# Patient Record
Sex: FEMALE | Race: WHITE | NOT HISPANIC OR LATINO | Employment: OTHER | URBAN - METROPOLITAN AREA
[De-identification: names, ages, dates, MRNs, and addresses within clinical notes are randomized per-mention and may not be internally consistent; named-entity substitution may affect disease eponyms.]

---

## 2017-05-16 ENCOUNTER — ALLSCRIPTS OFFICE VISIT (OUTPATIENT)
Dept: OTHER | Facility: OTHER | Age: 47
End: 2017-05-16

## 2017-05-16 ENCOUNTER — HOSPITAL ENCOUNTER (OUTPATIENT)
Dept: RADIOLOGY | Facility: HOSPITAL | Age: 47
Discharge: HOME | End: 2017-05-16
Attending: ORTHOPAEDIC SURGERY
Payer: COMMERCIAL

## 2017-05-16 DIAGNOSIS — M25.569 PAIN IN KNEE: ICD-10-CM

## 2017-05-16 DIAGNOSIS — M16.12 PRIMARY OSTEOARTHRITIS OF LEFT HIP: ICD-10-CM

## 2017-05-16 DIAGNOSIS — M25.552 PAIN IN LEFT HIP: ICD-10-CM

## 2017-05-16 PROCEDURE — 73502 X-RAY EXAM HIP UNI 2-3 VIEWS: CPT

## 2017-05-24 ENCOUNTER — APPOINTMENT (OUTPATIENT)
Dept: PHYSICAL THERAPY | Facility: REHABILITATION | Age: 47
End: 2017-05-24
Payer: COMMERCIAL

## 2017-05-24 DIAGNOSIS — M25.552 PAIN IN LEFT HIP: ICD-10-CM

## 2017-05-24 DIAGNOSIS — M25.569 PAIN IN KNEE: ICD-10-CM

## 2017-05-24 PROCEDURE — 97162 PT EVAL MOD COMPLEX 30 MIN: CPT

## 2017-05-24 PROCEDURE — 97110 THERAPEUTIC EXERCISES: CPT

## 2017-05-30 ENCOUNTER — APPOINTMENT (OUTPATIENT)
Dept: PHYSICAL THERAPY | Facility: REHABILITATION | Age: 47
End: 2017-05-30
Payer: COMMERCIAL

## 2017-05-30 PROCEDURE — 97110 THERAPEUTIC EXERCISES: CPT

## 2017-05-30 PROCEDURE — 97140 MANUAL THERAPY 1/> REGIONS: CPT

## 2017-06-05 ENCOUNTER — APPOINTMENT (OUTPATIENT)
Dept: PHYSICAL THERAPY | Facility: REHABILITATION | Age: 47
End: 2017-06-05
Payer: COMMERCIAL

## 2017-06-05 PROCEDURE — 97110 THERAPEUTIC EXERCISES: CPT

## 2017-06-08 ENCOUNTER — APPOINTMENT (OUTPATIENT)
Dept: PHYSICAL THERAPY | Facility: REHABILITATION | Age: 47
End: 2017-06-08
Payer: COMMERCIAL

## 2017-06-08 PROCEDURE — 97140 MANUAL THERAPY 1/> REGIONS: CPT

## 2017-06-08 PROCEDURE — 97110 THERAPEUTIC EXERCISES: CPT

## 2017-06-13 ENCOUNTER — APPOINTMENT (OUTPATIENT)
Dept: PHYSICAL THERAPY | Facility: REHABILITATION | Age: 47
End: 2017-06-13
Payer: COMMERCIAL

## 2017-06-13 PROCEDURE — 97110 THERAPEUTIC EXERCISES: CPT

## 2017-06-13 PROCEDURE — 97140 MANUAL THERAPY 1/> REGIONS: CPT

## 2017-06-16 ENCOUNTER — APPOINTMENT (OUTPATIENT)
Dept: PHYSICAL THERAPY | Facility: REHABILITATION | Age: 47
End: 2017-06-16
Payer: COMMERCIAL

## 2017-06-16 PROCEDURE — 97140 MANUAL THERAPY 1/> REGIONS: CPT

## 2017-06-16 PROCEDURE — 97110 THERAPEUTIC EXERCISES: CPT

## 2017-06-19 ENCOUNTER — APPOINTMENT (OUTPATIENT)
Dept: PHYSICAL THERAPY | Facility: REHABILITATION | Age: 47
End: 2017-06-19
Payer: COMMERCIAL

## 2017-06-20 ENCOUNTER — APPOINTMENT (OUTPATIENT)
Dept: PHYSICAL THERAPY | Facility: REHABILITATION | Age: 47
End: 2017-06-20
Payer: COMMERCIAL

## 2017-06-20 PROCEDURE — 97110 THERAPEUTIC EXERCISES: CPT

## 2017-06-20 PROCEDURE — 97140 MANUAL THERAPY 1/> REGIONS: CPT

## 2017-06-23 ENCOUNTER — APPOINTMENT (OUTPATIENT)
Dept: PHYSICAL THERAPY | Facility: REHABILITATION | Age: 47
End: 2017-06-23
Payer: COMMERCIAL

## 2017-06-26 ENCOUNTER — APPOINTMENT (OUTPATIENT)
Dept: PHYSICAL THERAPY | Facility: REHABILITATION | Age: 47
End: 2017-06-26
Payer: COMMERCIAL

## 2017-06-26 PROCEDURE — 97110 THERAPEUTIC EXERCISES: CPT

## 2017-06-26 PROCEDURE — 97140 MANUAL THERAPY 1/> REGIONS: CPT

## 2017-06-29 ENCOUNTER — APPOINTMENT (OUTPATIENT)
Dept: PHYSICAL THERAPY | Facility: REHABILITATION | Age: 47
End: 2017-06-29
Payer: COMMERCIAL

## 2017-06-29 PROCEDURE — 97110 THERAPEUTIC EXERCISES: CPT

## 2017-06-29 PROCEDURE — 97140 MANUAL THERAPY 1/> REGIONS: CPT

## 2017-07-03 ENCOUNTER — APPOINTMENT (OUTPATIENT)
Dept: PHYSICAL THERAPY | Facility: REHABILITATION | Age: 47
End: 2017-07-03
Payer: COMMERCIAL

## 2017-07-03 PROCEDURE — 97140 MANUAL THERAPY 1/> REGIONS: CPT

## 2017-07-03 PROCEDURE — 97110 THERAPEUTIC EXERCISES: CPT

## 2017-07-06 ENCOUNTER — APPOINTMENT (OUTPATIENT)
Dept: PHYSICAL THERAPY | Facility: REHABILITATION | Age: 47
End: 2017-07-06
Payer: COMMERCIAL

## 2017-07-06 ENCOUNTER — GENERIC CONVERSION - ENCOUNTER (OUTPATIENT)
Dept: OTHER | Facility: OTHER | Age: 47
End: 2017-07-06

## 2017-07-06 PROCEDURE — 97110 THERAPEUTIC EXERCISES: CPT

## 2017-07-06 PROCEDURE — 97140 MANUAL THERAPY 1/> REGIONS: CPT

## 2017-07-11 ENCOUNTER — APPOINTMENT (OUTPATIENT)
Dept: PHYSICAL THERAPY | Facility: REHABILITATION | Age: 47
End: 2017-07-11
Payer: COMMERCIAL

## 2017-07-11 PROCEDURE — 97140 MANUAL THERAPY 1/> REGIONS: CPT

## 2017-07-11 PROCEDURE — 97110 THERAPEUTIC EXERCISES: CPT

## 2017-07-14 ENCOUNTER — APPOINTMENT (OUTPATIENT)
Dept: PHYSICAL THERAPY | Facility: REHABILITATION | Age: 47
End: 2017-07-14
Payer: COMMERCIAL

## 2017-07-14 PROCEDURE — 97140 MANUAL THERAPY 1/> REGIONS: CPT

## 2017-07-14 PROCEDURE — 97110 THERAPEUTIC EXERCISES: CPT

## 2017-07-17 ENCOUNTER — APPOINTMENT (OUTPATIENT)
Dept: PHYSICAL THERAPY | Facility: REHABILITATION | Age: 47
End: 2017-07-17
Payer: COMMERCIAL

## 2017-07-17 PROCEDURE — 97140 MANUAL THERAPY 1/> REGIONS: CPT

## 2017-07-17 PROCEDURE — 97110 THERAPEUTIC EXERCISES: CPT

## 2017-07-20 ENCOUNTER — APPOINTMENT (OUTPATIENT)
Dept: PHYSICAL THERAPY | Facility: REHABILITATION | Age: 47
End: 2017-07-20
Payer: COMMERCIAL

## 2017-07-25 ENCOUNTER — APPOINTMENT (OUTPATIENT)
Dept: PHYSICAL THERAPY | Facility: REHABILITATION | Age: 47
End: 2017-07-25
Payer: COMMERCIAL

## 2017-07-27 ENCOUNTER — GENERIC CONVERSION - ENCOUNTER (OUTPATIENT)
Dept: OTHER | Facility: OTHER | Age: 47
End: 2017-07-27

## 2017-07-27 ENCOUNTER — APPOINTMENT (OUTPATIENT)
Dept: PHYSICAL THERAPY | Facility: REHABILITATION | Age: 47
End: 2017-07-27
Payer: COMMERCIAL

## 2017-07-27 PROCEDURE — 97110 THERAPEUTIC EXERCISES: CPT

## 2017-07-27 PROCEDURE — 97140 MANUAL THERAPY 1/> REGIONS: CPT

## 2017-08-30 ENCOUNTER — GENERIC CONVERSION - ENCOUNTER (OUTPATIENT)
Dept: OTHER | Facility: OTHER | Age: 47
End: 2017-08-30

## 2017-10-11 ENCOUNTER — ALLSCRIPTS OFFICE VISIT (OUTPATIENT)
Dept: OTHER | Facility: OTHER | Age: 47
End: 2017-10-11

## 2017-10-13 NOTE — PROGRESS NOTES
Assessment  1  Arm numbness (782 0) (R20 0)    Plan  Arm numbness    · *1 - [facility]  *  Status: Active  Requested for: 96NDL8401  Care Summary provided  : Yes   · *1 - [facility]  *  Status: Active  Requested for:  10THN4172  Care Summary provided  : Yes   · ASS Instruction Sheet Numbness Given; Status:Complete;   Done: 23WLL7966    Discussion/Summary    Scribe attestation:  Jess Patsy am acting as a scribe while in the presence of the attending physician  Attestation:  Angeles Sepulveda MD, personally performed the services described in this documentation as scribed in my presence  likelihood of symptoms not caused by carpal tunnel syndrome, but rather a broader neurologic problem  This is given the fact that the patient has multiple areas of nerve aggravation, weakness to multiple muscles, she describes numbness on both the dorsal and palmar aspect of the hand, she did not get better with carpal tunnel injections  follow-up with neurology and/or pain management  as needed I do not feel that surgical intervention in the form of a carpal tunnel release will benefit this patient  Chief Complaint  1  Hand Problem  Patient presents for follow-up of numbness and tingling in both hands  History of Present Illness  HPI: Patient is a 14-year-old, female who presents for follow-up to numbness tingling and weakness in both hands  He was last seen in the office on 8/30/17  EMG testing revealed a mild median neuropathy in both wrists  At last office visit, she received bilateral bilateral corticosteroid injections into both wrists, she was also advised to wear volar wrist splints at bedtime  Patient admits wearing splints as directed with no relief of symptoms she further admits no relief of symptoms after corticosteroid injections  He continues to complain of numbness and tingling in both hands all fingers both palmar and dorsal aspects   She notes bilateral hand pain especially with finger flexion  As well as, hand and arm weakness  PMH is significant for fibromyalgia  past medical, surgical, family, and social history as well as medications and allergies were reviewed  Updates as needed were performed  Review of systems was recorded on a separate intake sheet, this was reviewed as well  Review of Systems    Constitutional: No fever, no chills, feels well, no tiredness, no recent weight gain or loss  Eyes: No complaints of eyesight problems, no red eyes  ENT: no loss of hearing, no nosebleeds, no sore throat  Cardiovascular: No complaints of chest pain, no palpitations, no leg claudication or lower extremity edema  Respiratory: no compliants of shortness of breath, no wheezing, no cough  Gastrointestinal: no complaints of abdominal pain, no constipation, no nausea or diarrhea, no vomiting, no bloody stools  Genitourinary: no complaints of dysuria, no incontinence  Musculoskeletal: as noted in HPI  Integumentary: no complaints of skin rash or lesion, no itching or dry skin, no skin wounds  Neurological: as noted in HPI  Endocrine: No complaints of muscle weakness, no feelings of weakness, no frequent urination, no excessive thirst    Psychiatric: No suicidal thoughts, no anxiety, no feelings of depression  ROS reviewed  Active Problems  1  Arm numbness (782 0) (R20 0)   2  Arthritis, degenerative (715 90) (M19 90)   3  Asthma (493 90) (J45 909)   4  Bilateral low back pain with sciatica, sciatica laterality unspecified   5  Chronic back pain greater than 3 months duration (724 5,338 29) (M54 9,G89 29)   6  Knee injury, left, initial encounter (959 7) (S89 92XA)   7  Knee pain (719 46) (M25 569)   8  Left hip pain (719 45) (M25 552)   9  Postop check (V67 00) (Z09)   10  Primary localized osteoarthritis of left hip (715 15) (M16 12)   11  Primary osteoarthritis of left knee (715 16) (M17 12)   12   Tear of lateral meniscus of knee, left, sequela    Current Meds   1  Azelastine HCl - 0 05 % Ophthalmic Solution; Therapy: 42AFH6088 to Recorded   2  [employer] Cortisone Maximum Strength 1 % External Cream;   Therapy: 47Lov8513 to Recorded   3  DULoxetine HCl - 60 MG Oral Capsule Delayed Release Particles; Therapy: 06GVT0913 to Recorded   4  Ferrous Sulfate 325 (65 Fe) MG Oral Tablet Recorded   5  Hydrocortisone 2 5 % External Cream;   Therapy: 73Ern8037 to Recorded   6  Lisinopril 10 MG Oral Tablet; Therapy: 07KLV9458 to Recorded   7  Loratadine 10 MG Oral Tablet Recorded   8  Meloxicam 15 MG Oral Tablet; TAKE 1 TABLET DAILY WITH FOOD; Therapy: 73UNP7921 to (Evaluate:27Fqm6369)  Requested for: 55BVK5735; Last   Rx:56Rrs6662 Ordered   9  Meloxicam 7 5 MG Oral Tablet; Take 1 tablet twice daily as needed; Therapy: 85MHK9710 to (Evaluate:84Mbo8916)  Requested for: 24OUM5867; Last   Rx:43Qtu2164 Ordered   10  Oseltamivir Phosphate 75 MG Oral Capsule; Therapy: 43XUT2751 to Recorded   11  Simvastatin 40 MG Oral Tablet Recorded   12  Simvastatin 40 MG Oral Tablet; Therapy: 11Yra5327 to Recorded   13  Symbicort 160-4 5 MCG/ACT Inhalation Aerosol; Therapy: 45Fst2776 to Recorded   14  TraMADol HCl - 50 MG Oral Tablet; TAKE 1 TO 2 TABLETS EVERY 4 TO 6 HOURS AS    NEEDED FOR PAIN;    Therapy: 13FED7005 to (Evaluate:49Njh9023); Last Rx:71Sge3086 Ordered   15  TraZODone HCl - 50 MG Oral Tablet Recorded   16  Ventolin  (90 Base) MCG/ACT Inhalation Aerosol Solution; INHALE 2 PUFFS    EVERY 4-6 HOURS AS NEEDED; Therapy: 26GAL5116 to (Evaluate:06Jun2013); Last Rx:52Zis3455 Ordered   17  Vitamin D 2000 UNIT Oral Capsule Recorded   18  Vitamin D 2000 UNIT Oral Capsule; Therapy: 54Bme3881 to Recorded    Allergies  1   No Known Drug Allergies    Vitals  Signs   Heart Rate: 74  Systolic: 664  Diastolic: 85  Height: 5 ft 6 in  Weight: 274 lb 6 oz  BMI Calculated: 44 29  BSA Calculated: 2 29    Physical Exam      General: No acute distress, age-appropriate  Neck: Supple, trachea midline  HEENT: Normocephalic atraumatic, mucous membranes are moist, sclera are nonicteric  Cardiovascular: No discernable arrhythmia  Respiratory: Breathing is even and unlabored, no stridor or audible wheezing  Psychiatric: Awake alert and oriented x3, normal mood and affect  Abdomen: Without rebound or guarding  Bilateral upper extremities: Patient has a positive Tinel's ranging from the wrists to shoulders including the median nerves at the level of the infraclavicular area of the brachial plexus    Weakness to wrist extension on the left  Nearly full composite fist formation of the left with distraction, however with observation, patient only able to make a fist 50% of the way  She has global weakness of anterior interosseous muscles bilaterally, weakness abductor pollicis brevis bilaterally, weakness of biceps muscles bilaterally  full fist formation on the right mild interosseous weakness  Skin: was evaluated and demonstrated no masses, no abrasions, no erythema, no effusion, no edema, no fluctuance, no induration, no laceration, no ulceration, no lymphadenopathy  Left Upper Neurovascular:   2+ bilateral radial pulses  Results/Data    Diagnostic Review EMG demonstrates a very mild median neuropathy across both wrists  Future Appointments    Date/Time Provider Specialty Site   10/24/2017 12:10 PM ALEJANDRA Blanco   Orthopedic Surgery [address]     Signatures   Electronically signed by : HALINA Goode; Oct 11 2017  2:16PM EST                       (Author)    Electronically signed by : ALEJANDRA Rowell ; Oct 11 2017  7:15PM EST                       (Author)

## 2017-10-24 ENCOUNTER — GENERIC CONVERSION - ENCOUNTER (OUTPATIENT)
Dept: OTHER | Facility: OTHER | Age: 47
End: 2017-10-24

## 2017-12-12 ENCOUNTER — GENERIC CONVERSION - ENCOUNTER (OUTPATIENT)
Dept: OTHER | Facility: OTHER | Age: 47
End: 2017-12-12

## 2017-12-12 ENCOUNTER — CLINICAL SUPPORT (OUTPATIENT)
Dept: OTHER | Facility: OTHER | Age: 47
End: 2017-12-12

## 2017-12-28 ENCOUNTER — ALLSCRIPTS OFFICE VISIT (OUTPATIENT)
Dept: OTHER | Facility: OTHER | Age: 47
End: 2017-12-28

## 2017-12-29 ENCOUNTER — GENERIC CONVERSION - ENCOUNTER (OUTPATIENT)
Dept: OTHER | Facility: OTHER | Age: 47
End: 2017-12-29

## 2017-12-29 DIAGNOSIS — M54.16 RADICULOPATHY OF LUMBAR REGION: ICD-10-CM

## 2017-12-29 DIAGNOSIS — M54.12 RADICULOPATHY OF CERVICAL REGION: ICD-10-CM

## 2017-12-30 NOTE — PROGRESS NOTES
Assessment   1  Primary osteoarthritis of left knee (715 16) (M17 12)    Plan   Knee injury, left, initial encounter    · Euflexxa 20 MG/2ML Intra-articular Solution Prefilled Syringe  Primary osteoarthritis of left knee    · Follow-up visit in 1 week Evaluation and Treatment  Follow-up  Status: Hold For -    Scheduling  Requested for: 88Ylv5257    Discussion/Summary   Left knee DJD  Plan is as follows:          Weightbearing as tolerated          Viscosupplementation injection was administered which patient tolerated well to the left knee (Euflexxa)          Follow-up in 1 week for the 2nd of a series of 3 injections          Discussed treatment plan with patient and she is in agreement treatment plan  Thank you         Chief Complaint   1  Knee Pain    History of Present Illness   HPI: 54-year-old female presents at this time secondary to left knee DJD  Patient has failed steroid injection treatment to the left knee  Patient continues to have pain over the medial aspect of the knee  Review of Systems      ROS reviewed  Active Problems   1  Arm numbness (782 0) (R20 0)   2  Arthritis, degenerative (715 90) (M19 90)   3  Asthma (493 90) (J45 909)   4  Bilateral low back pain with sciatica, sciatica laterality unspecified   5  Carpal tunnel syndrome, bilateral (354 0) (G56 03)   6  Cervical radiculopathy (723 4) (M54 12)   7  Chronic back pain greater than 3 months duration (724 5,338 29) (M54 9,G89 29)   8  Degenerative disc disease, cervical (722 4) (M50 30)   9  Knee injury, left, initial encounter (959 7) (S89 92XA)   10  Knee pain (719 46) (M25 569)   11  Left hip pain (719 45) (M25 552)   12  Lumbar radiculopathy (724 4) (M54 16)   13  Postop check (V67 00) (Z09)   14  Primary localized osteoarthritis of left hip (715 15) (M16 12)   15  Primary osteoarthritis of left knee (715 16) (M17 12)   16  Sacroiliitis (720 2) (M46 1)   17   Tear of lateral meniscus of knee, left, sequela    Past Medical History    · History of Anxiety (300 00) (F41 9)   · Arthritis, degenerative (715 90) (M19 90)   · Asthma (493 90) (J45 909)   · History of arthritis (V13 4) (Z87 39)   · History of depression (V11 8) (Z86 59)   · History of fibromyalgia (V13 59) (Z87 39)   · History of hypercholesterolemia (V12 29) (Z86 39)   · History of malignant neoplasm (V10 90) (Z85 9)     The active problems and past medical history were reviewed and updated today  Surgical History    · History of Hip Surgery   · History of Hysterectomy   · History of Knee Surgery     The surgical history was reviewed and updated today  Family History   Mother    · Family history of arthritis (V17 7) (Z82 61)   · Family history of diabetes mellitus (V18 0) (Z83 3)  Father    · Family history of arthritis (V17 7) (Z82 61)   · Family history of diabetes mellitus (V18 0) (Z83 3)   · Family history of malignant neoplasm (V16 9) (Z80 9)  Sister    · Family history of diabetes mellitus (V18 0) (Z83 3)  Brother    · Family history of diabetes mellitus (V18 0) (Z83 3)  Unknown    · Family history of Back disorder   · Family history of hypertension (V17 49) (Z82 49)   · Family history of malignant neoplasm of thyroid (V16 8) (Z80 8)     The family history was reviewed and updated today  Social History    · Never a smoker   · No alcohol use  The social history was reviewed and updated today  Current Meds    1  Azelastine HCl - 0 05 % Ophthalmic Solution; Therapy: 62BHU5469 to Recorded   2  BuPROPion HCl ER (XL) 300 MG Oral Tablet Extended Release 24 Hour; Therapy: 74XXK8428 to (Evaluate:27Jan2018) Recorded   3  [employer] Cortisone Maximum Strength 1 % External Cream;     Therapy: 84Qpd9925 to Recorded   4  DULoxetine HCl - 60 MG Oral Capsule Delayed Release Particles; Therapy: 40AYA6828 to Recorded   5  Euflexxa 20 MG/2ML Intra-articular Solution Prefilled Syringe; Therapy: 42FQD2966 to (Evaluate:18Jan2018) Recorded   6   Ferrous Sulfate 325 (65 Fe) MG Oral Tablet Recorded   7  Gabapentin 300 MG Oral Capsule; TAKE 1 CAPSULE 3 TIMES DAILY; Therapy: 35FHP6009 to (Evaluate:29Mar2018)  Requested for: 85Hkf9756; Last     Rx:74Npi4778 Ordered   8  Hydrocortisone 2 5 % External Cream;     Therapy: 88Xba6027 to Recorded   9  Lisinopril 10 MG Oral Tablet; Therapy: 77FTL7434 to Recorded   10  Loratadine 10 MG Oral Tablet Recorded   11  Meloxicam 15 MG Oral Tablet; TAKE 1 TABLET DAILY WITH FOOD; Therapy: 55IIU3665 to (Evaluate:76Jue3052)  Requested for: 96UHP9563; Last      Rx:83Jtt2046 Ordered   12  Oseltamivir Phosphate 75 MG Oral Capsule; Therapy: 54ZQF5606 to Recorded   13  Simvastatin 40 MG Oral Tablet Recorded   14  Simvastatin 40 MG Oral Tablet; Therapy: 27Ybx6206 to Recorded   15  Symbicort 160-4 5 MCG/ACT Inhalation Aerosol; Therapy: 07Dus3910 to Recorded   16  TraMADol HCl - 50 MG Oral Tablet; Therapy: 57Lqz7031 to Recorded   17  TraZODone HCl - 50 MG Oral Tablet Recorded   18  Ventolin  (90 Base) MCG/ACT Inhalation Aerosol Solution; INHALE 2 PUFFS      EVERY 4-6 HOURS AS NEEDED; Therapy: 33NBB9535 to (Evaluate:06Jun2013); Last Rx:75Kgx6226 Ordered   19  Vitamin D 2000 UNIT Oral Capsule Recorded   20  Vitamin D 2000 UNIT Oral Capsule; Therapy: 02Goi0903 to Recorded     The medication list was reviewed and updated today  Allergies   1  iodine  2   Pollen    Vitals   Signs   Heart Rate: 80  Respiration: 20  Systolic: 619  Diastolic: 81  Weight: 341 lb     Physical Exam   Left knee:  No abrasions, no open wounds, no effusion, Joselyn tenderness, minimal lateral joint line tenderness, stable to coronal and sagittal plane stress, full range of motion, neurologically and vascularly intact distally                  Constitutional - General appearance: Normal       Musculoskeletal - Gait and station: Abnormal -- Digits and nails: Normal -- Muscle strength/tone: Normal -- Lower extremity compartments: Normal       Cardiovascular - Pulses: Normal -- Examination of extremities for edema and/or varicosities: Normal       Skin - Skin and subcutaneous tissue: Normal       Neurologic - Sensation: Normal       Psychiatric - Orientation to person, place, and time: Normal -- Mood and affect: Normal       Procedure   Injection of Euflexxa to the left knee secondary to pain and osteoarthritis  Patient total pros and cons  Alcohol ethyl chloride spray was used  Patient tolerated injection well  Band-Aid was placed  Future Appointments      Date/Time Provider Specialty Site   01/04/2018 03:40 PM ALEJANDRA Nava  Orthopedic Surgery [facility]   01/11/2018 01:10 PM ALEJANDRA Nava   Orthopedic Surgery [facility] [address] [place]   02/23/2018 11:30 AM Aron Kinney DO Pain Management [address]     Signatures    Electronically signed by : ALEJANDRA Gamboa ; Dec 29 2017 11:32AM EST                       (Author)

## 2018-01-04 ENCOUNTER — GENERIC CONVERSION - ENCOUNTER (OUTPATIENT)
Dept: OTHER | Facility: OTHER | Age: 48
End: 2018-01-04

## 2018-01-11 ENCOUNTER — GENERIC CONVERSION - ENCOUNTER (OUTPATIENT)
Dept: OTHER | Facility: OTHER | Age: 48
End: 2018-01-11

## 2018-01-11 ENCOUNTER — APPOINTMENT (OUTPATIENT)
Dept: PHYSICAL THERAPY | Facility: REHABILITATION | Age: 48
End: 2018-01-11
Payer: COMMERCIAL

## 2018-01-13 VITALS
HEART RATE: 77 BPM | SYSTOLIC BLOOD PRESSURE: 146 MMHG | DIASTOLIC BLOOD PRESSURE: 90 MMHG | WEIGHT: 262.13 LBS | BODY MASS INDEX: 42.31 KG/M2 | RESPIRATION RATE: 20 BRPM

## 2018-01-14 VITALS
WEIGHT: 274.38 LBS | HEIGHT: 66 IN | DIASTOLIC BLOOD PRESSURE: 85 MMHG | BODY MASS INDEX: 44.1 KG/M2 | SYSTOLIC BLOOD PRESSURE: 114 MMHG | HEART RATE: 74 BPM

## 2018-01-16 ENCOUNTER — GENERIC CONVERSION - ENCOUNTER (OUTPATIENT)
Dept: OTHER | Facility: OTHER | Age: 48
End: 2018-01-16

## 2018-01-19 ENCOUNTER — APPOINTMENT (OUTPATIENT)
Dept: PHYSICAL THERAPY | Facility: REHABILITATION | Age: 48
End: 2018-01-19
Payer: COMMERCIAL

## 2018-01-19 DIAGNOSIS — M54.12 RADICULOPATHY OF CERVICAL REGION: ICD-10-CM

## 2018-01-19 DIAGNOSIS — M54.16 RADICULOPATHY OF LUMBAR REGION: ICD-10-CM

## 2018-01-19 PROCEDURE — 97162 PT EVAL MOD COMPLEX 30 MIN: CPT

## 2018-01-19 PROCEDURE — G8979 MOBILITY GOAL STATUS: HCPCS

## 2018-01-19 PROCEDURE — 97110 THERAPEUTIC EXERCISES: CPT

## 2018-01-19 PROCEDURE — G8978 MOBILITY CURRENT STATUS: HCPCS

## 2018-01-22 ENCOUNTER — GENERIC CONVERSION - ENCOUNTER (OUTPATIENT)
Dept: OTHER | Facility: OTHER | Age: 48
End: 2018-01-22

## 2018-01-22 VITALS
WEIGHT: 274.38 LBS | SYSTOLIC BLOOD PRESSURE: 126 MMHG | BODY MASS INDEX: 44.1 KG/M2 | HEIGHT: 66 IN | HEART RATE: 77 BPM | DIASTOLIC BLOOD PRESSURE: 84 MMHG

## 2018-01-22 VITALS
WEIGHT: 280 LBS | SYSTOLIC BLOOD PRESSURE: 156 MMHG | BODY MASS INDEX: 45.19 KG/M2 | HEART RATE: 80 BPM | RESPIRATION RATE: 20 BRPM | DIASTOLIC BLOOD PRESSURE: 79 MMHG

## 2018-01-23 VITALS
RESPIRATION RATE: 20 BRPM | BODY MASS INDEX: 46 KG/M2 | SYSTOLIC BLOOD PRESSURE: 135 MMHG | HEART RATE: 80 BPM | WEIGHT: 285 LBS | DIASTOLIC BLOOD PRESSURE: 81 MMHG

## 2018-01-23 NOTE — MISCELLANEOUS
Message   Recorded as Task   Date: 12/12/2017 11:23 AM, Created By: Chuy Momin   Task Name: Miscellaneous   Assigned To: Dipika Minor   Regarding Patient: Ihsan Peters, Status: Active   Comment:    Dipika Minor - 12 Dec 2017 11:23 AM     TASK CREATED  Pt called stating she missed her appt today 10:45 because she came outside to a flat tire  I asked if she called the office to let them know  She said she did not have her phone on her    Pt rescheduled for 12/29 at 7:15am    Kali Puentes - 12 Dec 2017 12:45 PM     TASK REPLIED TO: Previously Assigned To [address]   Electronically signed by : Lynsey Rust, ; Dec 12 2017 12:58PM EST                       (Author)

## 2018-01-23 NOTE — MISCELLANEOUS
Message   Recorded as Task   Date: 01/15/2018 11:53 AM, Created By: Darinel Valdez   Task Name: Miscellaneous   Assigned To: [facility]   Regarding Patient: Festus Carter, Status: Active   CommentLacey Ruizgeolaura - 15 Yoel 2018 11:53 AM     TASK CREATED  Pt called and hasn't started PT yet  Has her 1st visit on Monday  Has appt scheduled for a follow up on 2/23  Should she keep this appt  or wait until after he PT is finished? Please call [phone] or her cell phone [phone]   Gladys Giron - 15 Yoel 2018 12:17 PM     TASK EDITED   Shreya Hilliard - 15 Yoel 2018 5:01 PM     TASK REPLIED TO: Previously Assigned To Shreya Hilliard                      The patient can keep her follow-up appointment as she will have completed about a month of physical therapy at that time and we can evaluate whether not she is making progress   [facility] - 16 Jan 2018 8:11 AM     TASK EDITED  S/w pt  Advised pt of the same  Pt verbalized understanding  Active Problems    1  Arm numbness (782 0) (R20 0)   2  Arthritis, degenerative (715 90) (M19 90)   3  Asthma (493 90) (J45 909)   4  Bilateral low back pain with sciatica, sciatica laterality unspecified   5  Carpal tunnel syndrome, bilateral (354 0) (G56 03)   6  Cervical radiculopathy (723 4) (M54 12)   7  Chronic back pain greater than 3 months duration (724 5,338 29) (M54 9,G89 29)   8  Degenerative disc disease, cervical (722 4) (M50 30)   9  Knee injury, left, initial encounter (959 7) (S89 92XA)   10  Knee pain (719 46) (M25 569)   11  Left hip pain (719 45) (M25 552)   12  Lumbar radiculopathy (724 4) (M54 16)   13  Postop check (V67 00) (Z09)   14  Primary localized osteoarthritis of left hip (715 15) (M16 12)   15  Primary osteoarthritis of left knee (715 16) (M17 12)   16  Sacroiliitis (720 2) (M46 1)   17  Tear of lateral meniscus of knee, left, sequela    Current Meds   1  Azelastine HCl - 0 05 % Ophthalmic Solution; Therapy: 87ZMR9199 to Recorded   2  BuPROPion HCl ER (XL) 150 MG Oral Tablet Extended Release 24 Hour; Therapy: 04EPW1393 to (Evaluate:03Feb2018) Recorded   3  BuPROPion HCl ER (XL) 300 MG Oral Tablet Extended Release 24 Hour; Therapy: 97CQA6681 to (Evaluate:27Jan2018) Recorded   4  [employer] Cortisone Maximum Strength 1 % External Cream;   Therapy: 82Ryq6080 to Recorded   5  DULoxetine HCl - 60 MG Oral Capsule Delayed Release Particles; Therapy: 31WKM7952 to Recorded   6  Euflexxa 20 MG/2ML Intra-articular Solution Prefilled Syringe; Therapy: 70KMQ6650 to (Evaluate:18Jan2018) Recorded   7  Ferrous Sulfate 325 (65 Fe) MG Oral Tablet Recorded   8  Gabapentin 300 MG Oral Capsule; TAKE 1 CAPSULE 3 TIMES DAILY; Therapy: 32DBN4461 to (Evaluate:29Mar2018)  Requested for: 04Qrg3844; Last   Rx:78Ywc4970 Ordered   9  Hydrocortisone 2 5 % External Cream;   Therapy: 28Apr2017 to Recorded   10  Lisinopril 10 MG Oral Tablet; Therapy: 82MRT7120 to Recorded   11  Loratadine 10 MG Oral Tablet Recorded   12  Meloxicam 15 MG Oral Tablet; TAKE 1 TABLET DAILY WITH FOOD; Therapy: 48IMI2781 to (Evaluate:65Rax9574)  Requested for: 88IMY3560; Last    Rx:27Raq5973 Ordered   13  Oseltamivir Phosphate 75 MG Oral Capsule; Therapy: 22MJE5171 to Recorded   14  Simvastatin 40 MG Oral Tablet Recorded   15  Simvastatin 40 MG Oral Tablet; Therapy: 06Jng3129 to Recorded   16  Symbicort 160-4 5 MCG/ACT Inhalation Aerosol; Therapy: 62Nhg8336 to Recorded   17  TraMADol HCl - 50 MG Oral Tablet; Therapy: 92MLG3834 to Recorded   18  TraZODone HCl - 50 MG Oral Tablet Recorded   19  Ventolin  (90 Base) MCG/ACT Inhalation Aerosol Solution; INHALE 2 PUFFS    EVERY 4-6 HOURS AS NEEDED; Therapy: 78YAK2857 to (Evaluate:06Jun2013); Last Rx:06Feb2013 Ordered   20  Vitamin D 2000 UNIT Oral Capsule Recorded   21  Vitamin D 2000 UNIT Oral Capsule; Therapy: 35Flq4560 to Recorded    Allergies    1  iodine    2   Pollen    Signatures   Electronically signed by : Yaw Haddad Khurram Nevarez, ; Jan 16 2018  8:11AM EST                       (Author)

## 2018-01-23 NOTE — MISCELLANEOUS
Signatures   Electronically signed by : Nicole Magana DO; Dec 12 2017 11:58AM EST                       (Author)

## 2018-01-24 VITALS
BODY MASS INDEX: 46.81 KG/M2 | DIASTOLIC BLOOD PRESSURE: 88 MMHG | SYSTOLIC BLOOD PRESSURE: 140 MMHG | RESPIRATION RATE: 20 BRPM | WEIGHT: 290 LBS | HEART RATE: 82 BPM

## 2018-01-24 VITALS
HEART RATE: 79 BPM | BODY MASS INDEX: 45.96 KG/M2 | TEMPERATURE: 97.7 F | HEIGHT: 66 IN | SYSTOLIC BLOOD PRESSURE: 156 MMHG | DIASTOLIC BLOOD PRESSURE: 92 MMHG | WEIGHT: 286 LBS

## 2018-01-24 VITALS
DIASTOLIC BLOOD PRESSURE: 81 MMHG | HEART RATE: 91 BPM | SYSTOLIC BLOOD PRESSURE: 132 MMHG | WEIGHT: 291 LBS | RESPIRATION RATE: 18 BRPM | BODY MASS INDEX: 46.97 KG/M2

## 2018-01-26 ENCOUNTER — OFFICE VISIT (OUTPATIENT)
Dept: PHYSICAL THERAPY | Facility: REHABILITATION | Age: 48
End: 2018-01-26
Payer: COMMERCIAL

## 2018-01-26 DIAGNOSIS — M54.12 RADICULOPATHY, CERVICAL: Primary | ICD-10-CM

## 2018-01-26 PROCEDURE — 97110 THERAPEUTIC EXERCISES: CPT | Performed by: PHYSICAL THERAPIST

## 2018-01-26 PROCEDURE — 97112 NEUROMUSCULAR REEDUCATION: CPT | Performed by: PHYSICAL THERAPIST

## 2018-01-26 PROCEDURE — 97140 MANUAL THERAPY 1/> REGIONS: CPT | Performed by: PHYSICAL THERAPIST

## 2018-01-26 NOTE — PROGRESS NOTES
Daily Note     Today's date: 2018  Patient name: Puma Villegas  : 1970  MRN: [id number]  Referring provider: Sandy Agrawal DO  Dx:   Encounter Diagnosis   Name Primary?  Radiculopathy, cervical Yes                  Subjective: Patient reports full compliance with HEP for initial evaluation and states that her neck is sore pre-tx  Patient also reports HA pre-tx  Objective: See treatment diary below      Assessment: Tolerated treatment well  Patient demonstrated fatigue post treatment and exhibited good technqiue with therapeutic exercises  Patient performed self SOR but reported no change in symptoms  Palpation revealed significant tightness despite no change  Began TE as noted  F/u with symptoms next tx session  Plan: Continue per plan of care  Precautions: HLD, HTN, OA, Fibromyalgia    Daily Treatment Diary     Manual              Suboccipital release DM            C2 upglide B/L DM                                                       Exercise Diary              UBE 6'            AROM + overpressure in all planes 6x10"ea              Self SNAG B/L 6x10"            UT stretching 4x30"B/L            TMD - tongue on roof + opening 1x10x5"            Scap retractions with BTB 2x10x5"            Prone scap retractions NV            Self SOR 3'                                                                                                                                                                            Modalities

## 2018-01-29 ENCOUNTER — OFFICE VISIT (OUTPATIENT)
Dept: PHYSICAL THERAPY | Facility: REHABILITATION | Age: 48
End: 2018-01-29
Payer: COMMERCIAL

## 2018-01-29 DIAGNOSIS — M54.12 RADICULOPATHY, CERVICAL: Primary | ICD-10-CM

## 2018-01-29 PROCEDURE — 97140 MANUAL THERAPY 1/> REGIONS: CPT | Performed by: PHYSICAL THERAPIST

## 2018-01-29 PROCEDURE — 97112 NEUROMUSCULAR REEDUCATION: CPT | Performed by: PHYSICAL THERAPIST

## 2018-01-29 PROCEDURE — 97110 THERAPEUTIC EXERCISES: CPT | Performed by: PHYSICAL THERAPIST

## 2018-01-29 NOTE — PROGRESS NOTES
Daily Note     Today's date: 2018  Patient name: Linda Andrade  : 1970  MRN: [id number]  Referring provider: Juan Antonio Carvajal DO  Dx:   Encounter Diagnosis   Name Primary?  Radiculopathy, cervical Yes                  Subjective: Patient reports having "the same" pain since last tx session and states that she has been moderately compliant with her HEP  Patient also reports having a B/L temporal headache pre-tx  Objective: See treatment diary below      Assessment: Tolerated treatment well  Patient demonstrated fatigue post treatment and exhibited good technqiue with therapeutic exercises  Improved upper cervical rotation noted B/L  Added cervical traction + extension which pt tolerated well without radicular symptoms  Plan: Continue per plan of care  Precautions: HLD, HTN, OA, Fibromyalgia    Daily Treatment Diary     Manual             Suboccipital release DM DM           C2 upglide B/L DM DM           Cervical traction + extension  DM                                         Exercise Diary             UBE 6' 6'           AROM + overpressure in all planes 6x10"ea  6x10"ea             Self SNAG B/L 6x10" B/L 6x10" B/L           UT stretching 4x30"B/L            TMD - tongue on roof + opening 1x10x5" 1x10x5"           Scap retractions with BTB 2x10x5" 2x10x5"           Prone scap retractions NV [id number]"           Self SOR 3'                                                                                                                                                                            Modalities

## 2018-02-02 ENCOUNTER — OFFICE VISIT (OUTPATIENT)
Dept: PHYSICAL THERAPY | Facility: REHABILITATION | Age: 48
End: 2018-02-02
Payer: COMMERCIAL

## 2018-02-02 DIAGNOSIS — M54.12 RADICULOPATHY, CERVICAL: Primary | ICD-10-CM

## 2018-02-02 PROCEDURE — 97112 NEUROMUSCULAR REEDUCATION: CPT | Performed by: PHYSICAL THERAPIST

## 2018-02-02 PROCEDURE — 97140 MANUAL THERAPY 1/> REGIONS: CPT | Performed by: PHYSICAL THERAPIST

## 2018-02-02 PROCEDURE — 97110 THERAPEUTIC EXERCISES: CPT | Performed by: PHYSICAL THERAPIST

## 2018-02-02 NOTE — PROGRESS NOTES
Daily Note     Today's date: 2018  Patient name: Armaan Ibanez  : 1970  MRN: [id number]  Referring provider: Rick Riggins DO  Dx:   Encounter Diagnosis   Name Primary?  Radiculopathy, cervical Yes                  Subjective: Patient reports having persistent headaches since last tx session and states that she has had minimal improvements thus far  Patient reports moderate HEP compliance  Patient reported less HA intensity at the end of the treatment session than when she came in (50% reduction)  Objective: See treatment diary below      Assessment: Tolerated treatment well  Patient demonstrated fatigue post treatment, exhibited good technique with therapeutic exercises and would benefit from continued PT  Continued extension based treatment including self progression with 1/2 foam roll used to upper thoracic PA mobilization  Plan: Continue plan of care  Precautions: HLD, HTN, OA, Fibromyalgia    Daily Treatment Diary     Manual            Suboccipital release DM DM DM          C2 upglide B/L DM DM DM          Cervical traction + extension  DM DM                                        Exercise Diary            UBE 6' 6' 6'          AROM + overpressure in all planes 6x10"ea  6x10"ea             Self SNAG B/L 6x10" B/L 6x10" B/L 6x10" B/L          UT stretching 4x30"B/L            TMD - tongue on roof + opening 1x10x5" 1x10x5"           Scap retractions with BTB 2x10x5" 2x10x5" 2x10x5"          Prone scap retractions NV 2p70x14" 4e72i51"          Self SOR 3' 3'           1/2 foam roll - horizontal + cervical retraction   5'                                                                                                                                                             Modalities

## 2018-02-05 ENCOUNTER — APPOINTMENT (OUTPATIENT)
Dept: PHYSICAL THERAPY | Facility: REHABILITATION | Age: 48
End: 2018-02-05
Payer: COMMERCIAL

## 2018-02-06 ENCOUNTER — OFFICE VISIT (OUTPATIENT)
Dept: PHYSICAL THERAPY | Facility: REHABILITATION | Age: 48
End: 2018-02-06
Payer: COMMERCIAL

## 2018-02-06 DIAGNOSIS — M54.12 RADICULOPATHY, CERVICAL: Primary | ICD-10-CM

## 2018-02-06 PROCEDURE — 97112 NEUROMUSCULAR REEDUCATION: CPT | Performed by: PHYSICAL THERAPIST

## 2018-02-06 PROCEDURE — 97140 MANUAL THERAPY 1/> REGIONS: CPT | Performed by: PHYSICAL THERAPIST

## 2018-02-06 PROCEDURE — 97110 THERAPEUTIC EXERCISES: CPT | Performed by: PHYSICAL THERAPIST

## 2018-02-06 NOTE — PROGRESS NOTES
Daily Note     Today's date: 2018  Patient name: Joshua Hill  : 1970  MRN: [id number]  Referring provider: Sandee Morrison DO  Dx:   Encounter Diagnosis   Name Primary?  Radiculopathy, cervical Yes                  Subjective: Patient reports having a resolved HA on Friday and 3/4 of the day on Saturday  Patient states that her HA returned and that she continued with B/L tremors at this time  Objective: See treatment diary below      Assessment: Tolerated treatment well  Patient demonstrated fatigue post treatment and exhibited good technique with therapeutic exercises  Patient continues to respond well to manual distraction with mild extension  B/L cervical rotation remains significantly limited despite patients report of HEP compliance  Continued postural based TE as noted  Plan: Continue per plan of care  Precautions: HLD, HTN, OA, Fibromyalgia    Daily Treatment Diary     Manual   02         Suboccipital release DM DM DM DM         C2 upglide B/L DM DM DM DM         Cervical distraction + extension  DM DM DM                                       Exercise Diary   0205         UBE 6' 6' 6' 6'         AROM + overpressure in all planes 6x10"ea  6x10"ea             Self SNAG B/L 6x10" B/L 6x10" B/L 6x10" B/L          UT stretching 4x30"B/L            TMD - tongue on roof + opening 1x10x5" 1x10x5"           Scap retractions with BTB 2x10x5" 2x10x5" 2x10x5" 2x10x5"         Prone scap retractions NV [id number]" [id number]" 1m23i69"         Self SOR 3' 3'           1/2 foam roll - horizontal + cervical retraction   5' 5'                                                                                                                                                            Modalities

## 2018-02-08 ENCOUNTER — APPOINTMENT (OUTPATIENT)
Dept: PHYSICAL THERAPY | Facility: REHABILITATION | Age: 48
End: 2018-02-08
Payer: COMMERCIAL

## 2018-02-12 ENCOUNTER — OFFICE VISIT (OUTPATIENT)
Dept: PHYSICAL THERAPY | Facility: REHABILITATION | Age: 48
End: 2018-02-12
Payer: COMMERCIAL

## 2018-02-12 DIAGNOSIS — M54.12 RADICULOPATHY, CERVICAL: Primary | ICD-10-CM

## 2018-02-12 PROCEDURE — 97110 THERAPEUTIC EXERCISES: CPT | Performed by: PHYSICAL THERAPIST

## 2018-02-12 PROCEDURE — 97140 MANUAL THERAPY 1/> REGIONS: CPT | Performed by: PHYSICAL THERAPIST

## 2018-02-12 NOTE — PROGRESS NOTES
Daily Note     Today's date: 2018  Patient name: Carlos A Phillips  : 1970  MRN: [id number]  Referring provider: Corry Mayorga DO  Dx:   Encounter Diagnosis   Name Primary?  Radiculopathy, cervical Yes                  Subjective: Patient reports persistent B/L UE symptoms and states that her HA frequency and intensity is mildly reducing  Patient reports compliance with her HEP  Objective: See treatment diary below      Assessment: Tolerated treatment well  Patient demonstrated fatigue post treatment and exhibited good technique with therapeutic exercises  Added prone MT TE secondary to significant weakness  Also progressed  strengthening HEP to include blue putty  Plan: Continue per plan of care  Precautions: HLD, HTN, OA, Fibromyalgia    Daily Treatment Diary     Manual          Suboccipital release DM DM DM DM 4'        C2 upglide B/L DM DM DM DM 3'        Cervical distraction + extension  DM DM DM x1 set                                      Exercise Diary          UBE 6' 6' 6' 6' 6'        AROM + overpressure in all planes 6x10"ea  6x10"ea             Self SNAG B/L 6x10" B/L 6x10" B/L 6x10" B/L          UT stretching 4x30"B/L            TMD - tongue on roof + opening 1x10x5" 1x10x5"           Scap retractions with BTB 2x10x5" 2x10x5" 2x10x5" 2x10x5" 2x10x5"        Prone scap retractions NV [id number]" [id number]" [id number]" [id number]"        Self SOR 3' 3'           1/2 foam roll - horizontal + cervical retraction   5' 5' 3'/ 10x10"        Prone MT     1# - 2x10 B/L        Blue putty     x50                                                                                                                                 Modalities

## 2018-02-15 ENCOUNTER — OFFICE VISIT (OUTPATIENT)
Dept: PHYSICAL THERAPY | Facility: REHABILITATION | Age: 48
End: 2018-02-15
Payer: COMMERCIAL

## 2018-02-15 DIAGNOSIS — M54.12 RADICULOPATHY, CERVICAL: Primary | ICD-10-CM

## 2018-02-15 PROCEDURE — 97112 NEUROMUSCULAR REEDUCATION: CPT | Performed by: PHYSICAL THERAPIST

## 2018-02-15 PROCEDURE — 97110 THERAPEUTIC EXERCISES: CPT | Performed by: PHYSICAL THERAPIST

## 2018-02-15 PROCEDURE — 97140 MANUAL THERAPY 1/> REGIONS: CPT | Performed by: PHYSICAL THERAPIST

## 2018-02-15 NOTE — PROGRESS NOTES
Daily Note     Today's date: 2/15/2018  Patient name: Armaan Ibanez  : 1970  MRN: [id number]  Referring provider: Rick Riggins DO  Dx:   Encounter Diagnosis   Name Primary?  Radiculopathy, cervical Yes                  Subjective: Patient reports having persistent UE symptoms but states that her cervical mobility is improving and her HA intensity and frequency continue to lessen  Objective: See treatment diary below      Assessment: Tolerated treatment well  Patient demonstrated fatigue post treatment and exhibited good technique with therapeutic exercises  Trialed mechanical traction but patient reported no change in symptoms except for worsening neck pain and was discontinued after 3 minutes  Continued all other TE as noted and pt tolerated well  Plan: Continue per plan of care  Precautions: HLD, HTN, OA, Fibromyalgia    Daily Treatment Diary     Manual  01/26 01/29 02/02 02/05 02/12 02/15       Suboccipital release DM DM DM DM 4' 4'       C2 upglide B/L DM DM DM DM 3' 3'       Cervical distraction + extension  DM DM DM x1 set x1                                     Exercise Diary  01/26 01/29 02/02 02/05 02/12 02/15       UBE 6' 6' 6' 6' 6' 6'       AROM + overpressure in all planes 6x10"ea  6x10"ea             Self SNAG B/L 6x10" B/L 6x10" B/L 6x10" B/L          UT stretching 4x30"B/L     4x30" B/L       TMD - tongue on roof + opening 1x10x5" 1x10x5"           Scap retractions with BTB 2x10x5" 2x10x5" 2x10x5" 2x10x5" 2x10x5" 2x10x5"       Prone scap retractions NV [id number]" [id number]" 1n96p72" 7k61a31" 0v77i72"       Self SOR 3' 3'           1/2 foam roll - horizontal + cervical retraction   5' 5' 3'/ 10x10" 3'/10x10"       Prone MT     1# - 2x10 B/L 1#  2x10  B/L       Blue putty     x50                                                                                                                                 Modalities

## 2018-02-19 ENCOUNTER — OFFICE VISIT (OUTPATIENT)
Dept: PHYSICAL THERAPY | Facility: REHABILITATION | Age: 48
End: 2018-02-19
Payer: COMMERCIAL

## 2018-02-19 DIAGNOSIS — M54.12 RADICULOPATHY, CERVICAL: Primary | ICD-10-CM

## 2018-02-19 PROCEDURE — 97140 MANUAL THERAPY 1/> REGIONS: CPT

## 2018-02-19 PROCEDURE — 97110 THERAPEUTIC EXERCISES: CPT

## 2018-02-19 NOTE — PROGRESS NOTES
Daily Note     Today's date: 2018  Patient name: Misael Given  : 1970  MRN: [id number]  Referring provider: Brielle Lockwood DO  Dx:   Encounter Diagnosis   Name Primary?  Radiculopathy, cervical Yes                  Subjective: Patient reports full compliance with HEP  Notes that she feels tremors throughout her body at times  Pt will be getting an MRI for her neck and spine  Complaints of a HA pre tx  Objective: See treatment diary below      Assessment: Tolerated treatment well  Patient demonstrated fatigue post treatment and exhibited good technqiue with therapeutic exercises  Patient performed self SOR but reported no change in symptoms  Palpation revealed significant tightness despite no change  Continued with all TE as noted having no increased pain or other symproms  Pt was sore post session  F/u with symptoms next tx session  Plan: Continue per plan of care  Precautions: HLD, HTN, OA, Fibromyalgia    Daily Treatment Diary     Manual             Suboccipital release DM MM           C2 upglide B/L DM                                                       Exercise Diary             UBE 6' 6'           AROM + overpressure in all planes 6x10"ea  6x10" ea             Self SNAG B/L 6x10" 6x10"           UT stretching 4x30"B/L 4x30" B/L           TMD - tongue on roof + opening 1x10x5" nt           Scap retractions with BTB 2x10x5" 2x10 5"           Prone scap retractions NV 2x10 #1           Self SOR 3' 3'                                                                                                                                                                           Modalities

## 2018-02-22 ENCOUNTER — OFFICE VISIT (OUTPATIENT)
Dept: PHYSICAL THERAPY | Facility: REHABILITATION | Age: 48
End: 2018-02-22
Payer: COMMERCIAL

## 2018-02-22 DIAGNOSIS — M54.12 RADICULOPATHY, CERVICAL: Primary | ICD-10-CM

## 2018-02-22 PROCEDURE — 97112 NEUROMUSCULAR REEDUCATION: CPT | Performed by: PHYSICAL THERAPIST

## 2018-02-22 PROCEDURE — 97110 THERAPEUTIC EXERCISES: CPT | Performed by: PHYSICAL THERAPIST

## 2018-02-22 PROCEDURE — 97140 MANUAL THERAPY 1/> REGIONS: CPT | Performed by: PHYSICAL THERAPIST

## 2018-02-22 NOTE — PROGRESS NOTES
Daily Note     Today's date: 2018  Patient name: Abdirahman Garza  : 1970  MRN: [id number]  Referring provider: Ambrose Mead DO  Dx:   Encounter Diagnosis     ICD-10-CM    1  Radiculopathy, cervical M54 12                   Subjective: Patient reports having decreased pain pre-tx but states that she continued to experience her "night tremors" and headaches  Patient also states that she is compliant with her HEP  Objective: See treatment diary below      Assessment: Tolerated treatment well  Patient exhibited good technique with therapeutic exercises  Patient continues with thoracic hypomobility as per [place] spring testing and reduced overall cervical mobility  Patient continues to demonstrate mild reduction of symptoms and centralization with extension progression  Continue to progress TE as tolerated  Plan: Continue per plan of care  Precautions: HLD, HTN, OA, Fibromyalgia    Daily Treatment Diary     Manual            Suboccipital release DM MM 5'          C2 upglide B/L DM  5'                                                     Exercise Diary            UBE 6' 6' 6'          AROM + overpressure in all planes 6x10"ea  6x10" ea             Self SNAG B/L 6x10" 6x10"           UT stretching 4x30"B/L 4x30" B/L           TMD - tongue on roof + opening 1x10x5" nt           Scap retractions with BTB 2x10x5" 2x10 5" 2x10x5"          Prone T's NV 2x10 #1 3x10  1#          Self SOR 3' 3'           Prone Y's   3x10          Prone scap retractions   3x10          Foam roller - T-spine PA   x5'                                                                                                                                   Modalities

## 2018-02-23 ENCOUNTER — CLINICAL SUPPORT (OUTPATIENT)
Dept: PAIN MEDICINE | Facility: CLINIC | Age: 48
End: 2018-02-23
Payer: COMMERCIAL

## 2018-02-23 VITALS
SYSTOLIC BLOOD PRESSURE: 148 MMHG | TEMPERATURE: 97.7 F | DIASTOLIC BLOOD PRESSURE: 93 MMHG | WEIGHT: 283 LBS | HEIGHT: 66 IN | BODY MASS INDEX: 45.48 KG/M2 | HEART RATE: 79 BPM

## 2018-02-23 DIAGNOSIS — G89.4 CHRONIC PAIN SYNDROME: ICD-10-CM

## 2018-02-23 DIAGNOSIS — M54.16 LUMBAR RADICULOPATHY: Primary | ICD-10-CM

## 2018-02-23 DIAGNOSIS — M54.12 CERVICAL RADICULOPATHY: ICD-10-CM

## 2018-02-23 DIAGNOSIS — M46.1 SACROILIITIS (HCC): ICD-10-CM

## 2018-02-23 DIAGNOSIS — G56.03 BILATERAL CARPAL TUNNEL SYNDROME: ICD-10-CM

## 2018-02-23 DIAGNOSIS — M50.30 DEGENERATIVE DISC DISEASE, CERVICAL: ICD-10-CM

## 2018-02-23 PROCEDURE — 99214 OFFICE O/P EST MOD 30 MIN: CPT | Performed by: ANESTHESIOLOGY

## 2018-02-23 RX ORDER — SIMVASTATIN 40 MG
TABLET ORAL
COMMUNITY
Start: 2017-08-30

## 2018-02-23 RX ORDER — AZELASTINE HYDROCHLORIDE 0.5 MG/ML
SOLUTION/ DROPS OPHTHALMIC
COMMUNITY
Start: 2017-05-22

## 2018-02-23 RX ORDER — LORATADINE 10 MG/1
TABLET ORAL
COMMUNITY

## 2018-02-23 RX ORDER — GABAPENTIN 300 MG/1
1 CAPSULE ORAL 3 TIMES DAILY
COMMUNITY
Start: 2017-12-29 | End: 2018-02-23 | Stop reason: SDUPTHER

## 2018-02-23 RX ORDER — TRAMADOL HYDROCHLORIDE 50 MG/1
TABLET ORAL
COMMUNITY
Start: 2017-12-29 | End: 2018-02-23

## 2018-02-23 RX ORDER — MELOXICAM 15 MG/1
1 TABLET ORAL DAILY
COMMUNITY
Start: 2017-05-16 | End: 2018-10-05

## 2018-02-23 RX ORDER — FERROUS SULFATE 325(65) MG
TABLET ORAL
COMMUNITY

## 2018-02-23 RX ORDER — TRAZODONE HYDROCHLORIDE 50 MG/1
TABLET ORAL
COMMUNITY
End: 2018-08-22 | Stop reason: ALTCHOICE

## 2018-02-23 RX ORDER — DULOXETIN HYDROCHLORIDE 60 MG/1
CAPSULE, DELAYED RELEASE ORAL
COMMUNITY
Start: 2017-04-05 | End: 2018-08-22 | Stop reason: ALTCHOICE

## 2018-02-23 RX ORDER — BUPROPION HYDROCHLORIDE 300 MG/1
TABLET ORAL
COMMUNITY
Start: 2017-12-15

## 2018-02-23 RX ORDER — GABAPENTIN 400 MG/1
400 CAPSULE ORAL 3 TIMES DAILY
Qty: 90 CAPSULE | Refills: 1 | Status: SHIPPED | OUTPATIENT
Start: 2018-02-23 | End: 2018-04-12 | Stop reason: SDUPTHER

## 2018-02-23 RX ORDER — HYALURONATE SODIUM 10 MG/ML
SYRINGE (ML) INTRAARTICULAR
COMMUNITY
Start: 2017-11-09 | End: 2018-10-05

## 2018-02-23 RX ORDER — DIAPER,BRIEF,INFANT-TODD,DISP
EACH MISCELLANEOUS
COMMUNITY
Start: 2017-08-29

## 2018-02-23 RX ORDER — LISINOPRIL 10 MG/1
TABLET ORAL
COMMUNITY
Start: 2017-05-01

## 2018-02-23 NOTE — PROGRESS NOTES
Assessment:  1  Lumbar radiculopathy    2  Chronic pain syndrome    3  Cervical radiculopathy    4  Bilateral carpal tunnel syndrome    5  Degenerative disc disease, cervical    6  Sacroiliitis [facility])        Plan:  50-year-old female returning for follow-up of neck pain that radiates into the C6-7 distribution of the left upper extremity  The patient does have carpal tunnel syndrome, however it does appear that she does have a radicular component to her pain as she does have a positive Spurling's on the left  She also complains of lumbosacral back pain which seems multifactorial in nature secondary to myofascial and facet mediated component  The patient does have evidence of sacroiliitis  There may be a radicular component as well as she does have numbness in bilateral feet  She has done about 5 weeks of physical therapy without any relief  She has titrated up on gabapentin to 300 mg t i d  and has noted some relief with this  She does continue to take tramadol 50 mg daily p r n  by her primary care physician and duloxetine 60 mg daily  At this point the patient has not improved with conservative therapy and I feel an MRI of her cervical and lumbar spines are indicated at this time  1   I will order an MRI of the patient's cervical and lumbar spine  2  I will increase the patient's gabapentin to 400 mg t i d  for neuropathic complaints  3  The patient will continue with duloxetine 60 mg daily  4  Patient may continue with tramadol as prescribed by her PCP  5  The patient may continue with meloxicam as prescribed  6  Patient will continue with her home exercise program  7    I will follow up the patient in 2 months and will call her with results of imaging once received    [place] Prescription Drug Monitoring Program report was reviewed and was appropriate       My impressions and treatment recommendations were discussed in detail with the patient who verbalized understanding and had no further questions  Discharge instructions were provided  I personally saw and examined the patient and I agree with the above discussed plan of care  No orders of the defined types were placed in this encounter  New Medications Ordered This Visit   Medications    VENTOLIN  (90 Base) MCG/ACT inhaler     Sig: INHALE 2 PUFFS EVERY 4 (FOUR) HOURS AS NEEDED FOR WHEEZING OR SHORTNESS OF BREATH  Refill:  2    azelastine (OPTIVAR) 0 05 % ophthalmic solution     Sig: Apply to eye    buPROPion (WELLBUTRIN XL) 300 mg 24 hr tablet     Sig: Take by mouth    Cholecalciferol 2000 units TABS     Sig: Take 2 capsules by mouth    hydrocortisone ([employer] CORTISONE MAXIMUM STRENGTH) 1 % cream     Sig: Apply topically    DULoxetine (CYMBALTA) 60 mg delayed release capsule     Sig: Take by mouth    Sodium Hyaluronate (EUFLEXXA) 20 MG/2ML SOSY     Sig: Inject into the joint    ferrous sulfate 325 (65 Fe) mg tablet     Sig: Take by mouth    fluticasone-salmeterol (ADVAIR DISKUS) 250-50 mcg/dose inhaler     Sig: Inhale 1 puff    gabapentin (NEURONTIN) 300 mg capsule     Sig: Take 1 capsule by mouth 3 (three) times a day    lisinopril (ZESTRIL) 10 mg tablet     Sig: Take by mouth    loratadine (CLARITIN) 10 mg tablet     Sig: Take by mouth    meloxicam (MOBIC) 15 mg tablet     Sig: Take 1 tablet by mouth Daily    simvastatin (ZOCOR) 40 mg tablet     Sig: Take by mouth    traMADol (ULTRAM) 50 mg tablet     Sig: Take by mouth    traZODone (DESYREL) 50 mg tablet     Sig: Take by mouth       History of Present Illness:    Geo Camacho is a 51 y o  female with a history of carpal tunnel syndrome returning for follow-up of neck pain that radiates into the C6-7 distribution of the left upper extremity and lumbosacral back pain that radiates into the buttocks with associated numbness in her feet  The patient did have injections in her wrists for carpal tunnel syndrome which were ineffective    She has been involved in physical therapy for the past 5 weeks for her neck and low back without any improvement in her symptoms  She has titrated up to gabapentin 300 mg t i d  and has noted some relief with this  She continues to take tramadol 50 mg daily p r n  by her PCP and duloxetine 60 mg daily  She gets approximately 20% relief from the current pain medicine regimen and denies any side effects  She denies any bladder or bowel incontinence or saddle anesthesia  The patient rates her pain a 9/10 on the pain is worse in the morning  The pain is constant and described as burning, sharp, throbbing, pressure-like, numbness, and pins and needles  The pain is worse with standing, walking, exercise, lifting, and bending at the neck and waste  The pain is alleviated with relaxation and lying down  I have personally reviewed and/or updated the patient's past medical history, past surgical history, family history, social history, current medications, allergies, and vital signs today  Other than as stated above, the patient denies any interval changes in medications, medical condition, mental condition, symptoms, or allergies since the last office visit  Review of Systems:    Review of Systems   Respiratory: Positive for shortness of breath  Cardiovascular: Negative for chest pain  Gastrointestinal: Negative for constipation, diarrhea, nausea and vomiting  Musculoskeletal: Negative for arthralgias, gait problem, joint swelling and myalgias  Difficulty walking, Joint stiffness    Skin: Negative for rash  Neurological: Positive for dizziness  Negative for seizures and weakness  All other systems reviewed and are negative  There is no problem list on file for this patient  Past Medical History:   Diagnosis Date    Asthma     Hyperlipidemia        No past surgical history on file  No family history on file  Social History     Occupational History    Not on file       Social History Main Topics    Smoking status: Not on file    Smokeless tobacco: Not on file    Alcohol use Not on file    Drug use: Unknown    Sexual activity: Not on file       No current outpatient prescriptions on file prior to visit  No current facility-administered medications on file prior to visit  Allergies   Allergen Reactions    Fish-Derived Products Hives    Iodine Hives    Other Swelling       Physical Exam:    /93   Pulse 79   Temp 97 7 °F (36 5 °C)   Ht 5' 6" (1 676 m)   Wt 128 kg (283 lb)   BMI 45 68 kg/m²     Constitutional: obese  Eyes: anicteric  HEENT: grossly intact  Neck: supple, symmetric, trachea midline and no masses   Pulmonary:even and unlabored  Cardiovascular:No edema or pitting edema present  Skin:Normal without rashes or lesions and well hydrated  Psychiatric:Mood and affect appropriate  Neurologic:Cranial Nerves II-XII grossly intact  Musculoskeletal:antalgic gait  Bilateral cervical paraspinals and trapezii tender to palpation on the left more than the right  Positive Spurling's to the left and negative to the right  Bilateral upper extremity strength 5/5 in all muscle groups  Bilateral lumbar paraspinals and SI joints tender to palpation  Equivocal seated straight leg raises bilaterally    Bilateral lower extremity strength 5/5 in all muscle groups    Imaging  Imaging reviewed

## 2018-03-01 ENCOUNTER — APPOINTMENT (OUTPATIENT)
Dept: PHYSICAL THERAPY | Facility: REHABILITATION | Age: 48
End: 2018-03-01
Payer: COMMERCIAL

## 2018-03-05 ENCOUNTER — OFFICE VISIT (OUTPATIENT)
Dept: PHYSICAL THERAPY | Facility: REHABILITATION | Age: 48
End: 2018-03-05
Payer: COMMERCIAL

## 2018-03-05 DIAGNOSIS — M54.12 RADICULOPATHY, CERVICAL: Primary | ICD-10-CM

## 2018-03-05 PROCEDURE — 97112 NEUROMUSCULAR REEDUCATION: CPT | Performed by: PHYSICAL THERAPIST

## 2018-03-05 PROCEDURE — 97140 MANUAL THERAPY 1/> REGIONS: CPT | Performed by: PHYSICAL THERAPIST

## 2018-03-05 PROCEDURE — 97110 THERAPEUTIC EXERCISES: CPT | Performed by: PHYSICAL THERAPIST

## 2018-03-05 NOTE — PROGRESS NOTES
Daily Note     Today's date: 3/5/2018  Patient name: Beryl Whittington  : 1970  MRN: [id number]  Referring provider: Brice Morrison DO  Dx:   Encounter Diagnosis     ICD-10-CM    1  Radiculopathy, cervical M54 12        Start Time: 1631  Stop Time: 4749  Total time in clinic (min): 55 minutes    Subjective: I feel very tight today I have had a really rough weekend  Objective: See treatment diary below      Assessment: Tolerated treatment well  Patient demonstrated fatigue post treatment and would benefit from continued PT      Plan: Continue per plan of care  Progress treatment as tolerated  Precautions: HLD, HTN, OA, Fibromyalgia    Daily Treatment Diary     Manual   35         Suboccipital release DM MM 5' 5'         C2 upglide B/L DM  5'          T spine PA mobs    5'                                       Exercise Diary   35         UBE 6' 6' 6' 6'         AROM + overpressure in all planes 6x10"ea  6x10" ea    6x10"         Self SNAG B/L 6x10" 6x10"           UT stretching 4x30"B/L 4x30" B/L  3x30"         TMD - tongue on roof + opening 1x10x5" nt           Scap retractions with BTB 2x10x5" 2x10 5" 2x10x5" 2x10x  5"         Prone T's NV 2x10 #1 3x10  1# 3x0   1#         Self SOR 3' 3'           Prone Y's   3x10 3x10         Prone scap retractions   3x10 3x10         Foam roller - T-spine PA   x5' x5'                                                                                                                                  Modalities

## 2018-03-08 ENCOUNTER — TELEPHONE (OUTPATIENT)
Dept: PAIN MEDICINE | Facility: CLINIC | Age: 48
End: 2018-03-08

## 2018-03-08 NOTE — TELEPHONE ENCOUNTER
--pt to C/B--    S/W pt  Advised her did not see a message in the computer that [address] called her  Asked her what the message stated  Pt stated she was just about to listen to the message and she stated she will get back to [address]

## 2018-03-08 NOTE — TELEPHONE ENCOUNTER
Received transferred call from pt, said she had a voicemail that we will not fill paperwork out, our  called and left the message  Pt requested that we mail the forms back to her,  mailed right away  Pt was extremely rude on phone

## 2018-03-12 ENCOUNTER — OFFICE VISIT (OUTPATIENT)
Dept: PHYSICAL THERAPY | Facility: REHABILITATION | Age: 48
End: 2018-03-12
Payer: COMMERCIAL

## 2018-03-12 DIAGNOSIS — M54.12 RADICULOPATHY, CERVICAL: Primary | ICD-10-CM

## 2018-03-12 PROCEDURE — 97140 MANUAL THERAPY 1/> REGIONS: CPT | Performed by: PHYSICAL THERAPIST

## 2018-03-12 PROCEDURE — 97112 NEUROMUSCULAR REEDUCATION: CPT | Performed by: PHYSICAL THERAPIST

## 2018-03-12 PROCEDURE — 97110 THERAPEUTIC EXERCISES: CPT | Performed by: PHYSICAL THERAPIST

## 2018-03-12 NOTE — PROGRESS NOTES
Daily Note     Today's date: 3/12/2018  Patient name: Lucila Mccartney  : 1970  MRN: [id number]  Referring provider: Ugo Wallace DO  Dx:   Encounter Diagnosis     ICD-10-CM    1  Radiculopathy, cervical M54 12                   Subjective: Patient reports continued cervical discomfort pre-tx and states that she has made minimal progression since initiation of physical therapy  Objective: See treatment diary below      Assessment: Tolerated treatment well  Patient demonstrated fatigue post treatment and exhibited good technique with therapeutic exercises  Added redcord cervical distraction and retractions which pt tolerated well  Overall posture remains impaired and tightness os suboccipitals still present secondary to forward head posture  Plan: Continue per plan of care  Continue to assess progress and potentially d/c in the near future if no progress is being made  Precautions: HLD, HTN, OA, Fibromyalgia    Daily Treatment Diary     Manual  01/26 2/19 02/22 3/5 3/12        Suboccipital release DM MM 5' 5' 5'        C2 upglide B/L DM  5'          T spine PA mobs    5' 5'                                      Exercise Diary  01/26 2/19 2/22 3/5 3/12        UBE 6' 6' 6' 6' 6'        AROM + overpressure in all planes 6x10"ea  6x10" ea    6x10"         Self SNAG B/L 6x10" 6x10"           UT stretching 4x30"B/L 4x30" B/L  3x30"         TMD - tongue on roof + opening 1x10x5" nt           Scap retractions with BTB 2x10x5" 2x10 5" 2x10x5" 2x10x  5" 2x10x5"        Prone T's NV 2x10 #1 3x10  1# 3x10   1# 3x10  1#        Self SOR 3' 3'           Prone Y's   3x10 3x10 3x10        Prone scap retractions   3x10 3x10         Foam roller - T-spine PA   x5' x5'                                                                                                                                  Modalities

## 2018-03-15 ENCOUNTER — OFFICE VISIT (OUTPATIENT)
Dept: OBGYN CLINIC | Facility: HOSPITAL | Age: 48
End: 2018-03-15
Payer: COMMERCIAL

## 2018-03-15 VITALS
DIASTOLIC BLOOD PRESSURE: 97 MMHG | RESPIRATION RATE: 20 BRPM | HEART RATE: 79 BPM | WEIGHT: 287 LBS | SYSTOLIC BLOOD PRESSURE: 128 MMHG | BODY MASS INDEX: 46.32 KG/M2

## 2018-03-15 DIAGNOSIS — M17.12 ARTHRITIS OF LEFT KNEE: Primary | ICD-10-CM

## 2018-03-15 PROCEDURE — 99213 OFFICE O/P EST LOW 20 MIN: CPT | Performed by: ORTHOPAEDIC SURGERY

## 2018-03-15 NOTE — PROGRESS NOTES
Orthopedics          Maya Ordonez 51 y o  female MRN: [id number]      Chief Complaint:   left knee pain    HPI:   51 y  o female complaining of left knee pain  Patient with history of left knee pain due to DJD  Denies any numbness tingling fevers chills  States that Depo-Medrol injection did not help her symptoms  Denies any numbness tingling fevers chills per                Review Of Systems:   · Skin: Normal  · Neuro: See HPI  · Musculoskeletal: See HPI  · 14 point review of systems negative except as stated above     Past Medical History:   Past Medical History:   Diagnosis Date    Asthma     Hyperlipidemia        Past Surgical History:   History reviewed  No pertinent surgical history  Family History:  Family history reviewed and non-contributory  History reviewed  No pertinent family history  Social History:  Social History     Social History    Marital status: Single     Spouse name: N/A    Number of children: N/A    Years of education: N/A     Social History Main Topics    Smoking status: Never Smoker    Smokeless tobacco: Never Used    Alcohol use None    Drug use: Unknown    Sexual activity: Not Asked     Other Topics Concern    None     Social History Narrative    None       Allergies:    Allergies   Allergen Reactions    Fish-Derived Products Hives    Iodine Hives    Other Swelling       Labs:    0  Lab Value Date/Time   HCT 35 7 09/07/2015 0600   HCT 34 6 (L) 09/06/2015 0556   HCT 39 4 09/05/2015 0607   HGB 11 1 (L) 09/07/2015 0600   HGB 10 8 (L) 09/06/2015 0556   HGB 12 8 09/05/2015 0607   INR 1 02 08/20/2015 1311   WBC 11 47 (H) 09/07/2015 0600   WBC 11 74 (H) 09/06/2015 0556   WBC 18 25 (H) 09/05/2015 0607       Meds:    Current Outpatient Prescriptions:     azelastine (OPTIVAR) 0 05 % ophthalmic solution, Apply to eye, Disp: , Rfl:     buPROPion (WELLBUTRIN XL) 300 mg 24 hr tablet, Take by mouth, Disp: , Rfl:     Cholecalciferol 2000 units TABS, Take 2 capsules by mouth, Disp: , Rfl:     DULoxetine (CYMBALTA) 60 mg delayed release capsule, Take by mouth, Disp: , Rfl:     ferrous sulfate 325 (65 Fe) mg tablet, Take by mouth, Disp: , Rfl:     fluticasone-salmeterol (ADVAIR DISKUS) 250-50 mcg/dose inhaler, Inhale 1 puff, Disp: , Rfl:     gabapentin (NEURONTIN) 400 mg capsule, Take 1 capsule (400 mg total) by mouth 3 (three) times a day, Disp: 90 capsule, Rfl: 1    hydrocortisone ([employer]S CORTISONE MAXIMUM STRENGTH) 1 % cream, Apply topically, Disp: , Rfl:     lisinopril (ZESTRIL) 10 mg tablet, Take by mouth, Disp: , Rfl:     loratadine (CLARITIN) 10 mg tablet, Take by mouth, Disp: , Rfl:     meloxicam (MOBIC) 15 mg tablet, Take 1 tablet by mouth Daily, Disp: , Rfl:     simvastatin (ZOCOR) 40 mg tablet, Take by mouth, Disp: , Rfl:     Sodium Hyaluronate (EUFLEXXA) 20 MG/2ML SOSY, Inject into the joint, Disp: , Rfl:     traZODone (DESYREL) 50 mg tablet, Take by mouth, Disp: , Rfl:     VENTOLIN  (90 Base) MCG/ACT inhaler, INHALE 2 PUFFS EVERY 4 (FOUR) HOURS AS NEEDED FOR WHEEZING OR SHORTNESS OF BREATH , Disp: , Rfl: 2      Physical Exam:     General Appearance:    Alert, cooperative, no distress, appears stated age   Head:    Normocephalic, without obvious abnormality, atraumatic   Eyes:    conjunctiva/corneas clear, both eyes        Nose:   Nares normal, septum midline, no drainage    Throat:   Lips normal; teeth and gums normal   Neck:    symmetrical, trachea midline, ;     thyroid:  no enlargement/   Back:     Symmetric, no curvature, ROM normal   Lungs:   No audible wheezing or labored breathing   Chest Wall:    No tenderness or deformity    Heart:    Regular rate and rhythm               Pulses:   2+ and symmetric all extremities   Skin:   Skin color, texture, turgor normal, no rashes or lesions   Neurologic:   normal strength, sensation and reflexes     throughout       Musculoskeletal: left lower extremity (nee)  · Moderate effusion  · Lateral joint line tenderness, mild medial joint line tenderness, stable to coronal and sagittal plane stress, neurologically and vascularly intact distally    Radiology:   I personally reviewed the films       _*_*_*_*_*_*_*_*_*_*_*_*_*_*_*_*_*_*_*_*_*_*_*_*_*_*_*_*_*_*_*_*_*_*_*_*_*_*_*_*_*    Assessment:  51 y  o female with left knee DJD    Plan:   · Weight bearing as tolerated  left lower extremity  ·   · Follow up in 3 months or sooner if needed    · Aspiration of the left knee which yielded about 30 milliliters of clear yellow fluid  Patient was given injection of steroids afterwards  Will try to schedule patient for Synvisc injection    Discussed treatment plan with patient and she is in agreement treatment plan    Thank you    Sarai Carvajal MD

## 2018-03-22 ENCOUNTER — TELEPHONE (OUTPATIENT)
Dept: OBGYN CLINIC | Facility: OTHER | Age: 48
End: 2018-03-22

## 2018-03-22 NOTE — TELEPHONE ENCOUNTER
Caller: Dr Summers Challenger patient  Ph: [phone]  Patient is checking on the status of her domestic relations paperwork that was dropped off last week for Dr Andrea Huerta  It was requested to be faxed to the phone number on the sheet and a copy mailed out to the patient   Please follow up on this request

## 2018-03-23 ENCOUNTER — TELEPHONE (OUTPATIENT)
Dept: RADIOLOGY | Facility: CLINIC | Age: 48
End: 2018-03-23

## 2018-03-23 ENCOUNTER — EVALUATION (OUTPATIENT)
Dept: PHYSICAL THERAPY | Facility: REHABILITATION | Age: 48
End: 2018-03-23
Payer: COMMERCIAL

## 2018-03-23 DIAGNOSIS — M54.12 RADICULOPATHY, CERVICAL: Primary | ICD-10-CM

## 2018-03-23 DIAGNOSIS — M54.16 LUMBAR RADICULOPATHY: Primary | ICD-10-CM

## 2018-03-23 PROCEDURE — 97112 NEUROMUSCULAR REEDUCATION: CPT | Performed by: PHYSICAL THERAPIST

## 2018-03-23 PROCEDURE — 97110 THERAPEUTIC EXERCISES: CPT | Performed by: PHYSICAL THERAPIST

## 2018-03-23 PROCEDURE — 97140 MANUAL THERAPY 1/> REGIONS: CPT | Performed by: PHYSICAL THERAPIST

## 2018-03-23 NOTE — TELEPHONE ENCOUNTER
Received call from [facility] at [address] [facility]  Pt is scheduled on Sat 3/31 for MRI of L Spine  This MRI [facility] with [facility] needs blood work ordered to check for creatinine and EGFR  Dionne explained this is because the pt has high blood pressure so they need to check on kidney functions before MRI w contrast  (if no contrast, no blood work needed)  Can you please order? Once ordered pt needs to be called and told to have blood work done 2-3 days prior to MRI  If we have questions, # for [facility] [place]- [phone]

## 2018-03-23 NOTE — PROGRESS NOTES
Daily Note     Today's date: 3/23/2018  Patient name: Kyle Mccullough  : 1970  MRN: [id number]  Referring provider: Antwon Up DO  Dx:   Encounter Diagnosis     ICD-10-CM    1  Radiculopathy, cervical M54 12        Start Time: 820  Stop Time:   Total time in clinic (min): 45 minutes    Subjective: Patient reports that she continues with neck pain and LUE radicular pain  States that she has been compliant with HEP thus far  Objective: See treatment diary below  3+ C5-6 reflex b/L  Denies difficulty speaking/swallowing  Increased tone to R Sub occipitals      Assessment: Tolerated treatment fair  Patient with upper CS extension rotation syndrome, hypomobility in CTJ and upper TS with increased mobility upper CS  Improved pain with MWM and postural correction  Patient educated on resting posture and upper CS extension  Plan: Continue per plan of care  Precautions: HLD, HTN, OA, Fibromyalgia    Daily Treatment Diary     Manual  01/26 2/19 02/22 3/5 3/12 3/23       Suboccipital release DM MM 5' 5' 5' L side       C2 upglide B/L DM  5'          T spine PA mobs    5' 5'        MWM PA to CTJ with active ext      3x6                        Exercise Diary  01/26 2/19 2/22 3/5 3/12 3/23       UBE 6' 6' 6' 6' 6' 6'       AROM + overpressure in all planes 6x10"ea  6x10" ea    6x10"         Self SNAG B/L 6x10" 6x10"           UT stretching 4x30"B/L 4x30" B/L  3x30"         TMD - tongue on roof + opening 1x10x5" nt           Scap retractions with BTB 2x10x5" 2x10 5" 2x10x5" 2x10x  5" 2x10x5" 10"x10-2 sets+chin tuck       Prone T's NV 2x10 #1 3x10  1# 3x10   1# 3x10  1#        Self SOR 3' 3'           Prone Y's   3x10 3x10 3x10        Prone scap retractions   3x10 3x10         Foam roller - T-spine PA   x5' x5'         Corner pec stretch      20"x3 B/L                                                                                                                   Modalities

## 2018-03-26 ENCOUNTER — HOSPITAL ENCOUNTER (OUTPATIENT)
Dept: RADIOLOGY | Facility: HOSPITAL | Age: 48
Discharge: HOME | End: 2018-03-26
Attending: ANESTHESIOLOGY
Payer: COMMERCIAL

## 2018-03-26 ENCOUNTER — TELEPHONE (OUTPATIENT)
Dept: PAIN MEDICINE | Facility: MEDICAL CENTER | Age: 48
End: 2018-03-26

## 2018-03-26 DIAGNOSIS — M54.12 CERVICAL RADICULOPATHY: ICD-10-CM

## 2018-03-26 PROCEDURE — 72141 MRI NECK SPINE W/O DYE: CPT

## 2018-03-26 NOTE — TELEPHONE ENCOUNTER
Pt is calling stating that someone called her and didn't leave a name or the reason why and would like a call back  In the process of talking to her the pt disconnected the phone on me   Cb# [phone]

## 2018-03-26 NOTE — TELEPHONE ENCOUNTER
Left detailed MOM to c/b, informed pt that Dr Efrain Joiner wants the MRI without contrast  Informed pt that I could mail the new rx or she can pick it up at the office  Will await c/b with decision

## 2018-03-26 NOTE — TELEPHONE ENCOUNTER
S/w pt to inform her that the MRI is to be done without contrast, pt does not need the rx as long the MRI dept has the new order  Called and s/w Dionne from  MRI, she got the new order and will change it in the system for her upcoming appt on 3/31

## 2018-03-26 NOTE — TELEPHONE ENCOUNTER
I meant order an MRI of the lumbar spine without contrast  I apologize for the confusion and over site   MRI of lumbar spine order printed at office to either be faxed over to MRI department or mailed to patient

## 2018-03-27 ENCOUNTER — APPOINTMENT (OUTPATIENT)
Dept: PHYSICAL THERAPY | Facility: REHABILITATION | Age: 48
End: 2018-03-27
Payer: COMMERCIAL

## 2018-03-30 ENCOUNTER — OFFICE VISIT (OUTPATIENT)
Dept: PHYSICAL THERAPY | Facility: REHABILITATION | Age: 48
End: 2018-03-30
Payer: COMMERCIAL

## 2018-03-30 DIAGNOSIS — M54.12 RADICULOPATHY, CERVICAL: Primary | ICD-10-CM

## 2018-03-30 PROCEDURE — 97110 THERAPEUTIC EXERCISES: CPT | Performed by: PHYSICAL THERAPIST

## 2018-03-30 PROCEDURE — G8985 CARRY GOAL STATUS: HCPCS | Performed by: PHYSICAL THERAPIST

## 2018-03-30 PROCEDURE — 97140 MANUAL THERAPY 1/> REGIONS: CPT | Performed by: PHYSICAL THERAPIST

## 2018-03-30 PROCEDURE — 97112 NEUROMUSCULAR REEDUCATION: CPT | Performed by: PHYSICAL THERAPIST

## 2018-03-30 PROCEDURE — G8984 CARRY CURRENT STATUS: HCPCS | Performed by: PHYSICAL THERAPIST

## 2018-03-30 NOTE — PROGRESS NOTES
PT Re-Evaluation     Today's date: 3/30/2018  Patient name: Abdirahman Garza  : 1970  MRN: [id number]  Referring provider: Ambrose Mead DO  Dx:   Encounter Diagnosis     ICD-10-CM    1  Radiculopathy, cervical M54 12                   Assessment  Impairments: abnormal muscle tone, abnormal or restricted ROM, abnormal movement, activity intolerance, impaired physical strength, lacks appropriate home exercise program and pain with function    Assessment details: Patient is a 51y o  year old female who attended physical therapy for 12 treatment sessions regarding cervical pain and radicular symptoms  Patient reports 65-70% improvement at this time which correlates to improved FOTO scoring  Patient has shown improvement throughout PT by demonstrating decreased pain, increased range of motion, increased strength and improved tolerance to activity  Patient continues to present with pain, decreased ROM, decreased strength, and decreased tolerance to activity  Maria Esther Stella would benefit from continued physical therapy to address these issues and to maximize function  Thank you  Understanding of Dx/Px/POC: excellent  Goals  STG (4 weeks)  1  Pt will be independent with Home Exercise Program = MET  2  Decrease pain at worst from 10/10 to < or equal to 7/10 = PROGRESSING  3  Increase all deficient cervical mobility by 10 degrees AROM = MET  LTG (8 weeks)  1  Decrease pain at worst from 10/10 to < or equal to 4/10 = NOT MET  2  Increase all deficient cervical mobility by 20 degrees AROM = PROGRESSING  3   Increase FOTO score from 34 to 52 at d/c = PROGRESSING (45 on 2018)    Plan  Patient would benefit from: skilled PT  Planned therapy interventions: joint mobilization, manual therapy, neuromuscular re-education, patient education, postural training, strengthening, stretching, therapeutic exercise, home exercise program, functional ROM exercises and ADL training  Frequency: 2x week  Duration in weeks: 8  Treatment plan discussed with: patient        Subjective Evaluation    History of Present Illness  Mechanism of injury: Patient reports having chronic neck pain (> 1 year) with isideous onset of pain/symptoms  Patient states reports increased crepitus with cervical rotation that reproduces symptoms  Patient reports worsening HA's for the past 6 months which start in the suboccipital region  Patient states that secondary to these symptoms she experiences difficulty with ADL's and working on the computer  Patient describes these symptoms as numbness/tingling into B/L hands while performing these tasks  Patient also reports increased difficulty sleeping secondary to symptoms  Patients goals for PT are to reduce symptom intensity and improve mobility  2018: Patient reports a mild reduction in pain/symptoms since IE and states that she continues with distal UE tingling and "shakes"  Patient states that she has been moderately compliant with her HEP     Recurrent probem    Quality of life: good    Pain  Current pain ratin  At best pain ratin  At worst pain ratin  Quality: sharp and radiating  Relieving factors: relaxation (self massage)  Aggravating factors: sitting and keyboarding      Diagnostic Tests  MRI studies: abnormal (Mild C5/C6, C6/C7 disc bulging)        Objective     Active Range of Motion   Cervical/Thoracic Spine   Cervical    Flexion: 12 degrees   Extension: 17 degrees     Thoracic   Left lateral flexion: 33 degrees   Right lateral flexion: 32 degrees   Left rotation: 51 degrees   Right rotation: 46 degrees     Additional Active Range of Motion Details  Hand held dynamometer:  R = 64# (80# on 2018)  L = 60# (73# on 2018)    (+) Compression, (+) B/L Spurling's (REMAINS)  Reflexes: 3+  Myotomes: Impaired C5-C7  (+) B/L ULTT - radial, ulnar, and median (IMPROVING)  (+) Suboccipital tension (IMPROVING)  (+) Forward head posture (REMAINS)  (+) TMD dysfunction (REMAINS)  (+) B/L Upper cervical dysfunction (Flexion rotation test) - ~ 20 degrees B/L (~30 degrees B/L on 03/30/2018)  (+) B/L UT release (REMAINS)    (-) CV testing  (-) CN testing    Strength/Myotome Testing     Left Shoulder     Planes of Motion   Flexion: 4+   Abduction: 4+   External rotation at 0°: 4+   Internal rotation at 0°: 4+     Right Shoulder     Planes of Motion   Flexion: 4+   Abduction: 4+   External rotation at 0°: 4+   Internal rotation at 0°: 4+     Left Elbow   Flexion: 4+  Extension: 4+    Right Elbow   Flexion: 4+  Extension: 4+    Left Wrist/Hand   Wrist extension: 4-  Wrist flexion: 4    Right Wrist/Hand   Wrist extension: 4-  Wrist flexion: 4          Precautions: HLD, HTN, OA, Fibromyalgia    Daily Treatment Diary     Manual  01/26 2/19 02/22 3/5 3/12        Suboccipital release DM MM 5' 5' 5'        C2 upglide B/L DM  5'          T spine PA mobs    5' 5'                                      Exercise Diary  01/26 2/19 2/22 3/5 3/12        UBE 6' 6' 6' 6' 6'        AROM + overpressure in all planes 6x10"ea  6x10" ea    6x10"         Self SNAG B/L 6x10" 6x10"           UT stretching 4x30"B/L 4x30" B/L  3x30"         TMD - tongue on roof + opening 1x10x5" nt           Scap retractions with BTB 2x10x5" 2x10 5" 2x10x5" 2x10x  5" 2x10x5"        Prone T's NV 2x10 #1 3x10  1# 3x10   1# 3x10  1#        Self SOR 3' 3'           Prone Y's   3x10 3x10 3x10        Prone scap retractions   3x10 3x10         Foam roller - T-spine PA   x5' x5'                                                                                                                                  Modalities

## 2018-03-31 ENCOUNTER — HOSPITAL ENCOUNTER (OUTPATIENT)
Dept: RADIOLOGY | Facility: HOSPITAL | Age: 48
Discharge: HOME | End: 2018-03-31
Attending: ANESTHESIOLOGY
Payer: COMMERCIAL

## 2018-03-31 DIAGNOSIS — M54.16 LUMBAR RADICULOPATHY: ICD-10-CM

## 2018-03-31 PROCEDURE — 72148 MRI LUMBAR SPINE W/O DYE: CPT

## 2018-04-03 ENCOUNTER — APPOINTMENT (OUTPATIENT)
Dept: PHYSICAL THERAPY | Facility: REHABILITATION | Age: 48
End: 2018-04-03
Payer: COMMERCIAL

## 2018-04-06 ENCOUNTER — OFFICE VISIT (OUTPATIENT)
Dept: PHYSICAL THERAPY | Facility: REHABILITATION | Age: 48
End: 2018-04-06
Payer: COMMERCIAL

## 2018-04-06 DIAGNOSIS — M54.12 RADICULOPATHY, CERVICAL: Primary | ICD-10-CM

## 2018-04-06 PROCEDURE — 97140 MANUAL THERAPY 1/> REGIONS: CPT | Performed by: PHYSICAL THERAPIST

## 2018-04-06 PROCEDURE — 97110 THERAPEUTIC EXERCISES: CPT | Performed by: PHYSICAL THERAPIST

## 2018-04-06 PROCEDURE — 97112 NEUROMUSCULAR REEDUCATION: CPT | Performed by: PHYSICAL THERAPIST

## 2018-04-11 ENCOUNTER — APPOINTMENT (OUTPATIENT)
Dept: PHYSICAL THERAPY | Facility: REHABILITATION | Age: 48
End: 2018-04-11
Payer: COMMERCIAL

## 2018-04-11 ENCOUNTER — OFFICE VISIT (OUTPATIENT)
Dept: PHYSICAL THERAPY | Facility: REHABILITATION | Age: 48
End: 2018-04-11
Payer: COMMERCIAL

## 2018-04-11 DIAGNOSIS — M54.12 RADICULOPATHY, CERVICAL: Primary | ICD-10-CM

## 2018-04-11 PROCEDURE — G8984 CARRY CURRENT STATUS: HCPCS | Performed by: PHYSICAL THERAPIST

## 2018-04-11 PROCEDURE — 97140 MANUAL THERAPY 1/> REGIONS: CPT | Performed by: PHYSICAL THERAPIST

## 2018-04-11 PROCEDURE — 97112 NEUROMUSCULAR REEDUCATION: CPT | Performed by: PHYSICAL THERAPIST

## 2018-04-11 PROCEDURE — 97110 THERAPEUTIC EXERCISES: CPT | Performed by: PHYSICAL THERAPIST

## 2018-04-11 PROCEDURE — G8985 CARRY GOAL STATUS: HCPCS | Performed by: PHYSICAL THERAPIST

## 2018-04-11 NOTE — PROGRESS NOTES
Daily Note     Today's date: 2018  Patient name: Tasha Sanchez  : 1970  MRN: [id number]  Referring provider: Faustino Bradshaw DO  Dx:   Encounter Diagnosis     ICD-10-CM    1  Radiculopathy, cervical M54 12                   Subjective: Patient reports having left cervical pain with an associated headache and states that she attributes this to her sleeping position  Objective: See treatment diary below      Assessment: CTJ remains hypomobile and patient demonstrates reduced MT/LT strength  Tolerated treatment well  Patient demonstrated fatigue post treatment and exhibited good technique with therapeutic exercises  Plan: Continue per plan of care  Precautions: HLD, HTN, OA, Fibromyalgia    Daily Treatment Diary     Manual  01/26 2/19 02/22 3/5 3/12 4/6 4/11      Suboccipital release DM MM 5' 5' 5' 5' 5'      C2 upglide B/L DM  5'          T spine PA mobs    5' 5' 5' 5'      Left cervical lateral glides       3'                       Exercise Diary  01/26 2/19 2/22 3/5 3/12 4/6 4/11      UBE 6' 6' 6' 6' 6' 6' 6'      AROM + overpressure in all planes 6x10"ea  6x10" ea    6x10"         Self SNAG B/L 6x10" 6x10"           UT stretching 4x30"B/L 4x30" B/L  3x30"  3x30"       TMD - tongue on roof + opening 1x10x5" nt           Scap retractions with BTB 2x10x5" 2x10 5" 2x10x5" 2x10x  5" 2x10x5" 2x10x5"       Prone T's NV 2x10 #1 3x10  1# 3x10   1# 3x10  1# 3x10  2# 3x10  2#      Self SOR 3' 3'           Prone Y's   3x10 3x10 3x10  3x10  1#      Prone scap retractions   3x10 3x10         Foam roller - T-spine PA   x5' x5'  x5' x5'      Elliptical       8' 5'                                                                                                                  Modalities

## 2018-04-12 ENCOUNTER — CLINICAL SUPPORT (OUTPATIENT)
Dept: PAIN MEDICINE | Facility: CLINIC | Age: 48
End: 2018-04-12
Payer: COMMERCIAL

## 2018-04-12 VITALS
SYSTOLIC BLOOD PRESSURE: 136 MMHG | BODY MASS INDEX: 46.12 KG/M2 | DIASTOLIC BLOOD PRESSURE: 81 MMHG | HEART RATE: 81 BPM | HEIGHT: 66 IN | WEIGHT: 287 LBS | TEMPERATURE: 98.2 F

## 2018-04-12 DIAGNOSIS — M54.12 CERVICAL RADICULOPATHY: ICD-10-CM

## 2018-04-12 DIAGNOSIS — M47.816 LUMBAR SPONDYLOSIS: ICD-10-CM

## 2018-04-12 DIAGNOSIS — M46.1 SACROILIITIS (HCC): Primary | ICD-10-CM

## 2018-04-12 DIAGNOSIS — M50.30 DEGENERATIVE DISC DISEASE, CERVICAL: ICD-10-CM

## 2018-04-12 PROCEDURE — 99214 OFFICE O/P EST MOD 30 MIN: CPT | Performed by: ANESTHESIOLOGY

## 2018-04-12 RX ORDER — TRAMADOL HYDROCHLORIDE 50 MG/1
TABLET ORAL
COMMUNITY
Start: 2018-04-10 | End: 2018-10-05

## 2018-04-12 RX ORDER — GABAPENTIN 300 MG/1
300 CAPSULE ORAL 3 TIMES DAILY
Qty: 90 CAPSULE | Refills: 2 | Status: SHIPPED | OUTPATIENT
Start: 2018-04-12 | End: 2019-01-29

## 2018-04-12 NOTE — PROGRESS NOTES
Assessment:  1  Sacroiliitis (HCC)    2  Cervical radiculopathy    3  Degenerative disc disease, cervical    4  Lumbar spondylosis        Plan:  44-year-old female returning for follow-up of neck pain and neuropathic symptoms into the left upper extremity  The patient does have carpal tunnel syndrome  She also has a history of fibromyalgia  She did have an MRI of her cervical spine revealing some degenerative disc disease at C5-6 and C6-7 with mild right foraminal stenosis at C6-7 which would not contribute to the patient's left upper extremity symptoms  I did explain to the patient that the patient's neuropathic symptoms are likely secondary to a combination of her carpal tunnel syndrome and fibromyalgia since there is no compressive pathology in her cervical spine  The patient also complains of lumbosacral back pain with occasional neuropathic symptoms in her feet  MRI of her lumbar spine revealed some degenerative disc disease and spondylosis with a small disc bulge at L4-5 which was noncompressive and did not produce any significant central foraminal stenosis  I am unsure as to what is causing the neuropathic symptoms in her lower extremities and this may indeed be secondary to fibromyalgia or peripheral neuropathy  Her low back pain seems to be myofascial and facet mediated  The patient does have evidence of sacroiliitis bilaterally which is certainly contributing to her low back pain  The patient did increase her gabapentin to 400 mg t i d , however she has been noticing tremors and some blurry vision  I have advised the patient to decrease her gabapentin to 300 mg t i d  as these could be side effects secondary to gabapentin to see if they improve  The patient did verbalize understanding  1   I will decrease the patient's gabapentin to 300 mg t i d  to see if her tremors and blurred vision improve  2    I will schedule the patient for bilateral SI joint injections to reduce the inflammatory component of her pain  3   The patient could potentially revisit Orthopedics regarding potential carpal tunnel release if warranted  4  Patient should follow up with the rheumatologist for fibromyalgia  5  Patient will continue with duloxetine 60 mg daily  6  I will follow up the patient pending results of SI joint injections    [facility] Prescription Drug Monitoring Program report was reviewed and was appropriate     Complete risks and benefits including bleeding, infection, tissue reaction, nerve injury and allergic reaction were discussed  The approach was demonstrated using models and literature was provided  Verbal and written consent was obtained  My impressions and treatment recommendations were discussed in detail with the patient who verbalized understanding and had no further questions  Discharge instructions were provided  I personally saw and examined the patient and I agree with the above discussed plan of care  No orders of the defined types were placed in this encounter  New Medications Ordered This Visit   Medications    traMADol (ULTRAM) 50 mg tablet       History of Present Illness:    Charu Lui is a 51 y o  female with a history of fibromyalgia and carpal tunnel syndrome returning for follow-up of neck pain and neuropathic symptoms into the left upper extremity and hand and lumbosacral back pain with neuropathic symptoms in her feet and calves bilaterally  She denies any bladder or bowel incontinence, saddle anesthesia, or balance issues  She does have some occasional neuropathic symptoms into the right upper extremity  The patient did have an MRI of her cervical spine revealing some degenerative disc disease and mild right foraminal stenosis at C6-7  MRI of her lumbar spine revealed a noncompressive disc bulge at L4-5 without any significant central or foraminal stenosis noted    The patient has increased her gabapentin to 400 mg t i d , however the patient has noted tremors and some blurriness in her vision  She is also taking duloxetine 60 mg daily and meloxicam daily p r n     She gets approximately 20% relief from her pain with this regimen and denies any side effects other than those listed above  The patient rates her pain 8/10 and the pain is worse in the evening at night  The pain is intermittent and described as burning, sharp, throbbing, pressure-like, shooting, numbness, and pins and needles  The pain is worse with standing, walking, and bending  The pain is alleviated with sitting and lying down  I have personally reviewed and/or updated the patient's past medical history, past surgical history, family history, social history, current medications, allergies, and vital signs today  Other than as stated above, the patient denies any interval changes in medications, medical condition, mental condition, symptoms, or allergies since the last office visit  Review of Systems:    Review of Systems   Respiratory: Negative for shortness of breath  Cardiovascular: Negative for chest pain  Gastrointestinal: Positive for constipation  Negative for diarrhea, nausea and vomiting  Musculoskeletal: Positive for gait problem  Negative for arthralgias, joint swelling and myalgias  Difficulty walking, Joint stiffness    Skin: Negative for rash  Neurological: Positive for dizziness  Negative for seizures and weakness  All other systems reviewed and are negative  Patient Active Problem List   Diagnosis    Bilateral carpal tunnel syndrome    Cervical radiculopathy    Degenerative disc disease, cervical    Lumbar radiculopathy    Sacroiliitis (HCC)       Past Medical History:   Diagnosis Date    Asthma     Hyperlipidemia        No past surgical history on file  No family history on file  Social History     Occupational History    Not on file       Social History Main Topics    Smoking status: Never Smoker    Smokeless tobacco: Never Used    Alcohol use Not on file    Drug use: Unknown    Sexual activity: Not on file       Current Outpatient Prescriptions on File Prior to Visit   Medication Sig    azelastine (OPTIVAR) 0 05 % ophthalmic solution Apply to eye    buPROPion (WELLBUTRIN XL) 300 mg 24 hr tablet Take by mouth    Cholecalciferol 2000 units TABS Take 2 capsules by mouth    DULoxetine (CYMBALTA) 60 mg delayed release capsule Take by mouth    ferrous sulfate 325 (65 Fe) mg tablet Take by mouth    fluticasone-salmeterol (ADVAIR DISKUS) 250-50 mcg/dose inhaler Inhale 1 puff    gabapentin (NEURONTIN) 400 mg capsule Take 1 capsule (400 mg total) by mouth 3 (three) times a day    hydrocortisone (CVS CORTISONE MAXIMUM STRENGTH) 1 % cream Apply topically    lisinopril (ZESTRIL) 10 mg tablet Take by mouth    loratadine (CLARITIN) 10 mg tablet Take by mouth    meloxicam (MOBIC) 15 mg tablet Take 1 tablet by mouth Daily    simvastatin (ZOCOR) 40 mg tablet Take by mouth    Sodium Hyaluronate (EUFLEXXA) 20 MG/2ML SOSY Inject into the joint    traZODone (DESYREL) 50 mg tablet Take by mouth    VENTOLIN  (90 Base) MCG/ACT inhaler INHALE 2 PUFFS EVERY 4 (FOUR) HOURS AS NEEDED FOR WHEEZING OR SHORTNESS OF BREATH  No current facility-administered medications on file prior to visit  Allergies   Allergen Reactions    Fish-Derived Products Hives    Iodine Hives    Other Swelling       Physical Exam:    /81   Pulse 81   Temp 98 2 °F (36 8 °C)   Ht 5' 6" (1 676 m)   Wt 130 kg (287 lb)   BMI 46 32 kg/m²     Constitutional: obese  Eyes: anicteric  HEENT: grossly intact  Neck: supple, symmetric, trachea midline and no masses   Pulmonary:even and unlabored  Cardiovascular:No edema or pitting edema present  Skin:Normal without rashes or lesions and well hydrated  Psychiatric:Mood and affect appropriate  Neurologic:Cranial Nerves II-XII grossly intact  Musculoskeletal:normal gait    Bilateral cervical paraspinals and trapezii tender to palpation and ropy in texture  Bilateral upper extremity strength 5/5 in all muscle groups  Equivocal Spurling's to the left and negative to the right  Bilateral lumbar paraspinals tender to palpation ropy in texture from L1-L5  Negative seated straight leg raise bilaterally  Bilateral lower extremity strength 5/5 in all muscle groups  Positive Jesus's, Gaenslen's, and AP compression test bilaterally      Imaging  Imaging reviewed

## 2018-04-20 ENCOUNTER — APPOINTMENT (OUTPATIENT)
Dept: PHYSICAL THERAPY | Facility: REHABILITATION | Age: 48
End: 2018-04-20
Payer: COMMERCIAL

## 2018-04-20 DIAGNOSIS — M54.12 CERVICAL RADICULOPATHY: ICD-10-CM

## 2018-04-20 RX ORDER — GABAPENTIN 400 MG/1
400 CAPSULE ORAL 3 TIMES DAILY
Qty: 90 CAPSULE | Refills: 1 | OUTPATIENT
Start: 2018-04-20

## 2018-04-20 NOTE — PROGRESS NOTES
Daily Note     Today's date: 2018  Patient name: Hal Burns  : 1970  MRN: [id number]  Referring provider: Lluvia Bergman DO  Dx:   Encounter Diagnosis     ICD-10-CM    1  Radiculopathy, cervical M54 12                   Subjective: Patient reports having left cervical pain with an associated headache and states that she attributes this to her sleeping position  Objective: See treatment diary below      Assessment: CTJ remains hypomobile and patient demonstrates reduced MT/LT strength  Tolerated treatment well  Patient demonstrated fatigue post treatment and exhibited good technique with therapeutic exercises  Plan: Continue per plan of care  Precautions: HLD, HTN, OA, Fibromyalgia    Daily Treatment Diary     Manual  01/26 2/19 02/22 3/5 3/12 4/6 4/11 4/20     Suboccipital release DM MM 5' 5' 5' 5' 5'      C2 upglide B/L DM  5'          T spine PA mobs    5' 5' 5' 5'      Left cervical lateral glides       3'                       Exercise Diary  01/26 2/19 2/22 3/5 3/12 4/6 4/11 4/20     UBE 6' 6' 6' 6' 6' 6' 6'      AROM + overpressure in all planes 6x10"ea  6x10" ea    6x10"         Self SNAG B/L 6x10" 6x10"           UT stretching 4x30"B/L 4x30" B/L  3x30"  3x30"       TMD - tongue on roof + opening 1x10x5" nt           Scap retractions with BTB 2x10x5" 2x10 5" 2x10x5" 2x10x  5" 2x10x5" 2x10x5"       Prone T's NV 2x10 #1 3x10  1# 3x10   1# 3x10  1# 3x10  2# 3x10  2#      Self SOR 3' 3'           Prone Y's   3x10 3x10 3x10  3x10  1#      Prone scap retractions   3x10 3x10         Foam roller - T-spine PA   x5' x5'  x5' x5'      Elliptical       8' 5'                                                                                                                  Modalities

## 2018-04-27 ENCOUNTER — APPOINTMENT (OUTPATIENT)
Dept: PHYSICAL THERAPY | Facility: REHABILITATION | Age: 48
End: 2018-04-27
Payer: COMMERCIAL

## 2018-05-14 NOTE — PROGRESS NOTES
Familia Gage attended physical therapy from 01/19/2018 until 04/11/2018 for 15 treatment sessions regarding cervical pain  Patient made improvement throughout treatment but am unable to achieve discharge information secondary to patient not returning for follow up appointments  Goals are unable to be updated secondary to self d/c  Patient will be discharged from PT at this time  Thank you

## 2018-07-09 RX ORDER — DULOXETIN HYDROCHLORIDE 60 MG/1
60 CAPSULE, DELAYED RELEASE ORAL
COMMUNITY
Start: 2018-05-01

## 2018-07-10 ENCOUNTER — OFFICE VISIT (OUTPATIENT)
Dept: OBGYN CLINIC | Facility: HOSPITAL | Age: 48
End: 2018-07-10
Payer: COMMERCIAL

## 2018-07-10 ENCOUNTER — HOSPITAL ENCOUNTER (OUTPATIENT)
Dept: RADIOLOGY | Facility: HOSPITAL | Age: 48
Discharge: HOME | End: 2018-07-10
Attending: ORTHOPAEDIC SURGERY
Payer: COMMERCIAL

## 2018-07-10 VITALS
DIASTOLIC BLOOD PRESSURE: 111 MMHG | SYSTOLIC BLOOD PRESSURE: 173 MMHG | WEIGHT: 282.6 LBS | HEART RATE: 91 BPM | BODY MASS INDEX: 45.61 KG/M2

## 2018-07-10 DIAGNOSIS — M17.12 PRIMARY OSTEOARTHRITIS OF LEFT KNEE: ICD-10-CM

## 2018-07-10 DIAGNOSIS — M25.562 ACUTE PAIN OF LEFT KNEE: ICD-10-CM

## 2018-07-10 DIAGNOSIS — M25.562 ACUTE PAIN OF LEFT KNEE: Primary | ICD-10-CM

## 2018-07-10 PROCEDURE — 20610 DRAIN/INJ JOINT/BURSA W/O US: CPT | Performed by: ORTHOPAEDIC SURGERY

## 2018-07-10 PROCEDURE — 99214 OFFICE O/P EST MOD 30 MIN: CPT | Performed by: ORTHOPAEDIC SURGERY

## 2018-07-10 PROCEDURE — 73562 X-RAY EXAM OF KNEE 3: CPT

## 2018-07-10 RX ORDER — LIDOCAINE HYDROCHLORIDE 10 MG/ML
2 INJECTION, SOLUTION INFILTRATION; PERINEURAL
Status: COMPLETED | OUTPATIENT
Start: 2018-07-10 | End: 2018-07-10

## 2018-07-10 RX ORDER — METHYLPREDNISOLONE ACETATE 40 MG/ML
2 INJECTION, SUSPENSION INTRA-ARTICULAR; INTRALESIONAL; INTRAMUSCULAR; SOFT TISSUE
Status: COMPLETED | OUTPATIENT
Start: 2018-07-10 | End: 2018-07-10

## 2018-07-10 RX ORDER — BUPIVACAINE HYDROCHLORIDE 2.5 MG/ML
2 INJECTION, SOLUTION INFILTRATION; PERINEURAL
Status: COMPLETED | OUTPATIENT
Start: 2018-07-10 | End: 2018-07-10

## 2018-07-10 RX ORDER — TRAZODONE HYDROCHLORIDE 50 MG/1
TABLET ORAL
COMMUNITY
Start: 2018-07-10 | End: 2019-08-08

## 2018-07-10 RX ADMIN — LIDOCAINE HYDROCHLORIDE 2 ML: 10 INJECTION, SOLUTION INFILTRATION; PERINEURAL at 17:56

## 2018-07-10 RX ADMIN — BUPIVACAINE HYDROCHLORIDE 2 ML: 2.5 INJECTION, SOLUTION INFILTRATION; PERINEURAL at 17:56

## 2018-07-10 RX ADMIN — METHYLPREDNISOLONE ACETATE 2 ML: 40 INJECTION, SUSPENSION INTRA-ARTICULAR; INTRALESIONAL; INTRAMUSCULAR; SOFT TISSUE at 17:56

## 2018-07-10 NOTE — PROGRESS NOTES
Orthopedics          Shweta Ferrari 51 y o  female MRN: [id number]      Chief Complaint:   left knee pain    HPI:   51 y  o female complaining of left knee pain  Patient presents office today regarding left Knee pain  She has been diagnosed with left knee osteoarthritis  She has had significant pain relief with injections in the past over the pain since returned  States the pain relief is for approximately 2 months following injection  She states the pain is localized to her left knee she denies any instability clicking popping catching  She also has history of viscosupplementation injection in her left knee without significant pain relief  Denies numbness tingling fevers chills  Review Of Systems:   · Skin: Normal  · Neuro: See HPI  · Musculoskeletal: See HPI  · 14 point review of systems negative except as stated above     Past Medical History:   Past Medical History:   Diagnosis Date    Asthma     Hyperlipidemia        Past Surgical History:   History reviewed  No pertinent surgical history  Family History:  Family history reviewed and non-contributory  History reviewed  No pertinent family history  Social History:  Social History     Social History    Marital status: Single     Spouse name: N/A    Number of children: N/A    Years of education: N/A     Social History Main Topics    Smoking status: Never Smoker    Smokeless tobacco: Never Used    Alcohol use None    Drug use: Unknown    Sexual activity: Not Asked     Other Topics Concern    None     Social History Narrative    None       Allergies:    Allergies   Allergen Reactions    Bee Venom Swelling    Fish-Derived Products Hives    Iodine Hives    Other Swelling       Labs:    0  Lab Value Date/Time   HCT 35 7 09/07/2015 0600   HCT 34 6 (L) 09/06/2015 0556   HCT 39 4 09/05/2015 0607   HGB 11 1 (L) 09/07/2015 0600   HGB 10 8 (L) 09/06/2015 0556   HGB 12 8 09/05/2015 0607   INR 1 02 08/20/2015 1311   WBC 11 47 (H) 09/07/2015 0600   WBC 11 74 (H) 09/06/2015 0556   WBC 18 25 (H) 09/05/2015 0607       Meds:    Current Outpatient Prescriptions:     amoxicillin (AMOXIL) 500 mg capsule, TAKE 4 CAPSULES 1 HR PRIOR TO APPT, Disp: , Rfl: 0    azelastine (OPTIVAR) 0 05 % ophthalmic solution, Apply to eye, Disp: , Rfl:     buPROPion (WELLBUTRIN XL) 300 mg 24 hr tablet, Take by mouth, Disp: , Rfl:     Cholecalciferol (VITAMIN D3) 2000 units capsule, TAKE 2 CAPSULES BY MOUTH DAILY INDICATIONS: VITAMIN D DEFICIENCY , Disp: , Rfl: 5    Cholecalciferol 2000 units TABS, Take 2 capsules by mouth, Disp: , Rfl:     DULoxetine (CYMBALTA) 60 mg delayed release capsule, Take by mouth, Disp: , Rfl:     DULoxetine (CYMBALTA) 60 mg delayed release capsule, Take 60 mg by mouth, Disp: , Rfl:     ferrous sulfate 325 (65 Fe) mg tablet, Take by mouth, Disp: , Rfl:     fluticasone-salmeterol (ADVAIR DISKUS) 250-50 mcg/dose inhaler, Inhale 1 puff, Disp: , Rfl:     gabapentin (NEURONTIN) 300 mg capsule, Take 1 capsule (300 mg total) by mouth 3 (three) times a day, Disp: 90 capsule, Rfl: 2    hydrocortisone (CVS CORTISONE MAXIMUM STRENGTH) 1 % cream, Apply topically, Disp: , Rfl:     lisinopril (ZESTRIL) 10 mg tablet, Take by mouth, Disp: , Rfl:     loratadine (CLARITIN) 10 mg tablet, Take by mouth, Disp: , Rfl:     meloxicam (MOBIC) 15 mg tablet, Take 1 tablet by mouth Daily, Disp: , Rfl:     simvastatin (ZOCOR) 40 mg tablet, Take by mouth, Disp: , Rfl:     Sodium Hyaluronate (EUFLEXXA) 20 MG/2ML SOSY, Inject into the joint, Disp: , Rfl:     traMADol (ULTRAM) 50 mg tablet, , Disp: , Rfl:     traZODone (DESYREL) 50 mg tablet, Take by mouth, Disp: , Rfl:     traZODone (DESYREL) 50 mg tablet, TAKE 1 TO 2 TABLETS AT BEDTIME FOR MOOD, Disp: , Rfl:     VENTOLIN  (90 Base) MCG/ACT inhaler, INHALE 2 PUFFS EVERY 4 (FOUR) HOURS AS NEEDED FOR WHEEZING OR SHORTNESS OF BREATH , Disp: , Rfl: 2      Physical Exam:     General Appearance:    Alert, cooperative, no distress, appears stated age   Head:    Normocephalic, without obvious abnormality, atraumatic   Eyes:    conjunctiva/corneas clear, both eyes        Nose:   Nares normal, septum midline, no drainage    Throat:   Lips normal; teeth and gums normal   Neck:    symmetrical, trachea midline, ;     thyroid:  no enlargement/   Back:     Symmetric, no curvature, ROM normal   Lungs:   No audible wheezing or labored breathing   Chest Wall:    No tenderness or deformity    Heart:    Regular rate and rhythm               Pulses:   2+ and symmetric all extremities   Skin:   Skin color, texture, turgor normal, no rashes or lesions   Neurologic:   normal strength, sensation and reflexes     throughout       Musculoskeletal: left lower extremity  · On examination of the left knee there is  a moderate effusion, no erythema  Range of motion to full active extension and flexion to greater than 120°  Pain on palpation medial and lateral joint lines  There is crepitus with range of motion, no warmth to palpation, bony enlargement noted  No pain on palpation pes anserine bursa region or distal iliotibial band  Stable to varus and valgus stress without pain or gapping  Negative anterior and posterior drawer testing  Sensation intact distal pulses present  Radiology:   I personally reviewed the films    X-rays left knee shows significant subchondral sclerosis lateral joint space narrowing osteophyte formation    Large joint arthrocentesis  Date/Time: 7/10/2018 5:56 PM  Consent given by: patient  Supporting Documentation  Indications: pain   Procedure Details  Location: knee - L knee  Needle size: 18 G  Ultrasound guidance: no  Approach: superior  Medications administered: 2 mL bupivacaine 0 25 %; 2 mL methylPREDNISolone acetate 40 mg/mL; 2 mL lidocaine 1 %    Aspirate amount: 22 mL  Aspirate: yellow and clear  Patient tolerance: patient tolerated the procedure well with no immediate complications        _*_*_*_*_*_*_*_*_*_*_*_*_*_*_*_*_*_*_*_*_*_*_*_*_*_*_*_*_*_*_*_*_*_*_*_*_*_*_*_*_*    Assessment:  51 y  o female with left knee pain osteoarthritis    Plan:   · Weight bearing as tolerated  left lower extremity  · Patient advised should they develop any increasing pain, redness, drainage, numbness, tingling or swelling surrounding the injection sight, they are to contact our office or present to the emergency department    · Left knee aspiration and intra-articular injection given as noted above  · Home range of motion strengthening exercises  · Follow up in 3 months or sooner if needed        Lisa Dailey PA-C

## 2018-08-07 ENCOUNTER — TELEPHONE (OUTPATIENT)
Dept: OBGYN CLINIC | Facility: HOSPITAL | Age: 48
End: 2018-08-07

## 2018-08-07 NOTE — TELEPHONE ENCOUNTER
Caller: patient  Call back number: [phone]  Fax number: [phone] efe Aguilar  Patient's doctor: Dr Fred Funes    Patient states at her last appointment she discussed having a letter written for a breast reduction  She states she would need this to qualify for it  She is asking that one is written and sent over   Please fax

## 2018-08-20 NOTE — TELEPHONE ENCOUNTER
I discovered I cannot write this letter given we have only taken care of her hip, she may need to follow up with a physical medicine and rehabilitation or her primary care physician

## 2018-08-22 ENCOUNTER — HOSPITAL ENCOUNTER (OUTPATIENT)
Dept: RADIOLOGY | Facility: CLINIC | Age: 48
Discharge: HOME | End: 2018-08-22
Payer: COMMERCIAL

## 2018-08-22 ENCOUNTER — TELEPHONE (OUTPATIENT)
Dept: PAIN MEDICINE | Facility: CLINIC | Age: 48
End: 2018-08-22

## 2018-08-22 VITALS
RESPIRATION RATE: 18 BRPM | DIASTOLIC BLOOD PRESSURE: 110 MMHG | OXYGEN SATURATION: 96 % | TEMPERATURE: 97.8 F | SYSTOLIC BLOOD PRESSURE: 157 MMHG | HEART RATE: 83 BPM

## 2018-08-22 DIAGNOSIS — M46.1 SACROILIITIS (HCC): ICD-10-CM

## 2018-08-22 PROCEDURE — A9579 GAD-BASE MR CONTRAST NOS,1ML: HCPCS | Performed by: ANESTHESIOLOGY

## 2018-08-22 PROCEDURE — 27096 INJECT SACROILIAC JOINT: CPT | Performed by: ANESTHESIOLOGY

## 2018-08-22 RX ORDER — METHYLPREDNISOLONE ACETATE 80 MG/ML
80 INJECTION, SUSPENSION INTRA-ARTICULAR; INTRALESIONAL; INTRAMUSCULAR; PARENTERAL; SOFT TISSUE ONCE
Status: COMPLETED | OUTPATIENT
Start: 2018-08-22 | End: 2018-08-22

## 2018-08-22 RX ORDER — LIDOCAINE HYDROCHLORIDE 10 MG/ML
5 INJECTION, SOLUTION EPIDURAL; INFILTRATION; INTRACAUDAL; PERINEURAL ONCE
Status: COMPLETED | OUTPATIENT
Start: 2018-08-22 | End: 2018-08-22

## 2018-08-22 RX ORDER — BUPIVACAINE HCL/PF 2.5 MG/ML
30 VIAL (ML) INJECTION ONCE
Status: COMPLETED | OUTPATIENT
Start: 2018-08-22 | End: 2018-08-22

## 2018-08-22 RX ADMIN — LIDOCAINE HYDROCHLORIDE 4 ML: 10 INJECTION, SOLUTION EPIDURAL; INFILTRATION; INTRACAUDAL; PERINEURAL at 10:50

## 2018-08-22 RX ADMIN — METHYLPREDNISOLONE ACETATE 80 MG: 80 INJECTION, SUSPENSION INTRA-ARTICULAR; INTRALESIONAL; INTRAMUSCULAR; SOFT TISSUE at 10:55

## 2018-08-22 RX ADMIN — GADOPENTETATE DIMEGLUMINE 1 ML: 469.01 INJECTION INTRAVENOUS at 10:53

## 2018-08-22 RX ADMIN — BUPIVACAINE HYDROCHLORIDE 4 ML: 2.5 INJECTION, SOLUTION EPIDURAL; INFILTRATION; INTRACAUDAL at 10:55

## 2018-08-22 NOTE — DISCHARGE INSTR - LAB
Steroid Joint Injection   WHAT YOU NEED TO KNOW:   A steroid joint injection is a procedure to inject steroid medicine into a joint  Steroid medicine decreases pain and inflammation  The injection may also contain an anesthetic (numbing medicine) to decrease pain  It may be done to treat conditions such as arthritis, gout, or carpal tunnel syndrome  The injections may be given in your knee, ankle, shoulder, elbow, wrist, ankle or sacroiliac joint  1  Do not apply heat to any area that is numb  If you have discomfort or soreness at the injection site, you may apply ice today, 20 minutes on and 20 minutes off  Tomorrow you may use ice or warm, moist heat  Do not apply ice or heat directly to the skin  2  You may have an increase or change in the discomfort for 36-48 hours after your treatment  Apply ice and continue with any pain medicine you have been prescribed  3  Do not do anything strenuous today  You may shower, but no tub baths or hot tubs today  You may resume your normal activities tomorrow, but do not overdo it  Resume normal activities slowly when you are feeling better  4  If you experience redness, drainage or swelling at the injection site, or if you develop a fever above 100 degrees, please call [facility] at [phone] or go to the Emergency Room  5  Continue to take all routine medicines prescribed by your primary care physician unless otherwise instructed by our staff  Most blood thinners should be started again according to your regularly scheduled dosing  If you have any questions, please give our office a call  If you have a problem specifically related to your procedure, please call our office at [phone]  Problems not related to your procedure should be directed to your primary care physician

## 2018-08-22 NOTE — H&P
History of Present Illness: The patient is a 51 y o  female who presents with complaints of low back pain  Patient Active Problem List   Diagnosis    Bilateral carpal tunnel syndrome    Degenerative disc disease, cervical    Lumbar radiculopathy    Sacroiliitis (HCC)    Lumbar spondylosis       Past Medical History:   Diagnosis Date    Asthma     Hyperlipidemia        History reviewed  No pertinent surgical history        Current Outpatient Prescriptions:     amoxicillin (AMOXIL) 500 mg capsule, TAKE 4 CAPSULES 1 HR PRIOR TO APPT, Disp: , Rfl: 0    azelastine (OPTIVAR) 0 05 % ophthalmic solution, Apply to eye, Disp: , Rfl:     buPROPion (WELLBUTRIN XL) 300 mg 24 hr tablet, Take by mouth, Disp: , Rfl:     Cholecalciferol (VITAMIN D3) 2000 units capsule, TAKE 2 CAPSULES BY MOUTH DAILY INDICATIONS: VITAMIN D DEFICIENCY , Disp: , Rfl: 5    Cholecalciferol 2000 units TABS, Take 2 capsules by mouth, Disp: , Rfl:     DULoxetine (CYMBALTA) 60 mg delayed release capsule, Take 60 mg by mouth, Disp: , Rfl:     ferrous sulfate 325 (65 Fe) mg tablet, Take by mouth, Disp: , Rfl:     fluticasone-salmeterol (ADVAIR DISKUS) 250-50 mcg/dose inhaler, Inhale 1 puff, Disp: , Rfl:     gabapentin (NEURONTIN) 300 mg capsule, Take 1 capsule (300 mg total) by mouth 3 (three) times a day, Disp: 90 capsule, Rfl: 2    hydrocortisone (CVS CORTISONE MAXIMUM STRENGTH) 1 % cream, Apply topically, Disp: , Rfl:     lisinopril (ZESTRIL) 10 mg tablet, Take by mouth, Disp: , Rfl:     loratadine (CLARITIN) 10 mg tablet, Take by mouth, Disp: , Rfl:     meloxicam (MOBIC) 15 mg tablet, Take 1 tablet by mouth Daily, Disp: , Rfl:     simvastatin (ZOCOR) 40 mg tablet, Take by mouth, Disp: , Rfl:     Sodium Hyaluronate (EUFLEXXA) 20 MG/2ML SOSY, Inject into the joint, Disp: , Rfl:     traMADol (ULTRAM) 50 mg tablet, , Disp: , Rfl:     traZODone (DESYREL) 50 mg tablet, TAKE 1 TO 2 TABLETS AT BEDTIME FOR MOOD, Disp: , Rfl:     VENTOLIN  (90 Base) MCG/ACT inhaler, INHALE 2 PUFFS EVERY 4 (FOUR) HOURS AS NEEDED FOR WHEEZING OR SHORTNESS OF BREATH , Disp: , Rfl: 2    Allergies   Allergen Reactions    Bee Venom Swelling    Fish-Derived Products Hives    Iodine Hives    Other Swelling       Physical Exam:   Vitals:    08/22/18 1032   BP: (!) 152/106   Pulse: 80   Resp: 18   Temp: 97 8 °F (36 6 °C)   SpO2: 96%     General: Awake, Alert, Oriented x 3, Mood and affect appropriate  Respiratory: Respirations even and unlabored  Cardiovascular: Peripheral pulses intact; no edema  Musculoskeletal Exam:  Bilateral SI joints tender to palpation  ASA Score: 2    Patient/Chart Verification  Patient ID Verified: Verbal  Consents Confirmed: Procedural, To be obtained in the Pre-Procedure area  H&P( within 30 days) Verified: To be obtained in the Pre-Procedure area  Interval H&P(within 24 hr) Complete (required for Outpatients and Surgery Admit only): To be obtained in the Pre-Procedure area  Allergies Reviewed: Yes  Anticoag/NSAID held?: NA  Currently on antibiotics?: No  Pregnancy denied?: NA    Assessment:   1   Sacroiliitis (HCC)        Plan: B/L SIJ INJ

## 2018-08-22 NOTE — TELEPHONE ENCOUNTER
Patient came in for appt today and asked about a letter she called about    When I looked for the phone note it was sent to Ortho  Pt stated it wasn't suppose to go there it was to go to Dr Gokul Renae  Please see below and advise  Caller: patient  Call back number: [phone]  Fax number: [phone] attention Paxton Pate  Patient's doctor: Dr Harmeet Calabrese     Patient states at her last appointment she discussed having a letter written for a breast reduction  She states she would need this to qualify for it  She is asking that one is written and sent over   Please fax

## 2018-08-29 ENCOUNTER — TELEPHONE (OUTPATIENT)
Dept: RADIOLOGY | Facility: CLINIC | Age: 48
End: 2018-08-29

## 2018-08-29 NOTE — TELEPHONE ENCOUNTER
Pt states that she is still having some pain in her neck  States that her pain is at about 7/10 currently and doesn't think she got much relief from the injection at this point  Pt was made aware that the injection could take up to 2 weeks for full effect or medication

## 2018-09-04 NOTE — TELEPHONE ENCOUNTER
2 week follow up call    1st attempt   Left message with call back number  Asked pt to give update after 2 weeks

## 2018-09-07 NOTE — TELEPHONE ENCOUNTER
Pt states that she has not received any more relief since last week  States that the pain starts in the mid back and shoots up and down the spine and into the legs  States that her pain is 7/10 currently and she didn't receive any relief from the injection

## 2018-09-29 DIAGNOSIS — M54.12 CERVICAL RADICULOPATHY: ICD-10-CM

## 2018-10-05 ENCOUNTER — OFFICE VISIT (OUTPATIENT)
Dept: OBGYN CLINIC | Facility: HOSPITAL | Age: 48
End: 2018-10-05
Payer: COMMERCIAL

## 2018-10-05 ENCOUNTER — CLINICAL SUPPORT (OUTPATIENT)
Dept: PAIN MEDICINE | Facility: CLINIC | Age: 48
End: 2018-10-05
Payer: COMMERCIAL

## 2018-10-05 VITALS
DIASTOLIC BLOOD PRESSURE: 79 MMHG | HEART RATE: 82 BPM | WEIGHT: 292 LBS | SYSTOLIC BLOOD PRESSURE: 111 MMHG | HEIGHT: 66 IN | BODY MASS INDEX: 46.93 KG/M2

## 2018-10-05 VITALS
DIASTOLIC BLOOD PRESSURE: 78 MMHG | WEIGHT: 292 LBS | TEMPERATURE: 97.9 F | SYSTOLIC BLOOD PRESSURE: 110 MMHG | BODY MASS INDEX: 47.13 KG/M2

## 2018-10-05 DIAGNOSIS — M47.816 LUMBAR SPONDYLOSIS: Primary | ICD-10-CM

## 2018-10-05 DIAGNOSIS — M46.1 SACROILIITIS (HCC): ICD-10-CM

## 2018-10-05 DIAGNOSIS — G56.02 CARPAL TUNNEL SYNDROME ON LEFT: Primary | ICD-10-CM

## 2018-10-05 DIAGNOSIS — G56.03 BILATERAL CARPAL TUNNEL SYNDROME: ICD-10-CM

## 2018-10-05 DIAGNOSIS — M50.30 DEGENERATIVE DISC DISEASE, CERVICAL: ICD-10-CM

## 2018-10-05 PROCEDURE — 20526 THER INJECTION CARP TUNNEL: CPT | Performed by: ORTHOPAEDIC SURGERY

## 2018-10-05 PROCEDURE — 99213 OFFICE O/P EST LOW 20 MIN: CPT | Performed by: ORTHOPAEDIC SURGERY

## 2018-10-05 PROCEDURE — 99214 OFFICE O/P EST MOD 30 MIN: CPT | Performed by: ANESTHESIOLOGY

## 2018-10-05 RX ORDER — BETAMETHASONE SODIUM PHOSPHATE AND BETAMETHASONE ACETATE 3; 3 MG/ML; MG/ML
6 INJECTION, SUSPENSION INTRA-ARTICULAR; INTRALESIONAL; INTRAMUSCULAR; SOFT TISSUE
Status: COMPLETED | OUTPATIENT
Start: 2018-10-05 | End: 2018-10-05

## 2018-10-05 RX ORDER — BUPIVACAINE HYDROCHLORIDE 2.5 MG/ML
1 INJECTION, SOLUTION INFILTRATION; PERINEURAL
Status: COMPLETED | OUTPATIENT
Start: 2018-10-05 | End: 2018-10-05

## 2018-10-05 RX ADMIN — BUPIVACAINE HYDROCHLORIDE 1 ML: 2.5 INJECTION, SOLUTION INFILTRATION; PERINEURAL at 11:30

## 2018-10-05 RX ADMIN — BETAMETHASONE SODIUM PHOSPHATE AND BETAMETHASONE ACETATE 6 MG: 3; 3 INJECTION, SUSPENSION INTRA-ARTICULAR; INTRALESIONAL; INTRAMUSCULAR; SOFT TISSUE at 11:30

## 2018-10-05 NOTE — PROGRESS NOTES
Assessment:  1  Lumbar spondylosis    2  Sacroiliitis ([address] )    3  Degenerative disc disease, cervical    4  Bilateral carpal tunnel syndrome        Plan:  58-year-old female returning for follow-up of neck pain, cervical degenerative disc disease, carpal tunnel syndrome, lumbar degenerative disc disease and spondylosis, and sacroiliitis  The patient is currently following with Orthopedics for carpal tunnel syndrome and is receiving steroid injections for this  The patient did have bilateral SI joint injections which did not provide much relief  She is currently taking gabapentin 300 mg t i d  She was unable tolerate higher doses secondary to tremors and blurred vision  She is tolerating this dose well  She also takes duloxetine 60 mg daily which is prescribed by rheumatology  The patient's low back pain seems to be myofascial and facet mediated  There is a sacroiliac mediated component as well, however she did not respond to SI joint injections  Although the patient does complain of neuropathic symptoms into her lower extremities, she does not have any evidence of radiculopathy or myelopathy on physical exam   1   I will schedule the patient for bilateral L2, L3, L4, and L5 medial branch blocks  I discussed the diagnostic nature of these blocks with the patient and if she has a favorable result x2 we could proceed with RFA for longer lasting relief  The patient wished to proceed  2   The patient will continue with gabapentin 300 mg t i d   3   The patient will continue with duloxetine as prescribed  4  I will follow up the patient pending results of medial branch blocks    Complete risks and benefits including bleeding, infection, tissue reaction, nerve injury and allergic reaction were discussed  The approach was demonstrated using models and literature was provided  Verbal and written consent was obtained      My impressions and treatment recommendations were discussed in detail with the patient who verbalized understanding and had no further questions  Discharge instructions were provided  I personally saw and examined the patient and I agree with the above discussed plan of care  No orders of the defined types were placed in this encounter  No orders of the defined types were placed in this encounter  History of Present Illness:    Geo Camacho is a 51 y o  female   returning for follow-up of neck pain, cervical degenerative disc disease, carpal tunnel syndrome, lumbar degenerative disc disease and spondylosis, and sacroiliitis  The patient is currently following with Orthopedics for carpal tunnel syndrome and is receiving steroid injections for this  The patient did have bilateral SI joint injections which did not provide much relief  She is currently taking gabapentin 300 mg t i d  She was unable tolerate higher doses secondary to tremors and blurred vision  She is tolerating this dose well  She also takes duloxetine 60 mg daily which is prescribed by rheumatology  The patient rates her pain as 7/10 on the pain is worse in the morning and at night  The pain is constant and described as burning, sharp, pressure-like, numbness, and pins and needles  The pain is worse with standing, walking, bending, and exercise  The pain is alleviated with relaxation, sitting, and lying down  I have personally reviewed and/or updated the patient's past medical history, past surgical history, family history, social history, current medications, allergies, and vital signs today  Other than as stated above, the patient denies any interval changes in medications, medical condition, mental condition, symptoms, or allergies since the last office visit  Review of Systems:    Review of Systems   Constitutional: Negative for fever and unexpected weight change  HENT: Negative for trouble swallowing  Eyes: Negative for visual disturbance     Respiratory: Negative for shortness of breath and wheezing  Cardiovascular: Negative for chest pain and palpitations  Gastrointestinal: Positive for nausea  Negative for constipation, diarrhea and vomiting  Endocrine: Negative for cold intolerance, heat intolerance and polydipsia  Genitourinary: Negative for difficulty urinating and frequency  Musculoskeletal: Positive for gait problem and joint swelling  Negative for arthralgias and myalgias  Left knee swelling   Skin: Negative for rash  Neurological: Positive for dizziness  Negative for seizures, syncope, weakness and headaches  Hematological: Does not bruise/bleed easily  Psychiatric/Behavioral: Negative for dysphoric mood  All other systems reviewed and are negative  Patient Active Problem List   Diagnosis    Bilateral carpal tunnel syndrome    Degenerative disc disease, cervical    Lumbar radiculopathy    Sacroiliitis (HCC)    Lumbar spondylosis       Past Medical History:   Diagnosis Date    Asthma     Hyperlipidemia        No past surgical history on file  Family History   Problem Relation Age of Onset    Diabetes Mother     Stroke Mother     Diabetes Father     Cancer Father     Diabetes Sister     Diabetes Brother        Social History     Occupational History    Not on file  Social History Main Topics    Smoking status: Former Smoker    Smokeless tobacco: Never Used    Alcohol use Not on file    Drug use: Unknown    Sexual activity: Not on file       Current Outpatient Prescriptions on File Prior to Visit   Medication Sig    amoxicillin (AMOXIL) 500 mg capsule TAKE 4 CAPSULES 1 HR PRIOR TO APPT    azelastine (OPTIVAR) 0 05 % ophthalmic solution Apply to eye    buPROPion (WELLBUTRIN XL) 300 mg 24 hr tablet Take by mouth    Cholecalciferol (VITAMIN D3) 2000 units capsule TAKE 2 CAPSULES BY MOUTH DAILY INDICATIONS: VITAMIN D DEFICIENCY      Cholecalciferol 2000 units TABS Take 2 capsules by mouth    DULoxetine (CYMBALTA) 60 mg delayed release capsule Take 60 mg by mouth    ferrous sulfate 325 (65 Fe) mg tablet Take by mouth    fluticasone-salmeterol (ADVAIR DISKUS) 250-50 mcg/dose inhaler Inhale 1 puff    gabapentin (NEURONTIN) 300 mg capsule Take 1 capsule (300 mg total) by mouth 3 (three) times a day    hydrocortisone ([employer] CORTISONE MAXIMUM STRENGTH) 1 % cream Apply topically    lisinopril (ZESTRIL) 10 mg tablet Take by mouth    loratadine (CLARITIN) 10 mg tablet Take by mouth    meloxicam (MOBIC) 15 mg tablet Take 1 tablet by mouth Daily    simvastatin (ZOCOR) 40 mg tablet Take by mouth    Sodium Hyaluronate (EUFLEXXA) 20 MG/2ML SOSY Inject into the joint    traMADol (ULTRAM) 50 mg tablet     traZODone (DESYREL) 50 mg tablet TAKE 1 TO 2 TABLETS AT BEDTIME FOR MOOD    VENTOLIN  (90 Base) MCG/ACT inhaler INHALE 2 PUFFS EVERY 4 (FOUR) HOURS AS NEEDED FOR WHEEZING OR SHORTNESS OF BREATH  No current facility-administered medications on file prior to visit  Allergies   Allergen Reactions    Bee Venom Swelling    Fish-Derived Products Hives    Iodine Hives    Other Swelling       Physical Exam:    There were no vitals taken for this visit  Constitutional: obese  Eyes: anicteric  HEENT: grossly intact  Neck: supple, symmetric, trachea midline and no masses   Pulmonary:even and unlabored  Cardiovascular:No edema or pitting edema present  Skin:Normal without rashes or lesions and well hydrated  Psychiatric:Mood and affect appropriate  Neurologic:Cranial Nerves II-XII grossly intact  Musculoskeletal:normal gait  Bilateral cervical paraspinals and trapezii tender to palpation  Bilateral upper extremity strength 5/5 in all muscle groups  Negative Spurling's bilaterally  Bilateral lumbar paraspinals tender to palpation from L2-L5  Bilateral SI joints tender to palpation  Bilateral lower extremity strength 5/5 in all muscle groups  Negative seated straight leg raise bilaterally      Imaging  Imaging reviewed

## 2018-10-05 NOTE — PROGRESS NOTES
ASSESSMENT/PLAN:    Assessment:   Left hand/arm paresthesias and difficulty gripping  Previous EMG showed mild carpal tunnel    Plan:   Explained to patient that not all of her signs and symptoms are consistent with carpal tunnel  Patient would like to try a 2nd injection and was instructed to pay close attention to any improvement in symptoms, even if just for a couple of days  If improvement, consider ECTR  If not, neurology consult    Follow Up:  6  week(s)    _____________________________________________________  CHIEF COMPLAINT:  Chief Complaint   Patient presents with    Left Hand - Follow-up    Right Hand - Follow-up         SUBJECTIVE:  Beryl Whittington is a 51y o  year old female who presents for follow up regarding left hand paresthesias  Patient states her symptoms have worsened since last year  She describes n/t throughout the entire hand including the dorsal hand  This radiates up the forearm as well  She describes difficulty grabbing and picking up items  Previous carpal tunnel injection did not provide relief  PAST MEDICAL HISTORY:  Past Medical History:   Diagnosis Date    Asthma     Hyperlipidemia        PAST SURGICAL HISTORY:  History reviewed  No pertinent surgical history      FAMILY HISTORY:  Family History   Problem Relation Age of Onset    Diabetes Mother     Stroke Mother     Diabetes Father     Cancer Father     Diabetes Sister     Diabetes Brother        SOCIAL HISTORY:  Social History   Substance Use Topics    Smoking status: Former Smoker    Smokeless tobacco: Never Used    Alcohol use Not on file       MEDICATIONS:    Current Outpatient Prescriptions:     amoxicillin (AMOXIL) 500 mg capsule, TAKE 4 CAPSULES 1 HR PRIOR TO APPT, Disp: , Rfl: 0    azelastine (OPTIVAR) 0 05 % ophthalmic solution, Apply to eye, Disp: , Rfl:     buPROPion (WELLBUTRIN XL) 300 mg 24 hr tablet, Take by mouth, Disp: , Rfl:     Cholecalciferol (VITAMIN D3) 2000 units capsule, TAKE 2 CAPSULES BY MOUTH DAILY INDICATIONS: VITAMIN D DEFICIENCY , Disp: , Rfl: 5    Cholecalciferol 2000 units TABS, Take 2 capsules by mouth, Disp: , Rfl:     DULoxetine (CYMBALTA) 60 mg delayed release capsule, Take 60 mg by mouth, Disp: , Rfl:     ferrous sulfate 325 (65 Fe) mg tablet, Take by mouth, Disp: , Rfl:     fluticasone-salmeterol (ADVAIR DISKUS) 250-50 mcg/dose inhaler, Inhale 1 puff, Disp: , Rfl:     gabapentin (NEURONTIN) 300 mg capsule, Take 1 capsule (300 mg total) by mouth 3 (three) times a day, Disp: 90 capsule, Rfl: 2    hydrocortisone (CVS CORTISONE MAXIMUM STRENGTH) 1 % cream, Apply topically, Disp: , Rfl:     lisinopril (ZESTRIL) 10 mg tablet, Take by mouth, Disp: , Rfl:     loratadine (CLARITIN) 10 mg tablet, Take by mouth, Disp: , Rfl:     simvastatin (ZOCOR) 40 mg tablet, Take by mouth, Disp: , Rfl:     Sodium Hyaluronate (EUFLEXXA) 20 MG/2ML SOSY, Inject into the joint, Disp: , Rfl:     traZODone (DESYREL) 50 mg tablet, TAKE 1 TO 2 TABLETS AT BEDTIME FOR MOOD, Disp: , Rfl:     VENTOLIN  (90 Base) MCG/ACT inhaler, INHALE 2 PUFFS EVERY 4 (FOUR) HOURS AS NEEDED FOR WHEEZING OR SHORTNESS OF BREATH , Disp: , Rfl: 2    meloxicam (MOBIC) 15 mg tablet, Take 1 tablet by mouth Daily, Disp: , Rfl:     traMADol (ULTRAM) 50 mg tablet, , Disp: , Rfl:     ALLERGIES:  Allergies   Allergen Reactions    Bee Venom Swelling    Fish-Derived Products Hives    Iodine Hives    Other Swelling       REVIEW OF SYSTEMS:  Pertinent items are noted in HPI  A comprehensive review of systems was negative      LABS:  HgA1c: No results found for: HGBA1C  BMP:   Lab Results   Component Value Date    GLUCOSE 116 09/07/2015    CALCIUM 8 1 (L) 09/07/2015     09/07/2015    K 3 8 09/07/2015    CO2 31 09/07/2015     09/07/2015    BUN 10 09/07/2015    CREATININE 0 63 09/07/2015           _____________________________________________________  PHYSICAL EXAMINATION:  General: well developed and well nourished, alert, oriented times 3 and appears comfortable  Psychiatric: Normal  HEENT: Trachea Midline, No torticollis  Cardiovascular: No discernable arrhythmia  Pulmonary: No wheezing or stridor  Skin: No masses, erthema, lacerations, fluctation, ulcerations  Neurovascular: Sensation Intact to the Median, Ulnar, Radial Nerve, Motor Intact to the Median, Ulnar, Radial Nerve and Pulses Intact    MUSCULOSKELETAL EXAMINATION:  LEFT SIDE:  Carpal tunnel:  Positive tinel's at carpal tunnel, but also positive tinel's further up the median nerve towards the antecubital fossa, cubital tunnel and radial tunnel as well as up towards the area of the clavicle  Patient describes difficulty moving her thumb for motor testing 2/2 pain  She has 5/5 strength to AIN, APB and intrinsic muscles       _____________________________________________________  STUDIES REVIEWED:  No New Studies to review - previous EMG was read as mild CTS b/l      PROCEDURES PERFORMED:  Hand/upper extremity injection  Date/Time: 10/5/2018 11:30 AM  Consent given by: patient  Timeout: Immediately prior to procedure a time out was called to verify the correct patient, procedure, equipment, support staff and site/side marked as required   Supporting Documentation  Indications: therapeutic   Procedure Details  Condition:carpal tunnel syndrome Site: L carpal tunnel   Needle size: 25 G  Ultrasound guidance: no  Approach: volar  Medications administered: 6 mg betamethasone acetate-betamethasone sodium phosphate 6 (3-3) mg/mL; 1 mL bupivacaine 0 25 %  Patient tolerance: patient tolerated the procedure well with no immediate complications  Dressing:  Sterile dressing applied       No Procedures performed today   Scribe Attestation    I,:   Nisha Barone PA-C am acting as a scribe while in the presence of the attending physician :        I,:   Christy Chávez MD personally performed the services described in this documentation    as scribed in my presence :

## 2018-10-07 RX ORDER — GABAPENTIN 300 MG/1
300 CAPSULE ORAL 3 TIMES DAILY
Qty: 90 CAPSULE | Refills: 2 | OUTPATIENT
Start: 2018-10-07

## 2018-10-09 NOTE — TELEPHONE ENCOUNTER
S/W pt  Advised her of the same  Pt stated she does not need a refill at this time  Advised pt to call [facility] at least 48 hrs prior to running out to request a refill  Pt verbalized understanding

## 2018-10-16 ENCOUNTER — OFFICE VISIT (OUTPATIENT)
Dept: OBGYN CLINIC | Facility: HOSPITAL | Age: 48
End: 2018-10-16
Payer: COMMERCIAL

## 2018-10-16 VITALS
WEIGHT: 291.2 LBS | HEIGHT: 66 IN | BODY MASS INDEX: 46.8 KG/M2 | HEART RATE: 106 BPM | DIASTOLIC BLOOD PRESSURE: 107 MMHG | SYSTOLIC BLOOD PRESSURE: 144 MMHG

## 2018-10-16 DIAGNOSIS — M17.12 PRIMARY OSTEOARTHRITIS OF LEFT KNEE: Primary | ICD-10-CM

## 2018-10-16 PROCEDURE — 20610 DRAIN/INJ JOINT/BURSA W/O US: CPT | Performed by: PHYSICIAN ASSISTANT

## 2018-10-16 PROCEDURE — 99213 OFFICE O/P EST LOW 20 MIN: CPT | Performed by: ORTHOPAEDIC SURGERY

## 2018-10-16 PROCEDURE — 20610 DRAIN/INJ JOINT/BURSA W/O US: CPT | Performed by: ORTHOPAEDIC SURGERY

## 2018-10-16 RX ORDER — METHYLPREDNISOLONE ACETATE 40 MG/ML
2 INJECTION, SUSPENSION INTRA-ARTICULAR; INTRALESIONAL; INTRAMUSCULAR; SOFT TISSUE
Status: COMPLETED | OUTPATIENT
Start: 2018-10-16 | End: 2018-10-16

## 2018-10-16 RX ORDER — BUPIVACAINE HYDROCHLORIDE 2.5 MG/ML
2 INJECTION, SOLUTION INFILTRATION; PERINEURAL
Status: COMPLETED | OUTPATIENT
Start: 2018-10-16 | End: 2018-10-16

## 2018-10-16 RX ADMIN — METHYLPREDNISOLONE ACETATE 2 ML: 40 INJECTION, SUSPENSION INTRA-ARTICULAR; INTRALESIONAL; INTRAMUSCULAR; SOFT TISSUE at 13:59

## 2018-10-16 RX ADMIN — BUPIVACAINE HYDROCHLORIDE 2 ML: 2.5 INJECTION, SOLUTION INFILTRATION; PERINEURAL at 13:59

## 2018-10-16 NOTE — PROGRESS NOTES
Orthopedics          Srinivas Case 51 y o  female MRN: [id number]      Chief Complaint:   left knee pain    HPI:   51 y  o female complaining of left knee pain  Patient diagnosed with left knee osteoarthritis in the past   Patient did have pain relief with the aspiration injection 3 months ago over pain has since returned  She states having 4 weeks pain relief following the injection  She states the pain is aching nature localized her left knee she complains of decreased motion in her left knee over the past 2 weeks time  She denies any instability or changes in numbness or tingling in her left lower extremity  Review Of Systems:   · Skin: Normal  · Neuro: See HPI  · Musculoskeletal: See HPI  · All other systems reviewed and are negative    Past Medical History:   Past Medical History:   Diagnosis Date    Asthma     Hyperlipidemia        Past Surgical History:   History reviewed  No pertinent surgical history  Family History:  Family history reviewed and non-contributory  Family History   Problem Relation Age of Onset    Diabetes Mother     Stroke Mother     Diabetes Father     Cancer Father     Diabetes Sister     Diabetes Brother          Social History:  Social History     Social History    Marital status: Single     Spouse name: N/A    Number of children: N/A    Years of education: N/A     Social History Main Topics    Smoking status: Former Smoker    Smokeless tobacco: Never Used    Alcohol use None    Drug use: Unknown    Sexual activity: Not Asked     Other Topics Concern    None     Social History Narrative    None       Allergies:    Allergies   Allergen Reactions    Bee Venom Swelling    Fish-Derived Products Hives    Iodine Hives    Other Swelling       Labs:    0  Lab Value Date/Time   HCT 35 7 09/07/2015 0600   HCT 34 6 (L) 09/06/2015 0556   HCT 39 4 09/05/2015 0607   HGB 11 1 (L) 09/07/2015 0600   HGB 10 8 (L) 09/06/2015 0556   HGB 12 8 09/05/2015 0607   INR 1 02 08/20/2015 1311   WBC 11 47 (H) 09/07/2015 0600   WBC 11 74 (H) 09/06/2015 0556   WBC 18 25 (H) 09/05/2015 0607       Meds:    Current Outpatient Prescriptions:     azelastine (OPTIVAR) 0 05 % ophthalmic solution, Apply to eye, Disp: , Rfl:     buPROPion (WELLBUTRIN XL) 300 mg 24 hr tablet, Take by mouth, Disp: , Rfl:     Cholecalciferol (VITAMIN D3) 2000 units capsule, TAKE 2 CAPSULES BY MOUTH DAILY INDICATIONS: VITAMIN D DEFICIENCY , Disp: , Rfl: 5    Cholecalciferol 2000 units TABS, Take 2 capsules by mouth, Disp: , Rfl:     DULoxetine (CYMBALTA) 60 mg delayed release capsule, Take 60 mg by mouth, Disp: , Rfl:     ferrous sulfate 325 (65 Fe) mg tablet, Take by mouth, Disp: , Rfl:     fluticasone-salmeterol (ADVAIR DISKUS) 250-50 mcg/dose inhaler, Inhale 1 puff, Disp: , Rfl:     gabapentin (NEURONTIN) 300 mg capsule, Take 1 capsule (300 mg total) by mouth 3 (three) times a day, Disp: 90 capsule, Rfl: 2    hydrocortisone ([employer]S CORTISONE MAXIMUM STRENGTH) 1 % cream, Apply topically, Disp: , Rfl:     lisinopril (ZESTRIL) 10 mg tablet, Take by mouth, Disp: , Rfl:     loratadine (CLARITIN) 10 mg tablet, Take by mouth, Disp: , Rfl:     simvastatin (ZOCOR) 40 mg tablet, Take by mouth, Disp: , Rfl:     traZODone (DESYREL) 50 mg tablet, TAKE 1 TO 2 TABLETS AT BEDTIME FOR MOOD, Disp: , Rfl:     VENTOLIN  (90 Base) MCG/ACT inhaler, INHALE 2 PUFFS EVERY 4 (FOUR) HOURS AS NEEDED FOR WHEEZING OR SHORTNESS OF BREATH , Disp: , Rfl: 2      Physical Exam:     General Appearance:    Alert, cooperative, no distress, appears stated age   Head:    Normocephalic, without obvious abnormality, atraumatic   Eyes:    conjunctiva/corneas clear, both eyes        Nose:   Nares normal, septum midline, no drainage    Throat:   Lips normal; teeth and gums normal   Neck:    symmetrical, trachea midline, ;     thyroid:  no enlargement/   Back:     Symmetric, no curvature, ROM normal   Lungs:   No audible wheezing or labored breathing   Chest Wall:    No tenderness or deformity    Heart:    Regular rate and rhythm               Pulses:   2+ and symmetric all extremities   Skin:   Skin color, texture, turgor normal, no rashes or lesions   Neurologic:   normal strength, sensation and reflexes     throughout       Musculoskeletal: left lower extremity  · On examination of the left knee there is a moderate effusion, no erythema  Range of motion to full active extension and flexion to greater than 120°  Pain on palpation medial and lateral joint lines  There is crepitus with range of motion, no warmth to palpation, bony enlargement noted  No pain on palpation pes anserine bursa region or distal iliotibial band  Stable to varus and valgus stress without pain or gapping  Negative anterior and posterior drawer testing  Sensation intact distal pulses present  Large joint arthrocentesis  Date/Time: 10/16/2018 1:59 PM  Consent given by: patient  Supporting Documentation  Indications: pain   Procedure Details  Location: knee - L knee  Needle size: 22 G  Ultrasound guidance: no  Approach: superior  Medications administered: 2 mL bupivacaine 0 25 %; 2 mL methylPREDNISolone acetate 40 mg/mL    Aspirate amount: 26 mL  Aspirate: yellow  Patient tolerance: patient tolerated the procedure well with no immediate complications            _*_*_*_*_*_*_*_*_*_*_*_*_*_*_*_*_*_*_*_*_*_*_*_*_*_*_*_*_*_*_*_*_*_*_*_*_*_*_*_*_*    Assessment:  51 y  o female with left knee pain, osteoarthritis and effusion    Plan:   · Weight bearing as tolerated  left lower extremity  · Left knee aspirated and injected with steroid as noted above  · Patient advised should they develop any increasing pain, redness, drainage, numbness, tingling or swelling surrounding the injection sight, they are to contact our office or present to the emergency department    · Continue home range of motion stretching strengthening exercises  · Follow up in 3 months or sooner if needed    Geraldine Alexandre PA-C

## 2018-10-17 ENCOUNTER — HOSPITAL ENCOUNTER (OUTPATIENT)
Dept: RADIOLOGY | Facility: CLINIC | Age: 48
Discharge: HOME | End: 2018-10-17
Admitting: ANESTHESIOLOGY
Payer: COMMERCIAL

## 2018-10-17 VITALS
RESPIRATION RATE: 20 BRPM | HEART RATE: 98 BPM | OXYGEN SATURATION: 100 % | TEMPERATURE: 97.9 F | SYSTOLIC BLOOD PRESSURE: 148 MMHG | DIASTOLIC BLOOD PRESSURE: 88 MMHG

## 2018-10-17 DIAGNOSIS — M47.816 LUMBAR SPONDYLOSIS: ICD-10-CM

## 2018-10-17 PROCEDURE — 64493 INJ PARAVERT F JNT L/S 1 LEV: CPT | Performed by: ANESTHESIOLOGY

## 2018-10-17 PROCEDURE — 64494 INJ PARAVERT F JNT L/S 2 LEV: CPT | Performed by: ANESTHESIOLOGY

## 2018-10-17 PROCEDURE — 64495 INJ PARAVERT F JNT L/S 3 LEV: CPT | Performed by: ANESTHESIOLOGY

## 2018-10-17 RX ORDER — LIDOCAINE HYDROCHLORIDE 10 MG/ML
10 INJECTION, SOLUTION EPIDURAL; INFILTRATION; INTRACAUDAL; PERINEURAL ONCE
Status: COMPLETED | OUTPATIENT
Start: 2018-10-17 | End: 2018-10-17

## 2018-10-17 RX ADMIN — LIDOCAINE HYDROCHLORIDE 4 ML: 20 INJECTION, SOLUTION EPIDURAL; INFILTRATION; INTRACAUDAL; PERINEURAL at 11:09

## 2018-10-17 RX ADMIN — LIDOCAINE HYDROCHLORIDE 10 ML: 10 INJECTION, SOLUTION EPIDURAL; INFILTRATION; INTRACAUDAL; PERINEURAL at 11:04

## 2018-10-17 NOTE — DISCHARGE INSTR - LAB

## 2018-10-17 NOTE — H&P
History of Present Illness: The patient is a 51 y o  female who presents with complaints of low back pain  Patient Active Problem List   Diagnosis    Bilateral carpal tunnel syndrome    Degenerative disc disease, cervical    Lumbar radiculopathy    Sacroiliitis (HCC)    Lumbar spondylosis       Past Medical History:   Diagnosis Date    Asthma     Hyperlipidemia        History reviewed  No pertinent surgical history        Current Outpatient Prescriptions:     azelastine (OPTIVAR) 0 05 % ophthalmic solution, Apply to eye, Disp: , Rfl:     buPROPion (WELLBUTRIN XL) 300 mg 24 hr tablet, Take by mouth, Disp: , Rfl:     Cholecalciferol (VITAMIN D3) 2000 units capsule, TAKE 2 CAPSULES BY MOUTH DAILY INDICATIONS: VITAMIN D DEFICIENCY , Disp: , Rfl: 5    Cholecalciferol 2000 units TABS, Take 2 capsules by mouth, Disp: , Rfl:     DULoxetine (CYMBALTA) 60 mg delayed release capsule, Take 60 mg by mouth, Disp: , Rfl:     ferrous sulfate 325 (65 Fe) mg tablet, Take by mouth, Disp: , Rfl:     fluticasone-salmeterol (ADVAIR DISKUS) 250-50 mcg/dose inhaler, Inhale 1 puff, Disp: , Rfl:     gabapentin (NEURONTIN) 300 mg capsule, Take 1 capsule (300 mg total) by mouth 3 (three) times a day, Disp: 90 capsule, Rfl: 2    hydrocortisone ([employer]S CORTISONE MAXIMUM STRENGTH) 1 % cream, Apply topically, Disp: , Rfl:     lisinopril (ZESTRIL) 10 mg tablet, Take by mouth, Disp: , Rfl:     loratadine (CLARITIN) 10 mg tablet, Take by mouth, Disp: , Rfl:     simvastatin (ZOCOR) 40 mg tablet, Take by mouth, Disp: , Rfl:     traZODone (DESYREL) 50 mg tablet, TAKE 1 TO 2 TABLETS AT BEDTIME FOR MOOD, Disp: , Rfl:     VENTOLIN  (90 Base) MCG/ACT inhaler, INHALE 2 PUFFS EVERY 4 (FOUR) HOURS AS NEEDED FOR WHEEZING OR SHORTNESS OF BREATH , Disp: , Rfl: 2    Current Facility-Administered Medications:     lidocaine (PF) (XYLOCAINE-MPF) 1 % injection 10 mL, 10 mL, Intra-articular, Once, Willie Ignacio, DO    lidocaine (PF) (XYLOCAINE-MPF) 2 % injection 4 mL, 4 mL, Intra-articular, Once, Willie Ignacio, DO    Allergies   Allergen Reactions    Bee Venom Swelling    Fish-Derived Products Hives    Iodine Hives    Other Swelling       Physical Exam:   Vitals:    10/17/18 1024   BP: 140/91   Pulse: 83   Resp: 20   Temp: 97 9 °F (36 6 °C)   SpO2: 94%     General: Awake, Alert, Oriented x 3, Mood and affect appropriate  Respiratory: Respirations even and unlabored  Cardiovascular: Peripheral pulses intact; no edema  Musculoskeletal Exam:   Bilateral lumbar paraspinals tender to palpation  ASA Score: 2    Patient/Chart Verification  Patient ID Verified: Verbal  ID Band Applied: No  Consents Confirmed: Procedural, To be obtained in the Pre-Procedure area  H&P( within 30 days) Verified: To be obtained in the Pre-Procedure area  Interval H&P(within 24 hr) Complete (required for Outpatients and Surgery Admit only): To be obtained in the Pre-Procedure area  Allergies Reviewed: Yes  Anticoag/NSAID held?: No  Currently on antibiotics?: No    Assessment:   1   Lumbar spondylosis        Plan: lumbar spondylosis - Bilateral L2, L3, L4, L5 MBB#1

## 2018-10-26 ENCOUNTER — TELEPHONE (OUTPATIENT)
Dept: PAIN MEDICINE | Facility: CLINIC | Age: 48
End: 2018-10-26

## 2018-10-26 NOTE — TELEPHONE ENCOUNTER
The patient had a favorable result to bilateral L2, L3, L4, L5 medial branch block 1  Please call the patient to schedule medial branch block 2

## 2018-10-29 NOTE — TELEPHONE ENCOUNTER
Called pt, scheduled BL L2, L3, L4, L5 MBB#2 on Tues 11/13/18 arr at 10:40  Went over pre procedure instructions, NPO 1 hr prior, if sick or on abx needs to call to rs, wear loose, comf clothing- no buttons/zippers, needs   Pt verbalized understanding

## 2018-11-13 ENCOUNTER — HOSPITAL ENCOUNTER (OUTPATIENT)
Dept: RADIOLOGY | Facility: CLINIC | Age: 48
Discharge: HOME | End: 2018-11-13
Admitting: ANESTHESIOLOGY
Payer: COMMERCIAL

## 2018-11-13 VITALS
DIASTOLIC BLOOD PRESSURE: 99 MMHG | OXYGEN SATURATION: 96 % | TEMPERATURE: 97.6 F | HEART RATE: 73 BPM | RESPIRATION RATE: 20 BRPM | SYSTOLIC BLOOD PRESSURE: 159 MMHG

## 2018-11-13 DIAGNOSIS — M47.816 LUMBAR SPONDYLOSIS: ICD-10-CM

## 2018-11-13 PROCEDURE — 64495 INJ PARAVERT F JNT L/S 3 LEV: CPT | Performed by: ANESTHESIOLOGY

## 2018-11-13 PROCEDURE — 64494 INJ PARAVERT F JNT L/S 2 LEV: CPT | Performed by: ANESTHESIOLOGY

## 2018-11-13 PROCEDURE — 64493 INJ PARAVERT F JNT L/S 1 LEV: CPT | Performed by: ANESTHESIOLOGY

## 2018-11-13 RX ORDER — BUPIVACAINE HYDROCHLORIDE 5 MG/ML
30 INJECTION, SOLUTION EPIDURAL; INTRACAUDAL ONCE
Status: COMPLETED | OUTPATIENT
Start: 2018-11-13 | End: 2018-11-13

## 2018-11-13 RX ORDER — LIDOCAINE HYDROCHLORIDE 10 MG/ML
10 INJECTION, SOLUTION EPIDURAL; INFILTRATION; INTRACAUDAL; PERINEURAL ONCE
Status: COMPLETED | OUTPATIENT
Start: 2018-11-13 | End: 2018-11-13

## 2018-11-13 RX ORDER — LIDOCAINE HYDROCHLORIDE 10 MG/ML
5 INJECTION, SOLUTION EPIDURAL; INFILTRATION; INTRACAUDAL; PERINEURAL ONCE
Status: COMPLETED | OUTPATIENT
Start: 2018-11-13 | End: 2018-11-13

## 2018-11-13 RX ADMIN — BUPIVACAINE HYDROCHLORIDE 4 ML: 5 INJECTION, SOLUTION EPIDURAL; INTRACAUDAL at 11:04

## 2018-11-13 RX ADMIN — LIDOCAINE HYDROCHLORIDE 2 ML: 10 INJECTION, SOLUTION EPIDURAL; INFILTRATION; INTRACAUDAL; PERINEURAL at 10:57

## 2018-11-13 RX ADMIN — LIDOCAINE HYDROCHLORIDE 10 ML: 10 INJECTION, SOLUTION EPIDURAL; INFILTRATION; INTRACAUDAL; PERINEURAL at 10:57

## 2018-11-13 NOTE — H&P
History of Present Illness: The patient is a 51 y o  female who presents with complaints of low back pain  Patient Active Problem List   Diagnosis    Bilateral carpal tunnel syndrome    Degenerative disc disease, cervical    Lumbar radiculopathy    Sacroiliitis (HCC)    Lumbar spondylosis       Past Medical History:   Diagnosis Date    Asthma     Hyperlipidemia        History reviewed  No pertinent surgical history        Current Outpatient Prescriptions:     azelastine (OPTIVAR) 0 05 % ophthalmic solution, Apply to eye, Disp: , Rfl:     buPROPion (WELLBUTRIN XL) 300 mg 24 hr tablet, Take by mouth, Disp: , Rfl:     Cholecalciferol (VITAMIN D3) 2000 units capsule, TAKE 2 CAPSULES BY MOUTH DAILY INDICATIONS: VITAMIN D DEFICIENCY , Disp: , Rfl: 5    Cholecalciferol 2000 units TABS, Take 2 capsules by mouth, Disp: , Rfl:     DULoxetine (CYMBALTA) 60 mg delayed release capsule, Take 60 mg by mouth, Disp: , Rfl:     ferrous sulfate 325 (65 Fe) mg tablet, Take by mouth, Disp: , Rfl:     fluticasone-salmeterol (ADVAIR DISKUS) 250-50 mcg/dose inhaler, Inhale 1 puff, Disp: , Rfl:     gabapentin (NEURONTIN) 300 mg capsule, Take 1 capsule (300 mg total) by mouth 3 (three) times a day, Disp: 90 capsule, Rfl: 2    hydrocortisone ([facility] CORTISONE MAXIMUM STRENGTH) 1 % cream, Apply topically, Disp: , Rfl:     lisinopril (ZESTRIL) 10 mg tablet, Take by mouth, Disp: , Rfl:     loratadine (CLARITIN) 10 mg tablet, Take by mouth, Disp: , Rfl:     simvastatin (ZOCOR) 40 mg tablet, Take by mouth, Disp: , Rfl:     traZODone (DESYREL) 50 mg tablet, TAKE 1 TO 2 TABLETS AT BEDTIME FOR MOOD, Disp: , Rfl:     VENTOLIN  (90 Base) MCG/ACT inhaler, INHALE 2 PUFFS EVERY 4 (FOUR) HOURS AS NEEDED FOR WHEEZING OR SHORTNESS OF BREATH , Disp: , Rfl: 2    Allergies   Allergen Reactions    Bee Venom Swelling    Fish-Derived Products Hives    Iodine Hives    Other Swelling       Physical Exam:   Vitals:    11/13/18 1040 BP: 136/98   Pulse: 77   Resp: 20   Temp: 97 6 °F (36 4 °C)   SpO2: 94%     General: Awake, Alert, Oriented x 3, Mood and affect appropriate  Respiratory: Respirations even and unlabored  Cardiovascular: Peripheral pulses intact; no edema  Musculoskeletal Exam:  Bilateral lumbar paraspinals tender to palpation  ASA Score: 2    Patient/Chart Verification  Patient ID Verified: Verbal  ID Band Applied: No  Consents Confirmed: Procedural  H&P( within 30 days) Verified: To be obtained in the Pre-Procedure area  Interval H&P(within 24 hr) Complete (required for Outpatients and Surgery Admit only): To be obtained in the Pre-Procedure area  Allergies Reviewed: Yes  Anticoag/NSAID held?: NA  Currently on antibiotics?: No  Pre-op Lab/Test Results Available: N/A  Pregnancy Lab Collected: N/A comment    Assessment:   1   Lumbar spondylosis        Plan: B/L L2, L3, L4, L5 MBB#2

## 2018-11-13 NOTE — DISCHARGE INSTRUCTIONS

## 2018-11-30 ENCOUNTER — TELEPHONE (OUTPATIENT)
Dept: OBGYN CLINIC | Facility: HOSPITAL | Age: 48
End: 2018-11-30

## 2018-11-30 NOTE — TELEPHONE ENCOUNTER
Patient called to cancel her appt today because she was not feeling well  Patient is stating that the last injection helped so she really didn't need the appt  Patient did reschedule for 1/9/18 just in case she needs a follow up

## 2018-12-06 ENCOUNTER — TELEPHONE (OUTPATIENT)
Dept: PAIN MEDICINE | Facility: CLINIC | Age: 48
End: 2018-12-06

## 2018-12-06 NOTE — TELEPHONE ENCOUNTER
The patient had a favorable response to bilateral L2, L3, L4, and L5 medial branch block 2   Please call the patient to schedule RFA

## 2018-12-07 NOTE — TELEPHONE ENCOUNTER
Called pt, scheduled LT L2, L3, L4, L5 RFA on Wed 12/19/18 arr at 11:10am, and RT L2, L3, L4, L5 RFA on Tues 01/08/18 arr at 08:30am     Alan david pre procedure instructions, NPO 1 hr prior, if sick or on abx needs to call to rs, wear loose, comf clothing- no buttons/zippers, needs   Pt verbalized understanding

## 2019-01-08 ENCOUNTER — HOSPITAL ENCOUNTER (OUTPATIENT)
Dept: RADIOLOGY | Facility: CLINIC | Age: 49
Discharge: HOME | End: 2019-01-08
Attending: ANESTHESIOLOGY
Payer: COMMERCIAL

## 2019-01-08 ENCOUNTER — TELEPHONE (OUTPATIENT)
Dept: RADIOLOGY | Facility: CLINIC | Age: 49
End: 2019-01-08

## 2019-01-08 VITALS
DIASTOLIC BLOOD PRESSURE: 91 MMHG | HEART RATE: 81 BPM | RESPIRATION RATE: 18 BRPM | SYSTOLIC BLOOD PRESSURE: 154 MMHG | OXYGEN SATURATION: 96 % | TEMPERATURE: 97.7 F

## 2019-01-08 DIAGNOSIS — M47.816 LUMBAR SPONDYLOSIS: ICD-10-CM

## 2019-01-08 PROCEDURE — 64636 DESTROY L/S FACET JNT ADDL: CPT | Performed by: ANESTHESIOLOGY

## 2019-01-08 PROCEDURE — 64635 DESTROY LUMB/SAC FACET JNT: CPT | Performed by: ANESTHESIOLOGY

## 2019-01-08 RX ORDER — BUPIVACAINE HYDROCHLORIDE 5 MG/ML
30 INJECTION, SOLUTION EPIDURAL; INTRACAUDAL ONCE
Status: COMPLETED | OUTPATIENT
Start: 2019-01-08 | End: 2019-01-08

## 2019-01-08 RX ORDER — MELOXICAM 15 MG/1
15 TABLET ORAL DAILY
COMMUNITY
End: 2019-11-05 | Stop reason: SDUPTHER

## 2019-01-08 RX ORDER — LIDOCAINE HYDROCHLORIDE 10 MG/ML
10 INJECTION, SOLUTION EPIDURAL; INFILTRATION; INTRACAUDAL; PERINEURAL ONCE
Status: COMPLETED | OUTPATIENT
Start: 2019-01-08 | End: 2019-01-08

## 2019-01-08 RX ADMIN — BUPIVACAINE HYDROCHLORIDE 4 ML: 5 INJECTION, SOLUTION EPIDURAL; INTRACAUDAL at 09:18

## 2019-01-08 RX ADMIN — LIDOCAINE HYDROCHLORIDE 4 ML: 20 INJECTION, SOLUTION EPIDURAL; INFILTRATION; INTRACAUDAL; PERINEURAL at 09:22

## 2019-01-08 RX ADMIN — LIDOCAINE HYDROCHLORIDE 9 ML: 10 INJECTION, SOLUTION EPIDURAL; INFILTRATION; INTRACAUDAL; PERINEURAL at 09:05

## 2019-01-08 NOTE — DISCHARGE INSTR - LAB

## 2019-01-08 NOTE — H&P
History of Present Illness: The patient is a 51 y o  female who presents with complaints of low back pain  Patient Active Problem List   Diagnosis    Bilateral carpal tunnel syndrome    Degenerative disc disease, cervical    Lumbar radiculopathy    Sacroiliitis (HCC)    Lumbar spondylosis       Past Medical History:   Diagnosis Date    Asthma     Hyperlipidemia        History reviewed  No pertinent surgical history        Current Outpatient Prescriptions:     meloxicam (MOBIC) 15 mg tablet, Take 15 mg by mouth daily, Disp: , Rfl:     azelastine (OPTIVAR) 0 05 % ophthalmic solution, Apply to eye, Disp: , Rfl:     buPROPion (WELLBUTRIN XL) 300 mg 24 hr tablet, Take by mouth, Disp: , Rfl:     Cholecalciferol (VITAMIN D3) 2000 units capsule, TAKE 2 CAPSULES BY MOUTH DAILY INDICATIONS: VITAMIN D DEFICIENCY , Disp: , Rfl: 5    Cholecalciferol 2000 units TABS, Take 2 capsules by mouth, Disp: , Rfl:     DULoxetine (CYMBALTA) 60 mg delayed release capsule, Take 60 mg by mouth, Disp: , Rfl:     ferrous sulfate 325 (65 Fe) mg tablet, Take by mouth, Disp: , Rfl:     fluticasone-salmeterol (ADVAIR DISKUS) 250-50 mcg/dose inhaler, Inhale 1 puff, Disp: , Rfl:     gabapentin (NEURONTIN) 300 mg capsule, Take 1 capsule (300 mg total) by mouth 3 (three) times a day, Disp: 90 capsule, Rfl: 2    hydrocortisone ([facility]S CORTISONE MAXIMUM STRENGTH) 1 % cream, Apply topically, Disp: , Rfl:     lisinopril (ZESTRIL) 10 mg tablet, Take by mouth, Disp: , Rfl:     loratadine (CLARITIN) 10 mg tablet, Take by mouth, Disp: , Rfl:     simvastatin (ZOCOR) 40 mg tablet, Take by mouth, Disp: , Rfl:     traZODone (DESYREL) 50 mg tablet, TAKE 1 TO 2 TABLETS AT BEDTIME FOR MOOD, Disp: , Rfl:     VENTOLIN  (90 Base) MCG/ACT inhaler, INHALE 2 PUFFS EVERY 4 (FOUR) HOURS AS NEEDED FOR WHEEZING OR SHORTNESS OF BREATH , Disp: , Rfl: 2    Allergies   Allergen Reactions    Bee Venom Swelling    Fish-Derived Products Hives    Iodine Hives    Other Swelling       Physical Exam:   Vitals:    01/08/19 0843   BP: 127/82   Pulse: 75   Resp: 18   Temp: 97 7 °F (36 5 °C)   SpO2: 94%     General: Awake, Alert, Oriented x 3, Mood and affect appropriate  Respiratory: Respirations even and unlabored  Cardiovascular: Peripheral pulses intact; no edema  Musculoskeletal Exam:  Bilateral lumbar paraspinals tender to palpation  ASA Score: 2    Patient/Chart Verification  Patient ID Verified: Verbal  Consents Confirmed: Procedural, To be obtained in the Pre-Procedure area  H&P( within 30 days) Verified: To be obtained in the Pre-Procedure area  Interval H&P(within 24 hr) Complete (required for Outpatients and Surgery Admit only): To be obtained in the Pre-Procedure area  Allergies Reviewed: Yes  Anticoag/NSAID held?: NA  Currently on antibiotics?: No  Pregnancy denied?: NA    Assessment:   1   Lumbar spondylosis        Plan: LT L2, L3, L4, L5 RFA

## 2019-01-09 NOTE — TELEPHONE ENCOUNTER
S/w the patient today and she stated she is just tired and is fine  Bandaid's are off and area is C/D/I  Reviewed the postop instructions

## 2019-01-29 ENCOUNTER — HOSPITAL ENCOUNTER (OUTPATIENT)
Dept: RADIOLOGY | Facility: CLINIC | Age: 49
Discharge: HOME | End: 2019-01-29
Admitting: ANESTHESIOLOGY
Payer: COMMERCIAL

## 2019-01-29 ENCOUNTER — TELEPHONE (OUTPATIENT)
Dept: RADIOLOGY | Facility: CLINIC | Age: 49
End: 2019-01-29

## 2019-01-29 ENCOUNTER — TELEPHONE (OUTPATIENT)
Dept: PAIN MEDICINE | Facility: CLINIC | Age: 49
End: 2019-01-29

## 2019-01-29 VITALS
OXYGEN SATURATION: 95 % | SYSTOLIC BLOOD PRESSURE: 148 MMHG | DIASTOLIC BLOOD PRESSURE: 94 MMHG | RESPIRATION RATE: 18 BRPM | HEART RATE: 81 BPM | TEMPERATURE: 98.4 F

## 2019-01-29 DIAGNOSIS — M47.816 LUMBAR SPONDYLOSIS: ICD-10-CM

## 2019-01-29 DIAGNOSIS — M54.12 CERVICAL RADICULOPATHY: ICD-10-CM

## 2019-01-29 PROCEDURE — 64636 DESTROY L/S FACET JNT ADDL: CPT | Performed by: ANESTHESIOLOGY

## 2019-01-29 PROCEDURE — 64635 DESTROY LUMB/SAC FACET JNT: CPT | Performed by: ANESTHESIOLOGY

## 2019-01-29 RX ORDER — GABAPENTIN 300 MG/1
300 CAPSULE ORAL 3 TIMES DAILY
Qty: 90 CAPSULE | Refills: 1 | Status: SHIPPED | OUTPATIENT
Start: 2019-01-29 | End: 2019-03-13

## 2019-01-29 RX ORDER — BUPIVACAINE HYDROCHLORIDE 5 MG/ML
30 INJECTION, SOLUTION EPIDURAL; INTRACAUDAL ONCE
Status: COMPLETED | OUTPATIENT
Start: 2019-01-29 | End: 2019-01-29

## 2019-01-29 RX ORDER — LIDOCAINE HYDROCHLORIDE 10 MG/ML
10 INJECTION, SOLUTION EPIDURAL; INFILTRATION; INTRACAUDAL; PERINEURAL ONCE
Status: COMPLETED | OUTPATIENT
Start: 2019-01-29 | End: 2019-01-29

## 2019-01-29 RX ADMIN — BUPIVACAINE HYDROCHLORIDE 4 ML: 5 INJECTION, SOLUTION EPIDURAL; INTRACAUDAL at 08:51

## 2019-01-29 RX ADMIN — LIDOCAINE HYDROCHLORIDE 4 ML: 20 INJECTION, SOLUTION EPIDURAL; INFILTRATION; INTRACAUDAL; PERINEURAL at 08:54

## 2019-01-29 RX ADMIN — LIDOCAINE HYDROCHLORIDE 9 ML: 10 INJECTION, SOLUTION EPIDURAL; INFILTRATION; INTRACAUDAL; PERINEURAL at 08:49

## 2019-01-29 NOTE — TELEPHONE ENCOUNTER
Pt in for procedure, requesting a 90 day refill of Gabapentin 300 mg TID  [facility]

## 2019-01-29 NOTE — H&P
History of Present Illness: The patient is a 47 y o  female who presents with complaints of low back pain  Patient Active Problem List   Diagnosis    Bilateral carpal tunnel syndrome    Degenerative disc disease, cervical    Lumbar radiculopathy    Sacroiliitis (HCC)    Lumbar spondylosis       Past Medical History:   Diagnosis Date    Asthma     Hyperlipidemia        History reviewed  No pertinent surgical history        Current Outpatient Prescriptions:     azelastine (OPTIVAR) 0 05 % ophthalmic solution, Apply to eye, Disp: , Rfl:     buPROPion (WELLBUTRIN XL) 300 mg 24 hr tablet, Take by mouth, Disp: , Rfl:     Cholecalciferol (VITAMIN D3) 2000 units capsule, TAKE 2 CAPSULES BY MOUTH DAILY INDICATIONS: VITAMIN D DEFICIENCY , Disp: , Rfl: 5    Cholecalciferol 2000 units TABS, Take 2 capsules by mouth, Disp: , Rfl:     DULoxetine (CYMBALTA) 60 mg delayed release capsule, Take 60 mg by mouth, Disp: , Rfl:     ferrous sulfate 325 (65 Fe) mg tablet, Take by mouth, Disp: , Rfl:     fluticasone-salmeterol (ADVAIR DISKUS) 250-50 mcg/dose inhaler, Inhale 1 puff, Disp: , Rfl:     gabapentin (NEURONTIN) 300 mg capsule, Take 1 capsule (300 mg total) by mouth 3 (three) times a day, Disp: 90 capsule, Rfl: 2    hydrocortisone ([facility]S CORTISONE MAXIMUM STRENGTH) 1 % cream, Apply topically, Disp: , Rfl:     lisinopril (ZESTRIL) 10 mg tablet, Take by mouth, Disp: , Rfl:     loratadine (CLARITIN) 10 mg tablet, Take by mouth, Disp: , Rfl:     meloxicam (MOBIC) 15 mg tablet, Take 15 mg by mouth daily, Disp: , Rfl:     simvastatin (ZOCOR) 40 mg tablet, Take by mouth, Disp: , Rfl:     traZODone (DESYREL) 50 mg tablet, TAKE 1 TO 2 TABLETS AT BEDTIME FOR MOOD, Disp: , Rfl:     VENTOLIN  (90 Base) MCG/ACT inhaler, INHALE 2 PUFFS EVERY 4 (FOUR) HOURS AS NEEDED FOR WHEEZING OR SHORTNESS OF BREATH , Disp: , Rfl: 2    Allergies   Allergen Reactions    Bee Venom Swelling    Fish-Derived Products Hives    Iodine Hives    Other Swelling       Physical Exam:   Vitals:    01/29/19 0834   BP: 147/100   Pulse: 88   Resp: 20   Temp: 98 4 °F (36 9 °C)   SpO2: 93%     General: Awake, Alert, Oriented x 3, Mood and affect appropriate  Respiratory: Respirations even and unlabored  Cardiovascular: Peripheral pulses intact; no edema  Musculoskeletal Exam:  Right lumbar paraspinals tender to palpation  ASA Score: 2    Patient/Chart Verification  Patient ID Verified: Verbal  ID Band Applied: No  Consents Confirmed: Procedural, To be obtained in the Pre-Procedure area  H&P( within 30 days) Verified: To be obtained in the Pre-Procedure area  Interval H&P(within 24 hr) Complete (required for Outpatients and Surgery Admit only): To be obtained in the Pre-Procedure area  Allergies Reviewed: Yes  Anticoag/NSAID held?: NA  Currently on antibiotics?: No    Assessment:   1   Lumbar spondylosis        Plan: RT L2, L3, L4, L5 RFA

## 2019-01-29 NOTE — TELEPHONE ENCOUNTER
**To be called on 1/30/19    Pt is s/p R L2-5 RFA on 1/29/19 by Dr Elías Huitron  Pt is coming in for a follow up on 3/13/19 to arrive at 9:45 am with Wen Toledo

## 2019-01-29 NOTE — DISCHARGE INSTRUCTIONS

## 2019-01-30 NOTE — TELEPHONE ENCOUNTER
S/w the patient and she stated she is fine but just has a HA  Bandaid's are off and she stated she is fine  Reviewed again the postop discharge instructions and patient verbalized understanding

## 2019-01-31 ENCOUNTER — OFFICE VISIT (OUTPATIENT)
Dept: OBGYN CLINIC | Facility: HOSPITAL | Age: 49
End: 2019-01-31
Payer: COMMERCIAL

## 2019-01-31 VITALS
BODY MASS INDEX: 49.52 KG/M2 | DIASTOLIC BLOOD PRESSURE: 93 MMHG | HEART RATE: 82 BPM | SYSTOLIC BLOOD PRESSURE: 154 MMHG | WEIGHT: 293 LBS

## 2019-01-31 DIAGNOSIS — M17.12 PRIMARY OSTEOARTHRITIS OF LEFT KNEE: Primary | ICD-10-CM

## 2019-01-31 PROCEDURE — 99213 OFFICE O/P EST LOW 20 MIN: CPT | Performed by: ORTHOPAEDIC SURGERY

## 2019-01-31 PROCEDURE — 20610 DRAIN/INJ JOINT/BURSA W/O US: CPT | Performed by: ORTHOPAEDIC SURGERY

## 2019-01-31 RX ORDER — TRAMADOL HYDROCHLORIDE 50 MG/1
50 TABLET ORAL EVERY 6 HOURS PRN
Qty: 30 TABLET | Refills: 0 | Status: SHIPPED | OUTPATIENT
Start: 2019-01-31 | End: 2019-08-08

## 2019-01-31 RX ORDER — BETAMETHASONE SODIUM PHOSPHATE AND BETAMETHASONE ACETATE 3; 3 MG/ML; MG/ML
12 INJECTION, SUSPENSION INTRA-ARTICULAR; INTRALESIONAL; INTRAMUSCULAR; SOFT TISSUE
Status: COMPLETED | OUTPATIENT
Start: 2019-01-31 | End: 2019-01-31

## 2019-01-31 RX ORDER — BUPIVACAINE HYDROCHLORIDE 2.5 MG/ML
2 INJECTION, SOLUTION INFILTRATION; PERINEURAL
Status: COMPLETED | OUTPATIENT
Start: 2019-01-31 | End: 2019-01-31

## 2019-01-31 RX ADMIN — BUPIVACAINE HYDROCHLORIDE 2 ML: 2.5 INJECTION, SOLUTION INFILTRATION; PERINEURAL at 15:21

## 2019-01-31 RX ADMIN — BETAMETHASONE SODIUM PHOSPHATE AND BETAMETHASONE ACETATE 12 MG: 3; 3 INJECTION, SUSPENSION INTRA-ARTICULAR; INTRALESIONAL; INTRAMUSCULAR; SOFT TISSUE at 15:21

## 2019-01-31 NOTE — PROGRESS NOTES
Orthopedics          Misael Lopez 47 y o  female MRN: [id number]    Assessment:  1  Primary osteoarthritis of left knee  traMADol (ULTRAM) 50 mg tablet       Plan:   Weight Bearing as Tolerated to Left lower extremity    Left knee aspiration and corticosteroid injection given in the office today   Follow up in 3 months if pain in Left knee persists   Patient would like to discuss total knee arthroplasty in the near future   Discussed treatment plan with patient and she is in agreement treatment plan  Thank you      The above stated was discussed in layman's terms and the patient expressed understanding  All questions were answered to the patient's satisfaction  Subjective:   Misael Lopez is a 47 y o  female who presents in the office today with Left knee pain due to her previous diagnosed primary osteoarthritis  She stated that at her last visit, about 4 months ago, that she received a steroid injection and it worked well for her  She stated that her pain hasn't changed in nature  She stated that the pain is in the lateral aspect of her knee and achy  She stated that it is worse with walking and stairs  She stated that she has been working on losing weight by walking, but that it is difficult with her knee pain  She stated that she does believe she is ready to get her knee replaced but needs to get a few at home situations figured out and would like to lose more weight first  She also stated that she uses Tramadol for her pain, except she has run out  She denies numbness and tingling that goes down the leg  She denies fever  Review of systems negative unless otherwise specified in HPI    Past Medical History:   Diagnosis Date    Asthma     Hyperlipidemia        History reviewed  No pertinent surgical history      Family History   Problem Relation Age of Onset    Diabetes Mother     Stroke Mother     Diabetes Father     Cancer Father     Diabetes Sister     Diabetes Brother        Social History     Occupational History    Not on file       Social History Main Topics    Smoking status: Former Smoker    Smokeless tobacco: Never Used    Alcohol use Not on file    Drug use: Unknown    Sexual activity: Not on file         Current Outpatient Prescriptions:     azelastine (OPTIVAR) 0 05 % ophthalmic solution, Apply to eye, Disp: , Rfl:     buPROPion (WELLBUTRIN XL) 300 mg 24 hr tablet, Take by mouth, Disp: , Rfl:     Cholecalciferol (VITAMIN D3) 2000 units capsule, TAKE 2 CAPSULES BY MOUTH DAILY INDICATIONS: VITAMIN D DEFICIENCY , Disp: , Rfl: 5    Cholecalciferol 2000 units TABS, Take 2 capsules by mouth, Disp: , Rfl:     DULoxetine (CYMBALTA) 60 mg delayed release capsule, Take 60 mg by mouth, Disp: , Rfl:     ferrous sulfate 325 (65 Fe) mg tablet, Take by mouth, Disp: , Rfl:     fluticasone-salmeterol (ADVAIR DISKUS) 250-50 mcg/dose inhaler, Inhale 1 puff, Disp: , Rfl:     gabapentin (NEURONTIN) 300 mg capsule, Take 1 capsule (300 mg total) by mouth 3 (three) times a day, Disp: 90 capsule, Rfl: 1    hydrocortisone ([facility]S CORTISONE MAXIMUM STRENGTH) 1 % cream, Apply topically, Disp: , Rfl:     lisinopril (ZESTRIL) 10 mg tablet, Take by mouth, Disp: , Rfl:     loratadine (CLARITIN) 10 mg tablet, Take by mouth, Disp: , Rfl:     meloxicam (MOBIC) 15 mg tablet, Take 15 mg by mouth daily, Disp: , Rfl:     simvastatin (ZOCOR) 40 mg tablet, Take by mouth, Disp: , Rfl:     traZODone (DESYREL) 50 mg tablet, TAKE 1 TO 2 TABLETS AT BEDTIME FOR MOOD, Disp: , Rfl:     VENTOLIN  (90 Base) MCG/ACT inhaler, INHALE 2 PUFFS EVERY 4 (FOUR) HOURS AS NEEDED FOR WHEEZING OR SHORTNESS OF BREATH , Disp: , Rfl: 2    traMADol (ULTRAM) 50 mg tablet, Take 1 tablet (50 mg total) by mouth every 6 (six) hours as needed for moderate pain, Disp: 30 tablet, Rfl: 0    Allergies   Allergen Reactions    Bee Venom Swelling    Fish-Derived Products Hives    Iodine Hives    Other Swelling            Vitals: 01/31/19 1420   BP: 154/93   Pulse: 82     Review of systems other than those stated are noted to be negative    Objective:    Physical Exam:  General: Awake, Alert, Oriented  Eyes: Pupils equal, round and reactive to light  Heart : Regular rate and rhythm  Lungs: No audible wheezing or laboring breathing  Musculoskeletal:    Left Knee:  · Valgus knee with moderate effusion present without erythema    · TTP of the lateral joint line  · Full Active and passive ROM with pain  · Muscle strength 5/5  · Moderate effusion  · Neurovascularly intact ditsally    Imaging:    None performed        Procedures:  Large joint arthrocentesis  Date/Time: 1/31/2019 3:21 PM  Consent given by: patient  Site marked: site marked  Supporting Documentation  Indications: pain and joint swelling   Procedure Details  Location: knee - L knee  Needle size: 22 G  Ultrasound guidance: no  Approach: anterolateral  Medications administered: 2 mL bupivacaine 0 25 %; 12 mg betamethasone acetate-betamethasone sodium phosphate 6 (3-3) mg/mL    Aspirate: yellow  Patient tolerance: patient tolerated the procedure well with no immediate complications  Dressing:  Sterile dressing applied     20 mL of fluid was aspirated

## 2019-02-05 ENCOUNTER — TELEPHONE (OUTPATIENT)
Dept: PAIN MEDICINE | Facility: MEDICAL CENTER | Age: 49
End: 2019-02-05

## 2019-02-15 ENCOUNTER — TELEPHONE (OUTPATIENT)
Dept: OBGYN CLINIC | Facility: HOSPITAL | Age: 49
End: 2019-02-15

## 2019-02-15 NOTE — TELEPHONE ENCOUNTER
Patient called checking on the status on a prior authorization that was requested for Tramadol  Patient will like a call back with update thanks                      Call back: [phone]

## 2019-02-18 ENCOUNTER — TELEPHONE (OUTPATIENT)
Dept: OBGYN CLINIC | Facility: HOSPITAL | Age: 49
End: 2019-02-18

## 2019-02-18 NOTE — TELEPHONE ENCOUNTER
Kimberlee called from [address] [facility],[address] [place] [address] to verify quantity of Tramadol

## 2019-03-13 ENCOUNTER — OFFICE VISIT (OUTPATIENT)
Dept: PAIN MEDICINE | Facility: CLINIC | Age: 49
End: 2019-03-13
Payer: COMMERCIAL

## 2019-03-13 VITALS — HEART RATE: 82 BPM | SYSTOLIC BLOOD PRESSURE: 138 MMHG | DIASTOLIC BLOOD PRESSURE: 88 MMHG | TEMPERATURE: 97.7 F

## 2019-03-13 DIAGNOSIS — M50.30 DEGENERATIVE DISC DISEASE, CERVICAL: ICD-10-CM

## 2019-03-13 DIAGNOSIS — M47.816 LUMBAR SPONDYLOSIS: ICD-10-CM

## 2019-03-13 DIAGNOSIS — G56.03 BILATERAL CARPAL TUNNEL SYNDROME: ICD-10-CM

## 2019-03-13 DIAGNOSIS — M54.16 LUMBAR RADICULOPATHY: ICD-10-CM

## 2019-03-13 DIAGNOSIS — M46.1 SACROILIITIS (HCC): Primary | ICD-10-CM

## 2019-03-13 PROCEDURE — 99214 OFFICE O/P EST MOD 30 MIN: CPT | Performed by: NURSE PRACTITIONER

## 2019-03-13 RX ORDER — GABAPENTIN 400 MG/1
400 CAPSULE ORAL 3 TIMES DAILY
Qty: 90 CAPSULE | Refills: 1 | Status: SHIPPED | OUTPATIENT
Start: 2019-03-13 | End: 2019-06-10

## 2019-03-13 NOTE — PROGRESS NOTES
Assessment:  1  Sacroiliitis ([facility] [address] )    2  Lumbar radiculopathy    3  Bilateral carpal tunnel syndrome    4  Degenerative disc disease, cervical    5  Lumbar spondylosis        Plan:  1  Patient is status post right L2-5 RFA on January 29, 2019 and left L2-5 RFA on January 2019  Per the patient, she is experiencing ongoing relief at this time, about 20-35%  I did explain that if needed, we can always repeat this procedure every 9 months  The patient was agreeable and verbalized an understanding  2  I will slightly increase gabapentin to 400 mg t i d  For neuropathic complaints  I advised the patient that if they experience any side effects or issues with the changes in their medication regiment, they should give our office a call to discuss  I also advised the patient not to drive or operate machinery until they see how the changes in the medication regimen affects them  The patient was agreeable and verbalized an understanding  3  The patient may continue duloxetine as prescribed  4  The patient will follow up with Orthopedics regarding carpal tunnel syndrome  5  The patient will follow-up in 3 months for medication prescription refill and reevaluation  The patient was advised to contact the office should their symptoms worsen in the interim  The patient was agreeable and verbalized an understanding  [place] Prescription Drug Monitoring Program report was reviewed and was appropriate     History of Present Illness: The patient is a 47 y o  female with a history of fibromyalgia carpal tunnel syndrome last seen on 01/29/19 who presents for a follow up office visit in regards to chronic lumbosacral back pain secondary to lumbar spondylosis  The patient is status post bilateral L2-5 RFA on January 29, 2019 and reports ongoing mild relief at this time    MRI of the lumbar spine revealed some degenerative disc disease and spondylosis with a small disc bulge at L4-5 which was noncompressive and did not produce any significant central or foraminal stenosis  The patient has also been seen by our practice for her neck pain that radiates into the left upper extremity, however MRI of the cervical spine revealed degenerative disc disease at C5-6 and C6-7 with mild right foraminal stenosis which would not contribute to the patient's left upper extremity symptoms  The patient also has a diagnosis of carpal tunnel syndrome  She was following with Orthopedics regarding this, however has not made a follow-up appointment recently  The patient currently rates her pain as 6/10 on the numeric pain rating scale  She states her pain is occasional in nature most bothersome in the evening  She characterizes the pain as dull aching, throbbing and shooting  Current pain medications includes:  Gabapentin 300 mg t i d , duloxetine as prescribed by rheumatology and tramadol as prescribed by her PCP   The patient reports that this regimen is providing 20-35% pain relief  The patient is reporting no side effects from this pain medication regimen  I have personally reviewed and/or updated the patient's past medical history, past surgical history, family history, social history, current medications, allergies, and vital signs today  Review of Systems:    Review of Systems   Respiratory: Positive for shortness of breath  Cardiovascular: Negative for chest pain  Gastrointestinal: Positive for nausea  Negative for constipation, diarrhea and vomiting  Musculoskeletal: Positive for gait problem  Negative for arthralgias, joint swelling and myalgias  Decreased ROM, swelling   Skin: Negative for rash  Neurological: Positive for dizziness  Negative for seizures and weakness  All other systems reviewed and are negative  Past Medical History:   Diagnosis Date    Asthma     Hyperlipidemia        No past surgical history on file      Family History   Problem Relation Age of Onset    Diabetes Mother    Coleen Sal Stroke Mother     Diabetes Father     Cancer Father     Diabetes Sister     Diabetes Brother        Social History     Occupational History    Not on file   Tobacco Use    Smoking status: Former Smoker    Smokeless tobacco: Never Used   Substance and Sexual Activity    Alcohol use: Not on file    Drug use: Not on file    Sexual activity: Not on file         Current Outpatient Medications:     azelastine (OPTIVAR) 0 05 % ophthalmic solution, Apply to eye, Disp: , Rfl:     buPROPion (WELLBUTRIN XL) 300 mg 24 hr tablet, Take by mouth, Disp: , Rfl:     Cholecalciferol (VITAMIN D3) 2000 units capsule, TAKE 2 CAPSULES BY MOUTH DAILY INDICATIONS: VITAMIN D DEFICIENCY , Disp: , Rfl: 5    DULoxetine (CYMBALTA) 60 mg delayed release capsule, Take 60 mg by mouth, Disp: , Rfl:     ferrous sulfate 325 (65 Fe) mg tablet, Take by mouth, Disp: , Rfl:     fluticasone-salmeterol (ADVAIR DISKUS) 250-50 mcg/dose inhaler, Inhale 1 puff, Disp: , Rfl:     hydrocortisone (CVS CORTISONE MAXIMUM STRENGTH) 1 % cream, Apply topically, Disp: , Rfl:     lisinopril (ZESTRIL) 10 mg tablet, Take by mouth, Disp: , Rfl:     loratadine (CLARITIN) 10 mg tablet, Take by mouth, Disp: , Rfl:     meloxicam (MOBIC) 15 mg tablet, Take 15 mg by mouth daily, Disp: , Rfl:     simvastatin (ZOCOR) 40 mg tablet, Take by mouth, Disp: , Rfl:     traMADol (ULTRAM) 50 mg tablet, Take 1 tablet (50 mg total) by mouth every 6 (six) hours as needed for moderate pain, Disp: 30 tablet, Rfl: 0    traZODone (DESYREL) 50 mg tablet, TAKE 1 TO 2 TABLETS AT BEDTIME FOR MOOD, Disp: , Rfl:     VENTOLIN  (90 Base) MCG/ACT inhaler, INHALE 2 PUFFS EVERY 4 (FOUR) HOURS AS NEEDED FOR WHEEZING OR SHORTNESS OF BREATH , Disp: , Rfl: 2    Cholecalciferol 2000 units TABS, Take 2 capsules by mouth, Disp: , Rfl:     gabapentin (NEURONTIN) 400 mg capsule, Take 1 capsule (400 mg total) by mouth 3 (three) times a day, Disp: 90 capsule, Rfl: 1    Allergies Allergen Reactions    Bee Venom Swelling    Fish-Derived Products Hives    Iodine Hives    Other Swelling       Physical Exam:    /88   Pulse 82   Temp 97 7 °F (36 5 °C) (Oral)     Constitutional:normal, well developed, well nourished, alert, in no distress and non-toxic and no overt pain behavior  Eyes:anicteric  HEENT:grossly intact  Neck:supple, symmetric, trachea midline and no masses   Pulmonary:even and unlabored  Cardiovascular:No edema or pitting edema present  Skin:Normal without rashes or lesions and well hydrated  Psychiatric:Mood and affect appropriate  Neurologic:Cranial Nerves II-XII grossly intact  Musculoskeletal:normal gait  Imaging  No orders to display   MRI LUMBAR SPINE WITHOUT CONTRAST     INDICATION:  Difficulty walking, bilateral hip replacement     COMPARISON:  X-ray 12/29/2015, CT 1/31/2008     TECHNIQUE:  Sagittal T1, sagittal T2, sagittal inversion recovery, axial T1 and axial T2, coronal T2        IMAGE QUALITY:  Diagnostic     FINDINGS:     ALIGNMENT:  Minor degenerative anterolisthesis L4-L5      MARROW SIGNAL:  Normal marrow signal is identified within the visualized bony structures  No discrete marrow lesion      DISTAL CORD AND CONUS:  Normal size and signal within the distal cord and conus  The conus ends at the L1 level      PARASPINAL SOFT TISSUES:  Paraspinal soft tissues are unremarkable      SACRUM:  Normal signal within the sacrum  No evidence of insufficiency or stress fracture      LOWER THORACIC DISC SPACES:  Normal disc height and signal   No disc herniation, canal stenosis or foraminal narrowing      LUMBAR DISC SPACES:     L1-L2:  Thin right posterolateral protrusion, not evident on prior CT  Minor deformation of the sac without root compression      L2-L3:  Minor facet arthrosis has progressed since prior CT      L3-L4:  Minor facet arthrosis     L4-L5:  Progression of facet arthrosis, very minor anterolisthesis not evident on prior CT    Disc extends asymmetrically to the left without root compression      L5-S1:  Minor facet arthrosis      IMPRESSION:     Minor degenerative changes which have progressed since prior CT 10 years earlier  Thin right L1-2 protrusion and thin left L4-5 protrusion are not compressive  MRI CERVICAL SPINE WITHOUT CONTRAST     INDICATION:  Chronic posterior neck pain and stiffness with limited range of motion      COMPARISON:  None      TECHNIQUE:  Sagittal T1, sagittal T2, sagittal inversion recovery, axial T2, axial  2D merge     IMAGE QUALITY:  Diagnostic     FINDINGS:     ALIGNMENT:  Straightening of the normal cervical lordosis  No compression fracture  No subluxation      MARROW SIGNAL:  Mild endplate marrow degenerative change at C6-C7      CERVICAL AND VISUALIZED THORACIC CORD:  Normal signal within the visualized cord      PREVERTEBRAL AND PARASPINAL SOFT TISSUES:  Normal      VISUALIZED POSTERIOR FOSSA:  The visualized posterior fossa demonstrates no abnormal signal      CERVICAL DISC SPACES:     C2-C3:  Normal      C3-C4:  Normal      C4-C5:  Normal      C5-C6:  Slight loss of disc height with mild annular bulging  No discrete disc herniation, canal stenosis or foraminal narrowing      C6-C7:  Disc desiccation with slight loss of disc height  Mild right greater than left uncinate joint hypertrophic degenerative change  No canal stenosis  Mild right foraminal narrowing without discrete nerve impingement      C7-T1:  Normal      UPPER THORACIC DISC SPACES:  Normal      IMPRESSION:     Mild noncompressive cervical degenerative change at C5-6 and C6-7  No orders of the defined types were placed in this encounter

## 2019-03-27 DIAGNOSIS — M54.12 CERVICAL RADICULOPATHY: ICD-10-CM

## 2019-03-27 RX ORDER — GABAPENTIN 300 MG/1
CAPSULE ORAL
Qty: 90 CAPSULE | Refills: 1 | OUTPATIENT
Start: 2019-03-27

## 2019-05-02 ENCOUNTER — OFFICE VISIT (OUTPATIENT)
Dept: OBGYN CLINIC | Facility: HOSPITAL | Age: 49
End: 2019-05-02
Payer: COMMERCIAL

## 2019-05-02 VITALS
BODY MASS INDEX: 50.02 KG/M2 | HEART RATE: 112 BPM | DIASTOLIC BLOOD PRESSURE: 105 MMHG | HEIGHT: 64 IN | SYSTOLIC BLOOD PRESSURE: 161 MMHG | WEIGHT: 293 LBS

## 2019-05-02 DIAGNOSIS — M17.12 PRIMARY OSTEOARTHRITIS OF LEFT KNEE: Primary | ICD-10-CM

## 2019-05-02 PROCEDURE — 99213 OFFICE O/P EST LOW 20 MIN: CPT | Performed by: ORTHOPAEDIC SURGERY

## 2019-05-02 PROCEDURE — 20610 DRAIN/INJ JOINT/BURSA W/O US: CPT | Performed by: ORTHOPAEDIC SURGERY

## 2019-05-02 RX ORDER — METHYLPREDNISOLONE ACETATE 40 MG/ML
2 INJECTION, SUSPENSION INTRA-ARTICULAR; INTRALESIONAL; INTRAMUSCULAR; SOFT TISSUE
Status: COMPLETED | OUTPATIENT
Start: 2019-05-02 | End: 2019-05-02

## 2019-05-02 RX ORDER — BUPIVACAINE HYDROCHLORIDE 2.5 MG/ML
2 INJECTION, SOLUTION INFILTRATION; PERINEURAL
Status: COMPLETED | OUTPATIENT
Start: 2019-05-02 | End: 2019-05-02

## 2019-05-02 RX ADMIN — METHYLPREDNISOLONE ACETATE 2 ML: 40 INJECTION, SUSPENSION INTRA-ARTICULAR; INTRALESIONAL; INTRAMUSCULAR; SOFT TISSUE at 17:14

## 2019-05-02 RX ADMIN — BUPIVACAINE HYDROCHLORIDE 2 ML: 2.5 INJECTION, SOLUTION INFILTRATION; PERINEURAL at 17:14

## 2019-05-08 ENCOUNTER — TELEPHONE (OUTPATIENT)
Dept: OBGYN CLINIC | Facility: HOSPITAL | Age: 49
End: 2019-05-08

## 2019-05-09 DIAGNOSIS — M54.16 LUMBAR RADICULOPATHY: ICD-10-CM

## 2019-05-09 RX ORDER — GABAPENTIN 400 MG/1
400 CAPSULE ORAL 3 TIMES DAILY
Qty: 90 CAPSULE | Refills: 1 | OUTPATIENT
Start: 2019-05-09

## 2019-06-10 ENCOUNTER — OFFICE VISIT (OUTPATIENT)
Dept: PAIN MEDICINE | Facility: CLINIC | Age: 49
End: 2019-06-10
Payer: COMMERCIAL

## 2019-06-10 VITALS
BODY MASS INDEX: 50.02 KG/M2 | HEIGHT: 64 IN | DIASTOLIC BLOOD PRESSURE: 80 MMHG | WEIGHT: 293 LBS | HEART RATE: 90 BPM | SYSTOLIC BLOOD PRESSURE: 135 MMHG

## 2019-06-10 DIAGNOSIS — M54.12 CERVICAL RADICULOPATHY: Primary | ICD-10-CM

## 2019-06-10 DIAGNOSIS — M50.30 DEGENERATIVE DISC DISEASE, CERVICAL: ICD-10-CM

## 2019-06-10 PROCEDURE — 99214 OFFICE O/P EST MOD 30 MIN: CPT | Performed by: NURSE PRACTITIONER

## 2019-06-10 RX ORDER — GABAPENTIN 600 MG/1
600 TABLET ORAL 3 TIMES DAILY
Qty: 90 TABLET | Refills: 1 | Status: SHIPPED | OUTPATIENT
Start: 2019-06-10 | End: 2019-09-23 | Stop reason: SDUPTHER

## 2019-08-06 ENCOUNTER — OFFICE VISIT (OUTPATIENT)
Dept: OBGYN CLINIC | Facility: HOSPITAL | Age: 49
End: 2019-08-06
Payer: COMMERCIAL

## 2019-08-06 VITALS
WEIGHT: 293 LBS | HEIGHT: 64 IN | DIASTOLIC BLOOD PRESSURE: 88 MMHG | HEART RATE: 83 BPM | BODY MASS INDEX: 50.02 KG/M2 | SYSTOLIC BLOOD PRESSURE: 146 MMHG

## 2019-08-06 DIAGNOSIS — M17.12 PRIMARY OSTEOARTHRITIS OF LEFT KNEE: Primary | ICD-10-CM

## 2019-08-06 PROCEDURE — 20610 DRAIN/INJ JOINT/BURSA W/O US: CPT | Performed by: ORTHOPAEDIC SURGERY

## 2019-08-06 PROCEDURE — 99213 OFFICE O/P EST LOW 20 MIN: CPT | Performed by: ORTHOPAEDIC SURGERY

## 2019-08-06 RX ORDER — METHYLPREDNISOLONE ACETATE 40 MG/ML
2 INJECTION, SUSPENSION INTRA-ARTICULAR; INTRALESIONAL; INTRAMUSCULAR; SOFT TISSUE
Status: COMPLETED | OUTPATIENT
Start: 2019-08-06 | End: 2019-08-06

## 2019-08-06 RX ORDER — BUPIVACAINE HYDROCHLORIDE 2.5 MG/ML
2 INJECTION, SOLUTION INFILTRATION; PERINEURAL
Status: COMPLETED | OUTPATIENT
Start: 2019-08-06 | End: 2019-08-06

## 2019-08-06 RX ADMIN — BUPIVACAINE HYDROCHLORIDE 2 ML: 2.5 INJECTION, SOLUTION INFILTRATION; PERINEURAL at 11:25

## 2019-08-06 RX ADMIN — METHYLPREDNISOLONE ACETATE 2 ML: 40 INJECTION, SUSPENSION INTRA-ARTICULAR; INTRALESIONAL; INTRAMUSCULAR; SOFT TISSUE at 11:25

## 2019-08-06 NOTE — PROGRESS NOTES
47 y o female presenting for follow-up for left knee pain  Patient was seen approximately 3 months ago, and was offered a corticosteroid injection at that time  She reports approximately 5 weeks of pain relief after the injection, with recurrence of her symptoms afterwards  Current she is walking with a cane, and describes pain throughout the entire knee, although worse in the posterior aspect  She continues to be interested in receiving a total knee arthroplasty at some point, but understands she has not yet met her weight goal  Denies any groin pain  Review of Systems  Review of systems negative unless otherwise specified in HPI    Past Medical History  Past Medical History:   Diagnosis Date    Asthma     Hyperlipidemia        Past Surgical History  No past surgical history on file  Current Medications  Current Outpatient Medications on File Prior to Visit   Medication Sig Dispense Refill    azelastine (OPTIVAR) 0 05 % ophthalmic solution Apply to eye      buPROPion (WELLBUTRIN XL) 300 mg 24 hr tablet Take by mouth      Cholecalciferol (VITAMIN D3) 2000 units capsule TAKE 2 CAPSULES BY MOUTH DAILY INDICATIONS: VITAMIN D DEFICIENCY    5    Cholecalciferol 2000 units TABS Take 2 capsules by mouth      diclofenac sodium (VOLTAREN) 1 % Apply 2 g topically 4 (four) times a day 1 Tube 2    DULoxetine (CYMBALTA) 60 mg delayed release capsule Take 60 mg by mouth      ferrous sulfate 325 (65 Fe) mg tablet Take by mouth      fluticasone-salmeterol (ADVAIR DISKUS) 250-50 mcg/dose inhaler Inhale 1 puff      gabapentin (NEURONTIN) 600 MG tablet Take 1 tablet (600 mg total) by mouth 3 (three) times a day 90 tablet 1    hydrocortisone (CVS CORTISONE MAXIMUM STRENGTH) 1 % cream Apply topically      lisinopril (ZESTRIL) 10 mg tablet Take by mouth      loratadine (CLARITIN) 10 mg tablet Take by mouth      meloxicam (MOBIC) 15 mg tablet Take 15 mg by mouth daily      simvastatin (ZOCOR) 40 mg tablet Take by mouth      traMADol (ULTRAM) 50 mg tablet Take 1 tablet (50 mg total) by mouth every 6 (six) hours as needed for moderate pain 30 tablet 0    traZODone (DESYREL) 50 mg tablet TAKE 1 TO 2 TABLETS AT BEDTIME FOR MOOD      VENTOLIN  (90 Base) MCG/ACT inhaler INHALE 2 PUFFS EVERY 4 (FOUR) HOURS AS NEEDED FOR WHEEZING OR SHORTNESS OF BREATH   2     No current facility-administered medications on file prior to visit  Recent Labs [facility])  0   Lab Value Date/Time    HCT 35 7 09/07/2015 0600    HGB 11 1 (L) 09/07/2015 0600    WBC 11 47 (H) 09/07/2015 0600    INR 1 02 08/20/2015 1311    GLUCOSE 116 09/07/2015 0600         Physical exam  · General: Awake, Alert, Oriented  · Eyes: Pupils equal, round and reactive to light  · Heart: regular rate and rhythm  · Lungs: No audible wheezing  · Abdomen: soft  MSK left lower extremity:  -skin intact over the knee  -trace effusion  -ROM 0-110  -Pain with passive terminal flexion  -TTP medial and lateral joint lines, no tenderness over the IT band or patella  -stable to valgus/varus stress  -no gross motor sensory deficits    Imaging  No new imaging this encounter  Previous images show moderate degenerative changes, primarily in the patellofemoral and lateral compartments      Procedure  Large joint arthrocentesis: L knee  Date/Time: 8/6/2019 11:25 AM  Consent given by: patient  Site marked: site marked  Timeout: Immediately prior to procedure a time out was called to verify the correct patient, procedure, equipment, support staff and site/side marked as required   Supporting Documentation  Indications: pain   Procedure Details  Location: knee - L knee  Needle size: 22 G  Approach: anteromedial  Medications administered: 2 mL bupivacaine 0 25 %; 2 mL methylPREDNISolone acetate 40 mg/mL    Patient tolerance: patient tolerated the procedure well with no immediate complications  Dressing:  Sterile dressing applied            Assessment/Plan: 47 y o female presenting for follow-up for left knee arthritis  A corticosteroid injection was offered and administered to the left knee  Patient was encouraged to continue to work on decreasing her BMI prior to being able to proceed with surgery safely  We referred the patient to physical therapy for quad and hamstring strengthening, as well as VMO strengthening  Would recommend 3 month follow-up for possible repeat injection and weight check  Discussed plan with patient and she is in agreement treatment plan    Thank you

## 2019-08-08 ENCOUNTER — OFFICE VISIT (OUTPATIENT)
Dept: PAIN MEDICINE | Facility: CLINIC | Age: 49
End: 2019-08-08
Payer: COMMERCIAL

## 2019-08-08 VITALS
HEART RATE: 80 BPM | SYSTOLIC BLOOD PRESSURE: 151 MMHG | BODY MASS INDEX: 50.02 KG/M2 | WEIGHT: 293 LBS | HEIGHT: 64 IN | DIASTOLIC BLOOD PRESSURE: 87 MMHG

## 2019-08-08 DIAGNOSIS — M50.30 DEGENERATIVE DISC DISEASE, CERVICAL: ICD-10-CM

## 2019-08-08 DIAGNOSIS — M47.816 LUMBAR SPONDYLOSIS: ICD-10-CM

## 2019-08-08 DIAGNOSIS — M54.12 CERVICAL RADICULOPATHY: ICD-10-CM

## 2019-08-08 DIAGNOSIS — M17.12 PRIMARY OSTEOARTHRITIS OF LEFT KNEE: Primary | ICD-10-CM

## 2019-08-08 DIAGNOSIS — M54.16 LUMBAR RADICULOPATHY: ICD-10-CM

## 2019-08-08 PROCEDURE — 99213 OFFICE O/P EST LOW 20 MIN: CPT | Performed by: NURSE PRACTITIONER

## 2019-08-08 NOTE — PROGRESS NOTES
Assessment:  1  Primary osteoarthritis of left knee    2  Cervical radiculopathy    3  Degenerative disc disease, cervical    4  Lumbar spondylosis    5  Lumbar radiculopathy        Plan:  Patient was scheduled for a cervical epidural steroid injection with Dr Johnson Friday, however did not come to the appointment because she states that her main focus is on her left knee  She has established with Orthopedics and has undergone corticosteroid injections as well as viscous supplemental injections into her left knee without relief  She is going to be initiating in physical therapy as per Orthopedics for her left knee pain  1  I did offer to schedule the patient for left geniculate nerve blocks with the intention of moving toward radiofrequency ablation, however she declines at this time and would like to trial physical therapy 1st  2  The patient may continue gabapentin 600 mg t i d  As prescribed  She has not need a refill this medication at this time  3  Patient may continue duloxetine 60 mg daily as prescribed by her PCP  4  The patient will continue to follow with orthopedics as scheduled  5  Patient could always be rescheduled for cervical epidural steroid injection should she wish to move forward with this in the future  6  The patient will follow-up in 4 months for medication prescription refill and reevaluation  The patient was advised to contact the office should their symptoms worsen in the interim  The patient was agreeable and verbalized an understanding  Other than as stated above, the patient denies any interval changes in medications, medical condition, mental condition, symptoms, or allergies since the last office visit  [employer] software was used to dictate this note  It may contain errors with dictating incorrect words or incorrect spelling  Please contact the provider directly with any questions  History of Present Illness:     The patient is a 47 y o  female with a history of fibromyalgia in carpal tunnel syndrome last seen on 6/10/19 who presents for a follow up office visit in regards to chronic neck pain that radiates into the bilateral upper extremities and left knee pain secondary to cervical degenerative disc disease, cervical spondylosis and stenosis and osteoarthritis of the knee  The patient denies bowel or bladder incontinence or balance issues  The patient prioritizes her left knee pain as most bothersome at this time  She was scheduled for cervical epidural steroid injection with our office, however did not show for the injection  At today's office visit, she states she decided to not treat her neck pain and focus on her knee pain  She has undergone corticosteroid injections and viscous supplemental injections by Orthopedics into the knee without relief  She will soon be initiating in physical therapy for this issue  The patient currently rates her pain a 9/10 on the numeric rating states the pain is CT constant nature and bothersome in the evening and at night  She characterizes the pain as burning, dull aching, shooting, numbness and pins and needles  Current pain medications includes:  Gabapentin 600 mg t i d , duloxetine 60 mg b i d  As prescribed by psychiatry and diclofenac gel p r n     The patient reports that this regimen is providing 25% pain relief  The patient is reporting no side effects from this pain medication regimen  I have personally reviewed and/or updated the patient's past medical history, past surgical history, family history, social history, current medications, allergies, and vital signs today  Review of Systems:    Review of Systems   Respiratory: Negative for shortness of breath  Cardiovascular: Negative for chest pain  Gastrointestinal: Negative for constipation, diarrhea, nausea and vomiting  Musculoskeletal: Negative for arthralgias, gait problem, joint swelling and myalgias  Skin: Negative for rash     Neurological: Negative for dizziness, seizures and weakness  All other systems reviewed and are negative  Past Medical History:   Diagnosis Date    Asthma     Hyperlipidemia        History reviewed  No pertinent surgical history      Family History   Problem Relation Age of Onset    Diabetes Mother     Stroke Mother     Diabetes Father     Cancer Father     Diabetes Sister     Diabetes Brother        Social History     Occupational History    Not on file   Tobacco Use    Smoking status: Former Smoker    Smokeless tobacco: Never Used   Substance and Sexual Activity    Alcohol use: Not on file    Drug use: Not on file    Sexual activity: Not on file         Current Outpatient Medications:     azelastine (OPTIVAR) 0 05 % ophthalmic solution, Apply to eye, Disp: , Rfl:     buPROPion (WELLBUTRIN XL) 300 mg 24 hr tablet, Take by mouth, Disp: , Rfl:     Cholecalciferol (VITAMIN D3) 2000 units capsule, TAKE 2 CAPSULES BY MOUTH DAILY INDICATIONS: VITAMIN D DEFICIENCY , Disp: , Rfl: 5    Cholecalciferol 2000 units TABS, Take 2 capsules by mouth, Disp: , Rfl:     diclofenac sodium (VOLTAREN) 1 %, Apply 2 g topically 4 (four) times a day, Disp: 1 Tube, Rfl: 2    DULoxetine (CYMBALTA) 60 mg delayed release capsule, Take 60 mg by mouth, Disp: , Rfl:     ferrous sulfate 325 (65 Fe) mg tablet, Take by mouth, Disp: , Rfl:     fluticasone-salmeterol (ADVAIR DISKUS) 250-50 mcg/dose inhaler, Inhale 1 puff, Disp: , Rfl:     gabapentin (NEURONTIN) 600 MG tablet, Take 1 tablet (600 mg total) by mouth 3 (three) times a day, Disp: 90 tablet, Rfl: 1    hydrocortisone (CVS CORTISONE MAXIMUM STRENGTH) 1 % cream, Apply topically, Disp: , Rfl:     lisinopril (ZESTRIL) 10 mg tablet, Take by mouth, Disp: , Rfl:     loratadine (CLARITIN) 10 mg tablet, Take by mouth, Disp: , Rfl:     meloxicam (MOBIC) 15 mg tablet, Take 15 mg by mouth daily, Disp: , Rfl:     simvastatin (ZOCOR) 40 mg tablet, Take by mouth, Disp: , Rfl:     VENTOLIN  (90 Base) MCG/ACT inhaler, INHALE 2 PUFFS EVERY 4 (FOUR) HOURS AS NEEDED FOR WHEEZING OR SHORTNESS OF BREATH , Disp: , Rfl: 2    Allergies   Allergen Reactions    Bee Venom Swelling    Fish-Derived Products Hives    Iodine Hives    Other Swelling       Physical Exam:    /87   Pulse 80   Ht 5' 4" (1 626 m)   Wt 135 kg (298 lb)   BMI 51 15 kg/m²     Constitutional:obese  Eyes:anicteric  HEENT:grossly intact  Neck:supple, symmetric, trachea midline and no masses   Pulmonary:even and unlabored  Cardiovascular:No edema or pitting edema present  Skin:Normal without rashes or lesions and well hydrated  Psychiatric:Mood and affect appropriate  Neurologic:Cranial Nerves II-XII grossly intact  Musculoskeletal:antalgic gait but steady with the use of a cane      Imaging  No orders to display   MRI LUMBAR SPINE WITHOUT CONTRAST     INDICATION:  Difficulty walking, bilateral hip replacement     COMPARISON:  X-ray 12/29/2015, CT 1/31/2008     TECHNIQUE:  Sagittal T1, sagittal T2, sagittal inversion recovery, axial T1 and axial T2, coronal T2        IMAGE QUALITY:  Diagnostic     FINDINGS:     ALIGNMENT:  Minor degenerative anterolisthesis L4-L5      MARROW SIGNAL:  Normal marrow signal is identified within the visualized bony structures  No discrete marrow lesion      DISTAL CORD AND CONUS:  Normal size and signal within the distal cord and conus  The conus ends at the L1 level      PARASPINAL SOFT TISSUES:  Paraspinal soft tissues are unremarkable      SACRUM:  Normal signal within the sacrum  No evidence of insufficiency or stress fracture      LOWER THORACIC DISC SPACES:  Normal disc height and signal   No disc herniation, canal stenosis or foraminal narrowing      LUMBAR DISC SPACES:     L1-L2:  Thin right posterolateral protrusion, not evident on prior CT    Minor deformation of the sac without root compression      L2-L3:  Minor facet arthrosis has progressed since prior CT      L3-L4:  Minor facet arthrosis     L4-L5:  Progression of facet arthrosis, very minor anterolisthesis not evident on prior CT  Disc extends asymmetrically to the left without root compression      L5-S1:  Minor facet arthrosis      IMPRESSION:     Minor degenerative changes which have progressed since prior CT 10 years earlier  Thin right L1-2 protrusion and thin left L4-5 protrusion are not compressive  MRI CERVICAL SPINE WITHOUT CONTRAST     INDICATION:  Chronic posterior neck pain and stiffness with limited range of motion      COMPARISON:  None      TECHNIQUE:  Sagittal T1, sagittal T2, sagittal inversion recovery, axial T2, axial  2D merge     IMAGE QUALITY:  Diagnostic     FINDINGS:     ALIGNMENT:  Straightening of the normal cervical lordosis  No compression fracture  No subluxation      MARROW SIGNAL:  Mild endplate marrow degenerative change at C6-C7      CERVICAL AND VISUALIZED THORACIC CORD:  Normal signal within the visualized cord      PREVERTEBRAL AND PARASPINAL SOFT TISSUES:  Normal      VISUALIZED POSTERIOR FOSSA:  The visualized posterior fossa demonstrates no abnormal signal      CERVICAL DISC SPACES:     C2-C3:  Normal      C3-C4:  Normal      C4-C5:  Normal      C5-C6:  Slight loss of disc height with mild annular bulging  No discrete disc herniation, canal stenosis or foraminal narrowing      C6-C7:  Disc desiccation with slight loss of disc height  Mild right greater than left uncinate joint hypertrophic degenerative change  No canal stenosis  Mild right foraminal narrowing without discrete nerve impingement      C7-T1:  Normal      UPPER THORACIC DISC SPACES:  Normal      IMPRESSION:     Mild noncompressive cervical degenerative change at C5-6 and C6-7  LEFT KNEE     INDICATION:   [id number]: Pain in left knee    "PT STATES CHRONIC LEFT KNEE PAIN"     COMPARISON:  Left knee radiographs 4/18/2016      VIEWS:  XR KNEE 3 VW LEFT NON INJURY         FINDINGS:     There is no acute fracture or dislocation      There is no joint effusion      Tricompartmental degenerative changes      No lytic or blastic lesions are seen      Soft tissues are unremarkable      IMPRESSION:     Tricompartmental degenerative changes            Workstation performed: [id number]    No orders of the defined types were placed in this encounter

## 2019-08-12 ENCOUNTER — TELEPHONE (OUTPATIENT)
Dept: PAIN MEDICINE | Facility: CLINIC | Age: 49
End: 2019-08-12

## 2019-08-12 NOTE — TELEPHONE ENCOUNTER
Patient called requesting her medical impairment medical source statement (disability form) must be filled out by Dr Fauzia Campbell  Patient states she thought she would see Dr Fauzia Campbell on her last appointment & she saw Diana Ibarra who she states told her that they don't fill out disability forms  Patient states her PCP told her Dr Fauzia Campbell suppose to fill out this form  Patient's appointment with her  is on 9/3/19 & she must give this paperwork on that day   Please advisetatiana    Call back# [phone]

## 2019-08-14 ENCOUNTER — APPOINTMENT (OUTPATIENT)
Dept: PHYSICAL THERAPY | Facility: REHABILITATION | Age: 49
End: 2019-08-14
Payer: COMMERCIAL

## 2019-08-15 DIAGNOSIS — M50.30 DEGENERATIVE DISC DISEASE, CERVICAL: ICD-10-CM

## 2019-08-15 DIAGNOSIS — M54.12 CERVICAL RADICULOPATHY: ICD-10-CM

## 2019-08-15 RX ORDER — GABAPENTIN 600 MG/1
TABLET ORAL
Qty: 90 TABLET | Refills: 1 | OUTPATIENT
Start: 2019-08-15

## 2019-08-21 ENCOUNTER — APPOINTMENT (OUTPATIENT)
Dept: PHYSICAL THERAPY | Facility: REHABILITATION | Age: 49
End: 2019-08-21
Payer: COMMERCIAL

## 2019-08-26 ENCOUNTER — DOCUMENTATION (OUTPATIENT)
Dept: OBGYN CLINIC | Facility: HOSPITAL | Age: 49
End: 2019-08-26

## 2019-08-30 ENCOUNTER — TELEPHONE (OUTPATIENT)
Dept: OBGYN CLINIC | Facility: HOSPITAL | Age: 49
End: 2019-08-30

## 2019-08-30 NOTE — TELEPHONE ENCOUNTER
Dharmesh Decker is calling to find make sure the medical records request was sent to [facility]  Marc Chand asked how long it might take to hear from [facility]  I advised it could take 4-6 weeks

## 2019-09-03 ENCOUNTER — TELEPHONE (OUTPATIENT)
Dept: PAIN MEDICINE | Facility: MEDICAL CENTER | Age: 49
End: 2019-09-03

## 2019-09-03 NOTE — TELEPHONE ENCOUNTER
Pt is calling stating her  from [facility] sent over request of records for pt medical records  Did you receive a release form for patient?

## 2019-09-04 ENCOUNTER — TELEPHONE (OUTPATIENT)
Dept: PAIN MEDICINE | Facility: CLINIC | Age: 49
End: 2019-09-04

## 2019-09-04 NOTE — TELEPHONE ENCOUNTER
Michelle HUMPHREY  Is calling from the attorneys office to check on the request  She is asking for a call back #[phone]   The  does need the records by tomorrow for the hearing on Friday

## 2019-09-04 NOTE — TELEPHONE ENCOUNTER
Rockney Session called because time is of the essence as they need records by tomorrow in order to get to   Would records be able to be faxed to them directly along with an invoice for payment? Fax [phone] att Jimbo Caraballo  Ph [phone]    Thank you

## 2019-09-05 ENCOUNTER — EVALUATION (OUTPATIENT)
Dept: PHYSICAL THERAPY | Facility: REHABILITATION | Age: 49
End: 2019-09-05
Payer: COMMERCIAL

## 2019-09-05 DIAGNOSIS — M17.12 ARTHRITIS OF KNEE, LEFT: Primary | ICD-10-CM

## 2019-09-05 PROCEDURE — 97110 THERAPEUTIC EXERCISES: CPT | Performed by: PHYSICAL THERAPIST

## 2019-09-05 PROCEDURE — 97161 PT EVAL LOW COMPLEX 20 MIN: CPT | Performed by: PHYSICAL THERAPIST

## 2019-09-05 NOTE — PROGRESS NOTES
PT Evaluation     Today's date: 2019  Patient name: Armaan Yu  : 1970  MRN: [id number]  Referring provider: Anna Acuna MD  Dx:   Encounter Diagnosis     ICD-10-CM    1  Arthritis of knee, left M17 12        Start Time: 1400  Stop Time: 1445  Total time in clinic (min): 45 minutes    Assessment  Assessment details: Armaan Yu is a 47 y o  female who presents with pain, decreased strength, decreased ROM, decreased joint mobility and ambulatory dysfunction  Due to these impairments, Patient has difficulty performing a/iadls, recreational activities and engaging in social activities  Patient's clinical presentation is consistent with their referring diagnosis of left knee pain with tricompartmental degenerative changes  Patient would benefit from skilled physical therapy to address their aforementioned impairments, improve their level of function and to improve their overall quality of life  Impairments: abnormal gait, abnormal or restricted ROM, abnormal movement, activity intolerance, impaired balance, impaired physical strength, lacks appropriate home exercise program, pain with function and weight-bearing intolerance  Understanding of Dx/Px/POC: excellent  Goals  Short Term Goals: to be achieved in 4 weeks  1) Patient to be independent with basic HEP  2) Decrease pain at it's worst to 4/10 on VAS  3) Increase LE strength by 1/2 MMT grade in all deficient planes  4) Patient to report decreased sleep interruption secondary to pain  5) Patient to negotiate steps with a reciprocal pattern with use of HR  6) Increase ambulatory tolerance by 10 minutes  Long Term Goals: to be achieved by discharge  1) FOTO equal to or greater than 48   2) Patient to be independent with comprehensive HEP  3) Abolish pain for improved quality of life  4) Increase LE strength to 5/5 MMT grade in all planes to improve A/IADLS    5) Patient to negotiate steps with a reciprocal pattern without use of Hr   6) Increase ambulatory tolerance to 40 minutes  7) Patient to report no sleep interruption secondary to pain  Plan  Patient would benefit from: skilled PT  Planned modality interventions: biofeedback, cryotherapy, hydrotherapy, TENS, unattended electrical stimulation and low level laser therapy  Planned therapy interventions: activity modification, ADL retraining, balance, balance/weight bearing training, behavior modification, body mechanics training, functional ROM exercises, gait training, home exercise program, IADL retraining, joint mobilization, manual therapy, massage, neuromuscular re-education, patient education, strengthening, stretching, therapeutic activities, therapeutic exercise and transfer training  Frequency: 1-2x week  Duration in weeks: 12  Treatment plan discussed with: patient        Subjective Evaluation    History of Present Illness  Mechanism of injury: Patient presents with c/c of chronic left knee pain  Patient with several knee injections with partial relief  Patient has underwent viscosupplementation injections without relief  Patient has difficulty sleeping  Patient had been recommended for a total knee replacement but was denied for insurance purposes  Patient reports having tingling "all over my body from the [place] " Patient has numbness in her legs bilaterally  Patient describes having intermittent popping in her knee  Patient has been ambulating with a SPC secondary to left knee pain and "broken toes" in left foot  Pain  Current pain ratin  At best pain ratin  At worst pain ratin  Location: left knee - diffuse, greatest laterally  Quality: needles, electric shocks  Alleviating factors: rest, laying with a pillow between legs, TENS unit  Exacerbated by: bending, ascending steps, exercising, walking      Social Support    Employment status: not working    Diagnostic Tests  X-ray: abnormal (Left knee: tricompartmental degenerative changes)  Patient Goals  Patient goal: to walk treadmill for longer periods of time, improved ability to negotiate steps, increase strength, decreased pain        Objective     Tenderness   Left Knee   No tenderness in the lateral joint line, lateral patella, medial joint line and medial patella  Right Knee   Tenderness in the lateral joint line, lateral patella, medial joint line and medial patella  Active Range of Motion   Left Knee   Flexion: 90 degrees   Extension: -5 degrees     Right Knee   Flexion: 105 degrees   Extension: 5 degrees     Passive Range of Motion   Left Knee   Flexion: 95 degrees   Extension: -5 degrees     Right Knee   Flexion: 110 degrees   Extension: 7 degrees     Mobility   Patellar Mobility:   Left Knee   Hypomobile: left medial, left lateral, left superior and left inferior    Right Knee   Hypomobile: medial, lateral, superior and inferior     Strength/Myotome Testing     Left Hip   Planes of Motion   Flexion: 5  Extension: 4+  Abduction: 4+    Right Hip   Planes of Motion   Flexion: 5  Extension: 4+  Abduction: 4+    Left Knee   Flexion: 5  Extension: 5    Right Knee   Flexion: 5  Extension: 5    Left Ankle/Foot   Left ankle dorsiflexion strength: deferred secondary to fractured toes  Right Ankle/Foot   Dorsiflexion: 5    Ambulation     Ambulation: Level Surfaces   Ambulation with assistive device: independent    Observational Gait   Gait: antalgic     Functional Assessment        Comments  Bilateral squat: fair technique, (+) knee pain, (+) crepitus      Flowsheet Rows      Most Recent Value   PT/OT G-Codes   Current Score  36   Projected Score  48             Precautions: bilateral EVA, lumbar fusion, h/o CA, HTN, prediabetes      Manual  9/5            Left patellar mobs             Left gastroc, hip flexor, h/s str  Exercise Diary  9/5            Recumbent bike             VG             Prone quad str   3x30"            Wall slides TKE             Standing hip abd, ext   With TB 2x10 OTB ea  b/l            LAQ             H/s curls                                                                                                                                                                             Modalities  9/5            TENS

## 2019-09-09 ENCOUNTER — OFFICE VISIT (OUTPATIENT)
Dept: PHYSICAL THERAPY | Facility: REHABILITATION | Age: 49
End: 2019-09-09
Payer: COMMERCIAL

## 2019-09-09 DIAGNOSIS — M17.12 ARTHRITIS OF KNEE, LEFT: Primary | ICD-10-CM

## 2019-09-09 PROCEDURE — 97112 NEUROMUSCULAR REEDUCATION: CPT

## 2019-09-09 PROCEDURE — 97140 MANUAL THERAPY 1/> REGIONS: CPT

## 2019-09-09 PROCEDURE — 97110 THERAPEUTIC EXERCISES: CPT

## 2019-09-09 NOTE — PROGRESS NOTES
Daily Note     Today's date: 2019  Patient name: Deric Barboza  : 1970  MRN: [id number]  Referring provider: Timoteo Kimbrough MD  Dx:   Encounter Diagnosis     ICD-10-CM    1  Arthritis of knee, left M17 12        Start Time: 1445  Stop Time: 1532  Total time in clinic (min): 47 minutes    Subjective: Pt reports no change since IE, has no questions about HEP at this time  Pt presents to session with decreased WB on L LE and no toe of on R LE  Pt states, "I think I broke my toes"       Objective: See treatment diary below      Assessment: Tolerated treatment fair  Held gastroc stretch due to toe pain  Some interventions modified this session due to pt having toe pain while standing  Patient demonstrated fatigue post treatment and would benefit from continued PT      Plan: Continue per plan of care  Precautions: bilateral EVA, lumbar fusion, h/o CA, HTN, prediabetes      Manual             Left patellar mobs  LK  prom           Left gastroc, hip flexor, h/s str  LK  Hip felxor and h/s                                                      Exercise Diary             Recumbent bike  8'            VG  np            Prone quad str  3x30" 3x 30"           Wall slides             TKE             Standing hip abd, ext   With TB 2x10 OTB ea  b/l [id number]             LAQ  2#  3x10           H/s curls  Prone  2#  3x1-                                                                                                                                                                           Modalities              TENS

## 2019-09-11 ENCOUNTER — OFFICE VISIT (OUTPATIENT)
Dept: PHYSICAL THERAPY | Facility: REHABILITATION | Age: 49
End: 2019-09-11
Payer: COMMERCIAL

## 2019-09-11 DIAGNOSIS — M17.12 ARTHRITIS OF KNEE, LEFT: Primary | ICD-10-CM

## 2019-09-11 PROCEDURE — 97110 THERAPEUTIC EXERCISES: CPT | Performed by: PHYSICAL THERAPIST

## 2019-09-11 PROCEDURE — 97140 MANUAL THERAPY 1/> REGIONS: CPT | Performed by: PHYSICAL THERAPIST

## 2019-09-11 NOTE — PROGRESS NOTES
Daily Note     Today's date: 2019  Patient name: Elton Gomez  : 1970  MRN: [id number]  Referring provider: Aydee Marinelli MD  Dx:   Encounter Diagnosis     ICD-10-CM    1  Arthritis of knee, left M17 12        Start Time: 1500  Stop Time: 1550  Total time in clinic (min): 50 minutes    Subjective: Patient reports that her knee continues to be painful, but her toes are feeling much better  Objective: See treatment diary below      Assessment: Tolerated treatment fair  Patient demonstrated fatigue post treatment and would benefit from continued PT  Patient with improved standing tolerance d/t less knee and toe pain  Patient educated to contact her physician if her toes continue to be painful secondary to presence of fractures  Plan: Continue per plan of care  Progress treatment as tolerated  Precautions: bilateral EVA, lumbar fusion, h/o CA, HTN, prediabetes      Manual            Left patellar mobs  LK  prom GR          Left gastroc, hip flexor, h/s str  LK  Hip felxor and h/s GR                                                     Exercise Diary            Recumbent bike  8'  10'          VG  np  L6 6'          Prone quad str  3x30" 3x 30" 3x30"          Wall slides             TKE   20x5" GTB          Standing hip abd, ext   With TB 2x10 OTB ea  b/l [id number]   3x10 OTB ea  b/l          LAQ  2#  3x10 3x10 3#          H/s curls  Prone  2#  3x1- Standing 3x10 3#                                                                                                                                                                          Modalities              TENS

## 2019-09-16 ENCOUNTER — OFFICE VISIT (OUTPATIENT)
Dept: PHYSICAL THERAPY | Facility: REHABILITATION | Age: 49
End: 2019-09-16
Payer: COMMERCIAL

## 2019-09-16 DIAGNOSIS — M17.12 ARTHRITIS OF KNEE, LEFT: Primary | ICD-10-CM

## 2019-09-16 PROCEDURE — 97110 THERAPEUTIC EXERCISES: CPT | Performed by: PHYSICAL THERAPIST

## 2019-09-16 NOTE — PROGRESS NOTES
Daily Note     Today's date: 2019  Patient name: Shayan Long  : 1970  MRN: [id number]  Referring provider: Jameson Guadalupe MD  Dx:   Encounter Diagnosis     ICD-10-CM    1  Arthritis of knee, left M17 12                   Subjective: States that her toes are feeling 'pretty okay', her knee remains at 7-8/10 pain  Objective: See treatment diary below      Assessment: Completed exercise with correct technique and minor reports of knee/toe pain  Able to increase repetitions in resistance exercises this session  Pt would benefit from continued PT services to reduce pain and increase level of function  Plan: Continue per plan of care  Progress treatment as tolerated  Precautions: bilateral EVA, lumbar fusion, h/o CA, HTN, prediabetes      Manual           Left patellar mobs  LK  prom GR MM         Left gastroc, hip flexor, h/s str  LK  Hip felxor and h/s GR MM                                                    Exercise Diary           Recumbent bike  8'  10' 10'         VG  np  L6 6' L6, 6'         Prone quad str  3x30" 3x 30" 3x30" 3x30"         Wall slides             TKE   20x5" GTB 20x5", GTB         Standing hip abd, ext   With TB 2x10 OTB ea  b/l [id number]   3x10 OTB ea  b/l 3x10, 3# ea B/L         LAQ  2#  3x10 3x10 3# 3x15, 3#         H/s curls  Prone  2#  3x1- Standing 3x10 3# Standing, 3x15, 3#                                                                                                                                                                         Modalities              TENS

## 2019-09-18 ENCOUNTER — OFFICE VISIT (OUTPATIENT)
Dept: PHYSICAL THERAPY | Facility: REHABILITATION | Age: 49
End: 2019-09-18
Payer: COMMERCIAL

## 2019-09-18 DIAGNOSIS — M17.12 ARTHRITIS OF KNEE, LEFT: Primary | ICD-10-CM

## 2019-09-18 PROCEDURE — 97140 MANUAL THERAPY 1/> REGIONS: CPT

## 2019-09-18 PROCEDURE — 97112 NEUROMUSCULAR REEDUCATION: CPT

## 2019-09-18 PROCEDURE — 97110 THERAPEUTIC EXERCISES: CPT

## 2019-09-18 NOTE — PROGRESS NOTES
Daily Note     Today's date: 2019  Patient name: Yoon Dubose  : 1970  MRN: [id number]  Referring provider: Lillette Ahumada, MD  Dx:   Encounter Diagnosis     ICD-10-CM    1  Arthritis of knee, left M17 12        Start Time:   Stop Time: 1725  Total time in clinic (min): 45 minutes    Subjective: Pt reports pain in L knee as 8/10 prior to start of session today  Notes that pain in toes became aggravated again after last visit  States she has an appointment with her orthopedic doctor this coming Saturday, to discuss the pain in her toes  Objective: See treatment diary below      Assessment: Tolerated treatment well  Was able to perform all exercise with no complaints of increased pain  Prone quad stretch not performed due to ride home arriving early to the clinic  Pt asked to perform that stretch herself at home later tonight  Tolerated increase of incline on VG well  Patient would benefit from continued PT in effort to further decrease pain levels and improve overall strength  Plan: Continue per plan of care  Precautions: bilateral EVA, lumbar fusion, h/o CA, HTN, prediabetes      Manual          Left patellar mobs  LK  prom GR MM Prom  AFB        Left gastroc, hip flexor, h/s str  LK  Hip felxor and h/s GR MM AFB                                                   Exercise Diary          Recumbent bike  8'  10' 10' 10'        VG  np  L6 6' L6, 6' L7, 5'        Prone quad str  3x30" 3x 30" 3x30" 3x30" Def to home        Wall slides             TKE   20x5" GTB 20x5", GTB 20x5", GTB        Standing hip abd, ext   With TB 2x10 OTB ea  b/l [id number]   3x10 OTB ea  b/l 3x10, 3# ea B/L 3x10, 3# ea B/L        LAQ  2#  3x10 3x10 3# 3x15, 3# 3x15, 3#        H/s curls  Prone  2#  3x1- Standing 3x10 3# Standing, 3x15, 3# Standing, 3x15, 3# Modalities  9/5            TENS

## 2019-09-21 ENCOUNTER — HOSPITAL ENCOUNTER (OUTPATIENT)
Dept: RADIOLOGY | Facility: HOSPITAL | Age: 49
Discharge: HOME | End: 2019-09-21
Payer: COMMERCIAL

## 2019-09-21 ENCOUNTER — OFFICE VISIT (OUTPATIENT)
Dept: OBGYN CLINIC | Facility: HOSPITAL | Age: 49
End: 2019-09-21
Payer: COMMERCIAL

## 2019-09-21 VITALS
SYSTOLIC BLOOD PRESSURE: 158 MMHG | WEIGHT: 293 LBS | HEART RATE: 80 BPM | HEIGHT: 64 IN | BODY MASS INDEX: 50.02 KG/M2 | DIASTOLIC BLOOD PRESSURE: 113 MMHG

## 2019-09-21 DIAGNOSIS — M79.672 PAIN IN LEFT FOOT: Primary | ICD-10-CM

## 2019-09-21 DIAGNOSIS — M79.672 PAIN IN LEFT FOOT: ICD-10-CM

## 2019-09-21 DIAGNOSIS — S92.515A CLOSED NONDISPLACED FRACTURE OF PROXIMAL PHALANX OF LESSER TOE OF LEFT FOOT, INITIAL ENCOUNTER: ICD-10-CM

## 2019-09-21 PROCEDURE — 99213 OFFICE O/P EST LOW 20 MIN: CPT | Performed by: PHYSICIAN ASSISTANT

## 2019-09-21 PROCEDURE — 73630 X-RAY EXAM OF FOOT: CPT

## 2019-09-21 NOTE — PATIENT INSTRUCTIONS
Ice Pack Application   WHAT YOU NEED TO KNOW:   Ice can be used to decrease swelling and pain after an injury or surgery  Common injuries that may benefit from ice therapy are sprains, strains, and bruises  The use of ice is most effective in the first 1 to 3 days after an injury  DISCHARGE INSTRUCTIONS:   How to apply ice:   · Fill a bag with crushed ice about half full  Remove the air from the bag before you close it  You can also use a bag of frozen vegetables  · Wrap the ice pack in a cloth to protect your skin from frostbite or other injury  · Put the ice over the injured area for 20 to 30 minutes or as long as directed  · Check your skin after about 30 seconds for color changes or blistering  Remove the ice if you notice skin changes or you feel burning or numbness in the area  · Throw the ice pack away after use  · Apply ice to your injured area 4 times each day or as directed  Ask your healthcare provider how many days you should apply ice  Contact your healthcare provider if:   · You see blisters, whitening of your skin, or a bluish color to your skin after using ice  · You feel burning or numbness when using ice  · You have questions about the use of ice packs  © 2017 [facility] Information is for End User's use only and may not be sold, redistributed or otherwise used for commercial purposes  All illustrations and images included in CareNotes® are the copyrighted property of [employer]  or Farhad Mckay  The above information is an  only  It is not intended as medical advice for individual conditions or treatments  Talk to your doctor, nurse or pharmacist before following any medical regimen to see if it is safe and effective for you

## 2019-09-21 NOTE — PROGRESS NOTES
Assessment/Plan   Diagnoses and all orders for this visit:    Pain in left foot  -     XR foot 3+ vw left; Future    Closed  Minimally displaced fracture of proximal phalanx of lesser toe of left foot, initial encounter  - ice as needed  - post op shoe fitted and dispensed  - follow up in a week with Dr Rey Naranjo   Patient ID: Francis Suarez is a 47 y o  female  Vitals:    09/21/19 1055   BP: (!) 158/113   Pulse: [de-identified]     48yo female comes in for an evaluation of her left 5th toe  She was injured when she accidentally walked into her mother's chair in the kitchen  She does not know when his happened  With some effort in narrowing down the time frame, I believe it was likely 2-3 weeks ago  At some point she had xrays done at a [facility] urgent care, but does not know when they were done or what they showed  She continues with 5th toe pain that increases with walking  She uses a cane for an unrelated reason, but this is helpful for her toe as well  The pain is dull in character, mild in severity, pain does not radiate and is not associated with numbness  She is a current patient of Dr Elenita Good          The following portions of the patient's history were reviewed and updated as appropriate: allergies, current medications, past family history, past medical history, past social history, past surgical history and problem list     Review of Systems  Ortho Exam  Past Medical History:   Diagnosis Date    Asthma     Cancer ([facility] [address] )     endometrial    Hyperlipidemia     Hypertension     Prediabetes      Past Surgical History:   Procedure Laterality Date    KNEE ARTHROSCOPY Right     LUMBAR FUSION      TOTAL HIP ARTHROPLASTY Bilateral      Family History   Problem Relation Age of Onset    Diabetes Mother     Stroke Mother     Diabetes Father     Cancer Father     Diabetes Sister     Diabetes Brother      Social History     Occupational History    Not on file   Tobacco Use    Smoking status: Former Smoker    Smokeless tobacco: Never Used   Substance and Sexual Activity    Alcohol use: Not on file    Drug use: Not on file    Sexual activity: Not on file       Review of Systems   Constitutional: Negative  HENT: Negative  Eyes: Negative  Respiratory: Negative  Cardiovascular: Negative  Gastrointestinal: Negative  Endocrine: Negative  Genitourinary: Negative  Musculoskeletal: As below      Allergic/Immunologic: Negative  Neurological: Negative  Hematological: Negative  Psychiatric/Behavioral: Negative  Objective   Physical Exam        I have personally reviewed pertinent films in PACS and my interpretation is oblique 5th toe fracture        · Constitutional: Awake, Alert, Oriented  · Eyes: EOMI  · Psych: Mood and affect appropriate  · Heart: regular rate and rhythm  · Lungs: No audible wheezing  · Abdomen: soft  · Lymph: no lymphedema             left 5th toe:  - Appearance   ecchymosis: onychomycosis incidentally noted, no discoloration, no deformity and + ecchymosis of the proximal phalanx   - Palpation   + tenderness of the 5th toe and Otherwise, no tenderness about the foot or ankle   - ROM   not examined due to fracture status  - Special Tests      - Motor   limited by pain  - NVI distally

## 2019-09-23 ENCOUNTER — OFFICE VISIT (OUTPATIENT)
Dept: PHYSICAL THERAPY | Facility: REHABILITATION | Age: 49
End: 2019-09-23
Payer: COMMERCIAL

## 2019-09-23 ENCOUNTER — TELEPHONE (OUTPATIENT)
Dept: PAIN MEDICINE | Facility: CLINIC | Age: 49
End: 2019-09-23

## 2019-09-23 DIAGNOSIS — M17.12 ARTHRITIS OF KNEE, LEFT: Primary | ICD-10-CM

## 2019-09-23 DIAGNOSIS — M50.30 DEGENERATIVE DISC DISEASE, CERVICAL: ICD-10-CM

## 2019-09-23 DIAGNOSIS — M54.12 CERVICAL RADICULOPATHY: ICD-10-CM

## 2019-09-23 PROCEDURE — 97110 THERAPEUTIC EXERCISES: CPT | Performed by: PHYSICAL THERAPIST

## 2019-09-23 PROCEDURE — 97140 MANUAL THERAPY 1/> REGIONS: CPT | Performed by: PHYSICAL THERAPIST

## 2019-09-23 RX ORDER — GABAPENTIN 600 MG/1
600 TABLET ORAL 3 TIMES DAILY
Qty: 90 TABLET | Refills: 2 | Status: SHIPPED | OUTPATIENT
Start: 2019-09-23 | End: 2019-12-10

## 2019-09-23 NOTE — TELEPHONE ENCOUNTER
S/w pt, confirmed she is taking Gabapentin 600mg 1 tablet TID and she has enough pills to last until Friday  Denies any side effects and states the medication is working for her  Please advise, thank you

## 2019-09-23 NOTE — TELEPHONE ENCOUNTER
Pt contacted Call Center requested refill of their medication  Medication Name:  gabapentin    Dosage of Med:  600mg    Frequency of Med:  1 tablet three times daily     Remaining Medication: For the week remaining  Pharmacy and Location:  [facility]/pharmacy #[id number], [address], [place]      Pt  Preferred Callback Phone Number:  [phone]    Thank you

## 2019-09-23 NOTE — PROGRESS NOTES
Daily Note     Today's date: 2019  Patient name: Breanna Edward  : 1970  MRN: [id number]  Referring provider: Marilyn Lion MD  Dx:   Encounter Diagnosis     ICD-10-CM    1  Arthritis of knee, left M17 12        Start Time: 1350  Stop Time: 1455  Total time in clinic (min): 65 minutes    Subjective: Patient reports that her knee is about an 8 today  Patient has a post-op shoe on her left foot because her toes are still fractured  Objective: See treatment diary below      Assessment: Tolerated treatment well  Patient demonstrated fatigue post treatment, exhibited good technique with therapeutic exercises and would benefit from continued PT  Patient with significantly improved patellar mobility  Deferred majority of weight-bearing exercises d/t toe pain  Plan: Continue per plan of care  Progress treatment as tolerated  Precautions: bilateral EVA, lumbar fusion, h/o CA, HTN, prediabetes      Manual         Left patellar mobs  LK  prom GR MM Prom  AFB GR       Left gastroc, hip flexor, h/s str  LK  Hip felxor and h/s GR MM AFB GR                                                  Exercise Diary         Recumbent bike  8'  10' 10' 10' 10'       VG  np  L6 6' L6, 6' L7, 5' 50% 5'2       Prone quad str  3x30" 3x 30" 3x30" 3x30" Def to home        Wall slides             TKE   20x5" GTB 20x5", GTB 20x5", GTB        Standing hip abd, ext  With TB 2x10 OTB ea  b/l [id number]   3x10 OTB ea  b/l 3x10, 3# ea B/L 3x10, 3# ea B/L        LAQ  2#  3x10 3x10 3# 3x15, 3# 3x15, 3#        H/s curls  Prone  2#  3x1- Standing 3x10 3# Standing, 3x15, 3# Standing, 3x15, 3#        Knee flexion mach  3x10 50#       Knee ext  Mach        3x10 25#       Wall sits      15x10"       Leg press      3x10 140#                                                                                                                   Modalities              TENS

## 2019-09-25 ENCOUNTER — APPOINTMENT (OUTPATIENT)
Dept: PHYSICAL THERAPY | Facility: REHABILITATION | Age: 49
End: 2019-09-25
Payer: COMMERCIAL

## 2019-09-26 ENCOUNTER — OFFICE VISIT (OUTPATIENT)
Dept: PHYSICAL THERAPY | Facility: REHABILITATION | Age: 49
End: 2019-09-26
Payer: COMMERCIAL

## 2019-09-26 DIAGNOSIS — M17.12 ARTHRITIS OF KNEE, LEFT: Primary | ICD-10-CM

## 2019-09-26 PROCEDURE — 97110 THERAPEUTIC EXERCISES: CPT | Performed by: PHYSICAL THERAPIST

## 2019-09-26 PROCEDURE — 97140 MANUAL THERAPY 1/> REGIONS: CPT | Performed by: PHYSICAL THERAPIST

## 2019-09-26 NOTE — PROGRESS NOTES
Daily Note     Today's date: 2019  Patient name: Finn Hernandez  : 1970  MRN: [id number]  Referring provider: Kishor Goodman MD  Dx:   Encounter Diagnosis     ICD-10-CM    1  Arthritis of knee, left M17 12        Start Time: 1445  Stop Time: 1540  Total time in clinic (min): 55 minutes    Subjective: Patient reports that her left knee and foot are more painful today and she had difficulty walking a block  Objective: See treatment diary below      Assessment: Tolerated treatment well  Patient demonstrated fatigue post treatment, exhibited good technique with therapeutic exercises and would benefit from continued PT  Patient entered therapy with increased antalgic gait secondary to pain in left knee and toes  Plan: Continue per plan of care  Progress treatment as tolerated  Precautions: bilateral EVA, lumbar fusion, h/o CA, HTN, prediabetes      Manual        Left patellar mobs  LK  prom GR MM Prom  AFB GR GR      Left gastroc, hip flexor, h/s str  LK  Hip felxor and h/s GR MM AFB GR GR                                                 Exercise Diary        Recumbent bike  8'  10' 10' 10' 10' 10'      VG  np  L6 6' L6, 6' L7, 5' 50% 5'2 L7 7'      Prone quad str  3x30" 3x 30" 3x30" 3x30" Def to home        Wall slides             TKE   20x5" GTB 20x5", GTB 20x5", GTB        Standing hip abd, ext  With TB 2x10 OTB ea  b/l [id number]   3x10 OTB ea  b/l 3x10, 3# ea B/L 3x10, 3# ea B/L        LAQ  2#  3x10 3x10 3# 3x15, 3# 3x15, 3#        H/s curls  Prone  2#  3x1- Standing 3x10 3# Standing, 3x15, 3# Standing, 3x15, 3#        Knee flexion mach  3x10 50# 3x10 50#      Knee ext  Mach        3x10 25# 3x10 35#      Wall sits      15x10" 15x10"      Leg press      3x10 140# 3x10 140#                                                                                                                  Modalities   TENS

## 2019-09-27 ENCOUNTER — TELEPHONE (OUTPATIENT)
Dept: OBGYN CLINIC | Facility: HOSPITAL | Age: 49
End: 2019-09-27

## 2019-09-30 ENCOUNTER — APPOINTMENT (OUTPATIENT)
Dept: PHYSICAL THERAPY | Facility: REHABILITATION | Age: 49
End: 2019-09-30
Payer: COMMERCIAL

## 2019-09-30 NOTE — PROGRESS NOTES
Daily Note     Today's date: 2019  Patient name: Monserrat Cochran  : 1970  MRN: [id number]  Referring provider: Warren Boeck, MD  Dx:   Encounter Diagnosis     ICD-10-CM    1  Arthritis of knee, left M17 12                   Subjective: ***      Objective: See treatment diary below      Assessment: Tolerated treatment well  Patient demonstrated fatigue post treatment, exhibited good technique with therapeutic exercises and would benefit from continued PT  Plan: Continue per plan of care  Progress treatment as tolerated  Precautions: bilateral EVA, lumbar fusion, h/o CA, HTN, prediabetes      Manual       Left patellar mobs  LK  prom GR MM Prom  AFB GR GR      Left gastroc, hip flexor, h/s str  LK  Hip felxor and h/s GR MM AFB GR GR                                                 Exercise Diary       Recumbent bike  8'  10' 10' 10' 10' 10'      VG  np  L6 6' L6, 6' L7, 5' 50% 5'2 L7 7'      Prone quad str  3x30" 3x 30" 3x30" 3x30" Def to home        Wall slides             TKE   20x5" GTB 20x5", GTB 20x5", GTB        Standing hip abd, ext  With TB 2x10 OTB ea  b/l [id number]   3x10 OTB ea  b/l 3x10, 3# ea B/L 3x10, 3# ea B/L        LAQ  2#  3x10 3x10 3# 3x15, 3# 3x15, 3#        H/s curls  Prone  2#  3x1- Standing 3x10 3# Standing, 3x15, 3# Standing, 3x15, 3#        Knee flexion mach  3x10 50# 3x10 50#      Knee ext  Mach        3x10 25# 3x10 35#      Wall sits      15x10" 15x10"      Leg press      3x10 140# 3x10 140#                                                                                                                  Modalities              TENS

## 2019-10-02 ENCOUNTER — OFFICE VISIT (OUTPATIENT)
Dept: PHYSICAL THERAPY | Facility: REHABILITATION | Age: 49
End: 2019-10-02
Payer: COMMERCIAL

## 2019-10-02 DIAGNOSIS — M17.12 ARTHRITIS OF KNEE, LEFT: Primary | ICD-10-CM

## 2019-10-02 PROCEDURE — 97140 MANUAL THERAPY 1/> REGIONS: CPT | Performed by: PHYSICAL THERAPIST

## 2019-10-02 PROCEDURE — 97110 THERAPEUTIC EXERCISES: CPT | Performed by: PHYSICAL THERAPIST

## 2019-10-02 NOTE — PROGRESS NOTES
Daily Note     Today's date: 10/2/2019  Patient name: Judy Cordova  : 1970  MRN: [id number]  Referring provider: Vish Rivera MD  Dx:   Encounter Diagnosis     ICD-10-CM    1  Arthritis of knee, left M17 12                   Subjective: Patient reports that her knee and foot continue to be painful  "My knee is about a 6 "      Objective: See treatment diary below      Assessment: Tolerated treatment fair  Patient demonstrated fatigue post treatment and would benefit from continued PT  Patient continues to present with left knee pain and antalgic gait largely due to toe fractures on left  Plan: Continue per plan of care  Progress treatment as tolerated  Precautions: bilateral EVA, lumbar fusion, h/o CA, HTN, prediabetes      Manual  9/5 09/09 9/11 9/16 9/18 9/23 9/26 10/2     Left patellar mobs  LK  prom GR MM Prom  AFB GR GR GR     Left gastroc, hip flexor, h/s str  LK  Hip felxor and h/s GR MM AFB GR GR GR                                                Exercise Diary   10/2     Recumbent bike  8'  10' 10' 10' 10' 10' 10'     VG  np  L6 6' L6, 6' L7, 5' 50% 5'2 L7 7' L7 5'     Prone quad str  3x30" 3x 30" 3x30" 3x30" Def to home        Wall slides             TKE   20x5" GTB 20x5", GTB 20x5", GTB        Standing hip abd, ext  With TB 2x10 OTB ea  b/l [id number]   3x10 OTB ea  b/l 3x10, 3# ea B/L 3x10, 3# ea B/L   3x10 YTB ea  b/l     LAQ  2#  3x10 3x10 3# 3x15, 3# 3x15, 3#        H/s curls  Prone  2#  3x1- Standing 3x10 3# Standing, 3x15, 3# Standing, 3x15, 3#        Knee flexion mach  3x10 50# 3x10 50# 3x10 50#     Knee ext  Mach        3x10 25# 3x10 35# 3x10 30#     Wall sits      15x10" 15x10" 15x10"     Leg press      3x10 140# 3x10 140# 3x10 140#                                                                                                                 Modalities              TENS

## 2019-10-04 ENCOUNTER — OFFICE VISIT (OUTPATIENT)
Dept: PHYSICAL THERAPY | Facility: REHABILITATION | Age: 49
End: 2019-10-04
Payer: COMMERCIAL

## 2019-10-04 ENCOUNTER — APPOINTMENT (OUTPATIENT)
Dept: PHYSICAL THERAPY | Facility: REHABILITATION | Age: 49
End: 2019-10-04
Payer: COMMERCIAL

## 2019-10-04 DIAGNOSIS — M17.12 ARTHRITIS OF KNEE, LEFT: Primary | ICD-10-CM

## 2019-10-04 PROCEDURE — 97140 MANUAL THERAPY 1/> REGIONS: CPT | Performed by: PHYSICAL THERAPIST

## 2019-10-04 PROCEDURE — 97110 THERAPEUTIC EXERCISES: CPT | Performed by: PHYSICAL THERAPIST

## 2019-10-04 NOTE — PROGRESS NOTES
Daily Note     Today's date: 10/4/2019  Patient name: Monserrat Cochran  : 1970  MRN: [id number]  Referring provider: Warren Boeck, MD  Dx:   Encounter Diagnosis     ICD-10-CM    1  Arthritis of knee, left M17 12        Start Time: 1030  Stop Time: 1130  Total time in clinic (min): 60 minutes    Subjective: Patient reports that she has a f/u appointment with her ortho next week  Objective: See treatment diary below      Assessment: Tolerated treatment well  Patient demonstrated fatigue post treatment, exhibited good technique with therapeutic exercises and would benefit from continued PT  Patient with improved quality of gait with less evidence of antalgia this visit  Plan: Continue per plan of care  Progress treatment as tolerated  Precautions: bilateral EVA, lumbar fusion, h/o CA, HTN, prediabetes      Manual  9/5 09/09 9/11 9/16 9/18 9/23 9/26 10/2 10/4    Left patellar mobs  LK  prom GR MM Prom  AFB GR GR GR GR    Left gastroc, hip flexor, h/s str  LK  Hip felxor and h/s GR MM AFB GR GR GR GR                                               Exercise Diary  9/5 09/09 9/11 9/16 9/18 9/23 9/26 10/2 10/4    Recumbent bike  8'  10' 10' 10' 10' 10' 10' 10'    VG  np  L6 6' L6, 6' L7, 5' 50% 5'2 L7 7' L7 5' L7 5'    Prone quad str  3x30" 3x 30" 3x30" 3x30" Def to home        Wall slides             TKE   20x5" GTB 20x5", GTB 20x5", GTB        Standing hip abd, ext  With TB 2x10 OTB ea  b/l [id number]   3x10 OTB ea  b/l 3x10, 3# ea B/L 3x10, 3# ea B/L   3x10 YTB ea  b/l 3x10 YTB b/l ea  LAQ  2#  3x10 3x10 3# 3x15, 3# 3x15, 3#        H/s curls  Prone  2#  3x1- Standing 3x10 3# Standing, 3x15, 3# Standing, 3x15, 3#        Knee flexion mach  3x10 50# 3x10 50# 3x10 50# 3x10 50#    Knee ext  Mach        3x10 25# 3x10 35# 3x10 30# 3x10 50#    Wall sits      15x10" 15x10" 15x10" 15x10"    Leg press      3x10 140# 3x10 140# 3x10 140# 3x10 140# Modalities  9/5            TENS

## 2019-10-08 ENCOUNTER — HOSPITAL ENCOUNTER (OUTPATIENT)
Dept: RADIOLOGY | Facility: HOSPITAL | Age: 49
Discharge: HOME | End: 2019-10-08
Attending: ORTHOPAEDIC SURGERY
Payer: COMMERCIAL

## 2019-10-08 ENCOUNTER — OFFICE VISIT (OUTPATIENT)
Dept: OBGYN CLINIC | Facility: HOSPITAL | Age: 49
End: 2019-10-08
Payer: COMMERCIAL

## 2019-10-08 VITALS
HEIGHT: 64 IN | BODY MASS INDEX: 50.02 KG/M2 | DIASTOLIC BLOOD PRESSURE: 100 MMHG | HEART RATE: 76 BPM | SYSTOLIC BLOOD PRESSURE: 140 MMHG | WEIGHT: 293 LBS

## 2019-10-08 DIAGNOSIS — S92.512A CLOSED DISPLACED FRACTURE OF PROXIMAL PHALANX OF LESSER TOE OF LEFT FOOT, INITIAL ENCOUNTER: Primary | ICD-10-CM

## 2019-10-08 DIAGNOSIS — M79.672 PAIN IN LEFT FOOT: ICD-10-CM

## 2019-10-08 DIAGNOSIS — G89.29 CHRONIC PAIN OF RIGHT KNEE: ICD-10-CM

## 2019-10-08 DIAGNOSIS — M25.561 CHRONIC PAIN OF RIGHT KNEE: ICD-10-CM

## 2019-10-08 DIAGNOSIS — E66.01 MORBID OBESITY (HCC): ICD-10-CM

## 2019-10-08 PROCEDURE — 20610 DRAIN/INJ JOINT/BURSA W/O US: CPT | Performed by: ORTHOPAEDIC SURGERY

## 2019-10-08 PROCEDURE — 99213 OFFICE O/P EST LOW 20 MIN: CPT | Performed by: ORTHOPAEDIC SURGERY

## 2019-10-08 PROCEDURE — 73630 X-RAY EXAM OF FOOT: CPT

## 2019-10-08 RX ORDER — BUPIVACAINE HYDROCHLORIDE 2.5 MG/ML
2 INJECTION, SOLUTION INFILTRATION; PERINEURAL
Status: COMPLETED | OUTPATIENT
Start: 2019-10-08 | End: 2019-10-08

## 2019-10-08 RX ORDER — METHYLPREDNISOLONE ACETATE 40 MG/ML
2 INJECTION, SUSPENSION INTRA-ARTICULAR; INTRALESIONAL; INTRAMUSCULAR; SOFT TISSUE
Status: COMPLETED | OUTPATIENT
Start: 2019-10-08 | End: 2019-10-08

## 2019-10-08 RX ADMIN — METHYLPREDNISOLONE ACETATE 2 ML: 40 INJECTION, SUSPENSION INTRA-ARTICULAR; INTRALESIONAL; INTRAMUSCULAR; SOFT TISSUE at 12:35

## 2019-10-08 RX ADMIN — BUPIVACAINE HYDROCHLORIDE 2 ML: 2.5 INJECTION, SOLUTION INFILTRATION; PERINEURAL at 12:35

## 2019-10-08 NOTE — PROGRESS NOTES
49 y o female who is now 6 weeks out after injuring her left 5th toe and found to have a displaced fracture of the left 5th proximal phalanx  She was seen by one of our orthopaedic PA's two weeks ago and provided with a hard soled shoe and instructed to follow up in one week for management  She comes in more than two weeks since that appointment  She has been compliant with wear of her hard soled shoe, her pain is well controlled, she does have some numbness or tingling to the lateral aspect of the left toe, pain has been well managed without the need for persistent pain medication  She also notes some worsening of her chronic right knee pain  Review of Systems  Review of systems negative unless otherwise specified in HPI    Past Medical History  Past Medical History:   Diagnosis Date    Asthma     Cancer ([facility] [address] )     endometrial    Hyperlipidemia     Hypertension     Prediabetes        Past Surgical History  Past Surgical History:   Procedure Laterality Date    KNEE ARTHROSCOPY Right     LUMBAR FUSION      TOTAL HIP ARTHROPLASTY Bilateral        Current Medications  Current Outpatient Medications on File Prior to Visit   Medication Sig Dispense Refill    azelastine (OPTIVAR) 0 05 % ophthalmic solution Apply to eye      buPROPion (WELLBUTRIN XL) 300 mg 24 hr tablet Take by mouth      Cholecalciferol (VITAMIN D3) 2000 units capsule TAKE 2 CAPSULES BY MOUTH DAILY INDICATIONS: VITAMIN D DEFICIENCY    5    Cholecalciferol 2000 units TABS Take 2 capsules by mouth      diclofenac sodium (VOLTAREN) 1 % Apply 2 g topically 4 (four) times a day 1 Tube 2    DULoxetine (CYMBALTA) 60 mg delayed release capsule Take 60 mg by mouth      ferrous sulfate 325 (65 Fe) mg tablet Take by mouth      fluticasone-salmeterol (ADVAIR DISKUS) 250-50 mcg/dose inhaler Inhale 1 puff      gabapentin (NEURONTIN) 600 MG tablet Take 1 tablet (600 mg total) by mouth 3 (three) times a day 90 tablet 2    hydrocortisone ([employer] CORTISONE MAXIMUM STRENGTH) 1 % cream Apply topically      lisinopril (ZESTRIL) 10 mg tablet Take by mouth      loratadine (CLARITIN) 10 mg tablet Take by mouth      meloxicam (MOBIC) 15 mg tablet Take 15 mg by mouth daily      simvastatin (ZOCOR) 40 mg tablet Take by mouth      VENTOLIN  (90 Base) MCG/ACT inhaler INHALE 2 PUFFS EVERY 4 (FOUR) HOURS AS NEEDED FOR WHEEZING OR SHORTNESS OF BREATH   2     No current facility-administered medications on file prior to visit  Recent Labs (HCT,HGB,PT,INR,ESR,CRP,GLU,HgA1C)  0   Lab Value Date/Time    HCT 35 7 09/07/2015 0600    HGB 11 1 (L) 09/07/2015 0600    WBC 11 47 (H) 09/07/2015 0600    INR 1 02 08/20/2015 1311    GLUCOSE 116 09/07/2015 0600         Physical exam  · General: Awake, Alert, Oriented  · Eyes: Pupils equal, round and reactive to light  · Heart: regular rate and rhythm  · Lungs: No audible wheezing  · Abdomen: soft  LLE 5th toe   Skin to the left toe intact with some mild swelling  Sensation diminished to the lateral aspect of the 5th toe, able to flex and extend the digit   Brisk capillary refill to the toe  Skin to the right knee intact  TTP over the lateral joint ling  Able to straight leg raise without flexion contracture  Stable ligamentous exam  Brisk capillary refill to the foot and toes     Imaging reviewed with the patient   XR of the left foot reviewed with the patient that shows interval healing of the fracture of the left 5th toe proximal phalanx     Large joint arthrocentesis: R knee  Date/Time: 10/8/2019 12:35 PM  Consent given by: patient  Supporting Documentation  Indications: pain   Procedure Details  Location: knee - R knee  Needle size: 22 G  Approach: anterolateral  Medications administered: 2 mL bupivacaine 0 25 %; 2 mL methylPREDNISolone acetate 40 mg/mL    Patient tolerance: patient tolerated the procedure well with no immediate complications            Assessment/Plan:   47 y  o female with left 5th toe middle phalanx fracture   WBAT LLE in hard soled shoe when out of the house, may begin walking around the house without the hard soled shoe  Provided referral for medical weight management   Provided CSI to right knee   Tylenol as needed for pain relief  ICE and elevation to the left foot as needed  Will see patient in one months for examination of there left knee and foot   Please obtain x-rays of the right knee also when patient returns to office hours even though we will be concentrating on her left knee on this visit    Thank you

## 2019-10-11 ENCOUNTER — EVALUATION (OUTPATIENT)
Dept: PHYSICAL THERAPY | Facility: REHABILITATION | Age: 49
End: 2019-10-11
Payer: COMMERCIAL

## 2019-10-11 DIAGNOSIS — M17.12 ARTHRITIS OF KNEE, LEFT: Primary | ICD-10-CM

## 2019-10-11 PROCEDURE — 97140 MANUAL THERAPY 1/> REGIONS: CPT | Performed by: PHYSICAL THERAPIST

## 2019-10-11 PROCEDURE — 97110 THERAPEUTIC EXERCISES: CPT | Performed by: PHYSICAL THERAPIST

## 2019-10-11 NOTE — PROGRESS NOTES
PT Re-Evaluation  and PT Discharge    Today's date: 10/11/2019  Patient name: Soha Dawn  : 1970  MRN: [id number]  Referring provider: Abigail Thurman MD  Dx:   Encounter Diagnosis     ICD-10-CM    1  Arthritis of knee, left M17 12                   Assessment  Assessment details: Since beginning physical therapy, Richard Alvarado has attended a total number of 9 visits and has maintained excellent compliance with established POC  Patient has made significant improvements in all areas, including decreased pain, increased strength, increased ROM, improved flexibility, improved joint mobility and improved overall level of function  Patient is reporting improved ability to perform {Functional Limitations:77093}  Patient is independent with comprehensive HEP  Patient has been instructed to contact PT if {He/she (caps):24804} begins to notice a decline in function or has any questions or concerns  Patient will be discharged at this time  Patient is in agreement with plan  Understanding of Dx/Px/POC: excellent   Prognosis: good    Goals  Short Term Goals: to be achieved in 4 weeks  1) Patient to be independent with basic HEP  2) Decrease pain at it's worst to 4/10 on VAS  3) Increase LE strength by 1/2 MMT grade in all deficient planes  4) Patient to report decreased sleep interruption secondary to pain  5) Patient to negotiate steps with a reciprocal pattern with use of HR  6) Increase ambulatory tolerance by 10 minutes  Long Term Goals: to be achieved by discharge  1) FOTO equal to or greater than 48   2) Patient to be independent with comprehensive HEP  3) Abolish pain for improved quality of life  4) Increase LE strength to 5/5 MMT grade in all planes to improve A/IADLS  5) Patient to negotiate steps with a reciprocal pattern without use of Hr  6) Increase ambulatory tolerance to 40 minutes  7) Patient to report no sleep interruption secondary to pain      Plan  Planned therapy interventions: home exercise program  Treatment plan discussed with: patient        Subjective    Objective           Precautions: bilateral EVA, lumbar fusion, h/o CA, HTN, prediabetes      Manual  9/5 09/09 9/11 9/16 9/18 9/23 9/26 10/2 10/4 10/11   Left patellar mobs  LK  prom GR MM Prom  AFB GR GR GR GR    Left gastroc, hip flexor, h/s str  LK  Hip felxor and h/s GR MM AFB GR GR GR GR                                               Exercise Diary  9/5 09/09 9/11 9/16 9/18 9/23 9/26 10/2 10/4 10/11   Recumbent bike  8'  10' 10' 10' 10' 10' 10' 10'    VG  np  L6 6' L6, 6' L7, 5' 50% 5'2 L7 7' L7 5' L7 5'    Prone quad str  3x30" 3x 30" 3x30" 3x30" Def to home        Wall slides             TKE   20x5" GTB 20x5", GTB 20x5", GTB        Standing hip abd, ext  With TB 2x10 OTB ea  b/l [id number]   3x10 OTB ea  b/l 3x10, 3# ea B/L 3x10, 3# ea B/L   3x10 YTB ea  b/l 3x10 YTB b/l ea  LAQ  2#  3x10 3x10 3# 3x15, 3# 3x15, 3#        H/s curls  Prone  2#  3x1- Standing 3x10 3# Standing, 3x15, 3# Standing, 3x15, 3#        Knee flexion mach  3x10 50# 3x10 50# 3x10 50# 3x10 50#    Knee ext  Mach        3x10 25# 3x10 35# 3x10 30# 3x10 50#    Wall sits      15x10" 15x10" 15x10" 15x10"    Leg press      3x10 140# 3x10 140# 3x10 140# 3x10 140#                                                                                                                Modalities  9/5            TENS

## 2019-10-11 NOTE — PROGRESS NOTES
Daily Note     Today's date: 10/11/2019  Patient name: Antwan Greer  : 1970  MRN: [id number]  Referring provider: Tera Porter MD  Dx:   Encounter Diagnosis     ICD-10-CM    1  Arthritis of knee, left M17 12        Start Time:   Stop Time: 940  Total time in clinic (min): 65 minutes    Subjective: Patient reports that she had a f/u with her ortho who injected her right knee  Patient was instructed to begin weaning out of her post-op shoe in 2 weeks in the home  Patient was instructed to continue with formal PT  Objective: See treatment diary below      Assessment: Tolerated treatment well  Patient demonstrated fatigue post treatment, exhibited good technique with therapeutic exercises and would benefit from continued PT  Patient with improved quality of ambulation with less evidence of antalgia  No significant changes to overall status otherwise  Plan: Continue per plan of care  Progress treatment as tolerated  Precautions: bilateral EVA, lumbar fusion, h/o CA, HTN, prediabetes      Manual  9/5 09/09 9/11 9/16 9/18 9/23 9/26 10/2 10/4 10/11   Left patellar mobs  LK  prom GR MM Prom  AFB GR GR GR GR GR   Left gastroc, hip flexor, h/s str  LK  Hip felxor and h/s GR MM AFB GR GR GR GR GR                                              Exercise Diary  9/5 09/09 9/11 9/16 9/18 9/23 9/26 10/2 10/4 10/11   Recumbent bike  8'  10' 10' 10' 10' 10' 10' 10' 10'   VG  np  L6 6' L6, 6' L7, 5' 50% 5'2 L7 7' L7 5' L7 5' L7 5'   Prone quad str  3x30" 3x 30" 3x30" 3x30" Def to home        Wall slides             TKE   20x5" GTB 20x5", GTB 20x5", GTB        Standing hip abd, ext  With TB 2x10 OTB ea  b/l [id number]   3x10 OTB ea  b/l 3x10, 3# ea B/L 3x10, 3# ea B/L   3x10 YTB ea  b/l 3x10 YTB b/l ea  LAQ  2#  3x10 3x10 3# 3x15, 3# 3x15, 3#        H/s curls  Prone  2#  3x1- Standing 3x10 3# Standing, 3x15, 3# Standing, 3x15, 3#        Knee flexion mach        3x10 50# 3x10 50# 3x10 50# 3x10 50# 3x10 50#   Knee ext  Mach        3x10 25# 3x10 35# 3x10 30# 3x10 50# 3x10 50#   Wall sits      15x10" 15x10" 15x10" 15x10" 20x10"   Leg press      3x10 140# 3x10 140# 3x10 140# 3x10 140# 3x10 140#                                                                                                               Modalities  9/5            TENS

## 2019-10-14 ENCOUNTER — OFFICE VISIT (OUTPATIENT)
Dept: PHYSICAL THERAPY | Facility: REHABILITATION | Age: 49
End: 2019-10-14
Payer: COMMERCIAL

## 2019-10-14 DIAGNOSIS — M17.12 ARTHRITIS OF KNEE, LEFT: Primary | ICD-10-CM

## 2019-10-14 PROCEDURE — 97110 THERAPEUTIC EXERCISES: CPT | Performed by: PHYSICAL THERAPIST

## 2019-10-14 NOTE — PROGRESS NOTES
Daily Note     Today's date: 10/14/2019  Patient name: Soha Dawn  : 1970  MRN: [id number]  Referring provider: Abigail Thurman MD  Dx:   Encounter Diagnosis     ICD-10-CM    1  Arthritis of knee, left M17 12        Start Time: 955  Stop Time:   Total time in clinic (min): 45 minutes    Subjective: "I feel pretty good right now "      Objective: See treatment diary below      Assessment: Tolerated treatment well  Patient demonstrated fatigue post treatment, exhibited good technique with therapeutic exercises and would benefit from continued PT  Patient's left knee remains painful with loaded quadriceps strengthening  Limiting standing activity secondary to healing fracture of left foot  Plan: Continue per plan of care  Progress treatment as tolerated  Precautions: bilateral EVA, lumbar fusion, h/o CA, HTN, prediabetes      Manual  10/14         10/11   Left patellar mobs GR         GR   Left gastroc, hip flexor, h/s str  GR                                              Exercise Diary  10/14         10/11   Recumbent bike 10'         10'   VG 50% 7'         L7 5'   Prone quad str  Wall slides             TKE             Standing hip abd, ext  With TB             LAQ             H/s curls             Knee flexion mach  3x10 50#         3x10 50#   Knee ext  Mach   3x10 50#         3x10 50#   Wall sits 20x10"         20x10"   Leg press 3x10 140#         3x10 140#                                                                                                               Modalities              TENS

## 2019-10-16 ENCOUNTER — OFFICE VISIT (OUTPATIENT)
Dept: PHYSICAL THERAPY | Facility: REHABILITATION | Age: 49
End: 2019-10-16
Payer: COMMERCIAL

## 2019-10-16 DIAGNOSIS — M17.12 ARTHRITIS OF KNEE, LEFT: Primary | ICD-10-CM

## 2019-10-16 PROCEDURE — 97110 THERAPEUTIC EXERCISES: CPT

## 2019-10-16 PROCEDURE — 97140 MANUAL THERAPY 1/> REGIONS: CPT

## 2019-10-16 NOTE — PROGRESS NOTES
Daily Note     Today's date: 10/16/2019  Patient name: Carry Bloch  : 1970  MRN: [id number]  Referring provider: Millie Olmstead MD  Dx:   Encounter Diagnosis     ICD-10-CM    1  Arthritis of knee, left M17 12                   Subjective: Pt has no complaints following last visit  Notes that today her knee is feeling painful /10 noting that it comes and goes pending how much activity she does  Objective: See treatment diary below      Assessment: Tolerated treatment well  She continues to have increased discomfort with loading exercises but able to complete all sets and reps as charted  She continues to make slow progress towards her long term goals  Patient demonstrated fatigue post treatment, exhibited good technique with therapeutic exercises and would benefit from continued PT  Plan: Continue per plan of care  Progress treatment as tolerated  Precautions: bilateral EVA, lumbar fusion, h/o CA, HTN, prediabetes      Manual  10/14 10/16        10/11   Left patellar mobs GR MM prom        GR   Left gastroc, hip flexor, h/s str  GR                                              Exercise Diary  10/14 10/16        10/11   Recumbent bike 10' 10'        10'   VG 50% 7' 50% 7'        L7 5'   Prone quad str  Wall slides             TKE             Standing hip abd, ext  With TB             LAQ             H/s curls             Knee flexion mach  3x10 50# 3x10 50#        3x10 50#   Knee ext  Mach   3x10 50# 3x10 50#        3x10 50#   Wall sits 20x10" 20x10"        20x10"   Leg press 3x10 140# 3x10 140#        3x10 140#                                                                                                               Modalities              TENS

## 2019-10-21 ENCOUNTER — OFFICE VISIT (OUTPATIENT)
Dept: PHYSICAL THERAPY | Facility: REHABILITATION | Age: 49
End: 2019-10-21
Payer: COMMERCIAL

## 2019-10-21 DIAGNOSIS — M17.12 ARTHRITIS OF KNEE, LEFT: Primary | ICD-10-CM

## 2019-10-21 PROCEDURE — 97110 THERAPEUTIC EXERCISES: CPT

## 2019-10-21 NOTE — PROGRESS NOTES
Daily Note     Today's date: 10/21/2019  Patient name: Cathie Diallo  : 1970  MRN: [id number]  Referring provider: Sade Naranjo MD  Dx:   Encounter Diagnosis     ICD-10-CM    1  Arthritis of knee, left M17 12        Start Time: 1117  Stop Time: 1206  Total time in clinic (min): 49 minutes    Subjective: "I haven't been wearing my boot " "Today is the first day I have tried wearing sneakers "  Reports "popping" in L knee with certain activity since last Friday  Objective: See treatment diary below      Assessment: Tolerated treatment well  Program kept the same d/t subjective comments reported at start of session  Slight pain reported in R knee during wall sits  Pain began to resolve throughout remainder of session with rest and additional exercise  No "popping" of L knee reported during assigned exercise today  Patient demonstrated fatigue post treatment and would benefit from continued PT  Plan: Continue per plan of care  Progress as able  Precautions: bilateral EVA, lumbar fusion, h/o CA, HTN, prediabetes      Manual  10/14 10/16 10/21       10/11   Left patellar mobs GR MM prom AFB  prom       GR   Left gastroc, hip flexor, h/s str  Hip flex,   20"x2       GR                                              Exercise Diary  10/14 10/16 10/21       10/11   Recumbent bike 10' 10' 10'       10'   VG 50% 7' 50% 7' 50% 7'       L7 5'   Prone quad str  Wall slides             TKE             Standing hip abd, ext  With TB             LAQ             H/s curls             Knee flexion mach  3x10 50# 3x10 50# 3x10 50#       3x10 50#   Knee ext  Mach   3x10 50# 3x10 50# 3x10 50#       3x10 50#   Wall sits 20x10" 20x10" 20  x10"       20x10"   Leg press 3x10 140# 3x10 140# 3x10 140#       3x10 140#                                                                                                               Modalities              TENS

## 2019-10-23 ENCOUNTER — OFFICE VISIT (OUTPATIENT)
Dept: PHYSICAL THERAPY | Facility: REHABILITATION | Age: 49
End: 2019-10-23
Payer: COMMERCIAL

## 2019-10-23 DIAGNOSIS — M17.12 ARTHRITIS OF KNEE, LEFT: Primary | ICD-10-CM

## 2019-10-23 PROCEDURE — 97140 MANUAL THERAPY 1/> REGIONS: CPT | Performed by: PHYSICAL THERAPIST

## 2019-10-23 PROCEDURE — 97110 THERAPEUTIC EXERCISES: CPT | Performed by: PHYSICAL THERAPIST

## 2019-10-23 NOTE — PROGRESS NOTES
Daily Note     Today's date: 10/23/2019  Patient name: Rina Galloway  : 1970  MRN: [id number]  Referring provider: Ricarda Del Rio MD  Dx:   Encounter Diagnosis     ICD-10-CM    1  Arthritis of knee, left M17 12        Start Time: 8  Stop Time: 1015  Total time in clinic (min): 57 minutes    Subjective: Patient reports that she is feeling pretty good today  Objective: See treatment diary below      Assessment: Tolerated treatment well  Patient demonstrated fatigue post treatment, exhibited good technique with therapeutic exercises and would benefit from continued PT  Patient with improved quality of gait and improved tolerance for weight-bearing strengthening  Plan: Continue per plan of care  Progress treatment as tolerated  Precautions: bilateral EVA, lumbar fusion, h/o CA, HTN, prediabetes      Manual  10/14 10/16 10/21 10/23      10/11   Left patellar mobs GR MM prom [employer]  prom GR      GR   Left gastroc, hip flexor, h/s str  Hip flex,   20"x2 GR      GR                                              Exercise Diary  10/14 10/16 10/21 10/23      10/11   Recumbent bike 10' 10' 10' 10'      10'   VG 50% 7' 50% 7' 50% 7' 50% 7'      L7 5'   Prone quad str  Wall slides             TKE             Standing hip abd, ext  With TB             LAQ             H/s curls             Knee flexion mach  3x10 50# 3x10 50# 3x10 50# 3x10 50#      3x10 50#   Knee ext  Mach   3x10 50# 3x10 50# 3x10 50# 3x10 40#      3x10 50#   Wall sits 20x10" 20x10" 20  x10" 15x10"      20x10"   Leg press 3x10 140# 3x10 140# 3x10 140# 3x10 140#      3x10 140#   VG: u/l, L5    30x b/l         Sidestepping with TB    10x YTB         LSD    2x10 4"                                                                              Modalities              TENS

## 2019-10-28 ENCOUNTER — OFFICE VISIT (OUTPATIENT)
Dept: PHYSICAL THERAPY | Facility: REHABILITATION | Age: 49
End: 2019-10-28
Payer: COMMERCIAL

## 2019-10-28 DIAGNOSIS — M17.12 ARTHRITIS OF KNEE, LEFT: Primary | ICD-10-CM

## 2019-10-28 PROCEDURE — 97110 THERAPEUTIC EXERCISES: CPT | Performed by: PHYSICAL THERAPIST

## 2019-10-28 NOTE — PROGRESS NOTES
Daily Note     Today's date: 10/28/2019  Patient name: Sonia Lane  : 1970  MRN: [id number]  Referring provider: Inocencia Florian MD  Dx:   Encounter Diagnosis     ICD-10-CM    1  Arthritis of knee, left M17 12        Start Time: 1115  Stop Time: 1235  Total time in clinic (min): 80 minutes    Subjective: Patient reports that her knee is about a 6/10 today  Objective: See treatment diary below      Assessment: Tolerated treatment well  Patient demonstrated fatigue post treatment, exhibited good technique with therapeutic exercises and would benefit from continued PT  Patient with improved weight-bearing tolerance and functional mobility  Plan: Continue per plan of care  Progress treatment as tolerated  Precautions: bilateral EVA, lumbar fusion, h/o CA, HTN, prediabetes      Manual  10/14 10/16 10/21 10/23 10/28     10/11   Left patellar mobs GR MM [employer]  prom GR      GR   Left gastroc, hip flexor, h/s str  Hip flex,   20"x2 GR      GR                                              Exercise Diary  10/14 10/16 10/21 10/23 10/28     10   Recumbent bike 10' 10' 10' 10' 15'     10'   VG 50% 7' 50% 7' 50% 7' 50% 7' 50% 7'     L7 5'   Prone quad str  Wall slides             TKE             Standing hip abd, ext  With TB     30x YTB ea  b/l        LAQ             H/s curls             Knee flexion mach  3x10 50# 3x10 50# 3x10 50# 3x10 50# 3x10  60#     3x10 50#   Knee ext  Mach   3x10 50# 3x10 50# 3x10 50# 3x10 40# 3x10 40#     3x10 50#   Wall sits 20x10" 20x10" 20  x10" 15x10" 15x10"     20x10"   Leg press 3x10 140# 3x10 140# 3x10 140# 3x10 140# 3x10 160#     3x10 140#   VG: u/l, L5    30x b/l 30x b/l        Sidestepping with TB    10x YTB 10x YTB        LSD    2x10 4" 3x10 4"                                                                             Modalities              TENS

## 2019-10-30 ENCOUNTER — EVALUATION (OUTPATIENT)
Dept: PHYSICAL THERAPY | Facility: REHABILITATION | Age: 49
End: 2019-10-30
Payer: COMMERCIAL

## 2019-10-30 DIAGNOSIS — M17.12 ARTHRITIS OF KNEE, LEFT: Primary | ICD-10-CM

## 2019-10-30 PROCEDURE — 97110 THERAPEUTIC EXERCISES: CPT | Performed by: PHYSICAL THERAPIST

## 2019-10-30 PROCEDURE — 97140 MANUAL THERAPY 1/> REGIONS: CPT | Performed by: PHYSICAL THERAPIST

## 2019-10-30 NOTE — PROGRESS NOTES
PT Re-Evaluation     Today's date: 10/30/2019  Patient name: Roma Franz  : 1970  MRN: [id number]  Referring provider: Ekaterina Leary MD  Dx:   Encounter Diagnosis     ICD-10-CM    1  Arthritis of knee, left M17 12        Start Time: 1025  Stop Time: 1145  Total time in clinic (min): 80 minutes    Assessment  Assessment details: Since beginning physical therapy, Estefany Feliciano has attended a total number of 16 visits and has maintained excellent compliance with established POC  Patient has made significant improvements in all areas, including decreased pain, increased strength, increased ROM, improved flexibility, improved joint mobility, improved balance, improved quality of gait and improved overall level of function  Patient is reporting improved ability to perform a/iadls and engaging in social activities  Despite these improvements, Patient continues to present with pain, decreased strength and ambulatory dysfunction  Patient would continue to benefit from skilled physical therapy to address her aforementioned impairments, improve their level of function and to improve their overall quality of life  Impairments: activity intolerance, impaired balance, impaired physical strength and pain with function  Understanding of Dx/Px/POC: excellent   Prognosis: good    Goals  Short Term Goals: to be achieved in 4 weeks  1) Patient to be independent with basic HEP  MET  2) Decrease pain at it's worst to 4/10 on VAS  PROGRESSED, BUT NOT MET  3) Increase LE strength by 1/2 MMT grade in all deficient planes  MET  4) Patient to report decreased sleep interruption secondary to pain  MET  5) Patient to negotiate steps with a reciprocal pattern with use of HR  MET  6) Increase ambulatory tolerance by 10 minutes  MET    Long Term Goals: to be achieved by discharge ALL GOALS PROGRESSING  1) FOTO equal to or greater than 48   2) Patient to be independent with comprehensive HEP    3) Abolish pain for improved quality of life  4) Increase LE strength to 5/5 MMT grade in all planes to improve A/IADLS  5) Patient to negotiate steps with a reciprocal pattern without use of Hr  6) Increase ambulatory tolerance to 40 minutes  7) Patient to report no sleep interruption secondary to pain  Plan  Patient would benefit from: skilled PT  Planned modality interventions: biofeedback, cryotherapy, hydrotherapy, TENS, unattended electrical stimulation and low level laser therapy  Planned therapy interventions: activity modification, ADL retraining, balance, balance/weight bearing training, behavior modification, body mechanics training, functional ROM exercises, gait training, home exercise program, IADL retraining, joint mobilization, manual therapy, massage, neuromuscular re-education, patient education, strengthening, stretching, therapeutic activities, therapeutic exercise and transfer training  Frequency: 1-2x week  Duration in weeks: 4  Treatment plan discussed with: patient        Subjective Evaluation    History of Present Illness  Mechanism of injury: Patient reports that since beginning physical therapy she is able to negotiate steps, squat without support and walk/stand with greater ease  Patient continues to have pain with lifting/pulling heavy items, kicking and prolonged walking  Patient has a f/u appointment with her referring physician on 19  Pain  Current pain ratin  At best pain ratin  At worst pain ratin  Location: left knee: diffuse  Quality: stabbing, ache, burning      Treatments  Current treatment: physical therapy  Patient Goals  Patient goal: to walk treadmill for longer periods of time, improved ability to negotiate steps, increase strength, decreased pain        Objective     Active Range of Motion   Left Knee   Flexion: 135 degrees   Extension: 0 degrees     Passive Range of Motion   Left Knee   Flexion: 135 degrees   Extension: 0 degrees     Mobility   Patellar Mobility:   Left Knee   WFL: medial, lateral, superior and inferior  Strength/Myotome Testing     Left Hip   Normal muscle strength    Right Hip   Normal muscle strength    Left Knee   Normal strength    Right Knee   Normal strength    Left Ankle/Foot   Dorsiflexion: 5    Right Ankle/Foot   Dorsiflexion: 5    Ambulation     Ambulation: Level Surfaces   Ambulation without assistive device: independent    Observational Gait   Gait: antalgic     Functional Assessment      Squat    Left within functional limits, right within functional limits and pain  Forward Step Down 8"   Left Leg  Within functional limits  Right Leg  Within functional limits  Comments  FSD 8" step: antalgic with decreased eccentric control, Mod I with u/l HR      Flowsheet Rows      Most Recent Value   PT/OT G-Codes   Current Score  57   Projected Score  48             Precautions: bilateral EVA, lumbar fusion, h/o CA, HTN, prediabetes      Manual  10/14 10/16 10/21 10/23 10/28 10/30    10/11   Left patellar mobs GR MM [employer]  prom GR      GR   Left gastroc, hip flexor, h/s str  Hip flex,   20"x2 GR      GR   Reassessment      GR                                     Exercise Diary  10/14 10/16 10/21 10/23 10/28 10/30    10/11   Recumbent bike 10' 10' 10' 10' 15' 15'    10'   VG 50% 7' 50% 7' 50% 7' 50% 7' 50% 7' 50% 7'    L7 5'   Prone quad str  Wall slides             TKE             Standing hip abd, ext  With TB     30x YTB ea  b/l        LAQ             H/s curls             Knee flexion mach  3x10 50# 3x10 50# 3x10 50# 3x10 50# 3x10  60#     3x10 50#   Knee ext  Mach   3x10 50# 3x10 50# 3x10 50# 3x10 40# 3x10 40#     3x10 50#   Wall sits 20x10" 20x10" 20  x10" 15x10" 15x10"     20x10"   Leg press 3x10 140# 3x10 140# 3x10 140# 3x10 140# 3x10 160#     3x10 140#   VG: u/l, L5    30x b/l 30x b/l        Sidestepping with TB    10x YTB 10x YTB        LSD    2x10 4" 3x10 4" 3x10 6" Modalities              TENS

## 2019-10-31 ENCOUNTER — TELEPHONE (OUTPATIENT)
Dept: PAIN MEDICINE | Facility: CLINIC | Age: 49
End: 2019-10-31

## 2019-10-31 NOTE — TELEPHONE ENCOUNTER
lmom for pt to cb to r/s appt due to change in providers schedule please r/s to later that day or the week before  Thank you

## 2019-11-05 ENCOUNTER — HOSPITAL ENCOUNTER (OUTPATIENT)
Dept: RADIOLOGY | Facility: HOSPITAL | Age: 49
Discharge: HOME | End: 2019-11-05
Attending: ORTHOPAEDIC SURGERY
Payer: COMMERCIAL

## 2019-11-05 ENCOUNTER — OFFICE VISIT (OUTPATIENT)
Dept: OBGYN CLINIC | Facility: HOSPITAL | Age: 49
End: 2019-11-05
Payer: COMMERCIAL

## 2019-11-05 VITALS
SYSTOLIC BLOOD PRESSURE: 123 MMHG | DIASTOLIC BLOOD PRESSURE: 84 MMHG | WEIGHT: 293 LBS | HEART RATE: 83 BPM | BODY MASS INDEX: 50.02 KG/M2 | HEIGHT: 64 IN

## 2019-11-05 DIAGNOSIS — M17.12 PRIMARY OSTEOARTHRITIS OF LEFT KNEE: ICD-10-CM

## 2019-11-05 DIAGNOSIS — M25.561 RIGHT KNEE PAIN, UNSPECIFIED CHRONICITY: Primary | ICD-10-CM

## 2019-11-05 DIAGNOSIS — M25.572 PAIN, JOINT, ANKLE AND FOOT, LEFT: ICD-10-CM

## 2019-11-05 DIAGNOSIS — M25.561 RIGHT KNEE PAIN, UNSPECIFIED CHRONICITY: ICD-10-CM

## 2019-11-05 PROCEDURE — 20610 DRAIN/INJ JOINT/BURSA W/O US: CPT | Performed by: PHYSICIAN ASSISTANT

## 2019-11-05 PROCEDURE — 99213 OFFICE O/P EST LOW 20 MIN: CPT | Performed by: PHYSICIAN ASSISTANT

## 2019-11-05 PROCEDURE — 73630 X-RAY EXAM OF FOOT: CPT

## 2019-11-05 PROCEDURE — 73562 X-RAY EXAM OF KNEE 3: CPT

## 2019-11-05 RX ORDER — BUPIVACAINE HYDROCHLORIDE 2.5 MG/ML
2 INJECTION, SOLUTION INFILTRATION; PERINEURAL
Status: COMPLETED | OUTPATIENT
Start: 2019-11-05 | End: 2019-11-05

## 2019-11-05 RX ORDER — METHYLPREDNISOLONE ACETATE 40 MG/ML
2 INJECTION, SUSPENSION INTRA-ARTICULAR; INTRALESIONAL; INTRAMUSCULAR; SOFT TISSUE
Status: COMPLETED | OUTPATIENT
Start: 2019-11-05 | End: 2019-11-05

## 2019-11-05 RX ORDER — MELOXICAM 15 MG/1
15 TABLET ORAL DAILY
Qty: 30 TABLET | Refills: 0 | Status: SHIPPED | OUTPATIENT
Start: 2019-11-05 | End: 2021-07-27

## 2019-11-05 RX ADMIN — METHYLPREDNISOLONE ACETATE 2 ML: 40 INJECTION, SUSPENSION INTRA-ARTICULAR; INTRALESIONAL; INTRAMUSCULAR; SOFT TISSUE at 12:22

## 2019-11-05 RX ADMIN — BUPIVACAINE HYDROCHLORIDE 2 ML: 2.5 INJECTION, SOLUTION INFILTRATION; PERINEURAL at 12:22

## 2019-11-05 NOTE — PROGRESS NOTES
Orthopedics          Isma Morris 47 y o  female MRN: [id number]      Chief Complaint:   bilateral knee pain    HPI:   47 y  o female complaining of bilateral knee pain  Patient presents office today regarding bilateral knee pain left greater than right  Patient did receive an injection right knee 3 months ago with significant pain relief in her right knee  States most of her pain is localized to her left knee at this time  States the pain is aching nature worse with activity mildly relieved with rest   Denies instability clicking popping catching her left knee  Patient is also presents today regarding her left small toe fracture  This is treated non operatively  She states she has had significant decreasing pain in her left small toe ever so been limited with certain shoe wear due to pain  States the pain is significantly improving denies any numbness tingling left lower extremity                  Review Of Systems:   · Skin: Normal  · Neuro: See HPI  · Musculoskeletal: See HPI  · All other systems reviewed and are negative    Past Medical History:   Past Medical History:   Diagnosis Date    Asthma     Cancer ([facility] [address] )     endometrial    Hyperlipidemia     Hypertension     Prediabetes        Past Surgical History:   Past Surgical History:   Procedure Laterality Date    KNEE ARTHROSCOPY Right     LUMBAR FUSION      TOTAL HIP ARTHROPLASTY Bilateral        Family History:  Family history reviewed and non-contributory  Family History   Problem Relation Age of Onset    Diabetes Mother     Stroke Mother     Diabetes Father     Cancer Father     Diabetes Sister     Diabetes Brother          Social History:  Social History     Socioeconomic History    Marital status: Single     Spouse name: None    Number of children: None    Years of education: None    Highest education level: None   Occupational History    None   Social Needs    Financial resource strain: None    Food insecurity:     Worry: None Inability: None    Transportation needs:     Medical: None     Non-medical: None   Tobacco Use    Smoking status: Former Smoker    Smokeless tobacco: Never Used   Substance and Sexual Activity    Alcohol use: None    Drug use: None    Sexual activity: None   Lifestyle    Physical activity:     Days per week: None     Minutes per session: None    Stress: None   Relationships    Social connections:     Talks on phone: None     Gets together: None     Attends Confucianist service: None     Active member of club or organization: None     Attends meetings of clubs or organizations: None     Relationship status: None    Intimate partner violence:     Fear of current or ex partner: None     Emotionally abused: None     Physically abused: None     Forced sexual activity: None   Other Topics Concern    None   Social History Narrative    None       Allergies:    Allergies   Allergen Reactions    Bee Venom Swelling    Fish-Derived Products Hives    Iodine Hives    Other Swelling       Labs:  0   Lab Value Date/Time    HCT 35 7 09/07/2015 0600    HCT 34 6 (L) 09/06/2015 0556    HCT 39 4 09/05/2015 0607    HGB 11 1 (L) 09/07/2015 0600    HGB 10 8 (L) 09/06/2015 0556    HGB 12 8 09/05/2015 0607    INR 1 02 08/20/2015 1311    WBC 11 47 (H) 09/07/2015 0600    WBC 11 74 (H) 09/06/2015 0556    WBC 18 25 (H) 09/05/2015 0607       Meds:    Current Outpatient Medications:     azelastine (OPTIVAR) 0 05 % ophthalmic solution, Apply to eye, Disp: , Rfl:     buPROPion (WELLBUTRIN XL) 300 mg 24 hr tablet, Take by mouth, Disp: , Rfl:     Cholecalciferol (VITAMIN D3) 2000 units capsule, TAKE 2 CAPSULES BY MOUTH DAILY INDICATIONS: VITAMIN D DEFICIENCY , Disp: , Rfl: 5    Cholecalciferol 2000 units TABS, Take 2 capsules by mouth, Disp: , Rfl:     diclofenac sodium (VOLTAREN) 1 %, Apply 2 g topically 4 (four) times a day, Disp: 1 Tube, Rfl: 2    DULoxetine (CYMBALTA) 60 mg delayed release capsule, Take 60 mg by mouth, Disp: , Rfl:     ferrous sulfate 325 (65 Fe) mg tablet, Take by mouth, Disp: , Rfl:     fluticasone-salmeterol (ADVAIR DISKUS) 250-50 mcg/dose inhaler, Inhale 1 puff, Disp: , Rfl:     gabapentin (NEURONTIN) 600 MG tablet, Take 1 tablet (600 mg total) by mouth 3 (three) times a day, Disp: 90 tablet, Rfl: 2    hydrocortisone ([employer] CORTISONE MAXIMUM STRENGTH) 1 % cream, Apply topically, Disp: , Rfl:     lisinopril (ZESTRIL) 10 mg tablet, Take by mouth, Disp: , Rfl:     loratadine (CLARITIN) 10 mg tablet, Take by mouth, Disp: , Rfl:     meloxicam (MOBIC) 15 mg tablet, Take 1 tablet (15 mg total) by mouth daily, Disp: 30 tablet, Rfl: 0    simvastatin (ZOCOR) 40 mg tablet, Take by mouth, Disp: , Rfl:     VENTOLIN  (90 Base) MCG/ACT inhaler, INHALE 2 PUFFS EVERY 4 (FOUR) HOURS AS NEEDED FOR WHEEZING OR SHORTNESS OF BREATH , Disp: , Rfl: 2      Physical Exam:     General Appearance:    Alert, cooperative, no distress, appears stated age   Head:    Normocephalic, without obvious abnormality, atraumatic   Eyes:    conjunctiva/corneas clear, both eyes        Nose:   Nares normal, septum midline, no drainage    Throat:   Lips normal; teeth and gums normal   Neck:    symmetrical, trachea midline, ;     thyroid:  no enlargement/   Back:     Symmetric, no curvature, ROM normal   Lungs:   No audible wheezing or labored breathing   Chest Wall:    No tenderness or deformity    Heart:    Regular rate and rhythm               Pulses:   2+ and symmetric all extremities   Skin:   Skin color, texture, turgor normal, no rashes or lesions   Neurologic:   normal strength, sensation and reflexes     throughout       Musculoskeletal: bilateral lower extremity  · On examination of the left knee there is no effusion, no erythema valgus deformity noted  Range of motion to full active extension and flexion to greater than 120°  Pain on palpation medial and lateral joint lines    There is crepitus with range of motion, no warmth to palpation, bony enlargement noted  No pain on palpation pes anserine bursa region or distal iliotibial band  Stable to varus and valgus stress without pain or gapping  Negative anterior and posterior drawer testing  Sensation intact distal pulses present  · On examination of the right knee there is no effusion, no erythema valgus deformity noted  Range of motion to full active extension and flexion to greater than 120°  Pain on palpation medial and lateral joint lines  There is crepitus with range of motion, no warmth to palpation, bony enlargement noted  No pain on palpation pes anserine bursa region or distal iliotibial band  Stable to varus and valgus stress without pain or gapping  Negative anterior and posterior drawer testing  Sensation intact distal pulses present  Large joint arthrocentesis: L knee  Date/Time: 11/5/2019 12:22 PM  Consent given by: patient  Supporting Documentation  Indications: pain   Procedure Details  Location: knee - L knee  Needle size: 22 G  Ultrasound guidance: no  Approach: superior  Medications administered: 2 mL bupivacaine 0 25 %; 2 mL methylPREDNISolone acetate 40 mg/mL          Imaging:  X-rays of the right knee personally reviewed by myself in the office today  X-rays reveal valgus deformity significant lateral joint space narrowing osteophyte formation x-rays left great toe reveal significant healing of the left small toe proximal phalanx fracture significant callus formation in acceptable alignment     _*_*_*_*_*_*_*_*_*_*_*_*_*_*_*_*_*_*_*_*_*_*_*_*_*_*_*_*_*_*_*_*_*_*_*_*_*_*_*_*_*    Assessment:  47 y  o female with bilateral knee pain due to osteoarthritis left worse than right left small toe fracture treated non operatively doing well    Plan:   · Weight bearing as tolerated  bilateral lower extremity  · Left knee intra-articular steroid injection given as noted above  · Patient advised should they develop any increasing pain, redness, drainage, numbness, tingling or swelling surrounding the injection sight, they are to contact our office or present to the emergency department    · Continue home range of motion stretching strengthening exercise bilateral knees  · Patient is following up with bariatric surgery guarding possible weight loss options in the near future which helps a less than her bilateral knee pain due to osteoarthritis  · Follow up in 3 months or sooner if needed      Rajni Joyce PA-C

## 2019-11-06 ENCOUNTER — OFFICE VISIT (OUTPATIENT)
Dept: PHYSICAL THERAPY | Facility: REHABILITATION | Age: 49
End: 2019-11-06
Payer: COMMERCIAL

## 2019-11-06 DIAGNOSIS — M17.12 ARTHRITIS OF KNEE, LEFT: Primary | ICD-10-CM

## 2019-11-06 PROCEDURE — 97110 THERAPEUTIC EXERCISES: CPT | Performed by: PHYSICAL THERAPIST

## 2019-11-06 NOTE — PROGRESS NOTES
PT Discharge    Today's date: 2019  Patient name: Frandy Dooley  : 1970  MRN: [id number]  Referring provider: Leticia Guevara MD  Dx:   Encounter Diagnosis     ICD-10-CM    1  Arthritis of knee, left M17 12        Start Time:   Stop Time: 950  Total time in clinic (min): 60 minutes    Assessment  Assessment details: Since beginning physical therapy, Jimmy Huerta has attended a total number of 17 visits and has maintained excellent compliance with established POC  Patient has made significant improvements in all areas, including decreased pain, increased strength, increased ROM, improved flexibility, improved joint mobility, improved balance, improved quality of gait and improved overall level of function  Patient is reporting improved ability to perform a/iadls and engaging in social activities  Patient is independent with comprehensive HEP and will be joining the gym to continue her rehabilitaiton  Patient has been instructed to contact PT if she begins to notice a decline in function or has any questions or concerns  Patient will be discharged at this time  Patient is in agreement with plan  Impairments: activity intolerance, impaired balance, impaired physical strength and pain with function  Understanding of Dx/Px/POC: excellent   Prognosis: good    Goals  Short Term Goals: to be achieved in 4 weeks  1) Patient to be independent with basic HEP  MET  2) Decrease pain at it's worst to 4/10 on VAS  PROGRESSED, BUT NOT MET  3) Increase LE strength by 1/2 MMT grade in all deficient planes  MET  4) Patient to report decreased sleep interruption secondary to pain  MET  5) Patient to negotiate steps with a reciprocal pattern with use of HR  MET  6) Increase ambulatory tolerance by 10 minutes  MET    Long Term Goals: to be achieved by discharge  1) FOTO equal to or greater than 48  MET  2) Patient to be independent with comprehensive HEP  MET  3) Abolish pain for improved quality of life   PROGRESSED, BUT NOT MET  4) Increase LE strength to 5/5 MMT grade in all planes to improve A/IADLS  MET  5) Patient to negotiate steps with a reciprocal pattern without use of Hr  MET  6) Increase ambulatory tolerance to 40 minutes  MET  7) Patient to report no sleep interruption secondary to pain  MET    Plan  Planned therapy interventions: home exercise program  Frequency: 1-2x week  Treatment plan discussed with: patient        Subjective Evaluation    History of Present Illness  Mechanism of injury: Patient reports that since beginning physical therapy she is able to negotiate steps, squat without support and walk/stand with greater ease  Patient continues to have pain with lifting/pulling heavy items, kicking and prolonged walking  Patient recently received a steroid injection to her left knee with some relief  Pain  Current pain ratin  At best pain ratin  At worst pain ratin  Location: left knee: diffuse  Quality: stabbing, ache, burning  Treatments  Current treatment: physical therapy  Patient Goals  Patient goal: to walk treadmill for longer periods of time, improved ability to negotiate steps, increase strength, decreased pain        Objective     Active Range of Motion   Left Knee   Flexion: 135 degrees   Extension: 0 degrees     Passive Range of Motion   Left Knee   Flexion: 135 degrees   Extension: 0 degrees     Mobility   Patellar Mobility:   Left Knee   WFL: medial, lateral, superior and inferior  Strength/Myotome Testing     Left Hip   Normal muscle strength    Right Hip   Normal muscle strength    Left Knee   Normal strength    Right Knee   Normal strength    Left Ankle/Foot   Dorsiflexion: 5    Right Ankle/Foot   Dorsiflexion: 5    Ambulation     Ambulation: Level Surfaces   Ambulation without assistive device: independent    Observational Gait   Gait: antalgic     Functional Assessment      Squat    Left within functional limits, right within functional limits and pain       Forward Step Down 8"   Left Leg  Within functional limits  Right Leg  Within functional limits  Comments  FSD 8" step: antalgic with decreased eccentric control, Mod I with u/l HR             Precautions: bilateral EVA, lumbar fusion, h/o CA, HTN, prediabetes      Manual  10/14 10/16 10/21 10/23 10/28 10/30 11/6   10/11   Left patellar mobs GR MM [employer]  prom GR      GR   Left gastroc, hip flexor, h/s str  Hip flex,   20"x2 GR      GR   Reassessment      [facility]                                     Exercise Diary  10/14 10/16 10/21 10/23 10/28 10/30 11/6   10/11   Recumbent bike 10' 10' 10' 10' 15' 15' 15'   10'   VG 50% 7' 50% 7' 50% 7' 50% 7' 50% 7' 50% 7' 50% 7'   L7 5'   Prone quad str  Wall slides             TKE             Standing hip abd, ext  With TB     30x YTB ea  b/l  30x YTB ea  b/l      LAQ             H/s curls             Knee flexion mach  3x10 50# 3x10 50# 3x10 50# 3x10 50# 3x10  60#  3x10 60#   3x10 50#   Knee ext  Mach   3x10 50# 3x10 50# 3x10 50# 3x10 40# 3x10 40#  3x10 40#   3x10 50#   Wall sits 20x10" 20x10" 20  x10" 15x10" 15x10"  15x10"   20x10"   Leg press 3x10 140# 3x10 140# 3x10 140# 3x10 140# 3x10 160#  3x10 160#   3x10 140#   VG: u/l, L5    30x b/l 30x b/l        Sidestepping with TB    10x YTB 10x YTB  10x YTB      LSD    2x10 4" 3x10 4" 3x10 6" 3x10 6"      Squats       2x10                                                              Modalities              TENS

## 2019-11-06 NOTE — PROGRESS NOTES
{SL AMB PT/OT NOTE [id number]}    Today's date: 2019  Patient name: Michael Wang  : 1970  MRN: [id number]  Referring provider: New Alvarado MD  Dx:   Encounter Diagnosis     ICD-10-CM    1   Arthritis of knee, left M17 12                   Assessment/Plan    Subjective    Objective           Precautions: ***      Manual                                                                                   Exercise Diary                                                                                                                                                                                                                                                                                      Modalities

## 2019-11-20 ENCOUNTER — DOCTOR'S OFFICE (OUTPATIENT)
Dept: URBAN - METROPOLITAN AREA CLINIC 136 | Facility: CLINIC | Age: 49
Setting detail: OPHTHALMOLOGY
End: 2019-11-20
Payer: COMMERCIAL

## 2019-11-20 ENCOUNTER — RX ONLY (RX ONLY)
Age: 49
End: 2019-11-20

## 2019-11-20 DIAGNOSIS — H52.13: ICD-10-CM

## 2019-11-20 DIAGNOSIS — H18.603: ICD-10-CM

## 2019-11-20 DIAGNOSIS — H52.4: ICD-10-CM

## 2019-11-20 PROCEDURE — 92002 INTRM OPH EXAM NEW PATIENT: CPT | Performed by: OPTOMETRIST

## 2019-11-20 PROCEDURE — 92015 DETERMINE REFRACTIVE STATE: CPT | Performed by: OPTOMETRIST

## 2019-11-20 PROCEDURE — 92025 CPTRIZED CORNEAL TOPOGRAPHY: CPT | Performed by: OPTOMETRIST

## 2019-11-20 ASSESSMENT — CONFRONTATIONAL VISUAL FIELD TEST (CVF)
OD_FINDINGS: FULL
OS_FINDINGS: FULL

## 2019-11-29 PROBLEM — I10 HYPERTENSION: Status: ACTIVE | Noted: 2019-11-29

## 2019-11-29 PROBLEM — R73.03 PREDIABETES: Status: ACTIVE | Noted: 2019-11-29

## 2019-11-29 PROBLEM — E78.5 HYPERLIPIDEMIA: Status: ACTIVE | Noted: 2019-11-29

## 2019-11-29 PROBLEM — E66.01 MORBID OBESITY WITH BODY MASS INDEX (BMI) OF 50.0 TO 59.9 IN ADULT (HCC): Status: ACTIVE | Noted: 2019-11-29

## 2019-12-02 ASSESSMENT — SPHEQUIV_DERIVED
OD_SPHEQUIV: -9
OS_SPHEQUIV: -10

## 2019-12-02 ASSESSMENT — REFRACTION_MANIFEST
OD_VA1: 20/100-1
OS_VA3: 20/
OD_VA3: 20/
OU_VA: 20/
OD_VA1: 20/
OS_CYLINDER: -4.00
OS_ADD: +1.50
OU_VA: 20/
OD_VA2: 20/
OD_SPHERE: -7.00
OS_VA2: 20/
OD_ADD: +1.50
OD_VA3: 20/
OS_VA3: 20/
OS_SPHERE: -8.00
OD_CYLINDER: -4.00
OS_VA2: 20/
OD_AXIS: 150
OS_VA1: 20/
OD_VA2: 20/
OS_VA1: 20/200
OS_AXIS: 025

## 2019-12-03 ASSESSMENT — REFRACTION_CURRENTRX
OS_OVR_VA: 20/
OD_OVR_VA: 20/
OD_OVR_VA: 20/
OS_OVR_VA: 20/
OS_OVR_VA: 20/
OD_OVR_VA: 20/

## 2019-12-03 ASSESSMENT — REFRACTION_AUTOREFRACTION
OD_SPHERE: -4.50
OD_AXIS: 149
OD_CYLINDER: -3.25
OS_SPHERE: ERROR

## 2019-12-03 ASSESSMENT — VISUAL ACUITY
OD_BCVA: 20/200
OS_BCVA: 20/70-1

## 2019-12-03 ASSESSMENT — SPHEQUIV_DERIVED: OD_SPHEQUIV: -6.125

## 2019-12-10 ENCOUNTER — OFFICE VISIT (OUTPATIENT)
Dept: PAIN MEDICINE | Facility: CLINIC | Age: 49
End: 2019-12-10
Payer: COMMERCIAL

## 2019-12-10 VITALS
BODY MASS INDEX: 50.02 KG/M2 | SYSTOLIC BLOOD PRESSURE: 148 MMHG | WEIGHT: 293 LBS | HEIGHT: 64 IN | DIASTOLIC BLOOD PRESSURE: 81 MMHG

## 2019-12-10 DIAGNOSIS — M50.30 DEGENERATIVE DISC DISEASE, CERVICAL: ICD-10-CM

## 2019-12-10 DIAGNOSIS — G89.29 CHRONIC PAIN OF BOTH KNEES: ICD-10-CM

## 2019-12-10 DIAGNOSIS — M54.16 LUMBAR RADICULOPATHY: ICD-10-CM

## 2019-12-10 DIAGNOSIS — M47.816 LUMBAR SPONDYLOSIS: Primary | ICD-10-CM

## 2019-12-10 DIAGNOSIS — M25.562 CHRONIC PAIN OF BOTH KNEES: ICD-10-CM

## 2019-12-10 DIAGNOSIS — M54.12 CERVICAL RADICULOPATHY: ICD-10-CM

## 2019-12-10 DIAGNOSIS — M25.561 CHRONIC PAIN OF BOTH KNEES: ICD-10-CM

## 2019-12-10 PROCEDURE — 99214 OFFICE O/P EST MOD 30 MIN: CPT | Performed by: NURSE PRACTITIONER

## 2019-12-10 RX ORDER — GABAPENTIN 300 MG/1
600 CAPSULE ORAL 2 TIMES DAILY
Qty: 180 CAPSULE | Refills: 1 | Status: SHIPPED | OUTPATIENT
Start: 2019-12-10 | End: 2020-07-27 | Stop reason: SDUPTHER

## 2019-12-10 NOTE — PATIENT INSTRUCTIONS
Gabapentin 300mg: Take 2 by mouth in the morning, 1 by mouth in the afternoon and 2 by mouth at bedtime x 3 days, then decrease to 1 by mouth in the morning, 1 by mouth in the afternoon and 2 by mouth at bedtime x 3 days, then decrease to 1 by mouth three times a day and continue

## 2019-12-10 NOTE — PROGRESS NOTES
Assessment:  1  Lumbar spondylosis    2  Cervical radiculopathy    3  Degenerative disc disease, cervical    4  Lumbar radiculopathy    5  Chronic pain of both knees        Plan: At today's office visit, the patient states that she is continuing in physical therapy with some relief  She has established with Orthopedics and is undergoing corticosteroid injections and viscous supplemental injections without relief  She was offered geniculate nerve blocks in the past, however she is not interested in this at this time  She was also offered a cervical epidural steroid injection the past, however again, is not interested in pursuing at this time  She does wish to decrease gabapentin  1  Patient will decrease gabapentin to 300 mg t i d  She was provided with instructions on how to do so appropriately  I advised the patient that if they experience any side effects or issues with the changes in their medication regiment, they should give our office a call to discuss  I also advised the patient not to drive or operate machinery until they see how the changes in the medication regimen affects them  The patient was agreeable and verbalized an understanding  2  The patient may continue duloxetine as prescribed by her PCP  3  Patient will continue to follow with Orthopedics for bilateral knee pain and will call our office if she wishes to move forward with left geniculate nerve blocks  4  We can schedule the patient for a cervical epidural steroid injection should her symptoms worsen in the future  5  Patient will continue with her home exercise program  6  We can repeat bilateral L2-5 RFA p r n   7  The patient would like to follow up as needed at this time  She states she will call our office when she needs a refill of her medications  M*Modal software was used to dictate this note  It may contain errors with dictating incorrect words or incorrect spelling   Please contact the provider directly with any questions  History of Present Illness: The patient is a 47 y o  female with a history of fibromyalgia and carpal tunnel syndrome last seen on 8/8/19 who presents for a follow up office visit in regards to chronic lumbosacral back pain, bilateral knee pain, and neck pain secondary to cervical degenerative disc disease, cervical spondylosis and stenosis, osteoarthritis, and lumbar spondylosis  The patient denies bowel or bladder incontinence or saddle anesthesia  She has established with Orthopedics and has undergone corticosteroid and viscous supplemental injections without much relief  She currently is participating in physical therapy with some improvement of her pain complaints  She was scheduled for cervical epidural steroid injection in the past, however canceled the procedure  She is inquiring about trying to decrease her gabapentin dosing at this time  The patient currently rates her pain a 6/10 on the numeric pain rating scale  She states her pain is constant in nature and bothersome in the evening and at night  She characterizes the pain as burning, dull aching, sharp, throbbing, shooting, numbness and pins and needles  Current pain medications includes:  Gabapentin 600 mg t i d , duloxetine 60 mg b i d  As prescribed by psychiatry, and meloxicam 15 mg daily p r n  As prescribed by Orthopedics   The patient reports that this regimen is providing 50-70% pain relief  The patient is reporting no side effects from this pain medication regimen  I have personally reviewed and/or updated the patient's past medical history, past surgical history, family history, social history, current medications, allergies, and vital signs today  Review of Systems:    Review of Systems   Respiratory: Negative for shortness of breath  Cardiovascular: Positive for chest pain  Gastrointestinal: Negative for constipation, diarrhea, nausea and vomiting  Musculoskeletal: Positive for gait problem  Negative for arthralgias, joint swelling and myalgias  Skin: Negative for rash  Neurological: Positive for weakness  Negative for dizziness and seizures  All other systems reviewed and are negative          Past Medical History:   Diagnosis Date    Asthma     Cancer ([facility] [address] )     endometrial    Hyperlipidemia     Hypertension     Prediabetes        Past Surgical History:   Procedure Laterality Date    KNEE ARTHROSCOPY Right     LUMBAR FUSION      TOTAL HIP ARTHROPLASTY Bilateral        Family History   Problem Relation Age of Onset    Diabetes Mother     Stroke Mother     Diabetes Father     Cancer Father     Diabetes Sister     Diabetes Brother        Social History     Occupational History    Not on file   Tobacco Use    Smoking status: Former Smoker    Smokeless tobacco: Never Used   Substance and Sexual Activity    Alcohol use: Not on file    Drug use: Not on file    Sexual activity: Not on file         Current Outpatient Medications:     azelastine (OPTIVAR) 0 05 % ophthalmic solution, Apply to eye, Disp: , Rfl:     buPROPion (WELLBUTRIN XL) 300 mg 24 hr tablet, Take by mouth, Disp: , Rfl:     Cholecalciferol (VITAMIN D3) 2000 units capsule, TAKE 2 CAPSULES BY MOUTH DAILY INDICATIONS: VITAMIN D DEFICIENCY , Disp: , Rfl: 5    Cholecalciferol 2000 units TABS, Take 2 capsules by mouth, Disp: , Rfl:     diclofenac sodium (VOLTAREN) 1 %, Apply 2 g topically 4 (four) times a day, Disp: 1 Tube, Rfl: 2    DULoxetine (CYMBALTA) 60 mg delayed release capsule, Take 60 mg by mouth, Disp: , Rfl:     ferrous sulfate 325 (65 Fe) mg tablet, Take by mouth, Disp: , Rfl:     fluticasone-salmeterol (ADVAIR DISKUS) 250-50 mcg/dose inhaler, Inhale 1 puff, Disp: , Rfl:     hydrocortisone (CVS CORTISONE MAXIMUM STRENGTH) 1 % cream, Apply topically, Disp: , Rfl:     lisinopril (ZESTRIL) 10 mg tablet, Take by mouth, Disp: , Rfl:     loratadine (CLARITIN) 10 mg tablet, Take by mouth, Disp: , Rfl:    meloxicam (MOBIC) 15 mg tablet, Take 1 tablet (15 mg total) by mouth daily, Disp: 30 tablet, Rfl: 0    simvastatin (ZOCOR) 40 mg tablet, Take by mouth, Disp: , Rfl:     VENTOLIN  (90 Base) MCG/ACT inhaler, INHALE 2 PUFFS EVERY 4 (FOUR) HOURS AS NEEDED FOR WHEEZING OR SHORTNESS OF BREATH , Disp: , Rfl: 2    gabapentin (NEURONTIN) 300 mg capsule, Take 2 capsules (600 mg total) by mouth 2 (two) times a day, Disp: 180 capsule, Rfl: 1    Allergies   Allergen Reactions    Bee Venom Swelling    Fish-Derived Products Hives    Iodine Hives    Other Swelling       Physical Exam:    /81   Ht 5' 4" (1 626 m)   Wt 133 kg (294 lb)   BMI 50 46 kg/m²     Constitutional:overweight  Eyes:anicteric  HEENT:grossly intact  Neck:supple, symmetric, trachea midline and no masses   Pulmonary:even and unlabored  Cardiovascular:No edema or pitting edema present  Skin:Normal without rashes or lesions and well hydrated  Psychiatric:Mood and affect appropriate  Neurologic:Cranial Nerves II-XII grossly intact  Musculoskeletal:Slightly antalgic but steady without the use of assistive devices      Imaging  No orders to display   MRI LUMBAR SPINE WITHOUT CONTRAST     INDICATION:  Difficulty walking, bilateral hip replacement     COMPARISON:  X-ray 12/29/2015, CT 1/31/2008     TECHNIQUE:  Sagittal T1, sagittal T2, sagittal inversion recovery, axial T1 and axial T2, coronal T2        IMAGE QUALITY:  Diagnostic     FINDINGS:     ALIGNMENT:  Minor degenerative anterolisthesis L4-L5      MARROW SIGNAL:  Normal marrow signal is identified within the visualized bony structures  No discrete marrow lesion      DISTAL CORD AND CONUS:  Normal size and signal within the distal cord and conus  The conus ends at the L1 level      PARASPINAL SOFT TISSUES:  Paraspinal soft tissues are unremarkable      SACRUM:  Normal signal within the sacrum   No evidence of insufficiency or stress fracture      LOWER THORACIC DISC SPACES:  Normal disc height and signal   No disc herniation, canal stenosis or foraminal narrowing      LUMBAR DISC SPACES:     L1-L2:  Thin right posterolateral protrusion, not evident on prior CT  Minor deformation of the sac without root compression      L2-L3:  Minor facet arthrosis has progressed since prior CT      L3-L4:  Minor facet arthrosis     L4-L5:  Progression of facet arthrosis, very minor anterolisthesis not evident on prior CT  Disc extends asymmetrically to the left without root compression      L5-S1:  Minor facet arthrosis      IMPRESSION:     Minor degenerative changes which have progressed since prior CT 10 years earlier  Thin right L1-2 protrusion and thin left L4-5 protrusion are not compressive  MRI CERVICAL SPINE WITHOUT CONTRAST     INDICATION:  Chronic posterior neck pain and stiffness with limited range of motion      COMPARISON:  None      TECHNIQUE:  Sagittal T1, sagittal T2, sagittal inversion recovery, axial T2, axial  2D merge     IMAGE QUALITY:  Diagnostic     FINDINGS:     ALIGNMENT:  Straightening of the normal cervical lordosis  No compression fracture  No subluxation      MARROW SIGNAL:  Mild endplate marrow degenerative change at C6-C7      CERVICAL AND VISUALIZED THORACIC CORD:  Normal signal within the visualized cord      PREVERTEBRAL AND PARASPINAL SOFT TISSUES:  Normal      VISUALIZED POSTERIOR FOSSA:  The visualized posterior fossa demonstrates no abnormal signal      CERVICAL DISC SPACES:     C2-C3:  Normal      C3-C4:  Normal      C4-C5:  Normal      C5-C6:  Slight loss of disc height with mild annular bulging  No discrete disc herniation, canal stenosis or foraminal narrowing      C6-C7:  Disc desiccation with slight loss of disc height  Mild right greater than left uncinate joint hypertrophic degenerative change  No canal stenosis    Mild right foraminal narrowing without discrete nerve impingement      C7-T1:  Normal      UPPER THORACIC DISC SPACES: Normal      IMPRESSION:     Mild noncompressive cervical degenerative change at C5-6 and C6-7           No orders of the defined types were placed in this encounter

## 2019-12-19 ENCOUNTER — DOCTOR'S OFFICE (OUTPATIENT)
Dept: URBAN - METROPOLITAN AREA CLINIC 136 | Facility: CLINIC | Age: 49
Setting detail: OPHTHALMOLOGY
End: 2019-12-19
Payer: COMMERCIAL

## 2019-12-19 DIAGNOSIS — H18.603: ICD-10-CM

## 2019-12-19 PROCEDURE — 92310 CONTACT LENS FITTING OU: CPT | Performed by: OPTOMETRIST

## 2019-12-19 ASSESSMENT — REFRACTION_MANIFEST
OU_VA: 20/
OS_VA3: 20/
OS_VA2: 20/
OS_VA1: 20/
OD_VA3: 20/
OD_VA2: 20/
OD_VA1: 20/

## 2019-12-19 ASSESSMENT — CONFRONTATIONAL VISUAL FIELD TEST (CVF)
OS_FINDINGS: FULL
OD_FINDINGS: FULL

## 2019-12-20 ENCOUNTER — OPTICAL OFFICE (OUTPATIENT)
Dept: URBAN - METROPOLITAN AREA CLINIC 143 | Facility: CLINIC | Age: 49
Setting detail: OPHTHALMOLOGY
End: 2019-12-20
Payer: COMMERCIAL

## 2019-12-20 DIAGNOSIS — H18.603: ICD-10-CM

## 2019-12-20 DIAGNOSIS — H44.23: ICD-10-CM

## 2019-12-20 PROCEDURE — S0500 DISPOS CONT LENS: HCPCS | Performed by: OPTOMETRIST

## 2019-12-20 PROCEDURE — 92072 FITG C-LENS KERATOCONUS 1ST: CPT | Performed by: OPTOMETRIST

## 2019-12-26 ASSESSMENT — REFRACTION_MANIFEST
OD_ADD: +1.50
OS_VA3: 20/
OS_CYLINDER: -4.00
OD_VA2: 20/
OS_VA2: 20/
OD_AXIS: 150
OD_SPHERE: -7.00
OS_ADD: +1.50
OD_CYLINDER: -4.00
OU_VA: 20/
OS_AXIS: 025
OD_VA3: 20/
OD_VA1: 20/100-1
OS_VA1: 20/200
OS_SPHERE: -8.00

## 2019-12-26 ASSESSMENT — REFRACTION_AUTOREFRACTION
OD_AXIS: 149
OD_SPHERE: -4.50
OS_SPHERE: ERROR
OD_CYLINDER: -3.25

## 2019-12-26 ASSESSMENT — SPHEQUIV_DERIVED
OD_SPHEQUIV: -6.125
OD_SPHEQUIV: -9
OS_SPHEQUIV: -10

## 2019-12-26 ASSESSMENT — VISUAL ACUITY
OD_BCVA: 20/150
OS_BCVA: 20/40

## 2019-12-26 ASSESSMENT — REFRACTION_CURRENTRX
OD_OVR_VA: 20/
OS_OVR_VA: 20/
OD_OVR_VA: 20/
OD_OVR_VA: 20/
OS_OVR_VA: 20/
OS_OVR_VA: 20/

## 2020-02-06 ENCOUNTER — OFFICE VISIT (OUTPATIENT)
Dept: OBGYN CLINIC | Facility: HOSPITAL | Age: 50
End: 2020-02-06
Payer: COMMERCIAL

## 2020-02-06 VITALS
WEIGHT: 293 LBS | SYSTOLIC BLOOD PRESSURE: 138 MMHG | HEART RATE: 92 BPM | DIASTOLIC BLOOD PRESSURE: 88 MMHG | BODY MASS INDEX: 51.77 KG/M2

## 2020-02-06 DIAGNOSIS — M17.12 PRIMARY OSTEOARTHRITIS OF LEFT KNEE: Primary | ICD-10-CM

## 2020-02-06 PROCEDURE — 99213 OFFICE O/P EST LOW 20 MIN: CPT | Performed by: PHYSICIAN ASSISTANT

## 2020-02-06 PROCEDURE — 20610 DRAIN/INJ JOINT/BURSA W/O US: CPT | Performed by: PHYSICIAN ASSISTANT

## 2020-02-06 RX ORDER — METHYLPREDNISOLONE ACETATE 40 MG/ML
2 INJECTION, SUSPENSION INTRA-ARTICULAR; INTRALESIONAL; INTRAMUSCULAR; SOFT TISSUE
Status: COMPLETED | OUTPATIENT
Start: 2020-02-06 | End: 2020-02-06

## 2020-02-06 RX ORDER — BUPIVACAINE HYDROCHLORIDE 2.5 MG/ML
2 INJECTION, SOLUTION INFILTRATION; PERINEURAL
Status: COMPLETED | OUTPATIENT
Start: 2020-02-06 | End: 2020-02-06

## 2020-02-06 RX ORDER — LIDOCAINE HYDROCHLORIDE 10 MG/ML
2 INJECTION, SOLUTION INFILTRATION; PERINEURAL
Status: COMPLETED | OUTPATIENT
Start: 2020-02-06 | End: 2020-02-06

## 2020-02-06 RX ADMIN — BUPIVACAINE HYDROCHLORIDE 2 ML: 2.5 INJECTION, SOLUTION INFILTRATION; PERINEURAL at 13:36

## 2020-02-06 RX ADMIN — METHYLPREDNISOLONE ACETATE 2 ML: 40 INJECTION, SUSPENSION INTRA-ARTICULAR; INTRALESIONAL; INTRAMUSCULAR; SOFT TISSUE at 13:36

## 2020-02-06 RX ADMIN — LIDOCAINE HYDROCHLORIDE 2 ML: 10 INJECTION, SOLUTION INFILTRATION; PERINEURAL at 13:36

## 2020-02-06 NOTE — PROGRESS NOTES
Orthopedics          Germán Chase 45 y o  female MRN: [id number]      Chief Complaint:   left knee pain    HPI:   45 y  o female complaining of left knee pain  Patient presents office today regarding left knee pain  Patient has been diagnosed with bilateral knee pain due to osteoarthritis  Patient states her left knee is swollen with decreased motion and pain localizing left knee  Patient states the pain is aching nature limits her activities  She ambulates with assistance of a cane due to her left knee pain  She denies any instability clicking popping catching of her left knee  Denies any numbness tingling left lower extremity                  Review Of Systems:   · Skin: Normal  · Neuro: See HPI  · Musculoskeletal: See HPI  · All other systems reviewed and are negative    Past Medical History:   Past Medical History:   Diagnosis Date    Asthma     Cancer ([facility] [address] )     endometrial    Hyperlipidemia     Hypertension     Prediabetes        Past Surgical History:   Past Surgical History:   Procedure Laterality Date    KNEE ARTHROSCOPY Right     LUMBAR FUSION      TOTAL HIP ARTHROPLASTY Bilateral        Family History:  Family history reviewed and non-contributory  Family History   Problem Relation Age of Onset    Diabetes Mother     Stroke Mother     Diabetes Father     Cancer Father     Diabetes Sister     Diabetes Brother          Social History:  Social History     Socioeconomic History    Marital status: Single     Spouse name: None    Number of children: None    Years of education: None    Highest education level: None   Occupational History    None   Social Needs    Financial resource strain: None    Food insecurity:     Worry: None     Inability: None    Transportation needs:     Medical: None     Non-medical: None   Tobacco Use    Smoking status: Former Smoker    Smokeless tobacco: Never Used   Substance and Sexual Activity    Alcohol use: None    Drug use: None    Sexual activity: None   Lifestyle    Physical activity:     Days per week: None     Minutes per session: None    Stress: None   Relationships    Social connections:     Talks on phone: None     Gets together: None     Attends [employer] service: None     Active member of club or organization: None     Attends meetings of clubs or organizations: None     Relationship status: None    Intimate partner violence:     Fear of current or ex partner: None     Emotionally abused: None     Physically abused: None     Forced sexual activity: None   Other Topics Concern    None   Social History Narrative    None       Allergies:    Allergies   Allergen Reactions    Bee Venom Swelling    Fish-Derived Products Hives    Iodine Hives    Other Swelling       Labs:  0   Lab Value Date/Time    HCT 35 7 09/07/2015 0600    HCT 34 6 (L) 09/06/2015 0556    HCT 39 4 09/05/2015 0607    HGB 11 1 (L) 09/07/2015 0600    HGB 10 8 (L) 09/06/2015 0556    HGB 12 8 09/05/2015 0607    INR 1 02 08/20/2015 1311    WBC 11 47 (H) 09/07/2015 0600    WBC 11 74 (H) 09/06/2015 0556    WBC 18 25 (H) 09/05/2015 0607       Meds:    Current Outpatient Medications:     azelastine (OPTIVAR) 0 05 % ophthalmic solution, Apply to eye, Disp: , Rfl:     buPROPion (WELLBUTRIN XL) 300 mg 24 hr tablet, Take by mouth, Disp: , Rfl:     Cholecalciferol (VITAMIN D3) 2000 units capsule, TAKE 2 CAPSULES BY MOUTH DAILY INDICATIONS: VITAMIN D DEFICIENCY , Disp: , Rfl: 5    Cholecalciferol 2000 units TABS, Take 2 capsules by mouth, Disp: , Rfl:     diclofenac sodium (VOLTAREN) 1 %, Apply 2 g topically 4 (four) times a day, Disp: 1 Tube, Rfl: 2    DULoxetine (CYMBALTA) 60 mg delayed release capsule, Take 60 mg by mouth, Disp: , Rfl:     ferrous sulfate 325 (65 Fe) mg tablet, Take by mouth, Disp: , Rfl:     fluticasone-salmeterol (ADVAIR DISKUS) 250-50 mcg/dose inhaler, Inhale 1 puff, Disp: , Rfl:     gabapentin (NEURONTIN) 300 mg capsule, Take 2 capsules (600 mg total) by mouth 2 (two) times a day, Disp: 180 capsule, Rfl: 1    hydrocortisone (CVS CORTISONE MAXIMUM STRENGTH) 1 % cream, Apply topically, Disp: , Rfl:     lisinopril (ZESTRIL) 10 mg tablet, Take by mouth, Disp: , Rfl:     loratadine (CLARITIN) 10 mg tablet, Take by mouth, Disp: , Rfl:     meloxicam (MOBIC) 15 mg tablet, Take 1 tablet (15 mg total) by mouth daily, Disp: 30 tablet, Rfl: 0    simvastatin (ZOCOR) 40 mg tablet, Take by mouth, Disp: , Rfl:     VENTOLIN  (90 Base) MCG/ACT inhaler, INHALE 2 PUFFS EVERY 4 (FOUR) HOURS AS NEEDED FOR WHEEZING OR SHORTNESS OF BREATH , Disp: , Rfl: 2      Physical Exam:     General Appearance:    Alert, cooperative, no distress, appears stated age   Head:    Normocephalic, without obvious abnormality, atraumatic   Eyes:    conjunctiva/corneas clear, both eyes        Nose:   Nares normal, septum midline, no drainage    Throat:   Lips normal; teeth and gums normal   Neck:    symmetrical, trachea midline, ;     thyroid:  no enlargement/   Back:     Symmetric, no curvature, ROM normal   Lungs:   No audible wheezing or labored breathing   Chest Wall:    No tenderness or deformity    Heart:    Regular rate and rhythm               Pulses:   2+ and symmetric all extremities   Skin:   Skin color, texture, turgor normal, no rashes or lesions   Neurologic:   normal strength, sensation and reflexes     throughout       Musculoskeletal: left lower extremity  · On examination of the left knee there is a moderate effusion, no erythema, valgus deformity noted  Range of motion to full active extension and flexion to greater than 120°  Pain on palpation medial and lateral joint lines  There is crepitus with range of motion, no warmth to palpation, bony enlargement noted  No pain on palpation pes anserine bursa region or distal iliotibial band  Stable to varus and valgus stress without pain or gapping  Negative anterior and posterior drawer testing    Sensation intact distal pulses present  Large joint arthrocentesis: L knee  Date/Time: 2/6/2020 1:36 PM  Consent given by: patient  Supporting Documentation  Indications: pain   Procedure Details  Location: knee - L knee  Needle size: 18 G  Ultrasound guidance: no  Approach: superior  Medications administered: 2 mL bupivacaine 0 25 %; 2 mL lidocaine 1 %; 2 mL methylPREDNISolone acetate 40 mg/mL    Aspirate amount: 22 mL  Aspirate: clear and yellow    Patient tolerance: patient tolerated the procedure well with no immediate complications          _*_*_*_*_*_*_*_*_*_*_*_*_*_*_*_*_*_*_*_*_*_*_*_*_*_*_*_*_*_*_*_*_*_*_*_*_*_*_*_*_*    Assessment:  45 y  o female with left knee pain due to osteoarthritis left knee effusion    Plan:   · Weight bearing as tolerated  left lower extremity  · Left knee intra-articular aspiration injection given as noted above  · Patient advised should they develop any increasing pain, redness, drainage, numbness, tingling or swelling surrounding the injection sight, they are to contact our office or present to the emergency department    · Advised patient weight loss with significantly reduce the pain in her left knee due to knee osteoarthritis  · Continue home range of motion stretching strengthening exercises  · Follow up in 3 months or sooner if needed      Shruthi Thornton PA-C

## 2020-05-07 ENCOUNTER — OFFICE VISIT (OUTPATIENT)
Dept: OBGYN CLINIC | Facility: HOSPITAL | Age: 50
End: 2020-05-07
Payer: COMMERCIAL

## 2020-05-07 VITALS
DIASTOLIC BLOOD PRESSURE: 91 MMHG | HEART RATE: 80 BPM | WEIGHT: 293 LBS | SYSTOLIC BLOOD PRESSURE: 148 MMHG | BODY MASS INDEX: 51.87 KG/M2

## 2020-05-07 DIAGNOSIS — M17.12 PRIMARY OSTEOARTHRITIS OF LEFT KNEE: Primary | ICD-10-CM

## 2020-05-07 PROCEDURE — 99213 OFFICE O/P EST LOW 20 MIN: CPT | Performed by: PHYSICIAN ASSISTANT

## 2020-05-07 PROCEDURE — 20610 DRAIN/INJ JOINT/BURSA W/O US: CPT | Performed by: PHYSICIAN ASSISTANT

## 2020-05-07 RX ORDER — LIDOCAINE HYDROCHLORIDE 5 MG/ML
2 INJECTION, SOLUTION INFILTRATION; PERINEURAL
Status: COMPLETED | OUTPATIENT
Start: 2020-05-07 | End: 2020-05-07

## 2020-05-07 RX ORDER — KETOROLAC TROMETHAMINE 30 MG/ML
60 INJECTION, SOLUTION INTRAMUSCULAR; INTRAVENOUS
Status: COMPLETED | OUTPATIENT
Start: 2020-05-07 | End: 2020-05-07

## 2020-05-07 RX ADMIN — KETOROLAC TROMETHAMINE 60 MG: 30 INJECTION, SOLUTION INTRAMUSCULAR; INTRAVENOUS at 13:38

## 2020-05-07 RX ADMIN — LIDOCAINE HYDROCHLORIDE 2 ML: 5 INJECTION, SOLUTION INFILTRATION; PERINEURAL at 13:38

## 2020-07-27 ENCOUNTER — TELEPHONE (OUTPATIENT)
Dept: PAIN MEDICINE | Facility: CLINIC | Age: 50
End: 2020-07-27

## 2020-07-27 DIAGNOSIS — M54.12 CERVICAL RADICULOPATHY: ICD-10-CM

## 2020-07-27 RX ORDER — GABAPENTIN 300 MG/1
600 CAPSULE ORAL 2 TIMES DAILY
Qty: 180 CAPSULE | Refills: 0 | Status: SHIPPED | OUTPATIENT
Start: 2020-07-27 | End: 2020-09-24 | Stop reason: SDUPTHER

## 2020-07-27 NOTE — TELEPHONE ENCOUNTER
S/w pt, she is requesting a RF of Gabapentin 600 mg BID  Pt tolerating dose  Next appt 8/7  Confirmed pharmacy

## 2020-07-27 NOTE — TELEPHONE ENCOUNTER
Med refill   Name of medication:gabapentin (NEURONTIN) 300 mg capsule       Frequency: Take 2 capsules (600 mg total) by mouth 2 (two) times a day    How many left:5 days     Pharmacy: [address] [place] [address], [address] [place] [address] call back # [phone]     She has been breaking them in half

## 2020-07-28 ENCOUNTER — TELEPHONE (OUTPATIENT)
Dept: RADIOLOGY | Facility: CLINIC | Age: 50
End: 2020-07-28

## 2020-07-28 NOTE — TELEPHONE ENCOUNTER
lmom for pt to cb- fridays are virtual appts patient is schedule for an sovs  please either change to virtual CALL or VIDEO DOXY OR TEAMS  or r/s to an in office visit  Thank you

## 2020-08-07 ENCOUNTER — TELEPHONE (OUTPATIENT)
Dept: PAIN MEDICINE | Facility: CLINIC | Age: 50
End: 2020-08-07

## 2020-08-07 ENCOUNTER — TELEMEDICINE (OUTPATIENT)
Dept: PAIN MEDICINE | Facility: CLINIC | Age: 50
End: 2020-08-07
Payer: COMMERCIAL

## 2020-08-07 DIAGNOSIS — M25.561 CHRONIC PAIN OF BOTH KNEES: ICD-10-CM

## 2020-08-07 DIAGNOSIS — G89.29 CHRONIC PAIN OF BOTH KNEES: ICD-10-CM

## 2020-08-07 DIAGNOSIS — M47.816 LUMBAR SPONDYLOSIS: ICD-10-CM

## 2020-08-07 DIAGNOSIS — M50.30 DEGENERATIVE DISC DISEASE, CERVICAL: ICD-10-CM

## 2020-08-07 DIAGNOSIS — G89.29 CHRONIC PAIN OF LEFT KNEE: Primary | ICD-10-CM

## 2020-08-07 DIAGNOSIS — M25.562 CHRONIC PAIN OF BOTH KNEES: ICD-10-CM

## 2020-08-07 DIAGNOSIS — M46.1 SACROILIITIS (HCC): ICD-10-CM

## 2020-08-07 DIAGNOSIS — M54.16 LUMBAR RADICULOPATHY: ICD-10-CM

## 2020-08-07 DIAGNOSIS — M25.562 CHRONIC PAIN OF LEFT KNEE: Primary | ICD-10-CM

## 2020-08-07 PROCEDURE — G2012 BRIEF CHECK IN BY MD/QHP: HCPCS | Performed by: NURSE PRACTITIONER

## 2020-08-07 NOTE — TELEPHONE ENCOUNTER
Patient contacted and scheduled for Left geniculate nerve block   All pre procedure instructions were given to patient   Nothing to eat or drink for 1 hour prior  Loose fitting clothing   Denies NSAIDS   Denies Antibx   Needs    If becomes sick, patient instructed to give our office a all to r/s procedure     Insurance auth received but is not a guarantee of payment per your insurance company's authorization disclaimer and it is your responsibility to verify your benefits   COVID -19 screening complete     Patient also scheduled for 6 week f/u

## 2020-08-07 NOTE — PROGRESS NOTES
Virtual Brief Visit    Assessment/Plan:    Problem List Items Addressed This Visit        Nervous and Auditory    Lumbar radiculopathy    Relevant Orders    Ambulatory referral to Physical Therapy       Musculoskeletal and Integument    Degenerative disc disease, cervical    Sacroiliitis (HCC)    Lumbar spondylosis       Other    Chronic pain of both knees      Other Visit Diagnoses     Chronic pain of left knee    -  Primary    Relevant Orders    FL spine and pain procedure                Reason for visit is left knee, neck, low back and left leg pain  Chief Complaint   Patient presents with    Virtual Brief Visit        Encounter provider DB Alan    Provider located at [address] [place] [id number]    Recent Visits  No visits were found meeting these conditions  Showing recent visits within past 7 days and meeting all other requirements     Today's Visits  Date Type Provider Dept   08/07/20 Telephone Georgette Recinos, [address] [place] [address]   08/07/20 Telemedicine Elly Carter today's visits and meeting all other requirements     Future Appointments  No visits were found meeting these conditions  Showing future appointments within next 150 days and meeting all other requirements        After connecting through telephone, the patient was identified by name and date of birth  Maya Ordonez was informed that this is a telemedicine visit and that the visit is being conducted through telephone  My office door was closed  No one else was in the room  She acknowledged consent and understanding of privacy and security of the platform  The patient has agreed to participate and understands she can discontinue the visit at any time      It was my intent to perform this visit via video technology but the patient was not able to do a video connection so the visit was completed via audio telephone only     Patient is aware this is a billable service  Subjective    Sandra Bey is a 45 y o  female with a history of fibromyalgia carpal tunnel syndrome last seen on December 10, 2019 returns for follow-up office visit in regards to chronic left knee pain, low back pain that radiates into the left lower extremity no specific dermatomal distribution and neck pain secondary to cervical degenerative disc disease, cervical spondylosis, stenosis, osteoarthritis, lumbar spondylosis, fibromyalgia, and chronic pain syndrome  The patient denies bowel or bladder incontinence or saddle anesthesia  The patient has had corticosteroid and viscous supplemental injections into the left knee without much relief  She is inquiring about geniculate nerve blocks today  She also complains of low back pain that radiates into the left lower extremity  Last MRI of the lumbar spine does not reveal any compressive pathology to account for her left lower extremity symptoms  She has not done any formal physical therapy for her low back slightly  The patient also continues with neck pain  She states that she continues with her physical therapy exercises which does improve her neck pain  She just recently restarted gabapentin and is currently taking 600 mg twice a day  She also manages her pain with meloxicam 15 mg daily p r n , diclofenac topical gel, and Gabapentin 60 mg as prescribed for mood  She rates her pain a 7/10 on the numeric pain rating scale      HPI     Past Medical History:   Diagnosis Date    Asthma     Cancer ([address] )     endometrial    Hyperlipidemia     Hypertension     Prediabetes        Past Surgical History:   Procedure Laterality Date    KNEE ARTHROSCOPY Right     LUMBAR FUSION      TOTAL HIP ARTHROPLASTY Bilateral        Current Outpatient Medications   Medication Sig Dispense Refill    azelastine (OPTIVAR) 0 05 % ophthalmic solution Apply to eye      buPROPion (WELLBUTRIN XL) 300 mg 24 hr tablet Take by mouth      Cholecalciferol (VITAMIN D3) 2000 units capsule TAKE 2 CAPSULES BY MOUTH DAILY INDICATIONS: VITAMIN D DEFICIENCY  5    Cholecalciferol 2000 units TABS Take 2 capsules by mouth      diclofenac sodium (VOLTAREN) 1 % Apply 2 g topically 4 (four) times a day 1 Tube 2    DULoxetine (CYMBALTA) 60 mg delayed release capsule Take 60 mg by mouth      ferrous sulfate 325 (65 Fe) mg tablet Take by mouth      fluticasone-salmeterol (ADVAIR DISKUS) 250-50 mcg/dose inhaler Inhale 1 puff      gabapentin (NEURONTIN) 300 mg capsule Take 2 capsules (600 mg total) by mouth 2 (two) times a day 180 capsule 0    hydrocortisone ([employer] CORTISONE MAXIMUM STRENGTH) 1 % cream Apply topically      lisinopril (ZESTRIL) 10 mg tablet Take by mouth      loratadine (CLARITIN) 10 mg tablet Take by mouth      meloxicam (MOBIC) 15 mg tablet Take 1 tablet (15 mg total) by mouth daily 30 tablet 0    simvastatin (ZOCOR) 40 mg tablet Take by mouth      VENTOLIN  (90 Base) MCG/ACT inhaler INHALE 2 PUFFS EVERY 4 (FOUR) HOURS AS NEEDED FOR WHEEZING OR SHORTNESS OF BREATH   2     No current facility-administered medications for this visit  Allergies   Allergen Reactions    Bee Venom Swelling    Dust Mite Extract     Fish-Derived Products Hives    Iodine Hives    Molds & Smuts     Other Swelling    Pollen Extract     Shrimp Flavor Hives       Review of Systems   Constitutional: Negative for chills and fever  Respiratory: Negative for cough and shortness of breath  Cardiovascular: Negative for chest pain and leg swelling  Gastrointestinal: Negative for abdominal pain  Musculoskeletal: Positive for arthralgias, back pain, gait problem, joint swelling, myalgias, neck pain and neck stiffness  Neurological: Negative for dizziness  There were no vitals filed for this visit  Plan:  1  I will initiate the patient Stefan based physical therapy for her low back and leg complaints    If no relief after 6 weeks, will consider ordering an updated MRI of the lumbar spine  2  I will schedule the patient for left geniculate nerve blocks to address the patient's chronic left knee pain since she has failed corticosteroid and viscous supplemental injections with Orthopedics  She is not currently a candidate for knee replacement secondary to BMI per the patient  3  Patient may continue gabapentin 600 mg twice a day as prescribed she does not require refills today  4  The patient may continue topical diclofenac gel p r n  And duloxetine 60 mg as prescribed  5  If the patient's neck pain becomes more bothersome, may consider cervical epidural steroid injection  6  The patient will continue to follow with orthopedics as scheduled  7  We can repeat bilateral L2-5 RFA p r n   8  The patient will follow-up in 6 weeks for medication prescription refill and reevaluation  The patient was advised to contact the office should their symptoms worsen in the interim  The patient was agreeable and verbalized an understanding  I spent 15 minutes directly with the patient during this visit    [address] acknowledges that she has consented to an online visit or consultation  She understands that the online visit is based solely on information provided by her, and that, in the absence of a face-to-face physical evaluation by the physician, the diagnosis she receives is both limited and provisional in terms of accuracy and completeness  This is not intended to replace a full medical face-to-face evaluation by the physician  Linda Andrade understands and accepts these terms

## 2020-08-07 NOTE — TELEPHONE ENCOUNTER
Maude Hunter - Please schedule patient for left geniculate NB per order  Can you please also schedule the patient for a 6 week follow up from today  Clerical - can you please send the patients PT order with her AVS  Thank you

## 2020-08-07 NOTE — PATIENT INSTRUCTIONS
Plan:  1  I will initiate the patient Stefan based physical therapy for her low back and leg complaints  If no relief after 6 weeks, will consider ordering an updated MRI of the lumbar spine  2  I will schedule the patient for left geniculate nerve blocks to address the patient's chronic left knee pain since she has failed corticosteroid and viscous supplemental injections with Orthopedics  She is not currently a candidate for knee replacement secondary to BMI per the patient  3  Patient may continue gabapentin 600 mg twice a day as prescribed she does not require refills today  4  The patient may continue topical diclofenac gel p r n  And duloxetine 60 mg as prescribed  5  If the patient's neck pain becomes more bothersome, may consider cervical epidural steroid injection  6  The patient will continue to follow with orthopedics as scheduled  7  We can repeat bilateral L2-5 RFA p r n   8  The patient will follow-up in 6 weeks for medication prescription refill and reevaluation  The patient was advised to contact the office should their symptoms worsen in the interim  The patient was agreeable and verbalized an understanding

## 2020-08-13 ENCOUNTER — APPOINTMENT (OUTPATIENT)
Dept: PHYSICAL THERAPY | Facility: REHABILITATION | Age: 50
End: 2020-08-13
Payer: COMMERCIAL

## 2020-08-13 NOTE — TELEPHONE ENCOUNTER
Patient calling for an update if this upcoming procedure has been approved  [phone]     Thank you

## 2020-08-19 ENCOUNTER — APPOINTMENT (OUTPATIENT)
Dept: PHYSICAL THERAPY | Facility: REHABILITATION | Age: 50
End: 2020-08-19
Payer: COMMERCIAL

## 2020-08-19 NOTE — PROGRESS NOTES
PT Evaluation     Today's date: 2020  Patient name: Victoriano Gustafson  : 1970  MRN: [id number]  Referring provider: DB Bo  Dx: No diagnosis found  Assessment  Assessment details: Cait Dennis is a 45 y o  female presenting to PT w/ a multi year history of lower back pain with left sided radicular symptoms  Pt would benefit from skilled PT services to address the below listed impairments and functional limitations to encourage return to PLOF  Impairments: abnormal or restricted ROM, activity intolerance, impaired balance, impaired physical strength, lacks appropriate home exercise program and pain with function    Goals  STG: ROM increased by 25% in all deficient planes in 2-4 weeks  STG: LE strength increased by 1/2 MMT grade in 2-4 weeks  STG: I w/ HEPs 1 week after prescription  LTG:   LTG:  LTG: FOTO >= to goal    LTG: I w/ comprehensive HEP by d/c  Plan  Patient would benefit from: PT eval and skilled physical therapy  Planned modality interventions: TENS, thermotherapy: hydrocollator packs and cryotherapy  Planned therapy interventions: strengthening, stretching, therapeutic activities, therapeutic exercise, therapeutic training, joint mobilization, manual therapy, neuromuscular re-education, balance, patient education, flexibility, functional ROM exercises, graded activity, graded exercise, graded motor and home exercise program  Frequency: 1-2x/week    Duration in visits: 16  Duration in weeks: 12  Plan of Care beginning date: 2020  Plan of Care expiration date: 2020  Treatment plan discussed with: patient        Subjective    Objective           Precautions: ***      Manuals                                                                 Neuro Re-Ed                                                                                                        Ther Ex Ther Activity                                       Gait Training                                       Modalities

## 2020-08-27 ENCOUNTER — EVALUATION (OUTPATIENT)
Dept: PHYSICAL THERAPY | Facility: REHABILITATION | Age: 50
End: 2020-08-27
Payer: COMMERCIAL

## 2020-08-27 DIAGNOSIS — M54.16 LUMBAR RADICULOPATHY: Primary | ICD-10-CM

## 2020-08-27 PROCEDURE — 97162 PT EVAL MOD COMPLEX 30 MIN: CPT | Performed by: PHYSICAL THERAPIST

## 2020-08-27 PROCEDURE — 97110 THERAPEUTIC EXERCISES: CPT | Performed by: PHYSICAL THERAPIST

## 2020-08-27 NOTE — PROGRESS NOTES
PT Evaluation     Today's date: 2020  Patient name: Nori Waldron  : 1970  MRN: [id number]  Referring provider: DB Valentin  Dx:   Encounter Diagnosis     ICD-10-CM    1  Lumbar radiculopathy  M54 16                   Assessment  Assessment details: Lakeisha Warner is a 45 y o  female presenting to PT w/ a multi year history of chronic lower back pain with bilateral radicular symptoms extending to both feet  Symptoms complicated by psychosocial factors resulting in poor prognosis  She also does not seem motivated to complete PT as she subjectively reports she 'knows what to do' and is just doing this to get an MRI  Pt would benefit from skilled PT services to address the below listed impairments and functional limitations to encourage return to PLOF  Impairments: abnormal or restricted ROM, activity intolerance, impaired physical strength, lacks appropriate home exercise program and pain with function  Functional limitations: Difficulty walking, lifting, prolonged sitting/standing, twisting, bendingBarriers to therapy: Poor self efficacy, transportation needs, chronicity of symptoms  Understanding of Dx/Px/POC: fair   Prognosis: poor    Goals  STG: Lumbar ROM increased by 25% in 2-4 weeks  STG: Subjectively report pain decreased by 2 points in 2-4 weeks  STG: I w/ HEPs 1 week after prescription  LTG: Walk   5 miles w/ <3/10 LBP by d/c  LTG: FOTO >= to goal   LTG: I w/ comprehensive HEP by d/c       Plan  Patient would benefit from: PT eval and skilled physical therapy  Planned modality interventions: TENS, thermotherapy: hydrocollator packs and cryotherapy  Planned therapy interventions: joint mobilization, manual therapy, massage, neuromuscular re-education, balance, patient education, strengthening, stretching, therapeutic activities, therapeutic exercise, therapeutic training, flexibility, functional ROM exercises, gait training, graded activity, graded exercise, graded motor and home exercise program  Frequency: 2x week  Duration in visits: 16  Duration in weeks: 12  Plan of Care beginning date: 2020  Plan of Care expiration date: 2020  Treatment plan discussed with: patient        Subjective Evaluation    History of Present Illness  Mechanism of injury: Reports multi year history of lower back pain w/ bilateral radicular symptoms into both legs, left greater than right  States that walking typically makes her symptoms worse, and that anything more than half a mile will make her symptomatic  Sitting is usually a good position for her unless she sits for too long  States that she has had PT for this before and keeps up with some of the exercise  She is really only here so that she can get an MRI for her back  She has previously had injections in her back maybe last year and her back pain started coming back around April  Says that she needs to do PT so that she can get another MRI  States that her goals for PT are to reduce her pain so that she can walk again  Pain  Current pain ratin  At best pain ratin  At worst pain rating: 10  Quality: throbbing, tight, radiating, sharp, knife-like, needle-like, pulling and squeezing  Relieving factors: rest  Aggravating factors: walking, standing, stair climbing and lifting    Treatments  Previous treatment: physical therapy and injection treatment  Patient Goals  Patient goals for therapy: decreased pain, increased motion, increased strength and return to sport/leisure activities          Objective     Concurrent Complaints  Positive for night pain and disturbed sleep   Negative for bladder dysfunction, bowel dysfunction and saddle (S4) numbness    Neurological Testing     Sensation     Lumbar   Left   Intact: light touch    Right   Intact: light touch    Reflexes   Left   Clonus sign: negative    Right   Clonus sign: negative    Active Range of Motion     Lumbar   Flexion:  WFL  Extension:  with pain Restriction level: moderate  Left lateral flexion:  with pain Restriction level: moderate  Right lateral flexion:  with pain Restriction level: moderate  Left rotation:  with pain Restriction level: minimal  Right rotation:  with pain Restriction level: minimal  Mechanical Assessment    Cervical      Thoracic      Lumbar    Standing flexion: repeated movements   Pain location:peripheralized  Pain intensity: worse  Sitting flexion: repeated movements  Pain location: peripheralized  Pain intensity: worse  Lying extension: sustained positions  Pain location: no change    Strength/Myotome Testing     Lumbar   Left   Normal strength    Right   Normal strength    Tests     Lumbar     Left   Positive passive SLR  Right   Negative passive SLR  Left Hip   Negative BRAYDEN and FADIR  Right Hip   Negative BRAYDEN and FADIR         Flowsheet Rows      Most Recent Value   PT/OT G-Codes   Current Score  40   Projected Score  52             Precautions: Hx of lumbar fusion and B/L EVA      Manuals 8/27                                                                Neuro Re-Ed                                                                                                        Ther Ex             Slump glides HEP            CHARLIE HEP                                                                                          Ther Activity                                       Gait Training                                       Modalities

## 2020-08-31 ENCOUNTER — OFFICE VISIT (OUTPATIENT)
Dept: PHYSICAL THERAPY | Facility: REHABILITATION | Age: 50
End: 2020-08-31
Payer: COMMERCIAL

## 2020-08-31 DIAGNOSIS — M54.16 LUMBAR RADICULOPATHY: Primary | ICD-10-CM

## 2020-08-31 PROCEDURE — 97112 NEUROMUSCULAR REEDUCATION: CPT | Performed by: PHYSICAL THERAPIST

## 2020-08-31 PROCEDURE — 97110 THERAPEUTIC EXERCISES: CPT | Performed by: PHYSICAL THERAPIST

## 2020-08-31 NOTE — PROGRESS NOTES
Daily Note     Today's date: 2020  Patient name: Sandra Bey  : 1970  MRN: [id number]  Referring provider: DB White  Dx:   Encounter Diagnosis     ICD-10-CM    1  Lumbar radiculopathy  M54 16                   Subjective: States that she has been doing her exercises at home  Objective: See treatment diary below      Assessment: Completed exercise with correct technique and without reports of pain  Demonstrated moderate fatigue after exercise  Pt would benefit from continued PT services to reduce pain and increase level of function  Plan: Continue per plan of care        Precautions: Hx of lumbar fusion and B/L EVA      Manuals                                                                 Neuro Re-Ed             Pallof press  3x10 ea            U/L LPD 3x10                                                                             Ther Ex             Slump glides HEP            CHARLIE HEP            3 way ball x10 ea                                                   Bike 9'            VG 7'            Ther Activity                                       Gait Training                                       Modalities

## 2020-09-04 ENCOUNTER — APPOINTMENT (OUTPATIENT)
Dept: PHYSICAL THERAPY | Facility: REHABILITATION | Age: 50
End: 2020-09-04
Payer: COMMERCIAL

## 2020-09-09 ENCOUNTER — OFFICE VISIT (OUTPATIENT)
Dept: PHYSICAL THERAPY | Facility: REHABILITATION | Age: 50
End: 2020-09-09
Payer: COMMERCIAL

## 2020-09-09 DIAGNOSIS — M54.16 LUMBAR RADICULOPATHY: Primary | ICD-10-CM

## 2020-09-09 PROCEDURE — 97110 THERAPEUTIC EXERCISES: CPT

## 2020-09-09 PROCEDURE — 97112 NEUROMUSCULAR REEDUCATION: CPT

## 2020-09-09 NOTE — PROGRESS NOTES
Daily Note     Today's date: 2020  Patient name: Pro Mccartney  : 1970  MRN: [id number]  Referring provider: DB Rosales  Dx:   Encounter Diagnosis     ICD-10-CM    1  Lumbar radiculopathy  M54 16        Start Time: 1180  Stop Time: 6736  Total time in clinic (min): 50 minutes    Subjective: Pt reports continuing to have difficulty standing for extended periods of time such as washing dishes or when walking in the grocery store  Onset of symptoms within 5 minutes of these type of activities  Objective: See treatment diary below      Assessment: Tolerated treatment fair  Pt reported slight increase of pain during pallof press at [address], but was able to complete all assigned reps/sets  Progressed program with backward ambulation with resistance from [place], in effort to further improve strength of BLE and reduce stress on LB  Patient would benefit from continued PT to further improve strength and increase standing activity level with less frequency of symptoms  Plan: Continue per plan of care        Precautions: Hx of lumbar fusion and B/L EVA      Manuals                                                                Neuro Re-Ed             Pallof press  3x10 ea [place]  8#  3x10 ea           U/L LPD 3x10 12#  3x10           Claudia bkwd ambulation with row  15#  6 laps                                                               Ther Ex             Slump glides HEP            CHARLIE HEP            3 way ball x10 ea 5"x10 ea                                                  Bike 9' L2 10'           VG 7' L10  6'           Ther Activity                                       Gait Training                                       Modalities

## 2020-09-11 ENCOUNTER — OFFICE VISIT (OUTPATIENT)
Dept: PHYSICAL THERAPY | Facility: REHABILITATION | Age: 50
End: 2020-09-11
Payer: COMMERCIAL

## 2020-09-11 DIAGNOSIS — M54.16 LUMBAR RADICULOPATHY: Primary | ICD-10-CM

## 2020-09-11 PROCEDURE — 97112 NEUROMUSCULAR REEDUCATION: CPT | Performed by: PHYSICAL THERAPIST

## 2020-09-11 PROCEDURE — 97530 THERAPEUTIC ACTIVITIES: CPT | Performed by: PHYSICAL THERAPIST

## 2020-09-11 PROCEDURE — 97110 THERAPEUTIC EXERCISES: CPT | Performed by: PHYSICAL THERAPIST

## 2020-09-11 NOTE — PROGRESS NOTES
Daily Note     Today's date: 2020  Patient name: Gifty Stephens  : 1970  MRN: [id number]  Referring provider: DB Adamson  Dx:   Encounter Diagnosis     ICD-10-CM    1  Lumbar radiculopathy  M54 16                   Subjective: Nothing new to report this session  Objective: See treatment diary below    Assessment: Completed exercise with correct technique and without reports of pain  Demonstrated moderate fatigue after exercise  Mild lower back pain with standing exercises but able to complete  Pt would benefit from continued PT services to reduce pain and increase level of function  Plan: Continue per plan of care        Precautions: Hx of lumbar fusion and B/L EVA      Manuals                                                               Neuro Re-Ed             Pallof press  3x10 ea Claudia  8#  3x10 ea Claudia 8# 3x9 ea          U/L LPD 3x10 12#  3x10 10# 2x10 ea          Claudia bkwd ambulation with row  15#  6 laps 15# 3x6           KB carry   10/8 2 laps ea                                                 Ther Ex             Slump glides HEP            CHARLIE HEP            3 way ball x10 ea 5"x10 ea 5"x10 ea                                                 Bike 9' L2 10' 10'          VG 7' L10  6'           Ther Activity                                       Gait Training                                       Modalities

## 2020-09-15 ENCOUNTER — OFFICE VISIT (OUTPATIENT)
Dept: PHYSICAL THERAPY | Facility: REHABILITATION | Age: 50
End: 2020-09-15
Payer: COMMERCIAL

## 2020-09-15 DIAGNOSIS — M54.16 LUMBAR RADICULOPATHY: Primary | ICD-10-CM

## 2020-09-15 PROCEDURE — 97110 THERAPEUTIC EXERCISES: CPT | Performed by: PHYSICAL THERAPIST

## 2020-09-15 PROCEDURE — 97112 NEUROMUSCULAR REEDUCATION: CPT | Performed by: PHYSICAL THERAPIST

## 2020-09-15 NOTE — PROGRESS NOTES
Daily Note     Today's date: 9/15/2020  Patient name: Herminio Aviles  : 1970  MRN: [id number]  Referring provider: DB Cates  Dx:   Encounter Diagnosis     ICD-10-CM    1  Lumbar radiculopathy  M54 16                   Subjective: States that standing is still hard, also her knees and legs have been bothering her  Objective: See treatment diary below      Assessment:  Completed exercise with correct technique and without reports of pain  Demonstrated moderate fatigue after exercise  Pt would benefit from continued PT services to reduce pain and increase level of function  Plan: Continue per plan of care        Precautions: Hx of lumbar fusion and B/L EVA      Manuals 8/31 9/9 9/11 9/15                                                             Neuro Re-Ed             Pallof press  3x10 ea Claudia  8#  3x10 ea Prather 8# 3x9 ea Claudia 9# 3x10         U/L LPD 3x10 12#  3x10 10# 2x10 ea 10# 2x10 ea         Prather bkwd ambulation with row  15#  6 laps 15# 3x6  16# 3x6          KB carry   10/8 2 laps [employer] 2x6                                   Ther Ex             Slump glides HEP            CHARLIE HEP            3 way ball x10 ea 5"x10 ea 5"x10 ea                                                 Bike 9' L2 10' 10' 10'         VG 7' L10  6'  8'         Ther Activity                                       Gait Training                                       Modalities

## 2020-09-21 PROBLEM — G89.4 CHRONIC PAIN SYNDROME: Status: ACTIVE | Noted: 2020-09-21

## 2020-09-22 ENCOUNTER — OFFICE VISIT (OUTPATIENT)
Dept: OBGYN CLINIC | Facility: HOSPITAL | Age: 50
End: 2020-09-22
Payer: COMMERCIAL

## 2020-09-22 VITALS
SYSTOLIC BLOOD PRESSURE: 116 MMHG | WEIGHT: 293 LBS | BODY MASS INDEX: 52.39 KG/M2 | HEART RATE: 88 BPM | DIASTOLIC BLOOD PRESSURE: 79 MMHG

## 2020-09-22 DIAGNOSIS — M17.11 PRIMARY OSTEOARTHRITIS OF RIGHT KNEE: Primary | ICD-10-CM

## 2020-09-22 DIAGNOSIS — M25.461 BILATERAL KNEE EFFUSIONS: ICD-10-CM

## 2020-09-22 DIAGNOSIS — M17.12 PRIMARY OSTEOARTHRITIS OF LEFT KNEE: ICD-10-CM

## 2020-09-22 DIAGNOSIS — M25.462 BILATERAL KNEE EFFUSIONS: ICD-10-CM

## 2020-09-22 PROCEDURE — 20610 DRAIN/INJ JOINT/BURSA W/O US: CPT | Performed by: PHYSICIAN ASSISTANT

## 2020-09-22 PROCEDURE — 99213 OFFICE O/P EST LOW 20 MIN: CPT | Performed by: PHYSICIAN ASSISTANT

## 2020-09-22 RX ORDER — LIDOCAINE HYDROCHLORIDE 10 MG/ML
2 INJECTION, SOLUTION INFILTRATION; PERINEURAL
Status: COMPLETED | OUTPATIENT
Start: 2020-09-22 | End: 2020-09-22

## 2020-09-22 RX ORDER — BUPIVACAINE HYDROCHLORIDE 2.5 MG/ML
2 INJECTION, SOLUTION INFILTRATION; PERINEURAL
Status: COMPLETED | OUTPATIENT
Start: 2020-09-22 | End: 2020-09-22

## 2020-09-22 RX ORDER — METHYLPREDNISOLONE ACETATE 40 MG/ML
2 INJECTION, SUSPENSION INTRA-ARTICULAR; INTRALESIONAL; INTRAMUSCULAR; SOFT TISSUE
Status: COMPLETED | OUTPATIENT
Start: 2020-09-22 | End: 2020-09-22

## 2020-09-22 RX ADMIN — LIDOCAINE HYDROCHLORIDE 2 ML: 10 INJECTION, SOLUTION INFILTRATION; PERINEURAL at 09:27

## 2020-09-22 RX ADMIN — BUPIVACAINE HYDROCHLORIDE 2 ML: 2.5 INJECTION, SOLUTION INFILTRATION; PERINEURAL at 09:27

## 2020-09-22 RX ADMIN — METHYLPREDNISOLONE ACETATE 2 ML: 40 INJECTION, SUSPENSION INTRA-ARTICULAR; INTRALESIONAL; INTRAMUSCULAR; SOFT TISSUE at 09:27

## 2020-09-22 NOTE — PROGRESS NOTES
Orthopedics          Isis Ennis 45 y o  female MRN: [id number]      Chief Complaint:   bilateral knee pain    HPI:   45 y  o female complaining of bilateral knee pain  Patient presents regarding bilateral knee pain  Patient has been previously diagnosed with pain due to osteoarthritis  Patient states the pain is aching nature worse weight-bearing mildly relieved with rest she also complains of swelling in bilateral knees  Patient has had multiple aspirations injections with significant pain relief however pain since returned  Pain is worse with standing for long periods time denies any instability clicking popping catching bilateral knees                  Review Of Systems:   · Skin: Normal  · Neuro: See HPI  · Musculoskeletal: See HPI  · All other systems reviewed and are negative    Past Medical History:   Past Medical History:   Diagnosis Date    Asthma     Cancer ([facility] [address] )     endometrial    Hyperlipidemia     Hypertension     Prediabetes        Past Surgical History:   Past Surgical History:   Procedure Laterality Date    KNEE ARTHROSCOPY Right     LUMBAR FUSION      TOTAL HIP ARTHROPLASTY Bilateral        Family History:  Family history reviewed and non-contributory  Family History   Problem Relation Age of Onset    Diabetes Mother     Stroke Mother     Diabetes Father     Cancer Father     Diabetes Sister     Diabetes Brother          Social History:  Social History     Socioeconomic History    Marital status: Single     Spouse name: None    Number of children: None    Years of education: None    Highest education level: None   Occupational History    None   Social Needs    Financial resource strain: None    Food insecurity     Worry: None     Inability: None    Transportation needs     Medical: None     Non-medical: None   Tobacco Use    Smoking status: Former Smoker    Smokeless tobacco: Never Used   Substance and Sexual Activity    Alcohol use: None    Drug use: None    Sexual activity: None   Lifestyle    Physical activity     Days per week: None     Minutes per session: None    Stress: None   Relationships    Social connections     Talks on phone: None     Gets together: None     Attends Alevism service: None     Active member of club or organization: None     Attends meetings of clubs or organizations: None     Relationship status: None    Intimate partner violence     Fear of current or ex partner: None     Emotionally abused: None     Physically abused: None     Forced sexual activity: None   Other Topics Concern    None   Social History Narrative    None       Allergies:    Allergies   Allergen Reactions    Bee Venom Swelling    Dust Mite Extract     Fish-Derived Products Hives    Iodine Hives    Molds & Smuts     Other Swelling    Pollen Extract     Shrimp Flavor Hives       Labs:  0   Lab Value Date/Time    HCT 35 7 09/07/2015 0600    HCT 34 6 (L) 09/06/2015 0556    HCT 39 4 09/05/2015 0607    HGB 11 1 (L) 09/07/2015 0600    HGB 10 8 (L) 09/06/2015 0556    HGB 12 8 09/05/2015 0607    INR 1 02 08/20/2015 1311    WBC 11 47 (H) 09/07/2015 0600    WBC 11 74 (H) 09/06/2015 0556    WBC 18 25 (H) 09/05/2015 0607       Meds:    Current Outpatient Medications:     azelastine (OPTIVAR) 0 05 % ophthalmic solution, Apply to eye, Disp: , Rfl:     buPROPion (WELLBUTRIN XL) 300 mg 24 hr tablet, Take by mouth, Disp: , Rfl:     Cholecalciferol (VITAMIN D3) 2000 units capsule, TAKE 2 CAPSULES BY MOUTH DAILY INDICATIONS: VITAMIN D DEFICIENCY , Disp: , Rfl: 5    Cholecalciferol 2000 units TABS, Take 2 capsules by mouth, Disp: , Rfl:     diclofenac sodium (VOLTAREN) 1 %, Apply 2 g topically 4 (four) times a day, Disp: 1 Tube, Rfl: 2    DULoxetine (CYMBALTA) 60 mg delayed release capsule, Take 60 mg by mouth, Disp: , Rfl:     ferrous sulfate 325 (65 Fe) mg tablet, Take by mouth, Disp: , Rfl:     fluticasone-salmeterol (ADVAIR DISKUS) 250-50 mcg/dose inhaler, Inhale 1 puff, Disp: , Rfl:     gabapentin (NEURONTIN) 300 mg capsule, Take 2 capsules (600 mg total) by mouth 2 (two) times a day, Disp: 180 capsule, Rfl: 0    hydrocortisone (CVS CORTISONE MAXIMUM STRENGTH) 1 % cream, Apply topically, Disp: , Rfl:     lisinopril (ZESTRIL) 10 mg tablet, Take by mouth, Disp: , Rfl:     loratadine (CLARITIN) 10 mg tablet, Take by mouth, Disp: , Rfl:     meloxicam (MOBIC) 15 mg tablet, Take 1 tablet (15 mg total) by mouth daily, Disp: 30 tablet, Rfl: 0    simvastatin (ZOCOR) 40 mg tablet, Take by mouth, Disp: , Rfl:     VENTOLIN  (90 Base) MCG/ACT inhaler, INHALE 2 PUFFS EVERY 4 (FOUR) HOURS AS NEEDED FOR WHEEZING OR SHORTNESS OF BREATH , Disp: , Rfl: 2      Physical Exam:     General Appearance:    Alert, cooperative, no distress, appears stated age   Head:    Normocephalic, without obvious abnormality, atraumatic   Eyes:    conjunctiva/corneas clear, both eyes        Nose:   Nares normal, septum midline, no drainage    Throat:   Lips normal; teeth and gums normal   Neck:    symmetrical, trachea midline, ;     thyroid:  no enlargement/   Back:     Symmetric, no curvature, ROM normal   Lungs:   No audible wheezing or labored breathing   Chest Wall:    No tenderness or deformity    Heart:    Regular rate and rhythm               Pulses:   2+ and symmetric all extremities   Skin:   Skin color, texture, turgor normal, no rashes or lesions   Neurologic:   normal strength, sensation and reflexes     throughout       Musculoskeletal: bilateral lower extremity  · On examination of the right knee there is a mild effusion, no erythema  Range of motion to full active extension and flexion to greater than 120°  Pain on palpation medial and lateral joint lines  There is crepitus with range of motion, no warmth to palpation, bony enlargement noted  No pain on palpation pes anserine bursa region or distal iliotibial band  Stable to varus and valgus stress without pain or gapping    Negative anterior and posterior drawer testing  Sensation intact distal pulses present  · On examination of the left knee there is a mild effusion, no erythema  Range of motion to full active extension and flexion to greater than 120°  Pain on palpation medial and lateral joint lines  There is crepitus with range of motion, no warmth to palpation, bony enlargement noted  No pain on palpation pes anserine bursa region or distal iliotibial band  Stable to varus and valgus stress without pain or gapping  Negative anterior and posterior drawer testing  Sensation intact distal pulses present  Large joint arthrocentesis: R knee  Date/Time: 9/22/2020 9:27 AM  Consent given by: patient  Supporting Documentation  Indications: pain   Procedure Details  Location: knee - R knee  Needle size: 22 G  Approach: superior  Medications administered: 2 mL bupivacaine 0 25 %; 2 mL lidocaine 1 %; 2 mL methylPREDNISolone acetate 40 mg/mL    Aspirate amount: 16 mL  Aspirate: clear and yellow    Patient tolerance: patient tolerated the procedure well with no immediate complications    Large joint arthrocentesis: L knee  Date/Time: 9/22/2020 9:27 AM  Consent given by: patient  Supporting Documentation  Indications: pain   Procedure Details  Location: knee - L knee  Needle size: 22 G  Approach: superior  Medications administered: 2 mL bupivacaine 0 25 %; 2 mL lidocaine 1 %; 2 mL methylPREDNISolone acetate 40 mg/mL    Aspirate amount: 13 mL  Aspirate: clear and yellow    Patient tolerance: patient tolerated the procedure well with no immediate complications            _*_*_*_*_*_*_*_*_*_*_*_*_*_*_*_*_*_*_*_*_*_*_*_*_*_*_*_*_*_*_*_*_*_*_*_*_*_*_*_*_*    Assessment:  45 y  o female with bilateral chronic knee pain due to osteoarthritis bilateral knee effusions    Plan:   · Weight bearing as tolerated  bilateral lower extremity  · Bilateral intra-articular knee aspiration injections given as noted above corticosteroid injections  · Patient advised should they develop any increasing pain, redness, drainage, numbness, tingling or swelling surrounding the injection sight, they are to contact our office or present to the emergency department    · Advised patient weight loss would reduce symptoms in her bilateral knees due to knee osteoarthritis  · Patient is following up with weight  at this time  · Continue home range of motion stretching strengthening exercises  · Follow up in 3 months or sooner if needed      Willian Davis PA-C

## 2020-09-23 ENCOUNTER — OFFICE VISIT (OUTPATIENT)
Dept: PHYSICAL THERAPY | Facility: REHABILITATION | Age: 50
End: 2020-09-23
Payer: COMMERCIAL

## 2020-09-23 DIAGNOSIS — M54.16 LUMBAR RADICULOPATHY: Primary | ICD-10-CM

## 2020-09-23 PROCEDURE — 97110 THERAPEUTIC EXERCISES: CPT

## 2020-09-23 PROCEDURE — 97112 NEUROMUSCULAR REEDUCATION: CPT

## 2020-09-23 NOTE — PROGRESS NOTES
Daily Note     Today's date: 2020  Patient name: Srinivas Odom  : 1970  MRN: [id number]  Referring provider: DB Castillo  Dx:   Encounter Diagnosis     ICD-10-CM    1  Lumbar radiculopathy  M54 16        Start Time: 0800  Stop Time: 74  Total time in clinic (min): 55 minutes    Subjective: Pt reports she almost fell down the stairs twice last week  Notes she had increased pain Thursday night into Friday in both LB and both knees  Received cortisone shot yesterday in both knees and has been feeling "refreshed" ever since  Objective: See treatment diary below  Shows improved progress with increased FOTO score of 43/52 today  Assessment: Tolerated treatment well  Continued with program as outlined below  Slight soreness present in LB with all assigned exercise, but no more than usual    Slight R knee pain during Claudia side steps, which resolved with short rest break  Is appropriately challenged with current program, demonstrating signs of fatigue post session  Patient would benefit from continued PT to further improve strength, reduce pain, and maximize function with ADL's  Plan: Continue per plan of care        Precautions: Hx of lumbar fusion and B/L EVA      Manuals 8/31 9/9 9/11 9/15 9/23                                                            Neuro Re-Ed             Pallof press  3x10 ea [place]  8#  3x10 ea [place] 8# 3x9 Brand Liner 9# 3x10 Keiser9# 3x10 ea        U/L LPD 3x10 12#  3x10 10# 2x10 ea 10# 2x10 ea 10# 2x10 ea        [place] bkwd ambulation with row  15#  6 laps 15# 3x6  16# 3x6  16# 3x6        KB carry   10/8 2 laps ea          [place] [employer] [facility] sidesteps    [place] 2x6 10#  2x6                                  Ther Ex             Slump glides HEP            CHARLIE HEP            3 way ball x10 ea 5"x10 ea 5"x10 ea  Fwd  5"x10                                               Bike 9' L2 10' 10' 10' 10'        VG 7' L10  6'  8' L7  8'        Ther Activity Gait Training                                       Modalities

## 2020-09-24 ENCOUNTER — OFFICE VISIT (OUTPATIENT)
Dept: PHYSICAL THERAPY | Facility: REHABILITATION | Age: 50
End: 2020-09-24
Payer: COMMERCIAL

## 2020-09-24 ENCOUNTER — OFFICE VISIT (OUTPATIENT)
Dept: PAIN MEDICINE | Facility: CLINIC | Age: 50
End: 2020-09-24
Payer: COMMERCIAL

## 2020-09-24 VITALS
HEIGHT: 64 IN | SYSTOLIC BLOOD PRESSURE: 164 MMHG | DIASTOLIC BLOOD PRESSURE: 103 MMHG | WEIGHT: 293 LBS | TEMPERATURE: 98 F | BODY MASS INDEX: 50.02 KG/M2 | HEART RATE: 87 BPM

## 2020-09-24 DIAGNOSIS — M47.816 LUMBAR SPONDYLOSIS: ICD-10-CM

## 2020-09-24 DIAGNOSIS — M54.16 LUMBAR RADICULOPATHY: ICD-10-CM

## 2020-09-24 DIAGNOSIS — G89.4 CHRONIC PAIN SYNDROME: Primary | ICD-10-CM

## 2020-09-24 DIAGNOSIS — M54.12 CERVICAL RADICULOPATHY: ICD-10-CM

## 2020-09-24 DIAGNOSIS — M25.562 CHRONIC PAIN OF BOTH KNEES: ICD-10-CM

## 2020-09-24 DIAGNOSIS — M46.1 SACROILIITIS (HCC): ICD-10-CM

## 2020-09-24 DIAGNOSIS — G89.29 CHRONIC PAIN OF BOTH KNEES: ICD-10-CM

## 2020-09-24 DIAGNOSIS — M54.16 LUMBAR RADICULOPATHY: Primary | ICD-10-CM

## 2020-09-24 DIAGNOSIS — M50.30 DEGENERATIVE DISC DISEASE, CERVICAL: ICD-10-CM

## 2020-09-24 DIAGNOSIS — M17.12 PRIMARY OSTEOARTHRITIS OF LEFT KNEE: ICD-10-CM

## 2020-09-24 DIAGNOSIS — M25.561 CHRONIC PAIN OF BOTH KNEES: ICD-10-CM

## 2020-09-24 DIAGNOSIS — M47.812 CERVICAL SPONDYLOSIS: ICD-10-CM

## 2020-09-24 PROCEDURE — 97112 NEUROMUSCULAR REEDUCATION: CPT | Performed by: PHYSICAL THERAPIST

## 2020-09-24 PROCEDURE — 97110 THERAPEUTIC EXERCISES: CPT | Performed by: PHYSICAL THERAPIST

## 2020-09-24 PROCEDURE — 99214 OFFICE O/P EST MOD 30 MIN: CPT | Performed by: NURSE PRACTITIONER

## 2020-09-24 RX ORDER — GABAPENTIN 300 MG/1
600 CAPSULE ORAL 2 TIMES DAILY
Qty: 120 CAPSULE | Refills: 2 | Status: SHIPPED | OUTPATIENT
Start: 2020-09-24 | End: 2020-11-19 | Stop reason: SDUPTHER

## 2020-09-24 NOTE — PROGRESS NOTES
Daily Note     Today's date: 2020  Patient name: Mariely Ferraro  : 1970  MRN: [id number]  Referring provider: DB Hurst  Dx:   Encounter Diagnosis     ICD-10-CM    1  Lumbar radiculopathy  M54 16                   Subjective: States that she is still having back pain, but will be adding in neck for PT as she has been having bad headaches and shooting sensations in her arm  Objective: See treatment diary below  Assessment: Completed exercise with correct technique and without reports of pain  Demonstrated moderate fatigue after exercise  Pt would benefit from continued PT services to reduce pain and increase level of function  Plan: Continue per plan of care        Precautions: Hx of lumbar fusion and B/L EVA      Manuals 9/24 9/9 9/11 9/15 9/23                                                            Neuro Re-Ed             Pallof press  3x10 ea 9# [place]  8#  3x10 ea [place] 8# 3x9 [place] 9# 3x10 Keiser9# 3x10 ea        U/L LPD 3x10 12#  3x10 10# 2x10 ea 10# 2x10 ea 10# 2x10 ea        [place] bkwd ambulation with row 18# 3x10 15#  6 laps 15# 3x6  16# 3x6  16# 3x6        KB carry   108 2 laps ea          [place] [employer] sidesteps 10# 2x6   [place] 2x6 10#  2x6                                  Ther Ex             Slump glides HEP            CHARLIE HEP            3 way ball x10 ea 5"x10 ea 5"x10 ea  Fwd  5"x10                                               Bike 9' L2 10' 10' 10' 10'        VG 7' L10  6'  8' L7  8'        Ther Activity                                       Gait Training                                       Modalities

## 2020-09-24 NOTE — PROGRESS NOTES
Assessment:  1  Chronic pain syndrome    2  Lumbar radiculopathy    3  Degenerative disc disease, cervical    4  Sacroiliitis ([address] )    5  Lumbar spondylosis    6  Chronic pain of both knees    7  Primary osteoarthritis of left knee    8  Cervical radiculopathy    9  Cervical spondylosis        Plan:  1  Patient will continue physical therapy for her low back and leg complaints, however I will add Stefan based physical therapy for her neck and upper extremity symptoms as well  2  I will order an MRI of the lumbar spine as the patient does not completed 6 sessions of physical therapy dedicated to her low back without improvement her pain  3  The patient may continue gabapentin 200 mg twice a day as prescribed  This medication was refilled today  4  We will schedule the patient for left knee C4-6 medial branch blocks with intention of moving forward towards radiofrequency ablation if there is an appropriate diagnostic response  The initial blocks will be performed with 2% lidocaine and if an appropriate response is obtained upon review of the patient's pain diary, a confirmatory block will be scheduled  Complete risks and benefits including bleeding, infection, tissue reaction, nerve injury and allergic reaction were discussed  The patient was agreeable and verbalized an understanding  5  May consider EMG of the bilateral upper extremities  6  I will refill diclofenac 1% topical gel which she can apply to her knees 4 times a day as needed  7  We can repeat bilateral L2-5 RFA p r n   8  Patient may continue meloxicam as prescribed by Orthopedics  She will continue to follow with orthopedics as well for bilateral knee injections  9  The patient will follow-up in 8 weeks for medication prescription refill and reevaluation  The patient was advised to contact the office should their symptoms worsen in the interim  The patient was agreeable and verbalized an understanding        M*Modal software was used to dictate this note  It may contain errors with dictating incorrect words or incorrect spelling  Please contact the provider directly with any questions  History of Present Illness: The patient is a 45 y o  female a history of fibromyalgia and carpal tunnel syndrome last seen on 8/7/20 who presents for a follow up office visit in regards to chronic neck pain that radiates into the upper extremities in no specific dermatomal distribution, low back pain that radiates into the lower extremities in no specific dermatomal distribution, and bilateral knee pain secondary to cervical degenerative disc disease, cervical spondylosis, cervical stenosis, osteoarthritis, lumbar spondylosis, and chronic pain syndrome  The patient denies bowel or bladder incontinence or saddle anesthesia  The patient is following with Orthopedics and is currently receiving corticosteroid injections into her knees  She states she does had injections 2 days ago and has noticed some improvement are ready in her knee pain  She states she has received viscous supplemental injections in the past without relief  She is not a candidate for knee replacement at this time secondary to BMI  She states she tried to initiate in the weight management program at [address] [place] [address], however was canceled secondary to [place]  She is now starting with the pain management program at [facility]  The patient's main pain complaint today is her left-sided neck pain that causes left sided headaches  She does also admit to neuropathic symptoms into the upper extremities, however states this is intermittent  She has not done physical therapy recently for her neck, however is currently enrolled in physical therapy for her low back and leg symptoms  She has had bilateral L2-5 RFA in the past with about 30% relief  She failed SI joint injections in the past   She has never had injections for her neck      MRI of the lumbar spine revealed some degenerative disc disease and spondylosis with a small disc bulge at L4-5 which was noncompressive and did not produce any significant central or foraminal stenosis  MRI of the cervical spine reveals cervical spondylosis at C5-6 and C6-7 without any central or foraminal stenosis  The patient rates her pain as 7/10 on the numeric pain rating scale  She states her pain is intermittent in nature and bothersome in the evening and at night  She characterizes the pain as burning, sharp, throbbing, pressure-like, shooting and pins and needles  Current pain medications includes:  Gabapentin 600 mg twice a day, meloxicam 15 mg daily, duloxetine 60 mg daily, and Tylenol p r n  Topical diclofenac gel was prescribed by Orthopedics, however the patient states she never received this medication from the pharmacy  The patient reports that this regimen is providing 40% pain relief  The patient is reporting no side effects from this pain medication regimen  I have personally reviewed and/or updated the patient's past medical history, past surgical history, family history, social history, current medications, allergies, and vital signs today  Review of Systems:    Review of Systems   Constitutional: Negative for fever and unexpected weight change  HENT: Negative for trouble swallowing  Eyes: Negative for visual disturbance  Respiratory: Negative for shortness of breath and wheezing  Cardiovascular: Negative for chest pain and palpitations  Gastrointestinal: Negative for constipation, diarrhea, nausea and vomiting  Endocrine: Negative for cold intolerance, heat intolerance and polydipsia  Genitourinary: Negative for difficulty urinating and frequency  Musculoskeletal: Positive for gait problem and joint swelling  Negative for arthralgias and myalgias  Skin: Negative for rash  Neurological: Negative for dizziness, seizures, syncope, weakness and headaches  Hematological: Does not bruise/bleed easily  Psychiatric/Behavioral: Negative for dysphoric mood  All other systems reviewed and are negative          Past Medical History:   Diagnosis Date    Asthma     Cancer ([facility] [address]     endometrial    Hyperlipidemia     Hypertension     Prediabetes        Past Surgical History:   Procedure Laterality Date    KNEE ARTHROSCOPY Right     LUMBAR FUSION      TOTAL HIP ARTHROPLASTY Bilateral        Family History   Problem Relation Age of Onset    Diabetes Mother     Stroke Mother     Diabetes Father     Cancer Father     Diabetes Sister     Diabetes Brother        Social History     Occupational History    Not on file   Tobacco Use    Smoking status: Former Smoker    Smokeless tobacco: Never Used   Substance and Sexual Activity    Alcohol use: Not on file    Drug use: Not on file    Sexual activity: Not on file         Current Outpatient Medications:     azelastine (OPTIVAR) 0 05 % ophthalmic solution, Apply to eye, Disp: , Rfl:     buPROPion (WELLBUTRIN XL) 300 mg 24 hr tablet, Take by mouth, Disp: , Rfl:     Cholecalciferol (VITAMIN D3) 2000 units capsule, TAKE 2 CAPSULES BY MOUTH DAILY INDICATIONS: VITAMIN D DEFICIENCY , Disp: , Rfl: 5    Cholecalciferol 2000 units TABS, Take 2 capsules by mouth, Disp: , Rfl:     diclofenac sodium (VOLTAREN) 1 %, Apply 2 g topically 4 (four) times a day, Disp: 1 Tube, Rfl: 3    DULoxetine (CYMBALTA) 60 mg delayed release capsule, Take 60 mg by mouth, Disp: , Rfl:     ferrous sulfate 325 (65 Fe) mg tablet, Take by mouth, Disp: , Rfl:     fluticasone-salmeterol (ADVAIR DISKUS) 250-50 mcg/dose inhaler, Inhale 1 puff, Disp: , Rfl:     gabapentin (NEURONTIN) 300 mg capsule, Take 2 capsules (600 mg total) by mouth 2 (two) times a day, Disp: 120 capsule, Rfl: 2    hydrocortisone (CVS CORTISONE MAXIMUM STRENGTH) 1 % cream, Apply topically, Disp: , Rfl:     lisinopril (ZESTRIL) 10 mg tablet, Take by mouth, Disp: , Rfl:     loratadine (CLARITIN) 10 mg tablet, Take by mouth, Disp: , Rfl:     meloxicam (MOBIC) 15 mg tablet, Take 1 tablet (15 mg total) by mouth daily, Disp: 30 tablet, Rfl: 0    simvastatin (ZOCOR) 40 mg tablet, Take by mouth, Disp: , Rfl:     VENTOLIN  (90 Base) MCG/ACT inhaler, INHALE 2 PUFFS EVERY 4 (FOUR) HOURS AS NEEDED FOR WHEEZING OR SHORTNESS OF BREATH , Disp: , Rfl: 2    Allergies   Allergen Reactions    Bee Venom Swelling    Dust Mite Extract     Fish-Derived Products Hives    Iodine Hives    Molds & Smuts     Other Swelling    Pollen Extract     Shrimp Flavor Hives       Physical Exam:    BP (!) 164/103   Pulse 87   Temp 98 °F (36 7 °C)   Ht 5' 4" (1 626 m)   Wt (!) 138 kg (305 lb)   BMI 52 35 kg/m²     Constitutional:overweight  Eyes:anicteric  HEENT:grossly intact  Neck:supple, symmetric, trachea midline and no masses   Pulmonary:even and unlabored  Cardiovascular:No edema or pitting edema present  Skin:Normal without rashes or lesions and well hydrated  Psychiatric:Mood and affect appropriate  Neurologic:Cranial Nerves II-XII grossly intact  Musculoskeletal:antalgic gait but steady without the use of assistive devices      Imaging  FL spine and pain procedure    (Results Pending)   MRI lumbar spine without contrast    (Results Pending)         Orders Placed This Encounter   Procedures    FL spine and pain procedure    MRI lumbar spine without contrast    Ambulatory referral to Physical Therapy

## 2020-09-29 ENCOUNTER — EVALUATION (OUTPATIENT)
Dept: PHYSICAL THERAPY | Facility: REHABILITATION | Age: 50
End: 2020-09-29
Payer: COMMERCIAL

## 2020-09-29 DIAGNOSIS — M54.12 CERVICAL RADICULOPATHY: ICD-10-CM

## 2020-09-29 DIAGNOSIS — M54.16 LUMBAR RADICULOPATHY: Primary | ICD-10-CM

## 2020-09-29 PROCEDURE — 97112 NEUROMUSCULAR REEDUCATION: CPT | Performed by: PHYSICAL THERAPIST

## 2020-09-29 PROCEDURE — 97164 PT RE-EVAL EST PLAN CARE: CPT | Performed by: PHYSICAL THERAPIST

## 2020-09-29 PROCEDURE — 97110 THERAPEUTIC EXERCISES: CPT | Performed by: PHYSICAL THERAPIST

## 2020-09-29 NOTE — PROGRESS NOTES
PT Re-Evaluation    Today's date: 2020  Patient name: Kyle Mccullough  : 1970  MRN: [id number]  Referring provider: DB Apodaca  Dx:   Encounter Diagnosis     ICD-10-CM    1  Lumbar radiculopathy  M54 [id number]                   Subjective: Pt presents today for evaluation of her chronic neck pain  Including radicular symptoms into her right arm which she states affects her entire arm  States that she has been having this neck pain for about a year of insidious onset  States that her primary complaint regarding the neck is headaches that start in the back of her neck and radiate but the back of her head  States that she will usually get the headaches in the afternoon, sometimes in the morning and if it is in the morning she will try to go back to sleep  States that sometimes an ice pack on her head will help the headaches  Denies visual changes, trouble breathing, balance difficulties  She does also report occassional 'shockwave' type sensation into both sides of her neck and into the back of both of her arms  States that if she lays on her left side with a pillow under her neck it will go away, but if she lays on her right side she will get symptoms  Objective: See treatment diary below    Cervical ROM:   Ext: 75%  Flex: WNL  S/b L: 75%  S/b R: 75% Rot R: 75%  Rot L: 75%  UE MMT: 5/5 grossly B/L  Reflexes:  L - C5: 2+  C6: 2+  C7: 2+       R- C5: 2+   C6: 2+  C7: 2+  Mechanical assessment: Repeated R s/b: Worsse Repeated L S/B: worse     Repeated Ext w/ L S/B: Worse Repeated flexion: No better, no worse  Repeated flex and L rot: Worse Repeated flex and R rotation: no better, no worse      Assessment:  Nilo Hua is a 45 y o  female presenting to PT w/ a 1 year history of bilateral neck pain w/ associated UE radicular symptoms and headaches  Signs and symptoms and clinical exam findings most consistent with cervical radiculopathy, she does have a DP for cervical flexion   Findings most likely complicated by history of chronic pain and multiple psychosocial factors negatively affecting prognosis  Pt would benefit from skilled PT services to address the below listed impairments and functional limitations to encourage return to PLOF  Plan: Continue per plan of care        Precautions: Hx of lumbar fusion and B/L EVA      Manuals 9/24 9/29 9/11 9/15 9/23                                                            Neuro Re-Ed             [place] press  3x10 ea 9#  [place] 8# 3x9 Cleconcepcion Yanet 9# 3x10 Keiser9# 3x10 ea        U/L LPD 3x10  10# 2x10 ea 10# 2x10 ea 10# 2x10 ea        Claudia bkwd ambulation with row 18# 3x10  15# 3x6  16# 3x6  16# 3x6        KB carry   10/8 2 laps ea          [place] [employer] sidesteps 10# 2x6   Claudia 2x6 10#  2x6        B/L KB carry  10/15# 2 laps ea           Serratus slides  W/ shrug x10           Ther Ex             Slump glides HEP            CHARLIE HEP            3 way ball x10 ea 5"x10 ea 5"x10 ea  Fwd  5"x10        DB Row  12# x10 ea                                     Bike 9' L2 10' 10' 10' 10'        VG 7'   8' L7  8'        Ther Activity                                       Gait Training                                       Modalities

## 2020-10-02 ENCOUNTER — OFFICE VISIT (OUTPATIENT)
Dept: PHYSICAL THERAPY | Facility: REHABILITATION | Age: 50
End: 2020-10-02
Payer: COMMERCIAL

## 2020-10-02 DIAGNOSIS — M54.12 CERVICAL RADICULOPATHY: ICD-10-CM

## 2020-10-02 DIAGNOSIS — M54.16 LUMBAR RADICULOPATHY: Primary | ICD-10-CM

## 2020-10-02 PROCEDURE — 97110 THERAPEUTIC EXERCISES: CPT | Performed by: PHYSICAL THERAPIST

## 2020-10-02 PROCEDURE — 97112 NEUROMUSCULAR REEDUCATION: CPT | Performed by: PHYSICAL THERAPIST

## 2020-10-06 ENCOUNTER — OFFICE VISIT (OUTPATIENT)
Dept: PHYSICAL THERAPY | Facility: REHABILITATION | Age: 50
End: 2020-10-06
Payer: COMMERCIAL

## 2020-10-06 DIAGNOSIS — M54.16 LUMBAR RADICULOPATHY: Primary | ICD-10-CM

## 2020-10-06 DIAGNOSIS — M54.12 CERVICAL RADICULOPATHY: ICD-10-CM

## 2020-10-06 PROCEDURE — 97112 NEUROMUSCULAR REEDUCATION: CPT | Performed by: PHYSICAL THERAPIST

## 2020-10-06 PROCEDURE — 97110 THERAPEUTIC EXERCISES: CPT | Performed by: PHYSICAL THERAPIST

## 2020-10-09 ENCOUNTER — OFFICE VISIT (OUTPATIENT)
Dept: PHYSICAL THERAPY | Facility: REHABILITATION | Age: 50
End: 2020-10-09
Payer: COMMERCIAL

## 2020-10-09 DIAGNOSIS — M54.16 LUMBAR RADICULOPATHY: Primary | ICD-10-CM

## 2020-10-09 DIAGNOSIS — M54.12 CERVICAL RADICULOPATHY: ICD-10-CM

## 2020-10-09 PROCEDURE — 97112 NEUROMUSCULAR REEDUCATION: CPT | Performed by: PHYSICAL THERAPIST

## 2020-10-09 PROCEDURE — 97110 THERAPEUTIC EXERCISES: CPT | Performed by: PHYSICAL THERAPIST

## 2020-10-12 ENCOUNTER — TELEPHONE (OUTPATIENT)
Dept: PAIN MEDICINE | Facility: CLINIC | Age: 50
End: 2020-10-12

## 2020-10-14 ENCOUNTER — OFFICE VISIT (OUTPATIENT)
Dept: PHYSICAL THERAPY | Facility: REHABILITATION | Age: 50
End: 2020-10-14
Payer: COMMERCIAL

## 2020-10-14 DIAGNOSIS — M54.16 LUMBAR RADICULOPATHY: Primary | ICD-10-CM

## 2020-10-14 DIAGNOSIS — M54.12 CERVICAL RADICULOPATHY: ICD-10-CM

## 2020-10-14 PROCEDURE — 97112 NEUROMUSCULAR REEDUCATION: CPT | Performed by: PHYSICAL THERAPIST

## 2020-10-14 PROCEDURE — 97110 THERAPEUTIC EXERCISES: CPT | Performed by: PHYSICAL THERAPIST

## 2020-10-16 ENCOUNTER — OFFICE VISIT (OUTPATIENT)
Dept: PHYSICAL THERAPY | Facility: REHABILITATION | Age: 50
End: 2020-10-16
Payer: COMMERCIAL

## 2020-10-16 DIAGNOSIS — M54.16 LUMBAR RADICULOPATHY: Primary | ICD-10-CM

## 2020-10-16 DIAGNOSIS — M54.12 CERVICAL RADICULOPATHY: ICD-10-CM

## 2020-10-16 PROCEDURE — 97112 NEUROMUSCULAR REEDUCATION: CPT | Performed by: PHYSICAL THERAPIST

## 2020-10-16 PROCEDURE — 97110 THERAPEUTIC EXERCISES: CPT | Performed by: PHYSICAL THERAPIST

## 2020-10-26 ENCOUNTER — HOSPITAL ENCOUNTER (OUTPATIENT)
Dept: RADIOLOGY | Facility: CLINIC | Age: 50
Discharge: HOME | End: 2020-10-26
Attending: ANESTHESIOLOGY | Admitting: ANESTHESIOLOGY
Payer: COMMERCIAL

## 2020-10-26 VITALS
SYSTOLIC BLOOD PRESSURE: 139 MMHG | TEMPERATURE: 97.9 F | RESPIRATION RATE: 18 BRPM | DIASTOLIC BLOOD PRESSURE: 85 MMHG | HEART RATE: 82 BPM | OXYGEN SATURATION: 95 %

## 2020-10-26 DIAGNOSIS — M47.812 CERVICAL SPONDYLOSIS: ICD-10-CM

## 2020-10-26 PROCEDURE — 64490 INJ PARAVERT F JNT C/T 1 LEV: CPT | Performed by: ANESTHESIOLOGY

## 2020-10-26 PROCEDURE — 64491 INJ PARAVERT F JNT C/T 2 LEV: CPT | Performed by: ANESTHESIOLOGY

## 2020-10-26 RX ORDER — LIDOCAINE HYDROCHLORIDE 10 MG/ML
5 INJECTION, SOLUTION EPIDURAL; INFILTRATION; INTRACAUDAL; PERINEURAL ONCE
Status: COMPLETED | OUTPATIENT
Start: 2020-10-26 | End: 2020-10-26

## 2020-10-26 RX ADMIN — LIDOCAINE HYDROCHLORIDE 3 ML: 10 INJECTION, SOLUTION EPIDURAL; INFILTRATION; INTRACAUDAL; PERINEURAL at 15:46

## 2020-10-26 RX ADMIN — LIDOCAINE HYDROCHLORIDE 1.5 ML: 20 INJECTION, SOLUTION EPIDURAL; INFILTRATION; INTRACAUDAL; PERINEURAL at 15:48

## 2020-10-27 ENCOUNTER — OFFICE VISIT (OUTPATIENT)
Dept: PHYSICAL THERAPY | Facility: REHABILITATION | Age: 50
End: 2020-10-27
Payer: COMMERCIAL

## 2020-10-27 DIAGNOSIS — M54.12 CERVICAL RADICULOPATHY: Primary | ICD-10-CM

## 2020-10-27 DIAGNOSIS — M54.16 LUMBAR RADICULOPATHY: ICD-10-CM

## 2020-10-27 PROCEDURE — 97110 THERAPEUTIC EXERCISES: CPT | Performed by: PHYSICAL THERAPIST

## 2020-10-27 PROCEDURE — 97112 NEUROMUSCULAR REEDUCATION: CPT | Performed by: PHYSICAL THERAPIST

## 2020-10-30 ENCOUNTER — OFFICE VISIT (OUTPATIENT)
Dept: PHYSICAL THERAPY | Facility: REHABILITATION | Age: 50
End: 2020-10-30
Payer: COMMERCIAL

## 2020-10-30 DIAGNOSIS — M54.16 LUMBAR RADICULOPATHY: ICD-10-CM

## 2020-10-30 DIAGNOSIS — M54.12 CERVICAL RADICULOPATHY: Primary | ICD-10-CM

## 2020-10-30 PROCEDURE — 97110 THERAPEUTIC EXERCISES: CPT | Performed by: PHYSICAL THERAPIST

## 2020-10-30 PROCEDURE — 97112 NEUROMUSCULAR REEDUCATION: CPT | Performed by: PHYSICAL THERAPIST

## 2020-11-03 ENCOUNTER — OFFICE VISIT (OUTPATIENT)
Dept: PHYSICAL THERAPY | Facility: REHABILITATION | Age: 50
End: 2020-11-03
Payer: COMMERCIAL

## 2020-11-03 ENCOUNTER — TELEPHONE (OUTPATIENT)
Dept: RADIOLOGY | Facility: CLINIC | Age: 50
End: 2020-11-03

## 2020-11-03 DIAGNOSIS — M54.12 CERVICAL RADICULOPATHY: Primary | ICD-10-CM

## 2020-11-03 DIAGNOSIS — M54.16 LUMBAR RADICULOPATHY: ICD-10-CM

## 2020-11-03 PROCEDURE — 97112 NEUROMUSCULAR REEDUCATION: CPT | Performed by: PHYSICAL THERAPIST

## 2020-11-03 PROCEDURE — 97110 THERAPEUTIC EXERCISES: CPT | Performed by: PHYSICAL THERAPIST

## 2020-11-05 ENCOUNTER — APPOINTMENT (OUTPATIENT)
Dept: PHYSICAL THERAPY | Facility: REHABILITATION | Age: 50
End: 2020-11-05
Payer: COMMERCIAL

## 2020-11-06 ENCOUNTER — OFFICE VISIT (OUTPATIENT)
Dept: PHYSICAL THERAPY | Facility: REHABILITATION | Age: 50
End: 2020-11-06
Payer: COMMERCIAL

## 2020-11-06 DIAGNOSIS — M54.12 CERVICAL RADICULOPATHY: Primary | ICD-10-CM

## 2020-11-06 DIAGNOSIS — M54.16 LUMBAR RADICULOPATHY: ICD-10-CM

## 2020-11-06 PROCEDURE — 97110 THERAPEUTIC EXERCISES: CPT | Performed by: PHYSICAL THERAPIST

## 2020-11-06 PROCEDURE — 97112 NEUROMUSCULAR REEDUCATION: CPT | Performed by: PHYSICAL THERAPIST

## 2020-11-10 ENCOUNTER — OFFICE VISIT (OUTPATIENT)
Dept: PHYSICAL THERAPY | Facility: REHABILITATION | Age: 50
End: 2020-11-10
Payer: COMMERCIAL

## 2020-11-10 DIAGNOSIS — M54.16 LUMBAR RADICULOPATHY: ICD-10-CM

## 2020-11-10 DIAGNOSIS — M54.12 CERVICAL RADICULOPATHY: Primary | ICD-10-CM

## 2020-11-10 PROCEDURE — 97112 NEUROMUSCULAR REEDUCATION: CPT | Performed by: PHYSICAL THERAPIST

## 2020-11-10 PROCEDURE — 97110 THERAPEUTIC EXERCISES: CPT | Performed by: PHYSICAL THERAPIST

## 2020-11-13 ENCOUNTER — OFFICE VISIT (OUTPATIENT)
Dept: PHYSICAL THERAPY | Facility: REHABILITATION | Age: 50
End: 2020-11-13
Payer: COMMERCIAL

## 2020-11-13 DIAGNOSIS — M54.12 CERVICAL RADICULOPATHY: Primary | ICD-10-CM

## 2020-11-13 DIAGNOSIS — M54.16 LUMBAR RADICULOPATHY: ICD-10-CM

## 2020-11-13 PROCEDURE — 97530 THERAPEUTIC ACTIVITIES: CPT | Performed by: PHYSICAL THERAPIST

## 2020-11-13 PROCEDURE — 97112 NEUROMUSCULAR REEDUCATION: CPT | Performed by: PHYSICAL THERAPIST

## 2020-11-13 PROCEDURE — 97110 THERAPEUTIC EXERCISES: CPT | Performed by: PHYSICAL THERAPIST

## 2020-11-16 ENCOUNTER — APPOINTMENT (OUTPATIENT)
Dept: PHYSICAL THERAPY | Facility: REHABILITATION | Age: 50
End: 2020-11-16
Payer: COMMERCIAL

## 2020-11-19 ENCOUNTER — OFFICE VISIT (OUTPATIENT)
Dept: PAIN MEDICINE | Facility: CLINIC | Age: 50
End: 2020-11-19
Payer: COMMERCIAL

## 2020-11-19 VITALS
BODY MASS INDEX: 50.02 KG/M2 | WEIGHT: 293 LBS | SYSTOLIC BLOOD PRESSURE: 149 MMHG | HEIGHT: 64 IN | HEART RATE: 84 BPM | DIASTOLIC BLOOD PRESSURE: 94 MMHG | TEMPERATURE: 97.9 F

## 2020-11-19 DIAGNOSIS — M47.812 CERVICAL SPONDYLOSIS: ICD-10-CM

## 2020-11-19 DIAGNOSIS — G89.29 CHRONIC PAIN OF BOTH KNEES: ICD-10-CM

## 2020-11-19 DIAGNOSIS — M54.12 CERVICAL RADICULOPATHY: ICD-10-CM

## 2020-11-19 DIAGNOSIS — M54.16 LUMBAR RADICULOPATHY: ICD-10-CM

## 2020-11-19 DIAGNOSIS — M25.562 CHRONIC PAIN OF BOTH KNEES: ICD-10-CM

## 2020-11-19 DIAGNOSIS — M25.561 CHRONIC PAIN OF BOTH KNEES: ICD-10-CM

## 2020-11-19 DIAGNOSIS — G89.4 CHRONIC PAIN SYNDROME: Primary | ICD-10-CM

## 2020-11-19 DIAGNOSIS — M50.30 DEGENERATIVE DISC DISEASE, CERVICAL: ICD-10-CM

## 2020-11-19 DIAGNOSIS — M47.816 LUMBAR SPONDYLOSIS: ICD-10-CM

## 2020-11-19 DIAGNOSIS — M46.1 SACROILIITIS (HCC): ICD-10-CM

## 2020-11-19 PROCEDURE — 99214 OFFICE O/P EST MOD 30 MIN: CPT | Performed by: NURSE PRACTITIONER

## 2020-11-19 RX ORDER — GABAPENTIN 300 MG/1
600 CAPSULE ORAL 2 TIMES DAILY
Qty: 120 CAPSULE | Refills: 2 | Status: SHIPPED | OUTPATIENT
Start: 2020-11-19 | End: 2021-03-31 | Stop reason: SDUPTHER

## 2020-11-19 RX ORDER — BUSPIRONE HYDROCHLORIDE 10 MG/1
10 TABLET ORAL DAILY
COMMUNITY
Start: 2020-10-26

## 2020-11-20 ENCOUNTER — OFFICE VISIT (OUTPATIENT)
Dept: PHYSICAL THERAPY | Facility: REHABILITATION | Age: 50
End: 2020-11-20
Payer: COMMERCIAL

## 2020-11-20 DIAGNOSIS — M54.12 CERVICAL RADICULOPATHY: Primary | ICD-10-CM

## 2020-11-20 DIAGNOSIS — M54.16 LUMBAR RADICULOPATHY: ICD-10-CM

## 2020-11-20 PROCEDURE — 97112 NEUROMUSCULAR REEDUCATION: CPT

## 2020-11-20 PROCEDURE — 97110 THERAPEUTIC EXERCISES: CPT

## 2020-11-24 ENCOUNTER — DOCTOR'S OFFICE (OUTPATIENT)
Dept: URBAN - METROPOLITAN AREA CLINIC 137 | Facility: CLINIC | Age: 50
Setting detail: OPHTHALMOLOGY
End: 2020-11-24
Payer: COMMERCIAL

## 2020-11-24 DIAGNOSIS — H52.13: ICD-10-CM

## 2020-11-24 PROBLEM — H18.603 KERATOCONUS; BOTH EYES: Status: ACTIVE | Noted: 2019-11-20

## 2020-11-24 PROCEDURE — 92014 COMPRE OPH EXAM EST PT 1/>: CPT | Performed by: OPTOMETRIST

## 2020-11-24 ASSESSMENT — REFRACTION_MANIFEST
OS_CYLINDER: -4.00
OD_VA1: 20/100-1
OS_SPHERE: -8.00
OS_ADD: +1.50
OS_VA1: 20/200
OD_CYLINDER: -4.00
OS_AXIS: 025
OD_AXIS: 150
OD_ADD: +1.50
OD_SPHERE: -7.00

## 2020-11-24 ASSESSMENT — REFRACTION_AUTOREFRACTION
OS_SPHERE: ERROR
OD_SPHERE: -4.50
OD_AXIS: 149
OD_CYLINDER: -3.25

## 2020-11-24 ASSESSMENT — VISUAL ACUITY
OD_BCVA: 20/400
OS_BCVA: 20/400

## 2020-11-24 ASSESSMENT — SPHEQUIV_DERIVED
OD_SPHEQUIV: -6.125
OS_SPHEQUIV: -10
OD_SPHEQUIV: -9

## 2020-11-24 ASSESSMENT — CONFRONTATIONAL VISUAL FIELD TEST (CVF)
OS_FINDINGS: FULL
OD_FINDINGS: FULL

## 2020-12-01 ENCOUNTER — TELEPHONE (OUTPATIENT)
Dept: PAIN MEDICINE | Facility: CLINIC | Age: 50
End: 2020-12-01

## 2020-12-08 DIAGNOSIS — M47.812 CERVICAL SPONDYLOSIS: Primary | ICD-10-CM

## 2020-12-29 ENCOUNTER — HOSPITAL ENCOUNTER (OUTPATIENT)
Dept: RADIOLOGY | Facility: CLINIC | Age: 50
Discharge: HOME | End: 2020-12-29
Attending: ANESTHESIOLOGY | Admitting: ANESTHESIOLOGY
Payer: COMMERCIAL

## 2020-12-29 VITALS
TEMPERATURE: 97.3 F | HEART RATE: 73 BPM | RESPIRATION RATE: 20 BRPM | OXYGEN SATURATION: 94 % | SYSTOLIC BLOOD PRESSURE: 112 MMHG | DIASTOLIC BLOOD PRESSURE: 68 MMHG

## 2020-12-29 DIAGNOSIS — M47.812 CERVICAL SPONDYLOSIS: ICD-10-CM

## 2020-12-29 PROCEDURE — 64490 INJ PARAVERT F JNT C/T 1 LEV: CPT | Performed by: ANESTHESIOLOGY

## 2020-12-29 PROCEDURE — 64491 INJ PARAVERT F JNT C/T 2 LEV: CPT | Performed by: ANESTHESIOLOGY

## 2020-12-29 RX ORDER — BUPIVACAINE HYDROCHLORIDE 5 MG/ML
30 INJECTION, SOLUTION EPIDURAL; INTRACAUDAL ONCE
Status: DISCONTINUED | OUTPATIENT
Start: 2020-12-29 | End: 2021-01-02 | Stop reason: HOSPADM

## 2020-12-29 RX ORDER — LIDOCAINE HYDROCHLORIDE 10 MG/ML
5 INJECTION, SOLUTION EPIDURAL; INFILTRATION; INTRACAUDAL; PERINEURAL ONCE
Status: COMPLETED | OUTPATIENT
Start: 2020-12-29 | End: 2020-12-29

## 2020-12-29 RX ORDER — BUPIVACAINE HYDROCHLORIDE 5 MG/ML
30 INJECTION, SOLUTION EPIDURAL; INTRACAUDAL ONCE
Status: COMPLETED | OUTPATIENT
Start: 2020-12-29 | End: 2020-12-29

## 2020-12-29 RX ORDER — LIDOCAINE HYDROCHLORIDE 10 MG/ML
5 INJECTION, SOLUTION EPIDURAL; INFILTRATION; INTRACAUDAL; PERINEURAL ONCE
Status: DISCONTINUED | OUTPATIENT
Start: 2020-12-29 | End: 2021-01-02 | Stop reason: HOSPADM

## 2020-12-29 RX ADMIN — BUPIVACAINE HYDROCHLORIDE 1.5 ML: 5 INJECTION, SOLUTION EPIDURAL; INTRACAUDAL at 13:48

## 2020-12-29 RX ADMIN — LIDOCAINE HYDROCHLORIDE 2 ML: 10 INJECTION, SOLUTION EPIDURAL; INFILTRATION; INTRACAUDAL; PERINEURAL at 13:45

## 2020-12-29 NOTE — DISCHARGE INSTRUCTIONS

## 2020-12-29 NOTE — H&P
History of Present Illness: The patient is a 45 y o  female who presents with complaints of   Neck pain      Patient Active Problem List   Diagnosis    Bilateral carpal tunnel syndrome    Degenerative disc disease, cervical    Lumbar radiculopathy    Sacroiliitis (HCC)    Lumbar spondylosis    Primary osteoarthritis of left knee    Morbid obesity with body mass index (BMI) of 50 0 to 59 9 in adult ([facility] )    Hypertension    Hyperlipidemia    Prediabetes    Chronic pain of both knees    Chronic pain syndrome    Cervical spondylosis       Past Medical History:   Diagnosis Date    Asthma     [facility]     endometrial    Hyperlipidemia     Hypertension     Prediabetes        Past Surgical History:   Procedure Laterality Date    KNEE ARTHROSCOPY Right     LUMBAR FUSION      TOTAL HIP ARTHROPLASTY Bilateral          Current Outpatient Medications:     azelastine (OPTIVAR) 0 05 % ophthalmic solution, Apply to eye, Disp: , Rfl:     buPROPion (WELLBUTRIN XL) 300 mg 24 hr tablet, Take by mouth, Disp: , Rfl:     busPIRone (BUSPAR) 10 mg tablet, TAKE 1 TABLET BY MOUTH TWICE A DAY, Disp: , Rfl:     Cholecalciferol (VITAMIN D3) 2000 units capsule, TAKE 2 CAPSULES BY MOUTH DAILY INDICATIONS: VITAMIN D DEFICIENCY , Disp: , Rfl: 5    Cholecalciferol 2000 units TABS, Take 2 capsules by mouth, Disp: , Rfl:     diclofenac sodium (VOLTAREN) 1 %, Apply 2 g topically 4 (four) times a day, Disp: 1 Tube, Rfl: 3    DULoxetine (CYMBALTA) 60 mg delayed release capsule, Take 60 mg by mouth, Disp: , Rfl:     ferrous sulfate 325 (65 Fe) mg tablet, Take by mouth, Disp: , Rfl:     fluticasone-salmeterol (ADVAIR DISKUS) 250-50 mcg/dose inhaler, Inhale 1 puff, Disp: , Rfl:     gabapentin (NEURONTIN) 300 mg capsule, Take 2 capsules (600 mg total) by mouth 2 (two) times a day, Disp: 120 capsule, Rfl: 2    hydrocortisone ([employer]S CORTISONE MAXIMUM STRENGTH) 1 % cream, Apply topically, Disp: , Rfl:     lisinopril (ZESTRIL) 10 mg tablet, Take by mouth, Disp: , Rfl:     loratadine (CLARITIN) 10 mg tablet, Take by mouth, Disp: , Rfl:     meloxicam (MOBIC) 15 mg tablet, Take 1 tablet (15 mg total) by mouth daily, Disp: 30 tablet, Rfl: 0    metFORMIN (GLUCOPHAGE) 500 mg tablet, Take 500 mg by mouth, Disp: , Rfl:     simvastatin (ZOCOR) 40 mg tablet, Take by mouth, Disp: , Rfl:     VENTOLIN  (90 Base) MCG/ACT inhaler, INHALE 2 PUFFS EVERY 4 (FOUR) HOURS AS NEEDED FOR WHEEZING OR SHORTNESS OF BREATH , Disp: , Rfl: 2    Current Facility-Administered Medications:     bupivacaine (PF) (MARCAINE) 0 5 % injection 30 mL, 30 mL, Injection, Once, Willie Ignacio, DO    lidocaine (PF) (XYLOCAINE-MPF) 1 % injection 5 mL, 5 mL, Other, Once, Willie Ignacio, DO    Allergies   Allergen Reactions    Bee Venom Swelling    Dust Mite Extract     Fish-Derived Products Hives    Iodine Hives    Molds & Smuts     Other Swelling    Pollen Extract     Shrimp Flavor Hives       Physical Exam:   Vitals:    12/29/20 1325   BP: 114/81   Pulse: 75   Resp: 20   Temp: (!) 97 3 °F (36 3 °C)   SpO2: 96%     General: Awake, Alert, Oriented x 3, Mood and affect appropriate  Respiratory: Respirations even and unlabored  Cardiovascular: Peripheral pulses intact; no edema  Musculoskeletal Exam:   Left cervical paraspinals tender to palpation    ASA Score: 2    Patient/Chart Verification  Patient ID Verified: Verbal  ID Band Applied: No  Consents Confirmed: Procedural  H&P( within 30 days) Verified: To be obtained in the Pre-Procedure area  Interval H&P(within 24 hr) Complete (required for Outpatients and Surgery Admit only): To be obtained in the Pre-Procedure area  Allergies Reviewed: Yes  Anticoag/NSAID held?: NA  Currently on antibiotics?: No  Pregnancy denied?: NA    Assessment:   1   Cervical spondylosis        Plan: LEFT C4-6 MBB # 2

## 2020-12-30 ENCOUNTER — HOSPITAL ENCOUNTER (OUTPATIENT)
Dept: RADIOLOGY | Facility: HOSPITAL | Age: 50
Discharge: HOME | End: 2020-12-30
Payer: COMMERCIAL

## 2020-12-30 DIAGNOSIS — M47.816 LUMBAR SPONDYLOSIS: ICD-10-CM

## 2020-12-30 DIAGNOSIS — M54.16 LUMBAR RADICULOPATHY: ICD-10-CM

## 2020-12-30 PROCEDURE — G1004 CDSM NDSC: HCPCS

## 2020-12-30 PROCEDURE — 72148 MRI LUMBAR SPINE W/O DYE: CPT

## 2021-01-04 DIAGNOSIS — M47.816 LUMBAR SPONDYLOSIS: ICD-10-CM

## 2021-01-04 DIAGNOSIS — M47.812 CERVICAL SPONDYLOSIS: Primary | ICD-10-CM

## 2021-01-20 ENCOUNTER — TELEPHONE (OUTPATIENT)
Dept: PAIN MEDICINE | Facility: CLINIC | Age: 51
End: 2021-01-20

## 2021-02-01 ENCOUNTER — TELEPHONE (OUTPATIENT)
Dept: OBGYN CLINIC | Facility: HOSPITAL | Age: 51
End: 2021-02-01

## 2021-02-23 ENCOUNTER — HOSPITAL ENCOUNTER (OUTPATIENT)
Dept: RADIOLOGY | Facility: CLINIC | Age: 51
Discharge: HOME | End: 2021-02-23
Attending: ANESTHESIOLOGY | Admitting: ANESTHESIOLOGY
Payer: COMMERCIAL

## 2021-02-23 ENCOUNTER — TELEPHONE (OUTPATIENT)
Dept: PAIN MEDICINE | Facility: CLINIC | Age: 51
End: 2021-02-23

## 2021-02-23 VITALS
RESPIRATION RATE: 20 BRPM | TEMPERATURE: 96.2 F | SYSTOLIC BLOOD PRESSURE: 162 MMHG | HEART RATE: 74 BPM | OXYGEN SATURATION: 98 % | DIASTOLIC BLOOD PRESSURE: 109 MMHG

## 2021-02-23 DIAGNOSIS — M47.812 CERVICAL SPONDYLOSIS: ICD-10-CM

## 2021-02-23 DIAGNOSIS — M47.812 CERVICAL SPONDYLOSIS: Primary | ICD-10-CM

## 2021-02-23 PROCEDURE — 64633 DESTROY CERV/THOR FACET JNT: CPT | Performed by: ANESTHESIOLOGY

## 2021-02-23 PROCEDURE — 64634 DESTROY C/TH FACET JNT ADDL: CPT | Performed by: ANESTHESIOLOGY

## 2021-02-23 RX ORDER — LIDOCAINE HYDROCHLORIDE 10 MG/ML
5 INJECTION, SOLUTION EPIDURAL; INFILTRATION; INTRACAUDAL; PERINEURAL ONCE
Status: COMPLETED | OUTPATIENT
Start: 2021-02-23 | End: 2021-02-23

## 2021-02-23 RX ORDER — BUPIVACAINE HYDROCHLORIDE 5 MG/ML
30 INJECTION, SOLUTION EPIDURAL; INTRACAUDAL ONCE
Status: COMPLETED | OUTPATIENT
Start: 2021-02-23 | End: 2021-02-23

## 2021-02-23 RX ADMIN — BUPIVACAINE HYDROCHLORIDE 3 ML: 5 INJECTION, SOLUTION EPIDURAL; INTRACAUDAL at 11:33

## 2021-02-23 RX ADMIN — LIDOCAINE HYDROCHLORIDE 3 ML: 20 INJECTION, SOLUTION EPIDURAL; INFILTRATION; INTRACAUDAL; PERINEURAL at 11:35

## 2021-02-23 RX ADMIN — LIDOCAINE HYDROCHLORIDE 1 ML: 10 INJECTION, SOLUTION EPIDURAL; INFILTRATION; INTRACAUDAL; PERINEURAL at 11:34

## 2021-02-23 RX ADMIN — LIDOCAINE HYDROCHLORIDE 5 ML: 10 INJECTION, SOLUTION EPIDURAL; INFILTRATION; INTRACAUDAL; PERINEURAL at 11:34

## 2021-02-23 NOTE — DISCHARGE INSTRUCTIONS

## 2021-02-23 NOTE — TELEPHONE ENCOUNTER
Patient is S/P a Left C4-C6 RFA c/ JW on 2/23/21  Next MBB is on 3/10 for the Right cervical   Please call on 2/24/21 post RFA   Thanks

## 2021-02-23 NOTE — H&P
History of Present Illness: The patient is a 45 y o  female who presents with complaints of   Neck pain      Patient Active Problem List   Diagnosis    Bilateral carpal tunnel syndrome    Degenerative disc disease, cervical    Lumbar radiculopathy    Sacroiliitis (HCC)    Lumbar spondylosis    Primary osteoarthritis of left knee    Morbid obesity with body mass index (BMI) of 50 0 to 59 9 in adult ([facility]    Hypertension    Hyperlipidemia    Prediabetes    Chronic pain of both knees    Chronic pain syndrome    Cervical spondylosis       Past Medical History:   Diagnosis Date    Asthma     [facility]     endometrial    Hyperlipidemia     Hypertension     Prediabetes        Past Surgical History:   Procedure Laterality Date    KNEE ARTHROSCOPY Right     LUMBAR FUSION      TOTAL HIP ARTHROPLASTY Bilateral          Current Outpatient Medications:     azelastine (OPTIVAR) 0 05 % ophthalmic solution, Apply to eye, Disp: , Rfl:     buPROPion (WELLBUTRIN XL) 300 mg 24 hr tablet, Take by mouth, Disp: , Rfl:     busPIRone (BUSPAR) 10 mg tablet, TAKE 1 TABLET BY MOUTH TWICE A DAY, Disp: , Rfl:     Cholecalciferol (VITAMIN D3) 2000 units capsule, TAKE 2 CAPSULES BY MOUTH DAILY INDICATIONS: VITAMIN D DEFICIENCY , Disp: , Rfl: 5    Cholecalciferol 2000 units TABS, Take 2 capsules by mouth, Disp: , Rfl:     diclofenac sodium (VOLTAREN) 1 %, Apply 2 g topically 4 (four) times a day, Disp: 1 Tube, Rfl: 3    DULoxetine (CYMBALTA) 60 mg delayed release capsule, Take 60 mg by mouth, Disp: , Rfl:     ferrous sulfate 325 (65 Fe) mg tablet, Take by mouth, Disp: , Rfl:     fluticasone-salmeterol (ADVAIR DISKUS) 250-50 mcg/dose inhaler, Inhale 1 puff, Disp: , Rfl:     gabapentin (NEURONTIN) 300 mg capsule, Take 2 capsules (600 mg total) by mouth 2 (two) times a day, Disp: 120 capsule, Rfl: 2    hydrocortisone (CVS CORTISONE MAXIMUM STRENGTH) 1 % cream, Apply topically, Disp: , Rfl:     lisinopril (ZESTRIL) 10 mg tablet, Take by mouth, Disp: , Rfl:     loratadine (CLARITIN) 10 mg tablet, Take by mouth, Disp: , Rfl:     meloxicam (MOBIC) 15 mg tablet, Take 1 tablet (15 mg total) by mouth daily, Disp: 30 tablet, Rfl: 0    metFORMIN (GLUCOPHAGE) 500 mg tablet, Take 500 mg by mouth, Disp: , Rfl:     simvastatin (ZOCOR) 40 mg tablet, Take by mouth, Disp: , Rfl:     VENTOLIN  (90 Base) MCG/ACT inhaler, INHALE 2 PUFFS EVERY 4 (FOUR) HOURS AS NEEDED FOR WHEEZING OR SHORTNESS OF BREATH , Disp: , Rfl: 2    Current Facility-Administered Medications:     bupivacaine (PF) (MARCAINE) 0 5 % injection 30 mL, 30 mL, Injection, Once, Willie Ignacio, DO    lidocaine (PF) (XYLOCAINE-MPF) 1 % injection 5 mL, 5 mL, Other, Once, Suly Ignacio, DO    lidocaine (PF) (XYLOCAINE-MPF) 2 % injection 4 mL, 4 mL, Other, Once, Willie Ignacio, DO    Allergies   Allergen Reactions    Bee Venom Swelling    Dust Mite Extract     Fish-Derived Products Hives    Iodine Hives    Molds & Smuts     Other Swelling    Pollen Extract     Shrimp Flavor Hives       Physical Exam:   Vitals:    02/23/21 1105   BP: 145/100   Pulse: 77   Resp: 20   Temp: (!) 96 2 °F (35 7 °C)   SpO2: 98%     General: Awake, Alert, Oriented x 3, Mood and affect appropriate  Respiratory: Respirations even and unlabored  Cardiovascular: Peripheral pulses intact; no edema  Musculoskeletal Exam:   Left cervical paraspinals tender to palpation  ASA Score: 3    Patient/Chart Verification  Patient ID Verified: Verbal  ID Band Applied: No  Consents Confirmed: Procedural  H&P( within 30 days) Verified: To be obtained in the Pre-Procedure area  Interval H&P(within 24 hr) Complete (required for Outpatients and Surgery Admit only): To be obtained in the Pre-Procedure area  Allergies Reviewed: Yes  Anticoag/NSAID held?: No(pt takes Meloxicam and held it for 3 days   pt advised hold not required for this procedure )  Currently on antibiotics?: No  Pregnancy denied?: NA    Assessment:   1   Cervical spondylosis        Plan: LEFT C4-6 RFA

## 2021-02-24 NOTE — TELEPHONE ENCOUNTER
S/W pt  Pt stated needle sites look good, denies S&S of infection, denies fevers, has soreness and stiffness and denies sun burn like sensation  Advised pt if she does get pain to take her prescribed or OTC pain medications and/or use ice/heat and that it takes 4 to 6 weeks to see the full effect  Scheduled pt for SOVS on 3/31 at 1:45 w/ KH  Pt verbalized understanding

## 2021-02-25 ENCOUNTER — OFFICE VISIT (OUTPATIENT)
Dept: OBGYN CLINIC | Facility: HOSPITAL | Age: 51
End: 2021-02-25
Payer: COMMERCIAL

## 2021-02-25 VITALS
BODY MASS INDEX: 48.83 KG/M2 | HEART RATE: 74 BPM | WEIGHT: 286 LBS | SYSTOLIC BLOOD PRESSURE: 142 MMHG | HEIGHT: 64 IN | DIASTOLIC BLOOD PRESSURE: 78 MMHG

## 2021-02-25 DIAGNOSIS — M17.11 PRIMARY OSTEOARTHRITIS OF RIGHT KNEE: Primary | ICD-10-CM

## 2021-02-25 DIAGNOSIS — M17.12 PRIMARY OSTEOARTHRITIS OF LEFT KNEE: ICD-10-CM

## 2021-02-25 PROCEDURE — 20610 DRAIN/INJ JOINT/BURSA W/O US: CPT | Performed by: ORTHOPAEDIC SURGERY

## 2021-02-25 PROCEDURE — 99213 OFFICE O/P EST LOW 20 MIN: CPT | Performed by: ORTHOPAEDIC SURGERY

## 2021-02-25 RX ORDER — BUPIVACAINE HYDROCHLORIDE 2.5 MG/ML
2 INJECTION, SOLUTION INFILTRATION; PERINEURAL
Status: COMPLETED | OUTPATIENT
Start: 2021-02-25 | End: 2021-02-25

## 2021-02-25 RX ORDER — METHYLPREDNISOLONE ACETATE 40 MG/ML
2 INJECTION, SUSPENSION INTRA-ARTICULAR; INTRALESIONAL; INTRAMUSCULAR; SOFT TISSUE
Status: COMPLETED | OUTPATIENT
Start: 2021-02-25 | End: 2021-02-25

## 2021-02-25 RX ADMIN — BUPIVACAINE HYDROCHLORIDE 2 ML: 2.5 INJECTION, SOLUTION INFILTRATION; PERINEURAL at 14:38

## 2021-02-25 RX ADMIN — METHYLPREDNISOLONE ACETATE 2 ML: 40 INJECTION, SUSPENSION INTRA-ARTICULAR; INTRALESIONAL; INTRAMUSCULAR; SOFT TISSUE at 14:38

## 2021-02-25 NOTE — PROGRESS NOTES
Assessment/Plan:  Assessment/Plan   Diagnoses and all orders for this visit:    Primary osteoarthritis of right knee  -     Large joint arthrocentesis: R knee    Primary osteoarthritis of left knee  -     Large joint arthrocentesis: L knee    Discussed with patient that today's physical exam is consistent with flare-up of primary osteoarthritis of the bilateral knees  Patient was offered, and excepted, Marcaine Depo-Medrol corticosteroid injections for relief of pain and inflammation  Patient tolerated treatment well  Informed patient that should she have any questions or concerns she can contact the office to schedule follow-up appointment  At this time she will be seen for follow-up in 3 months for re-evaluation and consideration for repeat injection  Subjective:   Patient ID: Hal Peters is a 45 y o  female  HPI  Patient presents for follow-up evaluation of primary DJD of the bilateral knees  She was last seen in regards to this issue on 9/22/2020 at which time she was provided corticosteroid injections  Patient states that she had relief of these injections up until about December, and due to various weatherclosures, she was unable to obtain follow-up appointment until now  On today's presentation she describes pain with going up and down stairs, or prolonged weight-bearing activity  She also notes that she has stiffness on arising in the morning  She reports occasional feelings of instability and swelling  She denies any bruising, numbness, tingling, or instability      The following portions of the patient's history were reviewed and updated as appropriate: allergies, current medications, past family history, past medical history, past social history, past surgical history and problem list     Past Medical History:   Diagnosis Date    Asthma     [facility] ([facility] )     endometrial    Hyperlipidemia     Hypertension     Prediabetes      Past Surgical History:   Procedure Laterality Date    KNEE ARTHROSCOPY Right     LUMBAR FUSION      TOTAL HIP ARTHROPLASTY Bilateral      Family History   Problem Relation Age of Onset    Diabetes Mother     Stroke Mother     Diabetes Father     Cancer Father     Diabetes Sister     Diabetes Brother      Social History     Socioeconomic History    Marital status: Single     Spouse name: None    Number of children: None    Years of education: None    Highest education level: None   Occupational History    None   Social Needs    Financial resource strain: None    Food insecurity     Worry: None     Inability: None    Transportation needs     Medical: None     Non-medical: None   Tobacco Use    Smoking status: Former Smoker    Smokeless tobacco: Never Used   Substance and Sexual Activity    Alcohol use: None    Drug use: None    Sexual activity: None   Lifestyle    Physical activity     Days per week: None     Minutes per session: None    Stress: None   Relationships    Social connections     Talks on phone: None     Gets together: None     Attends Faith service: None     Active member of club or organization: None     Attends meetings of clubs or organizations: None     Relationship status: None    Intimate partner violence     Fear of current or ex partner: None     Emotionally abused: None     Physically abused: None     Forced sexual activity: None   Other Topics Concern    None   Social History Narrative    None       Current Outpatient Medications:     azelastine (OPTIVAR) 0 05 % ophthalmic solution, Apply to eye, Disp: , Rfl:     buPROPion (WELLBUTRIN XL) 300 mg 24 hr tablet, Take by mouth, Disp: , Rfl:     busPIRone (BUSPAR) 10 mg tablet, TAKE 1 TABLET BY MOUTH TWICE A DAY, Disp: , Rfl:     Cholecalciferol (VITAMIN D3) 2000 units capsule, TAKE 2 CAPSULES BY MOUTH DAILY INDICATIONS: VITAMIN D DEFICIENCY , Disp: , Rfl: 5    Cholecalciferol 2000 units TABS, Take 2 capsules by mouth, Disp: , Rfl:     diclofenac sodium (VOLTAREN) 1 %, Apply 2 g topically 4 (four) times a day, Disp: 1 Tube, Rfl: 3    DULoxetine (CYMBALTA) 60 mg delayed release capsule, Take 60 mg by mouth, Disp: , Rfl:     ferrous sulfate 325 (65 Fe) mg tablet, Take by mouth, Disp: , Rfl:     fluticasone-salmeterol (ADVAIR DISKUS) 250-50 mcg/dose inhaler, Inhale 1 puff, Disp: , Rfl:     gabapentin (NEURONTIN) 300 mg capsule, Take 2 capsules (600 mg total) by mouth 2 (two) times a day, Disp: 120 capsule, Rfl: 2    hydrocortisone (CVS CORTISONE MAXIMUM STRENGTH) 1 % cream, Apply topically, Disp: , Rfl:     lisinopril (ZESTRIL) 10 mg tablet, Take by mouth, Disp: , Rfl:     loratadine (CLARITIN) 10 mg tablet, Take by mouth, Disp: , Rfl:     meloxicam (MOBIC) 15 mg tablet, Take 1 tablet (15 mg total) by mouth daily, Disp: 30 tablet, Rfl: 0    metFORMIN (GLUCOPHAGE) 500 mg tablet, Take 500 mg by mouth, Disp: , Rfl:     simvastatin (ZOCOR) 40 mg tablet, Take by mouth, Disp: , Rfl:     VENTOLIN  (90 Base) MCG/ACT inhaler, INHALE 2 PUFFS EVERY 4 (FOUR) HOURS AS NEEDED FOR WHEEZING OR SHORTNESS OF BREATH , Disp: , Rfl: 2    Allergies   Allergen Reactions    Bee Venom Swelling    Dust Mite Extract     Fish-Derived Products Hives    Iodine Hives    Molds & Smuts     Other Swelling    Pollen Extract     Shrimp Flavor Hives       Review of Systems   Constitutional: Negative for chills, fever and unexpected weight change  HENT: Negative for hearing loss, nosebleeds and sore throat  Eyes: Negative for pain, redness and visual disturbance  Respiratory: Negative for cough, shortness of breath and wheezing  Cardiovascular: Negative for chest pain, palpitations and leg swelling  Gastrointestinal: Negative for abdominal pain, nausea and vomiting  Endocrine: Negative for polydipsia and polyuria  Genitourinary: Negative for dysuria and hematuria  Musculoskeletal:        As noted in HPI   Skin: Negative for rash and wound     Neurological: Negative for dizziness, numbness and headaches  Psychiatric/Behavioral: Negative for decreased concentration and suicidal ideas  The patient is not nervous/anxious  Objective:  /78   Pulse 74   Ht 5' 4" (1 626 m)   Wt 130 kg (286 lb)   BMI 49 09 kg/m²     Ortho Exam   Bilateral knees -   Mild valgus deformity noted  Mild effusion noted in right knee, no soft tissue swelling  TTP over medial joint line  TTP over medial tibial plateau  Mildly TTP over lateral joint  ROM 0°-120°  Knees are stable to varus, valgus, anterior, posterior stress  Patella tracks centrally with palpable crepitus  Calf compartments are soft  2+ TP and DP pulses with brisk capillary refill to the toes  Sural, saphenous, tibial, superficial and deep peroneal motor and sensory distributions intact  Sensation light touch intact distally    Physical Exam  Constitutional:       Appearance: She is well-developed  HENT:      Right Ear: External ear normal       Left Ear: External ear normal       Nose: Nose normal    Eyes:      Conjunctiva/sclera: Conjunctivae normal       Pupils: Pupils are equal, round, and reactive to light  Neck:      Musculoskeletal: Normal range of motion  Pulmonary:      Effort: Pulmonary effort is normal    Musculoskeletal: Normal range of motion  Skin:     General: Skin is warm and dry  Neurological:      Mental Status: She is alert and oriented to person, place, and time  Psychiatric:         Behavior: Behavior normal          Thought Content: Thought content normal          Judgment: Judgment normal        Imaging:  No new imaging reviewed this visit     Large joint arthrocentesis: R knee  Martin Protocol:  Procedure performed by: Melvern Schwab LAT, ATC)  Consent: Verbal consent obtained    Consent given by: patient  Timeout called at: 2/25/2021 2:49 PM   Patient consent: the patient's understanding of the procedure matches consent given  Site marked: the operative site was marked  Patient identity confirmed: verbally with patient    Supporting Documentation  Indications: pain   Procedure Details  Location: knee - R knee  Needle size: 22 G  Ultrasound guidance: no  Approach: anterolateral  Medications administered: 2 mL bupivacaine 0 25 %; 2 mL methylPREDNISolone acetate 40 mg/mL    Patient tolerance: patient tolerated the procedure well with no immediate complications  Dressing:  Sterile dressing applied    Large joint arthrocentesis: L knee  Universal Protocol:  Procedure performed by: Lindsay THOMAS ATC)  Consent: Verbal consent obtained    Consent given by: patient  Timeout called at: 2/25/2021 2:49 PM   Patient consent: the patient's understanding of the procedure matches consent given  Site marked: the operative site was marked  Patient identity confirmed: verbally with patient    Supporting Documentation  Indications: pain and joint swelling   Procedure Details  Location: knee - L knee  Needle size: 22 G  Ultrasound guidance: no  Approach: anterolateral  Medications administered: 2 mL bupivacaine 0 25 %; 2 mL methylPREDNISolone acetate 40 mg/mL    Patient tolerance: patient tolerated the procedure well with no immediate complications  Dressing:  Sterile dressing applied          Scribe Attestation    I,:  Lindsay Titus am acting as a scribe while in the presence of the attending physician :       I,:  Mark Mcguire MD personally performed the services described in this documentation    as scribed in my presence :

## 2021-03-10 ENCOUNTER — HOSPITAL ENCOUNTER (OUTPATIENT)
Dept: RADIOLOGY | Facility: CLINIC | Age: 51
Discharge: HOME | End: 2021-03-10
Attending: ANESTHESIOLOGY
Payer: COMMERCIAL

## 2021-03-10 VITALS
DIASTOLIC BLOOD PRESSURE: 87 MMHG | OXYGEN SATURATION: 100 % | TEMPERATURE: 97.5 F | SYSTOLIC BLOOD PRESSURE: 145 MMHG | HEART RATE: 69 BPM | RESPIRATION RATE: 20 BRPM

## 2021-03-10 DIAGNOSIS — M47.812 CERVICAL SPONDYLOSIS: ICD-10-CM

## 2021-03-10 PROCEDURE — 64490 INJ PARAVERT F JNT C/T 1 LEV: CPT | Performed by: ANESTHESIOLOGY

## 2021-03-10 PROCEDURE — 64491 INJ PARAVERT F JNT C/T 2 LEV: CPT | Performed by: ANESTHESIOLOGY

## 2021-03-10 RX ORDER — LIDOCAINE HYDROCHLORIDE 10 MG/ML
5 INJECTION, SOLUTION EPIDURAL; INFILTRATION; INTRACAUDAL; PERINEURAL ONCE
Status: COMPLETED | OUTPATIENT
Start: 2021-03-10 | End: 2021-03-10

## 2021-03-10 RX ADMIN — LIDOCAINE HYDROCHLORIDE 1.5 ML: 20 INJECTION, SOLUTION EPIDURAL; INFILTRATION; INTRACAUDAL; PERINEURAL at 13:58

## 2021-03-10 RX ADMIN — LIDOCAINE HYDROCHLORIDE 5 ML: 10 INJECTION, SOLUTION EPIDURAL; INFILTRATION; INTRACAUDAL; PERINEURAL at 13:53

## 2021-03-10 NOTE — DISCHARGE INSTRUCTIONS

## 2021-03-10 NOTE — H&P
History of Present Illness: The patient is a 45 y o  female who presents with complaints of   Neck pain      Patient Active Problem List   Diagnosis    Bilateral carpal tunnel syndrome    Degenerative disc disease, cervical    Lumbar radiculopathy    Sacroiliitis (HCC)    Lumbar spondylosis    Primary osteoarthritis of left knee    Morbid obesity with body mass index (BMI) of 50 0 to 59 9 in adult ([facility] )    Hypertension    Hyperlipidemia    Prediabetes    Chronic pain of both knees    Chronic pain syndrome    Cervical spondylosis       Past Medical History:   Diagnosis Date    Asthma     [facility]     endometrial    Hyperlipidemia     Hypertension     Prediabetes        Past Surgical History:   Procedure Laterality Date    KNEE ARTHROSCOPY Right     LUMBAR FUSION      TOTAL HIP ARTHROPLASTY Bilateral          Current Outpatient Medications:     azelastine (OPTIVAR) 0 05 % ophthalmic solution, Apply to eye, Disp: , Rfl:     buPROPion (WELLBUTRIN XL) 300 mg 24 hr tablet, Take by mouth, Disp: , Rfl:     busPIRone (BUSPAR) 10 mg tablet, TAKE 1 TABLET BY MOUTH TWICE A DAY, Disp: , Rfl:     Cholecalciferol (VITAMIN D3) 2000 units capsule, TAKE 2 CAPSULES BY MOUTH DAILY INDICATIONS: VITAMIN D DEFICIENCY , Disp: , Rfl: 5    Cholecalciferol 2000 units TABS, Take 2 capsules by mouth, Disp: , Rfl:     diclofenac sodium (VOLTAREN) 1 %, Apply 2 g topically 4 (four) times a day, Disp: 1 Tube, Rfl: 3    DULoxetine (CYMBALTA) 60 mg delayed release capsule, Take 60 mg by mouth, Disp: , Rfl:     ferrous sulfate 325 (65 Fe) mg tablet, Take by mouth, Disp: , Rfl:     fluticasone-salmeterol (ADVAIR DISKUS) 250-50 mcg/dose inhaler, Inhale 1 puff, Disp: , Rfl:     gabapentin (NEURONTIN) 300 mg capsule, Take 2 capsules (600 mg total) by mouth 2 (two) times a day, Disp: 120 capsule, Rfl: 2    hydrocortisone (CVS CORTISONE MAXIMUM STRENGTH) 1 % cream, Apply topically, Disp: , Rfl:     lisinopril (ZESTRIL) 10 mg tablet, Take by mouth, Disp: , Rfl:     loratadine (CLARITIN) 10 mg tablet, Take by mouth, Disp: , Rfl:     meloxicam (MOBIC) 15 mg tablet, Take 1 tablet (15 mg total) by mouth daily, Disp: 30 tablet, Rfl: 0    metFORMIN (GLUCOPHAGE) 500 mg tablet, Take 500 mg by mouth, Disp: , Rfl:     simvastatin (ZOCOR) 40 mg tablet, Take by mouth, Disp: , Rfl:     VENTOLIN  (90 Base) MCG/ACT inhaler, INHALE 2 PUFFS EVERY 4 (FOUR) HOURS AS NEEDED FOR WHEEZING OR SHORTNESS OF BREATH , Disp: , Rfl: 2    Current Facility-Administered Medications:     lidocaine (PF) (XYLOCAINE-MPF) 1 % injection 5 mL, 5 mL, Other, Once, Andrade Ignacio,     lidocaine (PF) (XYLOCAINE-MPF) 2 % injection 2 mL, 2 mL, Other, Once, Willie Ignacio, DO    Allergies   Allergen Reactions    Bee Venom Swelling    Dust Mite Extract     Fish-Derived Products Hives    Iodine Hives    Molds & Smuts     Other Swelling    Pollen Extract     Shrimp Flavor Hives       Physical Exam:   Vitals:    03/10/21 1336   BP: 139/90   Pulse: 72   Resp: 20   Temp: 97 5 °F (36 4 °C)   SpO2: 98%     General: Awake, Alert, Oriented x 3, Mood and affect appropriate  Respiratory: Respirations even and unlabored  Cardiovascular: Peripheral pulses intact; no edema  Musculoskeletal Exam:   Right cervical paraspinals tender to palpation  ASA Score: 3    Patient/Chart Verification  Patient ID Verified: Verbal  Consents Confirmed: Procedural, To be obtained in the Pre-Procedure area  H&P( within 30 days) Verified: To be obtained in the Pre-Procedure area  Allergies Reviewed: Yes  Anticoag/NSAID held?: NA  Currently on antibiotics?: No  Pregnancy denied?: Yes    Assessment:   1   Cervical spondylosis        Plan: RIGHT C4-6 MBB # 1

## 2021-03-17 ENCOUNTER — TELEPHONE (OUTPATIENT)
Dept: RADIOLOGY | Facility: CLINIC | Age: 51
End: 2021-03-17

## 2021-03-17 NOTE — TELEPHONE ENCOUNTER
Spoke with patient to offer to schedule RIGHT C4-6 MBB # 2, she did not have her schedule with her and she WCB when she has it to schedule

## 2021-03-25 DIAGNOSIS — M47.812 CERVICAL SPONDYLOSIS: Primary | ICD-10-CM

## 2021-03-25 NOTE — TELEPHONE ENCOUNTER
Patient contacted and scheduled for RIGHT C4-6 MBB # 2  All pre procedure instructions were given to patient   Nothing to eat or drink for 1 hour prior  Loose fitting clothing   NSAIDS, ANTICOAG'S OKAY   Denies Antibx   NO PRN PAIN MEDS 6 HOURS PRIOR   Needs    Patient directed to call the office if becomes sick, as we will most likely reschedule  Insurance auth received but is not a guarantee of payment per your insurance company's authorization disclaimer and it is your responsibility to verify your benefits     COVID -19 screening complete     Also verified appointment with Megha prior to injection

## 2021-03-31 ENCOUNTER — OFFICE VISIT (OUTPATIENT)
Dept: PAIN MEDICINE | Facility: CLINIC | Age: 51
End: 2021-03-31
Payer: COMMERCIAL

## 2021-03-31 ENCOUNTER — HOSPITAL ENCOUNTER (OUTPATIENT)
Dept: RADIOLOGY | Facility: CLINIC | Age: 51
Discharge: HOME | End: 2021-03-31
Attending: ANESTHESIOLOGY
Payer: COMMERCIAL

## 2021-03-31 VITALS
DIASTOLIC BLOOD PRESSURE: 82 MMHG | WEIGHT: 277 LBS | HEART RATE: 71 BPM | TEMPERATURE: 98.5 F | SYSTOLIC BLOOD PRESSURE: 126 MMHG | HEIGHT: 64 IN | BODY MASS INDEX: 47.29 KG/M2

## 2021-03-31 VITALS
SYSTOLIC BLOOD PRESSURE: 139 MMHG | RESPIRATION RATE: 20 BRPM | TEMPERATURE: 98.1 F | HEART RATE: 84 BPM | OXYGEN SATURATION: 98 % | DIASTOLIC BLOOD PRESSURE: 94 MMHG

## 2021-03-31 DIAGNOSIS — M47.812 CERVICAL SPONDYLOSIS: ICD-10-CM

## 2021-03-31 DIAGNOSIS — G89.4 CHRONIC PAIN SYNDROME: Primary | ICD-10-CM

## 2021-03-31 DIAGNOSIS — M50.30 DEGENERATIVE DISC DISEASE, CERVICAL: ICD-10-CM

## 2021-03-31 DIAGNOSIS — M47.816 LUMBAR SPONDYLOSIS: ICD-10-CM

## 2021-03-31 DIAGNOSIS — M54.12 CERVICAL RADICULOPATHY: ICD-10-CM

## 2021-03-31 DIAGNOSIS — M54.16 LUMBAR RADICULOPATHY: ICD-10-CM

## 2021-03-31 PROCEDURE — 64491 INJ PARAVERT F JNT C/T 2 LEV: CPT | Performed by: ANESTHESIOLOGY

## 2021-03-31 PROCEDURE — 64490 INJ PARAVERT F JNT C/T 1 LEV: CPT | Performed by: ANESTHESIOLOGY

## 2021-03-31 PROCEDURE — 99214 OFFICE O/P EST MOD 30 MIN: CPT | Performed by: NURSE PRACTITIONER

## 2021-03-31 RX ORDER — LIDOCAINE HYDROCHLORIDE 10 MG/ML
5 INJECTION, SOLUTION EPIDURAL; INFILTRATION; INTRACAUDAL; PERINEURAL ONCE
Status: COMPLETED | OUTPATIENT
Start: 2021-03-31 | End: 2021-03-31

## 2021-03-31 RX ORDER — OMEGA-3/DHA/EPA/FISH OIL 35-113.5MG
1000 TABLET,CHEWABLE ORAL DAILY
COMMUNITY
Start: 2021-03-04

## 2021-03-31 RX ORDER — GABAPENTIN 300 MG/1
600 CAPSULE ORAL 2 TIMES DAILY
Qty: 120 CAPSULE | Refills: 2 | Status: SHIPPED | OUTPATIENT
Start: 2021-03-31 | End: 2021-05-26 | Stop reason: SDUPTHER

## 2021-03-31 RX ORDER — BUPIVACAINE HYDROCHLORIDE 5 MG/ML
30 INJECTION, SOLUTION EPIDURAL; INTRACAUDAL ONCE
Status: COMPLETED | OUTPATIENT
Start: 2021-03-31 | End: 2021-03-31

## 2021-03-31 RX ORDER — FLUTICASONE PROPIONATE 50 MCG
2 SPRAY, SUSPENSION (ML) NASAL DAILY
COMMUNITY
Start: 2021-03-30 | End: 2022-03-30

## 2021-03-31 RX ADMIN — LIDOCAINE HYDROCHLORIDE 3 ML: 10 INJECTION, SOLUTION EPIDURAL; INFILTRATION; INTRACAUDAL; PERINEURAL at 14:39

## 2021-03-31 RX ADMIN — BUPIVACAINE HYDROCHLORIDE 1.5 ML: 5 INJECTION, SOLUTION EPIDURAL; INTRACAUDAL at 14:45

## 2021-03-31 NOTE — PROGRESS NOTES
Assessment:  1  Chronic pain syndrome    2  Cervical radiculopathy    3  Lumbar radiculopathy    4  Degenerative disc disease, cervical    5  Lumbar spondylosis    6  Cervical spondylosis        Plan:  1  Patient will move forward with right C4-6 medial branch block #2 that is scheduled today  If she has a favorable response, we will then move forward to read radiofrequency ablation for longer lasting pain relief  2  I offered to schedule the patient for bilateral L4 TFESI, however she declines at this time  An informational brochure was provided to the patient to review  3  The patient may continue gabapentin 600 mg twice a day as prescribed  This medication was refilled today   4  The patient may continue meloxicam 15 mg p r n  as prescribed by Orthopedics I will continue to follow with them for her knee complaints   5  Patient may continue duloxetine as prescribed for mood   6  The patient will continue with her home exercise program   7  The patient will follow-up in 8 weeks for medication prescription refill and reevaluation  The patient was advised to contact the office should their symptoms worsen in the interim  The patient was agreeable and verbalized an understanding  M*Modal software was used to dictate this note  It may contain errors with dictating incorrect words or incorrect spelling  Please contact the provider directly with any questions  History of Present Illness: The patient is a 45 y o  female with a history of fibromyalgia and carpal tunnel syndrome last seen on 11/19/20 who presents for a follow up office visit in regards to chronic neck pain and low back pain that radiates into the lower extremities no specific dermatomal distribution secondary to  cervical degenerative disc disease, cervical spondylosis, cervical stenosis, osteoarthritis, lumbar spondylosis, lumbar stenosis, and chronic pain syndrome    The patient denies bowel or bladder incontinence, balance issues or saddle anesthesia  The patient is status post left C4-6 RFA completed on February 23, 2021  She does report some improvement of her neck pain, however she does continue a lot of right-sided neck pain so she states it is hard to gauge  She is scheduled for the 2nd round of right-sided C4-6 blocks today  She does continue with low back and leg pain as well  MRI of the lumbar spine does reveal grade 1 anterolisthesis at L4-5 with lateral recess stenosis without any significant canal or foraminal stenosis  There is also a small disc herniation at L5-S1 that does not cause any canal or foraminal stenosis  The patient continues to follow with Orthopedics and receives corticosteroid injections by their practice  The patient currently rates her pain as 7/10 on the numeric pain rating scale  She states her pain is intermittent in nature bothersome the morning and at night  She characterizes the pain as burning, sharp, throbbing, pressure-like, shooting, numbness and pins and needles    Current pain medications includes:  Gabapentin  600 mg twice a day, meloxicam 15 mg daily p r n , and duloxetine 60 mg daily as prescribed for mood   The patient reports that this regimen is providing 55% pain relief  The patient is reporting no side effects from this pain medication regimen  I have personally reviewed and/or updated the patient's past medical history, past surgical history, family history, social history, current medications, allergies, and vital signs today  Review of Systems:    Review of Systems   Respiratory: Negative for shortness of breath  Cardiovascular: Negative for chest pain  Gastrointestinal: Negative for constipation, diarrhea, nausea and vomiting  Musculoskeletal: Negative for arthralgias, gait problem, joint swelling and myalgias  Skin: Negative for rash  Neurological: Negative for dizziness, seizures and weakness  All other systems reviewed and are negative          Past Medical History:   Diagnosis Date    Asthma     [facility] ([facility]     endometrial    Hyperlipidemia     Hypertension     Prediabetes        Past Surgical History:   Procedure Laterality Date    KNEE ARTHROSCOPY Right     LUMBAR FUSION      TOTAL HIP ARTHROPLASTY Bilateral        Family History   Problem Relation Age of Onset    Diabetes Mother     Stroke Mother     Diabetes Father     Cancer Father     Diabetes Sister     Diabetes Brother        Social History     Occupational History    Not on file   Tobacco Use    Smoking status: Former Smoker    Smokeless tobacco: Never Used   Substance and Sexual Activity    Alcohol use: Not on file    Drug use: Not on file    Sexual activity: Not on file         Current Outpatient Medications:     azelastine (OPTIVAR) 0 05 % ophthalmic solution, Apply to eye, Disp: , Rfl:     buPROPion (WELLBUTRIN XL) 300 mg 24 hr tablet, Take by mouth, Disp: , Rfl:     busPIRone (BUSPAR) 10 mg tablet, TAKE 1 TABLET BY MOUTH TWICE A DAY, Disp: , Rfl:     Calcium Citrate-Vitamin D 250-200 MG-UNIT TABS, Take 2 tablets by mouth Three times a day, Disp: , Rfl:     Cholecalciferol (VITAMIN D3) 2000 units capsule, TAKE 2 CAPSULES BY MOUTH DAILY INDICATIONS: VITAMIN D DEFICIENCY , Disp: , Rfl: 5    Cholecalciferol 2000 units TABS, Take 2 capsules by mouth, Disp: , Rfl:     CVS Vitamin B-12 1000 MCG tablet, Take 1,000 mcg by mouth daily, Disp: , Rfl:     diclofenac sodium (VOLTAREN) 1 %, Apply 2 g topically 4 (four) times a day, Disp: 1 Tube, Rfl: 3    DULoxetine (CYMBALTA) 60 mg delayed release capsule, Take 60 mg by mouth, Disp: , Rfl:     ferrous sulfate 325 (65 Fe) mg tablet, Take by mouth, Disp: , Rfl:     fluticasone (FLONASE) 50 mcg/act nasal spray, 2 sprays into each nostril daily, Disp: , Rfl:     fluticasone-salmeterol (ADVAIR DISKUS) 250-50 mcg/dose inhaler, Inhale 1 puff, Disp: , Rfl:     gabapentin (NEURONTIN) 300 mg capsule, Take 2 capsules (600 mg total) by mouth 2 (two) times a day, Disp: 120 capsule, Rfl: 2    hydrocortisone (CVS CORTISONE MAXIMUM STRENGTH) 1 % cream, Apply topically, Disp: , Rfl:     lisinopril (ZESTRIL) 10 mg tablet, Take by mouth, Disp: , Rfl:     loratadine (CLARITIN) 10 mg tablet, Take by mouth, Disp: , Rfl:     meloxicam (MOBIC) 15 mg tablet, Take 1 tablet (15 mg total) by mouth daily, Disp: 30 tablet, Rfl: 0    simvastatin (ZOCOR) 40 mg tablet, Take by mouth, Disp: , Rfl:     VENTOLIN  (90 Base) MCG/ACT inhaler, INHALE 2 PUFFS EVERY 4 (FOUR) HOURS AS NEEDED FOR WHEEZING OR SHORTNESS OF BREATH , Disp: , Rfl: 2    metFORMIN (GLUCOPHAGE) 500 mg tablet, Take 500 mg by mouth, Disp: , Rfl:     Allergies   Allergen Reactions    Bee Venom Swelling    Dust Mite Extract     Fish-Derived Products - Food Allergy Hives    Iodine - Food Allergy Hives    Molds & Smuts     Other Swelling    Pollen Extract     Shrimp Flavor - Food Allergy Hives       Physical Exam:    /82   Pulse 71   Temp 98 5 °F (36 9 °C)   Ht 5' 4" (1 626 m)   Wt 126 kg (277 lb)   BMI 47 55 kg/m²     Constitutional:overweight  Eyes:anicteric  HEENT:grossly intact  Neck:supple, symmetric, trachea midline and no masses   Pulmonary:even and unlabored  Cardiovascular:No edema or pitting edema present  Skin:Normal without rashes or lesions and well hydrated  Psychiatric:Mood and affect appropriate  Neurologic:Cranial Nerves II-XII grossly intact  Musculoskeletal:Slightly antalgic gait but steady without the use of assistive devices      Imaging  No orders to display     MRI LUMBAR SPINE WITHOUT CONTRAST     INDICATION: M54 [id number]: Radiculopathy, lumbar region  M47 816: Spondylosis without myelopathy or radiculopathy, lumbar region      COMPARISON:  MRI dated 3/31/2018     TECHNIQUE:  Sagittal T1, sagittal T2, sagittal inversion recovery, axial T1 and axial T2, coronal T2     IMAGE QUALITY:  Diagnostic     FINDINGS:     VERTEBRAL BODIES:  There are 5 lumbar type vertebral bodies Minimal degenerative anterolisthesis at L4-5 is unchanged    No scoliosis  No compression fracture  Mild endplate degenerative changes  No suspicious marrow signal abnormality      SACRUM:  Normal signal within the sacrum  No evidence of insufficiency or stress fracture      DISTAL CORD AND CONUS:  Normal size and signal within the distal cord and conus      PARASPINAL SOFT TISSUES:  Paraspinal soft tissues are unremarkable      LOWER THORACIC DISC SPACES:  Normal disc height and signal   No disc herniation, canal stenosis or foraminal narrowing      LUMBAR DISC SPACES:     L1-L2:  Stable small right subarticular protrusion that mildly indents the thecal sac  No nerve root impingement  No canal or foraminal stenosis      L2-L3:  No disc herniation, canal or foraminal stenosis      L3-L4:  No disc herniation, canal or foraminal stenosis      L4-L5: Grade 1 retrolisthesis with uncovering of the disc is unchanged  Stable bilateral facet arthropathy  Lateral recess stenosis  No significant canal or foraminal stenosis      L5-S1:  Stable tiny central protrusion remains within the ventral epidural fat  No nerve root impingement  No canal or foraminal stenosis  Mild facet arthropathy      IMPRESSION:  Mild degenerative spondylosis without significant change  Tiny noncompressive right subarticular protrusion L1-2  Grade 1 degenerative anterolisthesis at L4-5  Tiny central noncompressive protrusion at L5-S1  MRI CERVICAL SPINE WITHOUT CONTRAST     INDICATION:  Chronic posterior neck pain and stiffness with limited range of motion      COMPARISON:  None      TECHNIQUE:  Sagittal T1, sagittal T2, sagittal inversion recovery, axial T2, axial  2D merge     IMAGE QUALITY:  Diagnostic     FINDINGS:     ALIGNMENT:  Straightening of the normal cervical lordosis  No compression fracture    No subluxation      MARROW SIGNAL:  Mild endplate marrow degenerative change at C6-C7      CERVICAL AND VISUALIZED THORACIC CORD: Normal signal within the visualized cord      PREVERTEBRAL AND PARASPINAL SOFT TISSUES:  Normal      VISUALIZED POSTERIOR FOSSA:  The visualized posterior fossa demonstrates no abnormal signal      CERVICAL DISC SPACES:     C2-C3:  Normal      C3-C4:  Normal      C4-C5:  Normal      C5-C6:  Slight loss of disc height with mild annular bulging  No discrete disc herniation, canal stenosis or foraminal narrowing      C6-C7:  Disc desiccation with slight loss of disc height  Mild right greater than left uncinate joint hypertrophic degenerative change  No canal stenosis  Mild right foraminal narrowing without discrete nerve impingement      C7-T1:  Normal      UPPER THORACIC DISC SPACES:  Normal      IMPRESSION:     Mild noncompressive cervical degenerative change at C5-6 and C6-7              No orders of the defined types were placed in this encounter

## 2021-03-31 NOTE — H&P
History of Present Illness: The patient is a 45 y o  female who presents with complaints of   Neck pain      Patient Active Problem List   Diagnosis    Bilateral carpal tunnel syndrome    Degenerative disc disease, cervical    Lumbar radiculopathy    Sacroiliitis (HCC)    Lumbar spondylosis    Primary osteoarthritis of left knee    Morbid obesity with body mass index (BMI) of 50 0 to 59 9 in adult ([facility] [facility]    Hypertension    Hyperlipidemia    Prediabetes    Chronic pain of both knees    Chronic pain syndrome    Cervical spondylosis       Past Medical History:   Diagnosis Date    Asthma     [facility]     endometrial    Hyperlipidemia     Hypertension     Prediabetes        Past Surgical History:   Procedure Laterality Date    KNEE ARTHROSCOPY Right     LUMBAR FUSION      TOTAL HIP ARTHROPLASTY Bilateral          Current Outpatient Medications:     azelastine (OPTIVAR) 0 05 % ophthalmic solution, Apply to eye, Disp: , Rfl:     buPROPion (WELLBUTRIN XL) 300 mg 24 hr tablet, Take by mouth, Disp: , Rfl:     busPIRone (BUSPAR) 10 mg tablet, TAKE 1 TABLET BY MOUTH TWICE A DAY, Disp: , Rfl:     Calcium Citrate-Vitamin D 250-200 MG-UNIT TABS, Take 2 tablets by mouth Three times a day, Disp: , Rfl:     Cholecalciferol (VITAMIN D3) 2000 units capsule, TAKE 2 CAPSULES BY MOUTH DAILY INDICATIONS: VITAMIN D DEFICIENCY , Disp: , Rfl: 5    Cholecalciferol 2000 units TABS, Take 2 capsules by mouth, Disp: , Rfl:     CVS Vitamin B-12 1000 MCG tablet, Take 1,000 mcg by mouth daily, Disp: , Rfl:     diclofenac sodium (VOLTAREN) 1 %, Apply 2 g topically 4 (four) times a day, Disp: 1 Tube, Rfl: 3    DULoxetine (CYMBALTA) 60 mg delayed release capsule, Take 60 mg by mouth, Disp: , Rfl:     ferrous sulfate 325 (65 Fe) mg tablet, Take by mouth, Disp: , Rfl:     fluticasone (FLONASE) 50 mcg/act nasal spray, 2 sprays into each nostril daily, Disp: , Rfl:     fluticasone-salmeterol (ADVAIR DISKUS) 250-50 mcg/dose inhaler, Inhale 1 puff, Disp: , Rfl:     gabapentin (NEURONTIN) 300 mg capsule, Take 2 capsules (600 mg total) by mouth 2 (two) times a day, Disp: 120 capsule, Rfl: 2    hydrocortisone (CVS CORTISONE MAXIMUM STRENGTH) 1 % cream, Apply topically, Disp: , Rfl:     lisinopril (ZESTRIL) 10 mg tablet, Take by mouth, Disp: , Rfl:     loratadine (CLARITIN) 10 mg tablet, Take by mouth, Disp: , Rfl:     meloxicam (MOBIC) 15 mg tablet, Take 1 tablet (15 mg total) by mouth daily, Disp: 30 tablet, Rfl: 0    metFORMIN (GLUCOPHAGE) 500 mg tablet, Take 500 mg by mouth, Disp: , Rfl:     simvastatin (ZOCOR) 40 mg tablet, Take by mouth, Disp: , Rfl:     VENTOLIN  (90 Base) MCG/ACT inhaler, INHALE 2 PUFFS EVERY 4 (FOUR) HOURS AS NEEDED FOR WHEEZING OR SHORTNESS OF BREATH , Disp: , Rfl: 2    Allergies   Allergen Reactions    Bee Venom Swelling    Dust Mite Extract     Fish-Derived Products - Food Allergy Hives    Iodine - Food Allergy Hives    Molds & Smuts     Other Swelling    Pollen Extract     Shrimp Flavor - Food Allergy Hives       Physical Exam:   Vitals:    03/31/21 1416   BP: 161/98   Pulse: 77   Resp: 20   Temp: 98 1 °F (36 7 °C)   SpO2: 97%     General: Awake, Alert, Oriented x 3, Mood and affect appropriate  Respiratory: Respirations even and unlabored  Cardiovascular: Peripheral pulses intact; no edema  Musculoskeletal Exam:   Right cervical paraspinals tender to palpation  ASA Score: 3    Patient/Chart Verification  Patient ID Verified: Verbal  ID Band Applied: No  Consents Confirmed: Procedural  H&P( within 30 days) Verified: To be obtained in the Pre-Procedure area  Interval H&P(within 24 hr) Complete (required for Outpatients and Surgery Admit only): To be obtained in the Pre-Procedure area  Allergies Reviewed: Yes  Anticoag/NSAID held?: No  Currently on antibiotics?: No  Pregnancy denied?: Yes  Pre-op Lab/Test Results Available: N/A  Pregnancy Lab Collected: N/A comment    Assessment:   1  Cervical spondylosis        Plan: RIGHT C4-6 MBB # 2

## 2021-03-31 NOTE — DISCHARGE INSTRUCTIONS
ACTIVITY  · Please do activities that will bring on the normal pain that we are rating  For example, if vacuuming or walking increases the pain, do that  This will give the most accurate response to the diary  · You may shower, but no tub baths today, or applied heat  CARE OF THE INJECTION SITE  · This area may be numb for several hours after the injection  · [facility]ify [facility] if you have any of the following:  redness, drainage, swelling, or fever above 100°F     SPECIAL INSTRUCTIONS  · Please return the MBB diary to our office by mail, fax, or drop it off  MEDICATIONS  · Please do not take any break through or short acting pain medications for 8 hours after the block  · Continue to take all routine medications  · Our office may have instructed you to hold some medications  As no general anesthesia was used in today's procedure, you should not experience any side effects related to anesthesia  If you have a problem specifically related to your procedure, please call our office at [phone]  Problems not related to your procedure should be directed to your primary care physician

## 2021-04-02 DIAGNOSIS — M47.812 CERVICAL SPONDYLOSIS: Primary | ICD-10-CM

## 2021-04-21 ENCOUNTER — HOSPITAL ENCOUNTER (OUTPATIENT)
Dept: RADIOLOGY | Facility: CLINIC | Age: 51
Discharge: HOME | End: 2021-04-21
Attending: ANESTHESIOLOGY
Payer: COMMERCIAL

## 2021-04-21 ENCOUNTER — TELEPHONE (OUTPATIENT)
Dept: PAIN MEDICINE | Facility: CLINIC | Age: 51
End: 2021-04-21

## 2021-04-21 VITALS
SYSTOLIC BLOOD PRESSURE: 151 MMHG | RESPIRATION RATE: 18 BRPM | HEART RATE: 66 BPM | DIASTOLIC BLOOD PRESSURE: 87 MMHG | TEMPERATURE: 97.3 F | OXYGEN SATURATION: 98 %

## 2021-04-21 DIAGNOSIS — M47.812 CERVICAL SPONDYLOSIS: ICD-10-CM

## 2021-04-21 PROCEDURE — 64633 DESTROY CERV/THOR FACET JNT: CPT | Performed by: ANESTHESIOLOGY

## 2021-04-21 PROCEDURE — 64634 DESTROY C/TH FACET JNT ADDL: CPT | Performed by: ANESTHESIOLOGY

## 2021-04-21 RX ORDER — BUPIVACAINE HYDROCHLORIDE 5 MG/ML
30 INJECTION, SOLUTION EPIDURAL; INTRACAUDAL ONCE
Status: COMPLETED | OUTPATIENT
Start: 2021-04-21 | End: 2021-04-21

## 2021-04-21 RX ORDER — LIDOCAINE HYDROCHLORIDE 10 MG/ML
5 INJECTION, SOLUTION EPIDURAL; INFILTRATION; INTRACAUDAL; PERINEURAL ONCE
Status: COMPLETED | OUTPATIENT
Start: 2021-04-21 | End: 2021-04-21

## 2021-04-21 RX ADMIN — LIDOCAINE HYDROCHLORIDE 3 ML: 20 INJECTION, SOLUTION EPIDURAL; INFILTRATION; INTRACAUDAL; PERINEURAL at 14:11

## 2021-04-21 RX ADMIN — BUPIVACAINE HYDROCHLORIDE 3 ML: 5 INJECTION, SOLUTION EPIDURAL; INTRACAUDAL at 14:11

## 2021-04-21 RX ADMIN — LIDOCAINE HYDROCHLORIDE 8 ML: 10 INJECTION, SOLUTION EPIDURAL; INFILTRATION; INTRACAUDAL; PERINEURAL at 14:10

## 2021-04-21 NOTE — DISCHARGE INSTRUCTIONS

## 2021-04-21 NOTE — H&P
History of Present Illness: The patient is a 45 y o  female who presents with complaints of   Neck pain      Patient Active Problem List   Diagnosis    Bilateral carpal tunnel syndrome    Degenerative disc disease, cervical    Lumbar radiculopathy    Sacroiliitis (HCC)    Lumbar spondylosis    Primary osteoarthritis of left knee    Morbid obesity with body mass index (BMI) of 50 0 to 59 9 in adult ([facility] [address] )    Hypertension    Hyperlipidemia    Prediabetes    Chronic pain of both knees    Chronic pain syndrome    Cervical spondylosis       Past Medical History:   Diagnosis Date    Asthma     [facility]     endometrial    Hyperlipidemia     Hypertension     Prediabetes        Past Surgical History:   Procedure Laterality Date    KNEE ARTHROSCOPY Right     LUMBAR FUSION      TOTAL HIP ARTHROPLASTY Bilateral          Current Outpatient Medications:     azelastine (OPTIVAR) 0 05 % ophthalmic solution, Apply to eye, Disp: , Rfl:     buPROPion (WELLBUTRIN XL) 300 mg 24 hr tablet, Take by mouth, Disp: , Rfl:     busPIRone (BUSPAR) 10 mg tablet, TAKE 1 TABLET BY MOUTH TWICE A DAY, Disp: , Rfl:     Calcium Citrate-Vitamin D 250-200 MG-UNIT TABS, Take 2 tablets by mouth Three times a day, Disp: , Rfl:     Cholecalciferol (VITAMIN D3) 2000 units capsule, TAKE 2 CAPSULES BY MOUTH DAILY INDICATIONS: VITAMIN D DEFICIENCY , Disp: , Rfl: 5    Cholecalciferol 2000 units TABS, Take 2 capsules by mouth, Disp: , Rfl:     [employer]S Vitamin B-12 1000 MCG tablet, Take 1,000 mcg by mouth daily, Disp: , Rfl:     diclofenac sodium (VOLTAREN) 1 %, Apply 2 g topically 4 (four) times a day, Disp: 1 Tube, Rfl: 3    DULoxetine (CYMBALTA) 60 mg delayed release capsule, Take 60 mg by mouth, Disp: , Rfl:     ferrous sulfate 325 (65 Fe) mg tablet, Take by mouth, Disp: , Rfl:     fluticasone (FLONASE) 50 mcg/act nasal spray, 2 sprays into each nostril daily, Disp: , Rfl:     fluticasone-salmeterol (ADVAIR DISKUS) 250-50 mcg/dose inhaler, Inhale 1 puff, Disp: , Rfl:     gabapentin (NEURONTIN) 300 mg capsule, Take 2 capsules (600 mg total) by mouth 2 (two) times a day, Disp: 120 capsule, Rfl: 2    hydrocortisone (CVS CORTISONE MAXIMUM STRENGTH) 1 % cream, Apply topically, Disp: , Rfl:     lisinopril (ZESTRIL) 10 mg tablet, Take by mouth, Disp: , Rfl:     loratadine (CLARITIN) 10 mg tablet, Take by mouth, Disp: , Rfl:     meloxicam (MOBIC) 15 mg tablet, Take 1 tablet (15 mg total) by mouth daily, Disp: 30 tablet, Rfl: 0    metFORMIN (GLUCOPHAGE) 500 mg tablet, Take 500 mg by mouth, Disp: , Rfl:     simvastatin (ZOCOR) 40 mg tablet, Take by mouth, Disp: , Rfl:     VENTOLIN  (90 Base) MCG/ACT inhaler, INHALE 2 PUFFS EVERY 4 (FOUR) HOURS AS NEEDED FOR WHEEZING OR SHORTNESS OF BREATH , Disp: , Rfl: 2    Current Facility-Administered Medications:     bupivacaine (PF) (MARCAINE) 0 5 % injection 30 mL, 30 mL, Injection, Once, Willie Ignacio, DO    lidocaine (PF) (XYLOCAINE-MPF) 1 % injection 5 mL, 5 mL, Other, Once, Willie Ignacio, DO    lidocaine (PF) (XYLOCAINE-MPF) 2 % injection 4 mL, 4 mL, Other, Once, Willie Ignacio, DO    Allergies   Allergen Reactions    Bee Venom Swelling    Dust Mite Extract     Fish-Derived Products - Food Allergy Hives    Iodine - Food Allergy Hives    Molds & Smuts     Other Swelling    Pollen Extract     Shrimp Flavor - Food Allergy Hives       Physical Exam:   Vitals:    04/21/21 1317   BP: 127/88   Pulse: 75   Resp: 20   Temp: (!) 97 3 °F (36 3 °C)   SpO2: 96%     General: Awake, Alert, Oriented x 3, Mood and affect appropriate  Respiratory: Respirations even and unlabored  Cardiovascular: Peripheral pulses intact; no edema  Musculoskeletal Exam:   Right cervical paraspinals tender to palpation  ASA Score: 3    Patient/Chart Verification  Patient ID Verified: Verbal  ID Band Applied: No  Consents Confirmed: Procedural  H&P( within 30 days) Verified:  To be obtained in the Pre-Procedure area  Interval H&P(within 24 hr) Complete (required for Outpatients and Surgery Admit only): To be obtained in the Pre-Procedure area  Allergies Reviewed: Yes  Anticoag/NSAID held?: NA  Currently on antibiotics?: No  Pregnancy denied?: NA    Assessment:   1   Cervical spondylosis        Plan: RIGHT C4-6 MBB RFA

## 2021-04-21 NOTE — TELEPHONE ENCOUNTER
Pt S/P  RT C4-6 RFA on 4/21 with Noreene Ear    Next ov scheduled 5/26 1330    Please call 4/22 post RFA

## 2021-04-22 NOTE — TELEPHONE ENCOUNTER
FYI-      RN s/w pt  Pt states " I am ok, I was a little sore last night but better today " Pt advised she can try ice, heat and medicate her pain  Pt denies s/s of infection and or sunburn like sensation  Pt reminded it takes 2 weeks to notice a difference and 4-6 weeks for the full effect  Pt verbalized understanding  OV confirmed for 5/26 with a 1:30 arrival time

## 2021-04-26 ENCOUNTER — TELEPHONE (OUTPATIENT)
Dept: PAIN MEDICINE | Facility: CLINIC | Age: 51
End: 2021-04-26

## 2021-04-26 NOTE — TELEPHONE ENCOUNTER
Patient called for help with her My chart on her last After visit Summery 4/21 where code usually is it said    Our records indicate that your [employer] account has been turned off  If you would like to turn back on your [employer]  account, call [phone] ([phone]) to talk to our customer support staff  I provided patient [place] to call for help

## 2021-05-26 ENCOUNTER — OFFICE VISIT (OUTPATIENT)
Dept: PAIN MEDICINE | Facility: CLINIC | Age: 51
End: 2021-05-26
Payer: COMMERCIAL

## 2021-05-26 VITALS
WEIGHT: 270 LBS | SYSTOLIC BLOOD PRESSURE: 158 MMHG | TEMPERATURE: 98.5 F | HEART RATE: 85 BPM | DIASTOLIC BLOOD PRESSURE: 90 MMHG | BODY MASS INDEX: 46.1 KG/M2 | HEIGHT: 64 IN

## 2021-05-26 DIAGNOSIS — M54.16 LUMBAR RADICULOPATHY: ICD-10-CM

## 2021-05-26 DIAGNOSIS — M54.12 CERVICAL RADICULOPATHY: ICD-10-CM

## 2021-05-26 DIAGNOSIS — M47.812 CERVICAL SPONDYLOSIS: ICD-10-CM

## 2021-05-26 DIAGNOSIS — G89.4 CHRONIC PAIN SYNDROME: Primary | ICD-10-CM

## 2021-05-26 PROCEDURE — 99214 OFFICE O/P EST MOD 30 MIN: CPT | Performed by: NURSE PRACTITIONER

## 2021-05-26 RX ORDER — GABAPENTIN 300 MG/1
600 CAPSULE ORAL 2 TIMES DAILY
Qty: 120 CAPSULE | Refills: 2 | Status: SHIPPED | OUTPATIENT
Start: 2021-05-26 | End: 2021-07-27

## 2021-05-26 NOTE — PROGRESS NOTES
Assessment:  1  Chronic pain syndrome    2  Cervical radiculopathy    3  Lumbar radiculopathy    4  Cervical spondylosis        Plan:  1  The patient may continue gabapentin 600 mg twice a day as prescribed  This medication was refilled today  2  We will hold off on any type of interventional procedure at this time as the patient is scheduled for an LSG tomorrow  3  I did inform patient that she would have to discontinue use of meloxicam and any NSAIDs after gastric surgery  4  Patient may continue duloxetine as prescribed  5  May consider a right L4 TFESI in the future  6  Patient will continue with her home exercise program  7  The patient will follow-up in 8 weeks or sooner if needed     M*Modal software was used to dictate this note  It may contain errors with dictating incorrect words or incorrect spelling  Please contact the provider directly with any questions  History of Present Illness: The patient is a 45 y o  female with a history of fibromyalgia and carpal tunnel syndrome last seen on 3/31/21 who presents for a follow up office visit in regards to chronic neck pain and low back pain that radiates into the bilateral upper and lower extremities in no specific dermatomal distribution  The patient denies bowel or bladder incontinence, balance issues or saddle anesthesia  The patient is status post left C4-6 RFA completed on February 23, 2021 and right C4-6 RFA completed on April 22, 2021 with a 60% improvement of her pain from the procedure  She does still continue with some low back pain as well that radiates into the lower extremities and no specific dermatomal distribution  MRI of the lumbar spine does reveal grade 1 anterolisthesis at L4-5 with lateral recess stenosis without any significant canal or foraminal stenosis  There is also a small disc herniation at L5-S1 that does not cause any canal or foraminal stenosis    The patient continues to follow with Orthopedics and receives corticosteroid injections by their practice for her knee complaints  She is scheduled for an LSG tomorrow at [facility]  The patient rates her pain a 7/10 on the numeric pain rating scale  She states her pain is constant nature and bothersome the evening at night  She characterizes the pain as burning, dull aching, throbbing, numbness and pins and needles    Current pain medications includes:  Gabapentin 600 mg twice a day, duloxetine 60 mg daily as prescribed for mood and meloxicam 15 mg daily p r n     The patient reports that this regimen is providing 60% pain relief  The patient is reporting no side effects from this pain medication regimen  I have personally reviewed and/or updated the patient's past medical history, past surgical history, family history, social history, current medications, allergies, and vital signs today  Review of Systems:    Review of Systems   Respiratory: Negative for shortness of breath  Cardiovascular: Negative for chest pain  Gastrointestinal: Negative for constipation, diarrhea, nausea and vomiting  Musculoskeletal: Negative for arthralgias, gait problem, joint swelling and myalgias  Skin: Negative for rash  Neurological: Negative for dizziness, seizures and weakness  All other systems reviewed and are negative          Past Medical History:   Diagnosis Date    Asthma     Cancer ([facility] [address] [facility]     endometrial    Hyperlipidemia     Hypertension     Prediabetes        Past Surgical History:   Procedure Laterality Date    KNEE ARTHROSCOPY Right     LUMBAR FUSION      TOTAL HIP ARTHROPLASTY Bilateral        Family History   Problem Relation Age of Onset    Diabetes Mother     Stroke Mother     Diabetes Father     Cancer Father     Diabetes Sister     Diabetes Brother        Social History     Occupational History    Not on file   Tobacco Use    Smoking status: Former Smoker    Smokeless tobacco: Never Used   Substance and Sexual Activity    Alcohol use: Not on file    Drug use: Not on file    Sexual activity: Not on file         Current Outpatient Medications:     azelastine (OPTIVAR) 0 05 % ophthalmic solution, Apply to eye, Disp: , Rfl:     buPROPion (WELLBUTRIN XL) 300 mg 24 hr tablet, Take by mouth, Disp: , Rfl:     busPIRone (BUSPAR) 10 mg tablet, TAKE 1 TABLET BY MOUTH TWICE A DAY, Disp: , Rfl:     Calcium Citrate-Vitamin D 250-200 MG-UNIT TABS, Take 2 tablets by mouth Three times a day, Disp: , Rfl:     Cholecalciferol (VITAMIN D3) 2000 units capsule, TAKE 2 CAPSULES BY MOUTH DAILY INDICATIONS: VITAMIN D DEFICIENCY , Disp: , Rfl: 5    Cholecalciferol 2000 units TABS, Take 2 capsules by mouth, Disp: , Rfl:     Cholecalciferol 50 MCG (2000 UT) CAPS, TAKE 2 CAPSULES BY MOUTH DAILY INDICATIONS: VITAMIN D DEFICIENCY , Disp: , Rfl:     [employer]S Vitamin B-12 1000 MCG tablet, Take 1,000 mcg by mouth daily, Disp: , Rfl:     diclofenac sodium (VOLTAREN) 1 %, Apply 2 g topically 4 (four) times a day, Disp: 1 Tube, Rfl: 3    DULoxetine (CYMBALTA) 60 mg delayed release capsule, Take 60 mg by mouth, Disp: , Rfl:     ferrous sulfate 325 (65 Fe) mg tablet, Take by mouth, Disp: , Rfl:     fluticasone (FLONASE) 50 mcg/act nasal spray, 2 sprays into each nostril daily, Disp: , Rfl:     fluticasone-salmeterol (ADVAIR DISKUS) 250-50 mcg/dose inhaler, Inhale 1 puff, Disp: , Rfl:     gabapentin (NEURONTIN) 300 mg capsule, Take 2 capsules (600 mg total) by mouth 2 (two) times a day, Disp: 120 capsule, Rfl: 2    hydrocortisone ([facility] CORTISONE MAXIMUM STRENGTH) 1 % cream, Apply topically, Disp: , Rfl:     lisinopril (ZESTRIL) 10 mg tablet, Take by mouth, Disp: , Rfl:     loratadine (CLARITIN) 10 mg tablet, Take by mouth, Disp: , Rfl:     meloxicam (MOBIC) 15 mg tablet, Take 1 tablet (15 mg total) by mouth daily, Disp: 30 tablet, Rfl: 0    simvastatin (ZOCOR) 40 mg tablet, Take by mouth, Disp: , Rfl:     VENTOLIN  (90 Base) MCG/ACT inhaler, INHALE 2 PUFFS EVERY 4 (FOUR) HOURS AS NEEDED FOR WHEEZING OR SHORTNESS OF BREATH , Disp: , Rfl: 2    metFORMIN (GLUCOPHAGE) 500 mg tablet, Take 500 mg by mouth, Disp: , Rfl:     Allergies   Allergen Reactions    Bee Venom Swelling    Dust Mite Extract     Fish-Derived Products - Food Allergy Hives    Iodine - Food Allergy Hives    Lac Bovis Other (See Comments)     congestion    Molds & Smuts     Other Swelling    Pollen Extract Other (See Comments)     Runny nose, watery eyes (also has corneal swelling) and itching  Triggers asthma    Shrimp Flavor - Food Allergy Hives       Physical Exam:    /90   Pulse 85   Temp 98 5 °F (36 9 °C)   Ht 5' 4" (1 626 m)   Wt 122 kg (270 lb)   BMI 46 35 kg/m²     Constitutional:normal, well developed, well nourished, alert, in no distress and non-toxic and no overt pain behavior  Eyes:anicteric  HEENT:grossly intact  Neck:supple, symmetric, trachea midline and no masses   Pulmonary:even and unlabored  Cardiovascular:No edema or pitting edema present  Skin:Normal without rashes or lesions and well hydrated  Psychiatric:Mood and affect appropriate  Neurologic:Cranial Nerves II-XII grossly intact  Musculoskeletal:Slightly antalgic gait but steady without the use of assistive devices      Imaging  No orders to display     Imagine reviewed    No orders of the defined types were placed in this encounter

## 2021-06-22 ENCOUNTER — OFFICE VISIT (OUTPATIENT)
Dept: OBGYN CLINIC | Facility: HOSPITAL | Age: 51
End: 2021-06-22
Payer: COMMERCIAL

## 2021-06-22 VITALS
HEART RATE: 74 BPM | SYSTOLIC BLOOD PRESSURE: 112 MMHG | BODY MASS INDEX: 43.36 KG/M2 | HEIGHT: 64 IN | DIASTOLIC BLOOD PRESSURE: 77 MMHG | WEIGHT: 254 LBS

## 2021-06-22 DIAGNOSIS — M17.12 PRIMARY OSTEOARTHRITIS OF LEFT KNEE: ICD-10-CM

## 2021-06-22 DIAGNOSIS — M17.11 PRIMARY OSTEOARTHRITIS OF RIGHT KNEE: Primary | ICD-10-CM

## 2021-06-22 PROCEDURE — 20610 DRAIN/INJ JOINT/BURSA W/O US: CPT | Performed by: PHYSICIAN ASSISTANT

## 2021-06-22 PROCEDURE — 99213 OFFICE O/P EST LOW 20 MIN: CPT | Performed by: PHYSICIAN ASSISTANT

## 2021-06-22 RX ORDER — METHYLPREDNISOLONE ACETATE 40 MG/ML
2 INJECTION, SUSPENSION INTRA-ARTICULAR; INTRALESIONAL; INTRAMUSCULAR; SOFT TISSUE
Status: COMPLETED | OUTPATIENT
Start: 2021-06-22 | End: 2021-06-22

## 2021-06-22 RX ORDER — BUPIVACAINE HYDROCHLORIDE 2.5 MG/ML
2 INJECTION, SOLUTION INFILTRATION; PERINEURAL
Status: COMPLETED | OUTPATIENT
Start: 2021-06-22 | End: 2021-06-22

## 2021-06-22 RX ORDER — LIDOCAINE HYDROCHLORIDE 10 MG/ML
2 INJECTION, SOLUTION INFILTRATION; PERINEURAL
Status: COMPLETED | OUTPATIENT
Start: 2021-06-22 | End: 2021-06-22

## 2021-06-22 RX ADMIN — METHYLPREDNISOLONE ACETATE 2 ML: 40 INJECTION, SUSPENSION INTRA-ARTICULAR; INTRALESIONAL; INTRAMUSCULAR; SOFT TISSUE at 10:59

## 2021-06-22 RX ADMIN — BUPIVACAINE HYDROCHLORIDE 2 ML: 2.5 INJECTION, SOLUTION INFILTRATION; PERINEURAL at 10:59

## 2021-06-22 RX ADMIN — LIDOCAINE HYDROCHLORIDE 2 ML: 10 INJECTION, SOLUTION INFILTRATION; PERINEURAL at 10:59

## 2021-06-22 NOTE — PROGRESS NOTES
Orthopedics          Corinnericardo Mcelroy 45 y o  female MRN: [id number]      Chief Complaint:   bilateral knee pain    HPI:   45 y  o female complaining of bilateral knee pain  Patient has been diagnosed with bilateral knee pain due to osteoarthritis received multiple intra-articular steroid injection with significant pain relief  She states her most recent injection lasted approximately 2 and half months were given in March of 2021  Patient states he has noticed increasing bilateral knee pain  States the pain is worse than left and right  She denies instability clicking popping catching her bilateral knees                  Review Of Systems:   · Skin: Normal  · Neuro: See HPI  · Musculoskeletal: See HPI  · All other systems reviewed and are negative    Past Medical History:   Past Medical History:   Diagnosis Date    Asthma     Cancer ([facility] [address] [facility]     endometrial    Hyperlipidemia     Hypertension     Prediabetes        Past Surgical History:   Past Surgical History:   Procedure Laterality Date    KNEE ARTHROSCOPY Right     LUMBAR FUSION      MARY-EN-Y PROCEDURE  05/27/2021    Sleve    TOTAL HIP ARTHROPLASTY Bilateral        Family History:  Family history reviewed and non-contributory  Family History   Problem Relation Age of Onset    Diabetes Mother     Stroke Mother     Diabetes Father     Cancer Father     Diabetes Sister     Diabetes Brother          Social History:  Social History     Socioeconomic History    Marital status: Single     Spouse name: None    Number of children: None    Years of education: None    Highest education level: None   Occupational History    None   Tobacco Use    Smoking status: Former Smoker    Smokeless tobacco: Never Used   Substance and Sexual Activity    Alcohol use: None    Drug use: None    Sexual activity: None   Other Topics Concern    None   Social History Narrative    None     Social Determinants of Health     Financial Resource Strain:     Difficulty of Paying Living Expenses:    Food Insecurity:     Worried About Running Out of Food in the Last Year:     [address] in the Last Year:    Transportation Needs:     Lack of Transportation (Medical):  Lack of Transportation (Non-Medical):    Physical Activity:     Days of Exercise per Week:     Minutes of Exercise per Session:    Stress:     Feeling of Stress :    Social Connections:     Frequency of Communication with Friends and Family:     Frequency of Social Gatherings with Friends and Family:     Attends [employer] Services:     Active Member of Clubs or Organizations:     Attends Club or Organization Meetings:     Marital Status:    Intimate Partner Violence:     Fear of Current or Ex-Partner:     Emotionally Abused:     Physically Abused:     Sexually Abused: Allergies:    Allergies   Allergen Reactions    Bee Venom Swelling    Dust Mite Extract     Fish-Derived Products - Food Allergy Hives    Iodine - Food Allergy Hives    Lac Bovis Other (See Comments)     congestion    Molds & Smuts     Other Swelling    Pollen Extract Other (See Comments)     Runny nose, watery eyes (also has corneal swelling) and itching  Triggers asthma    Shrimp Flavor - Food Allergy Hives       Labs:  0   Lab Value Date/Time    HCT 35 7 09/07/2015 0600    HCT 34 6 (L) 09/06/2015 0556    HCT 39 4 09/05/2015 0607    HGB 11 1 (L) 09/07/2015 0600    HGB 10 8 (L) 09/06/2015 0556    HGB 12 8 09/05/2015 0607    INR 1 02 08/20/2015 1311    WBC 11 47 (H) 09/07/2015 0600    WBC 11 74 (H) 09/06/2015 0556    WBC 18 25 (H) 09/05/2015 0607       Meds:    Current Outpatient Medications:     azelastine (OPTIVAR) 0 05 % ophthalmic solution, Apply to eye, Disp: , Rfl:     buPROPion (WELLBUTRIN XL) 300 mg 24 hr tablet, Take by mouth, Disp: , Rfl:     busPIRone (BUSPAR) 10 mg tablet, TAKE 1 TABLET BY MOUTH TWICE A DAY, Disp: , Rfl:     Calcium Citrate-Vitamin D 250-200 MG-UNIT TABS, Take 2 tablets by mouth Three times a day, Disp: , Rfl:     Cholecalciferol (VITAMIN D3) 2000 units capsule, TAKE 2 CAPSULES BY MOUTH DAILY INDICATIONS: VITAMIN D DEFICIENCY , Disp: , Rfl: 5    Cholecalciferol 50 MCG (2000 UT) CAPS, TAKE 2 CAPSULES BY MOUTH DAILY INDICATIONS: VITAMIN D DEFICIENCY , Disp: , Rfl:     [facility] Vitamin B-12 1000 MCG tablet, Take 1,000 mcg by mouth daily, Disp: , Rfl:     diclofenac sodium (VOLTAREN) 1 %, Apply 2 g topically 4 (four) times a day, Disp: 1 Tube, Rfl: 3    DULoxetine (CYMBALTA) 60 mg delayed release capsule, Take 60 mg by mouth, Disp: , Rfl:     ferrous sulfate 325 (65 Fe) mg tablet, Take by mouth, Disp: , Rfl:     fluticasone (FLONASE) 50 mcg/act nasal spray, 2 sprays into each nostril daily, Disp: , Rfl:     fluticasone-salmeterol (ADVAIR DISKUS) 250-50 mcg/dose inhaler, Inhale 1 puff, Disp: , Rfl:     gabapentin (NEURONTIN) 300 mg capsule, Take 2 capsules (600 mg total) by mouth 2 (two) times a day, Disp: 120 capsule, Rfl: 2    hydrocortisone ([facility] CORTISONE MAXIMUM STRENGTH) 1 % cream, Apply topically, Disp: , Rfl:     lisinopril (ZESTRIL) 10 mg tablet, Take by mouth, Disp: , Rfl:     loratadine (CLARITIN) 10 mg tablet, Take by mouth, Disp: , Rfl:     simvastatin (ZOCOR) 40 mg tablet, Take by mouth, Disp: , Rfl:     VENTOLIN  (90 Base) MCG/ACT inhaler, INHALE 2 PUFFS EVERY 4 (FOUR) HOURS AS NEEDED FOR WHEEZING OR SHORTNESS OF BREATH , Disp: , Rfl: 2    Cholecalciferol 2000 units TABS, Take 2 capsules by mouth (Patient not taking: Reported on 6/22/2021), Disp: , Rfl:     meloxicam (MOBIC) 15 mg tablet, Take 1 tablet (15 mg total) by mouth daily, Disp: 30 tablet, Rfl: 0    metFORMIN (GLUCOPHAGE) 500 mg tablet, Take 500 mg by mouth, Disp: , Rfl:       Physical Exam:     General Appearance:    Alert, cooperative, no distress, appears stated age   Head:    Normocephalic, without obvious abnormality, atraumatic   Eyes:    conjunctiva/corneas clear, both eyes        Nose:   Nares normal, septum midline, no drainage    Throat:   Lips normal; teeth and gums normal   Neck:    symmetrical, trachea midline, ;     thyroid:  no enlargement/   Back:     Symmetric, no curvature, ROM normal   Lungs:   No audible wheezing or labored breathing   Chest Wall:    No tenderness or deformity    Heart:    Regular rate and rhythm               Pulses:   2+ and symmetric all extremities   Skin:   Skin color, texture, turgor normal, no rashes or lesions   Neurologic:   normal strength, sensation and reflexes     throughout       Musculoskeletal: bilateral lower extremity  ·   On examination of the right knee there is no effusion, no erythema  Range of motion to full active extension and flexion to greater than 120°  Pain on palpation medial and lateral joint lines  There is crepitus with range of motion, no warmth to palpation, bony enlargement noted  No pain on palpation pes anserine bursa region or distal iliotibial band  Stable to varus and valgus stress without pain or gapping  Negative anterior and posterior drawer testing  Sensation intact distal pulses present  ·   On examination of the left knee there is a small effusion, no erythema  Range of motion to full active extension and flexion to greater than 120°  Pain on palpation medial and lateral joint lines  There is crepitus with range of motion, no warmth to palpation, bony enlargement noted  No pain on palpation pes anserine bursa region or distal iliotibial band  Stable to varus and valgus stress without pain or gapping  Negative anterior and posterior drawer testing  Sensation intact distal pulses present    Large joint arthrocentesis: R knee  Universal Protocol:  Consent given by: patient  Patient understanding: patient states understanding of the procedure being performed  Site marked: the operative site was marked  Patient identity confirmed: verbally with patient    Supporting Documentation  Indications: pain   Procedure Details  Location: knee - R knee  Needle size: 22 G  Approach: superior  Medications administered: 2 mL bupivacaine 0 25 %; 2 mL methylPREDNISolone acetate 40 mg/mL    Patient tolerance: patient tolerated the procedure well with no immediate complications    Large joint arthrocentesis: L knee  Universal Protocol:  Consent given by: patient  Patient understanding: patient states understanding of the procedure being performed  Site marked: the operative site was marked  Patient identity confirmed: verbally with patient    Supporting Documentation  Indications: pain   Procedure Details  Location: knee - L knee  Needle size: 18 G  Approach: superior  Medications administered: 2 mL bupivacaine 0 25 %; 2 mL lidocaine 1 %; 2 mL methylPREDNISolone acetate 40 mg/mL    Aspirate amount: 14 mL  Aspirate: clear and yellow    Patient tolerance: patient tolerated the procedure well with no immediate complications             _*_*_*_*_*_*_*_*_*_*_*_*_*_*_*_*_*_*_*_*_*_*_*_*_*_*_*_*_*_*_*_*_*_*_*_*_*_*_*_*_*    Assessment:  45 y  o female with bilateral knee  Chronic pain due to osteoarthritis    Plan:   · Weight bearing as tolerated  bilateral lower extremity  ·  bilateral knee injections given as noted above  ·  Patient advised should they develop any increasing pain, redness, drainage, numbness, tingling or swelling surrounding the injection sight, they are to contact our office or present to the emergency department    ·  patient wished to continue conservative management of bilateral knee osteoarthritis   · Follow up in 3 months or sooner if needed      Addy Gordillo PA-C

## 2021-07-27 ENCOUNTER — OFFICE VISIT (OUTPATIENT)
Dept: PAIN MEDICINE | Facility: CLINIC | Age: 51
End: 2021-07-27
Payer: MEDICARE

## 2021-07-27 VITALS
WEIGHT: 242 LBS | HEART RATE: 67 BPM | HEIGHT: 64 IN | SYSTOLIC BLOOD PRESSURE: 127 MMHG | DIASTOLIC BLOOD PRESSURE: 72 MMHG | BODY MASS INDEX: 41.32 KG/M2

## 2021-07-27 DIAGNOSIS — G89.4 CHRONIC PAIN SYNDROME: Primary | ICD-10-CM

## 2021-07-27 DIAGNOSIS — M47.816 LUMBAR SPONDYLOSIS: ICD-10-CM

## 2021-07-27 DIAGNOSIS — M54.16 LUMBAR RADICULOPATHY: ICD-10-CM

## 2021-07-27 DIAGNOSIS — M47.812 CERVICAL SPONDYLOSIS: ICD-10-CM

## 2021-07-27 PROCEDURE — 99214 OFFICE O/P EST MOD 30 MIN: CPT | Performed by: NURSE PRACTITIONER

## 2021-07-27 RX ORDER — ONDANSETRON 4 MG/1
TABLET, ORALLY DISINTEGRATING ORAL
COMMUNITY
Start: 2021-06-04

## 2021-07-27 RX ORDER — PANTOPRAZOLE SODIUM 40 MG/1
40 TABLET, DELAYED RELEASE ORAL DAILY
COMMUNITY
Start: 2021-05-28 | End: 2022-05-28

## 2021-07-27 RX ORDER — GABAPENTIN 300 MG/1
300 CAPSULE ORAL 3 TIMES DAILY
Qty: 90 CAPSULE | Refills: 1 | Status: SHIPPED | OUTPATIENT
Start: 2021-07-27 | End: 2022-01-10 | Stop reason: SDUPTHER

## 2021-07-27 NOTE — PROGRESS NOTES
Assessment:  1  Lumbar radiculopathy        Plan:  1  Patient wants to try lowering gabapentin to 300 mg 3 times a day  This medication was sent to the pharmacy   2  We can repeat lumbar RFA p r n    3  Patient will continue to follow with Orthopedics regarding knee complaints  4  I will avoid NSAIDs secondary to history of bariatric surgery   5  Patient may continue duloxetine as prescribed by rheumatology  6  The patient will continue with her home exercise program  7  The patient will follow-up in 3 months or sooner if needed     M*Modal software was used to dictate this note  It may contain errors with dictating incorrect words or incorrect spelling  Please contact the provider directly with any questions  History of Present Illness: The patient is a 45 y o  female with a history of fibromyalgia and carpal tunnel syndrome last seen on 5/26/21 who presents for a follow up office visit in regards to chronic neck pain and low back pain  The patient denies bowel or bladder incontinence or saddle anesthesia  The patient has had cervical RFA in April 2021 with ongoing relief of her neck pain  She does continue with low back pain but does not feel that the pain warrants a repeat RFA at this point  She did recently have gastric bypass in May 2021  MRI of the lumbar spine does reveal grade 1 anterolisthesis at L4-5 with lateral recess stenosis without any significant canal or foraminal stenosis  Rhodia Resides is also a small disc herniation at L5-S1 that does not cause any canal or foraminal stenosis  the patient rates her pain a 6/10 on the numeric pain rating scale  She states her pain is constant nature bothersome in the evening at night  She characterizes pain as burning, dull aching, pressure like, shooting and pins and needles    Current pain medications includes: gabapentin 600 mg twice a day and duloxetine 60 mg daily as prescribed by rheumatology      The patient reports that this regimen is providing 35-40% pain relief  The patient is reporting no side effects from this pain medication regimen  I have personally reviewed and/or updated the patient's past medical history, past surgical history, family history, social history, current medications, allergies, and vital signs today  Review of Systems:    Review of Systems   Respiratory: Negative for shortness of breath  Cardiovascular: Negative for chest pain  Gastrointestinal: Negative for constipation, diarrhea, nausea and vomiting  Musculoskeletal: Negative for arthralgias, gait problem, joint swelling and myalgias  Skin: Negative for rash  Neurological: Negative for dizziness, seizures and weakness  All other systems reviewed and are negative          Past Medical History:   Diagnosis Date    Asthma     Cancer ([facility] [address] )     endometrial    Hyperlipidemia     Hypertension     Prediabetes        Past Surgical History:   Procedure Laterality Date    KNEE ARTHROSCOPY Right     LUMBAR FUSION      MARY-EN-Y PROCEDURE  05/27/2021    Sleve    TOTAL HIP ARTHROPLASTY Bilateral        Family History   Problem Relation Age of Onset    Diabetes Mother    Choco Estevez Stroke Mother     Diabetes Father     Cancer Father     Diabetes Sister     Diabetes Brother        Social History     Occupational History    Not on file   Tobacco Use    Smoking status: Former Smoker    Smokeless tobacco: Never Used   Substance and Sexual Activity    Alcohol use: Not on file    Drug use: Not on file    Sexual activity: Not on file         Current Outpatient Medications:     azelastine (OPTIVAR) 0 05 % ophthalmic solution, Apply to eye, Disp: , Rfl:     buPROPion (WELLBUTRIN XL) 300 mg 24 hr tablet, Take by mouth, Disp: , Rfl:     busPIRone (BUSPAR) 10 mg tablet, 10 mg daily , Disp: , Rfl:     Calcium Citrate-Vitamin D 250-200 MG-UNIT TABS, Take 2 tablets by mouth Three times a day, Disp: , Rfl:     Cholecalciferol (VITAMIN D3) 2000 units capsule, TAKE 2 CAPSULES BY MOUTH DAILY INDICATIONS: VITAMIN D DEFICIENCY , Disp: , Rfl: 5    [facility] Vitamin B-12 1000 MCG tablet, Take 1,000 mcg by mouth daily, Disp: , Rfl:     diclofenac sodium (VOLTAREN) 1 %, Apply 2 g topically 4 (four) times a day, Disp: 1 Tube, Rfl: 3    DULoxetine (CYMBALTA) 60 mg delayed release capsule, Take 60 mg by mouth, Disp: , Rfl:     ferrous sulfate 325 (65 Fe) mg tablet, Take by mouth, Disp: , Rfl:     fluticasone (FLONASE) 50 mcg/act nasal spray, 2 sprays into each nostril daily, Disp: , Rfl:     fluticasone-salmeterol (ADVAIR DISKUS) 250-50 mcg/dose inhaler, Inhale 1 puff, Disp: , Rfl:     hydrocortisone ([facility] CORTISONE MAXIMUM STRENGTH) 1 % cream, Apply topically, Disp: , Rfl:     lisinopril (ZESTRIL) 10 mg tablet, Take by mouth, Disp: , Rfl:     loratadine (CLARITIN) 10 mg tablet, Take by mouth, Disp: , Rfl:     ondansetron (ZOFRAN-ODT) 4 mg disintegrating tablet, TAKE 1 TABLET BY MOUTH EVERY 8 HOURS AS NEEDED FOR NAUSEA AND VOMITING, Disp: , Rfl:     simvastatin (ZOCOR) 40 mg tablet, Take by mouth, Disp: , Rfl:     VENTOLIN  (90 Base) MCG/ACT inhaler, INHALE 2 PUFFS EVERY 4 (FOUR) HOURS AS NEEDED FOR WHEEZING OR SHORTNESS OF BREATH , Disp: , Rfl: 2    Cholecalciferol 2000 units TABS, Take 2 capsules by mouth (Patient not taking: Reported on 6/22/2021), Disp: , Rfl:     Cholecalciferol 50 MCG (2000 UT) CAPS, TAKE 2 CAPSULES BY MOUTH DAILY INDICATIONS: VITAMIN D DEFICIENCY   (Patient not taking: Reported on 7/27/2021), Disp: , Rfl:     gabapentin (NEURONTIN) 300 mg capsule, Take 1 capsule (300 mg total) by mouth 3 (three) times a day, Disp: 90 capsule, Rfl: 1    metFORMIN (GLUCOPHAGE) 500 mg tablet, Take 500 mg by mouth, Disp: , Rfl:     pantoprazole (PROTONIX) 40 mg tablet, Take 40 mg by mouth daily (Patient not taking: Reported on 7/27/2021), Disp: , Rfl:     Allergies   Allergen Reactions    Bee Venom Swelling    Dust Mite Extract     Fish-Derived Products - Food Allergy Hives  Iodine - Food Allergy Hives    Lac Bovis Other (See Comments)     congestion    Molds & Smuts     Other Swelling    Pollen Extract Other (See Comments)     Runny nose, watery eyes (also has corneal swelling) and itching  Triggers asthma    Shrimp Flavor - Food Allergy Hives       Physical Exam:    /72   Pulse 67   Ht 5' 4" (1 626 m)   Wt 110 kg (242 lb)   BMI 41 54 kg/m²     Constitutional:overweight  Eyes:anicteric  HEENT:grossly intact  Neck:supple, symmetric, trachea midline and no masses   Pulmonary:even and unlabored  Cardiovascular:No edema or pitting edema present  Skin:Normal without rashes or lesions and well hydrated  Psychiatric:Mood and affect appropriate  Neurologic:Cranial Nerves II-XII grossly intact  Musculoskeletal:antalgic gait but steady without the use of assistive devices      Imaging  No orders to display         No orders of the defined types were placed in this encounter

## 2021-10-19 ENCOUNTER — OFFICE VISIT (OUTPATIENT)
Dept: PAIN MEDICINE | Facility: CLINIC | Age: 51
End: 2021-10-19
Payer: MEDICARE

## 2021-10-19 VITALS
DIASTOLIC BLOOD PRESSURE: 77 MMHG | HEART RATE: 72 BPM | SYSTOLIC BLOOD PRESSURE: 117 MMHG | BODY MASS INDEX: 36.88 KG/M2 | WEIGHT: 216 LBS | HEIGHT: 64 IN

## 2021-10-19 DIAGNOSIS — M47.812 CERVICAL SPONDYLOSIS: ICD-10-CM

## 2021-10-19 DIAGNOSIS — M47.816 LUMBAR SPONDYLOSIS: ICD-10-CM

## 2021-10-19 DIAGNOSIS — M25.561 CHRONIC PAIN OF BOTH KNEES: ICD-10-CM

## 2021-10-19 DIAGNOSIS — M79.18 MYOFASCIAL PAIN SYNDROME: ICD-10-CM

## 2021-10-19 DIAGNOSIS — M54.16 LUMBAR RADICULOPATHY: ICD-10-CM

## 2021-10-19 DIAGNOSIS — G89.4 CHRONIC PAIN SYNDROME: Primary | ICD-10-CM

## 2021-10-19 DIAGNOSIS — M50.30 DEGENERATIVE DISC DISEASE, CERVICAL: ICD-10-CM

## 2021-10-19 DIAGNOSIS — G89.29 CHRONIC PAIN OF BOTH KNEES: ICD-10-CM

## 2021-10-19 DIAGNOSIS — M25.562 CHRONIC PAIN OF BOTH KNEES: ICD-10-CM

## 2021-10-19 DIAGNOSIS — M46.1 SACROILIITIS (HCC): ICD-10-CM

## 2021-10-19 PROCEDURE — 99214 OFFICE O/P EST MOD 30 MIN: CPT | Performed by: NURSE PRACTITIONER

## 2021-10-19 RX ORDER — METHOCARBAMOL 500 MG/1
500 TABLET, FILM COATED ORAL 2 TIMES DAILY PRN
Qty: 60 TABLET | Refills: 1 | Status: SHIPPED | OUTPATIENT
Start: 2021-10-19 | End: 2022-01-10 | Stop reason: SDUPTHER

## 2021-10-19 RX ORDER — LORAZEPAM 0.5 MG/1
0.5 TABLET ORAL DAILY PRN
COMMUNITY
Start: 2021-08-31

## 2021-10-26 ENCOUNTER — OFFICE VISIT (OUTPATIENT)
Dept: OBGYN CLINIC | Facility: HOSPITAL | Age: 51
End: 2021-10-26
Payer: MEDICARE

## 2021-10-26 VITALS
SYSTOLIC BLOOD PRESSURE: 123 MMHG | DIASTOLIC BLOOD PRESSURE: 83 MMHG | WEIGHT: 215.4 LBS | HEIGHT: 64 IN | BODY MASS INDEX: 36.77 KG/M2 | HEART RATE: 69 BPM

## 2021-10-26 DIAGNOSIS — M17.12 PRIMARY OSTEOARTHRITIS OF LEFT KNEE: Primary | ICD-10-CM

## 2021-10-26 DIAGNOSIS — M17.11 PRIMARY OSTEOARTHRITIS OF RIGHT KNEE: ICD-10-CM

## 2021-10-26 PROCEDURE — 99213 OFFICE O/P EST LOW 20 MIN: CPT | Performed by: PHYSICIAN ASSISTANT

## 2021-10-26 PROCEDURE — 20610 DRAIN/INJ JOINT/BURSA W/O US: CPT | Performed by: PHYSICIAN ASSISTANT

## 2021-10-26 RX ORDER — METHYLPREDNISOLONE ACETATE 40 MG/ML
2 INJECTION, SUSPENSION INTRA-ARTICULAR; INTRALESIONAL; INTRAMUSCULAR; SOFT TISSUE
Status: COMPLETED | OUTPATIENT
Start: 2021-10-26 | End: 2021-10-26

## 2021-10-26 RX ORDER — BUPIVACAINE HYDROCHLORIDE 2.5 MG/ML
2 INJECTION, SOLUTION INFILTRATION; PERINEURAL
Status: COMPLETED | OUTPATIENT
Start: 2021-10-26 | End: 2021-10-26

## 2021-10-26 RX ORDER — LIDOCAINE HYDROCHLORIDE 10 MG/ML
2 INJECTION, SOLUTION INFILTRATION; PERINEURAL
Status: COMPLETED | OUTPATIENT
Start: 2021-10-26 | End: 2021-10-26

## 2021-10-26 RX ADMIN — BUPIVACAINE HYDROCHLORIDE 2 ML: 2.5 INJECTION, SOLUTION INFILTRATION; PERINEURAL at 16:27

## 2021-10-26 RX ADMIN — LIDOCAINE HYDROCHLORIDE 2 ML: 10 INJECTION, SOLUTION INFILTRATION; PERINEURAL at 16:27

## 2021-10-26 RX ADMIN — METHYLPREDNISOLONE ACETATE 2 ML: 40 INJECTION, SUSPENSION INTRA-ARTICULAR; INTRALESIONAL; INTRAMUSCULAR; SOFT TISSUE at 16:27

## 2022-01-10 ENCOUNTER — OFFICE VISIT (OUTPATIENT)
Dept: PAIN MEDICINE | Facility: CLINIC | Age: 52
End: 2022-01-10
Payer: MEDICARE

## 2022-01-10 ENCOUNTER — CONSULT (OUTPATIENT)
Dept: PLASTIC SURGERY | Facility: CLINIC | Age: 52
End: 2022-01-10
Payer: MEDICARE

## 2022-01-10 VITALS
WEIGHT: 206 LBS | HEART RATE: 76 BPM | DIASTOLIC BLOOD PRESSURE: 74 MMHG | SYSTOLIC BLOOD PRESSURE: 110 MMHG | TEMPERATURE: 97.1 F | BODY MASS INDEX: 35.17 KG/M2 | HEIGHT: 64 IN

## 2022-01-10 VITALS
SYSTOLIC BLOOD PRESSURE: 123 MMHG | HEIGHT: 64 IN | WEIGHT: 204 LBS | BODY MASS INDEX: 34.83 KG/M2 | HEART RATE: 77 BPM | DIASTOLIC BLOOD PRESSURE: 83 MMHG

## 2022-01-10 DIAGNOSIS — M46.1 SACROILIITIS (HCC): ICD-10-CM

## 2022-01-10 DIAGNOSIS — N62 MACROMASTIA: Primary | ICD-10-CM

## 2022-01-10 DIAGNOSIS — M54.16 LUMBAR RADICULOPATHY: ICD-10-CM

## 2022-01-10 DIAGNOSIS — M47.812 CERVICAL SPONDYLOSIS: ICD-10-CM

## 2022-01-10 DIAGNOSIS — M54.2 NECK PAIN: ICD-10-CM

## 2022-01-10 DIAGNOSIS — G89.4 CHRONIC PAIN SYNDROME: Primary | ICD-10-CM

## 2022-01-10 DIAGNOSIS — M79.18 MYOFASCIAL PAIN SYNDROME: ICD-10-CM

## 2022-01-10 DIAGNOSIS — M47.816 LUMBAR SPONDYLOSIS: ICD-10-CM

## 2022-01-10 PROCEDURE — 99214 OFFICE O/P EST MOD 30 MIN: CPT | Performed by: NURSE PRACTITIONER

## 2022-01-10 PROCEDURE — 99203 OFFICE O/P NEW LOW 30 MIN: CPT | Performed by: STUDENT IN AN ORGANIZED HEALTH CARE EDUCATION/TRAINING PROGRAM

## 2022-01-10 RX ORDER — GABAPENTIN 300 MG/1
300 CAPSULE ORAL 3 TIMES DAILY
Qty: 90 CAPSULE | Refills: 2 | Status: SHIPPED | OUTPATIENT
Start: 2022-01-10 | End: 2022-08-01 | Stop reason: SDUPTHER

## 2022-01-10 RX ORDER — METHOCARBAMOL 750 MG/1
750 TABLET, FILM COATED ORAL 2 TIMES DAILY PRN
Qty: 60 TABLET | Refills: 2 | Status: SHIPPED | OUTPATIENT
Start: 2022-01-10 | End: 2022-04-04

## 2022-01-10 NOTE — PROGRESS NOTES
Assessment:  1  Chronic pain syndrome    2  Lumbar radiculopathy    3  Lumbar spondylosis    4  Sacroiliitis ([address] )    5  Neck pain    6  Cervical spondylosis    7  Myofascial pain syndrome        Plan:  While the patient was in the office today, I did have a thorough conversation regarding their chronic pain syndrome, medication management, and treatment plan options  Patient is being seen for follow-up visit  Patient is complaining of increasing neck pain  She previously underwent cervical radiofrequency ablations at C4-5 and C5-6  The left side was done on 02/23/2021, the right side was done on 04/21/2021  Her pain was up to 60% improved, but started increasing in a couple months ago  Today, we discussed possibility of repeating the radiofrequency ablations  Patient would like to repeat the ablation, but has an appointment scheduled with a surgeon tomorrow to discuss breast reduction surgery  If surgery is not going to be scheduled in the near future, she will call back and schedule 1 medial branch block  If she obtains good, but short-term relief, we will repeat the radiofrequency ablations  I advised patient to touch base with us after her visit tomorrow and let us now how she wishes to proceed  During her last visit, she was started on Robaxin 500 mg 2 times daily  She is reporting some improvement with Robaxin, but would like to increase it  Will increase the Robaxin to 750 mg twice daily if needed for spasms  A new prescription was sent to her pharmacy  Continue gabapentin 300 mg 3 times daily  Prescription was sent to her pharmacy  The patient will follow-up in 12 weeks for medication prescription refill and reevaluation  The patient was advised to contact the office should their symptoms worsen in the interim  The patient was agreeable and verbalized an understanding  History of Present Illness:     The patient is a 45 y o  female last seen on 10/19/21 who presents for a follow up office visit in regards to chronic pain secondary to chronic pain syndrome, chronic neck pain, cervical spondylosis, chronic low back pain lumbar spondylosis  The patient currently reports complaints of neck pain that radiates to her shoulders, midback pain, low back pain, right knee pain  Current pain level is a 6/10  Quality pain is described as burning, dull, aching, pressure-like, pins and needles  Current pain medications includes:  Gabapentin 300 mg 3 times daily Robaxin 500 mg twice daily if needed for spasms, her rheumatologist prescribes Cymbalta 60 mg daily    The patient reports that this regimen is providing 25-30 % pain relief  The patient is reporting no side effects from this pain medication regimen  I have personally reviewed and/or updated the patient's past medical history, past surgical history, family history, social history, current medications, allergies, and vital signs today  Review of Systems:    Review of Systems   Respiratory: Negative for shortness of breath  Cardiovascular: Positive for chest pain  Gastrointestinal: Negative for constipation, diarrhea, nausea and vomiting  Musculoskeletal: Positive for gait problem  Negative for arthralgias, joint swelling and myalgias  Skin: Negative for rash  Neurological: Negative for dizziness, seizures and weakness  All other systems reviewed and are negative          Past Medical History:   Diagnosis Date    Asthma     Cancer ([facility] [address] )     endometrial    Hyperlipidemia     Hypertension     Prediabetes        Past Surgical History:   Procedure Laterality Date    KNEE ARTHROSCOPY Right     LUMBAR FUSION      MARY-EN-Y PROCEDURE  05/27/2021    Sleve    TOTAL HIP ARTHROPLASTY Bilateral        Family History   Problem Relation Age of Onset    Diabetes Mother    Jullikendra Liming Stroke Mother     Diabetes Father     Cancer Father     Diabetes Sister     Diabetes Brother        Social History     Occupational History    Not on file   Tobacco Use    Smoking status: Former Smoker    Smokeless tobacco: Never Used   Substance and Sexual Activity    Alcohol use: Not on file    Drug use: Not on file    Sexual activity: Not on file         Current Outpatient Medications:     azelastine (OPTIVAR) 0 05 % ophthalmic solution, Apply to eye, Disp: , Rfl:     buPROPion (WELLBUTRIN XL) 300 mg 24 hr tablet, Take by mouth, Disp: , Rfl:     busPIRone (BUSPAR) 10 mg tablet, 10 mg daily , Disp: , Rfl:     Calcium Citrate-Vitamin D 250-200 MG-UNIT TABS, Take 2 tablets by mouth Three times a day, Disp: , Rfl:     Cholecalciferol (VITAMIN D3) 2000 units capsule, TAKE 2 CAPSULES BY MOUTH DAILY INDICATIONS: VITAMIN D DEFICIENCY , Disp: , Rfl: 5    Cholecalciferol 2000 units TABS, Take 2 capsules by mouth (Patient not taking: Reported on 6/22/2021), Disp: , Rfl:     Cholecalciferol 50 MCG (2000 UT) CAPS, TAKE 2 CAPSULES BY MOUTH DAILY INDICATIONS: VITAMIN D DEFICIENCY   (Patient not taking: Reported on 7/27/2021), Disp: , Rfl:     [facility] Vitamin B-12 1000 MCG tablet, Take 1,000 mcg by mouth daily, Disp: , Rfl:     diclofenac sodium (VOLTAREN) 1 %, Apply 2 g topically 4 (four) times a day, Disp: 1 Tube, Rfl: 3    DULoxetine (CYMBALTA) 60 mg delayed release capsule, Take 60 mg by mouth, Disp: , Rfl:     ferrous sulfate 325 (65 Fe) mg tablet, Take by mouth, Disp: , Rfl:     fluticasone (FLONASE) 50 mcg/act nasal spray, 2 sprays into each nostril daily, Disp: , Rfl:     fluticasone-salmeterol (ADVAIR DISKUS) 250-50 mcg/dose inhaler, Inhale 1 puff, Disp: , Rfl:     gabapentin (NEURONTIN) 300 mg capsule, Take 1 capsule (300 mg total) by mouth 3 (three) times a day, Disp: 90 capsule, Rfl: 2    hydrocortisone ([facility] CORTISONE MAXIMUM STRENGTH) 1 % cream, Apply topically, Disp: , Rfl:     hydrocortisone 2 5 % cream, , Disp: , Rfl:     lisinopril (ZESTRIL) 10 mg tablet, Take by mouth, Disp: , Rfl:     loratadine (CLARITIN) 10 mg tablet, Take by mouth, Disp: , Rfl:     LORazepam (ATIVAN) 0 5 mg tablet, Take 0 5 mg by mouth daily as needed, Disp: , Rfl:     metFORMIN (GLUCOPHAGE) 500 mg tablet, Take 500 mg by mouth, Disp: , Rfl:     methocarbamol (ROBAXIN) 750 mg tablet, Take 1 tablet (750 mg total) by mouth 2 (two) times a day as needed for muscle spasms, Disp: 60 tablet, Rfl: 2    ondansetron (ZOFRAN-ODT) 4 mg disintegrating tablet, TAKE 1 TABLET BY MOUTH EVERY 8 HOURS AS NEEDED FOR NAUSEA AND VOMITING, Disp: , Rfl:     pantoprazole (PROTONIX) 40 mg tablet, Take 40 mg by mouth daily (Patient not taking: Reported on 7/27/2021), Disp: , Rfl:     simvastatin (ZOCOR) 40 mg tablet, Take by mouth, Disp: , Rfl:     VENTOLIN  (90 Base) MCG/ACT inhaler, INHALE 2 PUFFS EVERY 4 (FOUR) HOURS AS NEEDED FOR WHEEZING OR SHORTNESS OF BREATH , Disp: , Rfl: 2    Allergies   Allergen Reactions    Bee Venom Swelling    Dust Mite Extract     Fish-Derived Products - Food Allergy Hives    Iodine - Food Allergy Hives    Lac Bovis Other (See Comments)     congestion    Molds & Smuts     Other Swelling    Pollen Extract Other (See Comments)     Runny nose, watery eyes (also has corneal swelling) and itching  Triggers asthma    Shrimp Flavor - Food Allergy Hives       Physical Exam:    /83   Pulse 77   Ht 5' 4" (1 626 m)   Wt 92 5 kg (204 lb)   BMI 35 02 kg/m²     Constitutional:normal, well developed, well nourished, alert, in no distress and non-toxic and no overt pain behavior  Eyes:anicteric  HEENT:grossly intact  Neck:supple, symmetric, trachea midline and no masses   Pulmonary:even and unlabored  Cardiovascular:No edema or pitting edema present  Skin:Normal without rashes or lesions and well hydrated  Psychiatric:Mood and affect appropriate  Neurologic:Cranial Nerves II-XII grossly intact  Musculoskeletal:  Range of motion of her cervical spine is limited in all planes  There are cervical paraspinal muscle spasms present    There is tenderness bilaterally from C2-C6  Imaging  No orders to display         No orders of the defined types were placed in this encounter

## 2022-01-11 NOTE — PROGRESS NOTES
Plastic Surgery Consult    Reason for visit: heavy breasts    HPI:  Patient is a 44 y/o female who presents with symptomatic macromastia  She complains of large, heavy, pendulous breasts that causes her chest, upper back, neck, and shoulder discomfort  She also complains of rashes underneath her breast during humid weather and has difficulty exercising  She has tried supportive bra, tylenol with minimal symptomatic improvement  She denies lumps or discharge on self-exams  Of note, patient is a massive weight loss patient via gastric sleeve in May 2021  She had 100 lbs weight loss  Her weight is now stable at 210 lbs  She currently wears 40K and would like to be B-C cup ideally  Her last mammogram was in 2020 and she is due for another  She has attempted physical therapy due to history of chronic back and neck pain  ROS: 12 pt ROS negative, except as otherwise noted in HPI      PMH: DM, osteoarthritis, fibromyalgia, asthma  FamHx: no history of breast cancer  SurgHx: bilateral hip replacement, gastric sleeve (May 2021), partial hysterectomy (2014)  SocHx: no tobacco, no ETOH  Meds: no blood thinners, no steroids  Allergies   Allergen Reactions    Bee Venom Swelling    Dust Mite Extract     Fish-Derived Products - Food Allergy Hives    Iodine - Food Allergy Hives    Lac Bovis Other (See Comments)     congestion    Molds & Smuts     Other Swelling    Pollen Extract Other (See Comments)     Runny nose, watery eyes (also has corneal swelling) and itching  Triggers asthma    Shrimp Flavor - Food Allergy Hives         PE:  Vitals:    01/10/22 1349   BP: 110/74   Pulse: 76   Temp: (!) 97 1 °F (36 2 °C)       General: NC/AT, breathing comfortably on RA  Neuro: CN II-XII grossly intact, symmetric reflexes  HEENT: PERRLA, EOMI, external ears normal, no lesions or deformities, neck supple, trachea midline  Respiratory: CTAB, normal respiratory effort  Cardio: RRR, normal S1, S2, no murmur, rubs, gallops  GI: soft, non-tender, non-distended  MSK: normal alignment, mobility, gait  Skin: no rashes, lesions, subcutaneous nodules      BMI: 35  BSA: 1 98    Breast Exam:  Bilateral large, pendulous breasts  Severe grade III ptosis bilaterally  No nipple retraction, masses, or nipple discharge  Bilateral shoulder grooving present  No current intertrigo but notable moist areas under breasts  Normal nipple sensation bilaterally  Significantly diminished upper pole bilaterally      Measurements:    R  L  SN-N  46 cm  48 cm  N-IMF  20 cm  19 cm      A/P: Patient is a 46 y/o with symptomatic macromastia   -Patient would benefit from bilateral reduction mammoplasty  Technique: wise-pattern with inferior pedicle with estimated resection weight of at least 650 g per side   -Discussed with patient the risks, benefits, procedure, and complications  Discussed changes/loss/hypersentivity in nipple sensation, diminished breast feeding ability and increase in size of breasts should she become pregnant  Discussed risk of partial vs complete nipple loss due to vascular issues  Discussed that breast reduction will help but may not completely resolve her back, chest, upper neck, and shoulder pain  Due to patient's morbid obesity, I discussed the increased risk of surgical complications including infection, seroma, abscess, wound dehisence  -Due to her large pendulous breasts, there would be planned free nipple grafts which would lead to loss of nipple sensation   -I informed patient that a majority of the weight of her breasts is in the inferior pendulous pole  With a breast reduction, I would be removing much of the pendulous weight which may leave her smaller in size than she wishes for her ideal size to be a "B or C" cup  This is exacerbated due to her severely diminished superior poles  Her very large breasts in combination with her massive weight loss and poor skin quality have resulted in her current clinical state   Despite possibly making her flatter than she would desire and knowing the risks, patient acknowledged and would like to proceed  -Patient's questions answered, concerns addressed  -mammogram ordered  -Will require nutrition labs and HgbA1C prior to preop appointment      Jordan Lee MD   [facility]   [address]   [address]   Office: [phone]

## 2022-01-26 ENCOUNTER — HOSPITAL ENCOUNTER (OUTPATIENT)
Dept: MAMMOGRAPHY | Facility: MEDICAL CENTER | Age: 52
Discharge: HOME | End: 2022-01-26
Payer: MEDICARE

## 2022-01-26 VITALS — WEIGHT: 206 LBS | BODY MASS INDEX: 35.17 KG/M2 | HEIGHT: 64 IN

## 2022-01-26 DIAGNOSIS — Z12.31 VISIT FOR SCREENING MAMMOGRAM: ICD-10-CM

## 2022-01-26 PROCEDURE — 77067 SCR MAMMO BI INCL CAD: CPT

## 2022-01-26 PROCEDURE — 77063 BREAST TOMOSYNTHESIS BI: CPT

## 2022-01-27 ENCOUNTER — APPOINTMENT (OUTPATIENT)
Dept: LAB | Facility: CLINIC | Age: 52
End: 2022-01-27
Payer: MEDICARE

## 2022-01-27 DIAGNOSIS — M54.9 UPPER BACK PAIN: ICD-10-CM

## 2022-01-27 DIAGNOSIS — N62 MACROMASTIA: ICD-10-CM

## 2022-01-27 LAB
ALBUMIN SERPL BCP-MCNC: 3.6 G/DL (ref 3.5–5)
ALP SERPL-CCNC: 145 U/L (ref 46–116)
ALT SERPL W P-5'-P-CCNC: 73 U/L (ref 12–78)
ANION GAP SERPL CALCULATED.3IONS-SCNC: 9 MMOL/L (ref 4–13)
AST SERPL W P-5'-P-CCNC: 35 U/L (ref 5–45)
BASOPHILS # BLD AUTO: 0.06 THOUSANDS/ΜL (ref 0–0.1)
BASOPHILS NFR BLD AUTO: 1 % (ref 0–1)
BILIRUB SERPL-MCNC: 0.55 MG/DL (ref 0.2–1)
BUN SERPL-MCNC: 13 MG/DL (ref 5–25)
CALCIUM SERPL-MCNC: 8.9 MG/DL (ref 8.3–10.1)
CHLORIDE SERPL-SCNC: 103 MMOL/L (ref 100–108)
CO2 SERPL-SCNC: 29 MMOL/L (ref 21–32)
CREAT SERPL-MCNC: 0.8 MG/DL (ref 0.6–1.3)
EOSINOPHIL # BLD AUTO: 0.58 THOUSAND/ΜL (ref 0–0.61)
EOSINOPHIL NFR BLD AUTO: 8 % (ref 0–6)
ERYTHROCYTE [DISTWIDTH] IN BLOOD BY AUTOMATED COUNT: 13 % (ref 11.6–15.1)
FERRITIN SERPL-MCNC: 465 NG/ML (ref 8–388)
FOLATE SERPL-MCNC: >20 NG/ML (ref 3.1–17.5)
GFR SERPL CREATININE-BSD FRML MDRD: 84 ML/MIN/1.73SQ M
GLUCOSE SERPL-MCNC: 87 MG/DL (ref 65–140)
HCT VFR BLD AUTO: 43 % (ref 34.8–46.1)
HGB BLD-MCNC: 13.8 G/DL (ref 11.5–15.4)
IMM GRANULOCYTES # BLD AUTO: 0.01 THOUSAND/UL (ref 0–0.2)
IMM GRANULOCYTES NFR BLD AUTO: 0 % (ref 0–2)
INR PPP: 1.02 (ref 0.84–1.19)
IRON SATN MFR SERPL: 40 % (ref 15–50)
IRON SERPL-MCNC: 104 UG/DL (ref 50–170)
LYMPHOCYTES # BLD AUTO: 2.59 THOUSANDS/ΜL (ref 0.6–4.47)
LYMPHOCYTES NFR BLD AUTO: 35 % (ref 14–44)
MCH RBC QN AUTO: 29.5 PG (ref 26.8–34.3)
MCHC RBC AUTO-ENTMCNC: 32.1 G/DL (ref 31.4–37.4)
MCV RBC AUTO: 92 FL (ref 82–98)
MONOCYTES # BLD AUTO: 0.54 THOUSAND/ΜL (ref 0.17–1.22)
MONOCYTES NFR BLD AUTO: 7 % (ref 4–12)
NEUTROPHILS # BLD AUTO: 3.71 THOUSANDS/ΜL (ref 1.85–7.62)
NEUTS SEG NFR BLD AUTO: 49 % (ref 43–75)
NRBC BLD AUTO-RTO: 0 /100 WBCS
PLATELET # BLD AUTO: 276 THOUSANDS/UL (ref 149–390)
PMV BLD AUTO: 10.9 FL (ref 8.9–12.7)
POTASSIUM SERPL-SCNC: 4.2 MMOL/L (ref 3.5–5.3)
PREALB SERPL-MCNC: 20.9 MG/DL (ref 18–40)
PROT SERPL-MCNC: 6.8 G/DL (ref 6.4–8.2)
PROTHROMBIN TIME: 13.4 SECONDS (ref 11.6–14.5)
RBC # BLD AUTO: 4.68 MILLION/UL (ref 3.81–5.12)
SODIUM SERPL-SCNC: 141 MMOL/L (ref 136–145)
TIBC SERPL-MCNC: 262 UG/DL (ref 250–450)
VIT B12 SERPL-MCNC: 1030 PG/ML (ref 100–900)
WBC # BLD AUTO: 7.49 THOUSAND/UL (ref 4.31–10.16)

## 2022-01-27 PROCEDURE — 85025 COMPLETE CBC W/AUTO DIFF WBC: CPT

## 2022-01-27 PROCEDURE — 80053 COMPREHEN METABOLIC PANEL: CPT

## 2022-01-27 PROCEDURE — 83540 ASSAY OF IRON: CPT

## 2022-01-27 PROCEDURE — 83036 HEMOGLOBIN GLYCOSYLATED A1C: CPT

## 2022-01-27 PROCEDURE — 83550 IRON BINDING TEST: CPT

## 2022-01-27 PROCEDURE — 85610 PROTHROMBIN TIME: CPT

## 2022-01-27 PROCEDURE — 82746 ASSAY OF FOLIC ACID SERUM: CPT

## 2022-01-27 PROCEDURE — 82728 ASSAY OF FERRITIN: CPT

## 2022-01-27 PROCEDURE — 36415 COLL VENOUS BLD VENIPUNCTURE: CPT

## 2022-01-27 PROCEDURE — 84134 ASSAY OF PREALBUMIN: CPT

## 2022-01-27 PROCEDURE — 82607 VITAMIN B-12: CPT

## 2022-01-28 LAB
EST. AVERAGE GLUCOSE BLD GHB EST-MCNC: 111 MG/DL
HBA1C MFR BLD: 5.5 %

## 2022-02-01 ENCOUNTER — OFFICE VISIT (OUTPATIENT)
Dept: PLASTIC SURGERY | Facility: CLINIC | Age: 52
End: 2022-02-01
Payer: MEDICARE

## 2022-02-01 VITALS
DIASTOLIC BLOOD PRESSURE: 77 MMHG | SYSTOLIC BLOOD PRESSURE: 121 MMHG | BODY MASS INDEX: 34.83 KG/M2 | HEIGHT: 64 IN | TEMPERATURE: 96.9 F | HEART RATE: 71 BPM | WEIGHT: 204 LBS

## 2022-02-01 DIAGNOSIS — N62 MACROMASTIA: Primary | ICD-10-CM

## 2022-02-01 PROCEDURE — 99213 OFFICE O/P EST LOW 20 MIN: CPT | Performed by: STUDENT IN AN ORGANIZED HEALTH CARE EDUCATION/TRAINING PROGRAM

## 2022-02-01 NOTE — PROGRESS NOTES
PRS     Preop for bilateral breast reduction, possible free nipple graft    Discussed again the risks, benefits, procedure, and complications of breast reduction including but not limited to infection, bleeding, scarring, asymmetry, abscess, delayed wound healing, wound dehiscence, contour deformity, partial vs complete nipple necrosis, fat grafting, changes in nipple sensation, changes in breast feeding ability  Also discussed increased risk of all complications given her elevated BMI  Due to the ptotic nature of her breasts, also discussed possibility of free nipple graft if needed  Patient current wears 46k, would like B-C cup, something that fits her body type  Estimated resection is at least 650 g per side  Of note, patient had gastric sleeve May 2021 and continues to lose weight  As she is not even 1 year out from weight-loss surgery, I explained to patient that it would be better to delay her surgery until she is at least 1 year out and that her weight is stable for 3-6 months  Patient acknowledged and understood  Will see back in May to reassess  Will review mammogram, nutrition, and HgbA1C at that time        Jordan Lee MD   [address] [place] [address] Plastic and Reconstructive Surgery   [address]   [address]   Office: [phone]

## 2022-02-10 ENCOUNTER — HOSPITAL ENCOUNTER (OUTPATIENT)
Facility: HOSPITAL | Age: 52
Setting detail: OBSERVATION
Discharge: HOME | End: 2022-02-11
Attending: SURGERY | Admitting: SURGERY
Payer: MEDICARE

## 2022-02-10 ENCOUNTER — APPOINTMENT (EMERGENCY)
Dept: RADIOLOGY | Facility: HOSPITAL | Age: 52
End: 2022-02-10
Payer: MEDICARE

## 2022-02-10 ENCOUNTER — APPOINTMENT (EMERGENCY)
Dept: CT IMAGING | Facility: HOSPITAL | Age: 52
End: 2022-02-10
Payer: MEDICARE

## 2022-02-10 DIAGNOSIS — R55 SYNCOPE AND COLLAPSE: Primary | ICD-10-CM

## 2022-02-10 PROBLEM — G89.4 CHRONIC PAIN SYNDROME: Status: ACTIVE | Noted: 2022-02-10

## 2022-02-10 PROBLEM — I10 PRIMARY HYPERTENSION: Status: ACTIVE | Noted: 2022-02-10

## 2022-02-10 LAB
2HR DELTA HS TROPONIN: 0 NG/L
4HR DELTA HS TROPONIN: 0 NG/L
ABO GROUP BLD: NORMAL
ABO GROUP BLD: NORMAL
ATRIAL RATE: 68 BPM
BASE EXCESS BLDA CALC-SCNC: 2 MMOL/L (ref -2–3)
BASOPHILS # BLD AUTO: 0.02 THOUSANDS/ΜL (ref 0–0.1)
BASOPHILS NFR BLD AUTO: 0 % (ref 0–1)
BLD GP AB SCN SERPL QL: NEGATIVE
CARDIAC TROPONIN I PNL SERPL HS: 2 NG/L
EOSINOPHIL # BLD AUTO: 0.08 THOUSAND/ΜL (ref 0–0.61)
EOSINOPHIL NFR BLD AUTO: 1 % (ref 0–6)
ERYTHROCYTE [DISTWIDTH] IN BLOOD BY AUTOMATED COUNT: 13.2 % (ref 11.6–15.1)
ETHANOL SERPL-MCNC: <3 MG/DL (ref 0–3)
EXT FECAL OCCULT BLOOD SCREEN: NEGATIVE
EXT. CONTROL: NORMAL
GLUCOSE SERPL-MCNC: 146 MG/DL (ref 65–140)
HCO3 BLDA-SCNC: 27.2 MMOL/L (ref 24–30)
HCT VFR BLD AUTO: 41.5 % (ref 34.8–46.1)
HCT VFR BLD CALC: 37 % (ref 34.8–46.1)
HGB BLD-MCNC: 13 G/DL (ref 11.5–15.4)
HGB BLDA-MCNC: 12.6 G/DL (ref 11.5–15.4)
HOLD SPECIMEN: NORMAL
HOLD SPECIMEN: NORMAL
IMM GRANULOCYTES # BLD AUTO: 0.03 THOUSAND/UL (ref 0–0.2)
IMM GRANULOCYTES NFR BLD AUTO: 0 % (ref 0–2)
LACTATE SERPL-SCNC: 1.2 MMOL/L (ref 0.5–2)
LACTATE SERPL-SCNC: 2.2 MMOL/L (ref 0.5–2)
LYMPHOCYTES # BLD AUTO: 1.19 THOUSANDS/ΜL (ref 0.6–4.47)
LYMPHOCYTES NFR BLD AUTO: 14 % (ref 14–44)
MCH RBC QN AUTO: 29.7 PG (ref 26.8–34.3)
MCHC RBC AUTO-ENTMCNC: 31.3 G/DL (ref 31.4–37.4)
MCV RBC AUTO: 95 FL (ref 82–98)
MONOCYTES # BLD AUTO: 0.31 THOUSAND/ΜL (ref 0.17–1.22)
MONOCYTES NFR BLD AUTO: 4 % (ref 4–12)
NEUTROPHILS # BLD AUTO: 6.77 THOUSANDS/ΜL (ref 1.85–7.62)
NEUTS SEG NFR BLD AUTO: 81 % (ref 43–75)
NRBC BLD AUTO-RTO: 0 /100 WBCS
P AXIS: 32 DEGREES
PCO2 BLD: 29 MMOL/L (ref 21–32)
PCO2 BLD: 44.3 MM HG (ref 42–50)
PH BLD: 7.4 [PH] (ref 7.3–7.4)
PLATELET # BLD AUTO: 210 THOUSANDS/UL (ref 149–390)
PMV BLD AUTO: 9.8 FL (ref 8.9–12.7)
PO2 BLD: 18 MM HG (ref 35–45)
POTASSIUM BLD-SCNC: 3.9 MMOL/L (ref 3.5–5.3)
PR INTERVAL: 120 MS
QRS AXIS: 87 DEGREES
QRSD INTERVAL: 72 MS
QT INTERVAL: 384 MS
QTC INTERVAL: 408 MS
RBC # BLD AUTO: 4.38 MILLION/UL (ref 3.81–5.12)
RH BLD: POSITIVE
RH BLD: POSITIVE
SAO2 % BLD FROM PO2: 25 % (ref 60–85)
SODIUM BLD-SCNC: 142 MMOL/L (ref 136–145)
SPECIMEN EXPIRATION DATE: NORMAL
SPECIMEN SOURCE: ABNORMAL
T WAVE AXIS: 50 DEGREES
TSH SERPL DL<=0.05 MIU/L-ACNC: 2.21 UIU/ML (ref 0.36–3.74)
VENTRICULAR RATE: 68 BPM
WBC # BLD AUTO: 8.4 THOUSAND/UL (ref 4.31–10.16)

## 2022-02-10 PROCEDURE — 76705 ECHO EXAM OF ABDOMEN: CPT | Performed by: PHYSICIAN ASSISTANT

## 2022-02-10 PROCEDURE — 93308 TTE F-UP OR LMTD: CPT | Performed by: PHYSICIAN ASSISTANT

## 2022-02-10 PROCEDURE — 36415 COLL VENOUS BLD VENIPUNCTURE: CPT | Performed by: SURGERY

## 2022-02-10 PROCEDURE — 71250 CT THORAX DX C-: CPT

## 2022-02-10 PROCEDURE — 70450 CT HEAD/BRAIN W/O DYE: CPT

## 2022-02-10 PROCEDURE — 84295 ASSAY OF SERUM SODIUM: CPT

## 2022-02-10 PROCEDURE — 74176 CT ABD & PELVIS W/O CONTRAST: CPT

## 2022-02-10 PROCEDURE — 86850 RBC ANTIBODY SCREEN: CPT | Performed by: SURGERY

## 2022-02-10 PROCEDURE — 82803 BLOOD GASES ANY COMBINATION: CPT

## 2022-02-10 PROCEDURE — 99285 EMERGENCY DEPT VISIT HI MDM: CPT

## 2022-02-10 PROCEDURE — 85025 COMPLETE CBC W/AUTO DIFF WBC: CPT | Performed by: SURGERY

## 2022-02-10 PROCEDURE — 84443 ASSAY THYROID STIM HORMONE: CPT | Performed by: NURSE PRACTITIONER

## 2022-02-10 PROCEDURE — 84132 ASSAY OF SERUM POTASSIUM: CPT

## 2022-02-10 PROCEDURE — 86900 BLOOD TYPING SEROLOGIC ABO: CPT | Performed by: SURGERY

## 2022-02-10 PROCEDURE — 93005 ELECTROCARDIOGRAM TRACING: CPT

## 2022-02-10 PROCEDURE — 96365 THER/PROPH/DIAG IV INF INIT: CPT

## 2022-02-10 PROCEDURE — 82947 ASSAY GLUCOSE BLOOD QUANT: CPT

## 2022-02-10 PROCEDURE — 83605 ASSAY OF LACTIC ACID: CPT

## 2022-02-10 PROCEDURE — 72170 X-RAY EXAM OF PELVIS: CPT

## 2022-02-10 PROCEDURE — 85014 HEMATOCRIT: CPT

## 2022-02-10 PROCEDURE — NC001 PR NO CHARGE: Performed by: EMERGENCY MEDICINE

## 2022-02-10 PROCEDURE — 73502 X-RAY EXAM HIP UNI 2-3 VIEWS: CPT

## 2022-02-10 PROCEDURE — 82077 ASSAY SPEC XCP UR&BREATH IA: CPT | Performed by: PHYSICIAN ASSISTANT

## 2022-02-10 PROCEDURE — 73560 X-RAY EXAM OF KNEE 1 OR 2: CPT

## 2022-02-10 PROCEDURE — 72125 CT NECK SPINE W/O DYE: CPT

## 2022-02-10 PROCEDURE — 84484 ASSAY OF TROPONIN QUANT: CPT | Performed by: SURGERY

## 2022-02-10 PROCEDURE — 86901 BLOOD TYPING SEROLOGIC RH(D): CPT | Performed by: SURGERY

## 2022-02-10 PROCEDURE — 93010 ELECTROCARDIOGRAM REPORT: CPT | Performed by: INTERNAL MEDICINE

## 2022-02-10 PROCEDURE — 99220 PR INITIAL OBSERVATION CARE/DAY 70 MINUTES: CPT | Performed by: PHYSICIAN ASSISTANT

## 2022-02-10 PROCEDURE — 71045 X-RAY EXAM CHEST 1 VIEW: CPT

## 2022-02-10 RX ORDER — GABAPENTIN 300 MG/1
300 CAPSULE ORAL 3 TIMES DAILY
Status: DISCONTINUED | OUTPATIENT
Start: 2022-02-10 | End: 2022-02-11 | Stop reason: HOSPADM

## 2022-02-10 RX ORDER — FERROUS SULFATE 325(65) MG
325 TABLET ORAL
COMMUNITY

## 2022-02-10 RX ORDER — DULOXETIN HYDROCHLORIDE 60 MG/1
60 CAPSULE, DELAYED RELEASE ORAL DAILY
COMMUNITY

## 2022-02-10 RX ORDER — LORATADINE 10 MG/1
10 TABLET ORAL DAILY
COMMUNITY

## 2022-02-10 RX ORDER — ACETAMINOPHEN 325 MG/1
975 TABLET ORAL EVERY 8 HOURS
Status: DISCONTINUED | OUTPATIENT
Start: 2022-02-10 | End: 2022-02-11 | Stop reason: HOSPADM

## 2022-02-10 RX ORDER — BUSPIRONE HYDROCHLORIDE 10 MG/1
10 TABLET ORAL DAILY
COMMUNITY

## 2022-02-10 RX ORDER — PANTOPRAZOLE SODIUM 40 MG/1
40 TABLET, DELAYED RELEASE ORAL DAILY
COMMUNITY

## 2022-02-10 RX ORDER — GABAPENTIN 300 MG/1
300 CAPSULE ORAL 3 TIMES DAILY
COMMUNITY
End: 2022-04-04 | Stop reason: SDUPTHER

## 2022-02-10 RX ORDER — SODIUM CHLORIDE, SODIUM GLUCONATE, SODIUM ACETATE, POTASSIUM CHLORIDE, MAGNESIUM CHLORIDE, SODIUM PHOSPHATE, DIBASIC, AND POTASSIUM PHOSPHATE .53; .5; .37; .037; .03; .012; .00082 G/100ML; G/100ML; G/100ML; G/100ML; G/100ML; G/100ML; G/100ML
1000 INJECTION, SOLUTION INTRAVENOUS ONCE
Status: COMPLETED | OUTPATIENT
Start: 2022-02-10 | End: 2022-02-10

## 2022-02-10 RX ORDER — BUPROPION HYDROCHLORIDE 300 MG/1
300 TABLET ORAL DAILY
COMMUNITY

## 2022-02-10 RX ORDER — LANOLIN ALCOHOL/MO/W.PET/CERES
CREAM (GRAM) TOPICAL DAILY
COMMUNITY

## 2022-02-10 RX ORDER — SIMVASTATIN 40 MG
40 TABLET ORAL
COMMUNITY

## 2022-02-10 RX ADMIN — ACETAMINOPHEN 975 MG: 325 TABLET, FILM COATED ORAL at 16:25

## 2022-02-10 RX ADMIN — ACETAMINOPHEN 975 MG: 325 TABLET, FILM COATED ORAL at 06:23

## 2022-02-10 RX ADMIN — ENOXAPARIN SODIUM 30 MG: 30 INJECTION SUBCUTANEOUS at 18:23

## 2022-02-10 RX ADMIN — SODIUM CHLORIDE, SODIUM GLUCONATE, SODIUM ACETATE, POTASSIUM CHLORIDE AND MAGNESIUM CHLORIDE 1000 ML: 526; 502; 368; 37; 30 INJECTION, SOLUTION INTRAVENOUS at 06:18

## 2022-02-10 RX ADMIN — SODIUM CHLORIDE 1000 ML: 0.9 INJECTION, SOLUTION INTRAVENOUS at 04:48

## 2022-02-10 RX ADMIN — GABAPENTIN 300 MG: 300 CAPSULE ORAL at 21:23

## 2022-02-10 RX ADMIN — ENOXAPARIN SODIUM 30 MG: 30 INJECTION SUBCUTANEOUS at 06:23

## 2022-02-10 RX ADMIN — GABAPENTIN 300 MG: 300 CAPSULE ORAL at 12:52

## 2022-02-10 RX ADMIN — ACETAMINOPHEN 975 MG: 325 TABLET, FILM COATED ORAL at 21:23

## 2022-02-10 RX ADMIN — GABAPENTIN 300 MG: 300 CAPSULE ORAL at 16:25

## 2022-02-10 NOTE — ASSESSMENT & PLAN NOTE
- chronic lumbar radiculopathy and neck pain  - managed by pain management with gabapentin 300mg TID, Cymbalta 60mg, Robaxin 750mg BID  - Hold on robaxin as potential cause for hypotension  - Acute right hip and knee pain - xrays pending

## 2022-02-10 NOTE — ED PROVIDER NOTES
Emergency Department Airway Evaluation and Management Form    History  Obtained from: patient and ambulance personnel  Patient has no allergy information on record  Chief Complaint   Patient presents with    Trauma     HPI Patient is a 47year old female with multiple syncopal episodes tonight and struck head and has headache and right side of her body hurts  No anticoagulant use  BP was low in the field (80s)  MICU call by me  Trauma level A called  Airway intact  Pupils equal and reactive  Oropharynx clear  TMs clear  Rectal brown stool and heme negative  Multiple female ED staff present during rectal exam  Trauma team evaluating patient in ED  No past medical history on file  No past surgical history on file  No family history on file  Social History     Tobacco Use    Smoking status: Not on file    Smokeless tobacco: Not on file   Substance Use Topics    Alcohol use: Not on file    Drug use: Not on file     I have reviewed and agree with the history as documented      Review of Systems    Physical Exam  BP (!) 77/44   Pulse 72   Resp 20   Wt 94 4 kg (208 lb 1 8 oz)   SpO2 94%     Physical Exam    ED Medications  Medications - No data to display    Intubation  Procedures    Notes      Final Diagnosis  Final diagnoses:   None       ED Provider  Electronically Signed by     Renetta Hughes MD  02/10/22 4870

## 2022-02-10 NOTE — ASSESSMENT & PLAN NOTE
- chronic lumbar radiculopathy and neck pain  - managed by pain management with gabapentin 300mg TID, Cymbalta 60mg, Robaxin 750mg BID  - Robaxin on hold as potential cause of hypotension - blood pressures improved over last 24 hours  - Acute right hip and knee pain - xrays neg

## 2022-02-10 NOTE — H&P
[facility]  H&P- Pro Mccartney 1970, 47 y o  female MRN: [id number]  Unit/Bed#: ED 13 Encounter: [id number]  Primary Care Provider: Lew Keating DO   Date and time admitted to hospital: 2/10/2022  4:49 AM    Syncope and collapse  Assessment & Plan  - fall with associated syncope and collapse - ct neg for acute traumatic injuries  - EKG/Trop/Echo ordered  - with associated hypotension upon arrival  - IVF hydration  - hold sedating medications and antihypertensive medications at this time    Primary hypertension  Assessment & Plan  - Currently managed with Lisinopril 10mg daily  - Hold antihypertensive agents with ongoing hypotension     Chronic pain syndrome  Assessment & Plan  - chronic lumbar radiculopathy and neck pain  - managed by pain management with gabapentin 300mg TID, Cymbalta 60mg, Robaxin 750mg BID  - Hold on robaxin as potential cause for hypotension  - Acute right hip and knee pain - xrays pending    Trauma Alert: Level A   Model of Arrival: Ambulance    Trauma Team: Attending Mariia Mariee and JEFF Li [id number]  Consultants:     None     Assessment/Plan     History of Present Illness     Chief Complaint: Syncope and collapse  Mechanism:Fall     HPI:    Pro Mccartney is a 47 y o  female with PMH robotic sleeve gastrectomy, chronic pain syndrome, htn who presents after an episode of syncope and collapse today  She reports she did not take her evening medications last night because she was not feeling well and this am woke up in pain and attempted to get out of bed but became light headed and fell/passed out onto the floor  She was not able to get up on her own and every time she attempted to stand she would pass out again  She was eventually able to get up with help from her son and immediately became incontinent of stool and once she was in the bathroom passed out again   She was able to get up that time and made it to the toilet where she passed out and EMS found her slumped her  She reports she felt well prior to last evening and denies chest pain, shortness of breath, abdominal pain, nausea, vomiting  She does report right sided pain and headache with severe right knee and hip pain which are not typical for her  Review of Systems   Constitutional: Negative for activity change, fatigue and fever  HENT: Negative for congestion, ear pain, facial swelling, nosebleeds, postnasal drip, rhinorrhea, sinus pressure, sinus pain, sneezing and sore throat  Respiratory: Negative  Negative for chest tightness, shortness of breath and wheezing  Cardiovascular: Negative for chest pain and palpitations  Gastrointestinal: Positive for diarrhea  Negative for abdominal distention, abdominal pain, constipation, nausea and vomiting  Genitourinary: Negative for dysuria, flank pain, hematuria and urgency  Musculoskeletal: Positive for back pain (chronic)  Negative for arthralgias, joint swelling, neck pain and neck stiffness  Skin: Negative for color change, rash and wound  Neurological: Negative for dizziness, seizures, weakness, light-headedness, numbness and headaches  12-point, complete review of systems was reviewed and negative except as stated above  Historical Information     Past Medical History:   Diagnosis Date    Chronic pain syndrome     HTN (hypertension)     Lumbar radiculopathy      Past Surgical History:   Procedure Laterality Date    GASTRECTOMY SLEEVE LAPAROSCOPIC          Social History     Tobacco Use    Smoking status: Not on file    Smokeless tobacco: Not on file   Substance Use Topics    Alcohol use: Not on file    Drug use: Not on file       There is no immunization history on file for this patient  Last Tetanus: n/a  Family History: Non-contributory     Meds/Allergies   all current active meds have been reviewed  Allergies have not been reviewed;   No Known Allergies    Objective   Initial Vitals:   Temperature: 97 6 °F (36 4 °C) (02/10/22 4685)  Pulse: 72 (02/10/22 0450)  Respirations: 20 (02/10/22 0450)  Blood Pressure: (!) 77/44 (02/10/22 0450)    Primary Survey:   Airway:        Status: patent;        Pre-hospital Interventions: none        Hospital Interventions: none  Breathing:        Pre-hospital Interventions: none       Effort: normal       Right breath sounds: normal       Left breath sounds: normal  Circulation:        Rhythm: regular       Rate: regular   Right Pulses Left Pulses    R radial: 2+  R femoral: 2+  R pedal: 2+  R carotid: 2+  R popliteal: 2+ L radial: 2+  L femoral: 2+  L pedal: 2+  L carotid: 2+  L popliteal: 2+   Disability:        GCS: Eye: 4; Verbal: 5 Motor: 6 Total: 15       Right Pupil: round;  reactive         Left Pupil:  round;  reactive      R Motor Strength L Motor Strength    R : 5/5  R dorsiflex: 5/5  R plantarflex: 5/5 L : 5/5  L dorsiflex: 5/5  L plantarflex: 5/5        Sensory:  No sensory deficit  Exposure:       Completed: Yes      Secondary Survey:  Physical Exam  Vitals and nursing note reviewed  Constitutional:       General: She is not in acute distress  Appearance: Normal appearance  She is obese  She is not ill-appearing or toxic-appearing  HENT:      Head: Normocephalic and atraumatic  Right Ear: Tympanic membrane normal       Left Ear: Tympanic membrane normal       Nose: Nose normal  No congestion or rhinorrhea  Mouth/Throat:      Mouth: Mucous membranes are moist       Pharynx: Oropharynx is clear  No oropharyngeal exudate or posterior oropharyngeal erythema  Eyes:      Extraocular Movements: Extraocular movements intact  Conjunctiva/sclera: Conjunctivae normal       Pupils: Pupils are equal, round, and reactive to light  Cardiovascular:      Rate and Rhythm: Normal rate and regular rhythm  Heart sounds: No murmur heard  No friction rub  No gallop  Pulmonary:      Effort: Pulmonary effort is normal  No respiratory distress  Breath sounds:  No wheezing, rhonchi or rales  Abdominal:      General: There is no distension  Palpations: Abdomen is soft  Tenderness: There is no abdominal tenderness  There is no guarding or rebound  Musculoskeletal:      Right shoulder: Normal       Left shoulder: Normal       Right upper arm: Normal       Left upper arm: Normal       Right elbow: Normal       Left elbow: Normal       Right forearm: Normal       Left forearm: Normal       Right wrist: Normal       Left wrist: Normal       Right hand: Normal       Left hand: Normal       Cervical back: Normal range of motion  No deformity, signs of trauma, lacerations or tenderness  Thoracic back: No deformity, signs of trauma or tenderness  Lumbar back: No deformity, signs of trauma or tenderness  Right hip: Normal  No deformity or tenderness  Normal range of motion  Left hip: Normal       Right upper leg: Tenderness (upper inner thigh) present  No swelling or deformity  Left upper leg: Normal       Right knee: Normal       Left knee: Normal       Right lower leg: Normal       Left lower leg: Normal       Right ankle: Normal       Left ankle: Normal       Right foot: Normal       Left foot: Normal    Skin:     General: Skin is warm and dry  Capillary Refill: Capillary refill takes less than 2 seconds  Findings: No bruising or lesion  Neurological:      General: No focal deficit present  Mental Status: She is alert and oriented to person, place, and time  Sensory: No sensory deficit  Motor: No weakness           Invasive Devices  Report    Peripheral Intravenous Line            Peripheral IV 02/10/22 Left Antecubital <1 day    Peripheral IV 02/10/22 Right Antecubital <1 day              Lab Results:   BMP/CMP:   Lab Results   Component Value Date    CO2 29 02/10/2022    GLUCOSE 146 (H) 02/10/2022   , CBC:   Lab Results   Component Value Date    WBC 8 40 02/10/2022    HGB 12 6 02/10/2022    HCT 37 02/10/2022    MCV 95 02/10/2022     02/10/2022    MCH 29 7 02/10/2022    MCHC 31 3 (L) 02/10/2022    RDW 13 2 02/10/2022    MPV 9 8 02/10/2022    NRBC 0 02/10/2022   , Lactate: No results found for: LACTATE, Troponin: No results found for: TROPONINI and EtOH: No results found for: ETOH    Imaging Results: I have personally reviewed pertinent films in PACS  Chest Xray(s): negative for acute traumatic findings   FAST exam(s): negative for acute traumatic findings   CT Scan(s): negative for acute traumatic findings   Additional Xray(s): pending     Other Studies: echo, ekg pending    Code Status: Level 1 - Full Code

## 2022-02-10 NOTE — ASSESSMENT & PLAN NOTE
- Currently managed with Lisinopril 10mg daily  - Hold antihypertensive agents with ongoing hypotension   - Hold Lisinopril at discharge with normotensive readings

## 2022-02-10 NOTE — PLAN OF CARE
Problem: CARDIOVASCULAR - ADULT  Goal: Maintains optimal cardiac output and hemodynamic stability  Description: INTERVENTIONS:  - Monitor I/O, vital signs and rhythm  - Monitor for S/S and trends of decreased cardiac output  - Administer and titrate ordered vasoactive medications to optimize hemodynamic stability  - Assess quality of pulses, skin color and temperature  - Assess for signs of decreased coronary artery perfusion  - Instruct patient to report change in severity of symptoms  Outcome: Progressing     Problem: Prexisting or High Potential for Compromised Skin Integrity  Goal: Skin integrity is maintained or improved  Description: INTERVENTIONS:  - Identify patients at risk for skin breakdown  - Assess and monitor skin integrity  - Assess and monitor nutrition and hydration status  - Monitor labs   - Assess for incontinence   - Turn and reposition patient  - Assist with mobility/ambulation  - Relieve pressure over bony prominences  - Avoid friction and shearing  - Provide appropriate hygiene as needed including keeping skin clean and dry  - Evaluate need for skin moisturizer/barrier cream  - Collaborate with interdisciplinary team   - Patient/family teaching  - Consider wound care consult   Outcome: Progressing     Problem: Potential for Falls  Goal: Patient will remain free of falls  Description: INTERVENTIONS:  - Educate patient/family on patient safety including physical limitations  - Instruct patient to call for assistance with activity   - Consult OT/PT to assist with strengthening/mobility   - Keep Call bell within reach  - Keep bed low and locked with side rails adjusted as appropriate  - Keep care items and personal belongings within reach  - Initiate and maintain comfort rounds  - Make Fall Risk Sign visible to staff  - Apply yellow socks and bracelet for high fall risk patients  - Consider moving patient to room near nurses station  Outcome: Progressing

## 2022-02-10 NOTE — ED NOTES
Meal tray provided at this time, Pt OOB to bedside commode with assistance  No recurrent episodes of hypotension or dizziness note       Jamil Hill RN  02/10/22 6996

## 2022-02-10 NOTE — ASSESSMENT & PLAN NOTE
- fall with associated syncope and collapse - ct neg for acute traumatic injuries  - EKG/Trop neg  - echo ordered, Pending cardiology read  - If echo okay will d/c to home  - with associated hypotension upon arrival  - hold robaxin and lisinopril at discharge as likely causes of hypotension

## 2022-02-10 NOTE — ASSESSMENT & PLAN NOTE
- fall with associated syncope and collapse - ct neg for acute traumatic injuries  - EKG/Trop/Echo ordered  - with associated hypotension upon arrival  - IVF hydration  - hold sedating medications and antihypertensive medications at this time

## 2022-02-10 NOTE — ASSESSMENT & PLAN NOTE
- Currently managed with Lisinopril 10mg daily  - Hold antihypertensive agents with ongoing hypotension

## 2022-02-10 NOTE — QUICK NOTE
Cervical Collar Clearance: The patient had a CT scan of the cervical spine demonstrating no acute injury  On exam, the patient had no midline point tenderness or paresthesias/numbness/weakness in the extremities  The patient had full range of motion (was then able to flex, extend, and rotate head laterally) without pain  There were no distracting injuries and the patient was not intoxicated  The patient's cervical spine was cleared radiologically and clinically  Cervical collar removed at this time       Willian Banks PA-C  2/10/2022

## 2022-02-10 NOTE — PROCEDURES
POC FAST US    Date/Time: 2/10/2022 4:48 AM  Performed by: Silvio Galindo PA-C  Authorized by: Silvio Galindo PA-C     Patient location:  Trauma  Procedure details:     Exam Type:  Diagnostic    Indications: blunt abdominal trauma and blunt chest trauma      Assess for:  Intra-abdominal fluid and pericardial effusion    Technique: FAST      Views obtained:  Heart - Pericardial sac, LUQ - Splenorenal space, Suprapubic - Pouch of Germán and RUQ - Pickering's Pouch    Image quality: diagnostic      Image availability:  Images available in PACS and video obtained  FAST Findings:     RUQ (Hepatorenal) free fluid: absent      LUQ (Splenorenal) free fluid: absent      Suprapubic free fluid: absent      Cardiac wall motion: identified      Pericardial effusion: absent    Interpretation:     Impressions: negative

## 2022-02-11 ENCOUNTER — APPOINTMENT (OUTPATIENT)
Dept: NON INVASIVE DIAGNOSTICS | Facility: HOSPITAL | Age: 52
End: 2022-02-11
Payer: MEDICARE

## 2022-02-11 VITALS
WEIGHT: 208 LBS | BODY MASS INDEX: 35.51 KG/M2 | TEMPERATURE: 97.6 F | RESPIRATION RATE: 18 BRPM | SYSTOLIC BLOOD PRESSURE: 117 MMHG | HEART RATE: 62 BPM | DIASTOLIC BLOOD PRESSURE: 81 MMHG | HEIGHT: 64 IN | OXYGEN SATURATION: 90 %

## 2022-02-11 LAB
ANION GAP SERPL CALCULATED.3IONS-SCNC: 5 MMOL/L (ref 4–13)
AORTIC ROOT: 2.9 CM
APICAL FOUR CHAMBER EJECTION FRACTION: 61 %
ASCENDING AORTA: 3 CM (ref 2.08–3.12)
ATRIAL RATE: 57 BPM
AV LVOT PEAK GRADIENT: 5 MMHG
AV PEAK GRADIENT: 9 MMHG
BASOPHILS # BLD AUTO: 0.04 THOUSANDS/ΜL (ref 0–0.1)
BASOPHILS NFR BLD AUTO: 1 % (ref 0–1)
BUN SERPL-MCNC: 13 MG/DL (ref 5–25)
CALCIUM SERPL-MCNC: 8.2 MG/DL (ref 8.3–10.1)
CHLORIDE SERPL-SCNC: 109 MMOL/L (ref 100–108)
CO2 SERPL-SCNC: 28 MMOL/L (ref 21–32)
CREAT SERPL-MCNC: 0.7 MG/DL (ref 0.6–1.3)
DOP CALC RVOT PEAK VEL: 0.51 M/S
E WAVE DECELERATION TIME: 207 MS
EOSINOPHIL # BLD AUTO: 0.46 THOUSAND/ΜL (ref 0–0.61)
EOSINOPHIL NFR BLD AUTO: 9 % (ref 0–6)
ERYTHROCYTE [DISTWIDTH] IN BLOOD BY AUTOMATED COUNT: 13.6 % (ref 11.6–15.1)
FRACTIONAL SHORTENING: 36 % (ref 28–44)
GFR SERPL CREATININE-BSD FRML MDRD: 99 ML/MIN/1.73SQ M
GLUCOSE P FAST SERPL-MCNC: 93 MG/DL (ref 65–99)
GLUCOSE SERPL-MCNC: 93 MG/DL (ref 65–140)
HCT VFR BLD AUTO: 38.8 % (ref 34.8–46.1)
HGB BLD-MCNC: 12.6 G/DL (ref 11.5–15.4)
IMM GRANULOCYTES # BLD AUTO: 0 THOUSAND/UL (ref 0–0.2)
IMM GRANULOCYTES NFR BLD AUTO: 0 % (ref 0–2)
INTERVENTRICULAR SEPTUM IN DIASTOLE (PARASTERNAL SHORT AXIS VIEW): 1 CM (ref 0.54–1.02)
INTERVENTRICULAR SEPTUM: 1 CM (ref 0.6–1.1)
LEFT ATRIUM AREA SYSTOLE SINGLE PLANE A4C: 21.2 CM2
LEFT ATRIUM SIZE: 3.6 CM
LEFT INTERNAL DIMENSION IN SYSTOLE: 3 CM (ref 2.1–4)
LEFT VENTRICULAR INTERNAL DIMENSION IN DIASTOLE: 4.7 CM (ref 5.42–8.07)
LEFT VENTRICULAR POSTERIOR WALL IN END DIASTOLE: 1 CM (ref 0.53–1.01)
LEFT VENTRICULAR STROKE VOLUME: 70 ML
LYMPHOCYTES # BLD AUTO: 2 THOUSANDS/ΜL (ref 0.6–4.47)
LYMPHOCYTES NFR BLD AUTO: 41 % (ref 14–44)
MCH RBC QN AUTO: 30 PG (ref 26.8–34.3)
MCHC RBC AUTO-ENTMCNC: 32.5 G/DL (ref 31.4–37.4)
MCV RBC AUTO: 92 FL (ref 82–98)
MONOCYTES # BLD AUTO: 0.42 THOUSAND/ΜL (ref 0.17–1.22)
MONOCYTES NFR BLD AUTO: 9 % (ref 4–12)
MV E'TISSUE VEL-SEP: 9 CM/S
MV PEAK A VEL: 0.41 M/S
MV PEAK E VEL: 85 CM/S
MV STENOSIS PRESSURE HALF TIME: 0 MS
NEUTROPHILS # BLD AUTO: 2.02 THOUSANDS/ΜL (ref 1.85–7.62)
NEUTS SEG NFR BLD AUTO: 40 % (ref 43–75)
NRBC BLD AUTO-RTO: 0 /100 WBCS
P AXIS: 18 DEGREES
PLATELET # BLD AUTO: 186 THOUSANDS/UL (ref 149–390)
PMV BLD AUTO: 10.1 FL (ref 8.9–12.7)
POTASSIUM SERPL-SCNC: 3.6 MMOL/L (ref 3.5–5.3)
PR INTERVAL: 114 MS
PV PEAK GRADIENT: 2 MMHG
QRS AXIS: 64 DEGREES
QRSD INTERVAL: 80 MS
QT INTERVAL: 412 MS
QTC INTERVAL: 401 MS
RBC # BLD AUTO: 4.2 MILLION/UL (ref 3.81–5.12)
RIGHT ATRIUM AREA SYSTOLE A4C: 17.7 CM2
RIGHT VENTRICLE ID DIMENSION: 3.9 CM
SL CV LV EF: 60
SL CV PED ECHO LEFT VENTRICLE DIASTOLIC VOLUME (MOD BIPLANE) 2D: 104 ML
SL CV PED ECHO LEFT VENTRICLE SYSTOLIC VOLUME (MOD BIPLANE) 2D: 34 ML
SL CV RVOT MAX GRADIENT: 1 MMHG
SODIUM SERPL-SCNC: 142 MMOL/L (ref 136–145)
T WAVE AXIS: 29 DEGREES
TR MAX PG: 28 MMHG
TR PEAK VELOCITY: 2.7 M/S
TRICUSPID VALVE PEAK REGURGITATION VELOCITY: 2.67 M/S
VENTRICULAR RATE: 57 BPM
WBC # BLD AUTO: 4.94 THOUSAND/UL (ref 4.31–10.16)
Z-SCORE OF ASCENDING AORTA: 1.53
Z-SCORE OF INTERVENTRICULAR SEPTUM IN END DIASTOLE: 1.8
Z-SCORE OF LEFT VENTRICULAR DIMENSION IN END SYSTOLE: -3.41
Z-SCORE OF LEFT VENTRICULAR POSTERIOR WALL IN END DIASTOLE: 1.91

## 2022-02-11 PROCEDURE — 93306 TTE W/DOPPLER COMPLETE: CPT | Performed by: INTERNAL MEDICINE

## 2022-02-11 PROCEDURE — NC001 PR NO CHARGE: Performed by: PHYSICIAN ASSISTANT

## 2022-02-11 PROCEDURE — 93010 ELECTROCARDIOGRAM REPORT: CPT | Performed by: INTERNAL MEDICINE

## 2022-02-11 PROCEDURE — 93306 TTE W/DOPPLER COMPLETE: CPT

## 2022-02-11 PROCEDURE — 93005 ELECTROCARDIOGRAM TRACING: CPT

## 2022-02-11 PROCEDURE — 80048 BASIC METABOLIC PNL TOTAL CA: CPT | Performed by: NURSE PRACTITIONER

## 2022-02-11 PROCEDURE — 85025 COMPLETE CBC W/AUTO DIFF WBC: CPT | Performed by: NURSE PRACTITIONER

## 2022-02-11 PROCEDURE — 99217 PR OBSERVATION CARE DISCHARGE MANAGEMENT: CPT | Performed by: SURGERY

## 2022-02-11 RX ORDER — ACETAMINOPHEN 325 MG/1
975 TABLET ORAL EVERY 8 HOURS
Refills: 0 | Status: CANCELLED
Start: 2022-02-11

## 2022-02-11 RX ORDER — LISINOPRIL 10 MG/1
10 TABLET ORAL DAILY
COMMUNITY
End: 2022-02-11 | Stop reason: HOSPADM

## 2022-02-11 RX ORDER — METHOCARBAMOL 750 MG/1
750 TABLET, FILM COATED ORAL 2 TIMES DAILY
COMMUNITY
End: 2022-02-11 | Stop reason: HOSPADM

## 2022-02-11 RX ADMIN — ACETAMINOPHEN 975 MG: 325 TABLET, FILM COATED ORAL at 05:43

## 2022-02-11 RX ADMIN — GABAPENTIN 300 MG: 300 CAPSULE ORAL at 08:33

## 2022-02-11 RX ADMIN — ENOXAPARIN SODIUM 30 MG: 30 INJECTION SUBCUTANEOUS at 05:43

## 2022-02-11 NOTE — PLAN OF CARE
Problem: Potential for Falls  Goal: Patient will remain free of falls  Description: INTERVENTIONS:  - Educate patient/family on patient safety including physical limitations  - Instruct patient to call for assistance with activity   - Consult OT/PT to assist with strengthening/mobility   - Keep Call bell within reach  - Keep bed low and locked with side rails adjusted as appropriate  - Keep care items and personal belongings within reach  - Initiate and maintain comfort rounds  - Make Fall Risk Sign visible to staff  - Apply yellow socks and bracelet for high fall risk patients  - Consider moving patient to room near nurses station  Outcome: Progressing     Problem: Prexisting or High Potential for Compromised Skin Integrity  Goal: Skin integrity is maintained or improved  Description: INTERVENTIONS:  - Identify patients at risk for skin breakdown  - Assess and monitor skin integrity  - Assess and monitor nutrition and hydration status  - Monitor labs   - Assess for incontinence   - Turn and reposition patient  - Assist with mobility/ambulation  - Relieve pressure over bony prominences  - Avoid friction and shearing  - Provide appropriate hygiene as needed including keeping skin clean and dry  - Evaluate need for skin moisturizer/barrier cream  - Collaborate with interdisciplinary team   - Patient/family teaching  - Consider wound care consult   Outcome: Progressing     Problem: CARDIOVASCULAR - ADULT  Goal: Maintains optimal cardiac output and hemodynamic stability  Description: INTERVENTIONS:  - Monitor I/O, vital signs and rhythm  - Monitor for S/S and trends of decreased cardiac output  - Administer and titrate ordered vasoactive medications to optimize hemodynamic stability  - Assess quality of pulses, skin color and temperature  - Assess for signs of decreased coronary artery perfusion  - Instruct patient to report change in severity of symptoms  Outcome: Progressing

## 2022-02-11 NOTE — ASSESSMENT & PLAN NOTE
- chronic lumbar radiculopathy and neck pain  - managed by pain management with gabapentin 300mg TID, Cymbalta 60mg, Robaxin 750mg BID  - Robaxin on hold as potential cause of hypotension - blood pressures improved over last 24 hours  - Acute right hip and knee pain - xrays neg  - follow-up with pain management as an outpatient

## 2022-02-11 NOTE — OCCUPATIONAL THERAPY NOTE
Occupational Therapy Screen     Patient Name: Delois Collet  EVLJL'D Date: 2/11/2022  Problem List  Principal Problem:    Syncope and collapse  Active Problems:    Chronic pain syndrome    Primary hypertension    Past Medical History  Past Medical History:   Diagnosis Date    Chronic pain syndrome     HTN (hypertension)     Lumbar radiculopathy      Past Surgical History  Past Surgical History:   Procedure Laterality Date    GASTRECTOMY SLEEVE LAPAROSCOPIC          02/11/22 1252   OT Last Visit   OT Visit Date 02/11/22  (Friday)   Note Type   Note type Screen   Additional Comments OT orders received and chart review completed  Observed pt ambulating in room to / from bathroom using cane w/ mod I  Contact made w/ pt  Pt reports living w/ her son and completing ADL at / near baseline level of I  No acute OT needs at ths time  Pt reports no dizziness  Will screen from OT caseload  Please re consult if acute needs arise   DC OT   [facility], OTR/L

## 2022-02-11 NOTE — DISCHARGE SUMMARY
[facility]  Discharge- Lucila Mccartney 1970, 47 y o  female MRN: [id number]  Unit/Bed#: S -01 Encounter: [id number]  Primary Care Provider: Royal Walker DO   Date and time admitted to hospital: 2/10/2022  4:49 AM    Primary hypertension  Assessment & Plan  - Currently managed with Lisinopril 10mg daily  - Hold antihypertensive agents with ongoing hypotension   - will continue to hold lisinopril due to patient being normotensive throughout her hospitalization  Patient is to follow-up with her PCP regarding continued blood pressure monitoring  Chronic pain syndrome  Assessment & Plan  - chronic lumbar radiculopathy and neck pain  - managed by pain management with gabapentin 300mg TID, Cymbalta 60mg, Robaxin 750mg BID  - Robaxin on hold as potential cause of hypotension - blood pressures improved over last 24 hours  - Acute right hip and knee pain - xrays neg  - follow-up with pain management as an outpatient    * Syncope and collapse  Assessment & Plan  - fall with associated syncope and collapse - ct neg for acute traumatic injuries  - EKG/Trop neg  - echo was unremarkable  - patient denies any additional syncope like symptoms  States she feels comfortable being discharged  Her vital signs have been stable-no hypotension   - patient was evaluated by PT/OT in cleared for discharge  - Will hold robaxin and lisinopril at discharge as likely contributing to hypotension  - Follow up with PCP regarding continued blood pressure monitoring  Patient has been normotensive throughout her admission  Medical Problems             Resolved Problems  Date Reviewed: 2/11/2022    None                Admission Date:   Admission Orders (From admission, onward)     Ordered        02/10/22 0604  Place in Observation  Once                        Admitting Diagnosis: Syncope and collapse [R55]  Trauma [[id number]]    HPI: From H&P by myself on 2/9/22: "Lucila Mccartney is a 47 y o  female with PMH robotic sleeve gastrectomy, chronic pain syndrome, htn who presents after an episode of syncope and collapse today  She reports she did not take her evening medications last night because she was not feeling well and this am woke up in pain and attempted to get out of bed but became light headed and fell/passed out onto the floor  She was not able to get up on her own and every time she attempted to stand she would pass out again  She was eventually able to get up with help from her son and immediately became incontinent of stool and once she was in the bathroom passed out again  She was able to get up that time and made it to the toilet where she passed out and EMS found her slumped her  She reports she felt well prior to last evening and denies chest pain, shortness of breath, abdominal pain, nausea, vomiting  She does report right sided pain and headache with severe right knee and hip pain which are not typical for her "    Procedures Performed:   Orders Placed This Encounter   Procedures    Fast Ultrasound       Summary of Hospital Course: Andrzej Keith is a 48 yo female who presented after multiple syncopal falls at home  She was found to be hypotensive without acute traumatic injury  She was admitted to the trauma service for syncope and hypotension work up  She was treated with IVF hydration and her robaxin and lisinopril home medications were held  Her blood pressure improved to the low 110's  Her EKG and troponins were wnl and she had a cardiac echo that was unremarkable  She was able to ambulate without further syncope and she was discharged home discontinuing her lisinopril and robaxin  Significant Findings, Care, Treatment and Services Provided:   Syncope and collapse with hypotension: likely secondary to polypharmacy  Hold home lisinopril and robaxin   Blood pressure improved with IVF hydration    Complications: none    Condition at Discharge: good         Discharge instructions/Information to patient and family:   See after visit summary for information provided to patient and family  Provisions for Follow-Up Care:  See after visit summary for information related to follow-up care and any pertinent home health orders  PCP: Lew Keating DO    Disposition: See After Visit Summary for discharge disposition information  Planned Readmission: No    Discharge Statement   I spent 20 minutes discharging the patient  This time was spent on the day of discharge  I had direct contact with the patient on the day of discharge  Additional documentation is required if more than 30 minutes were spent on discharge  Discharge Medications:  See after visit summary for reconciled discharge medications provided to patient and family

## 2022-02-11 NOTE — PROGRESS NOTES
[facility]  Progress Note - Walter Mcelroy 1970, 47 y o  female MRN: [id number]  Unit/Bed#: S -01 Encounter: [id number]  Primary Care Provider: Darryle Door, DO   Date and time admitted to hospital: 2/10/2022  4:49 AM    * Syncope and collapse  Assessment & Plan  - fall with associated syncope and collapse - ct neg for acute traumatic injuries  - EKG/Trop neg  - echo ordered, Pending cardiology read  - If echo okay will d/c to home  - with associated hypotension upon arrival  - hold robaxin and lisinopril at discharge as likely causes of hypotension    Primary hypertension  Assessment & Plan  - Currently managed with Lisinopril 10mg daily  - Hold antihypertensive agents with ongoing hypotension   - Hold Lisinopril at discharge with normotensive readings    Chronic pain syndrome  Assessment & Plan  - chronic lumbar radiculopathy and neck pain  - managed by pain management with gabapentin 300mg TID, Cymbalta 60mg, Robaxin 750mg BID  - Robaxin on hold as potential cause of hypotension - blood pressures improved over last 24 hours  - Acute right hip and knee pain - xrays neg        Disposition: pending echo with read today, if echo wnl will d/c to home holding robaxin and lisinopril medications      SUBJECTIVE:  Chief Complaint: No complaints    Subjective: Reports pain is well controlled, no other complaints, tolerating diet         OBJECTIVE:     Meds/Allergies     Current Facility-Administered Medications:     acetaminophen (TYLENOL) tablet 975 mg, 975 mg, Oral, Q8H, Lisa Cabrera PA-C, 975 mg at 02/11/22 0543    enoxaparin (LOVENOX) subcutaneous injection 30 mg, 30 mg, Subcutaneous, Q12H, Lisa Cabrera PA-C, 30 mg at 02/11/22 0543    gabapentin (NEURONTIN) capsule 300 mg, 300 mg, Oral, TID, Lisa Cabrera PA-C, 300 mg at 02/11/22 2355     Vitals:   Vitals:    02/11/22 0830   BP:    Pulse: 61   Resp:    Temp:    SpO2: 94%       Intake/Output:  I/O       02/09 0701  02/10 0700 02/10 0701 02/11 0700 02/11 0701 02/12 0700    P  O   240     I V  (mL/kg)  1000 (10 6)     Total Intake(mL/kg)  1240 (13 1)     Urine (mL/kg/hr)  0 (0)     Stool  0     Total Output  0     Net  +1240            Unmeasured Stool Occurrence  0 x            Nutrition/GI Proph/Bowel Reg: Bariatric maintenance    Physical Exam:   GENERAL APPEARANCE: No acute distress resting comfortably in bed   NEURO: AAOX3, GCS 15, no focal neuro deficit  HEENT: PERRL, EOMI, mucus membranes moist  CV: RRR S1 S2 without murmur, rub, or gallop  LUNGS: Clear to ausc bilaterally without wheezes, crackles, or rhonchi  GI: soft, nontender nondistended  : voiding independently  MSK: nontender without deformity  SKIN: warm, dry, intact           Invasive Devices  Report    Peripheral Intravenous Line            Peripheral IV 02/10/22 Left Antecubital 1 day    Peripheral IV 02/10/22 Right Antecubital 1 day                 Lab Results: BMP/CMP: No results found for: SODIUM, K, CL, CO2, ANIONGAP, BUN, CREATININE, GLUCOSE, CALCIUM, AST, ALT, ALKPHOS, PROT, BILITOT, EGFR and CBC:   Lab Results   Component Value Date    WBC 4 94 02/11/2022    HGB 12 6 02/11/2022    HCT 38 8 02/11/2022    MCV 92 02/11/2022     02/11/2022    MCH 30 0 02/11/2022    MCHC 32 5 02/11/2022    RDW 13 6 02/11/2022    MPV 10 1 02/11/2022    NRBC 0 02/11/2022     Imaging/EKG Studies: N/A  Other Studies: pending ECHO  VTE Prophylaxis: Sequential compression device (Venodyne)  and Enoxaparin (Lovenox)

## 2022-02-11 NOTE — ASSESSMENT & PLAN NOTE
- fall with associated syncope and collapse - ct neg for acute traumatic injuries  - EKG/Trop neg  - echo was unremarkable  - patient denies any additional syncope like symptoms  States she feels comfortable being discharged  Her vital signs have been stable-no hypotension   - patient was evaluated by PT/OT in cleared for discharge  - Will hold robaxin and lisinopril at discharge as likely contributing to hypotension  - Follow up with PCP regarding continued blood pressure monitoring  Patient has been normotensive throughout her admission

## 2022-02-11 NOTE — PHYSICAL THERAPY NOTE
PHYSICAL THERAPY SCREEN NOTE    Patient Name: Lucila Mccartney  FNIZM'E Date: 2/11/2022 02/11/22 1253   PT Last Visit   PT Visit Date 02/11/22   Note Type   Note type Screen   Additional Comments PT orders received, chart review performed  This writer observed pt ambulate from EOB to bathroom and b ack w/ SPC w/ mod I  Spoke w/ pt who reports she lives w/ her son and they "take care of each other"  Denies feeling lightheaded and wants to go home   Pt declines PT need at this time, seeing as pt has no acute PT needs, will DC from IPPT caseload, please reconsult if needs arise     Hernan Rangel, PT, DPT

## 2022-02-11 NOTE — PLAN OF CARE
Problem: Potential for Falls  Goal: Patient will remain free of falls  Description: INTERVENTIONS:  - Educate patient/family on patient safety including physical limitations  - Instruct patient to call for assistance with activity   - Consult OT/PT to assist with strengthening/mobility   - Keep Call bell within reach  - Keep bed low and locked with side rails adjusted as appropriate  - Keep care items and personal belongings within reach  - Initiate and maintain comfort rounds  - Make Fall Risk Sign visible to staff  - Offer Toileting every 2 Hours, in advance of need  - Initiate/Maintain alarm  - Obtain necessary fall risk management equipment:   - Apply yellow socks and bracelet for high fall risk patients  - Consider moving patient to room near nurses station  Outcome: Progressing     Problem: Prexisting or High Potential for Compromised Skin Integrity  Goal: Skin integrity is maintained or improved  Description: INTERVENTIONS:  - Identify patients at risk for skin breakdown  - Assess and monitor skin integrity  - Assess and monitor nutrition and hydration status  - Monitor labs   - Assess for incontinence   - Turn and reposition patient  - Assist with mobility/ambulation  - Relieve pressure over bony prominences  - Avoid friction and shearing  - Provide appropriate hygiene as needed including keeping skin clean and dry  - Evaluate need for skin moisturizer/barrier cream  - Collaborate with interdisciplinary team   - Patient/family teaching  - Consider wound care consult   Outcome: Progressing     Problem: CARDIOVASCULAR - ADULT  Goal: Maintains optimal cardiac output and hemodynamic stability  Description: INTERVENTIONS:  - Monitor I/O, vital signs and rhythm  - Monitor for S/S and trends of decreased cardiac output  - Administer and titrate ordered vasoactive medications to optimize hemodynamic stability  - Assess quality of pulses, skin color and temperature  - Assess for signs of decreased coronary artery perfusion  - Instruct patient to report change in severity of symptoms  Outcome: Progressing  Goal: Absence of cardiac dysrhythmias or at baseline rhythm  Description: INTERVENTIONS:  - Continuous cardiac monitoring, vital signs, obtain 12 lead EKG if ordered  - Administer antiarrhythmic and heart rate control medications as ordered  - Monitor electrolytes and administer replacement therapy as ordered  Outcome: Progressing

## 2022-02-11 NOTE — ASSESSMENT & PLAN NOTE
- Currently managed with Lisinopril 10mg daily  - Hold antihypertensive agents with ongoing hypotension   - will continue to hold lisinopril due to patient being normotensive throughout her hospitalization  Patient is to follow-up with her PCP regarding continued blood pressure monitoring

## 2022-02-11 NOTE — INCIDENTAL FINDINGS
The following findings require follow up:  Radiographic finding   Finding: There are subcentimeter noncalcified and calcified pulmonary nodules bilaterally, largest measuring 5 mm in the right upper lobe anteriorly (series 4, image 52)   Based on current Fleischner Society 2017 Guidelines on incidental pulmonary nodule,   Follow up required: PCP   Follow up should be done within 1 week(s)    Please notify the following clinician to assist with the follow up:   PCP

## 2022-02-24 ENCOUNTER — TELEPHONE (OUTPATIENT)
Dept: GASTROENTEROLOGY | Facility: CLINIC | Age: 52
End: 2022-02-24

## 2022-02-24 NOTE — TELEPHONE ENCOUNTER
Patient LMOM stating she overslept for her appointment today with Dr Jose Soares and would like to reschedule    Returned her call, mailbox is full

## 2022-03-08 ENCOUNTER — HOSPITAL ENCOUNTER (OUTPATIENT)
Dept: RADIOLOGY | Facility: HOSPITAL | Age: 52
Discharge: HOME | End: 2022-03-08
Attending: ORTHOPAEDIC SURGERY
Payer: MEDICARE

## 2022-03-08 ENCOUNTER — OFFICE VISIT (OUTPATIENT)
Dept: OBGYN CLINIC | Facility: CLINIC | Age: 52
End: 2022-03-08
Payer: MEDICARE

## 2022-03-08 VITALS — BODY MASS INDEX: 34.59 KG/M2 | HEIGHT: 64 IN | WEIGHT: 202.6 LBS

## 2022-03-08 DIAGNOSIS — M25.511 RIGHT SHOULDER PAIN, UNSPECIFIED CHRONICITY: Primary | ICD-10-CM

## 2022-03-08 DIAGNOSIS — M25.511 RIGHT SHOULDER PAIN, UNSPECIFIED CHRONICITY: ICD-10-CM

## 2022-03-08 PROCEDURE — 73030 X-RAY EXAM OF SHOULDER: CPT

## 2022-03-08 PROCEDURE — 99214 OFFICE O/P EST MOD 30 MIN: CPT | Performed by: ORTHOPAEDIC SURGERY

## 2022-03-08 NOTE — PROGRESS NOTES
Orthopedics          Beryl Whittington 47 y o  female MRN: [id number]      Chief Complaint:   bilateral knee pain, right shoulder pain    HPI:   47 y  o female complaining of bilateral knee pain  Patient presents office today regarding bilateral knee pain due to osteoarthritis follow up right shoulder pain patient has been struggling with bilateral knee osteoarthritis for several months  Patient has had multiple intra-articular injections significant pain relief  Patient states her pain in her bilateral knees is aching nature worse weight-bearing mildly relieved with rest   States her most recent injections October lasted significant amount time however the pain since returned  Patient states pain is aching nature localized her bilateral knees without radiation  Patient also complaining right shoulder pain from a fall she had after syncopal episodes  Patient states his pain is improving over his is localized to her right shoulder region    States having pain with overhead motions pain is localized in the shoulder without radiation denies any numbness tingling right upper extremity                Review Of Systems:   · Skin: Normal  · Neuro: See HPI  · Musculoskeletal: See HPI  · All other systems reviewed and are negative    Past Medical History:   Past Medical History:   Diagnosis Date    Asthma     Cancer ([facility] [address] )     endometrial    Chronic pain syndrome     HTN (hypertension)     Hyperlipidemia     Hypertension     Lumbar radiculopathy     Prediabetes        Past Surgical History:   Past Surgical History:   Procedure Laterality Date    GASTRECTOMY SLEEVE LAPAROSCOPIC      HYSTERECTOMY      KNEE ARTHROSCOPY Right     LUMBAR FUSION      MARY-EN-Y PROCEDURE  05/27/2021    Sleve    TOTAL HIP ARTHROPLASTY Bilateral        Family History:  Family history reviewed and non-contributory  Family History   Problem Relation Age of Onset    Diabetes Mother     Stroke Mother     Diabetes Father     Cancer Father thyroid    Diabetes Sister     Diabetes Brother     No Known Problems Maternal Grandmother     No Known Problems Maternal Grandfather     No Known Problems Paternal Grandmother     No Known Problems Paternal Grandfather     No Known Problems Sister          Social History:  Social History     Socioeconomic History    Marital status: Unknown     Spouse name: None    Number of children: None    Years of education: None    Highest education level: None   Occupational History    None   Tobacco Use    Smoking status: Former Smoker    Smokeless tobacco: Never Used   Substance and Sexual Activity    Alcohol use: None    Drug use: None    Sexual activity: None   Other Topics Concern    None   Social History Narrative    ** Merged History Encounter **          Social Determinants of Health     Financial Resource Strain: Not on file   Food Insecurity: Not on file   Transportation Needs: Not on file   Physical Activity: Not on file   Stress: Not on file   Social Connections: Not on file   Intimate Partner Violence: Not on file   Housing Stability: Not on file       Allergies:    Allergies   Allergen Reactions    Bee Venom Swelling    Dust Mite Extract     Fish-Derived Products - Food Allergy Hives    Iodine - Food Allergy Hives    Lac Bovis Other (See Comments)     congestion    Molds & Smuts     Other Swelling    Pollen Extract Other (See Comments)     Runny nose, watery eyes (also has corneal swelling) and itching  Triggers asthma    Shrimp Flavor - Food Allergy Hives       Labs:  0   Lab Value Date/Time    HCT 38 8 02/11/2022 0825    HCT 37 02/10/2022 0500    HCT 41 5 02/10/2022 0459    HCT 43 0 01/27/2022 1105    HCT 35 7 09/07/2015 0600    HCT 34 6 (L) 09/06/2015 0556    HCT 39 4 09/05/2015 0607    HGB 12 6 02/11/2022 0825    HGB 12 6 02/10/2022 0500    HGB 13 0 02/10/2022 0459    HGB 13 8 01/27/2022 1105    HGB 11 1 (L) 09/07/2015 0600    HGB 10 8 (L) 09/06/2015 0556    HGB 12 8 09/05/2015 0607    INR 1 02 01/27/2022 1105    INR 1 02 08/20/2015 1311    WBC 4 94 02/11/2022 0825    WBC 8 40 02/10/2022 0459    WBC 7 49 01/27/2022 1105    WBC 11 47 (H) 09/07/2015 0600    WBC 11 74 (H) 09/06/2015 0556    WBC 18 25 (H) 09/05/2015 0607       Meds:    Current Outpatient Medications:     buPROPion (WELLBUTRIN XL) 300 mg 24 hr tablet, Take by mouth, Disp: , Rfl:     busPIRone (BUSPAR) 10 mg tablet, 10 mg daily , Disp: , Rfl:     Calcium Carb-Cholecalciferol (OSCAL-D) 500 mg-200 units per tablet, Take 1 tablet by mouth 2 (two) times a day with meals, Disp: , Rfl:     Cholecalciferol (VITAMIN D3) 2000 units capsule, TAKE 2 CAPSULES BY MOUTH DAILY INDICATIONS: VITAMIN D DEFICIENCY , Disp: , Rfl: 5    Cholecalciferol 2000 units TABS, Take 2 capsules by mouth  , Disp: , Rfl:     [facility] Vitamin B-12 1000 MCG tablet, Take 1,000 mcg by mouth daily, Disp: , Rfl:     diclofenac sodium (VOLTAREN) 1 %, Apply 2 g topically 4 (four) times a day, Disp: 1 Tube, Rfl: 3    DULoxetine (CYMBALTA) 60 mg delayed release capsule, Take 60 mg by mouth, Disp: , Rfl:     ferrous sulfate 325 (65 Fe) mg tablet, Take by mouth, Disp: , Rfl:     fluticasone (FLONASE) 50 mcg/act nasal spray, 2 sprays into each nostril daily, Disp: , Rfl:     fluticasone-salmeterol (ADVAIR DISKUS) 250-50 mcg/dose inhaler, Inhale 1 puff, Disp: , Rfl:     gabapentin (NEURONTIN) 300 mg capsule, Take 1 capsule (300 mg total) by mouth 3 (three) times a day, Disp: 90 capsule, Rfl: 2    hydrocortisone ([employer] CORTISONE MAXIMUM STRENGTH) 1 % cream, Apply topically, Disp: , Rfl:     loratadine (CLARITIN) 10 mg tablet, Take by mouth, Disp: , Rfl:     LORazepam (ATIVAN) 0 5 mg tablet, Take 0 5 mg by mouth daily as needed, Disp: , Rfl:     simvastatin (ZOCOR) 40 mg tablet, Take by mouth, Disp: , Rfl:     VENTOLIN  (90 Base) MCG/ACT inhaler, INHALE 2 PUFFS EVERY 4 (FOUR) HOURS AS NEEDED FOR WHEEZING OR SHORTNESS OF BREATH , Disp: , Rfl: 2   vitamin B-12 (VITAMIN B-12) 1,000 mcg tablet, Take by mouth daily, Disp: , Rfl:     azelastine (OPTIVAR) 0 05 % ophthalmic solution, Apply to eye, Disp: , Rfl:     buPROPion (WELLBUTRIN XL) 300 mg 24 hr tablet, Take 300 mg by mouth daily (Patient not taking: Reported on 3/8/2022 ), Disp: , Rfl:     busPIRone (BUSPAR) 10 mg tablet, Take 10 mg by mouth in the morning   (Patient not taking: Reported on 3/8/2022 ), Disp: , Rfl:     Calcium Citrate-Vitamin D 250-200 MG-UNIT TABS, Take 2 tablets by mouth Three times a day (Patient not taking: Reported on 3/8/2022 ), Disp: , Rfl:     Cholecalciferol 50 MCG (2000 UT) CAPS, TAKE 2 CAPSULES BY MOUTH DAILY INDICATIONS: VITAMIN D DEFICIENCY   (Patient not taking: Reported on 3/8/2022), Disp: , Rfl:     DULoxetine (CYMBALTA) 60 mg delayed release capsule, Take 60 mg by mouth daily (Patient not taking: Reported on 3/8/2022 ), Disp: , Rfl:     ferrous sulfate 325 (65 Fe) mg tablet, Take 325 mg by mouth daily with breakfast (Patient not taking: Reported on 3/8/2022 ), Disp: , Rfl:     gabapentin (NEURONTIN) 300 mg capsule, Take 300 mg by mouth 3 (three) times a day (Patient not taking: Reported on 3/8/2022 ), Disp: , Rfl:     hydrocortisone 2 5 % cream, , Disp: , Rfl:     lisinopril (ZESTRIL) 10 mg tablet, Take by mouth (Patient not taking: Reported on 3/8/2022 ), Disp: , Rfl:     loratadine (CLARITIN) 10 mg tablet, Take 10 mg by mouth daily (Patient not taking: Reported on 3/8/2022 ), Disp: , Rfl:     metFORMIN (GLUCOPHAGE) 500 mg tablet, Take 500 mg by mouth 2 (two) times a day with meals (Patient not taking: Reported on 3/8/2022 ), Disp: , Rfl:     methocarbamol (ROBAXIN) 750 mg tablet, Take 1 tablet (750 mg total) by mouth 2 (two) times a day as needed for muscle spasms (Patient not taking: Reported on 3/8/2022 ), Disp: 60 tablet, Rfl: 2    ondansetron (ZOFRAN-ODT) 4 mg disintegrating tablet, TAKE 1 TABLET BY MOUTH EVERY 8 HOURS AS NEEDED FOR NAUSEA AND VOMITING (Patient not taking: Reported on 3/8/2022), Disp: , Rfl:     pantoprazole (PROTONIX) 40 mg tablet, Take 40 mg by mouth daily   (Patient not taking: Reported on 3/8/2022 ), Disp: , Rfl:     pantoprazole (PROTONIX) 40 mg tablet, Take 40 mg by mouth daily (Patient not taking: Reported on 3/8/2022 ), Disp: , Rfl:     simvastatin (ZOCOR) 40 mg tablet, Take 40 mg by mouth daily at bedtime (Patient not taking: Reported on 3/8/2022 ), Disp: , Rfl:       Physical Exam:     General Appearance:    Alert, cooperative, no distress, appears stated age   Head:    Normocephalic, without obvious abnormality, atraumatic   Eyes:    conjunctiva/corneas clear, both eyes        Nose:   Nares normal, septum midline, no drainage    Throat:   Lips normal; teeth and gums normal   Neck:    symmetrical, trachea midline, ;     thyroid:  no enlargement/   Back:     Symmetric, no curvature, ROM normal   Lungs:   No audible wheezing or labored breathing   Chest Wall:    No tenderness or deformity    Heart:    Regular rate and rhythm               Pulses:   2+ and symmetric all extremities   Skin:   Skin color, texture, turgor normal, no rashes or lesions   Neurologic:   normal strength, sensation and reflexes     throughout       Musculoskeletal: bilateral lower extremity  · On examination of the right knee there is no effusion, no erythema  Range of motion to full active extension and flexion to greater than 120°  Pain on palpation medial and lateral joint lines  There is crepitus with range of motion, no warmth to palpation, bony enlargement noted  No pain on palpation pes anserine bursa region or distal iliotibial band  Stable to varus and valgus stress without pain or gapping  Negative anterior and posterior drawer testing  Sensation intact distal pulses present  · On examination of the left knee there is a mild effusion, no erythema  Range of motion to full active extension and flexion to greater than 120°    Pain on palpation medial and lateral joint lines  There is crepitus with range of motion, no warmth to palpation, bony enlargement noted  No pain on palpation pes anserine bursa region or distal iliotibial band  Stable to varus and valgus stress without pain or gapping  Negative anterior and posterior drawer testing  Sensation intact distal pulses present  · Examination patient's right shoulder active forward flexion 140° active abduction 130° limited by pain passive forward flexion 160° passive abduction 150° abduction external strength testing 4+ out of 5 external rotation of greater than 30° internal rotation lumbar spine full range of motion right elbow wrist and hand sensation tacked distal pulses present right upper extremity    Radiology:   I personally reviewed the films  X-rays right shoulder reveal well located glenohumeral joint no evidence of fracture or bony abnormality    _*_*_*_*_*_*_*_*_*_*_*_*_*_*_*_*_*_*_*_*_*_*_*_*_*_*_*_*_*_*_*_*_*_*_*_*_*_*_*_*_*    Assessment:  47 y  o female with right knee chronic pain due to osteoarthritis right shoulder pain    Plan:   · Weight bearing as tolerated  bilateral lower extremities and right upper extremity  · Bilateral intra-articular aspiration injections administered as noted above   · Patient advised should they develop any increasing pain, redness, drainage, numbness, tingling or swelling surrounding the injection sight, they are to contact our office or present to the emergency department    · Formal physical therapy for patient's right shoulder at this time  · Follow up in 3 months or sooner if needed      Willian Davis PA-C

## 2022-03-14 ENCOUNTER — EVALUATION (OUTPATIENT)
Dept: PHYSICAL THERAPY | Facility: REHABILITATION | Age: 52
End: 2022-03-14
Payer: MEDICARE

## 2022-03-14 DIAGNOSIS — G89.29 CHRONIC RIGHT SHOULDER PAIN: Primary | ICD-10-CM

## 2022-03-14 DIAGNOSIS — M25.511 CHRONIC RIGHT SHOULDER PAIN: Primary | ICD-10-CM

## 2022-03-14 PROCEDURE — 97161 PT EVAL LOW COMPLEX 20 MIN: CPT | Performed by: PHYSICAL THERAPIST

## 2022-03-14 PROCEDURE — 97110 THERAPEUTIC EXERCISES: CPT | Performed by: PHYSICAL THERAPIST

## 2022-03-14 NOTE — PROGRESS NOTES
PT Evaluation     Today's date: 3/14/2022  Patient name: Nori Waldron  : 1970  MRN: [id number]   Referring provider: Caleb Gan MD    Dx:   Encounter Diagnosis     ICD-10-CM    1  Chronic right shoulder pain  M25 511     G89 29                   Assessment  Assessment details: Nori Waldron is a 47 y o  female who presents with pain, decreased strength, and decreased ROM  Due to these impairments, patient has difficulty performing a/iadls and recreational activities  Patient's clinical presentation is consistent with their referring diagnosis of chronic right shoulder pain  Patient would benefit from skilled physical therapy to address their aforementioned impairments, improve their level of function and to improve their overall quality of life  Impairments: abnormal or restricted ROM, impaired physical strength and pain with function  Understanding of Dx/Px/POC: good   Prognosis: good    Goals  Short term goals - to be achieved in 4 weeks:    Decrease pain 20-50%  Increase strength by 1/2 grade  Improve range of motion by 25%  Long term goals - to be achieved by discharge:    Overhead reaching is improved to maximal level of function  Lifting is improved to maximal level of function  IADL performance in related activities is improved to maximal level of function  Performance in related household activities is improved to maximal level of function       Plan  Planned therapy interventions: manual therapy, neuromuscular re-education, patient education, strengthening, stretching, therapeutic exercise and home exercise program  Frequency: 2x week  Duration in visits: 12  Duration in weeks: 6  Plan of Care beginning date: 3/14/2022  Plan of Care expiration date: 2022  Treatment plan discussed with: patient        Subjective Evaluation    History of Present Illness  Mechanism of injury: Patient refers to PT with c/o pain in her right shoulder which began in 2022 after sustaining multiple falls secondary to syncopic event  Patient received X-rays of her right shoulder on 3/8/22 which revealed mild osteoarthritis acromioclavicular joint  Patient states numbness and tingling in bilateral UE's into all digits of bilateral hands which she states she has had for many years secondary to C/S degeneration  Patient states pain and difficulty with overhead reaching, lifting, and washing and drying her hair    Patient states pain and difficulty with completion of household activities including vacuuming       Pain  Current pain ratin  At best pain rating: 3  At worst pain rating: 10  Quality: sharp, knife-like, throbbing and tight  Relieving factors: medications  Aggravating factors: overhead activity and lifting (Reaching)    Patient Goals  Patient goals for therapy: decreased pain and independence with ADLs/IADLs          Objective     Active Range of Motion     Right Shoulder   Flexion: 140 degrees   Abduction: 130 degrees   External rotation BTH: C6   Internal rotation BTB: T8     Passive Range of Motion     Right Shoulder   Flexion: 145 degrees   Abduction: 135 degrees   External rotation 90°: 75 degrees   Internal rotation 90°: 55 degrees     Strength/Myotome Testing     Right Shoulder     Planes of Motion   Flexion: 4-   Abduction: 3+   External rotation at 0°: 4-   Internal rotation at 0°: 4              Precautions: Gastric sleeve - 2021, Hx of Endometrial CA, Right EVA - , Left EVA -       Manuals 3/14            Right shoulder PROM                                                    Neuro Re-Ed             Supine Serratus             Ball rolls on wall             Scap ABC's                                                                 Ther Ex             Pulleys             TB ER             TB IR             TB rows             Sup wand flex HEP            Wall slides flex HEP            Stand wand abduction HEP                         Ther Activity Gait Training                                       Modalities                                           HEP access code: [email or web address]

## 2022-03-16 ENCOUNTER — OFFICE VISIT (OUTPATIENT)
Dept: PHYSICAL THERAPY | Facility: REHABILITATION | Age: 52
End: 2022-03-16
Payer: MEDICARE

## 2022-03-16 DIAGNOSIS — G89.29 CHRONIC RIGHT SHOULDER PAIN: Primary | ICD-10-CM

## 2022-03-16 DIAGNOSIS — M25.511 CHRONIC RIGHT SHOULDER PAIN: Primary | ICD-10-CM

## 2022-03-16 PROCEDURE — 97110 THERAPEUTIC EXERCISES: CPT | Performed by: PHYSICAL THERAPIST

## 2022-03-16 PROCEDURE — 97140 MANUAL THERAPY 1/> REGIONS: CPT | Performed by: PHYSICAL THERAPIST

## 2022-03-16 NOTE — PROGRESS NOTES
Daily Note     Today's date: 3/16/2022  Patient name: Gifty Stephens  : 1970  MRN: [id number]  Referring provider: Abran Martins DO  Dx:   Encounter Diagnosis     ICD-10-CM    1  Chronic right shoulder pain  M25 511     G89 29        Start Time: 915  Stop Time: 953  Total time in clinic (min): 38 minutes    Subjective: Patient reports no adverse effects following initial evaluation  Objective: See treatment diary below      Assessment: Patient tolerated first treatment well and denied any aggravation of shoulder pain post treatment  Manuals limited today secondary to patient's inability to relax UE  Cueing also provided throughout TE in order to maintain good exercise technique  Progress, as able  Plan: Continue per plan of care        Precautions: Gastric sleeve - 2021, Hx of Endometrial CA, Right EVA - , Left EVA -       Manuals 3/14 3/16           Right shoulder PROM                                                    Neuro Re-Ed             Supine Serratus  20x5"            Ball rolls on wall             Scap ABC's                                                                 Ther Ex             Pulleys  3' flex           TB ER  OTB 20x           TB IR  OTB 20x           TB rows/ext  OTB 20x5"            Sup wand flex HEP            Wall slides flex HEP            Stand wand abduction HEP            SL ER  20x           SL ABD  10x           SL flex  10x                        Gait Training                                       Modalities

## 2022-03-22 ENCOUNTER — OFFICE VISIT (OUTPATIENT)
Dept: PHYSICAL THERAPY | Facility: REHABILITATION | Age: 52
End: 2022-03-22
Payer: MEDICARE

## 2022-03-22 DIAGNOSIS — M25.511 CHRONIC RIGHT SHOULDER PAIN: Primary | ICD-10-CM

## 2022-03-22 DIAGNOSIS — G89.29 CHRONIC RIGHT SHOULDER PAIN: Primary | ICD-10-CM

## 2022-03-22 PROCEDURE — 97112 NEUROMUSCULAR REEDUCATION: CPT

## 2022-03-22 PROCEDURE — 97110 THERAPEUTIC EXERCISES: CPT

## 2022-03-22 PROCEDURE — 97140 MANUAL THERAPY 1/> REGIONS: CPT

## 2022-03-22 NOTE — PROGRESS NOTES
Daily Note     Today's date: 3/22/2022  Patient name: Gifty Stephens  : 1970  MRN: [id number]  Referring provider: Abran Martins DO  Dx:   Encounter Diagnosis     ICD-10-CM    1  Chronic right shoulder pain  M25 511     G89 29        Start Time: 1030  Stop Time: 1110  Total time in clinic (min): 40 minutes    Subjective: Patient reports she is doing well  Shoulder hurts sometimes when lifting things  Objective: See treatment diary below    Assessment: Patient tolerated treatment well  Displayed good technique with therapeutic exercises and did not report any pain or discomfort during exercise protocol  Pt would benefit from continued skilled physical therapy services to improve overall function while decreasing discomfort to return them to their prior level of function  Plan: Continue per plan of care        Precautions: Gastric sleeve - 2021, Hx of Endometrial CA, Right EVA - , Left EVA -     Manuals 3/14 3/16 3/22          Right shoulder PROM                                       Assessment   10'          Neuro Re-Ed   3/22          Supine Serratus  20x5"            Ball rolls on wall             Scap ABC's             Seated shld ER   20x5" OTB          Single arm row   agueda 3x10          Wall slides   3x10          XS   I's yllw 3x10          Suitcase carry   8lb 4 laps                                    Ther Ex   3/22          UBE   5'          PROM   5'          Pulleys  3' flex           TB ER  OTB 20x           TB IR  OTB 20x           TB rows/ext  OTB 20x5"            Sup wand flex HEP            Wall slides flex HEP            Stand wand abduction HEP            SL ER  20x           SL ABD    10x           SL flex  10x                        Gait Training                                       Modalities

## 2022-03-24 ENCOUNTER — OFFICE VISIT (OUTPATIENT)
Dept: PHYSICAL THERAPY | Facility: REHABILITATION | Age: 52
End: 2022-03-24
Payer: MEDICARE

## 2022-03-24 DIAGNOSIS — G89.29 CHRONIC RIGHT SHOULDER PAIN: Primary | ICD-10-CM

## 2022-03-24 DIAGNOSIS — M25.511 CHRONIC RIGHT SHOULDER PAIN: Primary | ICD-10-CM

## 2022-03-24 PROCEDURE — 97112 NEUROMUSCULAR REEDUCATION: CPT

## 2022-03-24 PROCEDURE — 97110 THERAPEUTIC EXERCISES: CPT

## 2022-03-24 NOTE — PROGRESS NOTES
Daily Note     Today's date: 3/24/2022  Patient name: Negin Hong  : 1970  MRN: [id number]  Referring provider: Elizabeth Buckley DO  Dx:   Encounter Diagnosis     ICD-10-CM    1  Chronic right shoulder pain  M25 511     G89 29                   Subjective: Pt reports she fell asleep last night on her R side, woke up around 3-4am with numbness throughout entire RUE; couldn't lift her arm and required use of SPC to walk to bathroom  Symptoms are better this morning, prior to start of treatment  Objective: See treatment diary below      Assessment: Tolerated treatment well  Continued with program as outlined below  Focus of program primarily on improving ROM and increasing scapular stability  Was able to perform all exercise with no complaints of pain  Occasional cues required for proper mid trap activation with TB resisted exercise  Patient would benefit from continued PT to further improve strength, decrease pain, and maximize overall function  Plan: Continue per plan of care        Precautions: Gastric sleeve - 2021, Hx of Endometrial CA, Right EVA - , Left EVA -     Manuals 3/14 3/16 3/22 3/24         Right shoulder PROM                                       Assessment   10'          Neuro Re-Ed   3/22 3/24         Supine Serratus  20x5"            Ball rolls on wall             Scap ABC's             Seated shld ER   20x5" OTB 20x5" OTB         Single arm row   agueda 3x10 agueda  10 0 3x10         Wall slides   3x10 3x10         XS   I's yllw 3x10 I's [id number]         Suitcase carry   8lb 4 laps farmer's carry  8#/10#  4 laps                                   Ther Ex   3/22 3/24         UBE   5' 5'         PROM   5' 10'         Pulleys  3' flex           TB ER  OTB 20x           TB IR  OTB 20x           TB rows/ext  OTB 20x5"            Sup wand flex HEP            Wall slides flex HEP            Stand wand abduction HEP            SL ER  20x           SL ABD    10x SL flex  10x                        Gait Training                                       Modalities

## 2022-03-30 ENCOUNTER — OFFICE VISIT (OUTPATIENT)
Dept: PHYSICAL THERAPY | Facility: REHABILITATION | Age: 52
End: 2022-03-30
Payer: MEDICARE

## 2022-03-30 DIAGNOSIS — M25.511 CHRONIC RIGHT SHOULDER PAIN: Primary | ICD-10-CM

## 2022-03-30 DIAGNOSIS — G89.29 CHRONIC RIGHT SHOULDER PAIN: Primary | ICD-10-CM

## 2022-03-30 PROCEDURE — 97112 NEUROMUSCULAR REEDUCATION: CPT | Performed by: PHYSICAL THERAPIST

## 2022-03-30 PROCEDURE — 97110 THERAPEUTIC EXERCISES: CPT | Performed by: PHYSICAL THERAPIST

## 2022-03-30 NOTE — PROGRESS NOTES
Daily Note     Today's date: 3/30/2022  Patient name: Herminio Aviles  : 1970  MRN: [id number]  Referring provider: Harjinder Pack DO  Dx:   Encounter Diagnosis     ICD-10-CM    1  Chronic right shoulder pain  M25 511     G89 29                   Subjective: States today that shoulder is okay but sore  Objective: See treatment diary below      Assessment: Completed exercise with correct technique  Minor pain w/ TB Is  Demonstrated moderate fatigue after exercise  Pt would benefit from continued PT services to reduce pain and increase level of function  Plan: Continue per plan of care        Precautions: Gastric sleeve - 2021, Hx of Endometrial CA, Right EVA - , Left EVA -     Manuals 3/14 3/16 3/22 3/24 3/30        Right shoulder PROM                                       Assessment   10'          Neuro Re-Ed   3/22 3/24 3/30        Supine Serratus  20x5"            Ball rolls on wall             Scap ABC's             Seated shld ER   20x5" OTB 20x5" OTB         Single arm row   agueda 3x10 agueda  10 0 3x10 agueda 20# double 3x10        Wall slides   3x10 3x10         XS   I's yllw 3x10 I's [id number] I's purple 2x8        Suitcase carry   8lb 4 laps farmer's carry  8#/10#  4 laps farmer's carry 8#/10# 4 laps                                  Ther Ex   3/22 3/24 3/30        UBE   5' 5' 5'        PROM   5' 10' 10'        Pulleys  3' flex           TB ER  OTB 20x   OTB 30x        TB IR  OTB 20x           TB rows/ext  OTB 20x5"            Sup wand flex HEP            Wall slides flex HEP            Stand wand abduction HEP            SL ER  20x           SL ABD    10x           SL flex  10x                        Gait Training                                       Modalities

## 2022-03-31 ENCOUNTER — OFFICE VISIT (OUTPATIENT)
Dept: PHYSICAL THERAPY | Facility: REHABILITATION | Age: 52
End: 2022-03-31
Payer: MEDICARE

## 2022-03-31 DIAGNOSIS — G89.29 CHRONIC RIGHT SHOULDER PAIN: Primary | ICD-10-CM

## 2022-03-31 DIAGNOSIS — M25.511 CHRONIC RIGHT SHOULDER PAIN: Primary | ICD-10-CM

## 2022-03-31 PROCEDURE — 97110 THERAPEUTIC EXERCISES: CPT

## 2022-03-31 PROCEDURE — 97112 NEUROMUSCULAR REEDUCATION: CPT

## 2022-03-31 NOTE — PROGRESS NOTES
Daily Note     Today's date: 3/31/2022  Patient name: Foster Montelongo  : 1970  MRN: [id number]  Referring provider: Peggy Coe DO  Dx:   Encounter Diagnosis     ICD-10-CM    1  Chronic right shoulder pain  M25 511     G89 29                   Subjective: Pt reports she has been doing well  L shldr did not bother her as much last night, compared to in the past   Notes yesterday and this morning she has experienced most pain with reaching behind her back  Has appt with pain management/spine specialist this coming Monday,   Objective: See treatment diary below      Assessment: Tolerated treatment well  Continued with program as outlined below  Progressed program with ball on wall and supine horiz abd, in effort to further improve scapular strength/stabilization  Patient would benefit from continued PT further improve strength, decrease pain, and maximize function  Plan: Continue per plan of care        Precautions: Gastric sleeve - 2021, Hx of Endometrial CA, Right EVA - , Left EVA -     Manuals 3/14 3/16 3/22 3/24 3/30 3/31       Right shoulder PROM                                       Assessment   10'          Neuro Re-Ed   3/22 3/24 3/30 3/31       Supine Serratus  20x5"            Ball rolls on wall      cw/ccw   x30 ea       Scap ABC's             Seated shld ER   20x5" OTB 20x5" OTB         Single arm row   claudia 3x10 claudia  10 0 3x10 claudia 20# double 3x10 claudia 20# double 3x10       Wall slides   3x10 3x10         XS   I's yllw 3x10 I's [id number] I's purple 2x8 I's purple 3x10       Suitcase carry   8lb 4 laps farmer's carry  8#/10#  4 laps farmer's carry 8#/10# 4 laps farmer's carry 8#/10# 4 laps       Claudia Retro walk      20#  2x10                      Ther Ex   3/22 3/24 3/30 3/31       UBE   5' 5' 5' 3'/3'       PROM   5' 10' 10' 10'       Pulleys  3' flex           TB ER  OTB 20x   OTB 30x OTB 30x       TB IR  OTB 20x           TB rows/ext  OTB 20x5" Supine Horz Abd      OTB  3"x20       Sup wand flex HEP            Wall slides flex HEP            Stand wand abduction HEP            SL ER  20x           SL ABD    10x           SL flex  10x                        Gait Training                                       Modalities

## 2022-04-04 ENCOUNTER — OFFICE VISIT (OUTPATIENT)
Dept: PAIN MEDICINE | Facility: CLINIC | Age: 52
End: 2022-04-04
Payer: MEDICARE

## 2022-04-04 ENCOUNTER — TELEPHONE (OUTPATIENT)
Dept: RADIOLOGY | Facility: CLINIC | Age: 52
End: 2022-04-04

## 2022-04-04 VITALS
DIASTOLIC BLOOD PRESSURE: 84 MMHG | BODY MASS INDEX: 34.15 KG/M2 | HEIGHT: 64 IN | HEART RATE: 74 BPM | WEIGHT: 200 LBS | SYSTOLIC BLOOD PRESSURE: 125 MMHG

## 2022-04-04 DIAGNOSIS — M47.816 LUMBAR SPONDYLOSIS: ICD-10-CM

## 2022-04-04 DIAGNOSIS — G89.4 CHRONIC PAIN SYNDROME: Primary | ICD-10-CM

## 2022-04-04 DIAGNOSIS — M47.812 CERVICAL SPONDYLOSIS: ICD-10-CM

## 2022-04-04 DIAGNOSIS — M54.16 LUMBAR RADICULOPATHY: ICD-10-CM

## 2022-04-04 DIAGNOSIS — M79.18 MYOFASCIAL PAIN SYNDROME: ICD-10-CM

## 2022-04-04 PROCEDURE — 99214 OFFICE O/P EST MOD 30 MIN: CPT | Performed by: NURSE PRACTITIONER

## 2022-04-04 RX ORDER — MONTELUKAST SODIUM 10 MG/1
TABLET ORAL
COMMUNITY
Start: 2022-03-30

## 2022-04-04 RX ORDER — MONTELUKAST SODIUM 10 MG/1
10 TABLET ORAL
COMMUNITY
Start: 2022-03-28 | End: 2023-03-28

## 2022-04-04 RX ORDER — TIZANIDINE 2 MG/1
2 TABLET ORAL
Qty: 30 TABLET | Refills: 0 | Status: SHIPPED | OUTPATIENT
Start: 2022-04-04 | End: 2022-06-15 | Stop reason: SDUPTHER

## 2022-04-04 RX ORDER — FEXOFENADINE HCL AND PSEUDOEPHEDRINE HCI 180; 240 MG/1; MG/1
1 TABLET, EXTENDED RELEASE ORAL DAILY
COMMUNITY
Start: 2022-03-28

## 2022-04-04 RX ORDER — GABAPENTIN 300 MG/1
300 CAPSULE ORAL 3 TIMES DAILY
Qty: 90 CAPSULE | Refills: 2 | Status: SHIPPED | OUTPATIENT
Start: 2022-04-04

## 2022-04-04 NOTE — PROGRESS NOTES
Assessment:  1  Chronic pain syndrome    2  Lumbar radiculopathy    3  Lumbar spondylosis    4  Cervical spondylosis    5  Myofascial pain syndrome        Plan:  1  I will schedule the patient for repeat right C4-6 MBB for axial neck pain  If patient responds to 1 round of medial branch blocks, patient will then move forward with radiofrequency ablation for longer lasting relief  Can repeat the same procedure on the left side  Complete risks and benefits including bleeding, infection, tissue reaction, nerve injury and allergic reaction were discussed  The patient was agreeable and verbalized an understanding  2  I will discontinue methocarbamol and trial the patient on tizanidine 2 mg q h s  p r n  myofascial pain  I advised the patient that they should not drive or operate machinery while on this medication until they see how it affects them, as it could cause lethargy and mental cloudyness  I advised the patient to call our office if they experience any side effects or issues with the medication changes  The patient verbalized an understanding  3  Patient may continue gabapentin as prescribed  4  Patient will continue to follow with Orthopedics regarding shoulder and knee complaints   5  I will avoid NSAIDs secondary to history of bariatric surgery   6  Patient will continue with her home exercise program  7  I will follow up with the patient pending results of her procedure sooner if needed     M*Modal software was used to dictate this note  It may contain errors with dictating incorrect words or incorrect spelling  Please contact the provider directly with any questions  History of Present Illness: The patient is a 47 y o  female of bariatric surgery, fibromyalgia and carpal tunnel syndrome last seen on 1/10/22 who presents for a follow up office visit in regards to chronic axial neck pain that radiates toward the trapezii musculature  Patient denies bowel or bladder incontinence    She has been experiencing balance issues and was admitted into the emergency room in February secondary to dehydration and hypotension  She states that since losing weight her bariatric surgery, she has been feeling dizzy  She was admitted into the emergency room in February and it was deemed that it was most likely polypharmacy between her blood pressure medications and high-dose muscle relaxants along with her weight loss and dehydration that were causing these symptoms  She has had good relief with cervical RFA in the past   She takes gabapentin 600 mg 3 times a day and Tylenol p r n  with about 40% improvement of her pain without side effects  She is no longer taking methocarbamol  She is currently following with Orthopedics regarding the complaints in right shoulder complaint  She states she just received corticosteroid injections into the knees  The patient rates her pain as 7/10 on the numeric pain rating scale  She states her pain is constant nature bothersome in the morning and at night  She characterizes the pain as burning, dull aching, sharp, cramping, pressure like, numbness and pins and needles    I have personally reviewed and/or updated the patient's past medical history, past surgical history, family history, social history, current medications, allergies, and vital signs today  Review of Systems:    Review of Systems   Respiratory: Negative for shortness of breath  Cardiovascular: Negative for chest pain  Gastrointestinal: Negative for constipation, diarrhea, nausea and vomiting  Musculoskeletal: Negative for arthralgias, gait problem, joint swelling and myalgias  Skin: Negative for rash  Neurological: Negative for dizziness, seizures and weakness  All other systems reviewed and are negative          Past Medical History:   Diagnosis Date    Asthma     Cancer [facility])     endometrial    Chronic pain syndrome     HTN (hypertension)     Hyperlipidemia     Hypertension     Lumbar radiculopathy     Prediabetes        Past Surgical History:   Procedure Laterality Date    GASTRECTOMY SLEEVE LAPAROSCOPIC      HYSTERECTOMY      KNEE ARTHROSCOPY Right     LUMBAR FUSION      MARY-EN-Y PROCEDURE  05/27/2021    Sleve    TOTAL HIP ARTHROPLASTY Bilateral        Family History   Problem Relation Age of Onset    Diabetes Mother     Stroke Mother     Diabetes Father     Cancer Father         thyroid    Diabetes Sister     Diabetes Brother     No Known Problems Maternal Grandmother     No Known Problems Maternal Grandfather     No Known Problems Paternal Grandmother     No Known Problems Paternal Grandfather     No Known Problems Sister        Social History     Occupational History    Not on file   Tobacco Use    Smoking status: Former Smoker    Smokeless tobacco: Never Used   Substance and Sexual Activity    Alcohol use: Not on file    Drug use: Not on file    Sexual activity: Not on file         Current Outpatient Medications:     buPROPion (WELLBUTRIN XL) 300 mg 24 hr tablet, Take by mouth, Disp: , Rfl:     busPIRone (BUSPAR) 10 mg tablet, 10 mg daily , Disp: , Rfl:     Calcium Carb-Cholecalciferol (OSCAL-D) 500 mg-200 units per tablet, Take 1 tablet by mouth 2 (two) times a day with meals, Disp: , Rfl:     Cholecalciferol (VITAMIN D3) 2000 units capsule, TAKE 2 CAPSULES BY MOUTH DAILY INDICATIONS: VITAMIN D DEFICIENCY , Disp: , Rfl: 5    [employer] Vitamin B-12 1000 MCG tablet, Take 1,000 mcg by mouth daily, Disp: , Rfl:     diclofenac sodium (VOLTAREN) 1 %, Apply 2 g topically 4 (four) times a day, Disp: 1 Tube, Rfl: 3    DULoxetine (CYMBALTA) 60 mg delayed release capsule, Take 60 mg by mouth, Disp: , Rfl:     ferrous sulfate 325 (65 Fe) mg tablet, Take by mouth, Disp: , Rfl:     fexofenadine-pseudoephedrine (ALLEGRA-D 24) 180-240 MG per 24 hr tablet, Take 1 tablet by mouth daily, Disp: , Rfl:     fluticasone-salmeterol (ADVAIR DISKUS) 250-50 mcg/dose inhaler, Inhale 1 puff, Disp: , Rfl:     gabapentin (NEURONTIN) 300 mg capsule, Take 1 capsule (300 mg total) by mouth 3 (three) times a day, Disp: 90 capsule, Rfl: 2    LORazepam (ATIVAN) 0 5 mg tablet, Take 0 5 mg by mouth daily as needed, Disp: , Rfl:     montelukast (SINGULAIR) 10 mg tablet, Take 10 mg by mouth, Disp: , Rfl:     pantoprazole (PROTONIX) 40 mg tablet, Take 40 mg by mouth daily  , Disp: , Rfl:     simvastatin (ZOCOR) 40 mg tablet, Take by mouth, Disp: , Rfl:     VENTOLIN  (90 Base) MCG/ACT inhaler, INHALE 2 PUFFS EVERY 4 (FOUR) HOURS AS NEEDED FOR WHEEZING OR SHORTNESS OF BREATH , Disp: , Rfl: 2    azelastine (OPTIVAR) 0 05 % ophthalmic solution, Apply to eye, Disp: , Rfl:     buPROPion (WELLBUTRIN XL) 300 mg 24 hr tablet, Take 300 mg by mouth daily (Patient not taking: Reported on 3/8/2022 ), Disp: , Rfl:     busPIRone (BUSPAR) 10 mg tablet, Take 10 mg by mouth in the morning   (Patient not taking: Reported on 3/8/2022 ), Disp: , Rfl:     Calcium Citrate-Vitamin D 250-200 MG-UNIT TABS, Take 2 tablets by mouth Three times a day (Patient not taking: Reported on 3/8/2022 ), Disp: , Rfl:     Cholecalciferol 2000 units TABS, Take 2 capsules by mouth  , Disp: , Rfl:     Cholecalciferol 50 MCG (2000 UT) CAPS, TAKE 2 CAPSULES BY MOUTH DAILY INDICATIONS: VITAMIN D DEFICIENCY   (Patient not taking: Reported on 3/8/2022), Disp: , Rfl:     DULoxetine (CYMBALTA) 60 mg delayed release capsule, Take 60 mg by mouth daily (Patient not taking: Reported on 3/8/2022 ), Disp: , Rfl:     ferrous sulfate 325 (65 Fe) mg tablet, Take 325 mg by mouth daily with breakfast (Patient not taking: Reported on 3/8/2022 ), Disp: , Rfl:     fluticasone (FLONASE) 50 mcg/act nasal spray, 2 sprays into each nostril daily, Disp: , Rfl:     gabapentin (NEURONTIN) 300 mg capsule, Take 1 capsule (300 mg total) by mouth 3 (three) times a day, Disp: 90 capsule, Rfl: 2    hydrocortisone ([employer]S CORTISONE MAXIMUM STRENGTH) 1 % cream, Apply topically, Disp: , Rfl:     hydrocortisone 2 5 % cream, , Disp: , Rfl:     lisinopril (ZESTRIL) 10 mg tablet, Take by mouth (Patient not taking: Reported on 3/8/2022 ), Disp: , Rfl:     loratadine (CLARITIN) 10 mg tablet, Take by mouth, Disp: , Rfl:     loratadine (CLARITIN) 10 mg tablet, Take 10 mg by mouth daily (Patient not taking: Reported on 3/8/2022 ), Disp: , Rfl:     metFORMIN (GLUCOPHAGE) 500 mg tablet, Take 500 mg by mouth 2 (two) times a day with meals (Patient not taking: Reported on 3/8/2022 ), Disp: , Rfl:     montelukast (SINGULAIR) 10 mg tablet, TAKE 1 TABLET BY MOUTH EVERY DAY AT NIGHT, Disp: , Rfl:     ondansetron (ZOFRAN-ODT) 4 mg disintegrating tablet, TAKE 1 TABLET BY MOUTH EVERY 8 HOURS AS NEEDED FOR NAUSEA AND VOMITING (Patient not taking: Reported on 3/8/2022), Disp: , Rfl:     pantoprazole (PROTONIX) 40 mg tablet, Take 40 mg by mouth daily (Patient not taking: Reported on 3/8/2022 ), Disp: , Rfl:     simvastatin (ZOCOR) 40 mg tablet, Take 40 mg by mouth daily at bedtime (Patient not taking: Reported on 3/8/2022 ), Disp: , Rfl:     tiZANidine (ZANAFLEX) 2 mg tablet, Take 1 tablet (2 mg total) by mouth daily at bedtime as needed for muscle spasms, Disp: 30 tablet, Rfl: 0    vitamin B-12 (VITAMIN B-12) 1,000 mcg tablet, Take by mouth daily, Disp: , Rfl:     Allergies   Allergen Reactions    Bee Venom Swelling    Dust Mite Extract     Fish-Derived Products - Food Allergy Hives    Iodine - Food Allergy Hives    Lac Bovis Other (See Comments)     congestion    Molds & Smuts     Other Swelling    Pollen Extract Other (See Comments)     Runny nose, watery eyes (also has corneal swelling) and itching  Triggers asthma    Shrimp Flavor - Food Allergy Hives       Physical Exam:    /84   Pulse 74   Ht 5' 4" (1 626 m)   Wt 90 7 kg (200 lb)   BMI 34 33 kg/m²     Constitutional:normal, well developed, well nourished, alert, in no distress and non-toxic and no overt pain behavior  Eyes:anicteric  HEENT:grossly intact  Neck:supple, symmetric, trachea midline and no masses   Pulmonary:even and unlabored  Cardiovascular:No edema or pitting edema present  Skin:Normal without rashes or lesions and well hydrated  Psychiatric:Mood and affect appropriate  Neurologic:Cranial Nerves II-XII grossly intact  Musculoskeletal:normal gait  Cervical paraspinal and trapezii musculature tender palpation  Limited range of motion with side bending  Imaging  FL spine and pain procedure    (Results Pending)   CT CERVICAL SPINE - WITHOUT CONTRAST   INDICATION: TRAUMA  Patient is a 47year old female with multiple syncopal episodes tonight and struck head and has headache and right side of her body hurts  No anticoagulant use  BP was low in the field (80s)  MICU call by me  Trauma level A called  Airway intact  Pupils   equal and reactive  Oropharynx clear  TMs clear  Rectal brown stool and heme negative  Multiple female ED staff present during rectal exam  Trauma team evaluating patient in ED  The patient's entire past medical history was obtained directly from either the attending emergency room physicians notes and/or the resident/physician's assistant notes in [employer]  The information was copied and pasted directly from Epic  COMPARISON: None  TECHNIQUE: CT examination of the cervical spine was performed without intravenous contrast  Contiguous axial images were obtained  Sagittal and coronal reconstructions were performed  Radiation dose length product (DLP) for this visit: 365 mGy-cm   This examination, like all CT scans performed in the [facility], was performed utilizing techniques to minimize radiation dose exposure, including the use of iterative   reconstruction and automated exposure control  IMAGE QUALITY: Diagnostic  FINDINGS:   ALIGNMENT: Straightening and reversal of the cervical lordotic curvature  Degenerative grade 1 anterolisthesis C4 on C5  VERTEBRAL BODIES: Osseous structures demineralized  No acute fracture or osseous destructive lesions  Degenerative endplate changes, most pronounced C6-C7  DEGENERATIVE CHANGES: Moderate multilevel cervical degenerative changes are noted  This is most pronounced C6-C7  No critical central canal stenosis  PREVERTEBRAL AND PARASPINAL SOFT TISSUES: Unremarkable  THORACIC INLET: Normal    IMPRESSION:   Degenerative changes of the cervical spine  No acute cervical spine fracture or traumatic malalignment          Orders Placed This Encounter   Procedures    FL spine and pain procedure

## 2022-04-04 NOTE — TELEPHONE ENCOUNTER
Patient contacted and scheduled for Repeat right C4-6 MBB  All pre procedure instructions were given to patient   Nothing to eat or drink for 1 hour prior  Loose fitting clothing   NSAIDS, ANTICOAG'S OKAY   Denies Antibx   NO PRN PAIN MEDS 6 HOURS PRIOR   Needs    Patient directed to call the office if becomes sick, as we will most likely reschedule       Insurance auth received but is not a guarantee of payment per your insurance company's authorization disclaimer and it is your responsibility to verify your benefits     COVID -19 screening complete

## 2022-04-04 NOTE — PATIENT INSTRUCTIONS
Methocarbamol (By mouth)   Methocarbamol (meth-oh-CURLY-ba-mol)  Treats muscle pain and spasms  Brand Name(s): Robaxin   There may be other brand names for this medicine  When This Medicine Should Not Be Used: This medicine is not right for everyone  Do not use it if you had an allergic reaction to methocarbamol  How to Use This Medicine:   Tablet  · Take your medicine as directed  Your dose may need to be changed several times to find what works best for you  · Missed dose: Take a dose as soon as you remember  If it is almost time for your next dose, wait until then and take a regular dose  Do not take extra medicine to make up for a missed dose  · Store the medicine in a closed container at room temperature, away from heat, moisture, and direct light  Drugs and Foods to Avoid:   Ask your doctor or pharmacist before using any other medicine, including over-the-counter medicines, vitamins, and herbal products  · Some medicines can affect how methocarbamol works  Tell your doctor if you are using pyridostigmine  · Do not drink alcohol while you are using this medicine  · Tell your doctor if you use anything else that makes you sleepy  Some examples are allergy medicine, narcotic pain medicine, and alcohol  Warnings While Using This Medicine:   · Tell your doctor if you are pregnant or breastfeeding, or if you have kidney disease, liver disease, or myasthenia gravis  · This medicine may make you dizzy or drowsy  Do not drive or do anything that could be dangerous until you know how this medicine affects you  · Tell any doctor or dentist who treats you that you are using this medicine  This medicine may affect certain medical test results  · Your doctor will check your progress and the effects of this medicine at regular visits  Keep all appointments  · Keep all medicine out of the reach of children  Never share your medicine with anyone    Possible Side Effects While Using This Medicine:   Call your doctor right away if you notice any of these side effects:  · Allergic reaction: Itching or hives, swelling in your face or hands, swelling or tingling in your mouth or throat, chest tightness, trouble breathing  · Blurred vision, redness, pain, or swelling of the eyes  · Dark urine or pale stools, nausea, vomiting, loss of appetite, stomach pain, yellow skin or eyes  · Fever  · Lightheadedness, dizziness, fainting  · Seizures  · Slow heartbeat  · Unusual bleeding, bruising, or weakness  If you notice these less serious side effects, talk with your doctor:   · Confusion, trouble sleeping  · Headache  · Warmth or redness in your face, neck, arms, or upper chest  If you notice other side effects that you think are caused by this medicine, tell your doctor  Call your doctor for medical advice about side effects  You may report side effects to FDA at 5-524-FDA-5884    © Copyright [employer] 2022 Information is for End User's use only and may not be sold, redistributed or otherwise used for commercial purposes  The above information is an  only  It is not intended as medical advice for individual conditions or treatments  Talk to your doctor, nurse or pharmacist before following any medical regimen to see if it is safe and effective for you

## 2022-04-05 ENCOUNTER — OFFICE VISIT (OUTPATIENT)
Dept: PHYSICAL THERAPY | Facility: REHABILITATION | Age: 52
End: 2022-04-05
Payer: MEDICARE

## 2022-04-05 DIAGNOSIS — M25.511 CHRONIC RIGHT SHOULDER PAIN: Primary | ICD-10-CM

## 2022-04-05 DIAGNOSIS — G89.29 CHRONIC RIGHT SHOULDER PAIN: Primary | ICD-10-CM

## 2022-04-05 PROCEDURE — 97110 THERAPEUTIC EXERCISES: CPT | Performed by: PHYSICAL THERAPIST

## 2022-04-05 PROCEDURE — 97140 MANUAL THERAPY 1/> REGIONS: CPT | Performed by: PHYSICAL THERAPIST

## 2022-04-05 NOTE — PROGRESS NOTES
Daily Note     Today's date: 2022  Patient name: Magnolia Garner  : 1970  MRN: [id number]  Referring provider: Aki So DO  Dx:   Encounter Diagnosis     ICD-10-CM    1  Chronic right shoulder pain  M25 511     G89 29        Start Time: 1230  Stop Time: 1324  Total time in clinic (min): 54 minutes    Subjective: Patient reports doing well overall, did have some posterior shoulder/UT pain yesterday  States that she has a CS ablation scheduled  Objective: See treatment diary below  R scapular depression, poor elevation b/l with R < L  Assessment: Tolerated treatment well  Patient got through all of her normal exercises without pain or limitations  We did try UT wall slides today due to notes in objective and subjective c/o dull ache in b/l UT  We also did try UT taping to improve resting posture, decrease strain CS with improvements in length/tension  Plan: Continue per plan of care        Precautions: Gastric sleeve - 2021, Hx of Endometrial CA, Right EVA - , Left EVA -     Manuals 3/14 3/16 3/22 3/24 3/30 3/31 45      Right shoulder PROM             UT taping       AB                   Assessment   10'          Neuro Re-Ed   3/22 3/24 3/30 3/31 4      Supine Serratus  20x5"            Ball rolls on wall      cw/ccw   x30 ea cw/ccw 30x ea      Scap ABC's             Seated shld ER   20x5" OTB 20x5" OTB         Single arm row   claudia 3x10 claudia  10 0 3x10 claudia 20# double 3x10 claudia 20# double 3x10 [place] 20#  double 2x10      Wall slides   3x10 3x10         XS   I's yllw 3x10 I's [id number] I's purple 2x8 I's purple 3x10 Is purlple 3x10      Suitcase carry   8lb 4 laps farmer's carry  8#/10#  4 laps farmer's carry 8#/10# 4 laps farmer's carry 8#/10# 4 laps farmer's carry 8#/10# 4 laps      Claudia Retro walk      20#  2x10   20#  2x10      UT wall slides       5"x5  3 sets      Ther Ex   3/22 3/24 3/30 3/31 4      UBE   5' 5' 5' 3'/3' 3'/3'      PROM   5' 10' 10' 10' NP      Pulleys  3' flex           TB ER  OTB 20x   OTB 30x OTB 30x OTB 30x      TB IR  OTB 20x           TB rows/ext  OTB 20x5"            Supine Horz Abd      OTB  3"x20       Sup wand flex HEP            Wall slides flex HEP            Stand wand abduction HEP            SL ER  20x           SL ABD    10x           SL flex  10x                        Gait Training                                       Modalities

## 2022-04-07 ENCOUNTER — APPOINTMENT (OUTPATIENT)
Dept: PHYSICAL THERAPY | Facility: REHABILITATION | Age: 52
End: 2022-04-07
Payer: MEDICARE

## 2022-04-14 ENCOUNTER — OFFICE VISIT (OUTPATIENT)
Dept: PHYSICAL THERAPY | Facility: REHABILITATION | Age: 52
End: 2022-04-14
Payer: MEDICARE

## 2022-04-14 DIAGNOSIS — G89.29 CHRONIC RIGHT SHOULDER PAIN: Primary | ICD-10-CM

## 2022-04-14 DIAGNOSIS — M25.511 CHRONIC RIGHT SHOULDER PAIN: Primary | ICD-10-CM

## 2022-04-14 PROCEDURE — 97112 NEUROMUSCULAR REEDUCATION: CPT

## 2022-04-14 PROCEDURE — 97110 THERAPEUTIC EXERCISES: CPT

## 2022-04-14 NOTE — PROGRESS NOTES
Daily Note     Today's date: 2022  Patient name: Carry Bloch  : 1970  MRN: [id number]  Referring provider: Brady Barnett DO  Dx:   Encounter Diagnosis     ICD-10-CM    1  Chronic right shoulder pain  M25 511     G89 29                   Subjective: Pt reports she continues to see improvement with R shldr pain  Symptoms present most when reaching OH to objects behind her  Feels like the tapping performed to UT LV helped  Objective: See treatment diary below      Assessment: Tolerated treatment well  Continued with program as outlined below  Tolerated increased reps/resistance with XS I's and TB ER  Slight soreness reported with UT wall slides today  Patient would benefit from continued PT to further improve strength, decrease pain, and maximize function  Plan: Continue per plan of care        Precautions: Gastric sleeve - 2021, Hx of Endometrial CA, Right EVA - , Left EVA -     Manuals 3/14 3/16 3/22 3/24 3/30 3/31 4/5 4/14     Right shoulder PROM             UT taping       AB AFB                  Assessment   10'          Neuro Re-Ed   3/22 3/24 3/30 3/31 4/5 4/14     Supine Serratus  20x5"            Ball rolls on wall      cw/ccw   x30 ea cw/ccw 30x ea cw/ccw   x1' ea     Scap ABC's             Seated shld ER   20x5" OTB 20x5" OTB         Single arm row   claudia 3x10 claudia  10 0 3x10 claudia 20# double 3x10 claudia 20# double 3x10 Claudia 20#  double 2x10 claudia 20# double 3x10     Wall slides   3x10 3x10         XS   I's yllw 3x10 I's [id number] I's purple 2x8 I's purple 3x10 Is purlple 3x10 Is purlple 4x8     Suitcase carry   8lb 4 laps farmer's carry  8#/10#  4 laps farmer's carry 8#/10# 4 laps farmer's carry 8#/10# 4 laps farmer's carry 8#/10# 4 laps nv     Claudia Retro walk      20#  2x10   20#  2x10 20#  2x10     UT wall slides       5"x5  3 sets 5"x5  3 sets     Ther Ex   3/22 3/24 3/30 3/31 4/5 4/14     UBE   5' 5' 5' 3'/3' 3'/3' 3'/3'     PROM   5' 10' 10' 10' NP Pulleys  3' flex           TB ER  OTB 20x   OTB 30x OTB 30x OTB 30x GTB  x20     TB IR  OTB 20x           TB rows/ext  OTB 20x5"            Supine Horz Abd      OTB  3"x20       Sup wand flex HEP            Wall slides flex HEP            Stand wand abduction HEP            SL ER  20x           SL ABD    10x           SL flex  10x                        Gait Training                                       Modalities

## 2022-04-19 ENCOUNTER — APPOINTMENT (OUTPATIENT)
Dept: PHYSICAL THERAPY | Facility: REHABILITATION | Age: 52
End: 2022-04-19
Payer: MEDICARE

## 2022-04-21 ENCOUNTER — OFFICE VISIT (OUTPATIENT)
Dept: PHYSICAL THERAPY | Facility: REHABILITATION | Age: 52
End: 2022-04-21
Payer: MEDICARE

## 2022-04-21 DIAGNOSIS — M25.511 CHRONIC RIGHT SHOULDER PAIN: Primary | ICD-10-CM

## 2022-04-21 DIAGNOSIS — G89.29 CHRONIC RIGHT SHOULDER PAIN: Primary | ICD-10-CM

## 2022-04-21 PROCEDURE — 97140 MANUAL THERAPY 1/> REGIONS: CPT

## 2022-04-21 NOTE — PROGRESS NOTES
Daily Note     Today's date: 2022  Patient name: Junior Flores  : 1970  MRN: [id number]  Referring provider: Marbin Tamayo DO  Dx:   Encounter Diagnosis     ICD-10-CM    1  Chronic right shoulder pain  M25 511     G89 29        Start Time: 1730  Stop Time: 1757  Total time in clinic (min): 27 minutes    Subjective: Pt reports she is doing well  Has had 0/10 pain over the past few days  Objective: See treatment diary below    ROM: WFL in all planes (equal to the left side)  Strength: 5/5 in all planes  Joint play assessment unremarkable  PROM WFL in all planes    Assessment: Tolerated treatment well  Patient is being discharged today due to improvements in overall functional status  Plan: D/C today       Precautions: Gastric sleeve - 2021, Hx of Endometrial CA, Right EVA - , Left EVA -     Manuals 3/14 3/16 3/22 3/24 3/30 3/31 4/5 4/14 4/21    Right shoulder PROM             UT taping       AB AFB                  Assessment   10'      QD 25'    Neuro Re-Ed   3/22 3/24 3/30 3/31 4/5 4/14 4/14    Supine Serratus  20x5"            Ball rolls on wall      cw/ccw   x30 ea cw/ccw 30x ea cw/ccw   x1' ea     Scap ABC's             Seated shld ER   20x5" OTB 20x5" OTB         Single arm row   claudia 3x10 claudia  10 0 3x10 claudia 20# double 3x10 claudia 20# double 3x10 Claudia 20#  double 2x10 claudia 20# double 3x10     Wall slides   3x10 3x10         XS   I's yllw 3x10 I's [id number] I's purple 2x8 I's purple 3x10 Is purlple 3x10 Is purlple 4x8     Suitcase carry   8lb 4 laps farmer's carry  8#/10#  4 laps farmer's carry 8#/10# 4 laps farmer's carry 8#/10# 4 laps farmer's carry 8#/10# 4 laps nv     Claudia Retro walk      20#  2x10   20#  2x10 20#  2x10     UT wall slides       5"x5  3 sets 5"x5  3 sets     Ther Ex   3/22 3/24 3/30 3/31 4/5 4/14     UBE   5' 5' 5' 3'/3' 3'/3' 3'/3'     PROM   5' 10' 10' 10' NP      Pulleys  3' flex           TB ER  OTB 20x   OTB 30x OTB 30x OTB 30x GTB  x20     TB IR  OTB 20x           TB rows/ext  OTB 20x5"            Supine Horz Abd      OTB  3"x20       Sup wand flex HEP            Wall slides flex HEP            Stand wand abduction HEP            SL ER  20x           SL ABD    10x           SL flex  10x                        Gait Training                                       Modalities

## 2022-04-26 ENCOUNTER — APPOINTMENT (OUTPATIENT)
Dept: PHYSICAL THERAPY | Facility: REHABILITATION | Age: 52
End: 2022-04-26
Payer: MEDICARE

## 2022-04-28 ENCOUNTER — APPOINTMENT (OUTPATIENT)
Dept: PHYSICAL THERAPY | Facility: REHABILITATION | Age: 52
End: 2022-04-28
Payer: MEDICARE

## 2022-05-09 ENCOUNTER — OFFICE VISIT (OUTPATIENT)
Dept: PLASTIC SURGERY | Facility: CLINIC | Age: 52
End: 2022-05-09
Payer: MEDICARE

## 2022-05-09 VITALS
BODY MASS INDEX: 34.72 KG/M2 | WEIGHT: 203.4 LBS | TEMPERATURE: 97.5 F | RESPIRATION RATE: 16 BRPM | HEIGHT: 64 IN | SYSTOLIC BLOOD PRESSURE: 133 MMHG | HEART RATE: 63 BPM | DIASTOLIC BLOOD PRESSURE: 82 MMHG

## 2022-05-09 DIAGNOSIS — N62 MACROMASTIA: Primary | ICD-10-CM

## 2022-05-09 PROCEDURE — 99214 OFFICE O/P EST MOD 30 MIN: CPT | Performed by: STUDENT IN AN ORGANIZED HEALTH CARE EDUCATION/TRAINING PROGRAM

## 2022-05-09 RX ORDER — NALOXONE HYDROCHLORIDE 4 MG/.1ML
SPRAY NASAL
Qty: 1 EACH | Refills: 1 | Status: SHIPPED | OUTPATIENT
Start: 2022-05-09

## 2022-05-09 RX ORDER — ONDANSETRON 4 MG/1
4 TABLET, FILM COATED ORAL EVERY 8 HOURS PRN
Qty: 20 TABLET | Refills: 0 | Status: SHIPPED | OUTPATIENT
Start: 2022-05-09

## 2022-05-09 RX ORDER — OXYCODONE HYDROCHLORIDE 5 MG/1
5 TABLET ORAL EVERY 4 HOURS PRN
Qty: 30 TABLET | Refills: 0 | Status: SHIPPED | OUTPATIENT
Start: 2022-05-09

## 2022-05-09 RX ORDER — ACETAMINOPHEN 500 MG
500 TABLET ORAL EVERY 4 HOURS PRN
Qty: 30 TABLET | Refills: 2 | Status: SHIPPED | OUTPATIENT
Start: 2022-05-09

## 2022-05-09 RX ORDER — DOCUSATE SODIUM 100 MG/1
100 CAPSULE, LIQUID FILLED ORAL 2 TIMES DAILY
Qty: 20 CAPSULE | Refills: 2 | Status: SHIPPED | OUTPATIENT
Start: 2022-05-09

## 2022-05-09 NOTE — PROGRESS NOTES
PRS      Preop for bilateral breast reduction, possible free nipple graft    Patient was delayed last visit so that she is 1 year s/p weight-loss surgery which she is now  Her weight has been stable for the past 3 months  She would like to proceed with BBR      Discussed again the risks, benefits, procedure, and complications of breast reduction including but not limited to infection, bleeding, scarring, asymmetry, abscess, delayed wound healing, wound dehiscence, contour deformity, partial vs complete nipple necrosis, fat grafting, changes in nipple sensation, changes in breast feeding ability       Also discussed increased risk of all complications given her elevated BMI  Due to the ptotic nature of her breasts, also discussed possibility of free nipple graft if needed, which is a high possibility  BMI 34 9  SN-N 46, 48 cm  N-IMF 20, 19 cm     Patient current wears 46k, would like B-C cup, something that fits her body type  Estimated resection is at least 650 g per side      Last mammogram 1/26/22: normal    Not on blood thinners  NKDA  No tobacco use    -Will order nutrition labs and review prior to scheudling  -Prescriptions sent  -Will obtain cardiac and medical clearance given her recent hospitalization for syncope        Wenceslao Haines MD   [facility]   [address], [address], [address]   Office: [phone]

## 2022-05-12 ENCOUNTER — TELEPHONE (OUTPATIENT)
Dept: PLASTIC SURGERY | Facility: CLINIC | Age: 52
End: 2022-05-12

## 2022-05-12 NOTE — TELEPHONE ENCOUNTER
TRIED CALLING PATIENT TO SCHEDULE SURGERY BUT VOICEMAIL BOX WAS FULL AND I COULDN'T LEAVE A MESSAGE

## 2022-06-02 NOTE — PROGRESS NOTES
Daily Note     Today's date: 2018  Patient name: Osman Scott  : 1970  MRN: [id number]  Referring provider: Babar Lemon DO  Dx:   Encounter Diagnosis     ICD-10-CM    1  Radiculopathy, cervical M54 12                   Subjective: Patient reports increased stress pre-tx and states that she has a left sided headache  Objective: See treatment diary below      Assessment: Tolerated treatment well  Patient demonstrated fatigue post treatment and exhibited good technique with therapeutic exercises  CTJ remains hypomobile as per PA spring testing  Continued cervical extension progression as symptoms continue to reduce  Plan: Continue per plan of care  Precautions: HLD, HTN, OA, Fibromyalgia    Daily Treatment Diary     Manual  01/26 2/19 02/22 3/5 3/12 4/6       Suboccipital release DM MM 5' 5' 5' 5'       C2 upglide B/L DM  5'          T spine PA mobs    5' 5' 5'                                     Exercise Diary  01/26 2/19 2/22 3/5 3/12 4/6       UBE 6' 6' 6' 6' 6' 6'       AROM + overpressure in all planes 6x10"ea  6x10" ea    6x10"         Self SNAG B/L 6x10" 6x10"           UT stretching 4x30"B/L 4x30" B/L  3x30"  3x30"       TMD - tongue on roof + opening 1x10x5" nt           Scap retractions with BTB 2x10x5" 2x10 5" 2x10x5" 2x10x  5" 2x10x5" 2x10x5"       Prone T's NV 2x10 #1 3x10  1# 3x10   1# 3x10  1# 3x10  2#       Self SOR 3' 3'           Prone Y's   3x10 3x10 3x10        Prone scap retractions   3x10 3x10         Foam roller - T-spine PA   x5' x5'  x5'       Elliptical       8'                                                                                                                   Modalities
done

## 2022-06-13 ENCOUNTER — TELEPHONE (OUTPATIENT)
Dept: OBGYN CLINIC | Facility: HOSPITAL | Age: 52
End: 2022-06-13

## 2022-06-13 NOTE — TELEPHONE ENCOUNTER
Patient states that she is having a surgery on her eye in July 26, 2022  She wants to make sure it is okay for her to have a cortisone injection before her surgery on 6/26 with [facility]  She is scheduled for 6/14/2022 for injection       # [phone]

## 2022-06-14 ENCOUNTER — TELEPHONE (OUTPATIENT)
Dept: PAIN MEDICINE | Facility: CLINIC | Age: 52
End: 2022-06-14

## 2022-06-14 DIAGNOSIS — M79.18 MYOFASCIAL PAIN SYNDROME: ICD-10-CM

## 2022-06-14 NOTE — TELEPHONE ENCOUNTER
Patient called back  Said she had just left the office  She seemed to get agitated when I asked what it was in regards to, she yelled "CORTISONE, HE KNOWS WHY I AM CALLING "    She said the eye doctor , Dr Pete Fischer said it is okay to get injection      She is demanding an appointment tomorrow    C/b # [phone]    Daryl Oliver , adding you as you were on this yesterday

## 2022-06-14 NOTE — TELEPHONE ENCOUNTER
Pt is returning the nurses call  Please be advised thank you    Pt can be reached @ [phone] pt is on her way to work doesn't have the time to wait

## 2022-06-14 NOTE — TELEPHONE ENCOUNTER
S/w pt, she was very rude throughout conversation and demanded to know why we were calling her if she called in for the refill, we should not have to talk to her thereafter  RN explained that pt's medication was changed at her last appt, so we need an update on how she is doing on it, and if she is having s/e  Pt stated "trying to get a hold of your office is trying to call wood out of the woodwork" and pt stated she would have called if there was an issue  Redirected pt and asked how it was working, pt said she has no idea and said to just refill it, and hung up on the RN

## 2022-06-14 NOTE — TELEPHONE ENCOUNTER
Pt contacted Call Center requested refill of their medication  Medication Name: Tizanidine      Dosage of Med: 2 mg      Frequency of Med: Take 1 tablet (2 mg total) by mouth daily at bedtime as needed for muscle spasms      Remaining Medication: 0      Pharmacy and Location: [facility] pharmacy on file         Pt  Preferred Callback Phone Number: [phone]      Thank you

## 2022-06-14 NOTE — TELEPHONE ENCOUNTER
LMOM to c/b, c/b# and OH given       **Muscle relaxer changed at last appt, want to see how pt is doing on this new medication

## 2022-06-15 ENCOUNTER — OFFICE VISIT (OUTPATIENT)
Dept: OBGYN CLINIC | Facility: CLINIC | Age: 52
End: 2022-06-15
Payer: MEDICARE

## 2022-06-15 DIAGNOSIS — M17.12 PRIMARY OSTEOARTHRITIS OF LEFT KNEE: Primary | ICD-10-CM

## 2022-06-15 PROCEDURE — 20610 DRAIN/INJ JOINT/BURSA W/O US: CPT | Performed by: PHYSICIAN ASSISTANT

## 2022-06-15 PROCEDURE — 99213 OFFICE O/P EST LOW 20 MIN: CPT | Performed by: PHYSICIAN ASSISTANT

## 2022-06-15 RX ORDER — BUPIVACAINE HYDROCHLORIDE 2.5 MG/ML
2 INJECTION, SOLUTION INFILTRATION; PERINEURAL
Status: COMPLETED | OUTPATIENT
Start: 2022-06-15 | End: 2022-06-15

## 2022-06-15 RX ORDER — TIZANIDINE 2 MG/1
2 TABLET ORAL
Qty: 30 TABLET | Refills: 0 | Status: SHIPPED | OUTPATIENT
Start: 2022-06-15

## 2022-06-15 RX ORDER — METHYLPREDNISOLONE ACETATE 40 MG/ML
2 INJECTION, SUSPENSION INTRA-ARTICULAR; INTRALESIONAL; INTRAMUSCULAR; SOFT TISSUE
Status: COMPLETED | OUTPATIENT
Start: 2022-06-15 | End: 2022-06-15

## 2022-06-15 RX ORDER — KETOROLAC TROMETHAMINE 30 MG/ML
30 INJECTION, SOLUTION INTRAMUSCULAR; INTRAVENOUS
Status: COMPLETED | OUTPATIENT
Start: 2022-06-15 | End: 2022-06-15

## 2022-06-15 RX ADMIN — METHYLPREDNISOLONE ACETATE 2 ML: 40 INJECTION, SUSPENSION INTRA-ARTICULAR; INTRALESIONAL; INTRAMUSCULAR; SOFT TISSUE at 16:17

## 2022-06-15 RX ADMIN — BUPIVACAINE HYDROCHLORIDE 2 ML: 2.5 INJECTION, SOLUTION INFILTRATION; PERINEURAL at 16:17

## 2022-06-15 RX ADMIN — KETOROLAC TROMETHAMINE 30 MG: 30 INJECTION, SOLUTION INTRAMUSCULAR; INTRAVENOUS at 16:17

## 2022-06-15 NOTE — PROGRESS NOTES
Orthopedics          [facility] 47 [facility]: [id number]      Chief Complaint:   left knee pain    HPI:   47 y  o female complaining of left knee pain  Patient presents office today regarding left knee pain  Patient has previously been diagnosed with left knee pain due to osteoarthritis  Patient has had multiple injections in her left knee with significant pain relief however pain since returned  Patient states the pain is aching nature worse weight-bearing mildly relieved with rest   She denies any changes numbness tingling in her left knee                  Review Of Systems:   · Skin: Normal  · Neuro: See HPI  · Musculoskeletal: See HPI  · All other systems reviewed and are negative    Past Medical History:   Past Medical History:   Diagnosis Date    Asthma     Cancer ([address] )     endometrial    Chronic pain syndrome     HTN (hypertension)     Hyperlipidemia     Hypertension     Lumbar radiculopathy     Prediabetes        Past Surgical History:   Past Surgical History:   Procedure Laterality Date    GASTRECTOMY SLEEVE LAPAROSCOPIC      HYSTERECTOMY      KNEE ARTHROSCOPY Right     LUMBAR FUSION      MARY-EN-Y PROCEDURE  05/27/2021    Sleve    TOTAL HIP ARTHROPLASTY Bilateral        Family History:  Family history reviewed and non-contributory  Family History   Problem Relation Age of Onset    Diabetes Mother     Stroke Mother     Diabetes Father     Cancer Father         thyroid    Diabetes Sister     Diabetes Brother     No Known Problems Maternal Grandmother     No Known Problems Maternal Grandfather     No Known Problems Paternal Grandmother     No Known Problems Paternal Grandfather     No Known Problems Sister          Social History:  Social History     Socioeconomic History    Marital status: Unknown     Spouse name: None    Number of children: None    Years of education: None    Highest education level: None   Occupational History    None   Tobacco Use    Smoking status: Former Smoker    Smokeless tobacco: Never Used   Substance and Sexual Activity    Alcohol use: Not Currently    Drug use: Never    Sexual activity: None   Other Topics Concern    None   Social History Narrative    ** Merged History Encounter **          Social Determinants of Health     Financial Resource Strain: Not on file   Food Insecurity: Not on file   Transportation Needs: Not on file   Physical Activity: Not on file   Stress: Not on file   Social Connections: Not on file   Intimate Partner Violence: Not on file   Housing Stability: Not on file       Allergies:    Allergies   Allergen Reactions    Bee Venom Swelling    Dust Mite Extract     Fish-Derived Products - Food Allergy Hives    Iodine - Food Allergy Hives    Lac Bovis Other (See Comments)     congestion    Molds & Smuts     Other Swelling    Pollen Extract Other (See Comments)     Runny nose, watery eyes (also has corneal swelling) and itching  Triggers asthma    Shrimp Flavor - Food Allergy Hives       Labs:  0   Lab Value Date/Time    HCT 38 8 02/11/2022 0825    HCT 37 02/10/2022 0500    HCT 41 5 02/10/2022 0459    HCT 43 0 01/27/2022 1105    HCT 35 7 09/07/2015 0600    HCT 34 6 (L) 09/06/2015 0556    HCT 39 4 09/05/2015 0607    HGB 12 6 02/11/2022 0825    HGB 12 6 02/10/2022 0500    HGB 13 0 02/10/2022 0459    HGB 13 8 01/27/2022 1105    HGB 11 1 (L) 09/07/2015 0600    HGB 10 8 (L) 09/06/2015 0556    HGB 12 8 09/05/2015 0607    INR 1 02 01/27/2022 1105    INR 1 02 08/20/2015 1311    WBC 4 94 02/11/2022 0825    WBC 8 40 02/10/2022 0459    WBC 7 49 01/27/2022 1105    WBC 11 47 (H) 09/07/2015 0600    WBC 11 74 (H) 09/06/2015 0556    WBC 18 25 (H) 09/05/2015 0607       Meds:    Current Outpatient Medications:     acetaminophen (TYLENOL) 500 mg tablet, Take 1 tablet (500 mg total) by mouth every 4 (four) hours as needed for mild pain, Disp: 30 tablet, Rfl: 2    azelastine (OPTIVAR) 0 05 % ophthalmic solution, Apply to eye, Disp: , Rfl:     buPROPion (WELLBUTRIN XL) 300 mg 24 hr tablet, Take by mouth, Disp: , Rfl:     buPROPion (WELLBUTRIN XL) 300 mg 24 hr tablet, Take 300 mg by mouth daily (Patient not taking: No sig reported), Disp: , Rfl:     busPIRone (BUSPAR) 10 mg tablet, 10 mg daily , Disp: , Rfl:     busPIRone (BUSPAR) 10 mg tablet, Take 10 mg by mouth in the morning   (Patient not taking: No sig reported), Disp: , Rfl:     Calcium Carb-Cholecalciferol (OSCAL-D) 500 mg-200 units per tablet, Take 1 tablet by mouth 2 (two) times a day with meals, Disp: , Rfl:     Calcium Citrate-Vitamin D 250-200 MG-UNIT TABS, Take 2 tablets by mouth Three times a day (Patient not taking: No sig reported), Disp: , Rfl:     Cholecalciferol (VITAMIN D3) 2000 units capsule, TAKE 2 CAPSULES BY MOUTH DAILY INDICATIONS: VITAMIN D DEFICIENCY , Disp: , Rfl: 5    Cholecalciferol 2000 units TABS, Take 2 capsules by mouth  , Disp: , Rfl:     Cholecalciferol 50 MCG (2000 UT) CAPS, TAKE 2 CAPSULES BY MOUTH DAILY INDICATIONS: VITAMIN D DEFICIENCY   (Patient not taking: No sig reported), Disp: , Rfl:     [facility] Vitamin B-12 1000 MCG tablet, Take 1,000 mcg by mouth daily, Disp: , Rfl:     diclofenac sodium (VOLTAREN) 1 %, Apply 2 g topically 4 (four) times a day, Disp: 1 Tube, Rfl: 3    docusate sodium (COLACE) 100 mg capsule, Take 1 capsule (100 mg total) by mouth 2 (two) times a day, Disp: 20 capsule, Rfl: 2    DULoxetine (CYMBALTA) 60 mg delayed release capsule, Take 60 mg by mouth, Disp: , Rfl:     DULoxetine (CYMBALTA) 60 mg delayed release capsule, Take 60 mg by mouth daily (Patient not taking: No sig reported), Disp: , Rfl:     ferrous sulfate 325 (65 Fe) mg tablet, Take by mouth, Disp: , Rfl:     ferrous sulfate 325 (65 Fe) mg tablet, Take 325 mg by mouth daily with breakfast (Patient not taking: No sig reported), Disp: , Rfl:     fexofenadine-pseudoephedrine (ALLEGRA-D 24) 180-240 MG per 24 hr tablet, Take 1 tablet by mouth daily, Disp: , Rfl:    fluticasone (FLONASE) 50 mcg/act nasal spray, 2 sprays into each nostril daily, Disp: , Rfl:     fluticasone-salmeterol (Advair) 250-50 mcg/dose inhaler, Inhale 1 puff, Disp: , Rfl:     gabapentin (NEURONTIN) 300 mg capsule, Take 1 capsule (300 mg total) by mouth 3 (three) times a day, Disp: 90 capsule, Rfl: 2    gabapentin (NEURONTIN) 300 mg capsule, Take 1 capsule (300 mg total) by mouth 3 (three) times a day, Disp: 90 capsule, Rfl: 2    hydrocortisone 1 % cream, Apply topically, Disp: , Rfl:     hydrocortisone 2 5 % cream, , Disp: , Rfl:     lisinopril (ZESTRIL) 10 mg tablet, Take by mouth (Patient not taking: No sig reported), Disp: , Rfl:     loratadine (CLARITIN) 10 mg tablet, Take by mouth, Disp: , Rfl:     loratadine (CLARITIN) 10 mg tablet, Take 10 mg by mouth daily (Patient not taking: No sig reported), Disp: , Rfl:     LORazepam (ATIVAN) 0 5 mg tablet, Take 0 5 mg by mouth daily as needed, Disp: , Rfl:     metFORMIN (GLUCOPHAGE) 500 mg tablet, Take 500 mg by mouth 2 (two) times a day with meals (Patient not taking: No sig reported), Disp: , Rfl:     montelukast (SINGULAIR) 10 mg tablet, Take 10 mg by mouth, Disp: , Rfl:     montelukast (SINGULAIR) 10 mg tablet, TAKE 1 TABLET BY MOUTH EVERY DAY AT NIGHT, Disp: , Rfl:     naloxone (NARCAN) 4 mg/0 1 mL nasal spray, Administer 1 spray into a nostril  If no response after 2-3 minutes, give another dose in the other nostril using a new spray   (Patient not taking: No sig reported), Disp: 1 each, Rfl: 1    ondansetron (ZOFRAN) 4 mg tablet, Take 1 tablet (4 mg total) by mouth every 8 (eight) hours as needed for nausea or vomiting, Disp: 20 tablet, Rfl: 0    ondansetron (ZOFRAN-ODT) 4 mg disintegrating tablet, TAKE 1 TABLET BY MOUTH EVERY 8 HOURS AS NEEDED FOR NAUSEA AND VOMITING (Patient not taking: No sig reported), Disp: , Rfl:     oxyCODONE (Roxicodone) 5 immediate release tablet, Take 1 tablet (5 mg total) by mouth every 4 (four) hours as needed for severe pain Max Daily Amount: 30 mg, Disp: 30 tablet, Rfl: 0    pantoprazole (PROTONIX) 40 mg tablet, Take 40 mg by mouth daily  , Disp: , Rfl:     pantoprazole (PROTONIX) 40 mg tablet, Take 40 mg by mouth daily (Patient not taking: No sig reported), Disp: , Rfl:     simvastatin (ZOCOR) 40 mg tablet, Take by mouth, Disp: , Rfl:     simvastatin (ZOCOR) 40 mg tablet, Take 40 mg by mouth daily at bedtime (Patient not taking: No sig reported), Disp: , Rfl:     tiZANidine (ZANAFLEX) 2 mg tablet, Take 1 tablet (2 mg total) by mouth daily at bedtime as needed for muscle spasms, Disp: 30 tablet, Rfl: 0    VENTOLIN  (90 Base) MCG/ACT inhaler, INHALE 2 PUFFS EVERY 4 (FOUR) HOURS AS NEEDED FOR WHEEZING OR SHORTNESS OF BREATH , Disp: , Rfl: 2    vitamin B-12 (VITAMIN B-12) 1,000 mcg tablet, Take by mouth daily, Disp: , Rfl:       Physical Exam:   There were no vitals filed for this visit  General Appearance:    Alert, cooperative, no distress, appears stated age   Head:    Normocephalic, without obvious abnormality, atraumatic   Eyes:    conjunctiva/corneas clear, both eyes        Nose:   Nares normal, septum midline, no drainage    Throat:   Lips normal; teeth and gums normal   Neck:    symmetrical, trachea midline, ;     thyroid:  no enlargement/   Back:     Symmetric, no curvature, ROM normal   Lungs:   No audible wheezing or labored breathing   Chest Wall:    No tenderness or deformity    Heart:    Regular rate and rhythm               Pulses:   2+ and symmetric all extremities   Skin:   Skin color, texture, turgor normal, no rashes or lesions   Neurologic:   normal strength, sensation and reflexes     throughout       Musculoskeletal: left lower extremity  On examination of the left knee there is no effusion, no erythema  Range of motion to full active extension and flexion to greater than 120°  Pain on palpation medial and lateral joint lines    There is crepitus with range of motion, no warmth to palpation, bony enlargement noted  No pain on palpation pes anserine bursa region or distal iliotibial band  Stable to varus and valgus stress without pain or gapping  Negative anterior and posterior drawer testing  Sensation intact distal pulses present  Large joint arthrocentesis: L knee  Universal Protocol:  Consent given by: patient  Patient understanding: patient states understanding of the procedure being performed  Site marked: the operative site was marked  Patient identity confirmed: verbally with patient    Supporting Documentation  Indications: pain   Procedure Details  Location: knee - L knee  Needle size: 22 G  Approach: superior  Medications administered: 2 mL bupivacaine 0 25 %; 2 mL methylPREDNISolone acetate 40 mg/mL; 30 mg ketorolac 30 mg/mL    Patient tolerance: patient tolerated the procedure well with no immediate complications            _*_*_*_*_*_*_*_*_*_*_*_*_*_*_*_*_*_*_*_*_*_*_*_*_*_*_*_*_*_*_*_*_*_*_*_*_*_*_*_*_*    Assessment:  47 y  o female with left knee pain due to osteoarthritis    Plan:   · Weight bearing as tolerated  left lower extremity  · Left knee intra-articular corticosteroid and Toradol injection in his right noted above   · Patient advised should they develop any increasing pain, redness, drainage, numbness, tingling or swelling surrounding the injection sight, they are to contact our office or present to the emergency department    · Case discussed with Dr Bebeto Davis  · Follow up in 5 months or sooner if needed      Cullen Mcfadden PA-C

## 2022-06-15 NOTE — PROGRESS NOTES
Assessment/Plan:  No diagnosis found  Scribe Attestation    I,:   am acting as a scribe while in the presence of the attending physician :       I,:   personally performed the services described in this documentation    as scribed in my presence  :             ***    Subjective:   Monserrat Cochran is a 47 y o  female who presents to the office today for follow-up evaluation of her left knee  Review of Systems   Constitutional: Negative for chills, fever and unexpected weight change  HENT: Negative for hearing loss, nosebleeds and sore throat  Eyes: Negative for pain, redness and visual disturbance  Respiratory: Negative for cough, shortness of breath and wheezing  Cardiovascular: Negative for chest pain, palpitations and leg swelling  Gastrointestinal: Negative for abdominal pain, nausea and vomiting  Endocrine: Negative for polydipsia and polyuria  Genitourinary: Negative for dysuria and hematuria  Musculoskeletal: Positive for arthralgias and myalgias  See HPI   Skin: Negative for rash and wound  Neurological: Negative for dizziness, numbness and headaches  Psychiatric/Behavioral: Negative for decreased concentration and suicidal ideas  The patient is not nervous/anxious            Past Medical History:   Diagnosis Date    Asthma     Cancer ([facility] [address] )     endometrial    Chronic pain syndrome     HTN (hypertension)     Hyperlipidemia     Hypertension     Lumbar radiculopathy     Prediabetes        Past Surgical History:   Procedure Laterality Date    GASTRECTOMY SLEEVE LAPAROSCOPIC      HYSTERECTOMY      KNEE ARTHROSCOPY Right     LUMBAR FUSION      MARY-EN-Y PROCEDURE  05/27/2021    Sleve    TOTAL HIP ARTHROPLASTY Bilateral        Family History   Problem Relation Age of Onset    Diabetes Mother     Stroke Mother     Diabetes Father     Cancer Father         thyroid    Diabetes Sister     Diabetes Brother     No Known Problems Maternal Grandmother     No Known Problems Maternal Grandfather     No Known Problems Paternal Grandmother     No Known Problems Paternal Grandfather     No Known Problems Sister        Social History     Occupational History    Not on file   Tobacco Use    Smoking status: Former Smoker    Smokeless tobacco: Never Used   Substance and Sexual Activity    Alcohol use: Not Currently    Drug use: Never    Sexual activity: Not on file         Current Outpatient Medications:     acetaminophen (TYLENOL) 500 mg tablet, Take 1 tablet (500 mg total) by mouth every 4 (four) hours as needed for mild pain, Disp: 30 tablet, Rfl: 2    azelastine (OPTIVAR) 0 05 % ophthalmic solution, Apply to eye, Disp: , Rfl:     buPROPion (WELLBUTRIN XL) 300 mg 24 hr tablet, Take by mouth, Disp: , Rfl:     buPROPion (WELLBUTRIN XL) 300 mg 24 hr tablet, Take 300 mg by mouth daily (Patient not taking: No sig reported), Disp: , Rfl:     busPIRone (BUSPAR) 10 mg tablet, 10 mg daily , Disp: , Rfl:     busPIRone (BUSPAR) 10 mg tablet, Take 10 mg by mouth in the morning   (Patient not taking: No sig reported), Disp: , Rfl:     Calcium Carb-Cholecalciferol (OSCAL-D) 500 mg-200 units per tablet, Take 1 tablet by mouth 2 (two) times a day with meals, Disp: , Rfl:     Calcium Citrate-Vitamin D 250-200 MG-UNIT TABS, Take 2 tablets by mouth Three times a day (Patient not taking: No sig reported), Disp: , Rfl:     Cholecalciferol (VITAMIN D3) 2000 units capsule, TAKE 2 CAPSULES BY MOUTH DAILY INDICATIONS: VITAMIN D DEFICIENCY , Disp: , Rfl: 5    Cholecalciferol 2000 units TABS, Take 2 capsules by mouth  , Disp: , Rfl:     Cholecalciferol 50 MCG (2000 UT) CAPS, TAKE 2 CAPSULES BY MOUTH DAILY INDICATIONS: VITAMIN D DEFICIENCY   (Patient not taking: No sig reported), Disp: , Rfl:     [facility] Vitamin B-12 1000 MCG tablet, Take 1,000 mcg by mouth daily, Disp: , Rfl:     diclofenac sodium (VOLTAREN) 1 %, Apply 2 g topically 4 (four) times a day, Disp: 1 Tube, Rfl: 3    docusate sodium (COLACE) 100 mg capsule, Take 1 capsule (100 mg total) by mouth 2 (two) times a day, Disp: 20 capsule, Rfl: 2    DULoxetine (CYMBALTA) 60 mg delayed release capsule, Take 60 mg by mouth, Disp: , Rfl:     DULoxetine (CYMBALTA) 60 mg delayed release capsule, Take 60 mg by mouth daily (Patient not taking: No sig reported), Disp: , Rfl:     ferrous sulfate 325 (65 Fe) mg tablet, Take by mouth, Disp: , Rfl:     ferrous sulfate 325 (65 Fe) mg tablet, Take 325 mg by mouth daily with breakfast (Patient not taking: No sig reported), Disp: , Rfl:     fexofenadine-pseudoephedrine (ALLEGRA-D 24) 180-240 MG per 24 hr tablet, Take 1 tablet by mouth daily, Disp: , Rfl:     fluticasone (FLONASE) 50 mcg/act nasal spray, 2 sprays into each nostril daily, Disp: , Rfl:     fluticasone-salmeterol (Advair) 250-50 mcg/dose inhaler, Inhale 1 puff, Disp: , Rfl:     gabapentin (NEURONTIN) 300 mg capsule, Take 1 capsule (300 mg total) by mouth 3 (three) times a day, Disp: 90 capsule, Rfl: 2    gabapentin (NEURONTIN) 300 mg capsule, Take 1 capsule (300 mg total) by mouth 3 (three) times a day, Disp: 90 capsule, Rfl: 2    hydrocortisone 1 % cream, Apply topically, Disp: , Rfl:     hydrocortisone 2 5 % cream, , Disp: , Rfl:     lisinopril (ZESTRIL) 10 mg tablet, Take by mouth (Patient not taking: No sig reported), Disp: , Rfl:     loratadine (CLARITIN) 10 mg tablet, Take by mouth, Disp: , Rfl:     loratadine (CLARITIN) 10 mg tablet, Take 10 mg by mouth daily (Patient not taking: No sig reported), Disp: , Rfl:     LORazepam (ATIVAN) 0 5 mg tablet, Take 0 5 mg by mouth daily as needed, Disp: , Rfl:     metFORMIN (GLUCOPHAGE) 500 mg tablet, Take 500 mg by mouth 2 (two) times a day with meals (Patient not taking: No sig reported), Disp: , Rfl:     montelukast (SINGULAIR) 10 mg tablet, Take 10 mg by mouth, Disp: , Rfl:     montelukast (SINGULAIR) 10 mg tablet, TAKE 1 TABLET BY MOUTH EVERY DAY AT NIGHT, Disp: , Rfl:    naloxone (NARCAN) 4 mg/0 1 mL nasal spray, Administer 1 spray into a nostril  If no response after 2-3 minutes, give another dose in the other nostril using a new spray   (Patient not taking: Reported on 6/14/2022), Disp: 1 each, Rfl: 1    ondansetron (ZOFRAN) 4 mg tablet, Take 1 tablet (4 mg total) by mouth every 8 (eight) hours as needed for nausea or vomiting, Disp: 20 tablet, Rfl: 0    ondansetron (ZOFRAN-ODT) 4 mg disintegrating tablet, TAKE 1 TABLET BY MOUTH EVERY 8 HOURS AS NEEDED FOR NAUSEA AND VOMITING (Patient not taking: No sig reported), Disp: , Rfl:     oxyCODONE (Roxicodone) 5 immediate release tablet, Take 1 tablet (5 mg total) by mouth every 4 (four) hours as needed for severe pain Max Daily Amount: 30 mg, Disp: 30 tablet, Rfl: 0    pantoprazole (PROTONIX) 40 mg tablet, Take 40 mg by mouth daily  , Disp: , Rfl:     pantoprazole (PROTONIX) 40 mg tablet, Take 40 mg by mouth daily (Patient not taking: No sig reported), Disp: , Rfl:     simvastatin (ZOCOR) 40 mg tablet, Take by mouth, Disp: , Rfl:     simvastatin (ZOCOR) 40 mg tablet, Take 40 mg by mouth daily at bedtime (Patient not taking: No sig reported), Disp: , Rfl:     tiZANidine (ZANAFLEX) 2 mg tablet, Take 1 tablet (2 mg total) by mouth daily at bedtime as needed for muscle spasms, Disp: 30 tablet, Rfl: 0    VENTOLIN  (90 Base) MCG/ACT inhaler, INHALE 2 PUFFS EVERY 4 (FOUR) HOURS AS NEEDED FOR WHEEZING OR SHORTNESS OF BREATH , Disp: , Rfl: 2    vitamin B-12 (VITAMIN B-12) 1,000 mcg tablet, Take by mouth daily, Disp: , Rfl:     Allergies   Allergen Reactions    Bee Venom Swelling    Dust Mite Extract     Fish-Derived Products - Food Allergy Hives    Iodine - Food Allergy Hives    Lac Bovis Other (See Comments)     congestion    Molds & Smuts     Other Swelling    Pollen Extract Other (See Comments)     Runny nose, watery eyes (also has corneal swelling) and itching  Triggers asthma    Shrimp Flavor - Food Allergy Hives Objective: There were no vitals filed for this visit      Ortho Exam    Physical Exam    I have personally reviewed pertinent films in PACS and my interpretation is as follows:  ***

## 2022-07-17 DIAGNOSIS — M79.18 MYOFASCIAL PAIN SYNDROME: ICD-10-CM

## 2022-07-17 DIAGNOSIS — M54.16 LUMBAR RADICULOPATHY: ICD-10-CM

## 2022-07-18 RX ORDER — TIZANIDINE 2 MG/1
2 TABLET ORAL
Qty: 30 TABLET | Refills: 0 | OUTPATIENT
Start: 2022-07-18

## 2022-08-01 RX ORDER — GABAPENTIN 300 MG/1
300 CAPSULE ORAL 3 TIMES DAILY
Qty: 90 CAPSULE | Refills: 2 | Status: SHIPPED | OUTPATIENT
Start: 2022-08-01 | End: 2022-09-21

## 2022-08-01 NOTE — TELEPHONE ENCOUNTER
S/w pt  Pt is requesting Gabapentin refill  Pt is taking 300 mg tid, it is helping her pain and she has no se's  Pt was discussing the cancellation of her MBB on 4/25 and had to hang up d/t emergency call coming in  LP 4/4/22 #90 with 2 refills   No f/u OVS   Pt has surgery scheduled in September  Advised will notify [facility] and CB

## 2022-08-01 NOTE — TELEPHONE ENCOUNTER
Pt contacted Call Center requested refill of their medication  Medication Name:  gabapentin    Dosage of Med:  300 mg    Frequency of Med:  3 x a day    Remaining Medication:  2 days    Ph[facility] and [facility]      Pt  Preferred Callback Phone Number:    [phone]  Thank you

## 2022-09-09 ENCOUNTER — APPOINTMENT (OUTPATIENT)
Dept: LAB | Facility: CLINIC | Age: 52
End: 2022-09-09
Payer: MEDICARE

## 2022-09-09 DIAGNOSIS — N62 MACROMASTIA: ICD-10-CM

## 2022-09-09 LAB
ALBUMIN SERPL BCP-MCNC: 3.9 G/DL (ref 3.5–5)
ALP SERPL-CCNC: 85 U/L (ref 34–104)
ALT SERPL W P-5'-P-CCNC: 94 U/L (ref 7–52)
ANION GAP SERPL CALCULATED.3IONS-SCNC: 4 MMOL/L (ref 4–13)
AST SERPL W P-5'-P-CCNC: 41 U/L (ref 13–39)
BASOPHILS # BLD AUTO: 0.07 THOUSANDS/ΜL (ref 0–0.1)
BASOPHILS NFR BLD AUTO: 1 % (ref 0–1)
BILIRUB SERPL-MCNC: 0.6 MG/DL (ref 0.2–1)
BUN SERPL-MCNC: 15 MG/DL (ref 5–25)
CALCIUM SERPL-MCNC: 8.8 MG/DL (ref 8.4–10.2)
CHLORIDE SERPL-SCNC: 105 MMOL/L (ref 96–108)
CO2 SERPL-SCNC: 33 MMOL/L (ref 21–32)
CREAT SERPL-MCNC: 0.76 MG/DL (ref 0.6–1.3)
EOSINOPHIL # BLD AUTO: 0.52 THOUSAND/ΜL (ref 0–0.61)
EOSINOPHIL NFR BLD AUTO: 8 % (ref 0–6)
ERYTHROCYTE [DISTWIDTH] IN BLOOD BY AUTOMATED COUNT: 13.2 % (ref 11.6–15.1)
EST. AVERAGE GLUCOSE BLD GHB EST-MCNC: 117 MG/DL
FERRITIN SERPL-MCNC: 351 NG/ML (ref 8–388)
FOLATE SERPL-MCNC: 19.5 NG/ML (ref 3.1–17.5)
GFR SERPL CREATININE-BSD FRML MDRD: 89 ML/MIN/1.73SQ M
GLUCOSE P FAST SERPL-MCNC: 91 MG/DL (ref 65–99)
HBA1C MFR BLD: 5.7 %
HCT VFR BLD AUTO: 44.7 % (ref 34.8–46.1)
HGB BLD-MCNC: 14.7 G/DL (ref 11.5–15.4)
IMM GRANULOCYTES # BLD AUTO: 0.03 THOUSAND/UL (ref 0–0.2)
IMM GRANULOCYTES NFR BLD AUTO: 1 % (ref 0–2)
INR PPP: 0.94 (ref 0.84–1.19)
IRON SATN MFR SERPL: 37 % (ref 15–50)
IRON SERPL-MCNC: 114 UG/DL (ref 50–170)
LYMPHOCYTES # BLD AUTO: 2.75 THOUSANDS/ΜL (ref 0.6–4.47)
LYMPHOCYTES NFR BLD AUTO: 42 % (ref 14–44)
MCH RBC QN AUTO: 29.6 PG (ref 26.8–34.3)
MCHC RBC AUTO-ENTMCNC: 32.9 G/DL (ref 31.4–37.4)
MCV RBC AUTO: 90 FL (ref 82–98)
MONOCYTES # BLD AUTO: 0.47 THOUSAND/ΜL (ref 0.17–1.22)
MONOCYTES NFR BLD AUTO: 7 % (ref 4–12)
NEUTROPHILS # BLD AUTO: 2.62 THOUSANDS/ΜL (ref 1.85–7.62)
NEUTS SEG NFR BLD AUTO: 41 % (ref 43–75)
NRBC BLD AUTO-RTO: 0 /100 WBCS
PLATELET # BLD AUTO: 265 THOUSANDS/UL (ref 149–390)
PMV BLD AUTO: 9.3 FL (ref 8.9–12.7)
POTASSIUM SERPL-SCNC: 3.9 MMOL/L (ref 3.5–5.3)
PROT SERPL-MCNC: 6.6 G/DL (ref 6.4–8.4)
PROTHROMBIN TIME: 12.8 SECONDS (ref 11.6–14.5)
RBC # BLD AUTO: 4.96 MILLION/UL (ref 3.81–5.12)
SODIUM SERPL-SCNC: 142 MMOL/L (ref 135–147)
TIBC SERPL-MCNC: 307 UG/DL (ref 250–450)
VIT B12 SERPL-MCNC: 769 PG/ML (ref 100–900)
WBC # BLD AUTO: 6.46 THOUSAND/UL (ref 4.31–10.16)

## 2022-09-09 PROCEDURE — 83550 IRON BINDING TEST: CPT

## 2022-09-09 PROCEDURE — 36415 COLL VENOUS BLD VENIPUNCTURE: CPT

## 2022-09-09 PROCEDURE — 83540 ASSAY OF IRON: CPT

## 2022-09-09 PROCEDURE — 82607 VITAMIN B-12: CPT

## 2022-09-09 PROCEDURE — 80053 COMPREHEN METABOLIC PANEL: CPT

## 2022-09-09 PROCEDURE — 82728 ASSAY OF FERRITIN: CPT

## 2022-09-09 PROCEDURE — 82746 ASSAY OF FOLIC ACID SERUM: CPT

## 2022-09-09 PROCEDURE — 85025 COMPLETE CBC W/AUTO DIFF WBC: CPT

## 2022-09-09 PROCEDURE — 83036 HEMOGLOBIN GLYCOSYLATED A1C: CPT

## 2022-09-09 PROCEDURE — 85610 PROTHROMBIN TIME: CPT

## 2022-09-21 RX ORDER — PREDNISOLONE ACETATE 10 MG/ML
SUSPENSION/ DROPS OPHTHALMIC
COMMUNITY
Start: 2022-05-12

## 2022-09-21 RX ORDER — BEPOTASTINE BESILATE 15 MG/ML
SOLUTION/ DROPS OPHTHALMIC
COMMUNITY
Start: 2022-07-15

## 2022-09-21 RX ORDER — OFLOXACIN 3 MG/ML
SOLUTION/ DROPS OPHTHALMIC
COMMUNITY
Start: 2022-05-12

## 2022-09-21 RX ORDER — ERYTHROMYCIN 5 MG/G
OINTMENT OPHTHALMIC
COMMUNITY
Start: 2022-05-12

## 2022-09-21 NOTE — PRE-PROCEDURE INSTRUCTIONS
Pre-Surgery Instructions:   Medication Instructions    acetaminophen (TYLENOL) 500 mg tablet Uses PRN- OK to take day of surgery    azelastine (OPTIVAR) 0 05 % ophthalmic solution Take day of surgery   bepotastine besilate (BEPREVE) 1 5 % op soln Take day of surgery   buPROPion (WELLBUTRIN XL) 300 mg 24 hr tablet Take day of surgery   busPIRone (BUSPAR) 10 mg tablet Take day of surgery   Calcium Carb-Cholecalciferol (OSCAL-D) 500 mg-200 units per tablet Stop taking    Cholecalciferol (VITAMIN D3) 2000 units capsule Stop taking    [facility] Vitamin B-12 1000 MCG tablet Stop taking    docusate sodium (COLACE) 100 mg capsule Hold day of surgery   DULoxetine (CYMBALTA) 60 mg delayed release capsule Take day of surgery   erythromycin (ILOTYCIN) ophthalmic ointment Take day of surgery   ferrous sulfate 325 (65 Fe) mg tablet Hold day of surgery   fluticasone (FLONASE) 50 mcg/act nasal spray Take day of surgery   gabapentin (NEURONTIN) 300 mg capsule Take day of surgery   hydrocortisone 1 % cream Hold day of surgery   loratadine (CLARITIN) 10 mg tablet Hold day of surgery   LORazepam (ATIVAN) 0 5 mg tablet Uses PRN- OK to take day of surgery    montelukast (SINGULAIR) 10 mg tablet Take day of surgery   ofloxacin (OCUFLOX) 0 3 % ophthalmic solution Take day of surgery   pantoprazole (PROTONIX) 40 mg tablet Uses PRN- OK to take day of surgery    prednisoLONE acetate (Pred Forte) 1 % ophthalmic suspension Take day of surgery   simvastatin (ZOCOR) 40 mg tablet Take night before surgery    tiZANidine (ZANAFLEX) 2 mg tablet Uses PRN- OK to take day of surgery    VENTOLIN  (90 Base) MCG/ACT inhaler Uses PRN- OK to take day of surgery   Have you had / have a sore throat? No  Have you had / have a cough less than 1 week? No  Have you had / have a fever greater than 100 0 - 100  4? No  Are you experiencing any shortness of breath? No    Review with patient via phone medications and showering instructions  Advised don't take NSAID's, ok tylenol products  Advised ASC call with surgery schedule time, nothing eat or drink after midnight  Verbalized understanding

## 2022-09-22 ENCOUNTER — ANESTHESIA EVENT (OUTPATIENT)
Dept: PERIOP | Facility: HOSPITAL | Age: 52
End: 2022-09-22
Payer: MEDICARE

## 2022-09-22 PROCEDURE — NC001 PR NO CHARGE: Performed by: PHYSICIAN ASSISTANT

## 2022-09-23 ENCOUNTER — HOSPITAL ENCOUNTER (OUTPATIENT)
Facility: HOSPITAL | Age: 52
Setting detail: OUTPATIENT SURGERY
Discharge: HOME | End: 2022-09-23
Attending: STUDENT IN AN ORGANIZED HEALTH CARE EDUCATION/TRAINING PROGRAM | Admitting: STUDENT IN AN ORGANIZED HEALTH CARE EDUCATION/TRAINING PROGRAM
Payer: MEDICARE

## 2022-09-23 ENCOUNTER — ANESTHESIA (OUTPATIENT)
Dept: PERIOP | Facility: HOSPITAL | Age: 52
End: 2022-09-23
Payer: MEDICARE

## 2022-09-23 VITALS
WEIGHT: 210.4 LBS | RESPIRATION RATE: 18 BRPM | OXYGEN SATURATION: 95 % | BODY MASS INDEX: 35.92 KG/M2 | TEMPERATURE: 97 F | HEIGHT: 64 IN | SYSTOLIC BLOOD PRESSURE: 120 MMHG | DIASTOLIC BLOOD PRESSURE: 70 MMHG | HEART RATE: 89 BPM

## 2022-09-23 DIAGNOSIS — N62 MACROMASTIA: ICD-10-CM

## 2022-09-23 DIAGNOSIS — Z98.890 S/P BILATERAL BREAST REDUCTION: Primary | ICD-10-CM

## 2022-09-23 LAB — GLUCOSE SERPL-MCNC: 92 MG/DL (ref 65–140)

## 2022-09-23 PROCEDURE — 82948 REAGENT STRIP/BLOOD GLUCOSE: CPT

## 2022-09-23 PROCEDURE — 88305 TISSUE EXAM BY PATHOLOGIST: CPT | Performed by: PATHOLOGY

## 2022-09-23 PROCEDURE — 19318 BREAST REDUCTION: CPT | Performed by: STUDENT IN AN ORGANIZED HEALTH CARE EDUCATION/TRAINING PROGRAM

## 2022-09-23 RX ORDER — ONDANSETRON 2 MG/ML
INJECTION INTRAMUSCULAR; INTRAVENOUS AS NEEDED
Status: DISCONTINUED | OUTPATIENT
Start: 2022-09-23 | End: 2022-09-23

## 2022-09-23 RX ORDER — MAGNESIUM HYDROXIDE 1200 MG/15ML
LIQUID ORAL AS NEEDED
Status: DISCONTINUED | OUTPATIENT
Start: 2022-09-23 | End: 2022-09-23 | Stop reason: HOSPADM

## 2022-09-23 RX ORDER — KETAMINE HCL IN NACL, ISO-OSM 100MG/10ML
SYRINGE (ML) INJECTION AS NEEDED
Status: DISCONTINUED | OUTPATIENT
Start: 2022-09-23 | End: 2022-09-23

## 2022-09-23 RX ORDER — GABAPENTIN 300 MG/1
300 CAPSULE ORAL 3 TIMES DAILY
Qty: 15 CAPSULE | Refills: 0 | Status: SHIPPED | OUTPATIENT
Start: 2022-09-23 | End: 2022-09-28

## 2022-09-23 RX ORDER — MIDAZOLAM HYDROCHLORIDE 2 MG/2ML
INJECTION, SOLUTION INTRAMUSCULAR; INTRAVENOUS AS NEEDED
Status: DISCONTINUED | OUTPATIENT
Start: 2022-09-23 | End: 2022-09-23

## 2022-09-23 RX ORDER — SODIUM CHLORIDE, SODIUM LACTATE, POTASSIUM CHLORIDE, CALCIUM CHLORIDE 600; 310; 30; 20 MG/100ML; MG/100ML; MG/100ML; MG/100ML
125 INJECTION, SOLUTION INTRAVENOUS CONTINUOUS
Status: DISCONTINUED | OUTPATIENT
Start: 2022-09-23 | End: 2022-09-23 | Stop reason: HOSPADM

## 2022-09-23 RX ORDER — HYDROMORPHONE HCL/PF 1 MG/ML
0.5 SYRINGE (ML) INJECTION
Status: DISCONTINUED | OUTPATIENT
Start: 2022-09-23 | End: 2022-09-23 | Stop reason: HOSPADM

## 2022-09-23 RX ORDER — ROCURONIUM BROMIDE 10 MG/ML
INJECTION, SOLUTION INTRAVENOUS AS NEEDED
Status: DISCONTINUED | OUTPATIENT
Start: 2022-09-23 | End: 2022-09-23

## 2022-09-23 RX ORDER — EPHEDRINE SULFATE 50 MG/ML
INJECTION INTRAVENOUS AS NEEDED
Status: DISCONTINUED | OUTPATIENT
Start: 2022-09-23 | End: 2022-09-23

## 2022-09-23 RX ORDER — SODIUM CHLORIDE, SODIUM LACTATE, POTASSIUM CHLORIDE, CALCIUM CHLORIDE 600; 310; 30; 20 MG/100ML; MG/100ML; MG/100ML; MG/100ML
100 INJECTION, SOLUTION INTRAVENOUS CONTINUOUS
Status: DISCONTINUED | OUTPATIENT
Start: 2022-09-23 | End: 2022-09-23 | Stop reason: HOSPADM

## 2022-09-23 RX ORDER — OXYCODONE HYDROCHLORIDE 5 MG/1
5 TABLET ORAL EVERY 4 HOURS PRN
Qty: 31 TABLET | Refills: 0 | Status: SHIPPED | OUTPATIENT
Start: 2022-09-23 | End: 2022-10-03

## 2022-09-23 RX ORDER — DOCUSATE SODIUM 100 MG/1
100 CAPSULE, LIQUID FILLED ORAL DAILY PRN
Qty: 30 CAPSULE | Refills: 0 | Status: SHIPPED | OUTPATIENT
Start: 2022-09-23 | End: 2022-10-23

## 2022-09-23 RX ORDER — ACETAMINOPHEN 325 MG/1
975 TABLET ORAL ONCE
Status: COMPLETED | OUTPATIENT
Start: 2022-09-23 | End: 2022-09-23

## 2022-09-23 RX ORDER — ONDANSETRON 2 MG/ML
4 INJECTION INTRAMUSCULAR; INTRAVENOUS EVERY 4 HOURS PRN
Status: DISCONTINUED | OUTPATIENT
Start: 2022-09-23 | End: 2022-09-23 | Stop reason: HOSPADM

## 2022-09-23 RX ORDER — PROPOFOL 10 MG/ML
INJECTION, EMULSION INTRAVENOUS AS NEEDED
Status: DISCONTINUED | OUTPATIENT
Start: 2022-09-23 | End: 2022-09-23

## 2022-09-23 RX ORDER — ALBUMIN, HUMAN INJ 5% 5 %
SOLUTION INTRAVENOUS CONTINUOUS PRN
Status: DISCONTINUED | OUTPATIENT
Start: 2022-09-23 | End: 2022-09-23

## 2022-09-23 RX ORDER — DEXAMETHASONE SODIUM PHOSPHATE 10 MG/ML
INJECTION, SOLUTION INTRAMUSCULAR; INTRAVENOUS AS NEEDED
Status: DISCONTINUED | OUTPATIENT
Start: 2022-09-23 | End: 2022-09-23

## 2022-09-23 RX ORDER — OXYCODONE HYDROCHLORIDE 5 MG/1
5 TABLET ORAL EVERY 4 HOURS PRN
Status: DISCONTINUED | OUTPATIENT
Start: 2022-09-23 | End: 2022-09-23 | Stop reason: HOSPADM

## 2022-09-23 RX ORDER — ONDANSETRON 4 MG/1
4 TABLET, FILM COATED ORAL EVERY 8 HOURS PRN
Qty: 20 TABLET | Refills: 0 | Status: SHIPPED | OUTPATIENT
Start: 2022-09-23

## 2022-09-23 RX ORDER — FENTANYL CITRATE/PF 50 MCG/ML
25 SYRINGE (ML) INJECTION
Status: COMPLETED | OUTPATIENT
Start: 2022-09-23 | End: 2022-09-23

## 2022-09-23 RX ORDER — FENTANYL CITRATE 50 UG/ML
INJECTION, SOLUTION INTRAMUSCULAR; INTRAVENOUS AS NEEDED
Status: DISCONTINUED | OUTPATIENT
Start: 2022-09-23 | End: 2022-09-23

## 2022-09-23 RX ORDER — CETIRIZINE HYDROCHLORIDE 10 MG/1
10 TABLET ORAL DAILY
COMMUNITY

## 2022-09-23 RX ORDER — CEFAZOLIN SODIUM 2 G/50ML
2000 SOLUTION INTRAVENOUS ONCE
Status: COMPLETED | OUTPATIENT
Start: 2022-09-23 | End: 2022-09-23

## 2022-09-23 RX ORDER — GABAPENTIN 300 MG/1
300 CAPSULE ORAL ONCE
Status: COMPLETED | OUTPATIENT
Start: 2022-09-23 | End: 2022-09-23

## 2022-09-23 RX ADMIN — CEFAZOLIN SODIUM 2000 MG: 2 SOLUTION INTRAVENOUS at 14:41

## 2022-09-23 RX ADMIN — SODIUM CHLORIDE, SODIUM LACTATE, POTASSIUM CHLORIDE, AND CALCIUM CHLORIDE 125 ML/HR: .6; .31; .03; .02 INJECTION, SOLUTION INTRAVENOUS at 08:15

## 2022-09-23 RX ADMIN — EPHEDRINE SULFATE 5 MG: 50 INJECTION INTRAVENOUS at 12:54

## 2022-09-23 RX ADMIN — EPHEDRINE SULFATE 10 MG: 50 INJECTION INTRAVENOUS at 10:37

## 2022-09-23 RX ADMIN — SUGAMMADEX 191 MG: 100 INJECTION, SOLUTION INTRAVENOUS at 14:43

## 2022-09-23 RX ADMIN — ACETAMINOPHEN 975 MG: 325 TABLET, FILM COATED ORAL at 08:22

## 2022-09-23 RX ADMIN — PROPOFOL 200 MG: 10 INJECTION, EMULSION INTRAVENOUS at 10:37

## 2022-09-23 RX ADMIN — Medication 30 MG: at 10:37

## 2022-09-23 RX ADMIN — FENTANYL CITRATE 50 MCG: 50 INJECTION INTRAMUSCULAR; INTRAVENOUS at 13:08

## 2022-09-23 RX ADMIN — Medication 25 MCG: at 15:35

## 2022-09-23 RX ADMIN — MIDAZOLAM 2 MG: 1 INJECTION INTRAMUSCULAR; INTRAVENOUS at 10:37

## 2022-09-23 RX ADMIN — ROCURONIUM BROMIDE 5 MG: 50 INJECTION INTRAVENOUS at 14:24

## 2022-09-23 RX ADMIN — Medication 25 MCG: at 15:40

## 2022-09-23 RX ADMIN — ALBUMIN (HUMAN): 12.5 INJECTION, SOLUTION INTRAVENOUS at 13:40

## 2022-09-23 RX ADMIN — ROCURONIUM BROMIDE 50 MG: 50 INJECTION INTRAVENOUS at 10:37

## 2022-09-23 RX ADMIN — GABAPENTIN 300 MG: 300 CAPSULE ORAL at 08:22

## 2022-09-23 RX ADMIN — ROCURONIUM BROMIDE 20 MG: 50 INJECTION INTRAVENOUS at 11:58

## 2022-09-23 RX ADMIN — DEXAMETHASONE SODIUM PHOSPHATE 10 MG: 10 INJECTION, SOLUTION INTRAMUSCULAR; INTRAVENOUS at 11:51

## 2022-09-23 RX ADMIN — HYDROMORPHONE HYDROCHLORIDE 0.5 MG: 1 INJECTION, SOLUTION INTRAMUSCULAR; INTRAVENOUS; SUBCUTANEOUS at 16:17

## 2022-09-23 RX ADMIN — FENTANYL CITRATE 50 MCG: 50 INJECTION INTRAMUSCULAR; INTRAVENOUS at 10:37

## 2022-09-23 RX ADMIN — SODIUM CHLORIDE, SODIUM LACTATE, POTASSIUM CHLORIDE, AND CALCIUM CHLORIDE: .6; .31; .03; .02 INJECTION, SOLUTION INTRAVENOUS at 11:27

## 2022-09-23 RX ADMIN — SODIUM CHLORIDE, SODIUM LACTATE, POTASSIUM CHLORIDE, AND CALCIUM CHLORIDE: .6; .31; .03; .02 INJECTION, SOLUTION INTRAVENOUS at 14:50

## 2022-09-23 RX ADMIN — FENTANYL CITRATE 50 MCG: 50 INJECTION INTRAMUSCULAR; INTRAVENOUS at 11:21

## 2022-09-23 RX ADMIN — SODIUM CHLORIDE, SODIUM LACTATE, POTASSIUM CHLORIDE, AND CALCIUM CHLORIDE: .6; .31; .03; .02 INJECTION, SOLUTION INTRAVENOUS at 13:25

## 2022-09-23 RX ADMIN — CEFAZOLIN SODIUM 2000 MG: 2 SOLUTION INTRAVENOUS at 10:50

## 2022-09-23 RX ADMIN — Medication 20 MG: at 11:21

## 2022-09-23 RX ADMIN — Medication 25 MCG: at 15:25

## 2022-09-23 RX ADMIN — Medication 25 MCG: at 15:30

## 2022-09-23 RX ADMIN — ONDANSETRON 4 MG: 2 INJECTION INTRAMUSCULAR; INTRAVENOUS at 14:46

## 2022-09-23 RX ADMIN — PHENYLEPHRINE HYDROCHLORIDE 50 MCG/MIN: 10 INJECTION INTRAVENOUS at 10:54

## 2022-09-23 RX ADMIN — FENTANYL CITRATE 50 MCG: 50 INJECTION INTRAMUSCULAR; INTRAVENOUS at 14:45

## 2022-09-23 RX ADMIN — EPHEDRINE SULFATE 10 MG: 50 INJECTION INTRAVENOUS at 10:52

## 2022-09-23 NOTE — OP NOTE
OPERATIVE REPORT  PATIENT NAME: Beryl Whittington    :  1970  MRN: [id number]  Pt Location: [facility] OR ROOM 08    SURGERY DATE: 2022    Surgeon(s) and Role:     * Chet Rojas MD - Primary     * Kira Gan PA-C - Assisting     * Lizeth Prather MD - Fellow    Preop Diagnosis:  Macromastia Franklin Mediate    Post-Op Diagnosis Codes:     * Macromastia [N62]    Procedure(s) (LRB):  BILATERAL BREAST REDUCTION (Bilateral)    Specimen(s):  ID Type Source Tests Collected by Time Destination   1 : Right Breast Tissue: 1412g Tissue Breast, Right TISSUE EXAM Chet Rojas MD 2022 1221    2 : Left Breast Tissue: 1157g Tissue Breast, Left TISSUE EXAM Chet Rojas MD 2022 1225        Estimated Blood Loss:   Minimal    Drains:  Urethral Catheter Latex 16 Fr  (Active)   Number of days: 0       Anesthesia Type:   General    Operative Indications:  Macromastia [N62]    Operative Findings:  Right breast resection 1412 g  Left breast resection 1157 g  Inferior pedicle    Complications:   None    Procedure and Technique:    Patient was brought to the operating room, transferred to the operating table in supine fashion  After undergoing general anesthesia, a timeout was performed at which point all patient identifiers were deemed to be correct  The patient's chest and abdomen were prepped and draped in the normal sterile fashion  The pre-operative wise-pattern incisions were re-inforced  A 10 cm inferior pedicle was marked bilaterally  A 42 mm NAC sizer was used to resize the new NAC and incised  The pedicle was dissected and defined first  Next, the breast flaps were dissected starting at 2 cm in thickness and gradually thicker with superior dissection  Resection of the excess breast tissue was performed  After resection, patient was tailortacked and sat upright to compare size and symmetry  After satisfactory size and symmetry were obtained, the new NAC was positioned and closure of the wound occurred   3-0 vicryl was used along the horizontal and vertical limbs for dermis  3-0 stratafix for the skin along the horizontal limb, 4-0 monocryl for the skin for the vertical limb  The NAC was brought at the new position and secured with 3-0 monocryl for dermis and 4-0 monocryl for skin  After this was completed, prineo wound closure system was performed along the vertical and horizontal incisions  Xeroform was placed over the NAC  The breasts were dressed with gauze, ABD, and bra  It was noted at the end of the case that bilateral nipples were viable  The nipples were viable without venous congestion at the end of the case  This concluded the procedure  Patient tolerated the procedure well without complications  At the end of the case, all sponge, needle, and instrument counts were correct  Patient was awakened from anesthesia and taken to the PACU in stable condition  I was present for the entire procedure, A qualified resident physician assisted and a physician assistant was required during the procedure for retraction, tissue handling, dissection and suturing        Patient Disposition:  PACU         SIGNATURE: [unfilled]  DATE: September 23, 2022  TIME: 2:54 PM

## 2022-09-23 NOTE — H&P
H&P - Plastic Surgery   Delois Collet 47 y o  female MRN: [id number]  Unit/Bed#:  Encounter: [id number]           Assessment:  Macromastia [N62]  Plan:  BILATERAL BREAST REDUCTION, POSSIBLE FREE NIPPLE GRAFT (Bilateral Breast)        HPI:   Delois Collet is a 47y o  year old female who presents with symptomatic macromastia  She complains of large, heavy, pendulous breasts that causes her chest, upper back, neck, and shoulder discomfort  She also complains of rashes underneath her breast during humid weather and has difficulty exercising  She has tried supportive bra, tylenol with minimal symptomatic improvement  She denies lumps or discharge on self-exams  Patient was delayed last visit so that she is 1 year s/p weight-loss surgery which she is now  Her weight has been stable for the past 3 months  She would like to proceed with BBR      Discussed again the risks, benefits, procedure, and complications of breast reduction including but not limited to infection, bleeding, scarring, asymmetry, abscess, delayed wound healing, wound dehiscence, contour deformity, partial vs complete nipple necrosis, fat grafting, changes in nipple sensation, changes in breast feeding ability       Also discussed increased risk of all complications given her elevated BMI  Due to the ptotic nature of her breasts, also discussed possibility of free nipple graft if needed, which is a high possibility      BMI 34 9  SN-N 46, 48 cm  N-IMF 20, 19 cm     Patient current wears 46k, would like B-C cup, something that fits her body type  Estimated resection is at least 650 g per side      Last mammogram 1/26/22: normal     Not on blood thinners  NKDA  No tobacco use      REVIEW OF SYSTEMS    GENERAL/CONSTITUTIONAL: The patient denies fever, fatigue, weakness, weight gain or weight loss  HEAD, EYES, EARS, NOSE AND THROAT: Eyes - The patient denies pain, redness, loss of vision, double or blurred vision and denies wearing glasses   The patient denies ringing in the ears, nosebleeds sinusitis, post nasal drip  Also denies frequent sore throats, hoarseness, painful swallowing  CARDIOVASCULAR: The patient denies chest pain, irregular heartbeats, palpitations, shortness of breath, heart murmurs, high blood pressure, cramps in his legs with walking, pain in his feet or toes at night or varicose veins  RESPIRATORY: The patient denies chronic cough, wheezing or night sweats  GASTROINTESTINAL: The patient denies decreased appetite, nausea, vomiting, diarrhea, constipation, blood in the stools  GENITOURINARY: The patient denies difficult urination, pain or burning with urination, blood in the urine  MUSCULOSKELETAL: The patient denies arm, thigh or calf cramps  No joint or muscle pain  No muscle weakness or tenderness  No joint swelling, neck pain, back pain or major orthopedic injuries  SKIN AND BREASTS: see hpi The patient denies easy bruising, skin redness, skin rash, hives  NEUROLOGIC: The patient denies headache, dizziness, fainting, memory loss  PSYCHIATRIC: The patient denies depression anxiety  ENDOCRINE: The patient denies intolerance to hot or cold temperature, flushing, fingernail changes, increased thirst, increased salt intake or decreased sexual desire  HEMATOLOGIC/LYMPHATIC: The patient denies anemia, bleeding tendency or clotting tendency  ALLERGIC/IMMUNOLOGIC: The patient denies rhinitis, asthma, skin sensitivity, latex allergies or sensitivity        Historical Information   Past Medical History:   Diagnosis Date    Anxiety     Asthma     Cancer ([place] [address] )     endometrial    Chronic pain syndrome     CPAP (continuous positive airway pressure) dependence     Depression     Diabetes mellitus ([facility] )     Pre diabetic    GERD (gastroesophageal reflux disease)     HTN (hypertension)     Hyperlipidemia     Hypertension     Lumbar radiculopathy     Prediabetes     Sleep apnea      Past Surgical History:   Procedure Laterality Date    GASTRECTOMY SLEEVE LAPAROSCOPIC      HYSTERECTOMY      KNEE ARTHROSCOPY Right     LUMBAR FUSION      MARY-EN-Y PROCEDURE  05/27/2021    Sleve    TOTAL HIP ARTHROPLASTY Bilateral      Social History   Social History     Substance and Sexual Activity   Alcohol Use Not Currently     Social History     Substance and Sexual Activity   Drug Use Not Currently     Social History     Tobacco Use   Smoking Status Former Smoker   Smokeless Tobacco Never Used     Family History:   Family History   Problem Relation Age of Onset    Diabetes Mother     Stroke Mother     Diabetes Father     Cancer Father         thyroid    Diabetes Sister     Diabetes Brother     No Known Problems Maternal Grandmother     No Known Problems Maternal Grandfather     No Known Problems Paternal Grandmother     No Known Problems Paternal Grandfather     No Known Problems Sister        Meds/Allergies   No current facility-administered medications for this encounter      Current Outpatient Medications:     acetaminophen (TYLENOL) 500 mg tablet, Take 1 tablet (500 mg total) by mouth every 4 (four) hours as needed for mild pain, Disp: 30 tablet, Rfl: 2    azelastine (OPTIVAR) 0 05 % ophthalmic solution, Administer 1 drop to the right eye 2 (two) times a day, Disp: , Rfl:     bepotastine besilate (BEPREVE) 1 5 % op soln, INSTILL 1 DROP INTO BOTH EYES TWICE A DAY, Disp: , Rfl:     buPROPion (WELLBUTRIN XL) 300 mg 24 hr tablet, Take 300 mg by mouth daily, Disp: , Rfl:     busPIRone (BUSPAR) 10 mg tablet, Take 10 mg by mouth in the morning, Disp: , Rfl:     Calcium Carb-Cholecalciferol (OSCAL-D) 500 mg-200 units per tablet, Take 1 tablet by mouth 2 (two) times a day with meals, Disp: , Rfl:     Cholecalciferol (VITAMIN D3) 2000 units capsule, TAKE 2 CAPSULES BY MOUTH DAILY INDICATIONS: VITAMIN D DEFICIENCY , Disp: , Rfl: 5    [employer] Vitamin B-12 1000 MCG tablet, Take 1,000 mcg by mouth daily, Disp: , Rfl:     docusate sodium (COLACE) 100 mg capsule, Take 1 capsule (100 mg total) by mouth 2 (two) times a day, Disp: 20 capsule, Rfl: 2    DULoxetine (CYMBALTA) 60 mg delayed release capsule, Take 60 mg by mouth 2 (two) times a day, Disp: , Rfl:     erythromycin (ILOTYCIN) ophthalmic ointment, APPLY (1CM) TO THE SURGICAL EYE AT BEDTIME, Disp: , Rfl:     ferrous sulfate 325 (65 Fe) mg tablet, Take by mouth daily with breakfast, Disp: , Rfl:     fluticasone (FLONASE) 50 mcg/act nasal spray, 2 sprays into each nostril daily, Disp: , Rfl:     gabapentin (NEURONTIN) 300 mg capsule, Take 1 capsule (300 mg total) by mouth 3 (three) times a day, Disp: 90 capsule, Rfl: 2    hydrocortisone 1 % cream, Apply topically as needed, Disp: , Rfl:     loratadine (CLARITIN) 10 mg tablet, Take 10 mg by mouth daily, Disp: , Rfl:     LORazepam (ATIVAN) 0 5 mg tablet, Take 0 5 mg by mouth daily at bedtime as needed, Disp: , Rfl:     montelukast (SINGULAIR) 10 mg tablet, Take 10 mg by mouth every morning, Disp: , Rfl:     ofloxacin (OCUFLOX) 0 3 % ophthalmic solution, instill 1 drop by ophthalmic route 4 times every day into the surgical eye, Disp: , Rfl:     pantoprazole (PROTONIX) 40 mg tablet, Take 40 mg by mouth as needed, Disp: , Rfl:     prednisoLONE acetate (Pred Forte) 1 % ophthalmic suspension, instill 1 drop 6 times daily to the surgical eye, Disp: , Rfl:     simvastatin (ZOCOR) 40 mg tablet, Take 40 mg by mouth daily at bedtime, Disp: , Rfl:     tiZANidine (ZANAFLEX) 2 mg tablet, Take 1 tablet (2 mg total) by mouth daily at bedtime as needed for muscle spasms, Disp: 30 tablet, Rfl: 0    VENTOLIN  (90 Base) MCG/ACT inhaler, INHALE 2 PUFFS EVERY 4 (FOUR) HOURS AS NEEDED FOR WHEEZING OR SHORTNESS OF BREATH , Disp: , Rfl: 2      Objective     General: NC/AT, breathing comfortably on RA  Neuro: CN II-XII grossly intact, symmetric reflexes  HEENT: PERRLA, EOMI, external ears normal, no lesions or deformities, neck supple, trachea midline  Respiratory: CTAB, normal respiratory effort  Cardio: RRR, normal S1, S2, no murmur, rubs, gallops  GI: soft, non-tender, non-distended  MSK: normal alignment, mobility, gait  Skin: no rashes, lesions, subcutaneous nodules        BMI: 35  BSA: 1 98     Breast Exam:  Bilateral large, pendulous breasts  Severe grade III ptosis bilaterally  No nipple retraction, masses, or nipple discharge  Bilateral shoulder grooving present  No current intertrigo but notable moist areas under breasts  Normal nipple sensation bilaterally  Significantly diminished upper pole bilaterally        Measurements:                          R                      L  SN-N               46 cm              48 cm  N-IMF              20 cm              19 cm    Lab Results:   Lab Results   Component Value Date    WBC 6 46 09/09/2022    HGB 14 7 09/09/2022    HCT 44 7 09/09/2022    MCV 90 09/09/2022     09/09/2022          No results found for: TISSUECULT, WOUNDCULT      Imaging Studies:   No results found      EKG, Pathology, and Other Studies:   No results found for: FINALDX    No intake or output data in the 24 hours ending 09/22/22 2009    Invasive Devices  Report    None                 VTE Prophylaxis: Sequential compression device Robert Gonsalez)     Code Status: Prior  Advance Directive and Living Will:      Power of :

## 2022-09-23 NOTE — DISCHARGE INSTRUCTIONS
Discharge Instructions    -Take tylenol 500 mg as scheduled for pain  For severe breakthrough pain, take oxycodone 5 mg as scheduled   -Do not drive or operate heavy machinery when taking narcotic pain medicine as it can cause drowsiness   -No showering for 48 hours  After 48 hours, can remove all dressings on breast (except of surgical tape along incisions) and shower  No baths, hottubs, pools, or soaking incision   -After shower, pat incisions dry  Dress incisions with gauze  Incision may ooze for first few days, this is normal  Dressing may need to be changed more frequently if this occurs  -Supportive bra at all times, either surgical bra or sports bra  No underwire   -No strenuous activity, no heavy lifting, (nothing over 5 lbs), no reaching above shoulder or head as this can pull on incisions  -Breasts will be swollen and bruised and incisions may ooze for first few days   -Resume all home medications  -Resume regular diet  -Call [facility] to make follow-up appointment with physician assistant in 7-10 days      Rosa Isela Glynn MD   [facility]   [address], [address]   Office: [phone]

## 2022-09-23 NOTE — ANESTHESIA POSTPROCEDURE EVALUATION
Post-Op Assessment Note    CV Status:  Stable  Pain Score: 0    Pain management: adequate     Mental Status:  Awake and sleepy   Hydration Status:  Euvolemic   PONV Controlled:  Controlled   Airway Patency:  Patent      Post Op Vitals Reviewed: Yes      Staff: CRNA         No complications documented      /62 (09/23/22 1508)    Temp 98 8 °F (37 1 °C) (09/23/22 1508)    Pulse 98 (09/23/22 1508)   Resp 16 (09/23/22 1508)    SpO2 99 % (09/23/22 1508)

## 2022-09-23 NOTE — ANESTHESIA PREPROCEDURE EVALUATION
Procedure:  BILATERAL BREAST REDUCTION, POSSIBLE FREE NIPPLE GRAFT (Bilateral Breast)    Relevant Problems   CARDIO   (+) Hyperlipidemia   (+) Hypertension   (+) Primary hypertension      MUSCULOSKELETAL   (+) Cervical spondylosis   (+) Degenerative disc disease, cervical   (+) Lumbar spondylosis   (+) Primary osteoarthritis of left knee   (+) Sacroiliitis (HCC)      NEURO/PSYCH   (+) Chronic pain syndrome        Physical Exam    Airway    Mallampati score: II  TM Distance: >3 FB  Neck ROM: full     Dental   upper dentures and lower dentures,     Cardiovascular  Cardiovascular exam normal    Pulmonary  Pulmonary exam normal     Other Findings        Anesthesia Plan  ASA Score- 2     Anesthesia Type- general with ASA Monitors  Additional Monitors:   Airway Plan: ETT  Plan Factors-    Chart reviewed  Existing labs reviewed  Patient summary reviewed  Patient is not a current smoker  Obstructive sleep apnea risk education given perioperatively  Induction- intravenous  Postoperative Plan- Plan for postoperative opioid use  Planned trial extubation    Informed Consent- Anesthetic plan and risks discussed with patient  I personally reviewed this patient with the CRNA  Discussed and agreed on the Anesthesia Plan with the CRNA  Karrie Nissen

## 2022-09-23 NOTE — DISCHARGE INSTR - AVS FIRST PAGE
Surgery Date: 9/23/2022                Patient: Lucila Mccartney  Surgeon: Dr Willie Mackenzie     Postoperative Instructions   Breast Reduction       Dressings:  [x] Skin glue was applied to your incision over absorbable sutures  You may feel small pieces of suture at the ends of your incision  [] Remove dressing the second morning following your surgery and bathe as directed  [] No dressings are required but you may cover the incision with gauze for comfort  [x] Wear your surgical bra at all times except while showering  [x] Bolsters (yellow dressings) were applied over your nipple areola complex  These MUST remain dry until seen in the office  [] Other instructions:     Bathing:  [x] Shower 48 hours after surgery  Allow soap and water to gently wash over the incision  No scrubbing  Gently pat dry and apply dressing as needed/instructed above  [x] No submerging incision in bathtub, pool, hot tub and/or lake  [x] Bolster dressings (yellow dressings) over your nipple areola complex MUST remain dry until seen in the office  Activity:  [x] No heavy lifting (> 10lbs)  [x] No strenuous exercise  [x] Walking is mandatory daily  [] Sleeping may be more comfortable with your head elevated  Diet and Medication:  [x] Resume diet as tolerated  High protein diet is important for healing  Remain well hydrated with water and minimize sodium intake  [x] Resume preoperative medications  [x] Pain medications as prescribed  You may also begin to use ibuprofen 48 hours after surgery  [] Finish all antibiotics as prescribed  [x] Apply ice to area as needed for pain  Do not place ice directly on skin  Do not use heat  [] Other instructions: It is expected to have some bruising, swelling and mild oozing at the incision site and of the surrounding area  If there is more than you expect, an enlarging area or you suspect an infection, please call the office       Some patients may experience a low-grade fever after surgery  If it is above 100 4, please call the office  If you do not have a postoperative office appointment scheduled, please call the office today and let the staff know Dr Willie Mackenzie needs to see you in 7  days  Please call [phone] with any questions, concerns or changes

## 2022-09-29 ENCOUNTER — TELEPHONE (OUTPATIENT)
Dept: GASTROENTEROLOGY | Facility: CLINIC | Age: 52
End: 2022-09-29

## 2022-09-29 NOTE — TELEPHONE ENCOUNTER
Patients GI provider:  Passed OA    Number to return call: ([phone])    Reason for call: Pt calling to schedule her colonoscopy screening  She would like to go to the [facility]  Please call to schedule  Thank you!     Scheduled procedure/appointment date if applicable: Apt/procedure

## 2022-09-29 NOTE — TELEPHONE ENCOUNTER
09/29/22  Screened by: Ashley Marrero    Referring Provider     Pre- Screening: There is no height or weight on file to calculate BMI  Has patient been referred for a routine screening Colonoscopy? yes  Is the patient between 39-70 years old? yes      Previous Colonoscopy no   If yes:    Date:     Facility:     Reason:       SCHEDULING STAFF: If the patient is between 45yrs-49yrs, please advise patient to confirm benefits/coverage with their insurance company for a routine screening colonoscopy, some insurance carriers will only cover at [address] or older  If the patient is over 66years old, please schedule an office visit  Does the patient want to see a Gastroenterologist prior to their procedure OR are they having any GI symptoms? no    Has the patient been hospitalized or had abdominal surgery in the past 6 months? no    Does the patient use supplemental oxygen? no    Does the patient take Coumadin, Lovenox, Plavix, Elliquis, Xarelto, or other blood thinning medication? no    Has the patient had a stroke, cardiac event, or stent placed in the past year? no    SCHEDULING STAFF: If patient answers NO to above questions, then schedule procedure  If patient answers YES to above questions, then schedule office appointment  If patient is between 45yrs - 49yrs, please advise patient that we will have to confirm benefits & coverage with their insurance company for a routine screening colonoscopy

## 2022-09-30 PROCEDURE — 88305 TISSUE EXAM BY PATHOLOGIST: CPT | Performed by: PATHOLOGY

## 2022-10-03 ENCOUNTER — OFFICE VISIT (OUTPATIENT)
Dept: PLASTIC SURGERY | Facility: CLINIC | Age: 52
End: 2022-10-03

## 2022-10-03 DIAGNOSIS — Z98.890 S/P BILATERAL BREAST REDUCTION: Primary | ICD-10-CM

## 2022-10-03 PROCEDURE — 99024 POSTOP FOLLOW-UP VISIT: CPT | Performed by: PHYSICIAN ASSISTANT

## 2022-10-03 NOTE — PROGRESS NOTES
Assessment/Plan:     Patient is a 58-year-old female who is status post bilateral breast reduction by Dr Payton Harry on 09/23/2022  Please see HPI  The patient presents to the office today for her 1st postoperative visit  She reports no issues or concerns postoperatively  The patient will continue to wear a supportive bra  She should not soak her incisions  She will return to our office in 7-10 days for an incision check  Diagnoses and all orders for this visit:    S/P bilateral breast reduction          Subjective:     Patient ID: Ronnie Chew is a 47 y o  female  HPI    Patient reports no issues or concerns today  Review of Systems    See HPI    Objective:     Physical Exam      Bilateral incisions are clean, dry and intact    Breasts are soft, supple and grossly symmetric

## 2022-10-11 ENCOUNTER — OFFICE VISIT (OUTPATIENT)
Dept: PLASTIC SURGERY | Facility: CLINIC | Age: 52
End: 2022-10-11

## 2022-10-11 DIAGNOSIS — Z98.890 S/P BILATERAL BREAST REDUCTION: Primary | ICD-10-CM

## 2022-10-11 PROCEDURE — 99024 POSTOP FOLLOW-UP VISIT: CPT | Performed by: PHYSICIAN ASSISTANT

## 2022-10-11 NOTE — PROGRESS NOTES
Assessment/Plan:     Patient is a 59-year-old female who is status post bilateral breast reduction by Dr Nayan Gdooy on 09/23/2022  Please see HPI  The patient returns to the office today for a routine postoperative check  She has a small amount of spitting sutures/superficial dehiscence at her bilateral T points, otherwise healing appropriately  The patient will apply Aquaphor and Xeroform to her bilateral T points and cover with an ABD  She will return to the office in 10-14 days for wound check or sooner with any questions or concerns  Diagnoses and all orders for this visit:    S/P bilateral breast reduction          Subjective:     Patient ID: Isis Ennis is a 47 y o  female  HPI     The patient reports that she has been doing well  Her incision sites are sore and she is having some bleeding under her breasts  No fever, chills, nausea, vomiting  Review of Systems    See HPI    Objective:     Physical Exam      Bilateral incision sites are clean, dry and intact with exception of few points of spitting suture bilaterally, most notably at the bilateral T points  Breast are soft, supple and grossly symmetric  Incision sites are non erythematous, nontender to palpation

## 2022-10-25 ENCOUNTER — OFFICE VISIT (OUTPATIENT)
Dept: PLASTIC SURGERY | Facility: CLINIC | Age: 52
End: 2022-10-25

## 2022-10-25 DIAGNOSIS — Z98.890 S/P BILATERAL BREAST REDUCTION: Primary | ICD-10-CM

## 2022-10-25 NOTE — PROGRESS NOTES
Assessment/Plan:     Patient is a 58-year-old female who is status post bilateral breast reduction by Dr Alisha Stark on 09/23/202Delores Whitley see HPI  The patient returns to the office today for routine postoperative check  She does report that she has some bleeding and worsening of her T point wound on the right  Upon evaluation, there is a large spitting suture present in the wound bed which was removed today  Please see photo in media  The patient will apply Aquaphor and xeroform to her right breast T point wound  She will return to the office in 1 week for wound check  Diagnoses and all orders for this visit:    S/P bilateral breast reduction          Subjective:     Patient ID: Sofía Calderón is a 47 y o  female  HPI    No issues or concerns except as stated above  The patient reports that she missed her last appointment because “I have a hard time getting up in the morning”  Review of Systems    See HPI    Objective:     Physical Exam      Incisions are clean, dry and intact with exception of right breast deep point with superficial opening and spitting suture  Wound bed is well granulated  Please see photo in media

## 2022-11-02 ENCOUNTER — OFFICE VISIT (OUTPATIENT)
Dept: PLASTIC SURGERY | Facility: CLINIC | Age: 52
End: 2022-11-02

## 2022-11-02 DIAGNOSIS — Z98.890 S/P BILATERAL BREAST REDUCTION: Primary | ICD-10-CM

## 2022-11-02 NOTE — PROGRESS NOTES
Assessment/Plan:     Patient is a 53-year-old female who is status post bilateral breast reduction by Dr Silver on 09/23/202Brooklyn Daniels see HPI  The patient returns to the office today for an incision check  She has had significant improvement in her right breast T point  Patient had few areas a spitting suture which were removed today  Of note, the patient has a skin growth of the right lateral breast, that is superior to her incision line  She reports that she scratch this recently and it bled for several hours  The patient will return to our office in 2 weeks for an incision check  At that time, I will biopsy this lesion and sent for pathology  The patient should continue to apply Aquaphor and a loose dressing to the open portions of her incision sites  She should continue wear a supportive draw without underwear  Diagnoses and all orders for this visit:    S/P bilateral breast reduction          Subjective:     Patient ID: Bethel Marie is a 47 y o  female  HPI     Patient reports no issues or concerns today except as detailed above  Review of Systems    See HPI    Objective:     Physical Exam      Incisions are clean, dry and intact except as above  Please see photo in media

## 2022-11-21 ENCOUNTER — OFFICE VISIT (OUTPATIENT)
Dept: PLASTIC SURGERY | Facility: CLINIC | Age: 52
End: 2022-11-21

## 2022-11-21 VITALS — BODY MASS INDEX: 35 KG/M2 | WEIGHT: 205 LBS | HEIGHT: 64 IN

## 2022-11-21 DIAGNOSIS — Z98.890 S/P BILATERAL BREAST REDUCTION: Primary | ICD-10-CM

## 2022-11-21 DIAGNOSIS — L98.8 SKIN LESION OF BREAST: ICD-10-CM

## 2022-11-29 ENCOUNTER — OFFICE VISIT (OUTPATIENT)
Dept: OBGYN CLINIC | Facility: CLINIC | Age: 52
End: 2022-11-29

## 2022-11-29 ENCOUNTER — HOSPITAL ENCOUNTER (OUTPATIENT)
Dept: RADIOLOGY | Facility: HOSPITAL | Age: 52
Discharge: HOME | End: 2022-11-29
Attending: ORTHOPAEDIC SURGERY

## 2022-11-29 VITALS — BODY MASS INDEX: 35 KG/M2 | HEIGHT: 64 IN | WEIGHT: 205 LBS

## 2022-11-29 DIAGNOSIS — M17.12 PRIMARY OSTEOARTHRITIS OF LEFT KNEE: Primary | ICD-10-CM

## 2022-11-29 DIAGNOSIS — E66.01 MORBID OBESITY WITH BODY MASS INDEX (BMI) OF 50.0 TO 59.9 IN ADULT (HCC): ICD-10-CM

## 2022-11-29 DIAGNOSIS — M25.662 STIFFNESS OF LEFT KNEE, NOT ELSEWHERE CLASSIFIED: ICD-10-CM

## 2022-11-29 DIAGNOSIS — M25.561 PAIN IN BOTH KNEES, UNSPECIFIED CHRONICITY: ICD-10-CM

## 2022-11-29 DIAGNOSIS — M25.69 STIFFNESS OF OTHER SPECIFIED JOINT, NOT ELSEWHERE CLASSIFIED: ICD-10-CM

## 2022-11-29 DIAGNOSIS — M17.11 PRIMARY OSTEOARTHRITIS OF RIGHT KNEE: ICD-10-CM

## 2022-11-29 DIAGNOSIS — M25.562 PAIN IN BOTH KNEES, UNSPECIFIED CHRONICITY: ICD-10-CM

## 2022-11-29 RX ORDER — FERROUS SULFATE TAB EC 324 MG (65 MG FE EQUIVALENT) 324 (65 FE) MG
324 TABLET DELAYED RESPONSE ORAL
Qty: 60 TABLET | Refills: 0 | Status: SHIPPED | OUTPATIENT
Start: 2022-11-29

## 2022-11-29 RX ORDER — METHYLPREDNISOLONE ACETATE 40 MG/ML
2 INJECTION, SUSPENSION INTRA-ARTICULAR; INTRALESIONAL; INTRAMUSCULAR; SOFT TISSUE
Status: COMPLETED | OUTPATIENT
Start: 2022-11-29 | End: 2022-11-29

## 2022-11-29 RX ORDER — MULTIVIT-MIN/IRON FUM/FOLIC AC 7.5 MG-4
1 TABLET ORAL DAILY
Qty: 30 TABLET | Refills: 0 | Status: SHIPPED | OUTPATIENT
Start: 2022-11-29

## 2022-11-29 RX ORDER — FOLIC ACID 1 MG/1
1 TABLET ORAL DAILY
Qty: 30 TABLET | Refills: 0 | Status: SHIPPED | OUTPATIENT
Start: 2022-11-29

## 2022-11-29 RX ORDER — ASCORBIC ACID 500 MG
500 TABLET ORAL DAILY
Qty: 30 TABLET | Refills: 0 | Status: SHIPPED | OUTPATIENT
Start: 2022-11-29

## 2022-11-29 RX ORDER — BUPIVACAINE HYDROCHLORIDE 2.5 MG/ML
2 INJECTION, SOLUTION INFILTRATION; PERINEURAL
Status: COMPLETED | OUTPATIENT
Start: 2022-11-29 | End: 2022-11-29

## 2022-11-29 RX ORDER — CHLORHEXIDINE GLUCONATE 0.12 MG/ML
15 RINSE ORAL ONCE
OUTPATIENT
Start: 2022-11-29 | End: 2022-11-29

## 2022-11-29 RX ADMIN — BUPIVACAINE HYDROCHLORIDE 2 ML: 2.5 INJECTION, SOLUTION INFILTRATION; PERINEURAL at 14:29

## 2022-11-29 RX ADMIN — METHYLPREDNISOLONE ACETATE 2 ML: 40 INJECTION, SUSPENSION INTRA-ARTICULAR; INTRALESIONAL; INTRAMUSCULAR; SOFT TISSUE at 14:29

## 2022-11-29 NOTE — PROGRESS NOTES
Assessment:  1  Chronic pain syndrome    2  Lumbar radiculopathy    3  Degenerative disc disease, cervical    4  Lumbar spondylosis    5  Sacroiliitis (HCC)    6  Cervical spondylosis    7  Numbness and tingling in both hands    8  Myofascial pain syndrome    9  Macromastia    10  S/P bilateral breast reduction        Plan:  1  We will schedule the patient for repeat right C4-6 medial branch blocks with intention of moving forward towards radiofrequency ablation if there is an appropriate diagnostic response  Complete risks and benefits including bleeding, infection, tissue reaction, nerve injury and allergic reaction were discussed  The patient was agreeable and verbalized an understanding  2  I will order an updated EMG of the bilateral upper extremities  3  I will discontinue tizanidine secondary to side effects and return the patient to methocarbamol 500 mg b i d  p r n  myofascial pain  I advised the patient that they should not drive or operate machinery while on this medication until they see how it affects them, as it could cause lethargy and mental cloudyness  I advised the patient to call our office if they experience any side effects or issues with the medication changes  The patient verbalized an understanding  4  Patient will need to follow with Orthopedics for knee complaints  She is scheduled for a left total knee arthroplasty on February 9, 2023  5  Patient may continue gabapentin 300 mg 3 times a day as prescribed  This medication was refilled today   6  Will avoid NSAIDs secondary to history of bariatric surgery   7  Continue with home exercise program  8  Follow up pending results of procedure or sooner if needed    History of Present Illness: The patient is a 47 y o  female with a history of bariatric surgery, fibromyalgia, and carpal tunnel syndrome last seen on 4/4/22 who presents for a follow up office visit in regards to chronic right-sided axial neck pain    Patient also complains of weakness and numbness in her bilateral hands  EMG from 2017 confirmed bilateral carpal tunnel syndrome  CT of the cervical spine from February 2022 did not reveal any critical central canal stenosis  Patient is scheduled for a left total knee arthroplasty in February of 2023  She just recently had a right intra-articular knee injection with Orthopedics yesterday  She never scheduled cervical medial branch blocks after last appointment but would like to schedule them today  She is taking tizanidine 2 mg p r n , gabapentin 300 mg 3 times a day p r n  and Tylenol p r n  without much relief  She would like to return to methocarbamol as she found this medication more effective in the past   She is unable to take NSAIDs secondary to history of bariatric surgery  She continues to complain of weakness in her hands  She states she is dropping objects and having a hard time with her  strength  She also complains of numbness in her fingers  Patient rates her pain an 8/10 on the numeric pain rating scale  She constantly has pain in the evening and at night which is described as burning, dull aching, shooting and pins and needles    I have personally reviewed and/or updated the patient's past medical history, past surgical history, family history, social history, current medications, allergies, and vital signs today  Review of Systems:    Review of Systems   Respiratory: Negative for shortness of breath  Cardiovascular: Negative for chest pain  Gastrointestinal: Negative for constipation, diarrhea, nausea and vomiting  Musculoskeletal: Negative for arthralgias, joint swelling and myalgias  Skin: Negative for rash  Neurological: Negative for dizziness, seizures and weakness  All other systems reviewed and are negative          Past Medical History:   Diagnosis Date   • Anxiety    • Asthma    • Cancer [facility])     endometrial   • Chronic pain syndrome    • CPAP (continuous positive airway pressure) dependence    • Depression    • Diabetes mellitus ([facility] [address] )     Pre diabetic   • GERD (gastroesophageal reflux disease)    • HTN (hypertension)    • Hyperlipidemia    • Hypertension    • Lumbar radiculopathy    • Prediabetes    • Sleep apnea        Past Surgical History:   Procedure Laterality Date   • GASTRECTOMY SLEEVE LAPAROSCOPIC     • HYSTERECTOMY     • KNEE ARTHROSCOPY Right    • LUMBAR FUSION     • [place] BREAST REDUCTION Bilateral 9/23/2022    Procedure: BILATERAL BREAST REDUCTION;  Surgeon: Alice Kline MD;  Location: [facility];  Service: Plastics   • MARY-EN-Y PROCEDURE  05/27/2021    [place]   • TOTAL HIP ARTHROPLASTY Bilateral        Family History   Problem Relation Age of Onset   • Diabetes Mother    • Stroke Mother    • Diabetes Father    • Cancer Father         thyroid   • Diabetes Sister    • Diabetes Brother    • No Known Problems Maternal Grandmother    • No Known Problems Maternal Grandfather    • No Known Problems Paternal Grandmother    • No Known Problems Paternal Grandfather    • No Known Problems Sister        Social History     Occupational History   • Not on file   Tobacco Use   • Smoking status: Former   • Smokeless tobacco: Never   Vaping Use   • Vaping Use: Never used   Substance and Sexual Activity   • Alcohol use: Not Currently   • Drug use: Not Currently   • Sexual activity: Not Currently         Current Outpatient Medications:   •  DULoxetine (CYMBALTA) 60 mg delayed release capsule, Take 60 mg by mouth 2 (two) times a day, Disp: , Rfl:   •  gabapentin (NEURONTIN) 300 mg capsule, Take 1 capsule (300 mg total) by mouth 3 (three) times a day, Disp: 90 capsule, Rfl: 2  •  methocarbamol (ROBAXIN) 500 mg tablet, Take 1 tablet (500 mg total) by mouth 2 (two) times a day as needed for muscle spasms, Disp: 60 tablet, Rfl: 1  •  acetaminophen (TYLENOL) 500 MG chewable tablet, Chew 1 tablet (500 mg total) every 6 (six) hours, Disp: 30 tablet, Rfl: 0  •  ascorbic acid (VITAMIN C) 500 MG tablet, Take 1 tablet (500 mg total) by mouth daily, Disp: 30 tablet, Rfl: 0  •  azelastine (OPTIVAR) 0 05 % ophthalmic solution, Administer 1 drop to the right eye 2 (two) times a day, Disp: , Rfl:   •  bepotastine besilate (BEPREVE) 1 5 % op soln, INSTILL 1 DROP INTO BOTH EYES TWICE A DAY, Disp: , Rfl:   •  buPROPion (WELLBUTRIN XL) 300 mg 24 hr tablet, Take 300 mg by mouth daily, Disp: , Rfl:   •  busPIRone (BUSPAR) 10 mg tablet, Take 10 mg by mouth in the morning, Disp: , Rfl:   •  Calcium Carb-Cholecalciferol (OSCAL-D) 500 mg-200 units per tablet, Take 1 tablet by mouth 2 (two) times a day with meals, Disp: , Rfl:   •  cetirizine (ZyrTEC) 10 mg tablet, Take 10 mg by mouth daily, Disp: , Rfl:   •  Cholecalciferol (VITAMIN D3) 2000 units capsule, TAKE 2 CAPSULES BY MOUTH DAILY INDICATIONS: VITAMIN D DEFICIENCY , Disp: , Rfl: 5  •  [employer] Vitamin B-12 1000 MCG tablet, Take 1,000 mcg by mouth daily, Disp: , Rfl:   •  docusate sodium (COLACE) 100 mg capsule, Take 1 capsule (100 mg total) by mouth 2 (two) times a day, Disp: 20 capsule, Rfl: 2  •  docusate sodium (Colace) 100 mg capsule, Take 1 capsule (100 mg total) by mouth daily as needed for constipation, Disp: 30 capsule, Rfl: 0  •  erythromycin (ILOTYCIN) ophthalmic ointment, APPLY (1CM) TO THE SURGICAL EYE AT BEDTIME, Disp: , Rfl:   •  ferrous sulfate 324 (65 Fe) mg, Take 1 tablet (324 mg total) by mouth 2 (two) times a day before meals (Patient not taking: Reported on 11/30/2022), Disp: 60 tablet, Rfl: 0  •  ferrous sulfate 325 (65 Fe) mg tablet, Take by mouth daily with breakfast, Disp: , Rfl:   •  fluticasone (FLONASE) 50 mcg/act nasal spray, 2 sprays into each nostril daily, Disp: , Rfl:   •  folic acid (FOLVITE) 1 mg tablet, Take 1 tablet (1 mg total) by mouth daily, Disp: 30 tablet, Rfl: 0  •  hydrocortisone 1 % cream, Apply topically as needed, Disp: , Rfl:   •  loratadine (CLARITIN) 10 mg tablet, Take 10 mg by mouth daily, Disp: , Rfl:   •  LORazepam (ATIVAN) 0 5 mg tablet, Take 0 5 mg by mouth daily at bedtime as needed (Patient not taking: Reported on 10/18/2022), Disp: , Rfl:   •  montelukast (SINGULAIR) 10 mg tablet, Take 10 mg by mouth every morning, Disp: , Rfl:   •  Multiple Vitamins-Minerals (multivitamin with minerals) tablet, Take 1 tablet by mouth daily, Disp: 30 tablet, Rfl: 0  •  ofloxacin (OCUFLOX) 0 3 % ophthalmic solution, instill 1 drop by ophthalmic route 4 times every day into the surgical eye, Disp: , Rfl:   •  ondansetron (ZOFRAN) 4 mg tablet, Take 1 tablet (4 mg total) by mouth every 8 (eight) hours as needed for nausea or vomiting, Disp: 20 tablet, Rfl: 0  •  pantoprazole (PROTONIX) 40 mg tablet, Take 40 mg by mouth as needed, Disp: , Rfl:   •  Polyethyl Glycol-Propyl Glycol (SYSTANE OP), Apply to eye, Disp: , Rfl:   •  prednisoLONE acetate (PRED FORTE) 1 % ophthalmic suspension, instill 1 drop 6 times daily to the surgical eye, Disp: , Rfl:   •  simvastatin (ZOCOR) 40 mg tablet, Take 40 mg by mouth daily at bedtime, Disp: , Rfl:   •  VENTOLIN  (90 Base) MCG/ACT inhaler, INHALE 2 PUFFS EVERY 4 (FOUR) HOURS AS NEEDED FOR WHEEZING OR SHORTNESS OF BREATH , Disp: , Rfl: 2  No current facility-administered medications for this visit  Allergies   Allergen Reactions   • Bee Venom Swelling   • Dust Mite Extract    • Fish-Derived Products - Food Allergy Hives   • Iodine - Food Allergy Hives   • Lac Bovis Other (See Comments)     congestion   • Molds & Smuts    • Other Swelling   • Pollen Extract Other (See Comments)     Runny nose, watery eyes (also has corneal swelling) and itching  Triggers asthma   • Shrimp Flavor - Food Allergy Hives       Physical Exam:    /87   Pulse 78   Ht 5' 4" (1 626 m)   Wt 94 3 kg (208 lb)   BMI 35 70 kg/m²     Constitutional:normal, well developed, well nourished, alert, in no distress and non-toxic and no overt pain behavior    Eyes:anicteric  HEENT:grossly intact  Neck:supple, symmetric, trachea midline and no masses   Pulmonary:even and unlabored  Cardiovascular:No edema or pitting edema present  Skin:Normal without rashes or lesions and well hydrated  Psychiatric:Mood and affect appropriate  Neurologic:Cranial Nerves II-XII grossly intact  Musculoskeletal:normal gait  Bilateral cervical paraspinal and trapezii musculature tender palpation, right greater than left        Imaging  FL spine and pain procedure    (Results Pending)         Orders Placed This Encounter   Procedures   • FL spine and pain procedure   • EMG 2 Limb Upper Extremity

## 2022-11-29 NOTE — PROGRESS NOTES
Assessment:   Diagnosis ICD-10-CM Associated Orders   1  Pain in both knees, unspecified chronicity  M25 561 XR knee 3 vw left non injury    M25 562 XR knee 3 vw right non injury      2  Primary osteoarthritis of left knee  M17 12 Ambulatory Referral to Physical Therapy      3  Primary osteoarthritis of right knee  M17 11 Ambulatory Referral to Physical Therapy     Large joint arthrocentesis: R knee          Plan:  · New xrays of the bilateral knees were obtained today and reviewed with the patient  · On exam, she maintains good non painful ROM, most pain is with weight bearing that is associated with swelling  · Plan is for left total knee arthroplasty and associated procedures  · The benefits and purpose of the operations and/or procedure have been explained to me in language I understand by Dr Callahan Kras well as the risks and benefits, alternatives and complications pertaining to the above procedure/surgery which include but is not limited to: infections, deeps vein thrombosis, blood clots to the lungs, wound healing problems, complications from extensive blood loss, including shock, possible death, continued pain, fracture, vascular or nerve injury, potential need for further surgery/revision, persistent pain/stiffness/instability, failure of hardware,heart attack, stroke and not obtaining results patient used  · She will stay one night at [facility]   · A cortisone injection for the right knee was administered today  · Patient has no hx of the DVT/PE and will on baby aspirin 81mg BID for 5 weeks post op    To do next visit:   Return for Follow up post- op  The above stated was discussed in layman's terms and the patient expressed understanding  All questions were answered to the patient's satisfaction         Scribe Attestation    I,:  Eugene Mcallister am acting as a scribe while in the presence of the attending physician :       I,:  Urvashi Jaimes MD personally performed the services described in this documentation    as scribed in my presence :             Subjective:   Liza Sanabria is a 47 y o  female who presents today for follow-up for her bilateral knee pain  Patient has been treating actively for her left knee pain due to osteoarthritis with periodic cortisone injections  The cortisone injections continue to give her significant relief of her pain  She notes that the pain is dull and aching in nature, worse with activity better at rest   She notes that she has 'popping' on the anterior aspect of the knee  The left knee pain is mainly located over the lateral aspect and posterior  She has tried OTC NSAIDs  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 and can sit most of the time due to pain int he knees  Review of systems negative unless otherwise specified in HPI  Review of Systems   Constitutional: Negative for chills, fever and unexpected weight change  HENT: Negative for hearing loss, nosebleeds and sore throat  Eyes: Negative for pain, redness and visual disturbance  Respiratory: Negative for cough, shortness of breath and wheezing  Cardiovascular: Negative for chest pain, palpitations and leg swelling  Gastrointestinal: Negative for abdominal pain and nausea  Genitourinary: Negative for dyspareunia, dysuria and frequency  Musculoskeletal: Positive for arthralgias  Skin: Negative for rash and wound  Neurological: Negative for dizziness, numbness and headaches  Psychiatric/Behavioral: Negative for decreased concentration and suicidal ideas  The patient is not nervous/anxious          Past Medical History:   Diagnosis Date   • Anxiety    • Asthma    • Cancer [facility])     endometrial   • Chronic pain syndrome    • CPAP (continuous positive airway pressure) dependence    • Depression    • Diabetes mellitus (HCC)     Pre diabetic   • GERD (gastroesophageal reflux disease)    • HTN (hypertension)    • Hyperlipidemia    • Hypertension    • Lumbar radiculopathy    • Prediabetes    • Sleep apnea        Past Surgical History:   Procedure Laterality Date   • GASTRECTOMY SLEEVE LAPAROSCOPIC     • HYSTERECTOMY     • KNEE ARTHROSCOPY Right    • LUMBAR FUSION     • [place] BREAST REDUCTION Bilateral 9/23/2022    Procedure: BILATERAL BREAST REDUCTION;  Surgeon: Garrett Nguyen MD;  Location:  MAIN OR;  Service: Plastics   • MARY-EN-Y PROCEDURE  05/27/2021    [facility]   • TOTAL HIP ARTHROPLASTY Bilateral        Family History   Problem Relation Age of Onset   • Diabetes Mother    • Stroke Mother    • Diabetes Father    • Cancer Father         thyroid   • Diabetes Sister    • Diabetes Brother    • No Known Problems Maternal Grandmother    • No Known Problems Maternal Grandfather    • No Known Problems Paternal Grandmother    • No Known Problems Paternal Grandfather    • No Known Problems Sister        Social History     Occupational History   • Not on file   Tobacco Use   • Smoking status: Former   • Smokeless tobacco: Never   Vaping Use   • Vaping Use: Never used   Substance and Sexual Activity   • Alcohol use: Not Currently   • Drug use: Not Currently   • Sexual activity: Not Currently         Current Outpatient Medications:   •  acetaminophen (TYLENOL) 500 MG chewable tablet, Chew 1 tablet (500 mg total) every 6 (six) hours, Disp: 30 tablet, Rfl: 0  •  azelastine (OPTIVAR) 0 05 % ophthalmic solution, Administer 1 drop to the right eye 2 (two) times a day, Disp: , Rfl:   •  bepotastine besilate (BEPREVE) 1 5 % op soln, INSTILL 1 DROP INTO BOTH EYES TWICE A DAY, Disp: , Rfl:   •  buPROPion (WELLBUTRIN XL) 300 mg 24 hr tablet, Take 300 mg by mouth daily, Disp: , Rfl:   •  busPIRone (BUSPAR) 10 mg tablet, Take 10 mg by mouth in the morning, Disp: , Rfl:   •  Calcium Carb-Cholecalciferol (OSCAL-D) 500 mg-200 units per tablet, Take 1 tablet by mouth 2 (two) times a day with meals, Disp: , Rfl:   •  cetirizine (ZyrTEC) 10 mg tablet, Take 10 mg by mouth daily, Disp: , Rfl:   •  Cholecalciferol (VITAMIN D3) 2000 units capsule, TAKE 2 CAPSULES BY MOUTH DAILY INDICATIONS: VITAMIN D DEFICIENCY , Disp: , Rfl: 5  •  [employer] Vitamin B-12 1000 MCG tablet, Take 1,000 mcg by mouth daily, Disp: , Rfl:   •  docusate sodium (COLACE) 100 mg capsule, Take 1 capsule (100 mg total) by mouth 2 (two) times a day, Disp: 20 capsule, Rfl: 2  •  docusate sodium (Colace) 100 mg capsule, Take 1 capsule (100 mg total) by mouth daily as needed for constipation, Disp: 30 capsule, Rfl: 0  •  DULoxetine (CYMBALTA) 60 mg delayed release capsule, Take 60 mg by mouth 2 (two) times a day, Disp: , Rfl:   •  erythromycin (ILOTYCIN) ophthalmic ointment, APPLY (1CM) TO THE SURGICAL EYE AT BEDTIME, Disp: , Rfl:   •  ferrous sulfate 325 (65 Fe) mg tablet, Take by mouth daily with breakfast, Disp: , Rfl:   •  fluticasone (FLONASE) 50 mcg/act nasal spray, 2 sprays into each nostril daily, Disp: , Rfl:   •  gabapentin (NEURONTIN) 300 mg capsule, Take 1 capsule (300 mg total) by mouth 3 (three) times a day for 5 days, Disp: 15 capsule, Rfl: 0  •  hydrocortisone 1 % cream, Apply topically as needed, Disp: , Rfl:   •  loratadine (CLARITIN) 10 mg tablet, Take 10 mg by mouth daily, Disp: , Rfl:   •  LORazepam (ATIVAN) 0 5 mg tablet, Take 0 5 mg by mouth daily at bedtime as needed (Patient not taking: Reported on 10/18/2022), Disp: , Rfl:   •  montelukast (SINGULAIR) 10 mg tablet, Take 10 mg by mouth every morning, Disp: , Rfl:   •  ofloxacin (OCUFLOX) 0 3 % ophthalmic solution, instill 1 drop by ophthalmic route 4 times every day into the surgical eye, Disp: , Rfl:   •  ondansetron (ZOFRAN) 4 mg tablet, Take 1 tablet (4 mg total) by mouth every 8 (eight) hours as needed for nausea or vomiting, Disp: 20 tablet, Rfl: 0  •  pantoprazole (PROTONIX) 40 mg tablet, Take 40 mg by mouth as needed, Disp: , Rfl:   •  Polyethyl Glycol-Propyl Glycol (SYSTANE OP), Apply to eye, Disp: , Rfl:   •  prednisoLONE acetate (PRED FORTE) 1 % ophthalmic suspension, instill 1 drop 6 times daily to the surgical eye, Disp: , Rfl:   •  simvastatin (ZOCOR) 40 mg tablet, Take 40 mg by mouth daily at bedtime, Disp: , Rfl:   •  tiZANidine (ZANAFLEX) 2 mg tablet, Take 1 tablet (2 mg total) by mouth daily at bedtime as needed for muscle spasms, Disp: 30 tablet, Rfl: 0  •  VENTOLIN  (90 Base) MCG/ACT inhaler, INHALE 2 PUFFS EVERY 4 (FOUR) HOURS AS NEEDED FOR WHEEZING OR SHORTNESS OF BREATH , Disp: , Rfl: 2    Allergies   Allergen Reactions   • Bee Venom Swelling   • Dust Mite Extract    • Fish-Derived Products - Food Allergy Hives   • Iodine - Food Allergy Hives   • Lac Bovis Other (See Comments)     congestion   • Molds & Smuts    • Other Swelling   • Pollen Extract Other (See Comments)     Runny nose, watery eyes (also has corneal swelling) and itching  Triggers asthma   • Shrimp Flavor - Food Allergy Hives          There were no vitals filed for this visit  Objective:    General:  No apparent distress   CV:  S1-S2   Chest:  No audible wheezing   Abdomen:  Soft                    Right Knee Exam     Tenderness   The patient is experiencing tenderness in the lateral joint line and patella  Range of Motion   Extension: 0   Flexion: 130     Tests   Varus: negative Valgus: negative  Drawer:  Anterior - negative        Other   Erythema: absent  Sensation: normal  Pulse: present  Swelling: none  Effusion: no effusion present    Comments:  Calf is soft and non tender      Left Knee Exam     Tenderness   The patient is experiencing tenderness in the lateral joint line and patella  Range of Motion   Extension: 0   Flexion: 120     Tests   Varus: negative Valgus: negative  Drawer:  Anterior - negative         Other   Erythema: absent  Sensation: normal  Pulse: present  Swelling: none  Effusion: no effusion present    Comments:  Calf is soft and non tender            Diagnostics, reviewed and taken today if performed as documented:     The attending physician has personally reviewed the pertinent films in PACS and interpretation is as follows:  Xrays of the left knee 3 views: moderate to severe arthritic change of the lateral compartment and patellofemoral  Xrays of the right knee 3 views: moderate arthritic change of the lateral compartment and patellofemoral compartment  Procedures, if performed today:    Large joint arthrocentesis: R knee  Universal Protocol:  Consent: Verbal consent obtained  Risks and benefits: risks, benefits and alternatives were discussed  Consent given by: patient  Time out: Immediately prior to procedure a "time out" was called to verify the correct patient, procedure, equipment, support staff and site/side marked as required  Timeout called at: 11/29/2022 2:27 PM   Patient understanding: patient states understanding of the procedure being performed  Site marked: the operative site was marked  Patient identity confirmed: verbally with patient    Supporting Documentation  Indications: pain   Procedure Details  Location: knee - R knee  Preparation: Patient was prepped and draped in the usual sterile fashion  Needle size: 22 G  Ultrasound guidance: no  Approach: anterolateral  Medications administered: 2 mL bupivacaine 0 25 %; 2 mL methylPREDNISolone acetate 40 mg/mL    Patient tolerance: patient tolerated the procedure well with no immediate complications  Dressing:  Sterile dressing applied          Portions of the record may have been created with voice recognition software  Occasional wrong word or "sound a like" substitutions may have occurred due to the inherent limitations of voice recognition software  Read the chart carefully and recognize, using context, where substitutions have occurred

## 2022-11-29 NOTE — PATIENT INSTRUCTIONS
Mindful preparation to begin daily prior surgery  1  I know and accept that this procedure will bring greater quality of life to me  2  I must remember the incredible power I possess within me to achieve anything I desire, including being "pain free"  3  I have found out what is keeping me from happiness and will remove it  That is why I am here today  4  I will always come out of a hard situation stronger  5  I can't wait to embrace the new me when I wake up later  6  I am determined to remain calm through this  7  Anxiety has no home in my being  8  I rise above my physical pain  9  There are some things I can't change, and I'm ok with that  But this procedure isn't one of those things because my team and I have got this in the bag  10  I am feeling strong and healthy today  11  Even in this bed, I am making better decisions about what I eat and drink  12  Because I am here, I am choosing to make my mental and physical health a priority  13  I picture my body glowing, growing, and healing  14  I am well, fit, healthy, and powerful         STRENGTHENING:   Quadriceps:   Isometric Quad sets

## 2022-11-30 ENCOUNTER — OFFICE VISIT (OUTPATIENT)
Dept: PAIN MEDICINE | Facility: CLINIC | Age: 52
End: 2022-11-30

## 2022-11-30 VITALS
HEIGHT: 64 IN | HEART RATE: 78 BPM | DIASTOLIC BLOOD PRESSURE: 87 MMHG | BODY MASS INDEX: 35.51 KG/M2 | SYSTOLIC BLOOD PRESSURE: 138 MMHG | WEIGHT: 208 LBS

## 2022-11-30 DIAGNOSIS — Z98.890 S/P BILATERAL BREAST REDUCTION: ICD-10-CM

## 2022-11-30 DIAGNOSIS — M46.1 SACROILIITIS (HCC): ICD-10-CM

## 2022-11-30 DIAGNOSIS — M50.30 DEGENERATIVE DISC DISEASE, CERVICAL: ICD-10-CM

## 2022-11-30 DIAGNOSIS — M54.16 LUMBAR RADICULOPATHY: ICD-10-CM

## 2022-11-30 DIAGNOSIS — M47.816 LUMBAR SPONDYLOSIS: ICD-10-CM

## 2022-11-30 DIAGNOSIS — R20.0 NUMBNESS AND TINGLING IN BOTH HANDS: ICD-10-CM

## 2022-11-30 DIAGNOSIS — R20.2 NUMBNESS AND TINGLING IN BOTH HANDS: ICD-10-CM

## 2022-11-30 DIAGNOSIS — M47.812 CERVICAL SPONDYLOSIS: ICD-10-CM

## 2022-11-30 DIAGNOSIS — G89.4 CHRONIC PAIN SYNDROME: Primary | ICD-10-CM

## 2022-11-30 DIAGNOSIS — M79.18 MYOFASCIAL PAIN SYNDROME: ICD-10-CM

## 2022-11-30 DIAGNOSIS — N62 MACROMASTIA: ICD-10-CM

## 2022-11-30 RX ORDER — GABAPENTIN 300 MG/1
300 CAPSULE ORAL 3 TIMES DAILY
Qty: 90 CAPSULE | Refills: 2 | Status: SHIPPED | OUTPATIENT
Start: 2022-11-30

## 2022-11-30 RX ORDER — METHOCARBAMOL 500 MG/1
500 TABLET, FILM COATED ORAL 2 TIMES DAILY PRN
Qty: 60 TABLET | Refills: 1 | Status: SHIPPED | OUTPATIENT
Start: 2022-11-30

## 2022-12-05 ENCOUNTER — COSMETIC (OUTPATIENT)
Dept: PLASTIC SURGERY | Facility: CLINIC | Age: 52
End: 2022-12-05

## 2022-12-05 ENCOUNTER — OFFICE VISIT (OUTPATIENT)
Dept: PLASTIC SURGERY | Facility: CLINIC | Age: 52
End: 2022-12-05

## 2022-12-05 DIAGNOSIS — Z98.890 S/P BILATERAL BREAST REDUCTION: Primary | ICD-10-CM

## 2022-12-05 DIAGNOSIS — L91.8 SKIN TAG: Primary | ICD-10-CM

## 2022-12-05 NOTE — PROGRESS NOTES
Cosmetic Procedures     Date/Time 12/5/2022 3:47 PM     Performed by  Shreya Saldnaa PA-C     Authorized by Shreya Saldana PA-C      Universal Protocol   Consent: Verbal consent obtained  Risks and benefits: risks, benefits and alternatives were discussed  Consent given by: patient  Patient understanding: patient states understanding of the procedure being performed  Patient consent: the patient's understanding of the procedure matches consent given  Patient identity confirmed: verbally with patient        Local anesthesia used: no     Anesthesia   Local anesthesia used: no     Sedation   Patient sedated: no        Specimen: no    Culture: no   Procedure Details   Procedure Notes:   Skin tag removal from bilateral lateral eyelids, right and left neck, bilateral axillae  Patient tolerated procedure well without complication  Minimal bleeding, patient advised to apply bacitracin and bandage as needed

## 2022-12-05 NOTE — PROGRESS NOTES
Assessment/Plan:     Patient is a 44-year-old female who is status post bilateral breast reduction by Dr Jeremiah Wang on 09/23/2022Cresenciano Herb see HPI  Patient presents to the office today for a routine postoperative check, biopsy results and suture removal from a right breast lesion, and skin tag removal   Please see separate note for skin tag removal cosmetic portion of visit  During the patient's visit today, her right breast T point wound was examined and is nearly healed  There is epidermal opening only  Biopsy results were discussed with the patient, which were as follows:     Final Diagnosis   A  Skin lesion, Right breast lesion, punch biopsy:  - Recurrent/traumatized dermal nevus associated with scar, present at peripheral borders of biopsy     The patient will continue to monitor the right breast T point  If it does not improve or gets worse, she will call the office  The patient will return to the office in 6 weeks for a scar check or sooner as needed  Diagnoses and all orders for this visit:    S/P bilateral breast reduction          Subjective:     Patient ID: Anne Burgess is a 47 y o  female  HPI     Patient reports no issues or concerns today  Review of Systems    See HPI    Objective:     Physical Exam      Incisions are clean, dry and intact with exception of very superficial, epidermal only opening at the right breast T point

## 2022-12-13 ENCOUNTER — HOSPITAL ENCOUNTER (OUTPATIENT)
Dept: RADIOLOGY | Facility: CLINIC | Age: 52
Discharge: HOME | End: 2022-12-13

## 2022-12-13 VITALS
HEART RATE: 71 BPM | OXYGEN SATURATION: 98 % | RESPIRATION RATE: 20 BRPM | TEMPERATURE: 97.1 F | DIASTOLIC BLOOD PRESSURE: 89 MMHG | SYSTOLIC BLOOD PRESSURE: 128 MMHG

## 2022-12-13 DIAGNOSIS — M47.812 CERVICAL SPONDYLOSIS: ICD-10-CM

## 2022-12-13 RX ORDER — LIDOCAINE HYDROCHLORIDE 10 MG/ML
5 INJECTION, SOLUTION EPIDURAL; INFILTRATION; INTRACAUDAL; PERINEURAL ONCE
Status: COMPLETED | OUTPATIENT
Start: 2022-12-13 | End: 2022-12-13

## 2022-12-13 RX ADMIN — LIDOCAINE HYDROCHLORIDE 1.5 ML: 20 INJECTION, SOLUTION EPIDURAL; INFILTRATION; INTRACAUDAL; PERINEURAL at 13:27

## 2022-12-13 RX ADMIN — LIDOCAINE HYDROCHLORIDE 2 ML: 10 INJECTION, SOLUTION EPIDURAL; INFILTRATION; INTRACAUDAL; PERINEURAL at 13:27

## 2022-12-13 NOTE — H&P
History of Present Illness: The patient is a 47 y o  female who presents with complaints of neck pain  Past Medical History:   Diagnosis Date   • Anxiety    • Asthma    • Cancer ([address] )     endometrial   • Chronic pain syndrome    • CPAP (continuous positive airway pressure) dependence    • Depression    • Diabetes mellitus (HCC)     Pre diabetic   • GERD (gastroesophageal reflux disease)    • HTN (hypertension)    • Hyperlipidemia    • Hypertension    • Lumbar radiculopathy    • Prediabetes    • Sleep apnea        Past Surgical History:   Procedure Laterality Date   • GASTRECTOMY SLEEVE LAPAROSCOPIC     • HYSTERECTOMY     • KNEE ARTHROSCOPY Right    • LUMBAR FUSION     • OH BREAST REDUCTION Bilateral 9/23/2022    Procedure: BILATERAL BREAST REDUCTION;  Surgeon: Edda Miller MD;  Location: [facility];  Service: Plastics   • MARY-EN-Y PROCEDURE  05/27/2021    Sleve   • TOTAL HIP ARTHROPLASTY Bilateral          Current Outpatient Medications:   •  acetaminophen (TYLENOL) 500 MG chewable tablet, Chew 1 tablet (500 mg total) every 6 (six) hours, Disp: 30 tablet, Rfl: 0  •  ascorbic acid (VITAMIN C) 500 MG tablet, Take 1 tablet (500 mg total) by mouth daily, Disp: 30 tablet, Rfl: 0  •  azelastine (OPTIVAR) 0 05 % ophthalmic solution, Administer 1 drop to the right eye 2 (two) times a day, Disp: , Rfl:   •  bepotastine besilate (BEPREVE) 1 5 % op soln, INSTILL 1 DROP INTO BOTH EYES TWICE A DAY, Disp: , Rfl:   •  buPROPion (WELLBUTRIN XL) 300 mg 24 hr tablet, Take 300 mg by mouth daily, Disp: , Rfl:   •  busPIRone (BUSPAR) 10 mg tablet, Take 10 mg by mouth in the morning, Disp: , Rfl:   •  Calcium Carb-Cholecalciferol (OSCAL-D) 500 mg-200 units per tablet, Take 1 tablet by mouth 2 (two) times a day with meals, Disp: , Rfl:   •  cetirizine (ZyrTEC) 10 mg tablet, Take 10 mg by mouth daily, Disp: , Rfl:   •  Cholecalciferol (VITAMIN D3) 2000 units capsule, TAKE 2 CAPSULES BY MOUTH DAILY INDICATIONS: VITAMIN D DEFICIENCY  , Disp: , Rfl: 5  •  [employer] Vitamin B-12 1000 MCG tablet, Take 1,000 mcg by mouth daily, Disp: , Rfl:   •  docusate sodium (COLACE) 100 mg capsule, Take 1 capsule (100 mg total) by mouth 2 (two) times a day, Disp: 20 capsule, Rfl: 2  •  docusate sodium (Colace) 100 mg capsule, Take 1 capsule (100 mg total) by mouth daily as needed for constipation, Disp: 30 capsule, Rfl: 0  •  DULoxetine (CYMBALTA) 60 mg delayed release capsule, Take 60 mg by mouth 2 (two) times a day, Disp: , Rfl:   •  erythromycin (ILOTYCIN) ophthalmic ointment, APPLY (1CM) TO THE SURGICAL EYE AT BEDTIME, Disp: , Rfl:   •  ferrous sulfate 324 (65 Fe) mg, Take 1 tablet (324 mg total) by mouth 2 (two) times a day before meals (Patient not taking: Reported on 11/30/2022), Disp: 60 tablet, Rfl: 0  •  ferrous sulfate 325 (65 Fe) mg tablet, Take by mouth daily with breakfast, Disp: , Rfl:   •  fluticasone (FLONASE) 50 mcg/act nasal spray, 2 sprays into each nostril daily, Disp: , Rfl:   •  folic acid (FOLVITE) 1 mg tablet, Take 1 tablet (1 mg total) by mouth daily, Disp: 30 tablet, Rfl: 0  •  gabapentin (NEURONTIN) 300 mg capsule, Take 1 capsule (300 mg total) by mouth 3 (three) times a day, Disp: 90 capsule, Rfl: 2  •  hydrocortisone 1 % cream, Apply topically as needed, Disp: , Rfl:   •  loratadine (CLARITIN) 10 mg tablet, Take 10 mg by mouth daily, Disp: , Rfl:   •  LORazepam (ATIVAN) 0 5 mg tablet, Take 0 5 mg by mouth daily at bedtime as needed (Patient not taking: Reported on 10/18/2022), Disp: , Rfl:   •  methocarbamol (ROBAXIN) 500 mg tablet, Take 1 tablet (500 mg total) by mouth 2 (two) times a day as needed for muscle spasms, Disp: 60 tablet, Rfl: 1  •  montelukast (SINGULAIR) 10 mg tablet, Take 10 mg by mouth every morning, Disp: , Rfl:   •  Multiple Vitamins-Minerals (multivitamin with minerals) tablet, Take 1 tablet by mouth daily, Disp: 30 tablet, Rfl: 0  •  ofloxacin (OCUFLOX) 0 3 % ophthalmic solution, instill 1 drop by ophthalmic route 4 times every day into the surgical eye, Disp: , Rfl:   •  ondansetron (ZOFRAN) 4 mg tablet, Take 1 tablet (4 mg total) by mouth every 8 (eight) hours as needed for nausea or vomiting, Disp: 20 tablet, Rfl: 0  •  pantoprazole (PROTONIX) 40 mg tablet, Take 40 mg by mouth as needed, Disp: , Rfl:   •  Polyethyl Glycol-Propyl Glycol (SYSTANE OP), Apply to eye, Disp: , Rfl:   •  prednisoLONE acetate (PRED FORTE) 1 % ophthalmic suspension, instill 1 drop 6 times daily to the surgical eye, Disp: , Rfl:   •  simvastatin (ZOCOR) 40 mg tablet, Take 40 mg by mouth daily at bedtime, Disp: , Rfl:   •  VENTOLIN  (90 Base) MCG/ACT inhaler, INHALE 2 PUFFS EVERY 4 (FOUR) HOURS AS NEEDED FOR WHEEZING OR SHORTNESS OF BREATH , Disp: , Rfl: 2    Allergies   Allergen Reactions   • Bee Venom Swelling   • Dust Mite Extract    • Fish-Derived Products - Food Allergy Hives   • Iodine - Food Allergy Hives   • Lac Bovis Other (See Comments)     congestion   • Molds & Smuts    • Other Swelling   • Pollen Extract Other (See Comments)     Runny nose, watery eyes (also has corneal swelling) and itching  Triggers asthma   • Shrimp Flavor - Food Allergy Hives       Physical Exam:   Vitals:    12/13/22 1334   BP: 128/89   Pulse: 71   Resp: 20   Temp:    SpO2: 98%     General: Awake, Alert, Oriented x 3, Mood and affect appropriate  Respiratory: Respirations even and unlabored  Cardiovascular: Peripheral pulses intact; no edema  Musculoskeletal Exam: Right cervical paraspinals tender to palpation    ASA Score: 3         Assessment:   1   Cervical spondylosis        Plan: Repeat right C4-6 MBB

## 2022-12-13 NOTE — DISCHARGE INSTRUCTIONS

## 2022-12-14 ENCOUNTER — PATIENT MESSAGE (OUTPATIENT)
Dept: PAIN MEDICINE | Facility: CLINIC | Age: 52
End: 2022-12-14

## 2022-12-15 NOTE — PATIENT COMMUNICATION
The patient had a favorable result to right C4-6 medial branch blocks    Please call the patient to schedule RFA

## 2022-12-22 DIAGNOSIS — M17.12 PRIMARY OSTEOARTHRITIS OF LEFT KNEE: ICD-10-CM

## 2022-12-22 RX ORDER — LORATADINE 10 MG
TABLET ORAL
Qty: 90 TABLET | Refills: 1 | Status: SHIPPED | OUTPATIENT
Start: 2022-12-22

## 2022-12-22 RX ORDER — FOLIC ACID 1 MG/1
TABLET ORAL
Qty: 90 TABLET | Refills: 1 | Status: SHIPPED | OUTPATIENT
Start: 2022-12-22

## 2022-12-29 ENCOUNTER — TELEPHONE (OUTPATIENT)
Dept: RADIOLOGY | Facility: CLINIC | Age: 52
End: 2022-12-29

## 2022-12-29 DIAGNOSIS — M47.812 CERVICAL SPONDYLOSIS: Primary | ICD-10-CM

## 2022-12-29 NOTE — TELEPHONE ENCOUNTER
Regarding: Lauren Destiny   Contact: [phone]  ----- Message from Ruddy Guo DO sent at 12/15/2022  3:20 PM EST -----       ----- Message from Carlos Davis to Ruddy Guo DO sent at 12/14/2022 11:47 AM -----   Hello I filled in the correct answers

## 2022-12-29 NOTE — TELEPHONE ENCOUNTER
Patient contacted and scheduled for right C4-6 - RFA  All pre procedure instructions were given to patient   Nothing to eat or drink for 1 hour prior  Loose fitting clothing   NSAIDS, ANTICOAG'S OKAY   Denies Antibx      Needs    Patient directed to call the office if becomes sick, as we will most likely reschedule       Insurance auth received but is not a guarantee of payment per your insurance company's authorization disclaimer and it is your responsibility to verify your benefits     COVID -19 screening complete

## 2023-01-03 ENCOUNTER — OFFICE VISIT (OUTPATIENT)
Dept: LAB | Facility: CLINIC | Age: 53
End: 2023-01-03

## 2023-01-03 ENCOUNTER — APPOINTMENT (OUTPATIENT)
Dept: LAB | Facility: CLINIC | Age: 53
End: 2023-01-03

## 2023-01-03 DIAGNOSIS — M17.12 PRIMARY OSTEOARTHRITIS OF LEFT KNEE: ICD-10-CM

## 2023-01-03 DIAGNOSIS — M25.662 STIFFNESS OF LEFT KNEE, NOT ELSEWHERE CLASSIFIED: ICD-10-CM

## 2023-01-03 DIAGNOSIS — M25.69 STIFFNESS OF OTHER SPECIFIED JOINT, NOT ELSEWHERE CLASSIFIED: ICD-10-CM

## 2023-01-03 LAB
ALBUMIN SERPL BCP-MCNC: 4.2 G/DL (ref 3.5–5)
ALP SERPL-CCNC: 73 U/L (ref 34–104)
ALT SERPL W P-5'-P-CCNC: 51 U/L (ref 7–52)
ANION GAP SERPL CALCULATED.3IONS-SCNC: 2 MMOL/L (ref 4–13)
APTT PPP: 29 SECONDS (ref 23–37)
AST SERPL W P-5'-P-CCNC: 31 U/L (ref 13–39)
BASOPHILS # BLD AUTO: 0.06 THOUSANDS/ÂΜL (ref 0–0.1)
BASOPHILS NFR BLD AUTO: 1 % (ref 0–1)
BILIRUB SERPL-MCNC: 0.64 MG/DL (ref 0.2–1)
BUN SERPL-MCNC: 16 MG/DL (ref 5–25)
CALCIUM SERPL-MCNC: 9.3 MG/DL (ref 8.4–10.2)
CHLORIDE SERPL-SCNC: 105 MMOL/L (ref 96–108)
CO2 SERPL-SCNC: 35 MMOL/L (ref 21–32)
CREAT SERPL-MCNC: 0.74 MG/DL (ref 0.6–1.3)
CRP SERPL QL: <1 MG/L
EOSINOPHIL # BLD AUTO: 0.8 THOUSAND/ÂΜL (ref 0–0.61)
EOSINOPHIL NFR BLD AUTO: 12 % (ref 0–6)
ERYTHROCYTE [DISTWIDTH] IN BLOOD BY AUTOMATED COUNT: 12.9 % (ref 11.6–15.1)
EST. AVERAGE GLUCOSE BLD GHB EST-MCNC: 117 MG/DL
GFR SERPL CREATININE-BSD FRML MDRD: 92 ML/MIN/1.73SQ M
GLUCOSE P FAST SERPL-MCNC: 93 MG/DL (ref 65–99)
HBA1C MFR BLD: 5.7 %
HCT VFR BLD AUTO: 47.7 % (ref 34.8–46.1)
HGB BLD-MCNC: 15.3 G/DL (ref 11.5–15.4)
IMM GRANULOCYTES # BLD AUTO: 0.03 THOUSAND/UL (ref 0–0.2)
IMM GRANULOCYTES NFR BLD AUTO: 1 % (ref 0–2)
INR PPP: 0.94 (ref 0.84–1.19)
LYMPHOCYTES # BLD AUTO: 2.42 THOUSANDS/ÂΜL (ref 0.6–4.47)
LYMPHOCYTES NFR BLD AUTO: 37 % (ref 14–44)
MCH RBC QN AUTO: 29.4 PG (ref 26.8–34.3)
MCHC RBC AUTO-ENTMCNC: 32.1 G/DL (ref 31.4–37.4)
MCV RBC AUTO: 92 FL (ref 82–98)
MONOCYTES # BLD AUTO: 0.48 THOUSAND/ÂΜL (ref 0.17–1.22)
MONOCYTES NFR BLD AUTO: 7 % (ref 4–12)
NEUTROPHILS # BLD AUTO: 2.71 THOUSANDS/ÂΜL (ref 1.85–7.62)
NEUTS SEG NFR BLD AUTO: 42 % (ref 43–75)
NRBC BLD AUTO-RTO: 0 /100 WBCS
PLATELET # BLD AUTO: 220 THOUSANDS/UL (ref 149–390)
PMV BLD AUTO: 9.5 FL (ref 8.9–12.7)
POTASSIUM SERPL-SCNC: 4.3 MMOL/L (ref 3.5–5.3)
PROT SERPL-MCNC: 6.9 G/DL (ref 6.4–8.4)
PROTHROMBIN TIME: 12.8 SECONDS (ref 11.6–14.5)
RBC # BLD AUTO: 5.21 MILLION/UL (ref 3.81–5.12)
SODIUM SERPL-SCNC: 142 MMOL/L (ref 135–147)
WBC # BLD AUTO: 6.5 THOUSAND/UL (ref 4.31–10.16)

## 2023-01-04 ENCOUNTER — LAB REQUISITION (OUTPATIENT)
Dept: LAB | Facility: HOSPITAL | Age: 53
End: 2023-01-04

## 2023-01-04 DIAGNOSIS — M17.2 BILATERAL POST-TRAUMATIC OSTEOARTHRITIS OF KNEE: ICD-10-CM

## 2023-01-04 LAB
ABO GROUP BLD: NORMAL
ATRIAL RATE: 76 BPM
BLD GP AB SCN SERPL QL: NEGATIVE
P AXIS: 6 DEGREES
PR INTERVAL: 114 MS
QRS AXIS: 67 DEGREES
QRSD INTERVAL: 74 MS
QT INTERVAL: 354 MS
QTC INTERVAL: 398 MS
RH BLD: POSITIVE
SPECIMEN EXPIRATION DATE: NORMAL
T WAVE AXIS: 47 DEGREES
VENTRICULAR RATE: 76 BPM

## 2023-01-05 ENCOUNTER — TELEPHONE (OUTPATIENT)
Dept: PAIN MEDICINE | Facility: MEDICAL CENTER | Age: 53
End: 2023-01-05

## 2023-01-05 NOTE — TELEPHONE ENCOUNTER
Caller: [facility]    Doctor: nicholas    Reason for call: needs script faxed over for the EMG  FAX# [phone]    Call back#: [phone]

## 2023-01-10 PROBLEM — I10 PRIMARY HYPERTENSION: Status: RESOLVED | Noted: 2022-02-10 | Resolved: 2023-01-10

## 2023-01-10 NOTE — H&P (VIEW-ONLY)
Internal Medicine Pre-Operative Evaluation:     Reason for Visit: Pre-operative Evaluation for Risk Stratification and Optimization    Patient ID: Mc Morrison is a 47 y o  female  Surgery: Arthroplasty of left knee  Referring Provider: Dr Earnest Mendoza      Recommendations to Proceed withSurgery    Patient is considered to be Low risk for Medium risk procedure  After evaluation and discussion with patient with emphasis that all surgery has some degree of inherent risk it is determined this procedure is of acceptable risk  medically  Patient may proceed with planned procedure      Assessment      Primary osteoarthritis left knee  • Failed conservative measures  • Electing to undergo total joint arthroplasty    Pre-operative Medical Evaluation for planned surgery  • Patient meets preoperative quality goals as noted below  • Recommendations as listed in PLAN section below  • Contact surgical nurse  navigator with any questions regarding preoperative plan or schedule      Chronic pain  · Back pain  · S/p multiple injections f/b pain mgmt  · Takes cymbalta bid    Obesity  • Recommend ongoing attempts at weight loss  • Current BMI 35    Abnormal EKG  · As compared to old ekg 2015  · Clinically patient with no symptoms    H/o Gastric sleeve  · Avoid NSAIDS  · Cont PPI  · Continue attempts at weight loss    Recent b/l breast reduction  · 9/2022  · F/b plastics    Impaired fasting glucose  • Hgb A1c 5 7  • Recommend following DM diet  • Monitor FBS    Depression  · Continue medications as prescribed          Patient Active Problem List   Diagnosis   • Bilateral carpal tunnel syndrome   • Degenerative disc disease, cervical   • Lumbar radiculopathy   • Sacroiliitis (HCC)   • Lumbar spondylosis   • Primary osteoarthritis of left knee   • Morbid obesity with body mass index (BMI) of 50 0 to 59 9 in [facility])   • Hypertension   • Hyperlipidemia   • Prediabetes   • Chronic pain of both knees   • Chronic pain syndrome   • Cervical spondylosis   • Neck pain   • Syncope and collapse   • Chronic pain syndrome   • Macromastia   • S/P bilateral breast reduction   • Skin lesion of breast        Plan:     1  Further preoperative workup as follows:   - none no further testing required may proceed with surgery    2  Medication Management/Recommendations:   - continue all current medicines through morning of surgery with sip of water except the following:  - hold aspirin 7 days prior to surgery  - avoid use of NSAID such as motrin, advil, aleve for 7 days prior to surgery  - hold all OTC herbal or nutritional supplements 7 days before surgery    3  Patient requires further consultation with:   No Consults Required    4  Discharge Planning / Barriers to Discharge  none identified - patients has post discharge therapy plan in place, transportation arranged for discharge day, adequate family support at home to assist with discharge to home  Subjective:           History of Present Illness:     Dorsie Leventhal is a 47 y o  female who presents to the office today for a preoperative consultation at the request of surgeon  The patient understands this is an elective procedure and not emergent  They are electing to undergo planned procedure with an understanding that all surgery has inherent risk  They have worked with their surgeon and failed conservative treatment measures  Today they present for preoperative risk assessment and recommendations for optimization in preparation for surgery  Pt seen in surgical optimization center for upcoming proposed surgery  They have failed previous conservative measures and have elected surgical intervention  Pt meets presurgical lab and BMI optimization goals  Upon interview questioning patient is able to perform greater than 4 METs workload in daily life without any reported cardio-pulmonary symptoms      Pt EKG was reviewed and it indicates a new septal infarct when compared to her last EKG  ROS: No TIA's or unusual headaches, no dysphagia  No prolonged cough  No dyspnea or chest pain on exertion  No abdominal pain, change in bowel habits, black or bloody stools  No urinary tract or BPH symptoms  Positive reported pain in arthritic joint  Positive difficulty with gait  No skin rashes or issues              Objective:      /78   Pulse 83   Ht 5' 4 5" (1 638 m)   Wt 96 2 kg (212 lb)   SpO2 97%   BMI 35 83 kg/m²       General Appearance: no distress, conversive  HEENT: PERRLA, conjuctiva normal; oropharynx clear; mucous membranes moist;   Neck:  Supple, no lymphadenopathy or thyromegaly  Lungs: breath sounds normal, normal respiratory effort, no retractions, expiratory effort normal  CV: normal heart sounds S1/S2, PMI normal   ABD: soft non tender, no masses , no hepatic or splenomegaly  EXT: DP pulses intact, no lymphadenopathy, no edema  Skin: normal turgor, normal texture, no rash  Psych: affect normal, mood normal  Neuro: AAOx3        The following portions of the patient's history were reviewed and updated as appropriate: allergies, current medications, past family history, past medical history, past social history, past surgical history and problem list      Past History:       Past Medical History:   Diagnosis Date   • Anxiety    • Asthma    • Cancer ([facility] [address] )     endometrial   • Chronic pain syndrome    • CPAP (continuous positive airway pressure) dependence    • Depression    • Diabetes mellitus (HCC)     Pre diabetic   • GERD (gastroesophageal reflux disease)    • HTN (hypertension)    • Hyperlipidemia    • Hypertension    • Lumbar radiculopathy    • Prediabetes    • Sleep apnea     Past Surgical History:   Procedure Laterality Date   • GASTRECTOMY SLEEVE LAPAROSCOPIC     • HYSTERECTOMY     • KNEE ARTHROSCOPY Right    • LUMBAR FUSION     • MI BREAST REDUCTION Bilateral 9/23/2022    Procedure: BILATERAL BREAST REDUCTION;  Surgeon: Tami Schuler MD;  Location: [facility] OR;  Service: Plastics   • MARY-EN-Y PROCEDURE  05/27/2021    Sleve   • TOTAL HIP ARTHROPLASTY Bilateral           Social History     Tobacco Use   • Smoking status: Former   • Smokeless tobacco: Never   Vaping Use   • Vaping Use: Never used   Substance Use Topics   • Alcohol use: Not Currently   • Drug use: Not Currently     Family History   Problem Relation Age of Onset   • Diabetes Mother    • Stroke Mother    • Diabetes Father    • Cancer Father         thyroid   • Diabetes Sister    • Diabetes Brother    • No Known Problems Maternal Grandmother    • No Known Problems Maternal Grandfather    • No Known Problems Paternal Grandmother    • No Known Problems Paternal Grandfather    • No Known Problems Sister           Allergies: Allergies   Allergen Reactions   • Bee Venom Swelling   • Dust Mite Extract    • Fish-Derived Products - Food Allergy Hives   • Iodine - Food Allergy Hives   • Lac Bovis Other (See Comments)     congestion   • Molds & Smuts    • Other Swelling   • Pollen Extract Other (See Comments)     Runny nose, watery eyes (also has corneal swelling) and itching  Triggers asthma   • Shrimp Flavor - Food Allergy Hives        Current Medications:     Current Outpatient Medications   Medication Instructions   • acetaminophen (TYLENOL) 500 mg, Oral, Every 6 hours scheduled   • azelastine (OPTIVAR) 0 05 % ophthalmic solution 1 drop, Right Eye, 2 times daily   • bepotastine besilate (BEPREVE) 1 5 % op soln INSTILL 1 DROP INTO BOTH EYES TWICE A DAY   • buPROPion (WELLBUTRIN XL) 300 mg, Oral, Daily   • busPIRone (BUSPAR) 10 mg, Oral, Daily, Takes twice a day   • Calcium Carb-Cholecalciferol (OSCAL-D) 500 mg-200 units per tablet 1 tablet, Oral, 2 times daily with meals   • cetirizine (ZYRTEC) 10 mg, Oral, Daily, Takes 1 tablet 2 times a day   • Cholecalciferol (VITAMIN D3) 2000 units capsule TAKE 2 CAPSULES BY MOUTH DAILY INDICATIONS: VITAMIN D DEFICIENCY     • [facility]S Vitamin B-12 1,000 mcg, Oral, Daily   • [facility]S Vitamin C 500 MG tablet TAKE 1 TABLET (500 MG TOTAL) BY MOUTH DAILY  • docusate sodium (COLACE) 100 mg, Oral, Daily PRN   • DULoxetine (CYMBALTA) 60 mg, Oral, 2 times daily   • erythromycin (ILOTYCIN) ophthalmic ointment APPLY (1CM) TO THE SURGICAL EYE AT BEDTIME   • ferrous sulfate 325 (65 Fe) mg tablet Oral, Daily with breakfast   • fluticasone (FLONASE) 50 mcg/act nasal spray 2 sprays, Nasal, Daily   • folic acid (FOLVITE) 1 mg tablet TAKE 1 TABLET BY MOUTH EVERY DAY   • gabapentin (NEURONTIN) 300 mg, Oral, 3 times daily   • hydrocortisone 1 % cream Topical, As needed, Applies daily   • loratadine (CLARITIN) 10 mg, Daily   • methocarbamol (ROBAXIN) 500 mg, Oral, 2 times daily PRN   • montelukast (SINGULAIR) 10 mg, Oral, Every morning   • Multiple Vitamins-Minerals (multivitamin with minerals) tablet 1 tablet, Oral, Daily   • ofloxacin (OCUFLOX) 0 3 % ophthalmic solution instill 1 drop by ophthalmic route 4 times every day into the surgical eye   • ondansetron (ZOFRAN) 4 mg, Oral, Every 8 hours PRN   • pantoprazole (PROTONIX) 40 mg, As needed   • Polyethyl Glycol-Propyl Glycol (SYSTANE OP) Ophthalmic   • prednisoLONE acetate (PRED FORTE) 1 % ophthalmic suspension instill 1 drop 6 times daily to the surgical eye   • simvastatin (ZOCOR) 40 mg, Oral, Daily at bedtime   • VENTOLIN  (90 Base) MCG/ACT inhaler INHALE 2 PUFFS EVERY 4 (FOUR) HOURS AS NEEDED FOR WHEEZING OR SHORTNESS OF BREATH  PRE-OP WORKSHEET DATA    Assessment of Pre-Operative Risks     MLJ Quality Hard Stops:  BMI (<40) : Estimated body mass index is 35 83 kg/m² as calculated from the following:    Height as of this encounter: 5' 4 5" (1 638 m)  Weight as of this encounter: 96 2 kg (212 lb)  Hgb ( >11):    Lab Results   Component Value Date    HGB 15 3 01/03/2023     HbA1c (<7 0) :   Lab Results   Component Value Date    HGBA1C 5 7 (H) 01/03/2023     GFR (>60) (Less then 45 = Nephrology consult):  CrCl cannot be calculated (Patient's most recent lab result is older than the maximum 7 days allowed  )  Active Decompensated Chronic Conditions which would delay surgery  No acutely decompensated medical issues such as recent CVA, MI, new onset arrhythmia, severe aortic stenosis, CHF, uncontrolled COPD       Exercise Capacity: (if less the 4 mets consider functional assessment via cardiac stress testing or consultation)    · Able to walk 2 blocks without symptoms?: Yes  · Able to walk 1 flights without symptoms?: Yes    Assessment of intra and post operative respiratory, hemodynamic and thrombotic risks     Prior Anesthesia Reactions: No     Personal history of venous thromboembolic disease? No    History of steroid use > 5 mg for >2 weeks within last year? No    Cardiac Risk Estimation: per the Revised Cardiac Risk Index (Circ  100:1043, 1999),     The patient's risk factors for cardiac complications include :  none    Tonya Del Real has a in hospital cardiac risk of RCI RISK CLASS I (0 risk factors, risk of major cardiac compl  appr  0 5%) based on RCRI calculator          Pre-Op Data Reviewed:       Laboratory Results: I have personally reviewed the pertinent laboratory results/reports     EKG:I have personally reviewed pertinent reports    I personally reviewed and interpreted available tracings in the electronic medical record    Normal sinus rhythm  Septal infarct , age undetermined  When compared with ECG of 20-AUG-2015 13:21,  Septal infarct is now Present  Confirmed by Alvarez Silveira ([id number]) on 1/4/2023     OLD RECORDS: reviewed old records in the chart review section if EHR on day of visit      Previous cardiopulmonary studies within the past year:  · Echocardiogram: yes, 2022 normal ef  · Cardiac Catheterization: no  · Stress Test: no      Time of visit including pre-visit chart review, visit and post-visit coordination of plan and care , review of pre-surgical lab work, preparation and time spent documenting note in electronic medical record, time spent face-to-face in physical examination answering patient questions by care team 60 minutes             [address] [place] [address]

## 2023-01-10 NOTE — PROGRESS NOTES
Internal Medicine Pre-Operative Evaluation:     Reason for Visit: Pre-operative Evaluation for Risk Stratification and Optimization    Patient ID: Christian Gandhi is a 47 y o  female  Surgery: Arthroplasty of left knee  Referring Provider: Dr Saurabh Savage      Recommendations to Proceed withSurgery    Patient is considered to be Low risk for Medium risk procedure  After evaluation and discussion with patient with emphasis that all surgery has some degree of inherent risk it is determined this procedure is of acceptable risk  medically  Patient may proceed with planned procedure      Assessment      Primary osteoarthritis left knee  • Failed conservative measures  • Electing to undergo total joint arthroplasty    Pre-operative Medical Evaluation for planned surgery  • Patient meets preoperative quality goals as noted below  • Recommendations as listed in PLAN section below  • Contact surgical nurse  navigator with any questions regarding preoperative plan or schedule      Chronic pain  · Back pain  · S/p multiple injections f/b pain mgmt  · Takes cymbalta bid    Obesity  • Recommend ongoing attempts at weight loss  • Current BMI 35    Abnormal EKG  · As compared to old ekg 2015  · Clinically patient with no symptoms    H/o Gastric sleeve  · Avoid NSAIDS  · Cont PPI  · Continue attempts at weight loss    Recent b/l breast reduction  · 9/2022  · F/b plastics    Impaired fasting glucose  • Hgb A1c 5 7  • Recommend following DM diet  • Monitor FBS    Depression  · Continue medications as prescribed          Patient Active Problem List   Diagnosis   • Bilateral carpal tunnel syndrome   • Degenerative disc disease, cervical   • Lumbar radiculopathy   • Sacroiliitis (HCC)   • Lumbar spondylosis   • Primary osteoarthritis of left knee   • Morbid obesity with body mass index (BMI) of 50 0 to 59 9 in [facility])   • Hypertension   • Hyperlipidemia   • Prediabetes   • Chronic pain of both knees   • Chronic pain syndrome   • Cervical spondylosis   • Neck pain   • Syncope and collapse   • Chronic pain syndrome   • Macromastia   • S/P bilateral breast reduction   • Skin lesion of breast        Plan:     1  Further preoperative workup as follows:   - none no further testing required may proceed with surgery    2  Medication Management/Recommendations:   - continue all current medicines through morning of surgery with sip of water except the following:  - hold aspirin 7 days prior to surgery  - avoid use of NSAID such as motrin, advil, aleve for 7 days prior to surgery  - hold all OTC herbal or nutritional supplements 7 days before surgery    3  Patient requires further consultation with:   No Consults Required    4  Discharge Planning / Barriers to Discharge  none identified - patients has post discharge therapy plan in place, transportation arranged for discharge day, adequate family support at home to assist with discharge to home  Subjective:           History of Present Illness:     Pantera Houston is a 47 y o  female who presents to the office today for a preoperative consultation at the request of surgeon  The patient understands this is an elective procedure and not emergent  They are electing to undergo planned procedure with an understanding that all surgery has inherent risk  They have worked with their surgeon and failed conservative treatment measures  Today they present for preoperative risk assessment and recommendations for optimization in preparation for surgery  Pt seen in surgical optimization center for upcoming proposed surgery  They have failed previous conservative measures and have elected surgical intervention  Pt meets presurgical lab and BMI optimization goals  Upon interview questioning patient is able to perform greater than 4 METs workload in daily life without any reported cardio-pulmonary symptoms      Pt EKG was reviewed and it indicates a new septal infarct when compared to her last EKG  ROS: No TIA's or unusual headaches, no dysphagia  No prolonged cough  No dyspnea or chest pain on exertion  No abdominal pain, change in bowel habits, black or bloody stools  No urinary tract or BPH symptoms  Positive reported pain in arthritic joint  Positive difficulty with gait  No skin rashes or issues              Objective:      /78   Pulse 83   Ht 5' 4 5" (1 638 m)   Wt 96 2 kg (212 lb)   SpO2 97%   BMI 35 83 kg/m²       General Appearance: no distress, conversive  HEENT: PERRLA, conjuctiva normal; oropharynx clear; mucous membranes moist;   Neck:  Supple, no lymphadenopathy or thyromegaly  Lungs: breath sounds normal, normal respiratory effort, no retractions, expiratory effort normal  CV: normal heart sounds S1/S2, PMI normal   ABD: soft non tender, no masses , no hepatic or splenomegaly  EXT: DP pulses intact, no lymphadenopathy, no edema  Skin: normal turgor, normal texture, no rash  Psych: affect normal, mood normal  Neuro: AAOx3        The following portions of the patient's history were reviewed and updated as appropriate: allergies, current medications, past family history, past medical history, past social history, past surgical history and problem list      Past History:       Past Medical History:   Diagnosis Date   • Anxiety    • Asthma    • Cancer ([facility] [address] )     endometrial   • Chronic pain syndrome    • CPAP (continuous positive airway pressure) dependence    • Depression    • Diabetes mellitus (HCC)     Pre diabetic   • GERD (gastroesophageal reflux disease)    • HTN (hypertension)    • Hyperlipidemia    • Hypertension    • Lumbar radiculopathy    • Prediabetes    • Sleep apnea     Past Surgical History:   Procedure Laterality Date   • GASTRECTOMY SLEEVE LAPAROSCOPIC     • HYSTERECTOMY     • KNEE ARTHROSCOPY Right    • LUMBAR FUSION     • [place] BREAST REDUCTION Bilateral 9/23/2022    Procedure: BILATERAL BREAST REDUCTION;  Surgeon: Corine Baum MD;  Location: [facility] OR;  Service: Plastics   • MARY-EN-Y PROCEDURE  05/27/2021    Sleve   • TOTAL HIP ARTHROPLASTY Bilateral           Social History     Tobacco Use   • Smoking status: Former   • Smokeless tobacco: Never   Vaping Use   • Vaping Use: Never used   Substance Use Topics   • Alcohol use: Not Currently   • Drug use: Not Currently     Family History   Problem Relation Age of Onset   • Diabetes Mother    • Stroke Mother    • Diabetes Father    • Cancer Father         thyroid   • Diabetes Sister    • Diabetes Brother    • No Known Problems Maternal Grandmother    • No Known Problems Maternal Grandfather    • No Known Problems Paternal Grandmother    • No Known Problems Paternal Grandfather    • No Known Problems Sister           Allergies: Allergies   Allergen Reactions   • Bee Venom Swelling   • Dust Mite Extract    • Fish-Derived Products - Food Allergy Hives   • Iodine - Food Allergy Hives   • Lac Bovis Other (See Comments)     congestion   • Molds & Smuts    • Other Swelling   • Pollen Extract Other (See Comments)     Runny nose, watery eyes (also has corneal swelling) and itching  Triggers asthma   • Shrimp Flavor - Food Allergy Hives        Current Medications:     Current Outpatient Medications   Medication Instructions   • acetaminophen (TYLENOL) 500 mg, Oral, Every 6 hours scheduled   • azelastine (OPTIVAR) 0 05 % ophthalmic solution 1 drop, Right Eye, 2 times daily   • bepotastine besilate (BEPREVE) 1 5 % op soln INSTILL 1 DROP INTO BOTH EYES TWICE A DAY   • buPROPion (WELLBUTRIN XL) 300 mg, Oral, Daily   • busPIRone (BUSPAR) 10 mg, Oral, Daily, Takes twice a day   • Calcium Carb-Cholecalciferol (OSCAL-D) 500 mg-200 units per tablet 1 tablet, Oral, 2 times daily with meals   • cetirizine (ZYRTEC) 10 mg, Oral, Daily, Takes 1 tablet 2 times a day   • Cholecalciferol (VITAMIN D3) 2000 units capsule TAKE 2 CAPSULES BY MOUTH DAILY INDICATIONS: VITAMIN D DEFICIENCY     • [facility] Vitamin B-12 1,000 mcg, Oral, Daily   • [facility] Vitamin C 500 MG tablet TAKE 1 TABLET (500 MG TOTAL) BY MOUTH DAILY  • docusate sodium (COLACE) 100 mg, Oral, Daily PRN   • DULoxetine (CYMBALTA) 60 mg, Oral, 2 times daily   • erythromycin (ILOTYCIN) ophthalmic ointment APPLY (1CM) TO THE SURGICAL EYE AT BEDTIME   • ferrous sulfate 325 (65 Fe) mg tablet Oral, Daily with breakfast   • fluticasone (FLONASE) 50 mcg/act nasal spray 2 sprays, Nasal, Daily   • folic acid (FOLVITE) 1 mg tablet TAKE 1 TABLET BY MOUTH EVERY DAY   • gabapentin (NEURONTIN) 300 mg, Oral, 3 times daily   • hydrocortisone 1 % cream Topical, As needed, Applies daily   • loratadine (CLARITIN) 10 mg, Daily   • methocarbamol (ROBAXIN) 500 mg, Oral, 2 times daily PRN   • montelukast (SINGULAIR) 10 mg, Oral, Every morning   • Multiple Vitamins-Minerals (multivitamin with minerals) tablet 1 tablet, Oral, Daily   • ofloxacin (OCUFLOX) 0 3 % ophthalmic solution instill 1 drop by ophthalmic route 4 times every day into the surgical eye   • ondansetron (ZOFRAN) 4 mg, Oral, Every 8 hours PRN   • pantoprazole (PROTONIX) 40 mg, As needed   • Polyethyl Glycol-Propyl Glycol (SYSTANE OP) Ophthalmic   • prednisoLONE acetate (PRED FORTE) 1 % ophthalmic suspension instill 1 drop 6 times daily to the surgical eye   • simvastatin (ZOCOR) 40 mg, Oral, Daily at bedtime   • VENTOLIN  (90 Base) MCG/ACT inhaler INHALE 2 PUFFS EVERY 4 (FOUR) HOURS AS NEEDED FOR WHEEZING OR SHORTNESS OF BREATH  PRE-OP WORKSHEET DATA    Assessment of Pre-Operative Risks     MLJ Quality Hard Stops:  BMI (<40) : Estimated body mass index is 35 83 kg/m² as calculated from the following:    Height as of this encounter: 5' 4 5" (1 638 m)  Weight as of this encounter: 96 2 kg (212 lb)  Hgb ( >11):    Lab Results   Component Value Date    HGB 15 3 01/03/2023     HbA1c (<7 0) :   Lab Results   Component Value Date    HGBA1C 5 7 (H) 01/03/2023     GFR (>60) (Less then 45 = Nephrology consult):  CrCl cannot be calculated (Patient's most recent lab result is older than the maximum 7 days allowed  )  Active Decompensated Chronic Conditions which would delay surgery  No acutely decompensated medical issues such as recent CVA, MI, new onset arrhythmia, severe aortic stenosis, CHF, uncontrolled COPD       Exercise Capacity: (if less the 4 mets consider functional assessment via cardiac stress testing or consultation)    · Able to walk 2 blocks without symptoms?: Yes  · Able to walk 1 flights without symptoms?: Yes    Assessment of intra and post operative respiratory, hemodynamic and thrombotic risks     Prior Anesthesia Reactions: No     Personal history of venous thromboembolic disease? No    History of steroid use > 5 mg for >2 weeks within last year? No    Cardiac Risk Estimation: per the Revised Cardiac Risk Index (Circ  100:1043, 1999),     The patient's risk factors for cardiac complications include :  none    Jerry Matos has a in hospital cardiac risk of RCI RISK CLASS I (0 risk factors, risk of major cardiac compl  appr  0 5%) based on RCRI calculator          Pre-Op Data Reviewed:       Laboratory Results: I have personally reviewed the pertinent laboratory results/reports     EKG:I have personally reviewed pertinent reports    I personally reviewed and interpreted available tracings in the electronic medical record    Normal sinus rhythm  Septal infarct , age undetermined  When compared with ECG of 20-AUG-2015 13:21,  Septal infarct is now Present  Confirmed by Albaro Kirkpatrick ([id number]) on 1/4/2023     OLD RECORDS: reviewed old records in the chart review section if EHR on day of visit      Previous cardiopulmonary studies within the past year:  · Echocardiogram: yes, 2022 normal ef  · Cardiac Catheterization: no  · Stress Test: no      Time of visit including pre-visit chart review, visit and post-visit coordination of plan and care , review of pre-surgical lab work, preparation and time spent documenting note in electronic medical record, time spent face-to-face in physical examination answering patient questions by care team 60 minutes             [address] [place] [address]

## 2023-01-10 NOTE — PATIENT INSTRUCTIONS
Contact surgical nurse  navigator with any questions regarding preoperative plan or schedule    Stop all over the counter supplements, herbal, naturopathic  medications for 1 week prior to surgery UNLESS prescribed by your surgeon  Hold NSAIDS (i e  advil, alleve, motrin, ibuprofen, celebrex) minimum 7 days prior to surgery  Hold Asprin minimum 7 days prior to surgery  Recommend using Tylenol ( acetaminophen ) 500mg every eight hours during the first week post discharge in conjunction with any additional pain medicine prescribed by your surgeon  Use bowel medicines prescribed by your surgeon ( colace) daily post op during the first 1-2 weeks to avoid post operative constipation issues  Call [phone] with any post discharge concerns or medical issues

## 2023-01-11 ENCOUNTER — TELEPHONE (OUTPATIENT)
Dept: OBGYN CLINIC | Facility: HOSPITAL | Age: 53
End: 2023-01-11

## 2023-01-11 DIAGNOSIS — M17.12 PRIMARY OSTEOARTHRITIS OF LEFT KNEE: Primary | ICD-10-CM

## 2023-01-11 NOTE — TELEPHONE ENCOUNTER
Preoperative Elective Admission Assessment- Spoke to patient    Medicare- SL EA     Living Situation:  Pt is moving to her parents house, and will be residing there with her parents and son  She reports she is moving this weekend, plenty of time before surgery  She did state she was unsure of the exact number of steps  What kind of home: multi-level- half a double   How do they enter the home: Back  How many levels in home: 2  # of steps to enter home: 3 to enter   # of steps to second floor: 12-14 to 2nd floor   Are there handrails: Yes  Are there landings: No  Sleeping arrangement: first/entry floor  Where is Bathroom: First floor bathroom with walk in shower, with chair      First Floor Setup:   Is there a bathroom: Yes  Where would pt sleep: recliner     DME: cane     Patient's Current Level of Function: Ambulates with cane and ADLs: Independent    Post-op Caregiver: relative  Caregiver Name and phone number for Inpatient discharge needs: Father- [phone]  Currently receive any HHC/aides/community supports: No     Post-op Transport: relative  To/from hospital: relative  To/from PT 2-3x/week: relative  Uses community transport now: No     Outpatient Physical Therapy Site:  Site: [facility]   pre and post-op appts scheduled? Yes     Medication Management: self  Preferred Pharmacy for Post-op Medications: [facility]   Blood Management Vitamin Regimen: Taking Iron, made aware of the folic acid and Vitamin C- instructed to start ASAP  Post-op anticoagulant: to be determined by surgical team postoperatively- ASA per OV note     DC Plan: Pt plans to be discharged home  Pt educated that our goal, if at all possible, is to appropriately discharge patient based off their post-op function while striving to maintain maximal independence  If possible, the goal is to discharge patient to home and for them to attend outpatient physical therapy      Barriers to DC identified preoperatively: none identified    BMI: 35 70      Enrolled in [employer]    Patient Education:  Pt educated on post-op pain, early mobilization (POD0), Average inpt LOS, OP PT goal   Patient educated that our goal is to appropriately discharge patient based off their post-op function while striving to maintain maximal independence  The goal is to discharge patient to home and for them to attend outpatient physical therapy

## 2023-01-17 ENCOUNTER — OFFICE VISIT (OUTPATIENT)
Dept: INTERNAL MEDICINE CLINIC | Facility: CLINIC | Age: 53
End: 2023-01-17

## 2023-01-17 ENCOUNTER — EVALUATION (OUTPATIENT)
Dept: PHYSICAL THERAPY | Facility: REHABILITATION | Age: 53
End: 2023-01-17

## 2023-01-17 VITALS
BODY MASS INDEX: 35.32 KG/M2 | OXYGEN SATURATION: 97 % | HEIGHT: 65 IN | WEIGHT: 212 LBS | DIASTOLIC BLOOD PRESSURE: 78 MMHG | SYSTOLIC BLOOD PRESSURE: 118 MMHG | HEART RATE: 83 BPM

## 2023-01-17 DIAGNOSIS — M17.12 PRIMARY OSTEOARTHRITIS OF LEFT KNEE: ICD-10-CM

## 2023-01-17 DIAGNOSIS — E78.2 MIXED HYPERLIPIDEMIA: ICD-10-CM

## 2023-01-17 DIAGNOSIS — R73.03 PREDIABETES: ICD-10-CM

## 2023-01-17 DIAGNOSIS — E66.01 MORBID OBESITY WITH BODY MASS INDEX (BMI) OF 50.0 TO 59.9 IN ADULT (HCC): ICD-10-CM

## 2023-01-17 DIAGNOSIS — M47.816 LUMBAR SPONDYLOSIS: ICD-10-CM

## 2023-01-17 DIAGNOSIS — Z01.818 PRE-OPERATIVE CLEARANCE: ICD-10-CM

## 2023-01-17 DIAGNOSIS — I15.9 SECONDARY HYPERTENSION: Primary | ICD-10-CM

## 2023-01-17 NOTE — PROGRESS NOTES
PT Evaluation     Today's date: 2023  Patient name: Jenene Cockayne  : 1970  MRN: [id number]  Referring provider: Madison Etienne MD  Dx:   Encounter Diagnosis     ICD-10-CM    1  Primary osteoarthritis of left knee  M17 12 Ambulatory referral to Physical Therapy          Start Time: 1105  Stop Time: 1140  Total time in clinic (min): 35 minutes    Assessment  Assessment details: Jenene Cockayne is a  47 y o  female presenting to PT for 1 pre-operative visit for a left TKA  Risk Assessment and Prediction Tool results reviewed  Patient reported functional outcome scores reviewed  Discussed DOS and patient’s questions were answered to patient’s satisfaction  Mobility/ROM results per above  Strength results per above  Balance/Gait (including Timed Up & Go) per above  Virtual Home Assessment was reviewed with patient  Home Preparation Checklist was reviewed with patient including identification of care partner and encouragement of single level set-up  Post-operative pain management expectations discussed to the patient’s satisfaction  Post-operative gait training for level ground, stairs, and car transfers was performed  Patient demonstrated competence with immediate post-operative home exercise program   Expectation is that patient will be d/c to home with outpatient PT and will benefit from PT to address gait/strength dysfunction along with knee Range of motion deficits to return to prior level of functioning  Impairments: abnormal or restricted ROM, activity intolerance, impaired physical strength, lacks appropriate home exercise program, pain with function, weight-bearing intolerance and poor posture   Understanding of Dx/Px/POC: good   Prognosis: good    Goals  Short Term Goals: to be achieved by 4 weeks  1) Patient to be independent with basic HEP  2) Decrease pain by 5/10 at its worst   3) Increase knee flexion ROM to 90 deg  4) Increase knee extension ROM by > 5 deg    5) Increase LE strength by 1/2 MMT grade in all deficient planes  6) Patient to negotiate steps with a step-to pattern with use of HR  7) Patient to report decreased sleep interruption secondary to pain  8) Increase ambulatory tolerance by 10 min with LRAD  Long Term Goals: to be achieved by discharge  1) FOTO equal to or greater than   2) Patient to be independent with comprehensive HEP  3) Decrease pain to 3/10 or less for improved quality of life  4) Increase LE strength to 5/5 MMT grade in all planes to improve a/iadls  5) Achieve full knee extension ROM to improve a/iadls  6) Increase knee flexion ROM to 120 degrees minimum to improve a/iadls  7) Patient to negotiate steps with a reciprocal pattern without use of Hr  8) Increase ambulatory tolerance to 30 min to improve participation in social activities  9) Patient to report no sleep interruption secondary to pain        Plan  Patient would benefit from: skilled physical therapy  Planned modality interventions: cryotherapy, thermotherapy: hydrocollator packs and unattended electrical stimulation  Planned therapy interventions: IADL retraining, joint mobilization, manual therapy, massage, ADL training, activity modification, abdominal trunk stabilization, ADL retraining, balance, balance/weight bearing training, neuromuscular re-education, body mechanics training, behavior modification, strengthening, stretching, therapeutic activities, therapeutic exercise, therapeutic training, transfer training, graded exercise, graded motor, home exercise program, graded activity, gait training, functional ROM exercises, patient education, postural training and flexibility  Frequency: 2x week  Duration in visits: 16  Duration in weeks: 8  Treatment plan discussed with: patient        Subjective Evaluation    History of Present Illness  Mechanism of injury: Whit Pearce is a 47 y o  female presenting to physical therapy on 01/17/23 with referral from MD for pre-operative evaluation for her L TKA on 2023  Reports her pain level in the morning is about an 8 in her left knee and it wakes her up in the middle of the night  Reports she fell 3 times recently and has trouble getting on and off the floor  Reports the knee gives out on her at times  Quality of life: good    Pain  Current pain rating: 3  At best pain rating: 3  At worst pain ratin  Quality: sharp  Relieving factors: relaxation and rest  Aggravating factors: walking and stair climbing    Patient Goals  Patient goals for therapy: independence with ADLs/IADLs, increased strength, return to sport/leisure activities, decreased pain and increased motion          Objective  Myotomes all intact b/l  Dermatome: all intact b/l      Reflexes: L3-4: 2+ b/l       S1:  2+ b/l              5xSTS: 21 15 seconds, used hands on thighs for ascent and descent  6MWT: 950ft no AD no breaks  TU 86 seconds, used hands to stand and sit, no AD        MMT         AROM          PROM    Hip       L       R        L           R      L     R   Flex  Extn  Abd  Add  IR          ER          G  Max         G  Med         Iliop                     Knee         Extension     5 deg 0 deg   Flexion     106 deg 129 deg            Ankle         Dorsi Flexion         Plantar Flexion           Flowsheet Rows    Flowsheet Row Most Recent Value   PT/OT G-Codes    Current Score 27   Projected Score 51           Precautions: DM, HTN, hx of CA, GERD, depression    Manuals             tibfem mobs             Patella mobs                          Assessment             Neuro Re-Ed             LAQ             SLR             Sand dune             TKE                                                    Ther Ex             Bike             STS             PROM             Prone hang             Seated ext prop             VG                                       Ther Activity                                       Gait Training Modalities

## 2023-01-19 ENCOUNTER — APPOINTMENT (OUTPATIENT)
Dept: PREADMISSION TESTING | Facility: HOSPITAL | Age: 53
End: 2023-01-19

## 2023-01-19 RX ORDER — ACETAMINOPHEN 325 MG/1
650 TABLET ORAL EVERY 6 HOURS PRN
COMMUNITY

## 2023-01-20 NOTE — TELEPHONE ENCOUNTER
Per PAT- patients plans changed  I contacted patient, she was short to discuss postoperative plans  States she will "be fine alone" and I discussed our goals for postoperative support and transportation follow discharge home  After some discussion and education, patient states she will return to her home, 2nd floor apartment with steps to enter  Her son will be there during the mornings, and she will call her friend Kavon Barros to provide evening support of the day of DC before the weekend  She reports her son will provide transportation to and from PT appts  pt encouraged to call me with questions, concerns or issues

## 2023-01-23 DIAGNOSIS — M17.12 PRIMARY OSTEOARTHRITIS OF LEFT KNEE: Primary | ICD-10-CM

## 2023-01-24 ENCOUNTER — HOSPITAL ENCOUNTER (OUTPATIENT)
Dept: RADIOLOGY | Facility: CLINIC | Age: 53
Discharge: HOME | End: 2023-01-24
Admitting: ANESTHESIOLOGY

## 2023-01-24 ENCOUNTER — TELEPHONE (OUTPATIENT)
Dept: PAIN MEDICINE | Facility: CLINIC | Age: 53
End: 2023-01-24

## 2023-01-24 VITALS
SYSTOLIC BLOOD PRESSURE: 138 MMHG | HEART RATE: 76 BPM | RESPIRATION RATE: 20 BRPM | TEMPERATURE: 97.6 F | OXYGEN SATURATION: 95 % | DIASTOLIC BLOOD PRESSURE: 93 MMHG

## 2023-01-24 DIAGNOSIS — M47.812 CERVICAL SPONDYLOSIS: ICD-10-CM

## 2023-01-24 RX ORDER — LIDOCAINE HYDROCHLORIDE 10 MG/ML
10 INJECTION, SOLUTION EPIDURAL; INFILTRATION; INTRACAUDAL; PERINEURAL ONCE
Status: COMPLETED | OUTPATIENT
Start: 2023-01-24 | End: 2023-01-24

## 2023-01-24 RX ORDER — BUPIVACAINE HYDROCHLORIDE 5 MG/ML
3 INJECTION, SOLUTION EPIDURAL; INTRACAUDAL ONCE
Status: COMPLETED | OUTPATIENT
Start: 2023-01-24 | End: 2023-01-24

## 2023-01-24 RX ADMIN — LIDOCAINE HYDROCHLORIDE 3 ML: 20 INJECTION, SOLUTION EPIDURAL; INFILTRATION; INTRACAUDAL; PERINEURAL at 11:35

## 2023-01-24 RX ADMIN — LIDOCAINE HYDROCHLORIDE 10 ML: 10 INJECTION, SOLUTION EPIDURAL; INFILTRATION; INTRACAUDAL; PERINEURAL at 11:30

## 2023-01-24 RX ADMIN — BUPIVACAINE HYDROCHLORIDE 3 ML: 5 INJECTION, SOLUTION EPIDURAL; INTRACAUDAL; PERINEURAL at 11:35

## 2023-01-24 NOTE — DISCHARGE INSTRUCTIONS
Medial Branch Radiofrequency Ablation     WHAT YOU NEED TO KNOW:   Medial branch radiofrequency ablation (RFA) is a procedure used to treat facet joint pain in your neck, mid back, or lower back  Facet joints are found at the back of each vertebra  A needle electrode is used to send electrical currents to the nerves in your facet joint  The electrical currents create heat that damages the nerve so it cannot send pain signals  ACTIVITY  Do not drive or operate machinery today  No strenuous activity today - bending, lifting, etc   You may shower today, but do not sit in a tub of water  Resume normal activities tomorrow as tolerated  CARE OF THE INJECTION SITE  If you have soreness or pain, apply ice to the area today (20 minutes on/20 minutes off)  Starting tomorrow, you may use warm, moist heat or ice if needed  Notify the [facility] if you have any of the following: redness, drainage, swelling, or fever above 100°F     SPECIAL INSTRUCTIONS  Our office will call you tomorrow for a progress report and make an appointment for a follow up visit in 4 weeks  If you feel a sunburn-like sensation in the area of your procedure, call our office  MEDICATIONS  Continue to take all routine medications  Our office may have instructed you to hold some medications  As no general anesthesia was used in today's procedure, you should not experience any side effects related to anesthesia  If you have a problem specifically related to your procedure, please call our office at [phone]  Problems not related to your procedure should be directed to your primary care physician

## 2023-01-24 NOTE — H&P
History of Present Illness: The patient is a 46 y o  female who presents with complaints of neck pain      Past Medical History:   Diagnosis Date   • Anxiety    • Asthma    • Cancer ([address] )     endometrial   • Chronic pain syndrome    • CPAP (continuous positive airway pressure) dependence    • Depression    • Diabetes mellitus (HCC)     Pre diabetic   • GERD (gastroesophageal reflux disease)    • HTN (hypertension)    • Hyperlipidemia    • Hypertension    • Lumbar radiculopathy    • Prediabetes    • Sleep apnea        Past Surgical History:   Procedure Laterality Date   • GASTRECTOMY SLEEVE LAPAROSCOPIC     • HYSTERECTOMY     • KNEE ARTHROSCOPY Right    • LUMBAR FUSION     • IA BREAST REDUCTION Bilateral 9/23/2022    Procedure: BILATERAL BREAST REDUCTION;  Surgeon: Michelle Suarez MD;  Location: [facility];  Service: Plastics   • MARY-EN-Y PROCEDURE  05/27/2021    Sleve   • TOTAL HIP ARTHROPLASTY Bilateral          Current Outpatient Medications:   •  acetaminophen (TYLENOL) 325 mg tablet, Take 650 mg by mouth every 6 (six) hours as needed for mild pain, Disp: , Rfl:   •  azelastine (OPTIVAR) 0 05 % ophthalmic solution, Administer 1 drop to the right eye 2 (two) times a day, Disp: , Rfl:   •  bepotastine besilate (BEPREVE) 1 5 % op soln, INSTILL 1 DROP INTO BOTH EYES TWICE A DAY, Disp: , Rfl:   •  buPROPion (WELLBUTRIN XL) 300 mg 24 hr tablet, Take 300 mg by mouth daily, Disp: , Rfl:   •  busPIRone (BUSPAR) 10 mg tablet, Take 10 mg by mouth in the morning Takes twice a day, Disp: , Rfl:   •  Calcium Carb-Cholecalciferol (OSCAL-D) 500 mg-200 units per tablet, Take 1 tablet by mouth 2 (two) times a day with meals, Disp: , Rfl:   •  cetirizine (ZyrTEC) 10 mg tablet, Take 10 mg by mouth daily Takes 1 tablet 2 times a day, Disp: , Rfl:   •  Cholecalciferol (VITAMIN D3) 2000 units capsule, TAKE 2 CAPSULES BY MOUTH DAILY INDICATIONS: VITAMIN D DEFICIENCY , Disp: , Rfl: 5  •  [employer] Vitamin B-12 1000 MCG tablet, Take 1,000 mcg by mouth daily, Disp: , Rfl:   •  [employer] Vitamin C 500 MG tablet, TAKE 1 TABLET (500 MG TOTAL) BY MOUTH DAILY  , Disp: 90 tablet, Rfl: 1  •  docusate sodium (Colace) 100 mg capsule, Take 1 capsule (100 mg total) by mouth daily as needed for constipation, Disp: 30 capsule, Rfl: 0  •  DULoxetine (CYMBALTA) 60 mg delayed release capsule, Take 60 mg by mouth 2 (two) times a day, Disp: , Rfl:   •  erythromycin (ILOTYCIN) ophthalmic ointment, APPLY (1CM) TO THE SURGICAL EYE AT BEDTIME, Disp: , Rfl:   •  ferrous sulfate 325 (65 Fe) mg tablet, Take by mouth daily with breakfast, Disp: , Rfl:   •  fluticasone (FLONASE) 50 mcg/act nasal spray, 2 sprays into each nostril daily, Disp: , Rfl:   •  folic acid (FOLVITE) 1 mg tablet, TAKE 1 TABLET BY MOUTH EVERY DAY, Disp: 90 tablet, Rfl: 1  •  gabapentin (NEURONTIN) 300 mg capsule, Take 1 capsule (300 mg total) by mouth 3 (three) times a day, Disp: 90 capsule, Rfl: 2  •  hydrocortisone 1 % cream, Apply topically as needed Applies daily, Disp: , Rfl:   •  methocarbamol (ROBAXIN) 500 mg tablet, Take 1 tablet (500 mg total) by mouth 2 (two) times a day as needed for muscle spasms, Disp: 60 tablet, Rfl: 1  •  Multiple Vitamins-Minerals (multivitamin with minerals) tablet, Take 1 tablet by mouth daily, Disp: 30 tablet, Rfl: 0  •  ofloxacin (OCUFLOX) 0 3 % ophthalmic solution, instill 1 drop by ophthalmic route 4 times every day into the surgical eye, Disp: , Rfl:   •  ondansetron (ZOFRAN) 4 mg tablet, Take 1 tablet (4 mg total) by mouth every 8 (eight) hours as needed for nausea or vomiting, Disp: 20 tablet, Rfl: 0  •  pantoprazole (PROTONIX) 40 mg tablet, Take 40 mg by mouth as needed, Disp: , Rfl:   •  Polyethyl Glycol-Propyl Glycol (SYSTANE OP), Apply to eye, Disp: , Rfl:   •  prednisoLONE acetate (PRED FORTE) 1 % ophthalmic suspension, instill 1 drop 6 times daily to the surgical eye, Disp: , Rfl:   •  simvastatin (ZOCOR) 40 mg tablet, Take 40 mg by mouth daily at bedtime, Disp: , Rfl: •  VENTOLIN  (90 Base) MCG/ACT inhaler, INHALE 2 PUFFS EVERY 4 (FOUR) HOURS AS NEEDED FOR WHEEZING OR SHORTNESS OF BREATH , Disp: , Rfl: 2  No current facility-administered medications for this encounter  Allergies   Allergen Reactions   • Bee Venom Swelling   • Dust Mite Extract    • Fish-Derived Products - Food Allergy Hives   • Iodine - Food Allergy Hives   • Lac Bovis Other (See Comments)     congestion   • Molds & Smuts    • Other Swelling   • Pollen Extract Other (See Comments)     Runny nose, watery eyes (also has corneal swelling) and itching  Triggers asthma   • Shrimp Flavor - Food Allergy Hives       Physical Exam:   Vitals:    01/24/23 1148   BP: 138/93   Pulse: 76   Resp: 20   Temp:    SpO2: 95%     General: Awake, Alert, Oriented x 3, Mood and affect appropriate  Respiratory: Respirations even and unlabored  Cardiovascular: Peripheral pulses intact; no edema  Musculoskeletal Exam: right cervical paraspinals TTP    ASA Score: 3    Patient/Chart Verification  Patient ID Verified: Verbal  ID Band Applied: No  Consents Confirmed: Procedural, To be obtained in the Pre-Procedure area  Interval H&P(within 24 hr) Complete (required for Outpatients and Surgery Admit only): To be obtained in the Pre-Procedure area  Allergies Reviewed: Yes  Anticoag/NSAID held?: NA  Currently on antibiotics?: No    Assessment:   1   Cervical spondylosis        Plan: right C4-6 - RFA

## 2023-01-25 NOTE — TELEPHONE ENCOUNTER
S/w pt who states she has a headache that nothing is working for right now but she is at the eye doctor  Pt advised ok to try ice and or heat for pain  Pt denies any signs of infection  Pt aware it may take 2 weeks to notice a difference and 4-6 weeks for the full effect  F/u [facility] scheduled

## 2023-01-31 ENCOUNTER — OFFICE VISIT (OUTPATIENT)
Dept: PLASTIC SURGERY | Facility: CLINIC | Age: 53
End: 2023-01-31

## 2023-01-31 DIAGNOSIS — Z98.890 S/P BILATERAL BREAST REDUCTION: Primary | ICD-10-CM

## 2023-01-31 NOTE — PROGRESS NOTES
Assessment/Plan:     Patient is a 56-year-old female who is status post bilateral breast reduction by Dr Bob Fraire on 09/23/2022Sjoy Luna see HPI  Patient returns to the office today for a final postoperative check  All incisions have completely healed  Scarring is minimal   The patient is interested in discussing a potential panniculectomy with Dr Bob Fraire after summertime  We will schedule that appointment today  Diagnoses and all orders for this visit:    S/P bilateral breast reduction          Subjective:     Patient ID: Leslie George is a 46 y o  female  HPI     Patient reports that she has been doing well  She has gained weight recently  I did inform the patient that she would need proper documentation as well as stable weight prior to any panniculectomy surgery  She voiced understanding  Review of Systems    See HPI    Objective:     Physical Exam      All incisions clean, dry and intact as above

## 2023-02-09 ENCOUNTER — HOSPITAL ENCOUNTER (OUTPATIENT)
Facility: HOSPITAL | Age: 53
Setting detail: OUTPATIENT SURGERY
Discharge: HOME | End: 2023-02-10
Attending: ORTHOPAEDIC SURGERY | Admitting: ORTHOPAEDIC SURGERY

## 2023-02-09 ENCOUNTER — ANESTHESIA (OUTPATIENT)
Dept: PERIOP | Facility: HOSPITAL | Age: 53
End: 2023-02-09

## 2023-02-09 ENCOUNTER — ANESTHESIA EVENT (OUTPATIENT)
Dept: PERIOP | Facility: HOSPITAL | Age: 53
End: 2023-02-09

## 2023-02-09 DIAGNOSIS — M17.12 PRIMARY OSTEOARTHRITIS OF LEFT KNEE: Primary | ICD-10-CM

## 2023-02-09 DIAGNOSIS — Z96.652 STATUS POST TOTAL KNEE REPLACEMENT USING CEMENT, LEFT: ICD-10-CM

## 2023-02-09 LAB
ABO GROUP BLD: NORMAL
BLD GP AB SCN SERPL QL: NEGATIVE
RH BLD: POSITIVE
SPECIMEN EXPIRATION DATE: NORMAL

## 2023-02-09 DEVICE — SMARTSET HV HIGH VISCOSITY BONE CEMENT 40G
Type: IMPLANTABLE DEVICE | Site: KNEE | Status: FUNCTIONAL
Brand: SMARTSET

## 2023-02-09 DEVICE — ATTUNE KNEE SYSTEM FEMORAL POSTERIOR STABILIZED SIZE 5 LEFT CEMENTED
Type: IMPLANTABLE DEVICE | Site: KNEE | Status: FUNCTIONAL
Brand: ATTUNE

## 2023-02-09 DEVICE — ATTUNE KNEE SYSTEM TIBIAL BASE ROTATING PLATFORM SIZE 6 CEMENTED
Type: IMPLANTABLE DEVICE | Site: KNEE | Status: FUNCTIONAL
Brand: ATTUNE

## 2023-02-09 DEVICE — ATTUNE KNEE SYSTEM TIBIAL INSERT ROTATING PLATFORM POSTERIOR STABILIZED 5 6MM AOX
Type: IMPLANTABLE DEVICE | Status: FUNCTIONAL
Brand: ATTUNE

## 2023-02-09 DEVICE — ATTUNE PATELLA MEDIALIZED DOME 35MM CEMENTED AOX
Type: IMPLANTABLE DEVICE | Site: KNEE | Status: FUNCTIONAL
Brand: ATTUNE

## 2023-02-09 RX ORDER — ONDANSETRON 2 MG/ML
4 INJECTION INTRAMUSCULAR; INTRAVENOUS ONCE AS NEEDED
Status: DISCONTINUED | OUTPATIENT
Start: 2023-02-09 | End: 2023-02-09 | Stop reason: HOSPADM

## 2023-02-09 RX ORDER — CEFAZOLIN SODIUM 2 G/50ML
2000 SOLUTION INTRAVENOUS ONCE
Status: COMPLETED | OUTPATIENT
Start: 2023-02-09 | End: 2023-02-09

## 2023-02-09 RX ORDER — SODIUM CHLORIDE, SODIUM LACTATE, POTASSIUM CHLORIDE, CALCIUM CHLORIDE 600; 310; 30; 20 MG/100ML; MG/100ML; MG/100ML; MG/100ML
100 INJECTION, SOLUTION INTRAVENOUS CONTINUOUS
Status: DISCONTINUED | OUTPATIENT
Start: 2023-02-09 | End: 2023-02-10 | Stop reason: HOSPADM

## 2023-02-09 RX ORDER — DEXAMETHASONE SODIUM PHOSPHATE 10 MG/ML
INJECTION, SOLUTION INTRAMUSCULAR; INTRAVENOUS AS NEEDED
Status: DISCONTINUED | OUTPATIENT
Start: 2023-02-09 | End: 2023-02-09

## 2023-02-09 RX ORDER — PROPOFOL 10 MG/ML
INJECTION, EMULSION INTRAVENOUS CONTINUOUS PRN
Status: DISCONTINUED | OUTPATIENT
Start: 2023-02-09 | End: 2023-02-09

## 2023-02-09 RX ORDER — MELATONIN
1000 DAILY
Status: DISCONTINUED | OUTPATIENT
Start: 2023-02-09 | End: 2023-02-10 | Stop reason: HOSPADM

## 2023-02-09 RX ORDER — SODIUM CHLORIDE, SODIUM LACTATE, POTASSIUM CHLORIDE, CALCIUM CHLORIDE 600; 310; 30; 20 MG/100ML; MG/100ML; MG/100ML; MG/100ML
INJECTION, SOLUTION INTRAVENOUS CONTINUOUS PRN
Status: DISCONTINUED | OUTPATIENT
Start: 2023-02-09 | End: 2023-02-09

## 2023-02-09 RX ORDER — BEPOTASTINE BESILATE 15 MG/ML
1 SOLUTION/ DROPS OPHTHALMIC 2 TIMES DAILY
Status: DISCONTINUED | OUTPATIENT
Start: 2023-02-09 | End: 2023-02-10 | Stop reason: HOSPADM

## 2023-02-09 RX ORDER — PROPOFOL 10 MG/ML
INJECTION, EMULSION INTRAVENOUS AS NEEDED
Status: DISCONTINUED | OUTPATIENT
Start: 2023-02-09 | End: 2023-02-09

## 2023-02-09 RX ORDER — METHOCARBAMOL 500 MG/1
500 TABLET, FILM COATED ORAL 2 TIMES DAILY PRN
Status: DISCONTINUED | OUTPATIENT
Start: 2023-02-09 | End: 2023-02-10 | Stop reason: HOSPADM

## 2023-02-09 RX ORDER — ONDANSETRON 2 MG/ML
INJECTION INTRAMUSCULAR; INTRAVENOUS AS NEEDED
Status: DISCONTINUED | OUTPATIENT
Start: 2023-02-09 | End: 2023-02-09

## 2023-02-09 RX ORDER — LIDOCAINE HYDROCHLORIDE 10 MG/ML
INJECTION, SOLUTION EPIDURAL; INFILTRATION; INTRACAUDAL; PERINEURAL AS NEEDED
Status: DISCONTINUED | OUTPATIENT
Start: 2023-02-09 | End: 2023-02-09

## 2023-02-09 RX ORDER — CEFAZOLIN SODIUM 1 G/50ML
1000 SOLUTION INTRAVENOUS EVERY 8 HOURS
Status: COMPLETED | OUTPATIENT
Start: 2023-02-09 | End: 2023-02-10

## 2023-02-09 RX ORDER — MAGNESIUM HYDROXIDE/ALUMINUM HYDROXICE/SIMETHICONE 120; 1200; 1200 MG/30ML; MG/30ML; MG/30ML
30 SUSPENSION ORAL EVERY 4 HOURS PRN
Status: DISCONTINUED | OUTPATIENT
Start: 2023-02-09 | End: 2023-02-10 | Stop reason: HOSPADM

## 2023-02-09 RX ORDER — LANOLIN ALCOHOL/MO/W.PET/CERES
1 CREAM (GRAM) TOPICAL 2 TIMES DAILY WITH MEALS
Status: DISCONTINUED | OUTPATIENT
Start: 2023-02-09 | End: 2023-02-10 | Stop reason: HOSPADM

## 2023-02-09 RX ORDER — BUSPIRONE HYDROCHLORIDE 10 MG/1
10 TABLET ORAL DAILY
Status: DISCONTINUED | OUTPATIENT
Start: 2023-02-09 | End: 2023-02-10 | Stop reason: HOSPADM

## 2023-02-09 RX ORDER — FENTANYL CITRATE 50 UG/ML
INJECTION, SOLUTION INTRAMUSCULAR; INTRAVENOUS AS NEEDED
Status: DISCONTINUED | OUTPATIENT
Start: 2023-02-09 | End: 2023-02-09

## 2023-02-09 RX ORDER — BUPIVACAINE HYDROCHLORIDE AND EPINEPHRINE 2.5; 5 MG/ML; UG/ML
INJECTION, SOLUTION EPIDURAL; INFILTRATION; INTRACAUDAL; PERINEURAL AS NEEDED
Status: DISCONTINUED | OUTPATIENT
Start: 2023-02-09 | End: 2023-02-09 | Stop reason: HOSPADM

## 2023-02-09 RX ORDER — ENOXAPARIN SODIUM 100 MG/ML
40 INJECTION SUBCUTANEOUS DAILY
Status: DISCONTINUED | OUTPATIENT
Start: 2023-02-10 | End: 2023-02-10 | Stop reason: HOSPADM

## 2023-02-09 RX ORDER — KETOROLAC TROMETHAMINE 30 MG/ML
INJECTION, SOLUTION INTRAMUSCULAR; INTRAVENOUS AS NEEDED
Status: DISCONTINUED | OUTPATIENT
Start: 2023-02-09 | End: 2023-02-09 | Stop reason: HOSPADM

## 2023-02-09 RX ORDER — CHLORHEXIDINE GLUCONATE 0.12 MG/ML
15 RINSE ORAL ONCE
Status: DISCONTINUED | OUTPATIENT
Start: 2023-02-09 | End: 2023-02-09

## 2023-02-09 RX ORDER — OXYCODONE HYDROCHLORIDE 10 MG/1
10 TABLET ORAL EVERY 4 HOURS PRN
Status: DISCONTINUED | OUTPATIENT
Start: 2023-02-09 | End: 2023-02-10 | Stop reason: HOSPADM

## 2023-02-09 RX ORDER — HYDROMORPHONE HCL/PF 1 MG/ML
0.5 SYRINGE (ML) INJECTION EVERY 2 HOUR PRN
Status: DISCONTINUED | OUTPATIENT
Start: 2023-02-09 | End: 2023-02-10 | Stop reason: HOSPADM

## 2023-02-09 RX ORDER — GABAPENTIN 300 MG/1
300 CAPSULE ORAL 3 TIMES DAILY
Status: DISCONTINUED | OUTPATIENT
Start: 2023-02-09 | End: 2023-02-10 | Stop reason: HOSPADM

## 2023-02-09 RX ORDER — MIDAZOLAM HYDROCHLORIDE 2 MG/2ML
INJECTION, SOLUTION INTRAMUSCULAR; INTRAVENOUS AS NEEDED
Status: DISCONTINUED | OUTPATIENT
Start: 2023-02-09 | End: 2023-02-09

## 2023-02-09 RX ORDER — ASCORBIC ACID 500 MG
500 TABLET ORAL DAILY
Status: DISCONTINUED | OUTPATIENT
Start: 2023-02-09 | End: 2023-02-10 | Stop reason: HOSPADM

## 2023-02-09 RX ORDER — BUPROPION HYDROCHLORIDE 150 MG/1
300 TABLET ORAL DAILY
Status: DISCONTINUED | OUTPATIENT
Start: 2023-02-09 | End: 2023-02-10 | Stop reason: HOSPADM

## 2023-02-09 RX ORDER — FOLIC ACID 1 MG/1
1000 TABLET ORAL DAILY
Status: DISCONTINUED | OUTPATIENT
Start: 2023-02-09 | End: 2023-02-10 | Stop reason: HOSPADM

## 2023-02-09 RX ORDER — ACETAMINOPHEN 325 MG/1
650 TABLET ORAL EVERY 6 HOURS PRN
Status: DISCONTINUED | OUTPATIENT
Start: 2023-02-09 | End: 2023-02-10 | Stop reason: HOSPADM

## 2023-02-09 RX ORDER — GLYCOPYRROLATE 0.2 MG/ML
INJECTION INTRAMUSCULAR; INTRAVENOUS AS NEEDED
Status: DISCONTINUED | OUTPATIENT
Start: 2023-02-09 | End: 2023-02-09

## 2023-02-09 RX ORDER — AZELASTINE HYDROCHLORIDE 0.5 MG/ML
1 SOLUTION/ DROPS OPHTHALMIC 2 TIMES DAILY
Status: DISCONTINUED | OUTPATIENT
Start: 2023-02-09 | End: 2023-02-10 | Stop reason: HOSPADM

## 2023-02-09 RX ORDER — SODIUM CHLORIDE, SODIUM LACTATE, POTASSIUM CHLORIDE, CALCIUM CHLORIDE 600; 310; 30; 20 MG/100ML; MG/100ML; MG/100ML; MG/100ML
75 INJECTION, SOLUTION INTRAVENOUS CONTINUOUS
Status: DISCONTINUED | OUTPATIENT
Start: 2023-02-09 | End: 2023-02-10 | Stop reason: HOSPADM

## 2023-02-09 RX ORDER — BUPIVACAINE HYDROCHLORIDE 2.5 MG/ML
INJECTION, SOLUTION EPIDURAL; INFILTRATION; INTRACAUDAL AS NEEDED
Status: DISCONTINUED | OUTPATIENT
Start: 2023-02-09 | End: 2023-02-09

## 2023-02-09 RX ORDER — SENNOSIDES 8.6 MG
1 TABLET ORAL DAILY
Status: DISCONTINUED | OUTPATIENT
Start: 2023-02-09 | End: 2023-02-10 | Stop reason: HOSPADM

## 2023-02-09 RX ORDER — PANTOPRAZOLE SODIUM 40 MG/1
40 TABLET, DELAYED RELEASE ORAL
Status: DISCONTINUED | OUTPATIENT
Start: 2023-02-10 | End: 2023-02-10 | Stop reason: HOSPADM

## 2023-02-09 RX ORDER — BUPIVACAINE HYDROCHLORIDE 7.5 MG/ML
INJECTION, SOLUTION INTRASPINAL AS NEEDED
Status: DISCONTINUED | OUTPATIENT
Start: 2023-02-09 | End: 2023-02-09

## 2023-02-09 RX ORDER — MAGNESIUM HYDROXIDE 1200 MG/15ML
LIQUID ORAL AS NEEDED
Status: DISCONTINUED | OUTPATIENT
Start: 2023-02-09 | End: 2023-02-09 | Stop reason: HOSPADM

## 2023-02-09 RX ORDER — ALBUTEROL SULFATE 90 UG/1
2 AEROSOL, METERED RESPIRATORY (INHALATION) EVERY 6 HOURS PRN
Status: DISCONTINUED | OUTPATIENT
Start: 2023-02-09 | End: 2023-02-10 | Stop reason: HOSPADM

## 2023-02-09 RX ORDER — DOCUSATE SODIUM 100 MG/1
100 CAPSULE, LIQUID FILLED ORAL 2 TIMES DAILY
Status: DISCONTINUED | OUTPATIENT
Start: 2023-02-09 | End: 2023-02-10 | Stop reason: HOSPADM

## 2023-02-09 RX ORDER — OXYCODONE HYDROCHLORIDE 5 MG/1
5 TABLET ORAL EVERY 4 HOURS PRN
Status: DISCONTINUED | OUTPATIENT
Start: 2023-02-09 | End: 2023-02-10 | Stop reason: HOSPADM

## 2023-02-09 RX ORDER — DULOXETIN HYDROCHLORIDE 60 MG/1
60 CAPSULE, DELAYED RELEASE ORAL 2 TIMES DAILY
Status: DISCONTINUED | OUTPATIENT
Start: 2023-02-09 | End: 2023-02-10 | Stop reason: HOSPADM

## 2023-02-09 RX ORDER — FERROUS SULFATE 325(65) MG
325 TABLET ORAL 2 TIMES DAILY WITH MEALS
Status: DISCONTINUED | OUTPATIENT
Start: 2023-02-09 | End: 2023-02-10 | Stop reason: HOSPADM

## 2023-02-09 RX ORDER — MORPHINE SULFATE 10 MG/ML
INJECTION, SOLUTION INTRAMUSCULAR; INTRAVENOUS AS NEEDED
Status: DISCONTINUED | OUTPATIENT
Start: 2023-02-09 | End: 2023-02-09 | Stop reason: HOSPADM

## 2023-02-09 RX ORDER — TRANEXAMIC ACID 100 MG/ML
INJECTION, SOLUTION INTRAVENOUS AS NEEDED
Status: DISCONTINUED | OUTPATIENT
Start: 2023-02-09 | End: 2023-02-09 | Stop reason: HOSPADM

## 2023-02-09 RX ADMIN — BUPIVACAINE HYDROCHLORIDE 15 ML: 2.5 INJECTION, SOLUTION EPIDURAL; INFILTRATION; INTRACAUDAL at 09:26

## 2023-02-09 RX ADMIN — FENTANYL CITRATE 25 MCG: 50 INJECTION, SOLUTION INTRAMUSCULAR; INTRAVENOUS at 09:55

## 2023-02-09 RX ADMIN — OXYCODONE HYDROCHLORIDE 10 MG: 10 TABLET ORAL at 23:09

## 2023-02-09 RX ADMIN — GLYCOPYRROLATE 0.1 MG: 0.2 INJECTION, SOLUTION INTRAMUSCULAR; INTRAVENOUS at 10:35

## 2023-02-09 RX ADMIN — GABAPENTIN 300 MG: 300 CAPSULE ORAL at 20:35

## 2023-02-09 RX ADMIN — FENTANYL CITRATE 25 MCG: 50 INJECTION, SOLUTION INTRAMUSCULAR; INTRAVENOUS at 09:50

## 2023-02-09 RX ADMIN — PHENYLEPHRINE HYDROCHLORIDE 20 MCG/MIN: 10 INJECTION INTRAVENOUS at 10:35

## 2023-02-09 RX ADMIN — BUPIVACAINE HYDROCHLORIDE 15 ML: 2.5 INJECTION, SOLUTION EPIDURAL; INFILTRATION; INTRACAUDAL at 09:19

## 2023-02-09 RX ADMIN — DULOXETINE HYDROCHLORIDE 60 MG: 60 CAPSULE, DELAYED RELEASE ORAL at 17:17

## 2023-02-09 RX ADMIN — CEFAZOLIN SODIUM 1000 MG: 1 SOLUTION INTRAVENOUS at 17:12

## 2023-02-09 RX ADMIN — BUPIVACAINE HYDROCHLORIDE IN DEXTROSE 1.6 ML: 7.5 INJECTION, SOLUTION SUBARACHNOID at 09:53

## 2023-02-09 RX ADMIN — OXYCODONE HYDROCHLORIDE 10 MG: 10 TABLET ORAL at 13:24

## 2023-02-09 RX ADMIN — SODIUM CHLORIDE, SODIUM LACTATE, POTASSIUM CHLORIDE, AND CALCIUM CHLORIDE 75 ML/HR: .6; .31; .03; .02 INJECTION, SOLUTION INTRAVENOUS at 13:03

## 2023-02-09 RX ADMIN — LIDOCAINE HYDROCHLORIDE 50 MG: 10 INJECTION, SOLUTION EPIDURAL; INFILTRATION; INTRACAUDAL at 09:55

## 2023-02-09 RX ADMIN — FERROUS SULFATE TAB 325 MG (65 MG ELEMENTAL FE) 325 MG: 325 (65 FE) TAB at 17:17

## 2023-02-09 RX ADMIN — FENTANYL CITRATE 50 MCG: 50 INJECTION, SOLUTION INTRAMUSCULAR; INTRAVENOUS at 09:16

## 2023-02-09 RX ADMIN — GABAPENTIN 300 MG: 300 CAPSULE ORAL at 17:17

## 2023-02-09 RX ADMIN — Medication 1 TABLET: at 17:17

## 2023-02-09 RX ADMIN — SODIUM CHLORIDE, SODIUM LACTATE, POTASSIUM CHLORIDE, AND CALCIUM CHLORIDE: .6; .31; .03; .02 INJECTION, SOLUTION INTRAVENOUS at 10:34

## 2023-02-09 RX ADMIN — ALUMINUM HYDROXIDE, MAGNESIUM HYDROXIDE, AND DIMETHICONE 30 ML: 200; 20; 200 SUSPENSION ORAL at 20:35

## 2023-02-09 RX ADMIN — GLYCOPYRROLATE 0.1 MG: 0.2 INJECTION, SOLUTION INTRAMUSCULAR; INTRAVENOUS at 10:12

## 2023-02-09 RX ADMIN — DOCUSATE SODIUM 100 MG: 100 CAPSULE, LIQUID FILLED ORAL at 13:26

## 2023-02-09 RX ADMIN — ONDANSETRON 4 MG: 2 INJECTION INTRAMUSCULAR; INTRAVENOUS at 11:08

## 2023-02-09 RX ADMIN — BUSPIRONE HYDROCHLORIDE 10 MG: 10 TABLET ORAL at 13:25

## 2023-02-09 RX ADMIN — CYANOCOBALAMIN TAB 500 MCG 1000 MCG: 500 TAB at 13:25

## 2023-02-09 RX ADMIN — Medication 1000 UNITS: at 13:25

## 2023-02-09 RX ADMIN — DOCUSATE SODIUM 100 MG: 100 CAPSULE, LIQUID FILLED ORAL at 18:13

## 2023-02-09 RX ADMIN — CEFAZOLIN SODIUM 2000 MG: 2 SOLUTION INTRAVENOUS at 09:50

## 2023-02-09 RX ADMIN — HYDROMORPHONE HYDROCHLORIDE 0.5 MG: 1 INJECTION, SOLUTION INTRAMUSCULAR; INTRAVENOUS; SUBCUTANEOUS at 20:46

## 2023-02-09 RX ADMIN — OXYCODONE HYDROCHLORIDE 10 MG: 10 TABLET ORAL at 17:26

## 2023-02-09 RX ADMIN — PROPOFOL 80 MCG/KG/MIN: 10 INJECTION, EMULSION INTRAVENOUS at 09:55

## 2023-02-09 RX ADMIN — DEXAMETHASONE SODIUM PHOSPHATE 10 MG: 10 INJECTION, SOLUTION INTRAMUSCULAR; INTRAVENOUS at 09:59

## 2023-02-09 RX ADMIN — MIDAZOLAM HYDROCHLORIDE 2 MG: 1 INJECTION, SOLUTION INTRAMUSCULAR; INTRAVENOUS at 09:16

## 2023-02-09 RX ADMIN — FOLIC ACID 1000 MCG: 1 TABLET ORAL at 13:25

## 2023-02-09 RX ADMIN — OXYCODONE HYDROCHLORIDE AND ACETAMINOPHEN 500 MG: 500 TABLET ORAL at 13:25

## 2023-02-09 RX ADMIN — TRANEXAMIC ACID 1000 MG: 1 INJECTION, SOLUTION INTRAVENOUS at 09:59

## 2023-02-09 RX ADMIN — SENNOSIDES 8.6 MG: 8.6 TABLET, FILM COATED ORAL at 13:25

## 2023-02-09 RX ADMIN — BUPROPION HYDROCHLORIDE 300 MG: 150 TABLET, EXTENDED RELEASE ORAL at 13:25

## 2023-02-09 RX ADMIN — PROPOFOL 50 MG: 10 INJECTION, EMULSION INTRAVENOUS at 09:55

## 2023-02-09 RX ADMIN — SODIUM CHLORIDE, SODIUM LACTATE, POTASSIUM CHLORIDE, AND CALCIUM CHLORIDE: .6; .31; .03; .02 INJECTION, SOLUTION INTRAVENOUS at 09:05

## 2023-02-09 NOTE — ANESTHESIA PROCEDURE NOTES
Peripheral Block    Patient location during procedure: holding area  Start time: 2/9/2023 9:26 AM  Reason for block: at surgeon's request and post-op pain management  Staffing  Performed: Anesthesiologist   Anesthesiologist: Denita Martinez DO  Preanesthetic Checklist  Completed: patient identified, IV checked, site marked, risks and benefits discussed, surgical consent, monitors and equipment checked, pre-op evaluation and timeout performed  Peripheral Block  Patient position: supine  Prep: ChloraPrep  Patient monitoring: continuous pulse ox, frequent blood pressure checks and heart rate  Block type: ipack block  Laterality: left  Injection technique: single-shot  Procedures: ultrasound guided, Ultrasound guidance required for the procedure to increase accuracy and safety of medication placement and decrease risk of complications    Ultrasound permanent image saved  Needle  Needle type: Stimuplex   Needle gauge: 20 G  Needle length: 4 in  Needle localization: ultrasound guidance  Test dose: negative  Assessment  Injection assessment: incremental injection, local visualized surrounding nerve on ultrasound, negative aspiration for heme and no paresthesia on injection  Paresthesia pain: none  Heart rate change: no  Slow fractionated injection: yes  Post-procedure:  site cleaned  patient tolerated the procedure well with no immediate complications

## 2023-02-09 NOTE — ANESTHESIA POSTPROCEDURE EVALUATION
Post-Op Assessment Note    CV Status:  Stable  Pain Score: 0    Pain management: adequate     Mental Status:  Alert, awake and sleepy   Hydration Status:  Euvolemic   PONV Controlled:  Controlled   Airway Patency:  Patent   Two or more mitigation strategies used for obstructive sleep apnea   Post Op Vitals Reviewed: Yes      Staff: CRNA, Anesthesiologist         No notable events documented      BP   118/56   81   Temp   97 1   Pulse  81   Resp   16   SpO2   95 2 lnc

## 2023-02-09 NOTE — DISCHARGE INSTR - AVS FIRST PAGE
Discharge Instructions - Alex Hernándze 46 y o  female MRN: [id number]  Unit/Bed#: PACU 04    Weight Bearing Status:                                           Weight Bearing as tolerated to the left lower extremity  DVT prophylaxis:  Complete course of Aspirin 81 mg twice daily x 35 days from date of surgery as directed    Pain:  Start oxycodone 5 mg every 6 hrs after last dose taken after surgery for 1 day  Transition to oxycodone 5 mg every 6 hours as needed    Showering Instructions:   Do not shower until 5 days from date of surgery  Do not soak your incision(Tubs, Jacuzzi's, pools, ext)    Dressing Instructions: May remove dressing 5 days from date of surgery or may leave dressing in place until follow up office visit 2 weeks from surgery date  Keep dressing/incision clean, dry and intact until follow up appointment  Do not apply any creams or ointments/lotions to your incisions including antibiotic ointment, peroxide    Driving Instructions:  No driving until cleared by Orthopaedic Surgery  PT/OT:  Continue PT/OT on outpatient basis as directed  Continue motion and strengthening exercises while at home    Appt Instructions: If you do not have your appointment, please call the clinic at [phone]  Otherwise followup as scheduled    Contact the office sooner if you experience any increased numbness/tingling in the extremities

## 2023-02-09 NOTE — INTERVAL H&P NOTE
H&P reviewed  After examining the patient I find no changes in the patients condition since the H&P had been written      Vitals:    02/09/23 0837   BP: 133/70   Pulse: 73   Resp: 12   Temp: 97 7 °F (36 5 °C)   SpO2: 96%   Patient seen and examined  General: No apparent distress  CV: S1-S2  Chest: No audible wheezing  Abdomen: Soft  Left knee: No abrasions or open wounds; minimal effusion; neurologically vascular intact distally  To the operating room for left total knee arthroplasty  Pros and cons of operative and nonoperative intervention discussed with patient  We will follow-up postoperatively

## 2023-02-09 NOTE — OP NOTE
Brief Op Note  Jerry Matos  MRN: [id number]      Unit/Bed#: PACU 03    PreOp Dx: left knee DJD      Postop Dx: left knee DJD      Procedure: left total knee arthroplasty      Surgeon: Jerome Acevedo MD      Assistants:Paul Camacho PA-C      No Qualified Resident Available for this Case  Physician assistant was needed for all portions of this case including prepping draping the patient as well as prepping and final implantation of the femoral, tibial, and patellar components    Fluids:       EBL:       Drains: none      Specimens: No       Complications: No       Condition: stable transferred to postanesthesia care unit      Implants: Depuy Attune RP  Femoral, size: 5  Tibial tray: 6  Polyethylene: 6  Patellar button: 28      46 y  o female, presents at this time, secondary to treatment of left knee DJD with valgus deformity which has failed conservative treatment  The patient was told of all the pros and cons of operative and nonoperative intervention  Some of the complications of operative intervention include infection, bleeding, scarring, nerve injury, vascular injury, fracture, continued pain, decreased range of motion, DVT, PE death, loss of limb, need for further surgery, not obtain an results  The patient wished  Patient did consent for operative intervention for this pathology  Patient was brought to the operating room  Patient was anesthetized as anesthesia team  Patient was prepped and draped in normal sterile fashion  After this was done, we did conduct a time out to make sure correct  Patient was in the room, prepped marked and draped  Implants were in the room, Rep  For the implants were present, DVT prophylaxis and antibiotics were addressed  Midline incision was made over the anterior knee after going through skin, fatty tissue, fascia was identified  Flaps were created both medially and laterally  Medial parapatellar incision was made  Excision of Hoffa fat pad was conducted   At this time because this was a valgus knee, we did expose the medial and lateral plateaus, but no collateral releases were done  We're able to excise the fatty tissue from the anterior aspect of the distal femur  We were able to at this time, flex the knee with the patella everted  Intramedullary reamer was used  Intramedullary guide for the femur was then placed to determine how much of the distal femur to cut and also, valgus cut angle  Pins were then placed  Intramedullary guide was removed  Distal femoral cut was made  Attention was now to the proximal tibia  Extramedullary guide was used  After making sure that we took the appropriate amount from the medial and lateral side with the aid of a measuring device, the jig was held in place with pins  Protection of the medial and lateral ligaments, as well as the popliteal region, was done  Proximal tibial cut was made  Extension gaps were evaluated  Size of the femur was then determined with the aid of a guide  After this was done, we placed the appropriate sized block  These were held down with pins  Anterior and posterior cuts were made  Anterior, posterior, chamfer cuts were made  We did check our flexion gap and was noted to be appropriate  This was a PCL sacrificing device being used Therefore a box cut was made  Tibial tray size was determined  This was held down with 2 small screws  The reamer and the punch was then used with the appropriate guide to make sure that these were all done, the appropriate manner  Guide was removed  Trial femoral component was placed  Trial tibial polyethylene was placed  Patient was noted to be stable  On coronal and sagittal planes  Patellar button size was determined after the articular surface of the patella was excised  The patella button was placed on the medial aspect of the patella  Holes were drilled for our true patella button to be cemented on   We tracked the knee, and we noted that the patella was tracking appropriately over the trochlear of the trial implants  After this was done we did drill holes in our trial femoral component  We then removed  All trial components  Copious irrigation was used at the operative site  We did place some bone within the intramedullary canal of the femur  High viscocity cement was used and all components were placed, including the femur, tibia, and patella button  A trial tibial Polyethylene was placed  After cement had dried, removed the trial polyethylene and removed any excess cement that was in the popliteal region  Copious irrigation was used  The tibial polyethylene was then placed  Patient was placed in the appropriate ranges of motion and patient's Knee was noted to be stable  Tourniquet was discontinued  Hemostasis was obtained  #1 Vicryl sutures were used to reapproximate the parapatellar incision  2-0 Vicryl sutures for the subcutaneous closure  This was reinforced with staples  Wounds were cleaned and dried  Acticoat, 4 x 4, ABDs and web roll was placed prior to Ace bandage be placed from the foot  All the way to the mid thigh region    Patient was awakened as anesthesia team noted to be a stable condition and transferred to postanesthesia care unit

## 2023-02-09 NOTE — PHYSICAL THERAPY NOTE
PHYSICAL THERAPY EVALUATION & TREATMENT  DATE: 02/09/23  TIME: 5799-2954    NAME:  Julienne Sandoval  AGE:   46 y o  Mrn:   [id number]  Length Of Stay: 0    ADMIT DX:  Primary osteoarthritis of left knee [M17 12]    Past Medical History:   Diagnosis Date    Anxiety     Asthma     Cancer ([facility] )     endometrial    Chronic pain syndrome     CPAP (continuous positive airway pressure) dependence     Depression     Diabetes mellitus ([facility] )     Pre diabetic    GERD (gastroesophageal reflux disease)     HTN (hypertension)     Hyperlipidemia     Hypertension     Lumbar radiculopathy     Prediabetes     Sleep apnea      Past Surgical History:   Procedure Laterality Date    GASTRECTOMY SLEEVE LAPAROSCOPIC      HYSTERECTOMY      KNEE ARTHROSCOPY Right     LUMBAR FUSION      ME BREAST REDUCTION Bilateral 9/23/2022    Procedure: BILATERAL BREAST REDUCTION;  Surgeon: Ced Arriaza MD;  Location: [facility];  Service: Plastics    MARY-EN-Y PROCEDURE  05/27/2021    Sleve    TOTAL HIP ARTHROPLASTY Bilateral        Performed at least 2 patient identifiers during session: Name and ID bracelet     02/09/23 1536   PT Last Visit   PT Visit Date 02/09/23   Note Type   Note type Evaluation  (& treatment)   Pain Assessment   Pain Assessment Tool 0-10   Pain Score 7  (increased to an 8/10 with mobility)   Pain Location/Orientation Orientation: Left; Location: Knee   Pain Onset/Description Onset: Ongoing; Descriptor: Sore;Descriptor: Discomfort   Effect of Pain on Daily Activities limits gait speed and gait mechanics   Patient's Stated Pain Goal No pain   Hospital Pain Intervention(s) Cold applied;Repositioned; Ambulation/increased activity; Emotional support   Multiple Pain Sites No   Restrictions/Precautions   Weight Bearing Precautions Per Order Yes   LLE Weight Bearing Per Order WBAT  (s/p L TKA 2/9/2023)   Braces or Orthoses Other (Comment)  (L LE ACE wrap)   Other Precautions Chair Alarm; Bed Alarm; Impulsive;WBS;Multiple lines; Fall Risk;Pain Home Living   Type of [address] [facility] One level;Stairs to enter with rails  (1+7+7 steps with BHR then 25ft walkway to enter the apartment, single level living once inside)   Bathroom Shower/Tub Tub/shower unit   [employer] Grab bars in shower; Shower chair   Bathroom Accessibility Accessible via walker   [address]  (pt reports previously getting a walker from a friend but it is currently broken, interested in getting a new walker)   Prior Function   Level of [place] Independent with ADLs; Independent with functional mobility; Independent with IADLS   Lives With Tripp Waller Help From Family;Friend(s); Outpatient therapy  (+OPPT set up post op)   IADLs Independent with driving; Independent with meal prep; Independent with medication management  (pt's son & friend will be providing transportation to needs post op)   Falls in the last 6 months 1 to 4  (pt reports 3 falls since summer 2022)   Vocational Unemployed   Comments Pt lives at home with her son in a 2nd floor apartment with + KRISTOPHER  Pt reports her son and friend have off work to provide assistance upon return to home post op  Pt is independent with all self care and functional mobility with no AD at baseline  General   Additional Pertinent History Pt presents POD0 s/p L TKA by Dr Kalia Flores 2/9/2023, WBAT L LE     Family/Caregiver Present No   Cognition   Overall Cognitive Status WFL   Arousal/Participation Cooperative   Orientation Level Oriented X4   Memory Within functional limits   Following Commands Follows all commands and directions without difficulty   Subjective   Subjective "I know what to do "   RUE Assessment   RUE Assessment WFL   LUE Assessment   LUE Assessment WFL   RLE Assessment   RLE Assessment WFL   LLE Assessment   LLE Assessment WFL  (POD0 s/p L TKA, tested to 3/5 MMT throughout)   Coordination   Movements are Fluid and Coordinated 1   Sensation [facility]   Light Touch   RLE Light Touch Grossly intact   LLE Light Touch Grossly intact   Proprioception   RLE Proprioception Grossly intact   LLE Proprioception Grossly Intact   Bed Mobility   Supine to Sit 6  Modified independent   Additional items HOB elevated; Bedrails;Verbal cues   Sit to Supine Unable to assess   Additional Comments Pt was left seated OOB in recliner chair at end of session  Transfers   Sit to Stand 5  Supervision   Additional items Assist x 1;Verbal cues; Increased time required; Impulsive   Stand to Sit 5  Supervision   Additional items Assist x 1; Increased time required;Verbal cues; Impulsive   Stand pivot 5  Supervision   Additional items Assist x 1; Increased time required;Verbal cues  (RW)   Additional Comments Cues for hand placement for safe transfer and optimal mechanics, pt with limited application and carry over t/o session as pt pulling up on RW to achieve standing position  Ambulation/Elevation   Gait pattern Antalgic;Decreased heel strike  (step to pattern progressed to step through pattern with cues)   Gait Assistance 5  Supervision   Additional items Assist x 1;Verbal cues   Assistive Device Rolling walker   Distance 130ft x1 with multiple changes in direction   Stair Management Assistance Not tested  (pt has 1+7+7 KRISTOPHER her home with BHR, will assess elevations next visit)   Balance   Static Sitting Good   Dynamic Sitting Fair +   Static Standing Good  (w/ RW support)   Dynamic Standing Fair +  (w/ RW support)   Ambulatory Fair +  (w/ RW support)   Endurance Deficit   Endurance Deficit Yes   Activity Tolerance   Activity Tolerance Patient limited by fatigue;Patient limited by pain   Medical Staff Made Aware Spoke with CM, OT   Nurse Made Aware Spoke with NICKY Rivas   Assessment   Prognosis Excellent   Problem List Decreased strength;Decreased range of motion; Impaired balance;Decreased mobility; Decreased skin integrity;Orthopedic restrictions;Pain   Assessment Pt seen for PT evaluation POD0 s/p elective L TKA by   [place] on 2/9/2023  Comorbidities affecting pt's fnxl performance include: falls, OA, obesity s/p gastric bypass, asthma, HTN, depression, anxiety, chronic pain syndrome, lumbar and cervical spondylosis  Pt demonstrates impairments in L LE strength and ROM, pain, balance, gait mechanics, gait speed, safety, lack of HEP  Pt was educated on importance of frequent mobility & exercises, paper HEP handout provided  Pt verbalizes good understanding  Pt scores 18/24 on AM-PAC objective tool w/ a standardized score of 41 05, indicating pt demonstrates functional capacity for return to home self care vs with increased supports upon d/c  Barthel Index outcome tool was used & pt scores 60 indicating moderate limitations of fnxl mobility/ADLS  Pt is at risk of falls d/t impulsivity, h/o falls, use of ambulatory aid, varying levels of pain  and acuity of medical illness  Pt will benefit from continued PT treatment in order to address impairments, decrease risk of falls, maximize independence w/ fnxl mobility, & ensure safety w/ mobility for ultimate goal of return to home  Recommend return to home with family supports and OPPT f/u upon d/c    Barriers to Discharge None  (pending progress with elevations)   Goals   Patient Goals "to be able to use an elliptical"   STG Expiration Date 02/14/23   Short Term Goal #1 Patient PT goals established in order to maximize mobility & safety, and progress to next level of PT care   Pt will: complete all bed mobility in flat bed independently in order to promote independence & simulate a home environment; complete all transfers w/ RW at [facility] level in order to increase safety &  independence; ambulate >250ft with RW at [facility] level in order to increase safety with household and community distance functional mobility; negotiate 10-12 stairs with BHR assist and S in order to facilitate safe access to her home; improve L LE strength to >/= 4/5 MMT t/o in order to increase safety & decrease risk of falls; demonstrate independence w/ 3-5 LE strengthening exercises to facilitate independence w/ HEP; improve AM-PAC score to >/= 23/24 in order to increase independence with mobility and decrease burden of care; improve Barthel Index score to >/= 70/100 in order to increase independence and decrease risk of falls  PT Treatment Day 0   Plan   Treatment/Interventions Functional transfer training;LE strengthening/ROM; Elevations; Therapeutic exercise;Patient/family training;Bed mobility;Gait training;Spoke to nursing;Spoke to case management   PT Frequency Twice a day   Recommendation   PT Discharge Recommendation Home with outpatient rehabilitation   Equipment Recommended [address] [facility] Recommended Wheeled walker   Change/add to GAMEVIL? No   AM-PAC Basic Mobility Inpatient   Turning in Flat Bed Without Bedrails 3   Lying on Back to Sitting on Edge of Flat Bed Without Bedrails 3   Moving Bed to Chair 3   Standing Up From Chair Using Arms 3   Walk in Room 3   Climb 3-5 Stairs With Railing 3   Basic Mobility Inpatient Raw Score 18   Basic Mobility Standardized Score 41 05   Highest Level Of Mobility   JH-HLM Goal 6: Walk 10 steps or more   JH-HLM Achieved 7: Walk 25 feet or more   Modified [place] Scale   Modified Mike Scale 3   Barthel Index   Feeding 10   Bathing 0   Grooming Score 5   Dressing Score 5   Bladder Score 0   Bowels Score 10   Toilet Use Score 5   Transfers (Bed/Chair) Score 10   Mobility (Level Surface) Score 10   Stairs Score 5   Barthel Index Score 60   Additional Treatment Session   Start Time 1515   End Time 1536   Treatment Assessment PT provided pt w/ post op HEP for improved AROM, strength, edema, & circulation  Therapist discussed exercises, and pt completes below outlined exercises 3x10 without cues, pt denies questions re: completion of HEP  Will f/u as able for assessment of pt application & technique of remainder of exercises  Pt w/ OPPT f/u scheduled     Equipment Use Recommend RN staffing SBA and pt use of RW for all functional mobility during remainder of her stay  Additional Treatment Day 1   Exercises   Heelslides Supine;AROM; Bilateral  (3 sets x10 reps)   Hip Flexion Supine;AROM; Bilateral  (3 sets x10 reps)   Knee AROM Long Arc Quad Sitting;AROM; Left  (3 sets x10 reps)   TKR Left   End of Consult   Patient Position at End of Consult Bedside chair;Bed/Chair alarm activated; All needs within reach     The patient's AM-PAC Basic Mobility Inpatient Short Form Raw Score is 18  A Raw score of greater than 16 suggests the patient may benefit from discharge to home  Please also refer to the recommendation of the Physical Therapist for safe discharge planning        Edurado Estrada PT, DPT   Available via [employer]  NPI # [id number]  [place] License - [id number]  3/1/1417

## 2023-02-09 NOTE — PLAN OF CARE
Problem: PHYSICAL THERAPY ADULT  Goal: Performs mobility at highest level of function for planned discharge setting  See evaluation for individualized goals  Description: Treatment/Interventions: Functional transfer training, LE strengthening/ROM, Elevations, Therapeutic exercise, Patient/family training, Bed mobility, Gait training, Spoke to nursing, Spoke to case management    Equipment Recommended: Alejandrina Donahue     See flowsheet documentation for full assessment, interventions and recommendations  Outcome: Progressing  Note: Prognosis: Excellent  Problem List: Decreased strength, Decreased range of motion, Impaired balance, Decreased mobility, Decreased skin integrity, Orthopedic restrictions, Pain  Assessment: Pt seen for PT evaluation POD0 s/p elective L TKA by Dr Saurabh Savage on 2/9/2023  Comorbidities affecting pt's fnxl performance include: falls, OA, obesity s/p gastric bypass, asthma, HTN, depression, anxiety, chronic pain syndrome, lumbar and cervical spondylosis  Pt demonstrates impairments in L LE strength and ROM, pain, balance, gait mechanics, gait speed, safety, lack of HEP  Pt was educated on importance of frequent mobility & exercises, paper HEP handout provided  Pt verbalizes good understanding  Pt scores 18/24 on AM-PAC objective tool w/ a standardized score of 41 05, indicating pt demonstrates functional capacity for return to home self care vs with increased supports upon d/c  Barthel Index outcome tool was used & pt scores 60 indicating moderate limitations of fnxl mobility/ADLS  Pt is at risk of falls d/t impulsivity, h/o falls, use of ambulatory aid, varying levels of pain  and acuity of medical illness  Pt will benefit from continued PT treatment in order to address impairments, decrease risk of falls, maximize independence w/ fnxl mobility, & ensure safety w/ mobility for ultimate goal of return to home   Recommend return to home with family supports and OPPT f/u upon d/c     Barriers to Discharge: None (pending progress with elevations)     PT Discharge Recommendation: Home with outpatient rehabilitation    See flowsheet documentation for full assessment

## 2023-02-09 NOTE — ANESTHESIA PROCEDURE NOTES
Peripheral Block    Patient location during procedure: holding area  Start time: 2/9/2023 9:19 AM  Reason for block: at surgeon's request and post-op pain management  Staffing  Performed: Anesthesiologist   Anesthesiologist: Dore Kayser, DO  Preanesthetic Checklist  Completed: patient identified, IV checked, site marked, risks and benefits discussed, surgical consent, monitors and equipment checked, pre-op evaluation and timeout performed  Peripheral Block  Patient position: supine  Prep: ChloraPrep  Patient monitoring: continuous pulse ox, frequent blood pressure checks and heart rate  Block type: adductor canal block  Laterality: left  Injection technique: single-shot  Procedures: ultrasound guided, Ultrasound guidance required for the procedure to increase accuracy and safety of medication placement and decrease risk of complications    Ultrasound permanent image saved  Needle  Needle type: Stimuplex   Needle gauge: 20 G  Needle length: 4 in  Needle localization: ultrasound guidance  Test dose: negative  Assessment  Injection assessment: incremental injection, local visualized surrounding nerve on ultrasound, negative aspiration for heme and no paresthesia on injection  Paresthesia pain: none  Heart rate change: no  Slow fractionated injection: yes  Post-procedure:  site cleaned  patient tolerated the procedure well with no immediate complications

## 2023-02-09 NOTE — PLAN OF CARE
Problem: OCCUPATIONAL THERAPY ADULT  Goal: Performs self-care activities at highest level of function for planned discharge setting  See evaluation for individualized goals  Description: Treatment Interventions: ADL retraining, Functional transfer training, Endurance training, Patient/family training, Equipment evaluation/education, Compensatory technique education, Energy conservation, Activityengagement          See flowsheet documentation for full assessment, interventions and recommendations  Outcome: Progressing  Note: Limitation: Decreased ADL status, Decreased endurance, Decreased high-level ADLs, Decreased self-care trans  Prognosis: Good  Assessment: Patient is a 46 y o  female seen for OT evaluation at [address] [place] [address] following admission on 2/9/2023  s/p <principal problem not specified>  Comorbidities and significant PMHx impacting functional performance include: hypertension, hyperlipidemia, prediabetes,asthma, GERD, depression and anxiety  Patient presents with active orders for OT eval and treat  Performed at least 2 patient identifiers during session including name and wristband  Prior to admission, patient was independent with functional mobility without assistive device, independent with ADLS and independent with IADLS  Upon initial evaluation, patient requires supervision for UB ADLs, min A for LB ADLs, and supervision for transfers and functional mobility community distance  with RW  Based on functional eval, pt presents with intact  attention, intact  safety awareness, intact  problem solving skills, and intact  short/long term memory  Occupational performance is affected by the following deficits: decreased standing tolerance for self care tasks , decreased activity tolerance , impulsive behavior and (+) pain  The AM-PAC & Barthel Index outcome tools were used to assist in determining pt safety w/self care /mobility and appropriate d/c recommendations, see above for score   Based on these findings, functional performance deficits, and medical complexity pt has been identified as a high complexity evaluation  Personal factors impacting performance include: decreased steps to enter home, decreased ADL independence , decreased IADL independence and High fall risk   Patient would benefit from OT services within the acute care setting to maximize level of functional independence in the following occupational areas bathing/showering, toileting, dressing , personal hygiene/grooming , bed mobility , functional mobility, transfer to all surfaces, activity engagement , safety procedures , fall prevention  and energy conservation    From OT standpoint, recommendation at time of D/C would be return to previous environment with no rehabilitation needs       OT Discharge Recommendation: No rehabilitation needs (OOP PT)     Lilo Waller MS OTR/L

## 2023-02-09 NOTE — PLAN OF CARE
Problem: Prexisting or High Potential for Compromised Skin Integrity  Goal: Skin integrity is maintained or improved  Description: INTERVENTIONS:  - Identify patients at risk for skin breakdown  - Assess and monitor skin integrity  - Assess and monitor nutrition and hydration status  - Monitor labs   - Assess for incontinence   - Turn and reposition patient  - Assist with mobility/ambulation  - Relieve pressure over bony prominences  - Avoid friction and shearing  - Provide appropriate hygiene as needed including keeping skin clean and dry  - Evaluate need for skin moisturizer/barrier cream  - Collaborate with interdisciplinary team   - Patient/family teaching  - Consider wound care consult   Outcome: Progressing     Problem: MOBILITY - ADULT  Goal: Maintain or return to baseline ADL function  Description: INTERVENTIONS:  -  Assess patient's ability to carry out ADLs; assess patient's baseline for ADL function and identify physical deficits which impact ability to perform ADLs (bathing, care of mouth/teeth, toileting, grooming, dressing, etc )  - Assess/evaluate cause of self-care deficits   - Assess range of motion  - Assess patient's mobility; develop plan if impaired  - Assess patient's need for assistive devices and provide as appropriate  - Encourage maximum independence but intervene and supervise when necessary  - Involve family in performance of ADLs  - Assess for home care needs following discharge   - Consider OT consult to assist with ADL evaluation and planning for discharge  - Provide patient education as appropriate  Outcome: Progressing  Goal: Maintains/Returns to pre admission functional level  Description: INTERVENTIONS:  - Perform BMAT or MOVE assessment daily    - Set and communicate daily mobility goal to care team and patient/family/caregiver     - Collaborate with rehabilitation services on mobility goals if consulted  - Out of bed for toileting  - Record patient progress and toleration of activity level   Outcome: Progressing     Problem: PAIN - ADULT  Goal: Verbalizes/displays adequate comfort level or baseline comfort level  Description: Interventions:  - Encourage patient to monitor pain and request assistance  - Assess pain using appropriate pain scale  - Administer analgesics based on type and severity of pain and evaluate response  - Implement non-pharmacological measures as appropriate and evaluate response  - Consider cultural and social influences on pain and pain management  - Notify physician/advanced practitioner if interventions unsuccessful or patient reports new pain  Outcome: Progressing     Problem: INFECTION - ADULT  Goal: Absence or prevention of progression during hospitalization  Description: INTERVENTIONS:  - Assess and monitor for signs and symptoms of infection  - Monitor lab/diagnostic results  - Monitor all insertion sites, i e  indwelling lines, tubes, and drains  - Monitor endotracheal if appropriate and nasal secretions for changes in amount and color  - [place] appropriate cooling/warming therapies per order  - Administer medications as ordered  - Instruct and encourage patient and family to use good hand hygiene technique  - Identify and instruct in appropriate isolation precautions for identified infection/condition  Outcome: Progressing  Goal: Absence of fever/infection during neutropenic period  Description: INTERVENTIONS:  - Monitor WBC    Outcome: Progressing     Problem: SAFETY ADULT  Goal: Maintain or return to baseline ADL function  Description: INTERVENTIONS:  -  Assess patient's ability to carry out ADLs; assess patient's baseline for ADL function and identify physical deficits which impact ability to perform ADLs (bathing, care of mouth/teeth, toileting, grooming, dressing, etc )  - Assess/evaluate cause of self-care deficits   - Assess range of motion  - Assess patient's mobility; develop plan if impaired  - Assess patient's need for assistive devices and provide as appropriate  - Encourage maximum independence but intervene and supervise when necessary  - Involve family in performance of ADLs  - Assess for home care needs following discharge   - Consider OT consult to assist with ADL evaluation and planning for discharge  - Provide patient education as appropriate  Outcome: Progressing  Goal: Maintains/Returns to pre admission functional level  Description: INTERVENTIONS:  - Perform BMAT or MOVE assessment daily    - Set and communicate daily mobility goal to care team and patient/family/caregiver     - Collaborate with rehabilitation services on mobility goals if consulted  - Out of bed for toileting  - Record patient progress and toleration of activity level   Outcome: Progressing  Goal: Patient will remain free of falls  Description: INTERVENTIONS:  - Educate patient/family on patient safety including physical limitations  - Instruct patient to call for assistance with activity   - Consult OT/PT to assist with strengthening/mobility   - Keep Call bell within reach  - Keep bed low and locked with side rails adjusted as appropriate  - Keep care items and personal belongings within reach  - Initiate and maintain comfort rounds  - Make Fall Risk Sign visible to staff  - Apply yellow socks and bracelet for high fall risk patients  - Consider moving patient to room near nurses station  Outcome: Progressing     Problem: DISCHARGE PLANNING  Goal: Discharge to home or other facility with appropriate resources  Description: INTERVENTIONS:  - Identify barriers to discharge w/patient and caregiver  - Arrange for needed discharge resources and transportation as appropriate  - Identify discharge learning needs (meds, wound care, etc )  - Arrange for interpretive services to assist at discharge as needed  - Refer to Case Management Department for coordinating discharge planning if the patient needs post-hospital services based on physician/advanced practitioner order or complex needs related to functional status, cognitive ability, or social support system  Outcome: Progressing     Problem: Knowledge Deficit  Goal: Patient/family/caregiver demonstrates understanding of disease process, treatment plan, medications, and discharge instructions  Description: Complete learning assessment and assess knowledge base    Interventions:  - Provide teaching at level of understanding  - Provide teaching via preferred learning methods  Outcome: Progressing     Problem: Potential for Falls  Goal: Patient will remain free of falls  Description: INTERVENTIONS:  - Educate patient/family on patient safety including physical limitations  - Instruct patient to call for assistance with activity   - Consult OT/PT to assist with strengthening/mobility   - Keep Call bell within reach  - Keep bed low and locked with side rails adjusted as appropriate  - Keep care items and personal belongings within reach  - Initiate and maintain comfort rounds  - Make Fall Risk Sign visible to staff  - Apply yellow socks and bracelet for high fall risk patients  - Consider moving patient to room near nurses station  Outcome: Progressing

## 2023-02-09 NOTE — OCCUPATIONAL THERAPY NOTE
Occupational Therapy Evaluation     Patient Name: Pollo Moctezuma  MJIKQ'[id number] Date: 2/9/2023  Problem List  Active Problems:    Sacroiliitis ([place] [address] )    Primary osteoarthritis of left knee    Hypertension    Hyperlipidemia    Prediabetes    Asthma    GERD (gastroesophageal reflux disease)    Sleep apnea    Depression    Anxiety    Diabetes mellitus, type 2 ([facility] )    Past Medical History  Past Medical History:   Diagnosis Date    Anxiety     Asthma     Cancer ([place] [address] )     endometrial    Chronic pain syndrome     CPAP (continuous positive airway pressure) dependence     Depression     Diabetes mellitus ([place] [address] )     Pre diabetic    GERD (gastroesophageal reflux disease)     HTN (hypertension)     Hyperlipidemia     Hypertension     Lumbar radiculopathy     Prediabetes     Sleep apnea      Past Surgical History  Past Surgical History:   Procedure Laterality Date    GASTRECTOMY SLEEVE LAPAROSCOPIC      HYSTERECTOMY      KNEE ARTHROSCOPY Right     LUMBAR FUSION      NM BREAST REDUCTION Bilateral 9/23/2022    Procedure: BILATERAL BREAST REDUCTION;  Surgeon: Brunilda Alvares MD;  Location: [facility];  Service: Plastics    MARY-EN-Y PROCEDURE  05/27/2021    Sleve    TOTAL HIP ARTHROPLASTY Bilateral         02/09/23 1529   Note Type   Note type Evaluation   Pain Assessment   Pain Assessment Tool 0-10   Pain Score 7  (8/10 with funcitonal mobility)   Pain Location/Orientation Orientation: Left; Location: Knee   Pain Onset/Description Onset: Ongoing   Effect of Pain on Daily Activities limits acitivty tolerance and comfort   Patient's Stated Pain Goal No pain   Hospital Pain Intervention(s) Cold applied;Repositioned; Ambulation/increased activity   Multiple Pain Sites No   Restrictions/Precautions   Weight Bearing Precautions Per Order Yes   LLE Weight Bearing Per Order WBAT  (s/p POD#0 L TKA)   Other Precautions Chair Alarm; Bed Alarm;WBS;Multiple lines; Impulsive; Fall Risk;Pain   Home Living   Type of [address] [place] [address] level;Stairs to enter with rails  (1+7+7 steps with BHR then 25ft walkway to enter the apartment, single level living once inside)   Bathroom Shower/Tub Tub/shower unit   [employer] Grab bars in shower; Shower chair   Bathroom Accessibility Accessible via walker   Home Equipment Cane;Walker  (pt reports previously getting a walker from a friend but it is currently broken, interested in getting a new walker)   Prior Function   Level of [place] Independent with ADLs; Independent with functional mobility; Independent with IADLS   Lives With Patience Cortez Help From Family;Friend(s); Outpatient therapy  ([facility]OP PT)   IADLs Independent with driving; Independent with meal prep; Independent with medication management  (Pt reports her son, friend and family will be assisting with transportation to post OP appts and any other needs)   Falls in the last 6 months 1 to 4  (pt reports 3 falls since summer 2022)   Vocational Unemployed   Lifestyle   Autonomy PTA pt lives at home with her son in a 2nd floor apartment with + KRISTOPHER   At baseline she is I w/ ADLS, IADLs and functional mobility   Reciprocal Relationships Family, Son   Service to Others Unemployed   Intrinsic Gratification Knitting   General   Additional Pertinent History Pt POD#0 L TKA; WBAT   Family/Caregiver Present No   Subjective   Subjective "I know what I am doing, I am a vet"   ADL   Eating Assistance 7  [place] 5  Supervision/Setup    [address] 5  Supervision/Setup   LB Dressing Assistance 4  [address]  4  Minimal Assistance   Additional Comments Patient limited by pain and decreased standing tolerance   Bed Mobility   Supine to Sit 6  Modified independent   Additional items HOB elevated;Verbal cues   Sit to Supine Unable to assess   Additional Comments Pt OOB to recliner chair   Transfers   Sit to Stand 5  Supervision   Additional items Assist x 1;Verbal cues; Increased time required; Impulsive   Stand to Sit 5  Supervision   Additional items Assist x 1; Increased time required;Verbal cues; Impulsive   Stand pivot 5  Supervision   Additional items Assist x 1; Increased time required; Impulsive;Verbal cues   Additional Comments Verbal cues for proper hand placement and body mechanics during funcitonal transfer traning and mobility   Functional Mobility   Functional Mobility 5  Supervision   Additional Comments assist x 1 for household distance with use of RW   Balance   Static Sitting Good   Dynamic Sitting Fair +   Static Standing Fair +   Dynamic Standing Fair +   Activity Tolerance   Activity Tolerance Patient limited by fatigue;Patient limited by pain   Medical Staff Mahogany coordinationNORBERTOIM, PT   Nurse Made Aware Spoke with NICKY Rivas pre/post session   RUE Assessment   RUE Assessment WFL   LUE Assessment   LUE Assessment WFL   Cognition   Overall Cognitive Status WFL   Arousal/Participation Alert; Responsive; Cooperative   Attention Within functional limits   Orientation Level Oriented X4   Memory Within functional limits   Following Commands Follows all commands and directions without difficulty   Assessment   Limitation Decreased ADL status; Decreased endurance;Decreased high-level ADLs; Decreased self-care trans   Prognosis Good   Assessment Patient is a 46 y o  female seen for OT evaluation at [address] [place] [address] following admission on 2/9/2023  s/p <principal problem not specified>  Comorbidities and significant PMHx impacting functional performance include: hypertension, hyperlipidemia, prediabetes,asthma, GERD, depression and anxiety  Patient presents with active orders for OT eval and treat  Performed at least 2 patient identifiers during session including name and wristband   Prior to admission, patient was independent with functional mobility without assistive device, independent with ADLS and independent with IADLS  Upon initial evaluation, patient requires supervision for UB ADLs, min A for LB ADLs, and supervision for transfers and functional mobility community distance  with RW  Based on functional eval, pt presents with intact  attention, intact  safety awareness, intact  problem solving skills, and intact  short/long term memory  Occupational performance is affected by the following deficits: decreased standing tolerance for self care tasks , decreased activity tolerance , impulsive behavior and (+) pain  The AM-PAC & Barthel Index outcome tools were used to assist in determining pt safety w/self care /mobility and appropriate d/c recommendations, see above for score  Based on these findings, functional performance deficits, and medical complexity pt has been identified as a high complexity evaluation  Personal factors impacting performance include: decreased steps to enter home, decreased ADL independence , decreased IADL independence and High fall risk   Patient would benefit from OT services within the acute care setting to maximize level of functional independence in the following occupational areas bathing/showering, toileting, dressing , personal hygiene/grooming , bed mobility , functional mobility, transfer to all surfaces, activity engagement , safety procedures , fall prevention  and energy conservation   From OT standpoint, recommendation at time of D/C would be return to previous environment with no rehabilitation needs   Goals   Patient Goals "to be able to use the elliptical"   LTG Time Frame 10-14   Long Term Goal See below   Plan   Treatment Interventions ADL retraining;Functional transfer training; Endurance training;Patient/family training;Equipment evaluation/education; Compensatory technique education; Energy conservation; Activityengagement   Goal Expiration Date 02/19/23   OT Treatment Day 0   OT Frequency 3-5x/wk   Recommendation   OT Discharge Recommendation No rehabilitation needs  (OOP PT)   Additional Comments  The patient's raw score on the AM-PAC Daily Activity Inpatient Short Form is 21  A raw score of greater than or equal to 19 suggests the patient may benefit from discharge to home  Please refer to the recommendation of the Occupational Therapist for safe discharge planning  AM-PAC Daily Activity Inpatient   Lower Body Dressing 3   Bathing 3   Toileting 3   Upper Body Dressing 4   Grooming 4   Eating 4   Daily Activity Raw Score 21   Daily Activity Standardized Score (Calc for Raw Score >=11) 44 27   AM-[facility] Applied Cognition Inpatient   Following a Speech/Presentation 4   Understanding Ordinary Conversation 4   Taking Medications 4   Remembering Where Things Are Placed or Put Away 4   Remembering List of 4-5 Errands 4   Taking Care of Complicated Tasks 4   Applied Cognition Raw Score 24   Applied Cognition Standardized Score 62 21   Barthel Index   Feeding 10   Bathing 0   Grooming Score 5   Dressing Score 5   Bladder Score 0   Bowels Score 10   Toilet Use Score 5   Transfers (Bed/Chair) Score 10   Mobility (Level Surface) Score 10   Stairs Score 5   Barthel Index Score 60   End of Consult   Education Provided Yes   Patient Position at End of Consult Bedside chair;Bed/Chair alarm activated; All needs within reach   Nurse Communication Nurse aware of consult   End of Consult Comments Pt benefited from co-session of skilled OT and PT therapists in order to most appropriately address functional deficits d/t extensive physical assistance required for safe mobilit  and decreased activity tolerance  Pt s/p POD#0 L TKA  OT/PT objectives were addressed separately; please see PT note for specific goal areas targeted  Pt will complete UB ADLs Independent  as needed for increased ADL independence within 10 days  Pt will complete LB ADLs Independent   with use of LHAE as needed for increased ADL independence within 10 days       Pt will verbalize and demonstrate understanding in use of compensatory techniques and use of long handled AE for increased safety and independence during LB ADL tasks  Pt will complete toileting Independent   with use of DME for increased ADL independence within 10 days  Pt will demonstrate proper body mechanics to complete self-care transfers and functional mobility with Independent  and use of AD PRN for increased safety and functional independence within 10 days  Pt will demonstrate standing tolerance of 15 min with Independent  and use of AD PRN for increased activity tolerance during ADL/IADL tasks within 10 days  Pt will demonstrate proper body mechanics and fall prevention strategies during 100% of tx sessions for increased safety awareness during ADL/IADLs    Pt will demonstrate OOB sitting tolerance of 2-4 hr/day for increased activity tolerance and engagement in self care tasks within 10 days  Pt will verbalize and demonstrate understanding of post-op movement precautions and WB status in 100% of tx sessions for increased safety and functional mobility  Pt will demonstrate use of pain management techniques PRN for increased activity tolerance and engagement       Mayda Zuniga, MS OTR/L

## 2023-02-09 NOTE — ANESTHESIA PROCEDURE NOTES
Spinal Block    Patient location during procedure: OR  Start time: 2/9/2023 9:25 AM  Reason for block: procedure for pain, at surgeon's request and primary anesthetic  Staffing  Performed: Anesthesiologist   Preanesthetic Checklist  Completed: patient identified, IV checked, site marked, risks and benefits discussed, surgical consent, monitors and equipment checked, pre-op evaluation and timeout performed  Spinal Block  Patient position: sitting  Prep: ChloraPrep  Patient monitoring: cardiac monitor and frequent blood pressure checks  Approach: midline  Location: L3-4  Injection technique: single-shot  Needle  Needle type: pencil-tip   Needle gauge: 25 G  Needle length: 10 cm  Assessment  Sensory level: T4  Injection Assessment:  negative aspiration for heme, no paresthesia on injection and positive aspiration for clear CSF    Post-procedure:  adhesive bandage applied, pressure dressing applied, secured with tape, site cleaned and sterile dressing applied

## 2023-02-09 NOTE — ANESTHESIA PREPROCEDURE EVALUATION
Procedure:  ARTHROPLASTY KNEE TOTAL AND ALL ASSOCIATED PROCEDURES (Left: Knee)    Relevant Problems   ANESTHESIA (within normal limits)      CARDIO   (+) Hyperlipidemia   (+) Hypertension      ENDO   (+) Diabetes mellitus, type 2 (HCC)   (-) Hyperthyroidism   (-) Hypothyroidism      GI/HEPATIC   (+) GERD (gastroesophageal reflux disease)      /RENAL (within normal limits)      GYN (within normal limits)      HEMATOLOGY (within normal limits)      MUSCULOSKELETAL   (+) Cervical spondylosis   (+) Degenerative disc disease, cervical   (+) Lumbar spondylosis   (+) Primary osteoarthritis of left knee   (+) Sacroiliitis (HCC)      NEURO/PSYCH   (+) Anxiety   (+) Chronic pain syndrome   (+) Depression      PULMONARY   (+) Asthma   (+) Sleep apnea        Physical Exam    Airway    Mallampati score: III  TM Distance: >3 FB  Neck ROM: full     Dental   upper dentures and lower dentures,     Cardiovascular      Pulmonary      Other Findings        Anesthesia Plan  ASA Score- 3     Anesthesia Type- regional with ASA Monitors  Additional Monitors:   Airway Plan:           Plan Factors-Exercise tolerance (METS): >4 METS  Chart reviewed  EKG reviewed  Existing labs reviewed  Patient summary reviewed  Patient is not a current smoker  Induction- intravenous  Postoperative Plan- Plan for postoperative opioid use  Informed Consent- Anesthetic plan and risks discussed with patient  I personally reviewed this patient with the CRNA  Discussed and agreed on the Anesthesia Plan with the CRNA  Magdalena Tompkins

## 2023-02-10 VITALS
OXYGEN SATURATION: 91 % | SYSTOLIC BLOOD PRESSURE: 121 MMHG | DIASTOLIC BLOOD PRESSURE: 72 MMHG | WEIGHT: 233.69 LBS | HEIGHT: 64 IN | BODY MASS INDEX: 39.9 KG/M2 | HEART RATE: 71 BPM | TEMPERATURE: 98 F | RESPIRATION RATE: 16 BRPM

## 2023-02-10 LAB
ANION GAP SERPL CALCULATED.3IONS-SCNC: 9 MMOL/L (ref 4–13)
BUN SERPL-MCNC: 24 MG/DL (ref 5–25)
CALCIUM SERPL-MCNC: 9.2 MG/DL (ref 8.4–10.2)
CHLORIDE SERPL-SCNC: 102 MMOL/L (ref 96–108)
CO2 SERPL-SCNC: 27 MMOL/L (ref 21–32)
CREAT SERPL-MCNC: 0.9 MG/DL (ref 0.6–1.3)
ERYTHROCYTE [DISTWIDTH] IN BLOOD BY AUTOMATED COUNT: 13.2 % (ref 11.6–15.1)
GFR SERPL CREATININE-BSD FRML MDRD: 72 ML/MIN/1.73SQ M
GLUCOSE SERPL-MCNC: 137 MG/DL (ref 65–140)
HCT VFR BLD AUTO: 40.9 % (ref 34.8–46.1)
HGB BLD-MCNC: 13.4 G/DL (ref 11.5–15.4)
MCH RBC QN AUTO: 29.5 PG (ref 26.8–34.3)
MCHC RBC AUTO-ENTMCNC: 32.8 G/DL (ref 31.4–37.4)
MCV RBC AUTO: 90 FL (ref 82–98)
PLATELET # BLD AUTO: 249 THOUSANDS/UL (ref 149–390)
PMV BLD AUTO: 9.4 FL (ref 8.9–12.7)
POTASSIUM SERPL-SCNC: 5 MMOL/L (ref 3.5–5.3)
RBC # BLD AUTO: 4.55 MILLION/UL (ref 3.81–5.12)
SODIUM SERPL-SCNC: 138 MMOL/L (ref 135–147)
WBC # BLD AUTO: 12.89 THOUSAND/UL (ref 4.31–10.16)

## 2023-02-10 RX ORDER — ASPIRIN 81 MG/1
81 TABLET ORAL 2 TIMES DAILY
Qty: 70 TABLET | Refills: 0 | Status: SHIPPED | OUTPATIENT
Start: 2023-02-10 | End: 2023-03-17

## 2023-02-10 RX ORDER — OXYCODONE HYDROCHLORIDE 5 MG/1
TABLET ORAL
Qty: 30 TABLET | Refills: 0 | Status: SHIPPED | OUTPATIENT
Start: 2023-02-10 | End: 2023-02-14 | Stop reason: SDUPTHER

## 2023-02-10 RX ORDER — SENNOSIDES 8.6 MG
650 CAPSULE ORAL EVERY 8 HOURS PRN
Qty: 30 TABLET | Refills: 0 | Status: SHIPPED | OUTPATIENT
Start: 2023-02-10 | End: 2023-03-12

## 2023-02-10 RX ADMIN — DOCUSATE SODIUM 100 MG: 100 CAPSULE, LIQUID FILLED ORAL at 08:39

## 2023-02-10 RX ADMIN — SENNOSIDES 8.6 MG: 8.6 TABLET, FILM COATED ORAL at 08:39

## 2023-02-10 RX ADMIN — ENOXAPARIN SODIUM 40 MG: 40 INJECTION SUBCUTANEOUS at 01:53

## 2023-02-10 RX ADMIN — HYDROMORPHONE HYDROCHLORIDE 0.5 MG: 1 INJECTION, SOLUTION INTRAMUSCULAR; INTRAVENOUS; SUBCUTANEOUS at 02:41

## 2023-02-10 RX ADMIN — Medication 1000 UNITS: at 08:39

## 2023-02-10 RX ADMIN — Medication 1 TABLET: at 08:39

## 2023-02-10 RX ADMIN — DULOXETINE HYDROCHLORIDE 60 MG: 60 CAPSULE, DELAYED RELEASE ORAL at 08:39

## 2023-02-10 RX ADMIN — GABAPENTIN 300 MG: 300 CAPSULE ORAL at 08:40

## 2023-02-10 RX ADMIN — BUPROPION HYDROCHLORIDE 300 MG: 150 TABLET, EXTENDED RELEASE ORAL at 08:39

## 2023-02-10 RX ADMIN — BUSPIRONE HYDROCHLORIDE 10 MG: 10 TABLET ORAL at 08:39

## 2023-02-10 RX ADMIN — FOLIC ACID 1000 MCG: 1 TABLET ORAL at 08:39

## 2023-02-10 RX ADMIN — ALUMINUM HYDROXIDE, MAGNESIUM HYDROXIDE, AND DIMETHICONE 30 ML: 200; 20; 200 SUSPENSION ORAL at 10:10

## 2023-02-10 RX ADMIN — OXYCODONE HYDROCHLORIDE AND ACETAMINOPHEN 500 MG: 500 TABLET ORAL at 08:39

## 2023-02-10 RX ADMIN — PANTOPRAZOLE SODIUM 40 MG: 40 TABLET, DELAYED RELEASE ORAL at 05:22

## 2023-02-10 RX ADMIN — CYANOCOBALAMIN TAB 500 MCG 1000 MCG: 500 TAB at 08:39

## 2023-02-10 RX ADMIN — FERROUS SULFATE TAB 325 MG (65 MG ELEMENTAL FE) 325 MG: 325 (65 FE) TAB at 08:39

## 2023-02-10 RX ADMIN — OXYCODONE HYDROCHLORIDE 10 MG: 10 TABLET ORAL at 06:50

## 2023-02-10 RX ADMIN — CEFAZOLIN SODIUM 1000 MG: 1 SOLUTION INTRAVENOUS at 01:53

## 2023-02-10 NOTE — PROGRESS NOTES
Progress Note - Orthopedics   Akshat Ramey 46 y o  female MRN: [id number]  Unit/Bed#: -01      Subjective:    46 y  o female  Postop day 1 status post left total knee arthroplasty  She is currently working with physical therapy and walking  No acute events, no new complaints  Patient doing well  Pain well controlled  Denies fevers, chills, CP, SOB, N/V, numbness or tingling  Patient reports no issues with urination or bowel movements       Labs:  0   Lab Value Date/Time    HCT 40 9 02/10/2023 0545    HCT 47 7 (H) 01/03/2023 1244    HCT 44 7 09/09/2022 1222    HCT 35 7 09/07/2015 0600    HCT 34 6 (L) 09/06/2015 0556    HCT 39 4 09/05/2015 0607    HGB 13 4 02/10/2023 0545    HGB 15 3 01/03/2023 1244    HGB 14 7 09/09/2022 1222    HGB 11 1 (L) 09/07/2015 0600    HGB 10 8 (L) 09/06/2015 0556    HGB 12 8 09/05/2015 0607    INR 0 94 01/03/2023 1244    INR 1 02 08/20/2015 1311    WBC 12 89 (H) 02/10/2023 0545    WBC 6 50 01/03/2023 1244    WBC 6 46 09/09/2022 1222    WBC 11 47 (H) 09/07/2015 0600    WBC 11 74 (H) 09/06/2015 0556    WBC 18 25 (H) 09/05/2015 0607    CRP <1 0 01/03/2023 1244       Meds:    Current Facility-Administered Medications:   •  acetaminophen (TYLENOL) tablet 650 mg, 650 mg, Oral, Q6H PRN, Ferny Blanco PA-C  •  albuterol (PROVENTIL HFA,VENTOLIN HFA) inhaler 2 puff, 2 puff, Inhalation, Q6H PRN, Ferny Blanco PA-C  •  aluminum-magnesium hydroxide-simethicone (MYLANTA) oral suspension 30 mL, 30 mL, Oral, Q4H PRN, Lizzette Thompson PA-C, 30 mL at 02/09/23 2035  •  ascorbic acid (VITAMIN C) tablet 500 mg, 500 mg, Oral, Daily, Meredeth Heft Efren, PA-C, 500 mg at 02/10/23 1502  •  azelastine (OPTIVAR) 0 05 % ophthalmic solution 1 drop, 1 drop, Right Eye, BID, Ferny Blanco PA-C  •  bepotastine besilate (BEPREVE) 1 5 % op soln 1 drop, 1 drop, Right Eye, BID, Ferny Blanco PA-C  •  buPROPion (WELLBUTRIN XL) 24 hr tablet 300 mg, 300 mg, Oral, Daily, HALINA Inman-C, 300 mg at 02/10/23 7481  •  busPIRone (BUSPAR) tablet 10 mg, 10 mg, Oral, Daily, Darryl Schwartz PA-C, 10 mg at 02/10/23 4901  •  calcium carbonate-vitamin D 500 mg-5 mcg tablet 1 tablet, 1 tablet, Oral, BID With Meals, HALINA Inman-C, 1 tablet at 02/10/23 8509  •  cholecalciferol (VITAMIN D3) tablet 1,000 Units, 1,000 Units, Oral, Daily, HALINA Inman-C, 1,000 Units at 02/10/23 6947  •  cyanocobalamin (VITAMIN B-12) tablet 1,000 mcg, 1,000 mcg, Oral, Daily, HALINA Inman-C, 1,000 mcg at 02/10/23 8980  •  docusate sodium (COLACE) capsule 100 mg, 100 mg, Oral, BID, HALINA Inman-C, 100 mg at 02/10/23 8175  •  DULoxetine (CYMBALTA) delayed release capsule 60 mg, 60 mg, Oral, BID, Darryl Schwartz PA-C, 60 mg at 02/10/23 5869  •  enoxaparin (LOVENOX) subcutaneous injection 40 mg, 40 mg, Subcutaneous, Daily, Darryl Schwartz PA-C, 40 mg at 02/10/23 7413  •  ferrous sulfate tablet 325 mg, 325 mg, Oral, BID With Meals, HALINA Inman-C, 325 mg at 71/98/17 9947  •  folic acid (FOLVITE) tablet 1,000 mcg, 1,000 mcg, Oral, Daily, HALINA Inman-C, 1,000 mcg at 02/10/23 4962  •  gabapentin (NEURONTIN) capsule 300 mg, 300 mg, Oral, TID, Yanique Monahan PA-C, 300 mg at 02/10/23 0840  •  HYDROmorphone (DILAUDID) injection 0 5 mg, 0 5 mg, Intravenous, Q2H PRN, Yanique Monahan PA-C, 0 5 mg at 02/10/23 0241  •  lactated ringers bolus 1,000 mL, 1,000 mL, Intravenous, Once PRN **AND** lactated ringers bolus 1,000 mL, 1,000 mL, Intravenous, Once PRN, Yanique Monahan PA-C  •  lactated ringers infusion, 100 mL/hr, Intravenous, Continuous, Minor Swann CRNA  •  lactated ringers infusion, 75 mL/hr, Intravenous, Continuous, Yanique Monahan PA-C, Last Rate: 75 mL/hr at 02/09/23 1303, 75 mL/hr at 02/09/23 1303  •  methocarbamol (ROBAXIN) tablet 500 mg, 500 mg, Oral, BID PRN, Ferny Blanco PA-C  •  oxyCODONE (ROXICODONE) immediate release tablet 10 mg, 10 mg, Oral, Q4H PRN, Ferny Blanco PA-C, 10 mg at 02/10/23 2667  •  oxyCODONE (ROXICODONE) IR tablet 5 mg, 5 mg, Oral, Q4H PRN, Ferny Blanco PA-C  •  pantoprazole (PROTONIX) EC tablet 40 mg, 40 mg, Oral, Early Morning, Lizzette Thompson PA-C, 40 mg at 02/10/23 0522  •  senna (SENOKOT) tablet 8 6 mg, 1 tablet, Oral, Daily, Josenusrat Koehler JOSETTE Schwartz, 8 6 mg at 02/10/23 1180  •  sodium chloride 0 9 % bolus 1,000 mL, 1,000 mL, Intravenous, Once PRN **AND** sodium chloride 0 9 % bolus 1,000 mL, 1,000 mL, Intravenous, Once PRN, Ferny Blanco PA-C    Blood Culture:   No results found for: BLOODCX    Wound Culture:   No results found for: WOUNDCULT    Ins and Outs:  I/O last 24 hours: In: 1400 [I V :1300; IV Piggyback:100]  Out: 1250 [Urine:1150; Blood:100]          Physical:  Vitals:    02/10/23 0719   BP: 121/72   Pulse: 71   Resp: 16   Temp: 98 °F (36 7 °C)   SpO2: 91%     Musculoskeletal: left Lower Extremity  · Dressing clean dry and intact  · Thigh and calf supple, nontender  · Sensation intact to saphenous, sural, tibial, superficial peroneal nerve, and deep peroneal  · Motor intact to +FHL/EHL, +ankle dorsi/plantar flexion  · 2+ DP pulse, symmetric bilaterally  · Digits warm and well perfused  · Capillary refill < 2 seconds    Assessment:    53 y  o female status post left total knee arthroplasty performed on 2/9/2023  Patient doing well       Plan:  · Weightbearing as tolerated left lower extremity     · PT/OT  · Pain control  · DVT ppx aspirin upon discharge  · Dispo: Discharge planning to home pending therapy    Cintia Carpenter PA-C

## 2023-02-10 NOTE — PLAN OF CARE
Problem: MOBILITY - ADULT  Goal: Maintain or return to baseline ADL function  Description: INTERVENTIONS:  -  Assess patient's ability to carry out ADLs; assess patient's baseline for ADL function and identify physical deficits which impact ability to perform ADLs (bathing, care of mouth/teeth, toileting, grooming, dressing, etc )  - Assess/evaluate cause of self-care deficits   - Assess range of motion  - Assess patient's mobility; develop plan if impaired  - Assess patient's need for assistive devices and provide as appropriate  - Encourage maximum independence but intervene and supervise when necessary  - Involve family in performance of ADLs  - Assess for home care needs following discharge   - Consider OT consult to assist with ADL evaluation and planning for discharge  - Provide patient education as appropriate  Outcome: Progressing     Problem: PAIN - ADULT  Goal: Verbalizes/displays adequate comfort level or baseline comfort level  Description: Interventions:  - Encourage patient to monitor pain and request assistance  - Assess pain using appropriate pain scale  - Administer analgesics based on type and severity of pain and evaluate response  - Implement non-pharmacological measures as appropriate and evaluate response  - Consider cultural and social influences on pain and pain management  - Notify physician/advanced practitioner if interventions unsuccessful or patient reports new pain  Outcome: Progressing     Problem: INFECTION - ADULT  Goal: Absence or prevention of progression during hospitalization  Description: INTERVENTIONS:  - Assess and monitor for signs and symptoms of infection  - Monitor lab/diagnostic results  - Monitor all insertion sites, i e  indwelling lines, tubes, and drains  - Monitor endotracheal if appropriate and nasal secretions for changes in amount and color  - [place] appropriate cooling/warming therapies per order  - Administer medications as ordered  - Instruct and encourage patient and family to use good hand hygiene technique  - Identify and instruct in appropriate isolation precautions for identified infection/condition  Outcome: Progressing     Problem: DISCHARGE PLANNING  Goal: Discharge to home or other facility with appropriate resources  Description: INTERVENTIONS:  - Identify barriers to discharge w/patient and caregiver  - Arrange for needed discharge resources and transportation as appropriate  - Identify discharge learning needs (meds, wound care, etc )  - Arrange for interpretive services to assist at discharge as needed  - Refer to Case Management Department for coordinating discharge planning if the patient needs post-hospital services based on physician/advanced practitioner order or complex needs related to functional status, cognitive ability, or social support system  Outcome: Progressing     Problem: Knowledge Deficit  Goal: Patient/family/caregiver demonstrates understanding of disease process, treatment plan, medications, and discharge instructions  Description: Complete learning assessment and assess knowledge base    Interventions:  - Provide teaching at level of understanding  - Provide teaching via preferred learning methods  Outcome: Progressing

## 2023-02-10 NOTE — PLAN OF CARE
Problem: MOBILITY - ADULT  Goal: Maintain or return to baseline ADL function  Description: INTERVENTIONS:  -  Assess patient's ability to carry out ADLs; assess patient's baseline for ADL function and identify physical deficits which impact ability to perform ADLs (bathing, care of mouth/teeth, toileting, grooming, dressing, etc )  - Assess/evaluate cause of self-care deficits   - Assess range of motion  - Assess patient's mobility; develop plan if impaired  - Assess patient's need for assistive devices and provide as appropriate  - Encourage maximum independence but intervene and supervise when necessary  - Involve family in performance of ADLs  - Assess for home care needs following discharge   - Consider OT consult to assist with ADL evaluation and planning for discharge  - Provide patient education as appropriate  Outcome: Progressing  Goal: Maintains/Returns to pre admission functional level  Description: INTERVENTIONS:  - Perform BMAT or MOVE assessment daily    - Set and communicate daily mobility goal to care team and patient/family/caregiver  - Collaborate with rehabilitation services on mobility goals if consulted  - Perform Range of Motion 3 times a day    - Dangle patient 3 times a day  - Stand patient  3  times a day  - Ambulate patient 3  times a day  - Out of bed to chair  3 times a day   - Out of bed for meals  3 times a day  - Out of bed for toileting  - Record patient progress and toleration of activity level   Outcome: Progressing     Problem: PAIN - ADULT  Goal: Verbalizes/displays adequate comfort level or baseline comfort level  Description: Interventions:  - Encourage patient to monitor pain and request assistance  - Assess pain using appropriate pain scale  - Administer analgesics based on type and severity of pain and evaluate response  - Implement non-pharmacological measures as appropriate and evaluate response  - Consider cultural and social influences on pain and pain management  - Notify physician/advanced practitioner if interventions unsuccessful or patient reports new pain  Outcome: Progressing     Problem: INFECTION - ADULT  Goal: Absence or prevention of progression during hospitalization  Description: INTERVENTIONS:  - Assess and monitor for signs and symptoms of infection  - Monitor lab/diagnostic results  - Administer medications as ordered  - Instruct and encourage patient and family to use good hand hygiene technique  Outcome: Progressing  Goal: Absence of fever/infection during neutropenic period  Description: INTERVENTIONS:  - Monitor WBC    Outcome: Progressing     Problem: SAFETY ADULT  Goal: Maintain or return to baseline ADL function  Description: INTERVENTIONS:  -  Assess patient's ability to carry out ADLs; assess patient's baseline for ADL function and identify physical deficits which impact ability to perform ADLs (bathing, care of mouth/teeth, toileting, grooming, dressing, etc )  - Assess/evaluate cause of self-care deficits   - Assess range of motion  - Assess patient's mobility; develop plan if impaired  - Assess patient's need for assistive devices and provide as appropriate  - Encourage maximum independence but intervene and supervise when necessary  - Involve family in performance of ADLs  - Assess for home care needs following discharge   - Consider OT consult to assist with ADL evaluation and planning for discharge  - Provide patient education as appropriate  Outcome: Progressing  Goal: Maintains/Returns to pre admission functional level  Description: INTERVENTIONS:  - Perform BMAT or MOVE assessment daily    - Set and communicate daily mobility goal to care team and patient/family/caregiver  - Collaborate with rehabilitation services on mobility goals if consulted  - Perform Range of Motion 3 times a day    - Dangle patient 3 times a day  - Stand patient 3  times a day  - Ambulate patient  3  times a day  - Out of bed to chair 3  times a day   - Out of bed for meals 3 times a day  - Out of bed for toileting  - Record patient progress and toleration of activity level   Outcome: Progressing  Goal: Patient will remain free of falls  Description: INTERVENTIONS:  - Educate patient/family on patient safety including physical limitations  - Instruct patient to call for assistance with activity   - Consult OT/PT to assist with strengthening/mobility   - Keep Call bell within reach  - Keep bed low and locked with side rails adjusted as appropriate  - Keep care items and personal belongings within reach  - Initiate and maintain comfort rounds  - Make Fall Risk Sign visible to staff  - Offer Toileting every  2  Hours, in advance of need  - Initiate/Maintain  bed and chair alarm  - Apply yellow socks and bracelet for high fall risk patients  - Consider moving patient to room near nurses station  Outcome: Progressing     Problem: Potential for Falls  Goal: Patient will remain free of falls  Description: INTERVENTIONS:  - Educate patient/family on patient safety including physical limitations  - Instruct patient to call for assistance with activity   - Consult OT/PT to assist with strengthening/mobility   - Keep Call bell within reach  - Keep bed low and locked with side rails adjusted as appropriate  - Keep care items and personal belongings within reach  - Initiate and maintain comfort rounds  - Make Fall Risk Sign visible to staff  - Offer Toileting every 2  Hours, in advance of need  - Initiate/Maintain  bed and chair alarm  - Apply yellow socks and bracelet for high fall risk patients  - Consider moving patient to room near nurses station  Outcome: Progressing     Problem: MUSCULOSKELETAL - ADULT  Goal: Maintain or return mobility to safest level of function  Description: INTERVENTIONS:  - Assess patient's ability to carry out ADLs; assess patient's baseline for ADL function and identify physical deficits which impact ability to perform ADLs (bathing, care of mouth/teeth, toileting, grooming, dressing, etc )  - Assess/evaluate cause of self-care deficits   - Assess range of motion  - Assess patient's mobility  - Assess patient's need for assistive devices and provide as appropriate  - Encourage maximum independence but intervene and supervise when necessary  - Involve family in performance of ADLs  - Assess for home care needs following discharge   - Consider OT consult to assist with ADL evaluation and planning for discharge  - Provide patient education as appropriate  Outcome: Progressing  Goal: Maintain proper alignment of affected body part  Description: INTERVENTIONS:  - Support, maintain and protect limb and body alignment  - Provide patient/ family with appropriate education  Outcome: Progressing

## 2023-02-10 NOTE — CASE MANAGEMENT
Case Management Discharge Planning Note    Patient name Pollo Moctezuma  Location /-37 MRN [id number]  : 1970 Date 2/10/2023       Current Admission Date: 2023  Current Admission Diagnosis:Primary osteoarthritis of left knee   Patient Active Problem List    Diagnosis Date Noted   • Asthma    • GERD (gastroesophageal reflux disease)    • Sleep apnea    • Depression    • Anxiety    • Diabetes mellitus, type 2 ([facility] [address] )    • Skin lesion of breast 2022   • S/P bilateral breast reduction 10/03/2022   • Macromastia 2022   • Syncope and collapse 02/10/2022   • Chronic pain syndrome 02/10/2022   • Neck pain 01/10/2022   • Cervical spondylosis    • Chronic pain syndrome 2020   • Chronic pain of both knees 12/10/2019   • Morbid obesity with body mass index (BMI) of 50 0 to 59 9 in [facility]) 2019   • Hypertension 2019   • Hyperlipidemia 2019   • Prediabetes 2019   • Primary osteoarthritis of left knee 2019   • Lumbar spondylosis 2018   • Degenerative disc disease, cervical 2017   • Lumbar radiculopathy 2017   • Sacroiliitis ([facility] [address] ) 2017   • Bilateral carpal tunnel syndrome 12/15/2017      LOS (days): 0  Geometric Mean LOS (GMLOS) (days):   Days to GMLOS:     OBJECTIVE:            Current admission status: Outpatient Surgery   Preferred Pharmacy:   [facility]/pharmacy #[id number] [place] [address]  Phone: [phone] Fax: [phone]    Primary Care Provider: Leighton Salas DO    Primary Insurance: MEDICARE  Secondary Insurance:     DISCHARGE DETAILS:    Discharge planning discussed with[de-identified] Patient  Freedom of Choice: Yes  Comments - Freedom of Choice: Choice is for [employer] for RW supplier    CM contacted family/caregiver?: No- see comments (Declined call at this time)  Were Treatment Team discharge recommendations reviewed with patient/caregiver?: Yes  Did patient/caregiver verbalize understanding of patient care needs?: Yes  Were patient/caregiver advised of the risks associated with not following Treatment Team discharge recommendations?: Yes    Requested [address]         Is the patient interested in [facility] at discharge?: No    DME Referral Provided  Referral made for DME?: Yes  DME referral completed for the following items[de-identified] Sharon Cordoba  DME Supplier Name[de-identified] [employer] (Approved via parachute, given to patient bedside   Receipt in folder for liaison )    Other Referral/Resources/Interventions Provided:  Interventions: DME    Would you like to participate in our [address] service program?  : No - Declined    Treatment Team Recommendation: Home  Discharge Destination Plan[de-identified] Home  Transport at Discharge : Family  ETA of Transport (Date): 02/10/23    BRYAN Stanley, ELIZABETH, ACALEJANDRA, CCM  02/10/23 8:53 AM

## 2023-02-10 NOTE — PLAN OF CARE
Problem: OCCUPATIONAL THERAPY ADULT  Goal: Performs self-care activities at highest level of function for planned discharge setting  See evaluation for individualized goals  Description: Treatment Interventions: ADL retraining, Functional transfer training, Endurance training, Patient/family training, Equipment evaluation/education, Compensatory technique education, Energy conservation, Activityengagement          See flowsheet documentation for full assessment, interventions and recommendations  Outcome: Progressing  Note: Limitation: Decreased ADL status, Decreased endurance, Decreased high-level ADLs, Decreased self-care trans  Prognosis: Good  Assessment: Patient seen for OT treatment on 2/10/2023 s/p admission for Primary osteoarthritis of left knee Patient presents with active orders for OT eval and treat  Upon arrival, pt found supine in bed showing no signs of distress  Patient agreeable to OT session  Patient participated in toileting, dressing , eating , personal hygiene/grooming , bed mobility , functional mobility, transfer to all surfaces, activity engagement , safety procedures , fall prevention  and energy conservation  with intervention focus on optimizing independence in functional mobility, transfers and ADL particiaption  Christal Carlson is showing improvements in activity tolerance, pain management, demonstration of compensatory techniques, participation in ADLs and functional mobility  Following tx session, pt demonstrates adequate functional independence and safety for POD#1 D/C  At this time, recommending D/C to: home with no rehabilitation needs   Pt is set up with OOP PT      OT Discharge Recommendation: No rehabilitation needs (set up with OOP PT)    Alex Alcazar MS OTR/L

## 2023-02-10 NOTE — PHYSICAL THERAPY NOTE
PHYSICAL THERAPY TREATMENT  DATE: 02/10/23  TIME: 2180-9377    NAME:  Pantera Houston  AGE:   46 y o  Mrn:   [id number]  Length Of Stay: 0    ADMIT DX:  Primary osteoarthritis of left knee [M17 12]    Past Medical History:   Diagnosis Date    Anxiety     Asthma     Cancer ([facility] )     endometrial    Chronic pain syndrome     CPAP (continuous positive airway pressure) dependence     Depression     Diabetes mellitus ([facility] )     Pre diabetic    GERD (gastroesophageal reflux disease)     HTN (hypertension)     Hyperlipidemia     Hypertension     Lumbar radiculopathy     Prediabetes     Sleep apnea      Past Surgical History:   Procedure Laterality Date    GASTRECTOMY SLEEVE LAPAROSCOPIC      HYSTERECTOMY      KNEE ARTHROSCOPY Right     LUMBAR FUSION      OR BREAST REDUCTION Bilateral 9/23/2022    Procedure: BILATERAL BREAST REDUCTION;  Surgeon: Tami Schuler MD;  Location: [facility];  Service: Plastics    MARY-EN-Y PROCEDURE  05/27/2021    Sleve    TOTAL HIP ARTHROPLASTY Bilateral        Performed at least 2 patient identifiers during session: Name and ID bracelet     02/10/23 0936   PT Last Visit   PT Visit Date 02/10/23   Note Type   Note Type BID visit/treatment   Pain Assessment   Pain Assessment Tool 0-10   Pain Score 8   Pain Location/Orientation Orientation: Left; Location: Knee   Pain Onset/Description Onset: Ongoing; Descriptor: Sore   Effect of Pain on Daily Activities limits comfort and positioning   Patient's Stated Pain Goal No pain   Hospital Pain Intervention(s) Repositioned; Ambulation/increased activity   Multiple Pain Sites No   Restrictions/Precautions   Weight Bearing Precautions Per Order Yes   LLE Weight Bearing Per Order WBAT  (s/p L TKA 2/9/2023)   Braces or Orthoses Other (Comment)  (L LE ACE wrap)   Other Precautions Fall Risk;Pain; Impulsive   General   Chart Reviewed Yes   Additional Pertinent History Pt presents POD1 s/p L TKA by Dr Allyssa Steiner 2/9/2023, WBAT L LE     Response to Previous Treatment Patient with no complaints from previous session  Family/Caregiver Present No   Cognition   Overall Cognitive Status WFL   Arousal/Participation Alert; Cooperative   Attention Attends with cues to redirect   Orientation Level Oriented X4   Memory Within functional limits   Following Commands Follows all commands and directions without difficulty   Subjective   Subjective "I know what to do, I'm a vet "   Bed Mobility   Supine to Sit Unable to assess   Sit to Supine Unable to assess   Additional Comments Pt was left seated OOB in recliner chair at end of session  Transfers   Sit to Stand 6  Modified independent   Additional items Armrests; Increased time required   Stand to Sit 6  Modified independent   Additional items Armrests; Increased time required   Stand pivot 6  Modified independent   Additional items Increased time required  (RW)   Ambulation/Elevation   Gait pattern WNL; Antalgic; Step through pattern  (good heel toe pattern applied today)   Gait Assistance 6  Modified independent   Assistive Device Rolling walker   Distance 300ft x1   Stair Management Assistance 5  Supervision   Additional items Assist x 1;Verbal cues; Increased time required   Stair Management Technique One rail L;Foreward;Nonreciprocal  (B UE support on 1HR)   Number of Stairs 10  (10 steps up & down)   Ambulation/Elevation Additional Comments Cues for step patterning t/o elevations, pt with good application and carry over  Education re: use of HR + SPC, as well as family member positioning on steps during first couple attempts (for safety)     Balance   Static Sitting Normal   Dynamic Sitting Good   Static Standing Good  (w/ RW support)   Dynamic Standing Good  (w/ RW support)   Ambulatory Good  (w/ RW support)   Endurance Deficit   Endurance Deficit Yes   Activity Tolerance   Activity Tolerance Patient limited by fatigue;Patient limited by pain   Medical Staff Made Aware Spoke with CM, OT, ortho AP   Nurse Made Aware Spoke with RN Harshpreet pre/post session   Exercises   Quad Sets 15 reps;AROM; Left  (in semi-kaur position)   Heelslides 15 reps;AROM; Left  (in semi-kaur position)   Glute Sets Sitting;15 reps;AROM; Bilateral   Hip Flexion 15 reps;AROM; Left  (in semi-kaur position for SLR)   Hip Abduction 15 reps;AROM; Left  (in semi-kaur position)   Knee AROM Long Arc Quad Sitting;15 reps;AROM; Left   Ankle Pumps Sitting;15 reps;AROM; Left   TKR Left   Assessment   Prognosis Excellent   Problem List Decreased strength;Decreased range of motion; Impaired balance;Decreased mobility; Decreased skin integrity;Orthopedic restrictions;Pain   Assessment Pt seen for PT treatment today with focus on gait training, elevations training, and initiation of HEP  Pt presents seated OOB in recliner chair, reports ongoing pain however is agreeable to participate in session  Bed mobility not completed on this date  Pt completes all transfers from recliner chair at [facility] level, ambulates 300ft with RW at [facility] level (good // safe gait speed), and negotiates 10 stairs up/down with 1HR and S (good application and carry over of step patterning)  Recommend pt utilize [facility] on contralateral side of HR at home  Pt completes above outlined exercises for strengthening, ROM, edema management, and improved circulation  Pt denies questions regarding HEP, reports already knowing everything  Pt denies any questions or concerns regarding d/c home today with OPPT f/u, reports having friend & family assistance upon d/c  At end of session pt was left as found with all needs in place and cold pack to L knee  Pt cleared for safe d/c home POD1  Spoke with ortho AP Mago Us regarding pt progress  Barriers to Discharge None   Goals   Patient Goals "to be able to use an elliptical"   STG Expiration Date 02/14/23   Short Term Goal #1 Patient PT goals established in order to maximize mobility & safety, and progress to next level of PT care   Pt will: complete all bed mobility in flat bed independently in order to promote independence & simulate a home environment; complete all transfers w/ RW at [facility] level in order to increase safety &  independence; ambulate >250ft with RW at [facility] level in order to increase safety with household and community distance functional mobility; negotiate 10-12 stairs with BHR assist and S in order to facilitate safe access to her home; improve L LE strength to >/= 4/5 MMT t/o in order to increase safety & decrease risk of falls; demonstrate independence w/ 3-5 LE strengthening exercises to facilitate independence w/ HEP; improve AM-PAC score to >/= 23/24 in order to increase independence with mobility and decrease burden of care; improve Barthel Index score to >/= 70/100 in order to increase independence and decrease risk of falls  PT Treatment Day 1   Plan   Treatment/Interventions Elevations;LE strengthening/ROM; Therapeutic exercise;Spoke to nursing;Spoke to advanced practitioner;Spoke to case management   Progress Improving as expected   PT Frequency Twice a day   Recommendation   PT Discharge Recommendation Home with outpatient rehabilitation   Equipment Recommended 9 [facility] Recommended Wheeled walker   Change/add to [employer]? No   AM-PAC Basic Mobility Inpatient   Turning in Flat Bed Without Bedrails 4   Lying on Back to Sitting on Edge of Flat Bed Without Bedrails 4   Moving Bed to Chair 4   Standing Up From Chair Using Arms 4   Walk in Room 4   Climb 3-5 Stairs With Railing 3   Basic Mobility Inpatient Raw Score 23   Basic Mobility Standardized Score 50 88   Highest Level Of Mobility   JH-HLM Goal 7: Walk 25 feet or more   JH-HLM Achieved 8: Walk 250 feet ot more   Education   Education Provided Mobility training;Home exercise program;Assistive device   Patient Explanation/teachback used;Demonstrates verbal understanding   End of Consult   Patient Position at End of Consult Bedside chair; All needs within reach     The patient's AM-PAC Basic Mobility Inpatient Short Form Raw Score is 23  A Raw score of greater than 16 suggests the patient may benefit from discharge to home  Please also refer to the recommendation of the Physical Therapist for safe discharge planning        Betzaida Carey PT, DPT   Available via [employer]  [facility] # [id number]  [place] License - [id number]  9/38/4727

## 2023-02-10 NOTE — OCCUPATIONAL THERAPY NOTE
Occupational Therapy Treatment Note     Patient Name: Mc Morrison  NLDCJ'H Date: 2/10/2023  Problem List  Principal Problem:    Primary osteoarthritis of left knee  Active Problems:    Sacroiliitis (HCC)    Hypertension    Hyperlipidemia    Prediabetes    Asthma    GERD (gastroesophageal reflux disease)    Sleep apnea    Depression    Anxiety    Diabetes mellitus, type 2 ([facility] )              02/10/23 0836   Note Type   Note Type Treatment   Pain Assessment   Pain Assessment Tool 0-10   Pain Score 8   Pain Location/Orientation Orientation: Left; Location: Knee   Pain Onset/Description Onset: Ongoing   Effect of Pain on Daily Activities limits standing and activity tolerance   Patient's Stated Pain Goal No pain   Hospital Pain Intervention(s) Cold applied;Repositioned; Ambulation/increased activity   Multiple Pain Sites No   Restrictions/Precautions   Weight Bearing Precautions Per Order Yes   LLE Weight Bearing Per Order WBAT  (POD # L TKA)   Other Precautions Chair Alarm; Bed Alarm;WBS;Fall Risk;Pain   Lifestyle   Autonomy PTA pt lives at home with her son in a 2nd floor apartment with + KRISTOPHER  At baseline she is I w/ ADLS, IADLs and functional mobility   Reciprocal Relationships Family, Son   Service to Others Unemployed   Intrinsic Gratification Knitting   ADL   Eating Assistance 6  Modified independent   Eating Deficit Setup   Eating Comments Set up and eating breakfast at the end of session   Grooming Assistance 6  Modified Independent   Grooming Deficit Setup   Grooming Comments Fixed hair and put on a headband sitting in bedside chair   UB Dressing Assistance 6  Modified independent   UB Dressing Deficit Thread RUE; Thread LUE;Pull over head;Pull around back;Pull down in back   UB Dressing Comments Doffed hospital gown and donned t-shirt seated in recliner chair   LB Dressing Assistance 4  Minimal Assistance   LB Dressing Deficit Supervision/safety; Increased time to complete; Don/doff R sock; Don/doff L sock; Thread RLE into pants; Thread LLE into pants; Thread RLE into underwear; Thread LLE into underwear;Pull up over hips;Don/doff R shoe;Don/doff L shoe   LB Dressing Comments Increased time to chitra underwear and sweatpants with use of dressing stick; supervision to doff socks and min A to chitra, offered sock aid but pt reports that she prefers the "flexable" sock aid which she purchased off [employer]; min A to chitra B sneakers with use of dressing stick and long handled shoe horn with increased time  LB dressing was completed seated in recliner chair  Toileting Assistance  5  Supervision/Setup   Toileting Deficit Increased time to complete;Steadying;Supervison/safety   Functional Standing Tolerance   Time 2~3 min   Activity Standing at the sink to complete hand washing hygiene and fix contacts  Comments Pt demonstrated fair + dynamic standing balance with minimal support from RW vs sink   Bed Mobility   Supine to Sit 6  Modified independent   Additional items HOB elevated; Bedrails; Increased time required;Verbal cues   Sit to Supine Unable to assess   Additional Comments Pt OOB to recliner chair   Transfers   Sit to Stand 5  Supervision   Additional items Assist x 1; Increased time required;Verbal cues  (RW)   Stand to Sit 5  Supervision   Additional items Assist x 1; Increased time required;Verbal cues  (RW)   Stand pivot 5  Supervision   Additional items Assist x 1; Increased time required;Verbal cues  (RW)   Additional Comments Verbal cues for hand placement during sit <> stand transfers, pt attempting to use B hands to pull up onto walker     Functional Mobility   Functional Mobility 5  Supervision   Additional Comments assist x1 for short household distance to/from the bathroom with use of RW   Additional items Rolling walker   Toilet Transfers   Toilet Transfer From Rolling walker   Toilet Transfer Type To and from   Toilet Transfer to Standard toilet   Toilet Transfer Technique Ambulating   Toilet Transfers Minimal assistance Toilet Transfers Comments Min A for slow decsend into standard toilet, pt has a standard size toilet at home but reports that she is going to ask her dad for his built up toilet seat to make sit <> stand transfers easier for her  Subjective   Subjective "It feels good to be dressed"   Cognition   Overall Cognitive Status [facility]   Arousal/Participation Alert; Responsive; Cooperative   Attention Within functional limits   Orientation Level Oriented X4   Memory Within functional limits   Following Commands Follows all commands and directions without difficulty   Activity Tolerance   Activity Tolerance Patient tolerated treatment well;Patient limited by pain   Medical Staff Made Aware Care coordination, CONNIE, PT, RN Douglas pre/post session   Assessment   Assessment Patient seen for OT treatment on 2/10/2023 s/p admission for Primary osteoarthritis of left knee Patient presents with active orders for OT eval and treat  Upon arrival, pt found supine in bed showing no signs of distress  Patient agreeable to OT session  Patient participated in toileting, dressing , eating , personal hygiene/grooming , bed mobility , functional mobility, transfer to all surfaces, activity engagement , safety procedures , fall prevention  and energy conservation  with intervention focus on optimizing independence in functional mobility, transfers and ADL particiaption  Justen Car is showing improvements in activity tolerance, pain management, demonstration of compensatory techniques, participation in ADLs and functional mobility  Following tx session, pt demonstrates adequate functional independence and safety for POD#1 D/C  At this time, recommending D/C to: home with no rehabilitation needs  Pt is set up with OOP PT  Plan   Treatment Interventions ADL retraining;Functional transfer training; Endurance training;Patient/family training;Equipment evaluation/education; Compensatory technique education; Activityengagement; Energy conservation Goal Expiration Date 02/19/23   OT Treatment Day 1   OT Frequency 3-5x/wk   Recommendation   OT Discharge Recommendation No rehabilitation needs  (set up with OOP PT)   Additional Comments  The patient's raw score on the AM-PAC Daily Activity Inpatient Short Form is 21  A raw score of greater than or equal to 19 suggests the patient may benefit from discharge to home  Please refer to the recommendation of the Occupational Therapist for safe discharge planning  AM-PAC Daily Activity Inpatient   Lower Body Dressing 3   Bathing 3   Toileting 3   Upper Body Dressing 4   Grooming 4   Eating 4   Daily Activity Raw Score 21   Daily Activity Standardized Score (Calc for Raw Score >=11) 44 27   AM-[facility] Applied Cognition Inpatient   Following a Speech/Presentation 4   Understanding Ordinary Conversation 4   Taking Medications 4   Remembering Where Things Are Placed or Put Away 4   Remembering List of 4-5 Errands 4   Taking Care of Complicated Tasks 4   Applied Cognition Raw Score 24   Applied Cognition Standardized Score 62 21   End of Consult   Education Provided Yes   Patient Position at End of Consult Bedside chair;Bed/Chair alarm activated; All needs within reach   Nurse Communication Nurse aware of consult   End of Consult Comments Pt education for safe sitting surfaces and placement of RW during LB dressing, as well as proper body mechanics for safe car transfers  Encouraged pt to continue c regular OOB mobility throughout healing process for pain management and to increase activity tolerance  Pt verbalized understanding         Stone Macias MS OTR/L

## 2023-02-10 NOTE — PLAN OF CARE
Problem: PHYSICAL THERAPY ADULT  Goal: Performs mobility at highest level of function for planned discharge setting  See evaluation for individualized goals  Description: Treatment/Interventions: Functional transfer training, LE strengthening/ROM, Elevations, Therapeutic exercise, Patient/family training, Bed mobility, Gait training, Spoke to nursing, Spoke to case management    Equipment Recommended: Abdi Franco     See flowsheet documentation for full assessment, interventions and recommendations  Outcome: Progressing  Note: Prognosis: Excellent  Problem List: Decreased strength, Decreased range of motion, Impaired balance, Decreased mobility, Decreased skin integrity, Orthopedic restrictions, Pain  Assessment: Pt seen for PT treatment today with focus on gait training, elevations training, and initiation of HEP  Pt presents seated OOB in recliner chair, reports ongoing pain however is agreeable to participate in session  Bed mobility not completed on this date  Pt completes all transfers from recliner chair at [facility] level, ambulates 300ft with RW at [facility] level (good // safe gait speed), and negotiates 10 stairs up/down with 1HR and S (good application and carry over of step patterning)  Recommend pt utilize [facility] on contralateral side of HR at home  Pt completes above outlined exercises for strengthening, ROM, edema management, and improved circulation  Pt denies questions regarding HEP, reports already knowing everything  Pt denies any questions or concerns regarding d/c home today with OPPT f/u, reports having friend & family assistance upon d/c  At end of session pt was left as found with all needs in place and cold pack to L knee  Pt cleared for safe d/c home POD1  Spoke with ortho JEFF Horton regarding pt progress  Barriers to Discharge: None     PT Discharge Recommendation: Home with outpatient rehabilitation    See flowsheet documentation for full assessment

## 2023-02-13 ENCOUNTER — OFFICE VISIT (OUTPATIENT)
Dept: PHYSICAL THERAPY | Facility: REHABILITATION | Age: 53
End: 2023-02-13

## 2023-02-13 ENCOUNTER — TELEPHONE (OUTPATIENT)
Dept: OBGYN CLINIC | Facility: HOSPITAL | Age: 53
End: 2023-02-13

## 2023-02-13 DIAGNOSIS — M17.12 PRIMARY OSTEOARTHRITIS OF LEFT KNEE: Primary | ICD-10-CM

## 2023-02-13 NOTE — TELEPHONE ENCOUNTER
2nd VM left for patient today, pt outreached to return her call and conduct post-op assessment  Sending to [facility] team to please work on oxycodone medication auth

## 2023-02-13 NOTE — TELEPHONE ENCOUNTER
Caller: Patient- Esteban Carrillo     Doctor: Dr Jose Edgar    Reason for call: Patient is calling in stating that she was not able to get Oxycodone 5 immediate release tabs that the pharmacy stated that the Dr needs to get this approved/ authorized by the insurance before she is able to get this  The patient is scheduled to have PT today as well and is not sure as to how she is even able to get through that since she is in so much pain  She is asking for a call back relating this       Call back#: [phone]        [facility] pharmacy in [place] on [address]

## 2023-02-13 NOTE — PROGRESS NOTES
PT Evaluation     Today's date: 2023  Patient name: Donaldo Calderon  : 1970  MRN: [id number]  Referring provider: Anjali Ragsdale MD  Dx:   Encounter Diagnosis     ICD-10-CM    1  Primary osteoarthritis of left knee  M17 12           Start Time: 1043  Stop Time: 1120  Total time in clinic (min): 37 minutes    Assessment  Assessment details: Problem List:  1) L knee hypomobility  2) Decreased ambulatory tolerance    Donaldo Calderon is a pleasant 46 y o  female who presents following L TKA on 2023  she has left knee hypomobility, high pain levels and irritability, and decreased L quad activation resulting in the pain she is experiencing, worry over not knowing what's wrong and fear of not being able to keep active  No further referral appears necessary at this time based upon examination results  I expect she will improve in 12-16 weeks  Positive prognostic indicators include positive attitude toward recovery and good understanding of diagnosis and treatment plan options  Negative prognostic indicators include chronicity of symptoms, high symptom irritability and obesity  Impairments: abnormal or restricted ROM, activity intolerance, impaired physical strength, lacks appropriate home exercise program, pain with function, weight-bearing intolerance and poor posture     Symptom irritability: highUnderstanding of Dx/Px/POC: good   Prognosis: good    Goals  Goals  Short Term Goals: to be achieved by 4 weeks  1) Patient to be independent with basic HEP  2) Decrease pain by 5/10 at its worst   3) Increase knee flexion ROM to 90 deg  4) Increase knee extension ROM by > 5 deg  5) Increase LE strength by 1/2 MMT grade in all deficient planes  6) Patient to negotiate steps with a step-to pattern with use of HR  7) Patient to report decreased sleep interruption secondary to pain  8) Increase ambulatory tolerance by 10 min with LRAD      Long Term Goals: to be achieved by discharge  1) FOTO equal to or greater than   2) Patient to be independent with comprehensive HEP  3) Decrease pain to 3/10 or less for improved quality of life  4) Increase LE strength to 5/5 MMT grade in all planes to improve a/iadls  5) Achieve full knee extension ROM to improve a/iadls  6) Increase knee flexion ROM to 120 degrees minimum to improve a/iadls  7) Patient to negotiate steps with a reciprocal pattern without use of Hr  8) Increase ambulatory tolerance to 30 min to improve participation in social activities  9) Patient to report no sleep interruption secondary to pain  Plan  Patient would benefit from: skilled physical therapy  Planned modality interventions: cryotherapy, thermotherapy: hydrocollator packs and unattended electrical stimulation  Planned therapy interventions: IADL retraining, joint mobilization, manual therapy, massage, ADL training, activity modification, abdominal trunk stabilization, ADL retraining, balance, balance/weight bearing training, neuromuscular re-education, body mechanics training, behavior modification, strengthening, stretching, therapeutic activities, therapeutic exercise, therapeutic training, transfer training, graded exercise, graded motor, home exercise program, graded activity, gait training, functional ROM exercises, patient education, postural training and flexibility  Frequency: 2x week  Duration in visits: 16  Duration in weeks: 8  Treatment plan discussed with: patient        Subjective Evaluation    History of Present Illness  Date of surgery: 2/9/2023  Mechanism of injury: surgery  Mechanism of injury: Codie Gonzales is a 46 y o  female presenting to physical therapy on 02/13/23 with referral from MD following L TKA on 02/09/2023  Objective         MMT         AROM          PROM    Hip       L       R        L           R      L     R   Flex  Extn  Abd  Add  IR          ER          G  Max         G  Med         Johnathon Liz Knee         Extension     0 deg    Flexion     55 deg             Ankle         Dorsi Flexion         Plantar Flexion           Segmental mobility:   Patella:  hypomobile      Tibio-Femoral Joint: hypomobile    Comments: no redness/warmth of LLE, (-) meena's sign, pt denies calf pain       Precautions: hx of TKA 02/09/2023, HTN    Manuals 2/13                                                                Neuro Re-Ed 2/13            balance                                                                                           Ther Ex 2/13            Bike             PROM 10'            Seated flexion AAROM HEP            Quad sets HEP            SLR HEP            STS             squats                                                    HEP/education 5'            Ther Activity                                       Gait Training                                       Modalities

## 2023-02-13 NOTE — TELEPHONE ENCOUNTER
Caller: Master Costello    Doctor: na    Reason for call: checking status of message below, warm transferred to RN    Call back#: na

## 2023-02-13 NOTE — TELEPHONE ENCOUNTER
Postoperative Follow-up Assessment-S/w pt  Pain: Per surgeon's MA, she is trying for auth for oxycodone, per the pt per her pharmacy, she states her insurance requires auth for the oxycodone  She is taking Tylenol 650mg every 6-8 hours for pain, encouraged on frequent icing and elevation to also assist with local pain  Swelling: moderate, encouraged on icing and elevating  Dressing:   She reports the dressing fell off last evening, she reports the incision looks good, denies bleeding or drainage  She notes PT redressed it today at the appt  Ambulation: ambulating using RW, no falls  AVS: reviewed  AVS Med List: Reviewed    Anticoagulant:  She confirms she is taking aspirin 81mg twice daily as prescribed  Follow-ups: PT today, plans to attend as scheduled  Surgeon's team 2/21  She denies barriers to attending  Concerning Symptoms to Patient none, she denies calf pain, fevers, CP or SOB  she denies any additional questions, concerns or issues  Pt encouraged to call with any questions, concerns or issues  No further action required at this time, patient is ready for CM handoff

## 2023-02-14 DIAGNOSIS — M17.12 PRIMARY OSTEOARTHRITIS OF LEFT KNEE: ICD-10-CM

## 2023-02-14 RX ORDER — OXYCODONE HYDROCHLORIDE 5 MG/1
TABLET ORAL
Qty: 30 TABLET | Refills: 0 | Status: SHIPPED | OUTPATIENT
Start: 2023-02-14 | End: 2023-02-27 | Stop reason: SDUPTHER

## 2023-02-15 LAB
DME PARACHUTE DELIVERY DATE ACTUAL: NORMAL
DME PARACHUTE DELIVERY DATE REQUESTED: NORMAL
DME PARACHUTE ITEM DESCRIPTION: NORMAL
DME PARACHUTE ORDER STATUS: NORMAL
DME PARACHUTE SUPPLIER NAME: NORMAL
DME PARACHUTE SUPPLIER PHONE: NORMAL

## 2023-02-16 ENCOUNTER — OFFICE VISIT (OUTPATIENT)
Dept: PHYSICAL THERAPY | Facility: REHABILITATION | Age: 53
End: 2023-02-16

## 2023-02-16 DIAGNOSIS — M17.12 PRIMARY OSTEOARTHRITIS OF LEFT KNEE: Primary | ICD-10-CM

## 2023-02-16 NOTE — PROGRESS NOTES
Daily Note     Today's date: 2023  Patient name: Josiah Sharif  : 1970  MRN: [id number]  Referring provider: Kathy Mendoza MD  Dx:   Encounter Diagnosis     ICD-10-CM    1  Primary osteoarthritis of left knee  M17 12                      Subjective: Pt reports she has received her pain medication but forgot to take it last night before going to bed  Woke up in the middle of the night with increased pain  Objective: See treatment diary below      Assessment: Tolerated treatment well  Continued with program as outlined below, focusing on ROM if K knee  Most limited with available P/AAROM L knee flex, but did slowly improve throughout session  Minimal quad activation present during QS today  Patient would benefit from continued PT to further improve strength, increase available ROM, and maximize overall function  Plan: Continue per plan of care        Precautions: hx of TKA 2023, HTN    Manuals                                                                Neuro Re-Ed            balance             Lat weight shift  5"x20                                                                            Ther Ex            Bike  rocking  5 min           PROM 10' AFB           Seated flexion AAROM HEP 10"x10           Quad sets HEP 5"x20           SLR HEP nv           STS             squats                                                    HEP/education 5'            Ther Activity                                       Gait Training                                       Modalities

## 2023-02-21 ENCOUNTER — OFFICE VISIT (OUTPATIENT)
Dept: PHYSICAL THERAPY | Facility: REHABILITATION | Age: 53
End: 2023-02-21

## 2023-02-21 DIAGNOSIS — M17.12 PRIMARY OSTEOARTHRITIS OF LEFT KNEE: ICD-10-CM

## 2023-02-21 DIAGNOSIS — Z96.652 S/P TOTAL KNEE ARTHROPLASTY, LEFT: Primary | ICD-10-CM

## 2023-02-21 NOTE — PROGRESS NOTES
Daily Note     Today's date: 2023  Patient name: Srinivas Odom  : 1970  MRN: [id number]  Referring provider: Terrence Bishop MD  Dx:   Encounter Diagnosis     ICD-10-CM    1  S/P total knee arthroplasty, left  Z96 652       2  Primary osteoarthritis of left knee  M17 12           Start Time: 1104  Stop Time: 1144  Total time in clinic (min): 40 minutes    Subjective: Pt reports she is doing a little better  Still has a lot of pain  Objective: See treatment diary below    Assessment: Tolerated treatment well  Displayed good improvements and progress during today's visit  Range of motion improved to 0-91 degrees  She is able to ambulate with the cane  Patient would benefit from continued PT to further improve strength, increase available ROM, and maximize overall function  Plan: Continue per plan of care        Precautions: hx of TKA 2023, HTN    Manuals                                           Neuro Re-Ed       balance   Ron board SS 5'      Lat weight shift  5"x20 15x ea                                                   Ther Ex       Bike  rocking  5 min 8' ROM      PROM 10' AFB QD 10'      Seated flexion AAROM HEP 10"x10       Quad sets HEP 5"x20       SLR HEP nv 5x5       STS         squats   18x       VG    L5 3x10 leg press b/l                        HEP/education 5'        Ther Activity                           Gait Training                           Modalities

## 2023-02-23 ENCOUNTER — HOSPITAL ENCOUNTER (INPATIENT)
Facility: HOSPITAL | Age: 53
LOS: 1 days | Discharge: LEFT BEFORE DISCHARGE | End: 2023-02-24
Attending: EMERGENCY MEDICINE | Admitting: SURGERY

## 2023-02-23 ENCOUNTER — APPOINTMENT (EMERGENCY)
Dept: RADIOLOGY | Facility: HOSPITAL | Age: 53
End: 2023-02-23

## 2023-02-23 DIAGNOSIS — Z96.652 HISTORY OF LEFT KNEE REPLACEMENT: ICD-10-CM

## 2023-02-23 DIAGNOSIS — R26.2 AMBULATORY DYSFUNCTION: ICD-10-CM
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
## 2023-02-23 LAB
ALBUMIN SERPL BCP-MCNC: 4 G/DL (ref 3.5–5)
ALP SERPL-CCNC: 109 U/L (ref 34–104)
ALT SERPL W P-5'-P-CCNC: 23 U/L (ref 7–52)
ANION GAP SERPL CALCULATED.3IONS-SCNC: 9 MMOL/L (ref 4–13)
AST SERPL W P-5'-P-CCNC: 25 U/L (ref 13–39)
BASOPHILS # BLD AUTO: 0.09 THOUSANDS/ÂΜL (ref 0–0.1)
BASOPHILS NFR BLD AUTO: 1 % (ref 0–1)
BILIRUB SERPL-MCNC: 1.03 MG/DL (ref 0.2–1)
BUN SERPL-MCNC: 17 MG/DL (ref 5–25)
CALCIUM SERPL-MCNC: 9.3 MG/DL (ref 8.4–10.2)
CARDIAC TROPONIN I PNL SERPL HS: <2 NG/L
CHLORIDE SERPL-SCNC: 103 MMOL/L (ref 96–108)
CO2 SERPL-SCNC: 26 MMOL/L (ref 21–32)
CREAT SERPL-MCNC: 0.55 MG/DL (ref 0.6–1.3)
EOSINOPHIL # BLD AUTO: 0.52 THOUSAND/ÂΜL (ref 0–0.61)
EOSINOPHIL NFR BLD AUTO: 5 % (ref 0–6)
ERYTHROCYTE [DISTWIDTH] IN BLOOD BY AUTOMATED COUNT: 13.7 % (ref 11.6–15.1)
GFR SERPL CREATININE-BSD FRML MDRD: 106 ML/MIN/1.73SQ M
GLUCOSE SERPL-MCNC: 102 MG/DL (ref 65–140)
HCT VFR BLD AUTO: 38.4 % (ref 34.8–46.1)
HGB BLD-MCNC: 12.3 G/DL (ref 11.5–15.4)
IMM GRANULOCYTES # BLD AUTO: 0.09 THOUSAND/UL (ref 0–0.2)
IMM GRANULOCYTES NFR BLD AUTO: 1 % (ref 0–2)
LYMPHOCYTES # BLD AUTO: 1.54 THOUSANDS/ÂΜL (ref 0.6–4.47)
LYMPHOCYTES NFR BLD AUTO: 15 % (ref 14–44)
MCH RBC QN AUTO: 29.9 PG (ref 26.8–34.3)
MCHC RBC AUTO-ENTMCNC: 32 G/DL (ref 31.4–37.4)
MCV RBC AUTO: 93 FL (ref 82–98)
MONOCYTES # BLD AUTO: 0.51 THOUSAND/ÂΜL (ref 0.17–1.22)
MONOCYTES NFR BLD AUTO: 5 % (ref 4–12)
NEUTROPHILS # BLD AUTO: 7.3 THOUSANDS/ÂΜL (ref 1.85–7.62)
NEUTS SEG NFR BLD AUTO: 73 % (ref 43–75)
NRBC BLD AUTO-RTO: 0 /100 WBCS
PLATELET # BLD AUTO: 304 THOUSANDS/UL (ref 149–390)
PLATELET # BLD AUTO: 308 THOUSANDS/UL (ref 149–390)
PMV BLD AUTO: 8.2 FL (ref 8.9–12.7)
PMV BLD AUTO: 8.7 FL (ref 8.9–12.7)
POTASSIUM SERPL-SCNC: 4 MMOL/L (ref 3.5–5.3)
PROT SERPL-MCNC: 6.9 G/DL (ref 6.4–8.4)
RBC # BLD AUTO: 4.11 MILLION/UL (ref 3.81–5.12)
SODIUM SERPL-SCNC: 138 MMOL/L (ref 135–147)
WBC # BLD AUTO: 10.05 THOUSAND/UL (ref 4.31–10.16)

## 2023-02-23 RX ORDER — ENOXAPARIN SODIUM 100 MG/ML
30 INJECTION SUBCUTANEOUS EVERY 12 HOURS
Status: DISCONTINUED | OUTPATIENT
Start: 2023-02-23 | End: 2023-02-24 | Stop reason: HOSPADM

## 2023-02-23 RX ORDER — KETOROLAC TROMETHAMINE 30 MG/ML
15 INJECTION, SOLUTION INTRAMUSCULAR; INTRAVENOUS ONCE
Status: COMPLETED | OUTPATIENT
Start: 2023-02-23 | End: 2023-02-23

## 2023-02-23 RX ORDER — METHOCARBAMOL 500 MG/1
500 TABLET, FILM COATED ORAL 2 TIMES DAILY PRN
Status: DISCONTINUED | OUTPATIENT
Start: 2023-02-23 | End: 2023-02-24 | Stop reason: HOSPADM

## 2023-02-23 RX ORDER — BUPROPION HYDROCHLORIDE 150 MG/1
300 TABLET ORAL DAILY
Status: DISCONTINUED | OUTPATIENT
Start: 2023-02-24 | End: 2023-02-24 | Stop reason: HOSPADM

## 2023-02-23 RX ORDER — DULOXETIN HYDROCHLORIDE 60 MG/1
60 CAPSULE, DELAYED RELEASE ORAL 2 TIMES DAILY
Status: DISCONTINUED | OUTPATIENT
Start: 2023-02-23 | End: 2023-02-24 | Stop reason: HOSPADM

## 2023-02-23 RX ORDER — ASCORBIC ACID 500 MG
500 TABLET ORAL DAILY
Status: DISCONTINUED | OUTPATIENT
Start: 2023-02-24 | End: 2023-02-24 | Stop reason: HOSPADM

## 2023-02-23 RX ORDER — FERROUS SULFATE 325(65) MG
325 TABLET ORAL 2 TIMES DAILY WITH MEALS
Status: DISCONTINUED | OUTPATIENT
Start: 2023-02-24 | End: 2023-02-24 | Stop reason: HOSPADM

## 2023-02-23 RX ORDER — ERYTHROMYCIN 5 MG/G
OINTMENT OPHTHALMIC
Status: DISCONTINUED | OUTPATIENT
Start: 2023-02-23 | End: 2023-02-24 | Stop reason: HOSPADM

## 2023-02-23 RX ORDER — MELATONIN
2000 2 TIMES DAILY
Status: DISCONTINUED | OUTPATIENT
Start: 2023-02-23 | End: 2023-02-24 | Stop reason: HOSPADM

## 2023-02-23 RX ORDER — OXYCODONE HYDROCHLORIDE 5 MG/1
5 TABLET ORAL EVERY 4 HOURS PRN
Status: DISCONTINUED | OUTPATIENT
Start: 2023-02-23 | End: 2023-02-24 | Stop reason: HOSPADM

## 2023-02-23 RX ORDER — ONDANSETRON 2 MG/ML
4 INJECTION INTRAMUSCULAR; INTRAVENOUS EVERY 8 HOURS PRN
Status: DISCONTINUED | OUTPATIENT
Start: 2023-02-23 | End: 2023-02-24 | Stop reason: HOSPADM

## 2023-02-23 RX ORDER — BUSPIRONE HYDROCHLORIDE 5 MG/1
10 TABLET ORAL DAILY
Status: DISCONTINUED | OUTPATIENT
Start: 2023-02-24 | End: 2023-02-24 | Stop reason: HOSPADM

## 2023-02-23 RX ORDER — FOLIC ACID 1 MG/1
1000 TABLET ORAL DAILY
Status: DISCONTINUED | OUTPATIENT
Start: 2023-02-24 | End: 2023-02-24 | Stop reason: HOSPADM

## 2023-02-23 RX ORDER — GABAPENTIN 300 MG/1
300 CAPSULE ORAL 3 TIMES DAILY
Status: DISCONTINUED | OUTPATIENT
Start: 2023-02-23 | End: 2023-02-24 | Stop reason: HOSPADM

## 2023-02-23 RX ORDER — ALBUTEROL SULFATE 90 UG/1
1 AEROSOL, METERED RESPIRATORY (INHALATION) EVERY 6 HOURS PRN
Status: DISCONTINUED | OUTPATIENT
Start: 2023-02-23 | End: 2023-02-24 | Stop reason: HOSPADM

## 2023-02-23 RX ORDER — PRAVASTATIN SODIUM 80 MG/1
80 TABLET ORAL
Status: DISCONTINUED | OUTPATIENT
Start: 2023-02-24 | End: 2023-02-24 | Stop reason: HOSPADM

## 2023-02-23 RX ADMIN — DULOXETINE HYDROCHLORIDE 60 MG: 60 CAPSULE, DELAYED RELEASE ORAL at 23:19

## 2023-02-23 RX ADMIN — KETOROLAC TROMETHAMINE 15 MG: 30 INJECTION, SOLUTION INTRAMUSCULAR at 19:52

## 2023-02-23 RX ADMIN — ENOXAPARIN SODIUM 30 MG: 30 INJECTION SUBCUTANEOUS at 23:19

## 2023-02-23 RX ADMIN — GABAPENTIN 300 MG: 300 CAPSULE ORAL at 23:19

## 2023-02-24 ENCOUNTER — APPOINTMENT (OUTPATIENT)
Dept: PHYSICAL THERAPY | Facility: REHABILITATION | Age: 53
End: 2023-02-24

## 2023-02-24 VITALS
HEIGHT: 64 IN | HEART RATE: 86 BPM | TEMPERATURE: 97.7 F | RESPIRATION RATE: 20 BRPM | DIASTOLIC BLOOD PRESSURE: 89 MMHG | WEIGHT: 212 LBS | OXYGEN SATURATION: 97 % | BODY MASS INDEX: 36.19 KG/M2 | SYSTOLIC BLOOD PRESSURE: 137 MMHG

## 2023-02-24 LAB
2HR DELTA HS TROPONIN: >0 NG/L
4HR DELTA HS TROPONIN: >0 NG/L
ATRIAL RATE: 73 BPM
CARDIAC TROPONIN I PNL SERPL HS: 2 NG/L
CARDIAC TROPONIN I PNL SERPL HS: 2 NG/L
P AXIS: 55 DEGREES
PR INTERVAL: 122 MS
QRS AXIS: 83 DEGREES
QRSD INTERVAL: 78 MS
QT INTERVAL: 380 MS
QTC INTERVAL: 418 MS
T WAVE AXIS: 45 DEGREES
VENTRICULAR RATE: 73 BPM

## 2023-02-24 RX ORDER — METHYLPREDNISOLONE 16 MG/1
32 TABLET ORAL
Status: DISCONTINUED | OUTPATIENT
Start: 2023-02-24 | End: 2023-02-24 | Stop reason: HOSPADM

## 2023-02-24 RX ORDER — DIPHENHYDRAMINE HCL 25 MG
50 TABLET ORAL
Status: DISCONTINUED | OUTPATIENT
Start: 2023-02-24 | End: 2023-02-24 | Stop reason: HOSPADM

## 2023-02-24 RX ADMIN — METHYLPREDNISOLONE 32 MG: 16 TABLET ORAL at 14:17

## 2023-02-24 RX ADMIN — FERROUS SULFATE TAB 325 MG (65 MG ELEMENTAL FE) 325 MG: 325 (65 FE) TAB at 09:18

## 2023-02-24 RX ADMIN — GABAPENTIN 300 MG: 300 CAPSULE ORAL at 09:17

## 2023-02-24 RX ADMIN — CYANOCOBALAMIN TAB 500 MCG 1000 MCG: 500 TAB at 09:17

## 2023-02-24 RX ADMIN — DULOXETINE HYDROCHLORIDE 60 MG: 60 CAPSULE, DELAYED RELEASE ORAL at 09:18

## 2023-02-24 RX ADMIN — METHOCARBAMOL 500 MG: 500 TABLET ORAL at 13:30

## 2023-02-24 RX ADMIN — OXYCODONE 5 MG: 5 TABLET ORAL at 01:10

## 2023-02-24 RX ADMIN — FOLIC ACID 1000 MCG: 1 TABLET ORAL at 09:19

## 2023-02-24 RX ADMIN — OXYCODONE 5 MG: 5 TABLET ORAL at 05:09

## 2023-02-24 RX ADMIN — BUSPIRONE HYDROCHLORIDE 10 MG: 5 TABLET ORAL at 09:18

## 2023-02-24 RX ADMIN — Medication 2000 UNITS: at 09:18

## 2023-02-24 RX ADMIN — B-COMPLEX W/ C & FOLIC ACID TAB 1 TABLET: TAB at 09:20

## 2023-02-24 RX ADMIN — ENOXAPARIN SODIUM 30 MG: 30 INJECTION SUBCUTANEOUS at 09:19

## 2023-02-24 RX ADMIN — OXYCODONE 5 MG: 5 TABLET ORAL at 09:17

## 2023-02-24 RX ADMIN — OXYCODONE 5 MG: 5 TABLET ORAL at 13:29

## 2023-02-24 RX ADMIN — OXYCODONE HYDROCHLORIDE AND ACETAMINOPHEN 500 MG: 500 TABLET ORAL at 09:18

## 2023-02-24 RX ADMIN — BUPROPION HYDROCHLORIDE 300 MG: 150 TABLET, FILM COATED, EXTENDED RELEASE ORAL at 09:17

## 2023-02-24 RX ADMIN — METHOCARBAMOL 500 MG: 500 TABLET ORAL at 01:10

## 2023-02-24 NOTE — ASSESSMENT & PLAN NOTE
· Mechanical fall  · No obvious injuries based on XR's  Official XR reads pending  · No need for additional imaging at this time based on examination    · Admitted for ambulatory dysfunction  · PT/OT

## 2023-02-24 NOTE — PLAN OF CARE
Problem: PHYSICAL THERAPY ADULT  Goal: Performs mobility at highest level of function for planned discharge setting  See evaluation for individualized goals  Description: Treatment/Interventions: Functional transfer training, LE strengthening/ROM, Elevations, Therapeutic exercise, Endurance training, Patient/family training, Equipment eval/education, Bed mobility, Gait training          See flowsheet documentation for full assessment, interventions and recommendations  Note:    Problem List: Decreased strength, Decreased range of motion, Decreased endurance, Impaired balance, Decreased mobility, Decreased safety awareness, Obesity, Orthopedic restrictions, Pain  Assessment: Pt was leaving a  when she turned back to look at something, losing her footing, and falling down approximately 1 step onto her left side  Dx: ambulatory dysfunction, s/p left TKR, and s/p fall  order placed for PT eval and tx, w/ activity order of up in chair  Pt is WBAT L LE per previous documentationpt presents w/ comorbidities of left TKR, asthma, endometrial cancer, chronic pain syndrome, DM, HTN, hyperlipdemia, lumbar radiculopathy and fusion, and bilateral THR and personal factors of anxiety, depression, mobilizing w/ assistive device, stair(s) to enter home and positive fall history  pt presents w/ pain, weakness, decreased ROM, decreased endurance, impaired balance, gait deviations, decreased safety awareness, orthopedic restrictions and fall risk  these impairments are evident in findings from physical examination (weakness and decreased ROM), mobility assessment (need for standby to mod assist w/ all phases of mobility when usually mobilizing independently, tolerance to only 8 feet of ambulation and need for cueing for mobility technique), and Barthel Index: 50/100  pt needed input for task focus and mobility technique  pt is at risk for falls due to physical and safety awareness deficits   pt's clinical presentation is unstable/unpredictable (evident in need for assist w/ all phases of mobility when usually mobilizing independently, tolerance to only 8 feet of ambulation, pain impacting overall mobility status and need for input for task focus and mobility technique)  pt needs inpatient PT tx to improve mobility deficits and progress mobility training as appropriate  discharge recommendation is for inpatient rehab to reduce fall risk and maximize level of functional independence  pt would benefit from acute pain service consult to address pain control  PT Discharge Recommendation: Post acute rehabilitation services    See flowsheet documentation for full assessment

## 2023-02-24 NOTE — ASSESSMENT & PLAN NOTE
· Mechanical fall  · No injuries on XR  No need for additional imaging based on tertiary evaluation    · Admitted for ambulatory dysfunction  · PT/OT

## 2023-02-24 NOTE — ASSESSMENT & PLAN NOTE
· History of total knee replacement approximately 2 weeks ago  · Orthopedics consulted due to patient scheduled for 2-week follow-up on 2/24  · Analgesia as needed  · Patient was previously on aspirin, will continue with Lovenox for DVT prophylaxis while inpatient    · PT/OT

## 2023-02-24 NOTE — DISCHARGE INSTR - AVS FIRST PAGE
Discharge Instructions - Yulissa Keys 46 y o  female MRN: [id number]  Unit/Bed#: S -01    Weight Bearing Status:                                           Weight bearing as tolerated to the left lower extremity  Pain:  Continue analgesics as directed    Dressing Instructions:   Please keep clean, dry and intact until follow up     Appt Instructions: If you do not have your appointment, please call the clinic at [phone] to schedule with Dr Mack Del Cid  Contact the office sooner if you experience any increased numbness/tingling in the extremities

## 2023-02-24 NOTE — OCCUPATIONAL THERAPY NOTE
Occupational Therapy Evaluation     Patient Name: Foster Montelongo  LSBQI'I Date: 2/24/2023  Problem List  Active Problems:    Hyperlipidemia    Asthma    Depression    Anxiety    Fall    Ambulatory dysfunction    Status post left knee replacement    Past Medical History  Past Medical History:   Diagnosis Date    Anxiety     Asthma     Cancer ([facility] )     endometrial    Chronic pain syndrome     CPAP (continuous positive airway pressure) dependence     Depression     Diabetes mellitus ([facility] )     Pre diabetic    GERD (gastroesophageal reflux disease)     HTN (hypertension)     Hyperlipidemia     Hypertension     Lumbar radiculopathy     Prediabetes     Sleep apnea      Past Surgical History  Past Surgical History:   Procedure Laterality Date    GASTRECTOMY SLEEVE LAPAROSCOPIC      HYSTERECTOMY      KNEE ARTHROSCOPY Right     LUMBAR FUSION      SD ARTHRP KNE CONDYLE&PLATU MEDIAL&LAT COMPARTMENTS Left 2/9/2023    Procedure: ARTHROPLASTY KNEE TOTAL AND ALL ASSOCIATED PROCEDURES;  Surgeon: Liseth Hernandez MD;  Location:  MAIN OR;  Service: Orthopedics    SD BREAST REDUCTION Bilateral 9/23/2022    Procedure: BILATERAL BREAST REDUCTION;  Surgeon: Marissa Woodson MD;  Location: [facility];  Service: Plastics    MARY-EN-Y PROCEDURE  05/27/2021    Sleve    TOTAL HIP ARTHROPLASTY Bilateral          02/24/23 0950   OT Last Visit   OT Visit Date 02/24/23  (Friday)   Note Type   Note type Evaluation   Pain Assessment   Pain Assessment Tool 0-10   Pain Score 7   Pain Location/Orientation Orientation: Left; Location: Leg  (anterior thigh, quadriceps)   Pain Radiating Towards down L LE   Effect of Pain on Daily Activities limits I w/ sit <> stand and activity tolerance during ADLs   Patient's Stated Pain Goal No pain   Hospital Pain Intervention(s) Repositioned; Ambulation/increased activity; Emotional support  (RNTeodora addressed prior to eval)   Restrictions/Precautions   Weight Bearing Precautions Per Order Yes   LLE Weight Bearing Per Order WBAT  (recent L TKA at [facility] by Dr Zacarias Godfrey on 2/9/23)   Other Precautions Chair Alarm; Bed Alarm;WBS;Fall Risk;Pain  (Rubén on room air)   Home Living   Type of Home Apartment  (2nd floor apt w/ 7+ 7 KRISTOPHER)   Home Layout One level;Stairs to enter with rails   Bathroom Shower/Tub Tub/shower unit   Bathroom Toilet Standard   Bathroom Equipment Grab bars in shower; Shower chair   [address] Walker;Cane;Grab bars; Sock aid   Additional Comments Pt reports living w/ son in 2nd floor apt w/ 7+ 7 KRISTOPHER  Pt reports using cane on stairs PTA w/ S from son   Prior Function   Level of [place] Independent with ADLs; Independent with functional mobility  (progressed to cane)   Lives With Son  (works during the day)   Receives Help From Family; Outpatient therapy  (son and OPPT)   IADLs Family/Friend/Other provides transportation   Falls in the last 6 months 1 to 4  (3 prior to recent TKA and 1 leading to admission)   Vocational Unemployed   Comments Pt reports using cane for functional mobility and I w/ ADLs s/p recent TKA using sock aide   Lifestyle   Autonomy Pt reports I w/ ADLs using cane for functional mobility PTA   Reciprocal Relationships Pt reports living w/ son   Service to Others Pt reports that she worked 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  Pt added that she worked at [employer] [address] reports enjoying sewing and added that she recently made a quilt   General   Family/Caregiver Present No   Subjective   Subjective "My son did not bring my any socks"   ADL   Where Assessed Edge of bed  (vs OOB in chair)   Eating Assistance 7  Independent  (able to feed self)   Eating Deficit Setup   Grooming Assistance 6  Modified Independent  (seated due to decreased stand anam / Gyala Martinez)   Grooming Deficit Setup   UB Bathing Assistance 6  Modified Independent  (seated due to + pain, decreased stand anam)   UB Bathing Deficit Setup; Increased time to complete   LB Bathing Assistance Unable to assess  (anticipate mod A based on fxal obs skills, clinical judgement seated due to pain, decreased activity anam)   UB Dressing Assistance 6  Modified independent   UB Dressing Deficit Setup; Increased time to complete   LB Dressing Assistance 3  Moderate Assistance   LB Dressing Deficit Setup; Thread RLE into pants; Thread LLE into pants;Pull up over hips;Steadying; Requires assistive device for steadying; Increased time to complete;Supervision/safety   Toileting Assistance  Unable to assess  (denied need to void)   Additional Comments Pt declined doff soiled (wet) underwear and don pants over top  RN, Aishwarya Nichols aware   Bed Mobility   Supine to Sit 4  Minimal assistance   Additional items Assist x 1; Increased time required;Verbal cues;LE management;HOB elevated; Bedrails  (to pt's L)   Sit to Supine Unable to assess   Additional Comments Pt required + time and assist to manage L LE to complete bed mobility supine to sit at EOB  Pt seated OOB In chair at end of session w/ needs met, call bell in reach and chair alarm activated   Transfers   Sit to Stand 3  Moderate assistance  (mod HHA)   Additional items Assist x 1; Increased time required;Verbal cues   Stand to Sit 5  Supervision   Additional items Assist x 1; Armrests; Bedrails; Increased time required;Verbal cues  (hand placement and L LE mgmt / positioning)   Additional Comments Pt required + time, encouraagement, cues to complete sit to stand from EOB  Mulitple attempts and unable to complete using RW  Pt required HHA  Functional Mobility   Functional Mobility 5  Supervision   Additional Comments engaged in functional mobility using RW w/ S and + time  Limited L LE ROM and weight bearing   Additional items Rolling walker   Balance   Static Sitting Fair +   Static Standing Fair -   Ambulatory Fair -  (using RW)   Activity Tolerance   Activity Tolerance Patient limited by fatigue;Patient limited by pain; Other (Comment)  (pt reports light headed, diaphoretic  + pain)   Medical Staff Made Aware care coordination w/ PT, RJ due to regression from baseline, decreased activity tolerance  [facility]   Nurse Made Aware per RN, [facility] appropriate to see pt  Spoke w/ RN pre/post eval   RUE Assessment   RUE Assessment WFL   RUE Strength   RUE Overall Strength Within Functional Limits - able to perform ADL tasks with strength   LUE Assessment   LUE Assessment WFL   LUE Strength   LUE Overall Strength Within Functional Limits - able to perform ADL tasks with strength   Hand Function   Gross Motor Coordination Functional   Fine Motor Coordination Functional   Sensation   Light Touch No apparent deficits   Cognition   Overall Cognitive Status   (generally oriented, able to follow directions)   Arousal/Participation Alert; Cooperative   Attention Attends with cues to redirect   Orientation Level Oriented X4   Memory   (appears [facility]; able to participate in conversation)   Following Commands Follows one step commands with increased time or repetition   Comments Identified pt by full name and birthdate  Alert and generally oriented  Limited active participation due to L LE pain and limited ROM   Assessment   Limitation Decreased ADL status; Decreased endurance;Decreased self-care trans;Decreased high-level ADLs   Assessment Pt is a 49yo female [facility] as a trauma following a fall  Diagnosed w/ ambulatory dysfunction s/p recent L TKA (2/9/2023)  Significant PMH impacting her occupational performance includes L TKA (2/9/2023), anxiety/ depression, endometrial ca hx/ tx, lumbar radiculopathy, HTN, B EVA, B breast reduction (2022), gastric bypass ([facility] 2021)  Pt w/ active OT orders and activity orders  Pt reports living w/ her son in 2nd floor apt, I w/ ADLs and use of cane for functional mobility PTA  Upon eval, pt alert and oriented  Able to follow directions w/ + time, encouragement   Demonstrated decreased activity tolerance and limited L LE ROM due to pain  Pt required min A to complete bed mobility for L LE mgmt w/ + time, mod HHA x2 sit <> stand from EOB and S w/ + time using RW for functional mobility  Pt required mod A to complete and mod I seated UBD  Pt presents w/ decreased L LE ROM / strength, decreased standing tolerance, decreased balance, decreased activity tolerance, and decreased endurance impacting her I w/ dressing, bathing, oral hygiene, functional mobility, functional transfers, clothing mgmt, food prep / clean up  Pt completing ADL below baseline level of I and would benefit from OT while in acute care to address deficits  Recommend DC home w/ Home OT using RW pend ability to manage stairs vs rehab  Will continue to follow   Goals   Patient Goals Pt stated that she would like to have less pain and be able to manage stairs to enter apt   Plan   Treatment Interventions ADL retraining;Functional transfer training; Endurance training;Patient/family training;Equipment evaluation/education;Continued evaluation; Energy conservation; Activityengagement; Compensatory technique education   Goal Expiration Date 03/03/23   OT Frequency 3-5x/wk   Recommendation   OT Discharge Recommendation Home with home health rehabilitation  (vs rehab pend ability to manage stairs, improved act anam, medical optimization)   Equipment Recommended Bedside commode; Shower/Tub chair with back ($)  (use of RW at this time)   Commode Type Standard   AM-PAC Daily Activity Inpatient   Lower Body Dressing 3   Bathing 3   Toileting 3   Upper Body Dressing 4   Grooming 4   Eating 4   Daily Activity Raw Score 21   Daily Activity Standardized Score (Calc for Raw Score >=11) 44 27   AM-PAC Applied Cognition Inpatient   Following a Speech/Presentation 4   Understanding Ordinary Conversation 4   Taking Medications 3   Remembering Where Things Are Placed or Put Away 4   Remembering List of 4-5 Errands 3   Taking Care of Complicated Tasks 3   Applied Cognition Raw Score 21   Applied Cognition Standardized Score 44 3   Barthel Index   Feeding 10   Bathing 0   Grooming Score 5   Dressing Score 5   Bladder Score 5   Bowels Score 10   Toilet Use Score 5   Transfers (Bed/Chair) Score 10   Mobility (Level Surface) Score 0   Stairs Score 0   Barthel Index Score 50   Modified Mike Scale   Modified Lipscomb Scale 3       The patient's raw score on the AM-PAC Daily Activity Inpatient Short Form is 21  A raw score of greater than or equal to 19 suggests the patient may benefit from discharge to home  Please refer to the recommendation of the Occupational Therapist for safe discharge planning      Pt goals to be met by 3/3/2023 to max I and return home to 2nd floor apt includes:    -Pt will demonstrate improved activity tolerance and sitting tolerance OOB In chair for all meals    -Pt will complete functional transfers to all surfaces w/ mod I using AD, DME as needed to max I and return home     -Pt will complete LBD w/ mod I using LHAE as needed to max I and minimize burden of care    -Pt will consistently engage in functional mobility using AD w/ mod I household distance to max I w/ ADLs and improve engagement    -Pt will complete bed mobility supine <> sit w/ mod I to max I and return home    -Pt will demonstrate improved activity and functional standing tolerance for at least 10 minutes using AD w/ at least fair balance to participate in grooming and UB bathing w/ mod I standing at sink to max I w/ functional mobility and food prep to return home    [facility], OTR/L

## 2023-02-24 NOTE — H&P
[facility] 1970, 46 y o  female MRN: [id number]  Unit/Bed#: ED-28 Encounter: [id number]  Primary Care Provider: Merlin Monaco, DO   Date and time admitted to hospital: 2023  6:27 PM    Fall  Assessment & Plan  · Mechanical fall  · No obvious injuries based on XR's  Official XR reads pending  · No need for additional imaging at this time based on examination  · Admitted for ambulatory dysfunction  · PT/OT    Ambulatory dysfunction  Assessment & Plan  · Patient unable to ambulate or bear weight on left lower extremity following fall  · PT/OT    Status post left knee replacement  Assessment & Plan  · History of total knee replacement approximately 2 weeks ago  · Orthopedics consulted due to patient scheduled for 2-week follow-up on   · Official XR reads pending  · Analgesia as needed  · Patient was previously on aspirin, will continue with Lovenox for DVT prophylaxis while inpatient  · PT/OT    Anxiety  Assessment & Plan  · Continue home medications including Wellbutrin, BuSpar, Cymbalta    Depression  Assessment & Plan  · Continue current home regimen including Cymbalta, Wellbutrin, BuSpar  Asthma  Assessment & Plan  · Albuterol as needed  Hyperlipidemia  Assessment & Plan  · We will substitute home simvastatin with hospital formulary pravastatin  Trauma Alert: Evaluation; trauma team notified at [phone] via text   Model of Arrival: Ambulance    Trauma Team: Attending Johny Fragoso and Brandi Singleton  Consultants:     Orthopedics: routine consult; Epic consult order placed; History of Present Illness     Chief Complaint: "I am not able to put any weight on it"  Mechanism:Fall     HPI:    Geo Camacho is a 46 y o  female with history of total knee replacement approximately 2 weeks ago, presenting today for evaluation after suffering a fall    Patient describes that she was leaving a  when she turned back to look at something losing her footing and falling down approximately 1 step onto her left side  No reported head strike or loss of consciousness  Since the fall patient was initially able to get up and ambulate to her car with assistance of a cane, but since that time ambulatory status has declined and patient is now unable to bear weight on her left lower extremity  She states the majority of her pain is in her left knee but also complains of soreness in her left hip  She denies any other complaints of pain or discomfort  Review of Systems   Musculoskeletal: Positive for arthralgias  Left knee and hip pain  All other systems reviewed and are negative  12-point, complete review of systems was reviewed and negative except as stated above       Historical Information     Past Medical History:   Diagnosis Date   • Anxiety    • Asthma    • Cancer ([facility] [address] )     endometrial   • Chronic pain syndrome    • CPAP (continuous positive airway pressure) dependence    • Depression    • Diabetes mellitus (HCC)     Pre diabetic   • GERD (gastroesophageal reflux disease)    • HTN (hypertension)    • Hyperlipidemia    • Hypertension    • Lumbar radiculopathy    • Prediabetes    • Sleep apnea      Past Surgical History:   Procedure Laterality Date   • GASTRECTOMY SLEEVE LAPAROSCOPIC     • HYSTERECTOMY     • KNEE ARTHROSCOPY Right    • LUMBAR FUSION     • CA ARTHRP KNE CONDYLE&PLATU MEDIAL&LAT COMPARTMENTS Left 2/9/2023    Procedure: ARTHROPLASTY KNEE TOTAL AND ALL ASSOCIATED PROCEDURES;  Surgeon: Katina Chao MD;  Location:  MAIN OR;  Service: Orthopedics   • CA BREAST REDUCTION Bilateral 9/23/2022    Procedure: BILATERAL BREAST REDUCTION;  Surgeon: Leonel Sesay MD;  Location:  MAIN OR;  Service: Plastics   • MARY-EN-Y PROCEDURE  05/27/2021    Sleve   • TOTAL HIP ARTHROPLASTY Bilateral         Social History     Tobacco Use   • Smoking status: Former   • Smokeless tobacco: Never   Vaping Use   • Vaping Use: Never used   Substance Use Topics   • Alcohol use: Not Currently   • Drug use: Not Currently     Immunization History   Administered Date(s) Administered   • COVID-19 MODERNA VACC 0 5 ML IM 04/05/2021, 05/03/2021     Last Tetanus: Unknown  Not indicated based on current physical examination  Family History: Non-contributory     Meds/Allergies   all current active meds have been reviewed     Allergies   Allergen Reactions   • Bee Venom Swelling   • Dust Mite Extract    • Fish-Derived Products - Food Allergy Hives   • Iodine - Food Allergy Hives   • Lac Bovis Other (See Comments)     congestion   • Molds & Smuts    • Other Swelling   • Pollen Extract Other (See Comments)     Runny nose, watery eyes (also has corneal swelling) and itching  Triggers asthma   • Shrimp Flavor - Food Allergy Hives       Objective   Initial Vitals:   Temperature: 98 1 °F (36 7 °C) (02/23/23 1832)  Pulse: 67 (02/23/23 1832)  Respirations: 20 (02/23/23 1832)  Blood Pressure: 104/55 (02/23/23 1832)    Primary Survey:   Airway:        Status: patent;        Pre-hospital Interventions: none        Hospital Interventions: none  Breathing:        Pre-hospital Interventions: none       Effort: normal       Right breath sounds: normal       Left breath sounds: normal  Circulation:        Rhythm: regular       Rate: regular   Right Pulses Left Pulses    R radial: 2+  R femoral: 2+  R pedal: 2+     L radial: 2+  L femoral: 2+  L pedal: 2+       Disability:        GCS: Eye: 4; Verbal: 5 Motor: 6 Total: 15       Right Pupil: 3 mm;  round;  reactive         Left Pupil:  3 mm;  round;  reactive      R Motor Strength L Motor Strength    R : 5/5  R dorsiflex: 5/5  R plantarflex: 5/5 L : 5/5  L dorsiflex: 5/5  L plantarflex: 5/5        Sensory:  No sensory deficit  Exposure:       Completed: Yes      Secondary Survey:  Physical Exam  Constitutional:       General: She is not in acute distress  Appearance: She is not ill-appearing  HENT:      Head: Normocephalic and atraumatic  Right Ear: External ear normal       Left Ear: External ear normal       Nose: Nose normal       Mouth/Throat:      Mouth: Mucous membranes are moist       Pharynx: Oropharynx is clear  Eyes:      Extraocular Movements: Extraocular movements intact  Pupils: Pupils are equal, round, and reactive to light  Cardiovascular:      Rate and Rhythm: Normal rate and regular rhythm  Pulses: Normal pulses  Heart sounds: Normal heart sounds  Pulmonary:      Effort: Pulmonary effort is normal  No respiratory distress  Breath sounds: Normal breath sounds  No stridor  No wheezing, rhonchi or rales  Chest:      Chest wall: No tenderness  Abdominal:      General: Abdomen is flat  Palpations: Abdomen is soft  Genitourinary:     Comments: Pelvis is stable  Musculoskeletal:      Cervical back: Normal range of motion and neck supple  No rigidity or tenderness  Comments: Left lower extremity has tenderness on palpation of left hip with limited range of motion  Left knee has surgical wound that is well approximated with staples-no erythema, dehiscence, or drainage appreciated  Small knee effusion palpable  Generalized tenderness of the left knee with limited range of motion  Left ankle has full range of motion and is nontender and displays no instability  All other extremities are nontender and display no effusion or deformities  No C, T, L-spine tenderness  No palpable step-offs  Skin:     General: Skin is warm and dry  Capillary Refill: Capillary refill takes less than 2 seconds  Comments: Left knee surgical wound is well approximated with staples  Otherwise, warm and dry  Neurological:      General: No focal deficit present  Mental Status: She is alert and oriented to person, place, and time  Psychiatric:         Mood and Affect: Mood normal          Thought Content:  Thought content normal          Invasive Devices     Peripheral Intravenous Line  Duration Peripheral IV 02/23/23 Left Forearm <1 day              Lab Results:   Results: I have personally reviewed all pertinent laboratory/tests results, BMP/CMP:   Lab Results   Component Value Date    SODIUM 138 02/23/2023    K 4 0 02/23/2023     02/23/2023    CO2 26 02/23/2023    BUN 17 02/23/2023    CREATININE 0 55 (L) 02/23/2023    CALCIUM 9 3 02/23/2023    AST 25 02/23/2023    ALT 23 02/23/2023    ALKPHOS 109 (H) 02/23/2023    EGFR 106 02/23/2023    and CBC:   Lab Results   Component Value Date    WBC 10 05 02/23/2023    HGB 12 3 02/23/2023    HCT 38 4 02/23/2023    MCV 93 02/23/2023     02/23/2023    MCH 29 9 02/23/2023    MCHC 32 0 02/23/2023    RDW 13 7 02/23/2023    MPV 8 7 (L) 02/23/2023    NRBC 0 02/23/2023       Imaging Results: I have personally reviewed pertinent reports  Chest Xray(s): N/A   FAST exam(s): N/A   CT Scan(s): N/A   Additional Xray(s): pending     Other Studies: none    Code Status: Level 1 - Full Code  Advance Directive and Living Will:      Power of :    POLST:    I have spent 25 minutes with Patient  today in which greater than 50% of this time was spent in counseling/coordination of care regarding Diagnostic results, Prognosis, Risks and benefits of tx options, Instructions for management, Patient and family education, Importance of tx compliance, Risk factor reductions, Impressions, Counseling / Coordination of care, Documenting in the medical record, Reviewing / ordering tests, medicine, procedures  , Obtaining or reviewing history   and Communicating with other healthcare professionals

## 2023-02-24 NOTE — ASSESSMENT & PLAN NOTE
· Patient unable to ambulate or bear weight on left lower extremity following fall  · No injuries on XR  No need for additional imaging based on tertiary evaluation    · PT/OT

## 2023-02-24 NOTE — ED PROVIDER NOTES
History  Chief Complaint   Patient presents with   • Fall     Pt fell today around 1130am  Pt was walking down a ramp and missed the railing and fell onto back  -head strike, - thinner, -LOC  Pt chief complaint is increased pain on L side of body  48-year-old female comes in for evaluation after fall  Patient states that she was at a  earlier today when she fell backwards walking down a ramp  Yulissa Job onto her back and left side  Patient complains of left hip knee and ankle pain  Patient states she is not sure why she fell  She states she felt dizzy and lightheaded like she was going to pass out but did not actually pass out  Patient states that she needed help getting up and since that time has been barely able to ambulate  Denies any head strike or loss of consciousness  Of note patient recently had a knee replacement  History provided by:  Patient and medical records   used: No    Fall  Mechanism of injury: fall    Injury location:  Leg  Leg injury location:  L hip, L leg, L ankle, L knee and L lower leg  Incident location:  home  Arrived directly from scene: no    Fall:     Fall occurred:  Walking    Impact surface:  [place]    Point of impact:  Unable to specify    Entrapped after fall: yes    Protective equipment: none    Suspicion of alcohol use: no    Suspicion of drug use: no    Tetanus status:  Up to date  Prior to arrival data:     Blood loss:  None    Responsiveness at scene:  Alert    Orientation at scene:  Person, place, situation and time    Loss of consciousness: no      Amnesic to event: no    Associated symptoms: no abdominal pain, no back pain, no chest pain, no seizures and no vomiting        Prior to Admission Medications   Prescriptions Last Dose Informant Patient Reported? Taking?    [facility] Vitamin B-12 1000 MCG tablet   Yes No   Sig: Take 1,000 mcg by mouth daily   CVS Vitamin C 500 MG tablet   No No   Sig: TAKE 1 TABLET (500 MG TOTAL) BY MOUTH DAILY  Calcium Carb-Cholecalciferol (OSCAL-D) 500 mg-200 units per tablet   Yes No   Sig: Take 1 tablet by mouth 2 (two) times a day with meals   Cholecalciferol (VITAMIN D3) 2000 units capsule   Yes No   Sig: TAKE 2 CAPSULES BY MOUTH DAILY INDICATIONS: VITAMIN D DEFICIENCY     DULoxetine (CYMBALTA) 60 mg delayed release capsule   Yes No   Sig: Take 60 mg by mouth 2 (two) times a day   Multiple Vitamins-Minerals (multivitamin with minerals) tablet   No No   Sig: Take 1 tablet by mouth daily   VENTOLIN  (90 Base) MCG/ACT inhaler   Yes No   Sig: INHALE 2 PUFFS EVERY 4 (FOUR) HOURS AS NEEDED FOR WHEEZING OR SHORTNESS OF BREATH    acetaminophen (TYLENOL) 325 mg tablet   Yes No   Sig: Take 650 mg by mouth every 6 (six) hours as needed for mild pain   acetaminophen (TYLENOL) 650 mg CR tablet   No No   Sig: Take 1 tablet (650 mg total) by mouth every 8 (eight) hours as needed for mild pain   aspirin (ECOTRIN LOW STRENGTH) 81 mg EC tablet   No No   Sig: Take 1 tablet (81 mg total) by mouth 2 (two) times a day Please do not start taking until after total joint arthroplasty   azelastine (OPTIVAR) 0 05 % ophthalmic solution   Yes No   Sig: Administer 1 drop to the right eye 2 (two) times a day   bepotastine besilate (BEPREVE) 1 5 % op soln   Yes No   Sig: INSTILL 1 DROP INTO BOTH EYES TWICE A DAY   buPROPion (WELLBUTRIN XL) 300 mg 24 hr tablet   Yes No   Sig: Take 300 mg by mouth daily   busPIRone (BUSPAR) 10 mg tablet   Yes No   Sig: Take 10 mg by mouth in the morning Takes twice a day   cetirizine (ZyrTEC) 10 mg tablet   Yes No   Sig: Take 10 mg by mouth daily Takes 1 tablet 2 times a day   docusate sodium (Colace) 100 mg capsule   No No   Sig: Take 1 capsule (100 mg total) by mouth daily as needed for constipation   erythromycin (ILOTYCIN) ophthalmic ointment   Yes No   Sig: APPLY (1CM) TO THE SURGICAL EYE AT BEDTIME   ferrous sulfate 325 (65 Fe) mg tablet   Yes No   Sig: Take by mouth daily with breakfast   folic acid (FOLVITE) 1 mg tablet   No No   Sig: TAKE 1 TABLET BY MOUTH EVERY DAY   gabapentin (NEURONTIN) 300 mg capsule   No No   Sig: Take 1 capsule (300 mg total) by mouth 3 (three) times a day   hydrocortisone 1 % cream   Yes No   Sig: Apply topically as needed Applies daily   methocarbamol (ROBAXIN) 500 mg tablet   No No   Sig: Take 1 tablet (500 mg total) by mouth 2 (two) times a day as needed for muscle spasms   ondansetron (ZOFRAN) 4 mg tablet   No No   Sig: Take 1 tablet (4 mg total) by mouth every 8 (eight) hours as needed for nausea or vomiting   oxyCODONE (ROXICODONE) 5 immediate release tablet   No No   Sig: Take 1 tablets every 6 hrs as needed for pain control   pantoprazole (PROTONIX) 40 mg tablet   Yes No   Sig: Take 40 mg by mouth as needed   prednisoLONE acetate (PRED FORTE) 1 % ophthalmic suspension   Yes No   Sig: instill 1 drop 6 times daily to the surgical eye   simvastatin (ZOCOR) 40 mg tablet   Yes No   Sig: Take 40 mg by mouth daily at bedtime      Facility-Administered Medications: None       Past Medical History:   Diagnosis Date   • Anxiety    • Asthma    • Cancer (HCC)     endometrial   • Chronic pain syndrome    • CPAP (continuous positive airway pressure) dependence    • Depression    • Diabetes mellitus (HCC)     Pre diabetic   • GERD (gastroesophageal reflux disease)    • HTN (hypertension)    • Hyperlipidemia    • Hypertension    • Lumbar radiculopathy    • Prediabetes    • Sleep apnea        Past Surgical History:   Procedure Laterality Date   • GASTRECTOMY SLEEVE LAPAROSCOPIC     • HYSTERECTOMY     • KNEE ARTHROSCOPY Right    • LUMBAR FUSION     • OK ARTHRP KNE CONDYLE&PLATU MEDIAL&LAT COMPARTMENTS Left 2/9/2023    Procedure: ARTHROPLASTY KNEE TOTAL AND ALL ASSOCIATED PROCEDURES;  Surgeon: Abdi Stanley MD;  Location:  MAIN OR;  Service: Orthopedics   • OK BREAST REDUCTION Bilateral 9/23/2022    Procedure: BILATERAL BREAST REDUCTION;  Surgeon: Ish De Oliveira MD;  Location:  MAIN OR;  Service: Plastics   • MARY-EN-Y PROCEDURE  05/27/2021    [place]   • TOTAL HIP ARTHROPLASTY Bilateral        Family History   Problem Relation Age of Onset   • Diabetes Mother    • Stroke Mother    • Diabetes Father    • Cancer Father         thyroid   • Diabetes Sister    • Diabetes Brother    • No Known Problems Maternal Grandmother    • No Known Problems Maternal Grandfather    • No Known Problems Paternal Grandmother    • No Known Problems Paternal Grandfather    • No Known Problems Sister      I have reviewed and agree with the history as documented  E-Cigarette/Vaping   • E-Cigarette Use Never User      E-Cigarette/Vaping Substances   • Nicotine No    • THC No    • CBD No    • Flavoring No    • Other No    • Unknown No      Social History     Tobacco Use   • Smoking status: Former   • Smokeless tobacco: Never   Vaping Use   • Vaping Use: Never used   Substance Use Topics   • Alcohol use: Not Currently   • Drug use: Not Currently       Review of Systems   Constitutional: Positive for fatigue  Negative for chills and fever  HENT: Negative for ear pain and sore throat  Eyes: Negative for pain and visual disturbance  Respiratory: Negative for cough and shortness of breath  Cardiovascular: Negative for chest pain and palpitations  Gastrointestinal: Negative for abdominal pain and vomiting  Genitourinary: Negative for dysuria and hematuria  Musculoskeletal: Positive for arthralgias, gait problem and myalgias  Negative for back pain  Skin: Negative for color change and rash  Neurological: Positive for weakness  Negative for seizures and syncope  All other systems reviewed and are negative  Physical Exam  Physical Exam  Vitals and nursing note reviewed  Constitutional:       General: She is not in acute distress  Appearance: She is well-developed  HENT:      Head: Normocephalic and atraumatic     Eyes:      Conjunctiva/sclera: Conjunctivae normal    Cardiovascular: Rate and Rhythm: Normal rate and regular rhythm  Heart sounds: No murmur heard  Pulmonary:      Effort: Pulmonary effort is normal  No respiratory distress  Breath sounds: Normal breath sounds  Abdominal:      Palpations: Abdomen is soft  Tenderness: There is no abdominal tenderness  Musculoskeletal:         General: No swelling  Cervical back: Neck supple  Left hip: Tenderness present  Decreased range of motion  Left knee: Swelling present  Decreased range of motion  Tenderness present  Left ankle: Tenderness present  Decreased range of motion  Legs:    Skin:     General: Skin is warm and dry  Capillary Refill: Capillary refill takes less than 2 seconds  Neurological:      Mental Status: She is alert     Psychiatric:         Mood and Affect: Mood normal          Vital Signs  ED Triage Vitals [02/23/23 1832]   Temperature Pulse Respirations Blood Pressure SpO2   98 1 °F (36 7 °C) 67 20 104/55 100 %      Temp Source Heart Rate Source Patient Position - Orthostatic VS BP Location FiO2 (%)   Oral Monitor Lying Right arm --      Pain Score       10 - Worst Possible Pain           Vitals:    02/23/23 1832 02/23/23 2200   BP: 104/55 131/82   Pulse: 67 79   Patient Position - Orthostatic VS: Lying          Visual Acuity  Visual Acuity    Flowsheet Row Most Recent Value   L Pupil Size (mm) 2   R Pupil Size (mm) 2   L Pupil Shape Round   R Pupil Shape Round          ED Medications  Medications   enoxaparin (LOVENOX) subcutaneous injection 30 mg (30 mg Subcutaneous Given 2/23/23 2319)   ascorbic acid (VITAMIN C) tablet 500 mg (has no administration in time range)   cyanocobalamin (VITAMIN B-12) tablet 1,000 mcg (has no administration in time range)   cholecalciferol (VITAMIN D3) tablet 2,000 Units (2,000 Units Oral Not Given 2/23/23 2135)   ferrous sulfate tablet 325 mg (has no administration in time range)   folic acid (FOLVITE) tablet 1,000 mcg (has no administration in time range)   oxyCODONE (ROXICODONE) split tablet 2 5 mg (has no administration in time range)     Or   oxyCODONE (ROXICODONE) IR tablet 5 mg (has no administration in time range)   gabapentin (NEURONTIN) capsule 300 mg (300 mg Oral Given 2/23/23 2319)   methocarbamol (ROBAXIN) tablet 500 mg (has no administration in time range)   multivitamin stress formula tablet 1 tablet (has no administration in time range)   albuterol (PROVENTIL HFA,VENTOLIN HFA) inhaler 1 puff (has no administration in time range)   pravastatin (PRAVACHOL) tablet 80 mg (has no administration in time range)   DULoxetine (CYMBALTA) delayed release capsule 60 mg (60 mg Oral Given 2/23/23 2319)   buPROPion (WELLBUTRIN XL) 24 hr tablet 300 mg (has no administration in time range)   busPIRone (BUSPAR) tablet 10 mg (has no administration in time range)   ondansetron (ZOFRAN) injection 4 mg (has no administration in time range)   erythromycin (ILOTYCIN) 0 5 % ophthalmic ointment ( Left Eye Not Given 2/23/23 2318)   ketorolac (TORADOL) injection 15 mg (15 mg Intravenous Given 2/23/23 1952)       Diagnostic Studies  Results Reviewed     Procedure Component Value Units Date/Time    HS Troponin I 2hr [[id number]]  (Normal) Collected: 02/23/23 2310    Lab Status: Final result Specimen: Blood from Arm, Right Updated: 02/24/23 0014     hs TnI 2hr 2 ng/L      Delta 2hr hsTnI >0 ng/L     Platelet count [[id number]]  (Abnormal) Collected: 02/23/23 2310    Lab Status: Final result Specimen: Blood from Arm, Right Updated: 02/23/23 2319     Platelets 148 Thousands/uL      MPV 8 2 fL     HS Troponin I 4hr [[id number]]     Lab Status: No result Specimen: Blood     HS Troponin 0hr (reflex protocol) [[id number]]  (Normal) Collected: 02/23/23 1953    Lab Status: Final result Specimen: Blood from Arm, Left Updated: 02/23/23 2051     hs TnI 0hr <2 ng/L     Comprehensive metabolic panel [[id number]]  (Abnormal) Collected: 02/23/23 1953    Lab Status: Final result Specimen: Blood from Arm, Left Updated: 02/23/23 2047     Sodium 138 mmol/L      Potassium 4 0 mmol/L      Chloride 103 mmol/L      CO2 26 mmol/L      ANION GAP 9 mmol/L      BUN 17 mg/dL      Creatinine 0 55 mg/dL      Glucose 102 mg/dL      Calcium 9 3 mg/dL      AST 25 U/L      ALT 23 U/L      Alkaline Phosphatase 109 U/L      Total Protein 6 9 g/dL      Albumin 4 0 g/dL      Total Bilirubin 1 03 mg/dL      eGFR 106 ml/min/1 73sq m     Narrative:      [place] guidelines for Chronic Kidney Disease (CKD):   •  Stage 1 with normal or high GFR (GFR > 90 mL/min/1 73 square meters)  •  Stage 2 Mild CKD (GFR = 60-89 mL/min/1 73 square meters)  •  Stage 3A Moderate CKD (GFR = 45-59 mL/min/1 73 square meters)  •  Stage 3B Moderate CKD (GFR = 30-44 mL/min/1 73 square meters)  •  Stage 4 Severe CKD (GFR = 15-29 mL/min/1 73 square meters)  •  Stage 5 End Stage CKD (GFR <15 mL/min/1 73 square meters)  Note: GFR calculation is accurate only with a steady state creatinine    CBC and differential [[id number]]  (Abnormal) Collected: 02/23/23 1953    Lab Status: Final result Specimen: Blood from Arm, Left Updated: 02/23/23 1959     WBC 10 05 Thousand/uL      RBC 4 11 Million/uL      Hemoglobin 12 3 g/dL      Hematocrit 38 4 %      MCV 93 fL      MCH 29 9 pg      MCHC 32 0 g/dL      RDW 13 7 %      MPV 8 7 fL      Platelets 624 Thousands/uL      nRBC 0 /100 WBCs      Neutrophils Relative 73 %      Immat GRANS % 1 %      Lymphocytes Relative 15 %      Monocytes Relative 5 %      Eosinophils Relative 5 %      Basophils Relative 1 %      Neutrophils Absolute 7 30 Thousands/µL      Immature Grans Absolute 0 09 Thousand/uL      Lymphocytes Absolute 1 54 Thousands/µL      Monocytes Absolute 0 51 Thousand/µL      Eosinophils Absolute 0 52 Thousand/µL      Basophils Absolute 0 09 Thousands/µL                  XR hip/pelv 2-3 vws left   Final Result by Stanislaw Aponte DO (02/23 2234)      No acute osseous abnormality is seen  Other findings as above  Workstation performed: [id number]         XR knee 4+ views left injury   Final Result by Jeremiah Vernon DO (02/23 2236)      Postsurgical changes as described but no evidence of acute osseous injury  Other findings as above  Workstation performed: [id number]         XR ankle 3+ views LEFT   Final Result by Jeremiah Vernon DO (02/23 2240)      No acute osseous abnormality is seen  Other findings as above  Workstation performed: [id number]                    Procedures  ECG 12 Lead Documentation Only    Date/Time: 2/23/2023 8:39 PM  Performed by: Rossana Gonzalez DO  Authorized by: Rossana Gonzalez DO     Patient location:  ED  Rate:     ECG rate:  70  Rhythm:     Rhythm: sinus rhythm    Ectopy:     Ectopy: none    QRS:     QRS axis:  Normal  Conduction:     Conduction: normal               ED Course                                             Medical Decision Making  Differential diagnosis includes but not limited to near syncope, arrhythmia, hip fracture, knee fracture, ankle fracture, pelvic fracture, hip strain, knee strain, ankle sprain, electrolyte abnormality    Ambulatory dysfunction: acute illness or injury  Fall, initial encounter: acute illness or injury  History of left knee replacement: acute illness or injury  Amount and/or Complexity of Data Reviewed  External Data Reviewed: notes  Details: Recent knee surgery notes reviewed  Labs: ordered  Decision-making details documented in ED Course  Radiology: ordered and independent interpretation performed  Details: Hip x-ray reviewed by me no fracture dislocation appreciated  Knee x-ray reviewed by me I do not appreciate any shift in the hardware from prior surgery  Ankle x-ray reviewed by me no fracture dislocation appreciated  ECG/medicine tests: ordered and independent interpretation performed   Decision-making details documented in ED Course  Risk  Prescription drug management  Decision regarding hospitalization  Risk Details: Discussed case with trauma team patient is still not able to ambulate in the emergency department despite no fracture or dislocation found on her x-rays  We will keep the patient for further evaluation and treatment  Disposition  Final diagnoses:   Fall, initial encounter   Ambulatory dysfunction   History of left knee replacement     Time reflects when diagnosis was documented in both MDM as applicable and the Disposition within this note     Time User Action Codes Description Comment    2/23/2023  8:44 PM López Feliz Add [B13  Kelvin Erik Fall, initial encounter     2/23/2023  8:44 PM López Feliz Add [R26 2] Ambulatory dysfunction     2/23/2023  9:09 PM Puneet Mckoy Add [H56 [id number]] History of left knee replacement       ED Disposition     ED Disposition   Admit    Condition   Stable    Date/Time   Thu Feb 23, 2023  9:18 PM    Comment   Case was discussed with dr Zion Jacobo and the patient's admission status was agreed to be Admission Status: observation status to the service of Dr Zion Jacobo              Follow-up Information    None         Current Discharge Medication List      CONTINUE these medications which have NOT CHANGED    Details   acetaminophen (TYLENOL) 325 mg tablet Take 650 mg by mouth every 6 (six) hours as needed for mild pain      acetaminophen (TYLENOL) 650 mg CR tablet Take 1 tablet (650 mg total) by mouth every 8 (eight) hours as needed for mild pain  Qty: 30 tablet, Refills: 0    Associated Diagnoses: Primary osteoarthritis of left knee      aspirin (ECOTRIN LOW STRENGTH) 81 mg EC tablet Take 1 tablet (81 mg total) by mouth 2 (two) times a day Please do not start taking until after total joint arthroplasty  Qty: 70 tablet, Refills: 0    Associated Diagnoses: Primary osteoarthritis of left knee      azelastine (OPTIVAR) 0 05 % ophthalmic solution Administer 1 drop to the right eye 2 (two) times a day      bepotastine besilate (BEPREVE) 1 5 % op soln INSTILL 1 DROP INTO BOTH EYES TWICE A DAY      buPROPion (WELLBUTRIN XL) 300 mg 24 hr tablet Take 300 mg by mouth daily      busPIRone (BUSPAR) 10 mg tablet Take 10 mg by mouth in the morning Takes twice a day      Calcium Carb-Cholecalciferol (OSCAL-D) 500 mg-200 units per tablet Take 1 tablet by mouth 2 (two) times a day with meals      cetirizine (ZyrTEC) 10 mg tablet Take 10 mg by mouth daily Takes 1 tablet 2 times a day      Cholecalciferol (VITAMIN D3) 2000 units capsule TAKE 2 CAPSULES BY MOUTH DAILY INDICATIONS: VITAMIN D DEFICIENCY  Refills: 5      [facility]S Vitamin B-12 1000 MCG tablet Take 1,000 mcg by mouth daily      CVS Vitamin C 500 MG tablet TAKE 1 TABLET (500 MG TOTAL) BY MOUTH DAILY    Qty: 90 tablet, Refills: 1    Associated Diagnoses: Primary osteoarthritis of left knee      docusate sodium (Colace) 100 mg capsule Take 1 capsule (100 mg total) by mouth daily as needed for constipation  Qty: 30 capsule, Refills: 0    Associated Diagnoses: Macromastia; S/P bilateral breast reduction      DULoxetine (CYMBALTA) 60 mg delayed release capsule Take 60 mg by mouth 2 (two) times a day      erythromycin (ILOTYCIN) ophthalmic ointment APPLY (1CM) TO THE SURGICAL EYE AT BEDTIME      ferrous sulfate 325 (65 Fe) mg tablet Take by mouth daily with breakfast      folic acid (FOLVITE) 1 mg tablet TAKE 1 TABLET BY MOUTH EVERY DAY  Qty: 90 tablet, Refills: 1    Associated Diagnoses: Primary osteoarthritis of left knee      gabapentin (NEURONTIN) 300 mg capsule Take 1 capsule (300 mg total) by mouth 3 (three) times a day  Qty: 90 capsule, Refills: 2    Associated Diagnoses: Macromastia; S/P bilateral breast reduction      hydrocortisone 1 % cream Apply topically as needed Applies daily      methocarbamol (ROBAXIN) 500 mg tablet Take 1 tablet (500 mg total) by mouth 2 (two) times a day as needed for muscle spasms  Qty: 60 tablet, Refills: 1    Associated Diagnoses: Myofascial pain syndrome      Multiple Vitamins-Minerals (multivitamin with minerals) tablet Take 1 tablet by mouth daily  Qty: 30 tablet, Refills: 0    Associated Diagnoses: Primary osteoarthritis of left knee      ondansetron (ZOFRAN) 4 mg tablet Take 1 tablet (4 mg total) by mouth every 8 (eight) hours as needed for nausea or vomiting  Qty: 20 tablet, Refills: 0    Associated Diagnoses: Macromastia; S/P bilateral breast reduction      oxyCODONE (ROXICODONE) 5 immediate release tablet Take 1 tablets every 6 hrs as needed for pain control  Qty: 30 tablet, Refills: 0    Comments: Continued treatment  Associated Diagnoses: Primary osteoarthritis of left knee      pantoprazole (PROTONIX) 40 mg tablet Take 40 mg by mouth as needed      prednisoLONE acetate (PRED FORTE) 1 % ophthalmic suspension instill 1 drop 6 times daily to the surgical eye      simvastatin (ZOCOR) 40 mg tablet Take 40 mg by mouth daily at bedtime      VENTOLIN  (90 Base) MCG/ACT inhaler INHALE 2 PUFFS EVERY 4 (FOUR) HOURS AS NEEDED FOR WHEEZING OR SHORTNESS OF BREATH  Refills: 2             No discharge procedures on file      PDMP Review       Value Time User    PDMP Reviewed  Yes 2/14/2023 12:43 PM Alberto Golden PA-C          ED Provider  Electronically Signed by           Amairani Etienne DO  02/24/23 9453

## 2023-02-24 NOTE — QUICK NOTE
Advanced Care Planning:   Patient noted to have a reported contrast allergy; patient unclear of her reaction however she states that she may or may not have a reaction nor did she know when the last time she had IV contrast   Secondary to the unclear history regarding the patient and this not being an emergent scan; will do standard prep for IV contrast at this time  Will order CT scan of abdomen pelvis with contrast at 0130 tomorrow morning  This will complete a 13-hour prep that is indicated at this time  If patient's clinical exam worsens and/or changes; we will abruptly stop prep and expedite prep status with shorter version  Prep can be done in total of 4 hours if deemed necessary  Continue to closely monitor at this time

## 2023-02-24 NOTE — ED NOTES
Pt yelling out in pain periodically  Pt states that her entire left leg is painful and is in 10/10 pain  Pt provided a pillow and ice pack        Christianne Hendrix RN  02/23/23 [id number]

## 2023-02-24 NOTE — PLAN OF CARE
Problem: OCCUPATIONAL THERAPY ADULT  Goal: Performs self-care activities at highest level of function for planned discharge setting  See evaluation for individualized goals  Description: Treatment Interventions: ADL retraining, Functional transfer training, Endurance training, Patient/family training, Equipment evaluation/education, Continued evaluation, Energy conservation, Activityengagement, Compensatory technique education  Equipment Recommended: Bedside commode, Shower/Tub chair with back ($) (use of RW at this time)       See flowsheet documentation for full assessment, interventions and recommendations  Note: Limitation: Decreased ADL status, Decreased endurance, Decreased self-care trans, Decreased high-level ADLs     Assessment: Pt is a 49yo female [facility] as a trauma following a fall  Diagnosed w/ ambulatory dysfunction s/p recent L TKA (2/9/2023)  Significant PMH impacting her occupational performance includes L TKA (2/9/2023), anxiety/ depression, endometrial ca hx/ tx, lumbar radiculopathy, HTN, B EVA, B breast reduction (2022), gastric bypass ([facility]y-En-Y 2021)  Pt w/ active OT orders and activity orders  Pt reports living w/ her son in 2nd floor apt, I w/ ADLs and use of cane for functional mobility PTA  Upon eval, pt alert and oriented  Able to follow directions w/ + time, encouragement  Demonstrated decreased activity tolerance and limited L LE ROM due to pain  Pt required min A to complete bed mobility for L LE mgmt w/ + time, mod HHA x2 sit <> stand from EOB and S w/ + time using RW for functional mobility  Pt required mod A to complete and mod I seated UBD  Pt presents w/ decreased L LE ROM / strength, decreased standing tolerance, decreased balance, decreased activity tolerance, and decreased endurance impacting her I w/ dressing, bathing, oral hygiene, functional mobility, functional transfers, clothing mgmt, food prep / clean up   Pt completing ADL below baseline level of I and would benefit from OT while in acute care to address deficits  Recommend DC home w/ Home OT using RW pend ability to manage stairs vs rehab   Will continue to follow     OT Discharge Recommendation: Home with home health rehabilitation (vs rehab pend ability to manage stairs, improved act anam, medical optimization)

## 2023-02-24 NOTE — PROGRESS NOTES
[facility]  Progress Note - Claudy Lentz 1970, 46 y o  female MRN: [id number]  Unit/Bed#: S -01 Encounter: [id number]  Primary Care Provider: Robyn Saini DO   Date and time admitted to hospital: 2/23/2023  6:27 PM    Fall  Assessment & Plan  · Mechanical fall  · No injuries on XR  No need for additional imaging based on tertiary evaluation  · Admitted for ambulatory dysfunction  · PT/OT    Ambulatory dysfunction  Assessment & Plan  · Patient unable to ambulate or bear weight on left lower extremity following fall  · No injuries on XR  No need for additional imaging based on tertiary evaluation  · PT/OT    Status post left knee replacement  Assessment & Plan  · History of total knee replacement approximately 2 weeks ago  · Orthopedics consulted due to patient scheduled for 2-week follow-up on 2/24  · Analgesia as needed  · Patient was previously on aspirin, will continue with Lovenox for DVT prophylaxis while inpatient  · PT/OT    Anxiety  Assessment & Plan  · Continue home medications including Wellbutrin, BuSpar, Cymbalta    Depression  Assessment & Plan  · Continue current home regimen including Cymbalta, Wellbutrin, BuSpar  Asthma  Assessment & Plan  · Albuterol as needed  Hyperlipidemia  Assessment & Plan  · We will substitute home simvastatin with hospital formulary pravastatin  TRAUMA TERTIARY SURVEY NOTE    VTE Prophylaxis:Sequential compression device (Venodyne)  and Enoxaparin (Lovenox)     Disposition: Pending PT/OT eval    Code status:  Level 1 - Full Code    Consultants: IP CONSULT TO TRAUMA SURGERY  IP CONSULT TO ORTHOPEDIC SURGERY  IP CONSULT TO CASE MANAGEMENT    Subjective     Mechanism of Injury:Fall     Chief Complaint: "Why does everyone keep bothering me"    HPI/Last 24 hour events: This is a 58-year-old female 2 weeks s/p total knee replacement that suffered a mechanical fall    No injuries were found on primary/secondary evaluations, patient has been unable to ambulate since  On my exam this morning, patient continues to complain of left knee and hip pain  She describes having limited range of motion in her left knee  She denies any new complaints  Objective   Vitals:   Temp:  [98 1 °F (36 7 °C)-99 °F (37 2 °C)] 98 3 °F (36 8 °C)  HR:  [67-80] 80  Resp:  [20] 20  BP: (104-131)/(55-82) 131/62    I/O     None           Physical Exam:   GENERAL APPEARANCE: No acute distress  NEURO: GCS is 15  Light touch sensation intact throughout  HEENT: Normocephalic, atraumatic  Neck supple  CV: Regular rate and rhythm   +2 radial and dorsalis pedis pulses, bilaterally  LUNGS: Clear to auscultation, bilaterally  Chest wall is nontender  GI: Abdomen is soft nontender  : Pelvis is stable  MSK: Left hip is mildly tender on palpation  No obvious hematoma  Limited range of motion of left hip  Left knee has effusion with generalized tenderness and limited range of motion  Left ankle has full range of motion and is nontender on palpation  All other extremities display full range of motion and are nontender  SKIN: Warm, dry  Left knee surgical wound is well approximated with staples with no signs of erythema, drainage, dehiscence      Invasive Devices     Peripheral Intravenous Line  Duration           Peripheral IV 02/23/23 Left Forearm <1 day                        Lab Results:   Results: I have personally reviewed all pertinent laboratory/tests results, BMP/CMP:   Lab Results   Component Value Date    SODIUM 138 02/23/2023    K 4 0 02/23/2023     02/23/2023    CO2 26 02/23/2023    BUN 17 02/23/2023    CREATININE 0 55 (L) 02/23/2023    CALCIUM 9 3 02/23/2023    AST 25 02/23/2023    ALT 23 02/23/2023    ALKPHOS 109 (H) 02/23/2023    EGFR 106 02/23/2023    and CBC:   Lab Results   Component Value Date    WBC 10 05 02/23/2023    HGB 12 3 02/23/2023    HCT 38 4 02/23/2023    MCV 93 02/23/2023     02/23/2023    MCH 29 9 02/23/2023 MCHC 32 0 02/23/2023    RDW 13 7 02/23/2023    MPV 8 2 (L) 02/23/2023    NRBC 0 02/23/2023       Imaging Results: I have personally reviewed pertinent reports      Chest Xray(s): N/A   FAST exam(s): N/A   CT Scan(s): N/A   Additional Xray(s): negative for acute findings     Other Studies: none

## 2023-02-24 NOTE — QUICK NOTE
I was called down to 236 due to the patient screaming and getting more verbally aggressive and the staff needed assistance  When I got in there she was attempting to get up without the walker  Mariaelena Whitman (RN) who was also in there helping, asked me to grab the walker for her  When I brought the walker over to her she took it and slammed it against the side of Isabel's body  She was redirected and told that it is not acceptable to hit anyone or be aggressive at all towards staff  When she got up with the walker, she was a light assist OOB, and stood at the chair  Patient was visibly soiled, I asked her then if we can change the gown, she said "I know I'm wet, I am soaked" but did refused assistance in changing gown  When prompted to sit down patient said "get your hands off me, I am fine, I have upper body strength I am not going to fall " And continued standing facing the window  Cely (PCA) & I changed her bed  Patient was still soiled when I left room, due to her refusing assistance   She was given a gown for when she was ready to change

## 2023-02-24 NOTE — ASSESSMENT & PLAN NOTE
· History of total knee replacement approximately 2 weeks ago  · Orthopedics consulted due to patient scheduled for 2-week follow-up on 2/24  · Official XR reads pending  · Analgesia as needed  · Patient was previously on aspirin, will continue with Lovenox for DVT prophylaxis while inpatient    · PT/OT

## 2023-02-24 NOTE — CONSULTS
Orthopedics   Lisa Maynard 46 y o  female MRN: [id number]  Unit/Bed#: S -01      Chief Complaint:   left knee pain    HPI:   46 y  o female known to our practice from recent left total knee arthroplasty performed by Dr Robin Whipple 2023  She is currently admitted to the hospital s/p fall yesterday afternoon while at a  walking on a ramp  She notes that she was ambulating with a cane, when she lost her balance, falling backwards, landing "flat on her back on her left side"  She notes that she has subsequently been having left sided posterior buttocks, hip, thigh and knee pain, as well as left ankle pain  She describes the predominant amount of her pain as posterior thigh, and into the medial aspect of her left leg  No numbness/tingling  Feels that the pain radiates down her leg  She has been having difficulty with ambulation since the fall  She denies upper extremity pain of injury  She denies right sided hip or leg injury or pain       Review Of Systems:   · Skin: Normal  · Neuro: See HPI  · Musculoskeletal: See HPI  · 14 point review of systems negative except as stated above     Past Medical History:   Past Medical History:   Diagnosis Date   • Anxiety    • Asthma    • Cancer ([facility] [address] )     endometrial   • Chronic pain syndrome    • CPAP (continuous positive airway pressure) dependence    • Depression    • Diabetes mellitus (HCC)     Pre diabetic   • GERD (gastroesophageal reflux disease)    • HTN (hypertension)    • Hyperlipidemia    • Hypertension    • Lumbar radiculopathy    • Prediabetes    • Sleep apnea        Past Surgical History:   Past Surgical History:   Procedure Laterality Date   • GASTRECTOMY SLEEVE LAPAROSCOPIC     • HYSTERECTOMY     • KNEE ARTHROSCOPY Right    • LUMBAR FUSION     • AK ARTHRP KNE CONDYLE&PLATU MEDIAL&LAT COMPARTMENTS Left 2023    Procedure: ARTHROPLASTY KNEE TOTAL AND ALL ASSOCIATED PROCEDURES;  Surgeon: Sobeida Núñez MD;  Location:  MAIN OR;  Service: Orthopedics   • RI BREAST REDUCTION Bilateral 9/23/2022    Procedure: BILATERAL BREAST REDUCTION;  Surgeon: Leonel Sesay MD;  Location: [facility];  Service: Plastics   • MARY-EN-Y PROCEDURE  05/27/2021    Sleve   • TOTAL HIP ARTHROPLASTY Bilateral        Family History:  Family history reviewed and non-contributory  Family History   Problem Relation Age of Onset   • Diabetes Mother    • Stroke Mother    • Diabetes Father    • Cancer Father         thyroid   • Diabetes Sister    • Diabetes Brother    • No Known Problems Maternal Grandmother    • No Known Problems Maternal Grandfather    • No Known Problems Paternal Grandmother    • No Known Problems Paternal Grandfather    • No Known Problems Sister        Social History:  Social History     Socioeconomic History   • Marital status: Unknown     Spouse name: None   • Number of children: None   • Years of education: None   • Highest education level: None   Occupational History   • None   Tobacco Use   • Smoking status: Former   • Smokeless tobacco: Never   Vaping Use   • Vaping Use: Never used   Substance and Sexual Activity   • Alcohol use: Not Currently   • Drug use: Not Currently   • Sexual activity: Not Currently   Other Topics Concern   • None   Social History Narrative    ** Merged History Encounter **          Social Determinants of Health     Financial Resource Strain: Not on file   Food Insecurity: Not on file   Transportation Needs: Not on file   Physical Activity: Not on file   Stress: Not on file   Social Connections: Not on file   Intimate Partner Violence: Not on file   Housing Stability: Not on file       Allergies:    Allergies   Allergen Reactions   • Bee Venom Swelling   • Dust Mite Extract    • Fish-Derived Products - Food Allergy Hives   • Iodine - Food Allergy Hives   • Lac Bovis Other (See Comments)     congestion   • Molds & Smuts    • Other Swelling   • Pollen Extract Other (See Comments)     Runny nose, watery eyes (also has corneal swelling) and itching  Triggers asthma   • Shrimp Flavor - Food Allergy Hives           Labs:  0   Lab Value Date/Time    HCT 38 4 02/23/2023 1953    HCT 40 9 02/10/2023 0545    HCT 47 7 (H) 01/03/2023 1244    HCT 35 7 09/07/2015 0600    HCT 34 6 (L) 09/06/2015 0556    HCT 39 4 09/05/2015 0607    HGB 12 3 02/23/2023 1953    HGB 13 4 02/10/2023 0545    HGB 15 3 01/03/2023 1244    HGB 11 1 (L) 09/07/2015 0600    HGB 10 8 (L) 09/06/2015 0556    HGB 12 8 09/05/2015 0607    INR 0 94 01/03/2023 1244    INR 1 02 08/20/2015 1311    WBC 10 05 02/23/2023 1953    WBC 12 89 (H) 02/10/2023 0545    WBC 6 50 01/03/2023 1244    WBC 11 47 (H) 09/07/2015 0600    WBC 11 74 (H) 09/06/2015 0556    WBC 18 25 (H) 09/05/2015 0607    CRP <1 0 01/03/2023 1244       Meds:    Current Facility-Administered Medications:   •  albuterol (PROVENTIL HFA,VENTOLIN HFA) inhaler 1 puff, 1 puff, Inhalation, Q6H PRN, KATIA ChurchNP  •  ascorbic acid (VITAMIN C) tablet 500 mg, 500 mg, Oral, Daily, Shabana Kays, CRNP, 500 mg at 02/24/23 6110  •  buPROPion (WELLBUTRIN XL) 24 hr tablet 300 mg, 300 mg, Oral, Daily, Shabana Kays, CRNP, 300 mg at 02/24/23 9232  •  busPIRone (BUSPAR) tablet 10 mg, 10 mg, Oral, Daily, Shabana Kays, CRNP, 10 mg at 02/24/23 6102  •  cholecalciferol (VITAMIN D3) tablet 2,000 Units, 2,000 Units, Oral, BID, Shabana Kays, CRNP, 2,000 Units at 02/24/23 4113  •  cyanocobalamin (VITAMIN B-12) tablet 1,000 mcg, 1,000 mcg, Oral, Daily, Shabana Kays, CRNP, 1,000 mcg at 02/24/23 7948  •  diphenhydrAMINE (BENADRYL) tablet 50 mg, 50 mg, Oral, 60 Min Pre-Op, [place] JOSETTE Benítez  •  DULoxetine (CYMBALTA) delayed release capsule 60 mg, 60 mg, Oral, BID, DB Church, 60 mg at 02/24/23 2814  •  enoxaparin (LOVENOX) subcutaneous injection 30 mg, 30 mg, Subcutaneous, Q12H, DB Church, 30 mg at 02/24/23 3994  •  erythromycin (ILOTYCIN) 0 5 % ophthalmic ointment, , Left Eye, HS, DB Church  •  ferrous sulfate tablet 325 mg, 325 mg, Oral, BID With Meals, DB Son, 325 mg at 84/76/90 8918  •  folic acid (FOLVITE) tablet 1,000 mcg, 1,000 mcg, Oral, Daily, DB Son, 1,000 mcg at 02/24/23 9054  •  gabapentin (NEURONTIN) capsule 300 mg, 300 mg, Oral, TID, DB Son, 300 mg at 02/24/23 7801  •  methocarbamol (ROBAXIN) tablet 500 mg, 500 mg, Oral, BID PRN, DB Son, 500 mg at 02/24/23 0110  •  methylPREDNISolone (MEDROL) tablet 32 mg, 32 mg, Oral, Q10H, Tay Schmidt PA-C  •  multivitamin stress formula tablet 1 tablet, 1 tablet, Oral, Daily, DB Son, 1 tablet at 02/24/23 0920  •  ondansetron (ZOFRAN) injection 4 mg, 4 mg, Intravenous, Q8H PRN, DB Son  •  oxyCODONE (ROXICODONE) split tablet 2 5 mg, 2 5 mg, Oral, Q4H PRN **OR** oxyCODONE (ROXICODONE) IR tablet 5 mg, 5 mg, Oral, Q4H PRN, DB Son, 5 mg at 02/24/23 7892  •  pravastatin (PRAVACHOL) tablet 80 mg, 80 mg, Oral, Daily With DB Painter    Blood Culture:   No results found for: BLOODCX    Wound Culture:   No results found for: WOUNDCULT    Ins and Outs:  No intake/output data recorded  Physical Exam:   /95   Pulse 86   Temp 98 5 °F (36 9 °C)   Resp 20   Ht 5' 4" (1 626 m)   Wt 96 2 kg (212 lb)   SpO2 97%   BMI 36 39 kg/m²   Gen: No acute distress, resting comfortably in bed  HEENT: Eyes clear, moist mucus membranes, hearing intact  Respiratory: No audible wheezing or stridor  Cardiovascular: Well Perfused peripherally, 2+ distal pulse  Abdomen: nondistended, no peritoneal signs  Musculoskeletal: left lower extremity  · Skin with surgical incision over the left knee, well approximated with staples in place  Staples removed, with steristrips applied  Steristrips came loose and larger steristrips wer re-applied later in the morning  · Mild tenderness noted over the entire knee  · No effusion  · Active and passive ROM 0-90 degrees without significant discomfort  · Can perform straight leg raise  · Stable to varus/valgus stress, negative lachmans, posterior draw  · Sensation intact L3-S1  · 5/5 strength to hip flexion/extension, knee flexion/extension, ankle dorsi/plantar flexion, EHL/FHL  · No significant discomfort noted over palpation of the left ankle, or left hip/femur or lower leg   · 2+ DP/PT pulse  · Musculature is soft and compressible, no pain with passive stretch  · Leg lengths equal      General MSK  · She has no pain to palpation, bony deformities, crepitus over bilateral clavicles, shoulders, upper arms, elbows, forearms or wrists  Sensation intact bilateral upper extremities  ROM full  · She has no pain to palpation, bony deformities, crepitus over the right hip, femur, knee, lower leg or right ankle  L3-S1 intact  ROM full  Radiology:   I personally reviewed the films  Imaging reviewed and discussed with Dr Dori Penn  X-rays left ankle: no acute osseous abnormality  Xrays left knee: no acute osseous abnormality  S/p left total knee arthroplasty  No evidence of hardware failure  X-rays hip/pelvis: no acute osseous abnormality  Bilateral hip hemiarthroplasties, no evidence of hardware failure      _*_*_*_*_*_*_*_*_*_*_*_*_*_*_*_*_*_*_*_*_*_*_*_*_*_*_*_*_*_*_*_*_*_*_*_*_*_*_*_*_*    Assessment:  46 y o  female with recent left knee arthroplasty s/p recent fall with left sided leg pain  XRAYs negative for acute osseous abnormality/fracture  Plan:   · Weight bearing as tolerated  left lower extremity  · PT/OT  · Pain control per primary team    · Body mass index is 36 39 kg/m²  moderately obese  Recommend behavior modifications and nutrition  · Dispo: Ortho signing off  · Patient to follow up with Dr Jose J Armendariz upon discharge       Opal Mendoza PA-C

## 2023-02-24 NOTE — CASE MANAGEMENT
Case Management Assessment & Discharge Planning Note    Patient name Kyle Mccullough  [facility] S /S -71 MRN [id number]  : 1970 Date 2023       Current Admission Date: 2023  Current Admission Diagnosis:Ambulatory dysfunction   Patient Active Problem List    Diagnosis Date Noted   • Fall 2023   • Ambulatory dysfunction 2023   • Status post left knee replacement 2023   • Asthma    • GERD (gastroesophageal reflux disease)    • Sleep apnea    • Depression    • Anxiety    • Diabetes mellitus, type 2 ([facility] [address] )    • Skin lesion of breast 2022   • S/P bilateral breast reduction 10/03/2022   • Macromastia 2022   • Syncope and collapse 02/10/2022   • Chronic pain syndrome 02/10/2022   • Neck pain 01/10/2022   • Cervical spondylosis    • Chronic pain syndrome 2020   • Chronic pain of both knees 12/10/2019   • Morbid obesity with body mass index (BMI) of 50 0 to 59 9 in adult [facility]) 2019   • Hypertension 2019   • Hyperlipidemia 2019   • Prediabetes 2019   • Primary osteoarthritis of left knee 2019   • Lumbar spondylosis 2018   • Degenerative disc disease, cervical 2017   • Lumbar radiculopathy 2017   • Sacroiliitis ([facility] [address] ) 2017   • Bilateral carpal tunnel syndrome 12/15/2017      LOS (days): 0  Geometric Mean LOS (GMLOS) (days):   Days to GMLOS:     OBJECTIVE:              Current admission status: Observation       Preferred Pharmacy:   [id number] [place] [address], [address] [place] [address]  Phone: [phone] Fax: [phone]    Primary Care Provider: Alva Membreno DO    Primary Insurance: MEDICARE  Secondary Insurance:     ASSESSMENT:  Active Health Care Proxies    There are no active Health Care Proxies on file                        Patient Information  Admitted from[de-identified] Home  Mental Status: Alert  During Assessment patient was accompanied by: Not accompanied during assessment  Assessment information provided by[de-identified] Patient  Primary Caregiver: Self  Support Systems: Son, Family members, [address] of Residence: [address] [facility] do you live in?: [place] entry access options   Select all that apply : Stairs  Number of steps to enter home :  (1+7+7)  Type of Current Residence: Apartment  Floor Level: 2  Upon entering residence, is there a bedroom on the main floor (no further steps)?: Yes  Upon entering residence, is there a bathroom on the main floor (no further steps)?: Yes  In the last 12 months, was there a time when you were not able to pay the mortgage or rent on time?: No  In the last 12 months, was there a time when you did not have a steady place to sleep or slept in a shelter (including now)?: No  Homeless/housing insecurity resource given?: No, N/A  Living Arrangements: Lives w/ Son    Activities of Daily Living Prior to Admission  Functional Status: Independent  Completes ADLs independently?: Yes  Ambulates independently?: Yes  Does patient use assisted devices?: Yes  Assisted Devices (DME) used: Bedside Commode, Straight Isabella Lash, Shower Chair  Does patient currently own DME?: Yes  What DME does the patient currently own?: Bedside Commode, Straight Isabella Lash, Shower Chair  Does patient have a history of Outpatient Therapy (PT/OT)?: Yes  Does the patient have a history of Short-Term Rehab?: Yes ([facility])  Does patient have a history of HHC?: Yes  Does patient currently have [address]?: No         Patient Information Continued  Within the past 12 months, you worried that your food would run out before you got the money to buy more : Never true  Within the past 12 months, the food you bought just didn't last and you didn't have money to get more : Never true  Food insecurity resource given?: N/A         Means of Transportation  Means of Transport to Appts[de-identified] Family transport  In the past 12 months, has lack of transportation kept you from medical appointments or from getting medications?: No  In the past 12 months, has lack of transportation kept you from meetings, work, or from getting things needed for daily living?: No  Was application for public transport provided?: N/A        DISCHARGE DETAILS:    Discharge planning discussed with[de-identified] patient and pt's friend, [employer] of Choice: Yes  Comments - Freedom of Choice: CM met with pt and pt's friend Lizzie Shipley at bedside re: assessment and dcp, PT rcmd for STR, [facility]  Pt lives with son in second floor apartment, [address]  Pt independent with ADLS, pt's son assists her with getting into bed and transport to appts  Pt reported hx of STR with H[facility] and HHC hx, and current with OPPT  Pt utilizes her cane to ambulate but has RW if needed  Pt declined both STR and HHC relayed she is current with OPPT, pt's son brings her to OPPT before he goes to work  No further CM d/c needs identified at this time, CM remains available  CM contacted family/caregiver?: Yes  Were Treatment Team discharge recommendations reviewed with patient/caregiver?: Yes  Did patient/caregiver verbalize understanding of patient care needs?: Yes  Were patient/caregiver advised of the risks associated with not following Treatment Team discharge recommendations?: Yes    Contacts  Patient Contacts: Lizzie Shipley  Relationship to Patient[de-identified] Friend  Contact Method:  In Person  Reason/Outcome: Discharge Planning, Continuity of Care, Referral, Emergency [address]         Is the patient interested in [place] [address] at discharge?: No    DME Referral Provided  Referral made for DME?: No         Would you like to participate in our [address] service program?  : No - [facility]ined [facility])    Treatment Team Recommendation: Short Term Rehab  Discharge Destination Plan[de-identified] Home  Transport at Discharge : Family

## 2023-02-24 NOTE — PHYSICAL THERAPY NOTE
PHYSICAL THERAPY EVALUATION NOTE    Patient Name: Claudy JESSICA'H Date: 2/24/2023  AGE:   46 y o  Mrn:   [id number]  ADMIT DX:  Ambulatory dysfunction [R26 2]  History of left knee replacement [Z96 652]    Past Medical History:   Diagnosis Date    Anxiety     Asthma     Cancer ([facility] [address] )     endometrial    Chronic pain syndrome     CPAP (continuous positive airway pressure) dependence     Depression     Diabetes mellitus (HCC)     Pre diabetic    GERD (gastroesophageal reflux disease)     HTN (hypertension)     Hyperlipidemia     Hypertension     Lumbar radiculopathy     Prediabetes     Sleep apnea      Length Of Stay: 0  PHYSICAL THERAPY EVALUATION :    02/24/23 0948   PT Last Visit   PT Visit Date 02/24/23   Pain Assessment   Pain Assessment Tool 0-10   Pain Score 7   Pain Location/Orientation Other (Comment)  (left thigh)   Hospital Pain Intervention(s) Repositioned; Ambulation/increased activity   Restrictions/Precautions   LLE Weight Bearing Per Order WBAT  (per previous orders 2/10/23)   Other Precautions Chair Alarm; Bed Alarm; Fall Risk;Pain;WBS   Home Living   Type of [address] [place] [address] One level; Other (Comment)  (1+7+7 KRISTOPHER w/ bilateral rails  son performed supervision assist when using the steps )   Additional Comments lives w/ son  ambulates w/ cane  owns walker  independent w/ ADLs  4 falls in last 6 months  receiving outpatient PT following a left TKR  General   Additional Pertinent History room air resting pulse ox 96% and 83 BPM    Family/Caregiver Present No   Cognition   Arousal/Participation Alert   Orientation Level Oriented to person; Other (Comment)  (pt was identified w/ full name, birth date)   Following Commands Follows one step commands with increased time or repetition   Comments unable to perform definitive strength testing due to high level of pain     Subjective   Subjective pt seen sitting on edge of bed w/ Caitlin OT present  pt agreed to PT eval after motivation was provided  pt states having left thigh pain  occasional input was needed for task focus  RUE Assessment   RUE Assessment WFL   LUE Assessment   LUE Assessment WFL   RLE Assessment   RLE Assessment WFL  (based on observation)   LLE Assessment   LLE Assessment X  (limited active ROM, based on observation)   Transfers   Sit to Stand 3  Moderate assistance  (+ lightheaded)   Additional items Assist x 1; Increased time required;Verbal cues  (for LE positoining)   Stand to Sit 5  Supervision   Additional items Verbal cues  (for body positioning, safety)   Additional Comments pt declined additional attempts at mobility training   Ambulation/Elevation   Gait pattern Decreased L stance; Antalgic; Short stride; Foward flexed; Inconsistent ramiro; Wide MARIEL   Gait Assistance 5  Supervision   Additional items Verbal cues  (for walker positioning, breathing technique, safety)   Assistive Device Other (Comment)  (wide roller walker)   Distance 8 feet  (additional not possible due to fatigue, pain, lightheadedness)   Stair Management Assistance Not tested  (due to limited ambulation tolerance, safety concerns)   Balance   Static Sitting Fair +   Static Standing Fair -  (w/ wide roller walker)   Ambulatory Fair -  (w/ wide roller walker)   Activity Tolerance   Activity Tolerance Patient limited by fatigue;Patient limited by pain   Nurse Made Aware spoke to Caitlin Choudhury OT, Millie CM   Assessment   Problem List Decreased strength;Decreased range of motion;Decreased endurance; Impaired balance;Decreased mobility; Decreased safety awareness; Obesity;Orthopedic restrictions;Pain   Assessment Pt was leaving a  when she turned back to look at something, losing her footing, and falling down approximately 1 step onto her left side  Dx: ambulatory dysfunction, s/p left TKR, and s/p fall  order placed for PT eval and tx, w/ activity order of up in chair   Pt is WBAT L LE per previous documentationpt presents w/ comorbidities of left TKR, asthma, endometrial cancer, chronic pain syndrome, DM, HTN, hyperlipdemia, lumbar radiculopathy and fusion, and bilateral THR and personal factors of anxiety, depression, mobilizing w/ assistive device, stair(s) to enter home and positive fall history  pt presents w/ pain, weakness, decreased ROM, decreased endurance, impaired balance, gait deviations, decreased safety awareness, orthopedic restrictions and fall risk  these impairments are evident in findings from physical examination (weakness and decreased ROM), mobility assessment (need for standby to mod assist w/ all phases of mobility when usually mobilizing independently, tolerance to only 8 feet of ambulation and need for cueing for mobility technique), and Barthel Index: 50/100  pt needed input for task focus and mobility technique  pt is at risk for falls due to physical and safety awareness deficits  pt's clinical presentation is unstable/unpredictable (evident in need for assist w/ all phases of mobility when usually mobilizing independently, tolerance to only 8 feet of ambulation, pain impacting overall mobility status and need for input for task focus and mobility technique)  pt needs inpatient PT tx to improve mobility deficits and progress mobility training as appropriate  discharge recommendation is for inpatient rehab to reduce fall risk and maximize level of functional independence  pt would benefit from acute pain service consult to address pain control  Goals   Patient Goals have less pain  go home     STG Expiration Date 03/06/23   Short Term Goal #1 pt will: Perform bed mobility independently to increase level of independence, Perform all transfers independently to improve independence, Ambulate 200 ft  with least restrictive assistive device independently w/o LOB to improve functional independence, Navigate 7 stair(s) independently with bilateral handrails to facilitate return to previous living environment, Increase ambulatory balance 1 grade to decrease risk for falls, Tolerate 3 hr OOB to faciliate upright tolerance, Tolerate standing 2 minutes independently to facilitate functional task performance, Improve Barthel Index score to 70 or greater to facilitate independence and Complete Timed Up and Go or Comfortable Gait Speed to further assess mobility and monitor progress   PT Treatment Day 0   Plan   Treatment/Interventions Functional transfer training;LE strengthening/ROM; Elevations; Therapeutic exercise; Endurance training;Patient/family training;Equipment eval/education; Bed mobility;Gait training   PT Frequency 3-5x/wk   Recommendation   PT Discharge Recommendation Post acute rehabilitation services   Additional Comments recommend roller walker use w/ mobility   Additional Comments 2 (S)  pt would benefit from acute pain service consult to address pain control  AM-PAC Basic Mobility Inpatient   Turning in Flat Bed Without Bedrails 3   Lying on Back to Sitting on Edge of Flat Bed Without Bedrails 3   Moving Bed to Chair 3   Standing Up From Chair Using Arms 3   Walk in Room 3   Climb 3-5 Stairs With Railing 1   Basic Mobility Inpatient Raw Score 16   Basic Mobility Standardized Score 38 32   Highest Level Of Mobility   -[facility] Goal 5: Stand one or more mins   -HL Achieved 4: Move to chair/commode   Barthel Index   Feeding 10   Bathing 0   Grooming Score 5   Dressing Score 5   Bladder Score 5   Bowels Score 10   Toilet Use Score 5   Transfers (Bed/Chair) Score 10   Mobility (Level Surface) Score 0   Stairs Score 0   Barthel Index Score 50   End of Consult   Patient Position at End of Consult Bedside chair;Bed/Chair alarm activated; All needs within reach     The patient's AM-PAC Basic Mobility Inpatient Short Form Raw Score is 16  A Raw score of less than or equal to 16 suggests the patient may benefit from discharge to post-acute rehabilitation services   Please also refer to the recommendation of the Physical Therapist for safe discharge planning  Skilled PT recommended while in hospital and upon DC to progress pt toward treatment goals       Alicia Gage, PT
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
## 2023-02-27 ENCOUNTER — TELEPHONE (OUTPATIENT)
Dept: OBGYN CLINIC | Facility: HOSPITAL | Age: 53
End: 2023-02-27

## 2023-02-27 DIAGNOSIS — M17.12 PRIMARY OSTEOARTHRITIS OF LEFT KNEE: ICD-10-CM

## 2023-02-27 RX ORDER — OXYCODONE HYDROCHLORIDE 5 MG/1
TABLET ORAL
Qty: 20 TABLET | Refills: 0 | Status: SHIPPED | OUTPATIENT
Start: 2023-02-27 | End: 2023-03-14

## 2023-02-27 NOTE — TELEPHONE ENCOUNTER
Caller: Patient    Doctor: Shweta Weinberg    Reason for call: Patient calling for a fu appt  Patient signed out AMA of the hospital   Do we need to schedule a fu appt  Also calling regarding a refill for Oxycodone        Call back#: [phone]

## 2023-02-27 NOTE — TELEPHONE ENCOUNTER
LVM for patient, medication sent to pharmacy  Advised patient to call back to schedule PO in 2-3 weeks per task

## 2023-02-27 NOTE — PROGRESS NOTES
Assessment:  1  Chronic pain syndrome    2  Lumbar radiculopathy    3  Degenerative disc disease, cervical    4  Lumbar spondylosis    5  Neck pain    6  Myofascial pain syndrome    7  Macromastia    8  S/P bilateral breast reduction    9  Cervical spondylosis        Plan:  1  Patient had a fall backwards 6 days ago where she fell and landed on her back and twisted her left leg  Since then she has been experiencing low back pain that is radiating down the posterior medial aspect of the left lower extremity to the foot with associated numbness and paresthesias  I will order an updated MRI lumbar spine without contrast for further evaluation of her pain  2  We will schedule the patient for left C4-6 medial branch blocks with intention of moving forward towards radiofrequency ablation if there is an appropriate diagnostic response  The initial blocks will be performed with 2% lidocaine and if an appropriate response is obtained upon review of the patient's pain diary, a confirmatory block will be scheduled  Complete risks and benefits including bleeding, infection, tissue reaction, nerve injury and allergic reaction were discussed  The patient was agreeable and verbalized an understanding  3  I will increase gabapentin to 600 mg 3 times a day for pain complaints  I advised the patient that if they experience any side effects or issues with the changes in their medication regiment, they should give our office a call to discuss  I also advised the patient not to drive or operate machinery until they see how the changes in the medication regimen affects them  The patient was agreeable and verbalized an understanding  4   I will increase methocarbamol to 500 mg every 8 hours as needed myofascial pain  5   Oxycodone per orthopedics  6  Physical therapy per orthopedics  7  We will avoid NSAIDs secondary to history of bariatric surgery  8    The patient will follow-up after imaging for medication prescription refill and reevaluation  The patient was advised to contact the office should their symptoms worsen in the interim  The patient was agreeable and verbalized an understanding  History of Present Illness: The patient is a 46 y o  female with a history of recent left total knee arthroplasty 2023, bariatric surgery, fibromyalgia and carpal tunnel syndrome last seen on 2022 who presents for a follow up office visit in regards to left-sided low back which began 6 days ago when she was walking holding onto a railing at a , the railing ended and she did not realize, and she fell landing on her back twisting her left lower extremity underneath her  Since then she has been experiencing pain that radiates from her low back into the posterior medial aspect of the left lower extremity to the foot with associated numbness and paresthesias  She did present to the emergency room  X-ray of the left knee showed a stable arthroplasty without complication of hardware  X-ray of the left ankle was normal   X-ray of the left hip revealed intact hemiarthroplasty bilaterally  Patient also continues with ongoing left-sided neck pain causing cervicogenic headaches  She would be interested in moving forward with left C4-6 medial branch blocks  She is status post right C4-6 RFA 2023 and is already experiencing some ongoing relief of her neck pain at this time  She continues on gabapentin 300 mg 3 times a day and methocarbamol 500 mg twice daily as needed with 30% improvement of her pain without side effects  She is involved in physical therapy for knee complaints as per orthopedics  Currently taking oxycodone for postoperative pain    The patient rates her pain an 8 out of 10 on the numeric pain rating scale    She constantly has pain in the evening and at night which is described as dull aching, sharp, throbbing, cramping, pressure-like, shooting, numbness and pins-and-needles    I have personally reviewed and/or updated the patient's past medical history, past surgical history, family history, social history, current medications, allergies, and vital signs today  Review of Systems:    Review of Systems   Respiratory: Negative for shortness of breath  Cardiovascular: Negative for chest pain  Gastrointestinal: Negative for constipation, diarrhea, nausea and vomiting  Musculoskeletal: Positive for gait problem  Negative for arthralgias, joint swelling and myalgias  Skin: Negative for rash  Neurological: Negative for dizziness, seizures and weakness  All other systems reviewed and are negative          Past Medical History:   Diagnosis Date   • Anxiety    • Asthma    • Cancer ([facility] [address] )     endometrial   • Chronic pain syndrome    • CPAP (continuous positive airway pressure) dependence    • Depression    • Diabetes mellitus (HCC)     Pre diabetic   • GERD (gastroesophageal reflux disease)    • HTN (hypertension)    • Hyperlipidemia    • Hypertension    • Lumbar radiculopathy    • Prediabetes    • Sleep apnea        Past Surgical History:   Procedure Laterality Date   • GASTRECTOMY SLEEVE LAPAROSCOPIC     • HYSTERECTOMY     • KNEE ARTHROSCOPY Right    • LUMBAR FUSION     • NJ ARTHRP KNE CONDYLE&PLATU MEDIAL&LAT COMPARTMENTS Left 2/9/2023    Procedure: ARTHROPLASTY KNEE TOTAL AND ALL ASSOCIATED PROCEDURES;  Surgeon: Randy Segovia MD;  Location:  MAIN OR;  Service: Orthopedics   • NJ BREAST REDUCTION Bilateral 9/23/2022    Procedure: BILATERAL BREAST REDUCTION;  Surgeon: Tere Vinson MD;  Location:  MAIN OR;  Service: Plastics   • MARY-EN-Y PROCEDURE  05/27/2021    Sleve   • TOTAL HIP ARTHROPLASTY Bilateral        Family History   Problem Relation Age of Onset   • Diabetes Mother    • Stroke Mother    • Diabetes Father    • Cancer Father         thyroid   • Diabetes Sister    • Diabetes Brother    • No Known Problems Maternal Grandmother    • No Known Problems Maternal Grandfather    • No Known Problems Paternal Grandmother    • No Known Problems Paternal Grandfather    • No Known Problems Sister        Social History     Occupational History   • Not on file   Tobacco Use   • Smoking status: Former   • Smokeless tobacco: Never   Vaping Use   • Vaping Use: Never used   Substance and Sexual Activity   • Alcohol use: Not Currently   • Drug use: Not Currently   • Sexual activity: Not Currently         Current Outpatient Medications:   •  acetaminophen (TYLENOL) 650 mg CR tablet, Take 1 tablet (650 mg total) by mouth every 8 (eight) hours as needed for mild pain, Disp: 30 tablet, Rfl: 0  •  buPROPion (WELLBUTRIN XL) 300 mg 24 hr tablet, Take 300 mg by mouth daily, Disp: , Rfl:   •  busPIRone (BUSPAR) 10 mg tablet, Take 10 mg by mouth in the morning Takes twice a day, Disp: , Rfl:   •  Calcium Carb-Cholecalciferol (OSCAL-D) 500 mg-200 units per tablet, Take 1 tablet by mouth 2 (two) times a day with meals, Disp: , Rfl:   •  Cholecalciferol (VITAMIN D3) 2000 units capsule, TAKE 2 CAPSULES BY MOUTH DAILY INDICATIONS: VITAMIN D DEFICIENCY , Disp: , Rfl: 5  •  [employer] Vitamin B-12 1000 MCG tablet, Take 1,000 mcg by mouth daily, Disp: , Rfl:   •  [employer] Vitamin C 500 MG tablet, TAKE 1 TABLET (500 MG TOTAL) BY MOUTH DAILY  , Disp: 90 tablet, Rfl: 1  •  DULoxetine (CYMBALTA) 60 mg delayed release capsule, Take 60 mg by mouth 2 (two) times a day, Disp: , Rfl:   •  erythromycin (ILOTYCIN) ophthalmic ointment, APPLY (1CM) TO THE SURGICAL EYE AT BEDTIME, Disp: , Rfl:   •  ferrous sulfate 325 (65 Fe) mg tablet, Take by mouth daily with breakfast, Disp: , Rfl:   •  folic acid (FOLVITE) 1 mg tablet, TAKE 1 TABLET BY MOUTH EVERY DAY, Disp: 90 tablet, Rfl: 1  •  gabapentin (NEURONTIN) 300 mg capsule, Take 2 capsules (600 mg total) by mouth 3 (three) times a day, Disp: 180 capsule, Rfl: 1  •  methocarbamol (ROBAXIN) 500 mg tablet, Take 1 tablet (500 mg total) by mouth every 8 (eight) hours as needed for muscle spasms, Disp: 90 tablet, Rfl: 1  •  Multiple Vitamins-Minerals (multivitamin with minerals) tablet, Take 1 tablet by mouth daily, Disp: 30 tablet, Rfl: 0  •  ondansetron (ZOFRAN) 4 mg tablet, Take 1 tablet (4 mg total) by mouth every 8 (eight) hours as needed for nausea or vomiting, Disp: 20 tablet, Rfl: 0  •  VENTOLIN  (90 Base) MCG/ACT inhaler, INHALE 2 PUFFS EVERY 4 (FOUR) HOURS AS NEEDED FOR WHEEZING OR SHORTNESS OF BREATH , Disp: , Rfl: 2  •  acetaminophen (TYLENOL) 325 mg tablet, Take 650 mg by mouth every 6 (six) hours as needed for mild pain (Patient not taking: Reported on 2/28/2023), Disp: , Rfl:   •  aspirin (ECOTRIN LOW STRENGTH) 81 mg EC tablet, Take 1 tablet (81 mg total) by mouth 2 (two) times a day Please do not start taking until after total joint arthroplasty, Disp: 70 tablet, Rfl: 0  •  azelastine (OPTIVAR) 0 05 % ophthalmic solution, Administer 1 drop to the right eye 2 (two) times a day, Disp: , Rfl:   •  bepotastine besilate (BEPREVE) 1 5 % op soln, INSTILL 1 DROP INTO BOTH EYES TWICE A DAY, Disp: , Rfl:   •  cetirizine (ZyrTEC) 10 mg tablet, Take 10 mg by mouth daily Takes 1 tablet 2 times a day, Disp: , Rfl:   •  docusate sodium (Colace) 100 mg capsule, Take 1 capsule (100 mg total) by mouth daily as needed for constipation, Disp: 30 capsule, Rfl: 0  •  hydrocortisone 1 % cream, Apply topically as needed Applies daily, Disp: , Rfl:   •  oxyCODONE (ROXICODONE) 5 immediate release tablet, Take 1 tablets every 6 hrs as needed for pain control (Patient not taking: Reported on 2/28/2023), Disp: 20 tablet, Rfl: 0  •  pantoprazole (PROTONIX) 40 mg tablet, Take 40 mg by mouth as needed, Disp: , Rfl:   •  prednisoLONE acetate (PRED FORTE) 1 % ophthalmic suspension, instill 1 drop 6 times daily to the surgical eye, Disp: , Rfl:   •  simvastatin (ZOCOR) 40 mg tablet, Take 40 mg by mouth daily at bedtime, Disp: , Rfl:     Allergies   Allergen Reactions   • Bee Venom Swelling   • Dust Mite Extract • Fish-Derived Products - Food Allergy Hives   • Iodine - Food Allergy Hives   • Lac Bovis Other (See Comments)     congestion   • Molds & Smuts    • Other Swelling   • Pollen Extract Other (See Comments)     Runny nose, watery eyes (also has corneal swelling) and itching  Triggers asthma   • Shrimp Flavor - Food Allergy Hives       Physical Exam:    /84   Pulse 89   Ht 5' 4" (1 626 m)   Wt 96 2 kg (212 lb)   BMI 36 39 kg/m²     Constitutional:normal, well developed, well nourished, alert, in no distress and non-toxic and no overt pain behavior  Eyes:anicteric  HEENT:grossly intact  Neck:supple, symmetric, trachea midline and no masses   Pulmonary:even and unlabored  Cardiovascular:No edema or pitting edema present  Skin:Normal without rashes or lesions and well hydrated -well approximated left anterior knee incision  Psychiatric:Mood and affect appropriate  Neurologic:Cranial Nerves II-XII grossly intact  Musculoskeletal:antalgic gait  Ambulating with a walker    Positive seated straight leg raise on the left      Imaging  MRI lumbar spine without contrast    (Results Pending)   FL spine and pain procedure    (Results Pending)         Orders Placed This Encounter   Procedures   • MRI lumbar spine without contrast   • FL spine and pain procedure

## 2023-02-28 ENCOUNTER — OFFICE VISIT (OUTPATIENT)
Dept: PHYSICAL THERAPY | Facility: REHABILITATION | Age: 53
End: 2023-02-28

## 2023-02-28 ENCOUNTER — OFFICE VISIT (OUTPATIENT)
Dept: PAIN MEDICINE | Facility: CLINIC | Age: 53
End: 2023-02-28

## 2023-02-28 VITALS
DIASTOLIC BLOOD PRESSURE: 84 MMHG | HEIGHT: 64 IN | WEIGHT: 212 LBS | HEART RATE: 89 BPM | SYSTOLIC BLOOD PRESSURE: 118 MMHG | BODY MASS INDEX: 36.19 KG/M2

## 2023-02-28 DIAGNOSIS — M47.812 CERVICAL SPONDYLOSIS: ICD-10-CM

## 2023-02-28 DIAGNOSIS — M54.2 NECK PAIN: ICD-10-CM

## 2023-02-28 DIAGNOSIS — M50.30 DEGENERATIVE DISC DISEASE, CERVICAL: ICD-10-CM

## 2023-02-28 DIAGNOSIS — Z98.890 S/P BILATERAL BREAST REDUCTION: ICD-10-CM

## 2023-02-28 DIAGNOSIS — M47.816 LUMBAR SPONDYLOSIS: ICD-10-CM

## 2023-02-28 DIAGNOSIS — Z96.652 S/P TOTAL KNEE ARTHROPLASTY, LEFT: Primary | ICD-10-CM

## 2023-02-28 DIAGNOSIS — M79.18 MYOFASCIAL PAIN SYNDROME: ICD-10-CM

## 2023-02-28 DIAGNOSIS — N62 MACROMASTIA: ICD-10-CM

## 2023-02-28 DIAGNOSIS — M17.12 PRIMARY OSTEOARTHRITIS OF LEFT KNEE: ICD-10-CM

## 2023-02-28 DIAGNOSIS — G89.4 CHRONIC PAIN SYNDROME: Primary | ICD-10-CM

## 2023-02-28 DIAGNOSIS — M54.16 LUMBAR RADICULOPATHY: ICD-10-CM

## 2023-02-28 RX ORDER — GABAPENTIN 300 MG/1
600 CAPSULE ORAL 3 TIMES DAILY
Qty: 180 CAPSULE | Refills: 1 | Status: SHIPPED | OUTPATIENT
Start: 2023-02-28

## 2023-02-28 RX ORDER — METHOCARBAMOL 500 MG/1
500 TABLET, FILM COATED ORAL EVERY 8 HOURS PRN
Qty: 90 TABLET | Refills: 1 | Status: SHIPPED | OUTPATIENT
Start: 2023-02-28

## 2023-02-28 NOTE — PROGRESS NOTES
Daily Note     Today's date: 2023  Patient name: Benji Dietrich  : 1970  MRN: [id number]  Referring provider: Ann Tomlin MD  Dx:   Encounter Diagnosis     ICD-10-CM    1  S/P total knee arthroplasty, left  Z96 652       2  Primary osteoarthritis of left knee  M17 12           Start Time: 1115  Stop Time: 1158  Total time in clinic (min): 43 minutes    Subjective: Pt reports she fell on Thursday at the  and went to the ED  She states she left against medical advice  Has been taking medications as she is in more pain and feeling it down the back of her leg into her calf  Objective: See treatment diary below    0-110 degrees knee ROM    Assessment: Tolerated treatment well  Overall seems to be doing well despite falling on Thursday  Range of motion is improved and pt is able to perform full SLR without quad lag  Most of patients pain seems to be around her posterior thigh, glute and calf  Patient would benefit from continued PT to further improve strength, increase available ROM, and maximize overall function  Plan: Continue per plan of care        Precautions: hx of TKA 2023, HTN    Manuals                                          Neuro Re-Ed      balance   Ron board SS 5'      Lat weight shift  5"x20 15x ea                                                   Ther Ex      Bike  rocking  5 min 8' ROM 10' ROM     PROM 10' AFB QD 10' QD 10'     Seated flexion AAROM HEP 10"x10       Quad sets HEP 5"x20  LAQ 2x10     SLR HEP nv 5x5  5x5     STS         squats   18x  2x10     VG    L5 3x10 leg press b/l 8' L5                        HEP/education 5'        Ther Activity                           Gait Training                           Modalities

## 2023-02-28 NOTE — PATIENT INSTRUCTIONS
Gabapentin 300mg:  Take 1 capsule in the morning, 1 capsule in the afternoon and 2 capsules at bedtime x 5 days  Then increase to 2 capsules in the morning, 1 capsule in the afternoon and 2 capsules at bedtime x 5 days  Then increase to 2 capsules three times a day and stay on this dose

## 2023-03-03 ENCOUNTER — OFFICE VISIT (OUTPATIENT)
Dept: PHYSICAL THERAPY | Facility: REHABILITATION | Age: 53
End: 2023-03-03

## 2023-03-03 DIAGNOSIS — Z96.652 S/P TOTAL KNEE ARTHROPLASTY, LEFT: Primary | ICD-10-CM

## 2023-03-03 DIAGNOSIS — M17.12 PRIMARY OSTEOARTHRITIS OF LEFT KNEE: ICD-10-CM

## 2023-03-03 NOTE — PROGRESS NOTES
Daily Note     Today's date: 3/3/2023  Patient name: Justen Marr  : 1970  MRN: [id number]  Referring provider: Keo Tubbs MD  Dx:   Encounter Diagnosis     ICD-10-CM    1  S/P total knee arthroplasty, left  Z96 652       2  Primary osteoarthritis of left knee  M17 12           Start Time: 1045  Stop Time: 1130  Total time in clinic (min): 45 minutes    Subjective: Pt reports she is sore today  Did not sleep well last night  Objective: See treatment diary below    0-110 degrees knee ROM    Assessment: Tolerated treatment well  Progressing well following her TKA despite falling last week  Will continue to progress as able  Patient would benefit from continued PT to further improve strength, increase available ROM, and maximize overall function  Plan: Continue per plan of care        Precautions: hx of TKA 2023, HTN    Manuals 2/13 2/16 2/21 2/28 3/3                                        Neuro Re-Ed 2/13 2/16 2/21 2/28 3/3    balance   Ron board SS 5'      Lat weight shift  5"x20 15x ea                                                   Ther Ex  33    Bike  rocking  5 min 8' ROM 10' ROM 10' ROM    PROM 10' AFB QD 10' QD 10' QD 3'    Seated flexion AAROM HEP 10"x10       Quad sets HEP 5"x20  LAQ 2x10 LAQ 25x 3lb AW    SLR HEP nv 5x5  5x5 5x5    STS     2x10 hi lo    squats   18x  2x10 2x10    VG    L5 3x10 leg press b/l 8' L5  B/L 4x10 L7    Step ups     Sand dune 2x10    Calf raises     3x10 b/l    HEP/education 5'        Ther Activity                           Gait Training                           Modalities

## 2023-03-07 ENCOUNTER — OFFICE VISIT (OUTPATIENT)
Dept: PHYSICAL THERAPY | Facility: REHABILITATION | Age: 53
End: 2023-03-07

## 2023-03-07 ENCOUNTER — TELEPHONE (OUTPATIENT)
Dept: PAIN MEDICINE | Facility: CLINIC | Age: 53
End: 2023-03-07

## 2023-03-07 DIAGNOSIS — M17.12 PRIMARY OSTEOARTHRITIS OF LEFT KNEE: ICD-10-CM

## 2023-03-07 DIAGNOSIS — Z96.652 S/P TOTAL KNEE ARTHROPLASTY, LEFT: Primary | ICD-10-CM

## 2023-03-07 NOTE — TELEPHONE ENCOUNTER
Laura Ovalle [facility]    Doctor: [address]    Reason for call: EMG script please send to [facility] appt is 03/09/2023    Phone : [phone]  Fax # [phone] attn: Judy Peña     Call back#: [phone]

## 2023-03-07 NOTE — PROGRESS NOTES
Daily Note     Today's date: 3/7/2023  Patient name: Justen Marr  : 1970  MRN: [id number]  Referring provider: Keo Tubbs MD  Dx:   Encounter Diagnosis     ICD-10-CM    1  S/P total knee arthroplasty, left  Z96 652       2  Primary osteoarthritis of left knee  M17 12           Start Time: 1114  Stop Time: 1156  Total time in clinic (min): 42 minutes    Subjective: Pt reports she is doing alright  Still having some pain down the back of her leg  Reports she has been walking small distances at home without an AD  Objective: See treatment diary below    Knee ROM: 0-121     Assessment: Tolerated treatment well  Patient would benefit from continued PT to further improve strength, increase available ROM, and maximize overall function  Plan: Continue per plan of care        Precautions: hx of TKA 2023, HTN    Manuals 2/13 2/16 2/21 2/28 3/3 3/7                                       Neuro Re-Ed 2/13 2/16 2/21 2/28 3/3 3/7   balance   Ron board SS 5'   [address] [facility] board 1xF ea  Tandem stance 2x1' ea   Lat weight shift  5"x20 15x ea   8x 2" hold                                                Ther Ex 2/13 2/16 2/21 2/28 3/3 37   Bike  rocking  5 min 8' ROM 10' ROM 10' ROM 10' ROM   PROM 10' AFB QD 10' QD 10' QD 3' QD 3'   Seated flexion AAROM HEP 10"x10       Quad sets HEP 5"x20  LAQ 2x10 LAQ 25x 3lb AW    SLR HEP nv 5x5  5x5 5x5 3x10   STS     2x10 hi lo    squats   18x  2x10 2x10 2x10   VG    L5 3x10 leg press b/l 8' L5  B/L 4x10 L7 B/L 4x10 L6   Step ups     Sand dune 2x10 Sand dune 2x10   Calf raises     3x10 b/l 2x10 b/l no HHA   HEP/education 5'        Ther Activity                           Gait Training                           Modalities

## 2023-03-10 ENCOUNTER — OFFICE VISIT (OUTPATIENT)
Dept: PHYSICAL THERAPY | Facility: REHABILITATION | Age: 53
End: 2023-03-10

## 2023-03-10 DIAGNOSIS — Z96.652 S/P TOTAL KNEE ARTHROPLASTY, LEFT: Primary | ICD-10-CM

## 2023-03-10 DIAGNOSIS — M17.12 PRIMARY OSTEOARTHRITIS OF LEFT KNEE: ICD-10-CM

## 2023-03-10 RX ORDER — SALICYLIC ACID 40 %
ADHESIVE PATCH, MEDICATED TOPICAL
Qty: 60 TABLET | Refills: 1 | Status: SHIPPED | OUTPATIENT
Start: 2023-03-10

## 2023-03-10 NOTE — PROGRESS NOTES
Daily Note     Today's date: 3/10/2023  Patient name: Grayson Granda  : 1970  MRN: [id number]  Referring provider: Tay Dillard MD  Dx:   Encounter Diagnosis     ICD-10-CM    1  S/P total knee arthroplasty, left  Z96 652       2  Primary osteoarthritis of left knee  M17 12           Start Time: 1044  Stop Time: 1131  Total time in clinic (min): 47 minutes    Subjective: Pt reports she is doing alright  Still having some pain down the back of her leg  Reports she has been walking small distances at home without an AD  Objective: See treatment diary below    Knee ROM: 0-121     Assessment: Tolerated treatment well  Felt some cramping in her L knee during step ups so exercise was held after 10 reps  Performed stretching following to decrease tension of hamstring  Patient would benefit from continued PT to further improve strength, increase available ROM, and maximize overall function  Plan: Continue per plan of care        Precautions: hx of TKA 2023, HTN    Manuals 3/10  2/21 2/28 3/3 3/7                                       Neuro Re-Ed 3/10  2/21 2/28 3/3 3/7   balance   Ron board SS 5'   [address] [facility] board 1xF ea  Tandem stance 2x1' ea   Lat weight shift   15x ea   8x 2" hold                                                Ther Ex 3/10  2/21 2/28 3/3 3/7   Bike 10' ROM  8' ROM 10' ROM 10' ROM 10' ROM   PROM QD  QD 10' QD 10' QD 3' QD 3'   Seated flexion AAROM         Quad sets    LAQ 2x10 LAQ 25x 3lb AW    SLR 6x5 2lb  5x5  5x5 5x5 3x10   STS     2x10 hi lo    squats   18x  2x10 2x10 2x10   VG  L6 3x10 single leg  L5 3x10 leg press b/l 8' L5  B/L 4x10 L7 B/L 4x10 L6   Step ups 6in step up and over  Sand dune 2x10    Sand dune 2x10 Sand dune 2x10   Calf raises     3x10 b/l 2x10 b/l no HHA   HEP/education         Ther Activity                           Gait Training                           Modalities

## 2023-03-14 ENCOUNTER — APPOINTMENT (OUTPATIENT)
Dept: PHYSICAL THERAPY | Facility: REHABILITATION | Age: 53
End: 2023-03-14

## 2023-03-14 DIAGNOSIS — M17.11 PRIMARY OSTEOARTHRITIS OF RIGHT KNEE: ICD-10-CM

## 2023-03-14 DIAGNOSIS — M17.12 PRIMARY OSTEOARTHRITIS OF LEFT KNEE: Primary | ICD-10-CM

## 2023-03-14 RX ORDER — HYDROCODONE BITARTRATE AND ACETAMINOPHEN 5; 325 MG/1; MG/1
1 TABLET ORAL EVERY 6 HOURS PRN
Qty: 20 TABLET | Refills: 0 | Status: SHIPPED | OUTPATIENT
Start: 2023-03-14 | End: 2023-09-14 | Stop reason: ALTCHOICE

## 2023-03-15 ENCOUNTER — HOSPITAL ENCOUNTER (OUTPATIENT)
Dept: RADIOLOGY | Facility: CLINIC | Age: 53
Discharge: HOME | End: 2023-03-15

## 2023-03-15 ENCOUNTER — OFFICE VISIT (OUTPATIENT)
Dept: PHYSICAL THERAPY | Facility: REHABILITATION | Age: 53
End: 2023-03-15

## 2023-03-15 VITALS
DIASTOLIC BLOOD PRESSURE: 97 MMHG | HEART RATE: 69 BPM | OXYGEN SATURATION: 97 % | TEMPERATURE: 98.7 F | RESPIRATION RATE: 18 BRPM | SYSTOLIC BLOOD PRESSURE: 142 MMHG

## 2023-03-15 DIAGNOSIS — M47.812 CERVICAL SPONDYLOSIS: ICD-10-CM

## 2023-03-15 DIAGNOSIS — Z96.652 S/P TOTAL KNEE ARTHROPLASTY, LEFT: Primary | ICD-10-CM

## 2023-03-15 NOTE — H&P
History of Present Illness: The patient is a 46 y o  female who presents with complaints of neck pain      Past Medical History:   Diagnosis Date   • Anxiety    • Asthma    • Cancer ([address] )     endometrial   • Chronic pain syndrome    • CPAP (continuous positive airway pressure) dependence    • Depression    • Diabetes mellitus (HCC)     Pre diabetic   • GERD (gastroesophageal reflux disease)    • HTN (hypertension)    • Hyperlipidemia    • Hypertension    • Lumbar radiculopathy    • Prediabetes    • Sleep apnea        Past Surgical History:   Procedure Laterality Date   • GASTRECTOMY SLEEVE LAPAROSCOPIC     • HYSTERECTOMY     • KNEE ARTHROSCOPY Right    • LUMBAR FUSION     • NY ARTHRP KNE CONDYLE&PLATU MEDIAL&LAT COMPARTMENTS Left 2/9/2023    Procedure: ARTHROPLASTY KNEE TOTAL AND ALL ASSOCIATED PROCEDURES;  Surgeon: Maria L Ying MD;  Location:  MAIN OR;  Service: Orthopedics   • NY BREAST REDUCTION Bilateral 9/23/2022    Procedure: BILATERAL BREAST REDUCTION;  Surgeon: Deepthi Cardoza MD;  Location:  MAIN OR;  Service: Plastics   • MARY-EN-Y PROCEDURE  05/27/2021    Sleve   • TOTAL HIP ARTHROPLASTY Bilateral          Current Outpatient Medications:   •  acetaminophen (TYLENOL) 325 mg tablet, Take 650 mg by mouth every 6 (six) hours as needed for mild pain (Patient not taking: Reported on 2/28/2023), Disp: , Rfl:   •  azelastine (OPTIVAR) 0 05 % ophthalmic solution, Administer 1 drop to the right eye 2 (two) times a day, Disp: , Rfl:   •  bepotastine besilate (BEPREVE) 1 5 % op soln, INSTILL 1 DROP INTO BOTH EYES TWICE A DAY, Disp: , Rfl:   •  buPROPion (WELLBUTRIN XL) 300 mg 24 hr tablet, Take 300 mg by mouth daily, Disp: , Rfl:   •  busPIRone (BUSPAR) 10 mg tablet, Take 10 mg by mouth in the morning Takes twice a day, Disp: , Rfl:   •  Calcium Carb-Cholecalciferol (OSCAL-D) 500 mg-200 units per tablet, Take 1 tablet by mouth 2 (two) times a day with meals, Disp: , Rfl:   •  cetirizine (ZyrTEC) 10 mg tablet, Take 10 mg by mouth daily Takes 1 tablet 2 times a day, Disp: , Rfl:   •  Cholecalciferol (VITAMIN D3) 2000 units capsule, TAKE 2 CAPSULES BY MOUTH DAILY INDICATIONS: VITAMIN D DEFICIENCY , Disp: , Rfl: 5  •  [employer] Aspirin Low Dose 81 MG EC tablet, TAKE 1 TABLET BY MOUTH TWICE A DAY, Disp: 60 tablet, Rfl: 1  •  [employer] Vitamin B-12 1000 MCG tablet, Take 1,000 mcg by mouth daily, Disp: , Rfl:   •  [employer] Vitamin C 500 MG tablet, TAKE 1 TABLET (500 MG TOTAL) BY MOUTH DAILY  , Disp: 90 tablet, Rfl: 1  •  docusate sodium (Colace) 100 mg capsule, Take 1 capsule (100 mg total) by mouth daily as needed for constipation, Disp: 30 capsule, Rfl: 0  •  DULoxetine (CYMBALTA) 60 mg delayed release capsule, Take 60 mg by mouth 2 (two) times a day, Disp: , Rfl:   •  erythromycin (ILOTYCIN) ophthalmic ointment, APPLY (1CM) TO THE SURGICAL EYE AT BEDTIME, Disp: , Rfl:   •  ferrous sulfate 325 (65 Fe) mg tablet, Take by mouth daily with breakfast, Disp: , Rfl:   •  folic acid (FOLVITE) 1 mg tablet, TAKE 1 TABLET BY MOUTH EVERY DAY, Disp: 90 tablet, Rfl: 1  •  gabapentin (NEURONTIN) 300 mg capsule, Take 2 capsules (600 mg total) by mouth 3 (three) times a day, Disp: 180 capsule, Rfl: 1  •  HYDROcodone-acetaminophen (Norco) 5-325 mg per tablet, Take 1 tablet by mouth every 6 (six) hours as needed for pain Max Daily Amount: 4 tablets, Disp: 20 tablet, Rfl: 0  •  hydrocortisone 1 % cream, Apply topically as needed Applies daily, Disp: , Rfl:   •  methocarbamol (ROBAXIN) 500 mg tablet, Take 1 tablet (500 mg total) by mouth every 8 (eight) hours as needed for muscle spasms, Disp: 90 tablet, Rfl: 1  •  Multiple Vitamins-Minerals (multivitamin with minerals) tablet, Take 1 tablet by mouth daily, Disp: 30 tablet, Rfl: 0  •  ondansetron (ZOFRAN) 4 mg tablet, Take 1 tablet (4 mg total) by mouth every 8 (eight) hours as needed for nausea or vomiting, Disp: 20 tablet, Rfl: 0  •  pantoprazole (PROTONIX) 40 mg tablet, Take 40 mg by mouth as needed, Disp: , Rfl:   •  prednisoLONE acetate (PRED FORTE) 1 % ophthalmic suspension, instill 1 drop 6 times daily to the surgical eye, Disp: , Rfl:   •  simvastatin (ZOCOR) 40 mg tablet, Take 40 mg by mouth daily at bedtime, Disp: , Rfl:   •  VENTOLIN  (90 Base) MCG/ACT inhaler, INHALE 2 PUFFS EVERY 4 (FOUR) HOURS AS NEEDED FOR WHEEZING OR SHORTNESS OF BREATH , Disp: , Rfl: 2    Allergies   Allergen Reactions   • Bee Venom Swelling   • Dust Mite Extract    • Fish-Derived Products - Food Allergy Hives   • Iodine - Food Allergy Hives   • Lac Bovis Other (See Comments)     congestion   • Molds & Smuts    • Other Swelling   • Pollen Extract Other (See Comments)     Runny nose, watery eyes (also has corneal swelling) and itching  Triggers asthma   • Shrimp Flavor - Food Allergy Hives       Physical Exam:   Vitals:    03/15/23 1122   BP: 148/89   Pulse:    Resp:    Temp:    SpO2:      General: Awake, Alert, Oriented x 3, Mood and affect appropriate  Respiratory: Respirations even and unlabored  Cardiovascular: Peripheral pulses intact; no edema  Musculoskeletal Exam: Left cervical paraspinals tender to palpation    ASA Score: 3         Assessment:   1   Cervical spondylosis        Plan: Left C4-6 MBB

## 2023-03-15 NOTE — PROGRESS NOTES
Daily Note     Today's date: 3/15/2023  Patient name: Tonya Del Real  : 1970  MRN: [id number]  Referring provider: Tere Mcallister MD  Dx:   Encounter Diagnosis     ICD-10-CM    1  S/P total knee arthroplasty, left  Z96 652           Start Time: 930  Stop Time: 1027  Total time in clinic (min): 57 minutes    Subjective: Pt reports she is pretty well  Has been driving without issue  Objective: See treatment diary below     Assessment: Tolerated treatment well  Tolerated all activities well today  Able to perform single leg leg extensions today without pain  Patient would benefit from continued PT to further improve strength, increase available ROM, and maximize overall function  Plan: Continue per plan of care        Precautions: hx of TKA 2023, HTN    Manuals 3/10 3/15 2/21 2/28 3/3 3/7                                       Neuro Re-Ed 3/10 3/15 2/21 2/28 3/3 3/7   balance  Ron board [address]' [address] board SS 5'   [address] [email or web address] board 1xF ea  Tandem stance 2x1' ea   Lat weight shift   15x ea   8x 2" hold   bridges  2x10x3" YHB       clamshells  10x L only       Sidestepping at bar  7 laps at bar YHB                         Ther Ex 3/10 3/15 2/21 2/28 3/3 3/7   Bike 10' ROM 10' ROM 8' ROM 10' ROM 10' ROM 10' ROM   PROM QD  QD 10' QD 10' QD 3' QD 3'   Seated flexion AAROM         Quad sets    LAQ 2x10 LAQ 25x 3lb AW    SLR 6x5 2lb 5x5 2lb 5x5  5x5 5x5 3x10   STS     2x10 hi lo    squats   18x  2x10 2x10 2x10   VG  L6 3x10 single leg L6 6' DL L5 3x10 leg press b/l 8' L5  B/L 4x10 L7 B/L 4x10 L6   Step ups 6in step up and over  Sand dune 2x10 Sand dune 2x10   Sand dune 2x10 Sand dune 2x10   Calf raises     3x10 b/l 2x10 b/l no HHA   HEP/education         Ther Activity                           Gait Training                           Modalities

## 2023-03-15 NOTE — DISCHARGE INSTRUCTIONS

## 2023-03-16 ENCOUNTER — HOSPITAL ENCOUNTER (OUTPATIENT)
Dept: MRI IMAGING | Facility: HOSPITAL | Age: 53
End: 2023-03-16

## 2023-03-16 DIAGNOSIS — M54.16 LUMBAR RADICULOPATHY: ICD-10-CM

## 2023-03-17 ENCOUNTER — EVALUATION (OUTPATIENT)
Dept: PHYSICAL THERAPY | Facility: REHABILITATION | Age: 53
End: 2023-03-17

## 2023-03-17 DIAGNOSIS — Z96.652 S/P TOTAL KNEE ARTHROPLASTY, LEFT: Primary | ICD-10-CM

## 2023-03-17 NOTE — PROGRESS NOTES
Daily Note     Today's date: 3/17/2023  Patient name: Akshat Ramey  : 1970  MRN: [id number]  Referring provider: Nathan Gu MD  Dx:   Encounter Diagnosis     ICD-10-CM    1  S/P total knee arthroplasty, left  Z96 652           Start Time: 1041  Stop Time: 1125  Total time in clinic (min): 44 minutes    Subjective: Pt reports she is pretty well  Reports max pain level of 7/10 and GROC score of 60%  Objective: See treatment diary below     -5-121 degrees    Assessment: Tolerated treatment well  Overall, Char Paul is progressing well following her TKA on 2023  She has very good range of motion and only a minor limitation in her extension  Functionally, she is ambulating with an SPC and has been trying to ambulate without it recently which is going well  She did have one fall about 3 weeks ago when she was [facility] a [employer] [facility] , but is doing well since then  Patient would benefit from continued PT to further improve strength, increase available ROM, and maximize overall function  Goals  Short Term Goals: to be achieved by 4 weeks  1) Patient to be independent with basic HEP  MET  2) Decrease pain by 5/10 at its worst  NOT MET  3) Increase knee flexion ROM to 90 deg  MET  4) Increase knee extension ROM by > 5 deg  MET  5) Increase LE strength by 1/2 MMT grade in all deficient planes  MET  6) Patient to negotiate steps with a step-to pattern with use of HR  MET  7) Patient to report decreased sleep interruption secondary to pain  MET  8) Increase ambulatory tolerance by 10 min with LRAD  MET    Long Term Goals: to be achieved by discharge  1) FOTO equal to or greater than   2) Patient to be independent with comprehensive HEP  3) Decrease pain to 3/10 or less for improved quality of life  4) Increase LE strength to 5/5 MMT grade in all planes to improve a/iadls  5) Achieve full knee extension ROM to improve a/iadls    6) Increase knee flexion ROM to 120 degrees minimum to improve a/iadls  7) Patient to negotiate steps with a reciprocal pattern without use of Hr  8) Increase ambulatory tolerance to 30 min to improve participation in social activities  9) Patient to report no sleep interruption secondary to pain  Plan: Continue per plan of care        Precautions: hx of TKA 02/09/2023, HTN    Manuals 3/10 3/15 3/17 2/28 3/3 3/7                                       Neuro Re-Ed 3/10 3/15 3/17 2/28 3/3 3/7   balance  Ron board 2' [address] board 3' [address]   Ron board 1xF ea  Tandem stance 2x1' ea   Lat weight shift      8x 2" hold   bridges  2x10x3" YHB 2x10x3"      clamshells  10x L only 10x L only       Sidestepping at bar  7 laps at bar YHB                         Ther Ex 3/10 3/15 3/17 2/28 3/3 3/7   Bike 10' ROM 10' ROM 10' ROM 10' ROM 10' ROM 10' ROM   PROM QD   QD 10' QD 3' QD 3'   Seated flexion AAROM         Quad sets    LAQ 2x10 LAQ 25x 3lb AW    SLR 6x5 2lb 5x5 2lb 5x5 2lb 5x5 5x5 3x10   STS     2x10 hi lo    squats    2x10 2x10 2x10   VG  L6 3x10 single leg L6 6' DL L6 4' DL 8' L5  B/L 4x10 L7 B/L 4x10 L6   Step ups 6in step up and over  Sand dune 2x10 Sand dune 2x10 Sand dune 2x10  Sand dune 2x10 Sand dune 2x10   Calf raises     3x10 b/l 2x10 b/l no HHA   HEP/education         Ther Activity                           Gait Training                           Modalities

## 2023-03-20 ENCOUNTER — OFFICE VISIT (OUTPATIENT)
Dept: PHYSICAL THERAPY | Facility: REHABILITATION | Age: 53
End: 2023-03-20

## 2023-03-20 DIAGNOSIS — Z96.652 S/P TOTAL KNEE ARTHROPLASTY, LEFT: Primary | ICD-10-CM

## 2023-03-20 DIAGNOSIS — M17.12 PRIMARY OSTEOARTHRITIS OF LEFT KNEE: ICD-10-CM

## 2023-03-20 NOTE — PROGRESS NOTES
Daily Note     Today's date: 3/20/2023  Patient name: Joshua Hill  : 1970  MRN: [id number]  Referring provider: Hendrick Goltz, MD  Dx:   Encounter Diagnosis     ICD-10-CM    1  S/P total knee arthroplasty, left  Z96 652       2  Primary osteoarthritis of left knee  M17 12                      Subjective: patient reports she is tired this morning, no issues after last visit  Objective: See treatment diary below      Assessment: Tolerated treatment well  Continued with program as outlined below  She does note throughout the session she is tired and she wants to go home  Notes she feels the leg needs to be stretched, offered to show her a new stretch and patient declined  Patient would benefit from continued PT to further improve strength, increase available ROM, and maximize overall function  Plan: Continue per plan of care  Precautions: hx of TKA 2023, HTN    Manuals 3/10 3/15 3/17 3/20  3/7                                       Neuro Re-Ed 3/10 3/15 3/17 3/20  3/7   balance  Ron board 2' [address] board 3' ea [address] Ron board   3' ea way  [address] [place] [address] board 1xF ea  Tandem stance 2x1' ea   Lat weight shift      8x 2" hold   bridges  2x10x3" YHB 2x10x3" 2x10x3"     clamshells  10x L only 10x L only  10x L only     Sidestepping at bar  7 laps at bar YHB                         Ther Ex 3/10 3/15 3/17 3/20  3/7   Bike 10' ROM 10' ROM 10' ROM 10' ROM  10' ROM   PROM QD     QD 3'   Seated flexion AAROM         Quad sets         SLR 6x5 2lb 5x5 2lb 5x5 2lb 5x5 2lb  3x10   STS         squats      2x10   VG  L6 3x10 single leg L6 6' DL L6 4' DL L6 4' DL  B/L 4x10 L6   Step ups 6in step up and over  Sand dune 2x10 Sand dune 2x10 Sand dune 2x10 Sand Dune  2x10    4" x10 step up and over    Sand dune 2x10   Calf raises      2x10 b/l no HHA   HEP/education         Ther Activity                           Gait Training                           Modalities

## 2023-03-21 ENCOUNTER — APPOINTMENT (OUTPATIENT)
Dept: PHYSICAL THERAPY | Facility: REHABILITATION | Age: 53
End: 2023-03-21

## 2023-03-22 ENCOUNTER — TELEPHONE (OUTPATIENT)
Dept: RADIOLOGY | Facility: CLINIC | Age: 53
End: 2023-03-22

## 2023-03-22 NOTE — TELEPHONE ENCOUNTER
----- Message from Luther Owen, [address] sent at 3/22/2023 10:37 AM EDT -----  MRI of the lumbar spine reveals multilevel arthritis from L1-L5 S1 with grade 1 anterolisthesis (shifting of the vertebrae) at L4-5 with severe arthritis at this level and bilateral lateral recess narrowing with possible impact on the L5 nerve roots   at L4-5

## 2023-03-24 ENCOUNTER — OFFICE VISIT (OUTPATIENT)
Dept: PHYSICAL THERAPY | Facility: REHABILITATION | Age: 53
End: 2023-03-24

## 2023-03-24 DIAGNOSIS — M17.12 PRIMARY OSTEOARTHRITIS OF LEFT KNEE: ICD-10-CM

## 2023-03-24 DIAGNOSIS — Z96.652 S/P TOTAL KNEE ARTHROPLASTY, LEFT: Primary | ICD-10-CM

## 2023-03-24 NOTE — PROGRESS NOTES
Assessment:  1  Chronic pain syndrome    2  Cervical spondylosis    3  Lumbar radiculopathy    4  Degenerative disc disease, cervical    5  Sacroiliitis (HCC)    6  Lumbar spondylosis    7  Myofascial pain syndrome    8  Macromastia    9  S/P bilateral breast reduction        Plan:  1  Patient will be scheduled for repeat left C4-6 RFA  Patient states branch blocks provided almost complete relief for the day  Patient will also be scheduled for a left L5 TFESI  Complete risks and benefits including bleeding, infection, tissue reaction, nerve injury and allergic reaction were discussed  The patient was agreeable and verbalized an understanding    2  Patient may continue gabapentin and methocarbamol as prescribed  Both of these medications were refilled today  3  We will avoid NSAIDs secondary to history of bariatric surgery  4  Follow-up after procedures or sooner if needed    History of Present Illness: The patient is a 46 y o  female with a history of a left total knee arthroplasty in 2023, bariatric surgery, and fibromyalgia last seen on 2023 who presents for a follow up office visit in regards to chronic axial neck pain and low back pain that radiates into the lateral aspect of the left lower extremity  Patient states her pain began after she fell at a  walking down a ramp and did not realize that the railing ended  Updated MRI of the lumbar spine  from 2023 reveals stable degenerative changes most pronounced at L4-5 where there is bilateral lateral recess narrowing with possible impact on the L5 nerves and mild canal stenosis at this level  Patient also is status post repeat left C4-6 medial branch block on 2023  She states that the block did provide almost complete relief for about a day  She completed right C4-6 RFA in 2023  She continues on gabapentin 600 mg 3 times a day and methocarbamol 500 mg as needed    She rates her pain an 8 out of 10 on the numeric pain rating scale  She constantly has pain in the evening and at night which is described as dull aching, sharp, pressure-like, shooting and numbness    I have personally reviewed and/or updated the patient's past medical history, past surgical history, family history, social history, current medications, allergies, and vital signs today  Review of Systems:    Review of Systems   Respiratory: Negative for shortness of breath  Cardiovascular: Negative for chest pain  Gastrointestinal: Negative for constipation, diarrhea, nausea and vomiting  Musculoskeletal: Negative for arthralgias, gait problem, joint swelling and myalgias  Skin: Negative for rash  Neurological: Negative for dizziness, seizures and weakness  All other systems reviewed and are negative          Past Medical History:   Diagnosis Date   • Anxiety    • Asthma    • Cancer ([facility] [address] )     endometrial   • Chronic pain syndrome    • CPAP (continuous positive airway pressure) dependence    • Depression    • Diabetes mellitus (HCC)     Pre diabetic   • GERD (gastroesophageal reflux disease)    • HTN (hypertension)    • Hyperlipidemia    • Hypertension    • Lumbar radiculopathy    • Prediabetes    • Sleep apnea        Past Surgical History:   Procedure Laterality Date   • GASTRECTOMY SLEEVE LAPAROSCOPIC     • HYSTERECTOMY     • KNEE ARTHROSCOPY Right    • LUMBAR FUSION     • TN ARTHRP KNE CONDYLE&PLATU MEDIAL&LAT COMPARTMENTS Left 2/9/2023    Procedure: ARTHROPLASTY KNEE TOTAL AND ALL ASSOCIATED PROCEDURES;  Surgeon: Radha Salvador MD;  Location:  MAIN OR;  Service: Orthopedics   • TN BREAST REDUCTION Bilateral 9/23/2022    Procedure: BILATERAL BREAST REDUCTION;  Surgeon: Jacob Calderon MD;  Location:  MAIN OR;  Service: Plastics   • MARY-EN-Y PROCEDURE  05/27/2021    Sleve   • TOTAL HIP ARTHROPLASTY Bilateral        Family History   Problem Relation Age of Onset   • Diabetes Mother    • Stroke Mother    • Diabetes Father    • Cancer Father         thyroid   • Diabetes Sister    • Diabetes Brother    • No Known Problems Maternal Grandmother    • No Known Problems Maternal Grandfather    • No Known Problems Paternal Grandmother    • No Known Problems Paternal Grandfather    • No Known Problems Sister        Social History     Occupational History   • Not on file   Tobacco Use   • Smoking status: Former   • Smokeless tobacco: Never   Vaping Use   • Vaping Use: Never used   Substance and Sexual Activity   • Alcohol use: Not Currently   • Drug use: Not Currently   • Sexual activity: Not Currently         Current Outpatient Medications:   •  busPIRone (BUSPAR) 10 mg tablet, Take 10 mg by mouth in the morning Takes twice a day, Disp: , Rfl:   •  Calcium Carb-Cholecalciferol (OSCAL-D) 500 mg-200 units per tablet, Take 1 tablet by mouth 2 (two) times a day with meals, Disp: , Rfl:   •  Cholecalciferol (VITAMIN D3) 2000 units capsule, TAKE 2 CAPSULES BY MOUTH DAILY INDICATIONS: VITAMIN D DEFICIENCY , Disp: , Rfl: 5  •  [employer] Aspirin Low Dose 81 MG EC tablet, TAKE 1 TABLET BY MOUTH TWICE A DAY, Disp: 60 tablet, Rfl: 1  •  [facility] Vitamin B-12 1000 MCG tablet, Take 1,000 mcg by mouth daily, Disp: , Rfl:   •  [facility] Vitamin C 500 MG tablet, TAKE 1 TABLET (500 MG TOTAL) BY MOUTH DAILY  , Disp: 90 tablet, Rfl: 1  •  DULoxetine (CYMBALTA) 60 mg delayed release capsule, Take 60 mg by mouth 2 (two) times a day, Disp: , Rfl:   •  ferrous sulfate 325 (65 Fe) mg tablet, Take by mouth daily with breakfast, Disp: , Rfl:   •  folic acid (FOLVITE) 1 mg tablet, TAKE 1 TABLET BY MOUTH EVERY DAY, Disp: 90 tablet, Rfl: 1  •  gabapentin (NEURONTIN) 300 mg capsule, Take 2 capsules (600 mg total) by mouth 3 (three) times a day, Disp: 180 capsule, Rfl: 2  •  methocarbamol (ROBAXIN) 500 mg tablet, Take 1 tablet (500 mg total) by mouth every 8 (eight) hours as needed for muscle spasms, Disp: 90 tablet, Rfl: 2  •  Multiple Vitamins-Minerals (multivitamin with minerals) tablet, Take 1 tablet by mouth daily, Disp: 30 tablet, Rfl: 0  •  acetaminophen (TYLENOL) 325 mg tablet, Take 650 mg by mouth every 6 (six) hours as needed for mild pain (Patient not taking: Reported on 2/28/2023), Disp: , Rfl:   •  azelastine (OPTIVAR) 0 05 % ophthalmic solution, Administer 1 drop to the right eye 2 (two) times a day, Disp: , Rfl:   •  bepotastine besilate (BEPREVE) 1 5 % op soln, INSTILL 1 DROP INTO BOTH EYES TWICE A DAY, Disp: , Rfl:   •  buPROPion (WELLBUTRIN XL) 300 mg 24 hr tablet, Take 300 mg by mouth daily, Disp: , Rfl:   •  cetirizine (ZyrTEC) 10 mg tablet, Take 10 mg by mouth daily Takes 1 tablet 2 times a day, Disp: , Rfl:   •  docusate sodium (Colace) 100 mg capsule, Take 1 capsule (100 mg total) by mouth daily as needed for constipation, Disp: 30 capsule, Rfl: 0  •  erythromycin (ILOTYCIN) ophthalmic ointment, APPLY (1CM) TO THE SURGICAL EYE AT BEDTIME, Disp: , Rfl:   •  HYDROcodone-acetaminophen (Norco) 5-325 mg per tablet, Take 1 tablet by mouth every 6 (six) hours as needed for pain Max Daily Amount: 4 tablets (Patient not taking: Reported on 3/29/2023), Disp: 20 tablet, Rfl: 0  •  hydrocortisone 1 % cream, Apply topically as needed Applies daily, Disp: , Rfl:   •  ondansetron (ZOFRAN) 4 mg tablet, Take 1 tablet (4 mg total) by mouth every 8 (eight) hours as needed for nausea or vomiting, Disp: 20 tablet, Rfl: 0  •  pantoprazole (PROTONIX) 40 mg tablet, Take 40 mg by mouth as needed, Disp: , Rfl:   •  prednisoLONE acetate (PRED FORTE) 1 % ophthalmic suspension, instill 1 drop 6 times daily to the surgical eye, Disp: , Rfl:   •  simvastatin (ZOCOR) 40 mg tablet, Take 40 mg by mouth daily at bedtime, Disp: , Rfl:   •  VENTOLIN  (90 Base) MCG/ACT inhaler, INHALE 2 PUFFS EVERY 4 (FOUR) HOURS AS NEEDED FOR WHEEZING OR SHORTNESS OF BREATH , Disp: , Rfl: 2    Allergies   Allergen Reactions   • Bee Venom Swelling   • Dust Mite Extract    • Fish-Derived Products - Food Allergy Hives "  • Iodine - Food Allergy Hives   • Lac Bovis Other (See Comments)     congestion   • Molds & Smuts    • Other Swelling   • Pollen Extract Other (See Comments)     Runny nose, watery eyes (also has corneal swelling) and itching  Triggers asthma   • Shrimp Flavor - Food Allergy Hives       Physical Exam:    /78   Pulse 78   Ht 5' 4\" (1 626 m)   Wt 96 2 kg (212 lb)   BMI 36 39 kg/m²     Constitutional:normal, well developed, well nourished, alert, in no distress and non-toxic and no overt pain behavior  Eyes:anicteric  HEENT:grossly intact  Neck:supple, symmetric, trachea midline and no masses   Pulmonary:even and unlabored  Cardiovascular:No edema or pitting edema present  Skin:Normal without rashes or lesions and well hydrated  Psychiatric:Mood and affect appropriate  Neurologic:Cranial Nerves II-XII grossly intact  Musculoskeletal:antalgic gait, ambulates with a cane      Imaging  FL spine and pain procedure    (Results Pending)   FL spine and pain procedure    (Results Pending)     MRI LUMBAR SPINE WITHOUT CONTRAST   INDICATION: M54 16: Radiculopathy, lumbar region  COMPARISON: MRI lumbar spine 12/30/2020   TECHNIQUE: Multiplanar, multisequence imaging of the lumbar spine was performed      IMAGE QUALITY: Diagnostic   FINDINGS:   VERTEBRAL BODIES: There is a transitional lumbosacral junction with partially sacralized L5  There are short ribs (riblets) at T12 with 5 lumbar type nonrib bearing vertebrae  Stable degenerative grade 1 anterolisthesis at L4-5  No abnormal bone marrow signal or suspicious discrete lesion  Leonetta Bharti SACRUM: Unremarkable  No evidence of insufficiency or stress fracture  DISTAL CORD AND CONUS: Normal size and signal within the distal cord and conus  PARASPINAL SOFT TISSUES: Paraspinal soft tissues are unremarkable  LOWER THORACIC DISC SPACES: Mild noncompressive lower thoracic degenerative change  LUMBAR DISC SPACES:   L1-L2: No disc bulge  Mild facet arthropathy   No canal " or foraminal stenosis  L2-L3: There is left foraminal disc protrusion with annular fissure  Mild facet arthropathy  No canal stenosis  No significant foraminal stenosis  L3-L4: No disc bulge  Mild facet arthropathy with no canal or significant foraminal stenosis  L4-L5: Grossly stable grade 1 anterolisthesis uncovering posterior disc margin moderate to severe bilateral facet arthropathy  Bilateral ligamentum flavum thickening  Bilateral lateral recesses narrowing with possible impact on L5 nerve roots  Mild   canal stenosis  Minimal bilateral foraminal narrowing  L5-S1: No disc bulge  Mild facet arthropathy  No canal or foraminal stenosis  OTHER FINDINGS: None  IMPRESSION:   Grossly stable degenerative change as detailed, most pronounced at level L4-5 where there is bilateral lateral recesses narrowing with possible impact on L5 nerve roots  Mild canal stenosis at this level     Workstation performed: [id number]       Orders Placed This Encounter   Procedures   • FL spine and pain procedure   • FL spine and pain procedure

## 2023-03-24 NOTE — PROGRESS NOTES
Daily Note     Today's date: 3/24/2023  Patient name: Forrest Wesley  : 1970  MRN: [id number]  Referring provider: Cherie Martinez MD  Dx:   Encounter Diagnosis     ICD-10-CM    1  S/P total knee arthroplasty, left  Z96 652       2  Primary osteoarthritis of left knee  M17 12           Start Time: 1034  Stop Time: 1115  Total time in clinic (min): 41 minutes    Subjective: patient reports she is doing well  Is no longer walking with SPC  Objective: See treatment diary below      Assessment: Tolerated treatment well  Patient would benefit from continued PT to further improve strength, increase available ROM, and maximize overall function  Plan: Continue per plan of care  Precautions: hx of TKA 2023, HTN    Manuals 3/10 3/15 3/17 3/20 3/24 3/7                                       Neuro Re-Ed 3/10 3/15 3/17 3/20 3/24 3/7   balance  Ron board 2' [address] board 3' ea way Ron board   3' ea way  Ron board 1xF ea  Tandem stance 2x1' ea   Lat weight shift      8x 2" hold   bridges  2x10x3" YHB 2x10x3" 2x10x3" 3x10    clamshells  10x L only 10x L only  10x L only 2x10 L YHB    Sidestepping at bar  7 laps at bar YHB                         Ther Ex 3/10 3/15 3/17 3/20 3/24 3/7   Bike 10' ROM 10' ROM 10' ROM 10' ROM 10' ROM 10' ROM   PROM QD     QD 3'   Seated flexion AAROM         Quad sets         SLR 6x5 2lb 5x5 2lb 5x5 2lb 5x5 2lb 5x5 3lb 3x10   STS         squats      2x10   VG  L6 3x10 single leg L6 6' DL L6 4' DL L6 4' DL  B/L 4x10 L6   Step ups 6in step up and over  Sand dune 2x10 Sand dune 2x10 Sand dune 2x10 Sand Dune  2x10    4" x10 step up and over    Sand dune 2x10   Leg ext     35lb 5x5 2up 1 dwn    Leg press     3x10 60lb single leg    Calf raises      2x10 b/l no HHA   HEP/education         Ther Activity                           Gait Training                           Modalities

## 2023-03-24 NOTE — TELEPHONE ENCOUNTER
Caller: Jadon Rodriguez PT    Doctor/Office: Dr Edmar Landry    Call regarding :   MRI results     Call was transferred to: Nurse

## 2023-03-24 NOTE — TELEPHONE ENCOUNTER
Caller: Racquel Stark    Doctor: Sheng Long    Reason for call: patient is at PT returning nurses phone call she will call back later     Wasn't happy with my response on who called, I said "a nurse"    Call back#: [phone]

## 2023-03-28 ENCOUNTER — OFFICE VISIT (OUTPATIENT)
Dept: PHYSICAL THERAPY | Facility: REHABILITATION | Age: 53
End: 2023-03-28

## 2023-03-28 DIAGNOSIS — Z96.652 S/P TOTAL KNEE ARTHROPLASTY, LEFT: Primary | ICD-10-CM

## 2023-03-28 DIAGNOSIS — M17.12 PRIMARY OSTEOARTHRITIS OF LEFT KNEE: ICD-10-CM

## 2023-03-28 NOTE — PROGRESS NOTES
"Daily Note     Today's date: 3/28/2023  Patient name: Shyann Ayala  : 1970  MRN: [id number]  Referring provider: Blake Mulligan MD  Dx:   Encounter Diagnosis     ICD-10-CM    1  S/P total knee arthroplasty, left  Z96 652       2  Primary osteoarthritis of left knee  M17 12           Start Time: 1105  Stop Time: 1155  Total time in clinic (min): 50 minutes    Subjective: Patient reports she gets sore towards the end of the day and requires the use of a cane  Objective: See treatment diary below      Assessment: Tolerated treatment well  Good tolerance to all exercises and activities  Patient would benefit from continued PT to further improve strength, increase available ROM, and maximize overall function  Plan: Continue per plan of care  Precautions: hx of TKA 2023, HTN    Manuals 3/10 3/15 3/17 3/20 3/24 3/28                                       Neuro Re-Ed 3/10 3/15 3/17 3/20 3/24 3/28   balance  Ron board 2' [address] board 3' ea way Ron board   3' ea way  Ron board 3' ea way   Lat weight shift         bridges  2x10x3\" YHB 2x10x3\" 2x10x3\" 3x10 3x10   clamshells  10x L only 10x L only  10x L only 2x10 L YHB 2x10 ea YHB   Sidestepping at bar  7 laps at bar YHB       Suitcase carry      15lb KB 2 laps ea            Ther Ex 3/10 3/15 3/17 3/20 3/24 3/28   Bike 10' ROM 10' ROM 10' ROM 10' ROM 10' ROM 10' ROM   PROM QD        Seated flexion AAROM         Quad sets         SLR 6x5 2lb 5x5 2lb 5x5 2lb 5x5 2lb 5x5 3lb 5x5 3lb   STS         squats         VG  L6 3x10 single leg L6 6' DL L6 4' DL L6 4' DL     Step ups 6in step up and over  Sand dune 2x10 Sand dune 2x10 Sand dune 2x10 Sand Dune  2x10    4\" x10 step up and over       Leg ext     35lb 5x5 2up 1 dwn 2x10 35lbs 2up 1dwn   Leg press     3x10 60lb single leg 3x10 100lb eccentric   Calf raises         HEP/education         Ther Activity                           Gait Training                           Modalities       "

## 2023-03-29 ENCOUNTER — OFFICE VISIT (OUTPATIENT)
Dept: PAIN MEDICINE | Facility: CLINIC | Age: 53
End: 2023-03-29

## 2023-03-29 VITALS
SYSTOLIC BLOOD PRESSURE: 119 MMHG | WEIGHT: 212 LBS | DIASTOLIC BLOOD PRESSURE: 78 MMHG | BODY MASS INDEX: 36.19 KG/M2 | HEIGHT: 64 IN | HEART RATE: 78 BPM

## 2023-03-29 DIAGNOSIS — M79.18 MYOFASCIAL PAIN SYNDROME: ICD-10-CM

## 2023-03-29 DIAGNOSIS — M46.1 SACROILIITIS (HCC): ICD-10-CM

## 2023-03-29 DIAGNOSIS — M50.30 DEGENERATIVE DISC DISEASE, CERVICAL: ICD-10-CM

## 2023-03-29 DIAGNOSIS — G89.4 CHRONIC PAIN SYNDROME: Primary | ICD-10-CM

## 2023-03-29 DIAGNOSIS — M47.812 CERVICAL SPONDYLOSIS: ICD-10-CM

## 2023-03-29 DIAGNOSIS — M54.16 LUMBAR RADICULOPATHY: ICD-10-CM

## 2023-03-29 DIAGNOSIS — N62 MACROMASTIA: ICD-10-CM

## 2023-03-29 DIAGNOSIS — M47.816 LUMBAR SPONDYLOSIS: ICD-10-CM

## 2023-03-29 DIAGNOSIS — Z98.890 S/P BILATERAL BREAST REDUCTION: ICD-10-CM

## 2023-03-29 RX ORDER — GABAPENTIN 300 MG/1
600 CAPSULE ORAL 3 TIMES DAILY
Qty: 180 CAPSULE | Refills: 2 | Status: SHIPPED | OUTPATIENT
Start: 2023-03-29

## 2023-03-29 RX ORDER — METHOCARBAMOL 500 MG/1
500 TABLET, FILM COATED ORAL EVERY 8 HOURS PRN
Qty: 90 TABLET | Refills: 2 | Status: SHIPPED | OUTPATIENT
Start: 2023-03-29

## 2023-03-31 ENCOUNTER — OFFICE VISIT (OUTPATIENT)
Dept: PHYSICAL THERAPY | Facility: REHABILITATION | Age: 53
End: 2023-03-31

## 2023-03-31 ENCOUNTER — OFFICE VISIT (OUTPATIENT)
Dept: OBGYN CLINIC | Facility: CLINIC | Age: 53
End: 2023-03-31

## 2023-03-31 ENCOUNTER — APPOINTMENT (OUTPATIENT)
Dept: RADIOLOGY | Facility: AMBULARY SURGERY CENTER | Age: 53
End: 2023-03-31
Attending: ORTHOPAEDIC SURGERY

## 2023-03-31 VITALS
HEIGHT: 64 IN | DIASTOLIC BLOOD PRESSURE: 74 MMHG | SYSTOLIC BLOOD PRESSURE: 114 MMHG | WEIGHT: 212 LBS | HEART RATE: 76 BPM | BODY MASS INDEX: 36.19 KG/M2

## 2023-03-31 DIAGNOSIS — Z96.652 S/P TOTAL KNEE ARTHROPLASTY, LEFT: Primary | ICD-10-CM

## 2023-03-31 DIAGNOSIS — Z96.652 S/P TOTAL KNEE REPLACEMENT, LEFT: Primary | ICD-10-CM

## 2023-03-31 DIAGNOSIS — Z96.652 S/P TOTAL KNEE REPLACEMENT, LEFT: ICD-10-CM

## 2023-03-31 NOTE — PROGRESS NOTES
Daily Note     Today's date: 3/31/2023  Patient name: Sonido Young  : 1970  MRN: [id number]  Referring provider: Lit Munguia MD  Dx:   Encounter Diagnosis     ICD-10-CM    1  S/P total knee arthroplasty, left  Z96 652           Start Time:   Stop Time: 160  Total time in clinic (min): 44 minutes    Subjective: Patient reports she is doing well  Max pain level of 7/10  Objective: See treatment diary below    Knee ROM: 0-125 deg  NPRS: 7/10    6MWT: 1,320ft no AD, no breaks  5xSTS: 19 23 seconds, no hands  TUG: 10 10 seconds, no hands, no AD    Assessment: Overall, Nubia is progressing very well in PT  Her range of motion has improved to 125 degrees of flexion and she is able to achieve 0 degrees of knee extension  Her cardiovascular endurance has improved as demonstrated by her 6MWT  Her LE strength and gait have improved as well  She is driving without issue and has returned to working at this time  Tolerated treatment well  Patient would benefit from continued PT to further improve strength, increase available ROM, and maximize overall function  Goals  Short Term Goals: to be achieved by 4 weeks  1) Patient to be independent with basic HEP  MET  2) Decrease pain by 5/10 at its worst  NOT MET  3) Increase knee flexion ROM to 90 deg  MET  4) Increase knee extension ROM by > 5 deg  MET  5) Increase LE strength by 1/2 MMT grade in all deficient planes  MET  6) Patient to negotiate steps with a step-to pattern with use of HR  MET  7) Patient to report decreased sleep interruption secondary to pain  MET  8) Increase ambulatory tolerance by 10 min with LRAD  MET    Long Term Goals: to be achieved by discharge  1) FOTO equal to or greater than goal  MET  2) Patient to be independent with comprehensive HEP  MET  3) Decrease pain to 3/10 or less for improved quality of life  MET  4) Increase LE strength to 5/5 MMT grade in all planes to improve a/iadls   MET  5) Achieve full knee extension "ROM to improve a/iadls  MET  6) Increase knee flexion ROM to 120 degrees minimum to improve a/iadls  MET  7) Patient to negotiate steps with a reciprocal pattern without use of Hr  MET  8) Increase ambulatory tolerance to 30 min to improve participation in social activities  MET  9) Patient to report no sleep interruption secondary to pain  MET    Plan: Continue per plan of care  Precautions: hx of TKA 02/09/2023, HTN    Manuals 3/31 3/15 3/17 3/20 3/24 3/28                              Re-assessment 25'        Neuro Re-Ed 3/31 3/15 3/17 3/20 3/24 3/28   balance  Ron board 2' ea Ron board 3' ea way Ron board   3' ea way  Ron board 3' ea way   Lat weight shift         bridges  2x10x3\" YHB 2x10x3\" 2x10x3\" 3x10 3x10   clamshells  10x L only 10x L only  10x L only 2x10 L YHB 2x10 ea YHB   Sidestepping at bar  7 laps at bar YHB       Suitcase carry      15lb KB 2 laps ea            Ther Ex 3/31 3/15 3/17 3/20 3/24 3/28   Bike 10' ROM 10' ROM 10' ROM 10' ROM 10' ROM 10' ROM   PROM         Seated flexion AAROM         Quad sets         SLR  5x5 2lb 5x5 2lb 5x5 2lb 5x5 3lb 5x5 3lb   STS         squats         VG   L6 6' DL L6 4' DL L6 4' DL     Step ups  Sand dune 2x10 Sand dune 2x10 Sand Dune  2x10    4\" x10 step up and over       Leg ext 3x10 35lbs 2up 1dwn    35lb 5x5 2up 1 dwn 2x10 35lbs 2up 1dwn   Leg press 3x10 100lb DL  2x10 single leg    3x10 60lb single leg 3x10 100lb eccentric   Calf raises         HEP/education         Ther Activity                           Gait Training                           Modalities                                "

## 2023-03-31 NOTE — LETTER
March 31, 2023     Patient: Carmen Lafleur  YOB: 1970  Date of Visit: 3/31/2023      To Whom it May Concern:    Kim Betancourt is under my professional care  Bethel Landeros was seen in my office on 3/31/2023  Bethel Landeros is to continue with current work restriction  Will re evaluate patient in 5 weeks      If you have any questions or concerns, please don't hesitate to call           Sincerely,          Melany Snyder MD        CC: No Recipients

## 2023-03-31 NOTE — PROGRESS NOTES
Assessment:  1  S/P total knee replacement, left  XR knee 3 vw left non injury          Plan:    • Patient is 7 weeks s/p left total knee arthroplasty, DOS 2/9/32   • Weightbearing as tolerated   • Continue with physical therapy   • Will repeat x-rays left knee at the next office visit   • Follow up         The above stated was discussed in layman's terms and the patient expressed understanding  All questions were answered to the patient's satisfaction      /    Subjective:   London Krishnamurthy is a 46 y o  female who presents today 7 weeks status post left total knee arthroplasty, DOS 2/9/23  She states she is doing well  She is been going to physical therapy and her motion has improved  Her range of motion was  measured at 0-120  She had a fall on 2/23/2023 and was admitted to the hospital   She is treating with pain management for disease and spinal stenosis with lumbar steroid injections  She is taking Gabapentin 600 mg TID and Robaxin 750 TID         Review of systems negative unless otherwise specified in HPI    Past Medical History:   Diagnosis Date   • Anxiety    • Asthma    • Cancer ([address] )     endometrial   • Chronic pain syndrome    • CPAP (continuous positive airway pressure) dependence    • Depression    • Diabetes mellitus (HCC)     Pre diabetic   • GERD (gastroesophageal reflux disease)    • HTN (hypertension)    • Hyperlipidemia    • Hypertension    • Lumbar radiculopathy    • Prediabetes    • Sleep apnea        Past Surgical History:   Procedure Laterality Date   • GASTRECTOMY SLEEVE LAPAROSCOPIC     • HYSTERECTOMY     • KNEE ARTHROSCOPY Right    • LUMBAR FUSION     • CO ARTHRP KNE CONDYLE&PLATU MEDIAL&LAT COMPARTMENTS Left 2/9/2023    Procedure: ARTHROPLASTY KNEE TOTAL AND ALL ASSOCIATED PROCEDURES;  Surgeon: Ortiz Hudson MD;  Location: [facility];  Service: Orthopedics   • CO BREAST REDUCTION Bilateral 9/23/2022    Procedure: BILATERAL BREAST REDUCTION;  Surgeon: El Rojas MD;  Location: [facility];  Service: Plastics   • MARY-EN-Y PROCEDURE  05/27/2021    Sleve   • TOTAL HIP ARTHROPLASTY Bilateral        Family History   Problem Relation Age of Onset   • Diabetes Mother    • Stroke Mother    • Diabetes Father    • Cancer Father         thyroid   • Diabetes Sister    • Diabetes Brother    • No Known Problems Maternal Grandmother    • No Known Problems Maternal Grandfather    • No Known Problems Paternal Grandmother    • No Known Problems Paternal Grandfather    • No Known Problems Sister        Social History     Occupational History   • Not on file   Tobacco Use   • Smoking status: Former   • Smokeless tobacco: Never   Vaping Use   • Vaping Use: Never used   Substance and Sexual Activity   • Alcohol use: Not Currently   • Drug use: Not Currently   • Sexual activity: Not Currently         Current Outpatient Medications:   •  acetaminophen (TYLENOL) 325 mg tablet, Take 650 mg by mouth every 6 (six) hours as needed for mild pain (Patient not taking: Reported on 2/28/2023), Disp: , Rfl:   •  azelastine (OPTIVAR) 0 05 % ophthalmic solution, Administer 1 drop to the right eye 2 (two) times a day, Disp: , Rfl:   •  bepotastine besilate (BEPREVE) 1 5 % op soln, INSTILL 1 DROP INTO BOTH EYES TWICE A DAY, Disp: , Rfl:   •  buPROPion (WELLBUTRIN XL) 300 mg 24 hr tablet, Take 300 mg by mouth daily, Disp: , Rfl:   •  busPIRone (BUSPAR) 10 mg tablet, Take 10 mg by mouth in the morning Takes twice a day, Disp: , Rfl:   •  Calcium Carb-Cholecalciferol (OSCAL-D) 500 mg-200 units per tablet, Take 1 tablet by mouth 2 (two) times a day with meals, Disp: , Rfl:   •  cetirizine (ZyrTEC) 10 mg tablet, Take 10 mg by mouth daily Takes 1 tablet 2 times a day, Disp: , Rfl:   •  Cholecalciferol (VITAMIN D3) 2000 units capsule, TAKE 2 CAPSULES BY MOUTH DAILY INDICATIONS: VITAMIN D DEFICIENCY , Disp: , Rfl: 5  •  [employer] Aspirin Low Dose 81 MG EC tablet, TAKE 1 TABLET BY MOUTH TWICE A DAY, Disp: 60 tablet, Rfl: 1  •  [employer] Vitamin B-12 1000 MCG tablet, Take 1,000 mcg by mouth daily, Disp: , Rfl:   •  [employer] Vitamin C 500 MG tablet, TAKE 1 TABLET (500 MG TOTAL) BY MOUTH DAILY  , Disp: 90 tablet, Rfl: 1  •  docusate sodium (Colace) 100 mg capsule, Take 1 capsule (100 mg total) by mouth daily as needed for constipation, Disp: 30 capsule, Rfl: 0  •  DULoxetine (CYMBALTA) 60 mg delayed release capsule, Take 60 mg by mouth 2 (two) times a day, Disp: , Rfl:   •  erythromycin (ILOTYCIN) ophthalmic ointment, APPLY (1CM) TO THE SURGICAL EYE AT BEDTIME, Disp: , Rfl:   •  ferrous sulfate 325 (65 Fe) mg tablet, Take by mouth daily with breakfast, Disp: , Rfl:   •  folic acid (FOLVITE) 1 mg tablet, TAKE 1 TABLET BY MOUTH EVERY DAY, Disp: 90 tablet, Rfl: 1  •  gabapentin (NEURONTIN) 300 mg capsule, Take 2 capsules (600 mg total) by mouth 3 (three) times a day, Disp: 180 capsule, Rfl: 2  •  HYDROcodone-acetaminophen (Norco) 5-325 mg per tablet, Take 1 tablet by mouth every 6 (six) hours as needed for pain Max Daily Amount: 4 tablets (Patient not taking: Reported on 3/29/2023), Disp: 20 tablet, Rfl: 0  •  hydrocortisone 1 % cream, Apply topically as needed Applies daily, Disp: , Rfl:   •  methocarbamol (ROBAXIN) 500 mg tablet, Take 1 tablet (500 mg total) by mouth every 8 (eight) hours as needed for muscle spasms, Disp: 90 tablet, Rfl: 2  •  Multiple Vitamins-Minerals (multivitamin with minerals) tablet, Take 1 tablet by mouth daily, Disp: 30 tablet, Rfl: 0  •  ondansetron (ZOFRAN) 4 mg tablet, Take 1 tablet (4 mg total) by mouth every 8 (eight) hours as needed for nausea or vomiting, Disp: 20 tablet, Rfl: 0  •  pantoprazole (PROTONIX) 40 mg tablet, Take 40 mg by mouth as needed, Disp: , Rfl:   •  prednisoLONE acetate (PRED FORTE) 1 % ophthalmic suspension, instill 1 drop 6 times daily to the surgical eye, Disp: , Rfl:   •  simvastatin (ZOCOR) 40 mg tablet, Take 40 mg by mouth daily at bedtime, Disp: , Rfl:   •  VENTOLIN  (90 Base) MCG/ACT inhaler, INHALE 2 PUFFS EVERY 4 (FOUR) HOURS AS NEEDED FOR WHEEZING OR SHORTNESS OF BREATH , Disp: , Rfl: 2    Allergies   Allergen Reactions   • Bee Venom Swelling   • Dust Mite Extract    • Fish-Derived Products - Food Allergy Hives   • Iodine - Food Allergy Hives   • Lac Bovis Other (See Comments)     congestion   • Molds & Smuts    • Other Swelling   • Pollen Extract Other (See Comments)     Runny nose, watery eyes (also has corneal swelling) and itching  Triggers asthma   • Shrimp Flavor - Food Allergy Hives            Vitals:    03/31/23 1200   BP: 114/74   Pulse: 76       Objective:            Physical Exam  Physical Exam:      General Appearance:    Alert, cooperative, no distress, appears stated age   Head:    Normocephalic, without obvious abnormality, atraumatic   Eyes:    conjunctiva/corneas clear, both eyes         Nose:   Nares normal, septum midline, no drainage    Throat:   Lips normal; teeth and gums normal   Neck:    symmetrical, trachea midline, ;     thyroid:  no enlargement/   Back:     Symmetric, no curvature, ROM normal   Lungs:   No audible wheezing or labored breathing   Chest Wall:    No tenderness or deformity    Heart:    Regular rate and rhythm                         Pulses:   2+ and symmetric all extremities   Skin:   Skin color, texture, turgor normal, no rashes or lesions   Neurologic:   normal strength, sensation and reflexes     throughout                       Ortho Exam  Right knee  Anterior incision well-healed  No erythema  Active range of motion 0-1 25  Stable to varus and valgus stress  Neurovascularly Intact Distally     Diagnostics, reviewed and taken today if performed as documented    The attending physician has personally reviewed the pertinent films in PACS and interpretation is as follows: X-ray left knee demonstrate status post TKA adequate line of implant, no sign of loosening      Procedures, if performed today:    Procedures    None performed      Scribe Attestation    I,:   am "acting as a scribe while in the presence of the attending physician :       I,:   personally performed the services described in this documentation    as scribed in my presence :             Portions of the record may have been created with voice recognition software  Occasional wrong word or \"sound a like\" substitutions may have occurred due to the inherent limitations of voice recognition software  Read the chart carefully and recognize, using context, where substitutions have occurred    "

## 2023-04-03 ENCOUNTER — APPOINTMENT (OUTPATIENT)
Dept: PHYSICAL THERAPY | Facility: REHABILITATION | Age: 53
End: 2023-04-03

## 2023-04-04 ENCOUNTER — OFFICE VISIT (OUTPATIENT)
Dept: PHYSICAL THERAPY | Facility: REHABILITATION | Age: 53
End: 2023-04-04

## 2023-04-04 DIAGNOSIS — Z96.652 S/P TOTAL KNEE ARTHROPLASTY, LEFT: Primary | ICD-10-CM

## 2023-04-06 ENCOUNTER — OFFICE VISIT (OUTPATIENT)
Dept: PHYSICAL THERAPY | Facility: REHABILITATION | Age: 53
End: 2023-04-06

## 2023-04-06 DIAGNOSIS — Z96.652 S/P TOTAL KNEE ARTHROPLASTY, LEFT: Primary | ICD-10-CM

## 2023-04-06 DIAGNOSIS — M17.12 PRIMARY OSTEOARTHRITIS OF LEFT KNEE: ICD-10-CM

## 2023-04-06 NOTE — PROGRESS NOTES
Daily Note     Today's date: 2023  Patient name: London Krishnamurthy  : 1970  MRN: [id number]  Referring provider: Liliam Patterson MD  Dx:   Encounter Diagnosis     ICD-10-CM    1  S/P total knee arthroplasty, left  Z96 652       2  Primary osteoarthritis of left knee  M17 12                      Subjective: Pt reports her L knee was sore yesterday  Notes she was standing up from chair/sofa yesterday every 45-60 minutes  Objective: See treatment diary below      Assessment: Tolerated treatment well  Continued with program as outlined below, focusing on L quad strength  Is making good progress toward long term goals  Most challenged today with SL sit to chair and SL leg ext today  Patient would benefit from continued PT to further improve strength, in effort to return to PLOF  Plan: Continue per plan of care        Precautions: hx of TKA 2023, HTN    Manuals 3/31 4/4 4/6  3/24 3/28                              Re-assessment 25'        Neuro Re-Ed 3/31 4/4 4/6  3/24 3/28   balance      Ron board 3' ea way   Lat weight shift         bridges     3x10 3x10   clamshells     2x10 L YHB 2x10 ea YHB   Sidestepping at bar         Suitcase carry      15lb KB 2 laps ea            Ther Ex 3/31 4/4 4/6  3/24 3/28   Bike 10' ROM 12' ROM 10' ROM  10' ROM 10' ROM   PROM         Seated flexion AAROM         Quad sets         SLR  5x5 ea 3lb 5x5 ea 3lb  5x5 3lb 5x5 3lb   STS         squats         VG   25x ea L7 25x ea L7      Step ups  LSD 6in 2x10 ea LSD 6in 2x10 ea      Leg ext 3x10 35lbs 2up 1dwn 3x10 25lbs 2up 1 dwn 1x15 25lbs 2up 1 dwn  35lb 5x5 2up 1 dwn 2x10 35lbs 2up 1dwn   Leg press 3x10 100lb DL  2x10 single leg  2x10 100lb DL    2x10 80lb SL  3x10 60lb single leg 3x10 100lb eccentric   Single leg eccentric squat  2xF with airex on chair 2xF with airex on chair      Calf raises         HEP/education         Ther Activity                           Gait Training Modalities

## 2023-04-24 ENCOUNTER — EVALUATION (OUTPATIENT)
Dept: PHYSICAL THERAPY | Facility: REHABILITATION | Age: 53
End: 2023-04-24

## 2023-04-24 DIAGNOSIS — Z96.652 S/P TOTAL KNEE ARTHROPLASTY, LEFT: Primary | ICD-10-CM

## 2023-04-24 NOTE — PROGRESS NOTES
Daily Note     Today's date: 2023  Patient name: Sanket Manuel  : 1970  MRN: [id number]  Referring provider: Crystal Paige MD  Dx:   Encounter Diagnosis     ICD-10-CM    1  S/P total knee arthroplasty, left  Z96 652           Start Time: 1035  Stop Time: 1120  Total time in clinic (min): 45 minutes    Subjective: Reports her knee is doing pretty well  Challenged by stair climbing both up and down  Reports a max pain level of 6/10  Objective: See treatment diary below    Assessment: Patient tolerated treatment well  [place]aut[place] of R quad noted upon assessment  Still has difficulties ascending and descending stairs  Pt will benefit from continued skilled PT intervention in order to address remaining limitations and to restore maximal function  Goals  Short Term Goals: to be achieved by 4 weeks  1) Patient to be independent with basic HEP  MET  2) Decrease pain by 5/10 at its worst  NOT MET  3) Increase knee flexion ROM to 90 deg  MET  4) Increase knee extension ROM by > 5 deg  MET  5) Increase LE strength by 1/2 MMT grade in all deficient planes  MET  6) Patient to negotiate steps with a step-to pattern with use of HR  MET  7) Patient to report decreased sleep interruption secondary to pain  MET  8) Increase ambulatory tolerance by 10 min with LRAD  MET    Long Term Goals: to be achieved by discharge  1) FOTO equal to or greater than goal   2) Patient to be independent with comprehensive HEP  3) Decrease pain to 3/10 or less for improved quality of life  4) Increase LE strength to 5/5 MMT grade in all planes to improve a/iadls  5) Achieve full knee extension ROM to improve a/iadls  6) Increase knee flexion ROM to 120 degrees minimum to improve a/iadls  7) Patient to negotiate steps with a reciprocal pattern without use of Hr  8) Increase ambulatory tolerance to 30 min to improve participation in social activities  9) Patient to report no sleep interruption secondary to pain      Plan: "Continue per plan of care        Precautions: hx of TKA 02/09/2023, HTN    Manuals 4/24 4/4 4/6 4/10 4/13 4/20   STM R quad 10'                          Re-assessment QD 5'        Neuro Re-Ed 4/24 4/4 4/6 4/10 4/13 4/20   balance         Lat weight shift         bridges    3x10     clamshells         Sidestepping at bar         Suitcase carry                  Ther Ex 4/24 4/4 4/6 4/10 4/13 4/20   Bike 15' ROM 12' ROM 10' ROM 10' ROM 10' ROM 10' ROM   PROM         Seated flexion AAROM         Quad sets         SLR 5x5 ea 3lb 5x5 ea 3lb 5x5 ea 3lb 5x5 ea 3lb 2x15 ea 3lb 2x15ea 3lb   STS         squats         VG   25x ea L7 25x ea L7 SL 30x ea L7 SL 30x ea L7    Step ups Lateral step down 2x10 8in    Sand dune 2x10 LSD 6in 2x10 ea LSD 6in 2x10 ea LSD 6in 2x10 ea LSD 6in 2x10 ea    ecc step down      6\" 2x10 L only    Leg ext  3x10 25lbs 2up 1 dwn 1x15 25lbs 2up 1 dwn 2x10 25lbs   2up 1 dwn 2x10 25lbs   2up 1 dwn 2x10 40# 2 up 1down   Leg press   2x10 100lb DL    2x10 80lb SL 3x10 100lb DL    2x10 80lb SL 3x10 100lb DL    2x10 80lb SL 3x10 120lbs DL    3x10 80# SL   Single leg eccentric squat  2xF with airex on chair 2xF with airex on chair 2xF with airex on chair  2xF with airex on chair   Calf raises         HEP/education         Ther Activity                           Gait Training                           Modalities                                "

## 2023-04-27 ENCOUNTER — OFFICE VISIT (OUTPATIENT)
Dept: PHYSICAL THERAPY | Facility: REHABILITATION | Age: 53
End: 2023-04-27

## 2023-04-27 DIAGNOSIS — Z96.652 S/P TOTAL KNEE ARTHROPLASTY, LEFT: Primary | ICD-10-CM

## 2023-04-27 DIAGNOSIS — M17.12 PRIMARY OSTEOARTHRITIS OF LEFT KNEE: ICD-10-CM

## 2023-04-27 NOTE — PROGRESS NOTES
"Daily Note     Today's date: 2023  Patient name: Boogie Stark  : 1970  MRN: [id number]  Referring provider: Iggy Olson MD  Dx:   Encounter Diagnosis     ICD-10-CM    1  S/P total knee arthroplasty, left  Z96 652       2  Primary osteoarthritis of left knee  M17 12           Start Time: 1000  Stop Time: 1045  Total time in clinic (min): 45 minutes    Subjective: Pt reports her L knee has been bothering her a little bit more today than usual   Thinks it may be because of the weather  Objective: See treatment diary below      Assessment: Tolerated treatment well  Continued with program as outlined below  Very TTP with moderate soft tissue restriction present along mid to distal L lat quad  Some pain and achiness around entire L knee during supine SLR, but was able to complete all assigned reps/sets, despite the pain  Patient would benefit from continued PT to further improve strength, decrease pain, and maximize overall function  Plan: Continue per plan of care        Precautions: hx of TKA 2023, HTN    Manuals 4/24 4/27  4/10 4/13 4/20   STM R quad 10' 10' AFB                         Re-assessment QD 5'        Neuro Re-Ed 4/24 4/27  4/10 4/13 4/20   balance         Lat weight shift         bridges    3x10     clamshells         Sidestepping at bar         Suitcase carry                  Ther Ex 4/24 4/27  4/10 4/13 4/20   Bike 15' ROM 10' ROM  10' ROM 10' ROM 10' ROM   PROM         Seated flexion AAROM         Quad sets         SLR 5x5 ea 3lb 2x15 ea 3lb  5x5 ea 3lb 2x15 ea 3lb 2x15ea 3lb   STS         squats         VG     SL 30x ea L7 SL 30x ea L7    Step ups Lateral step down 2x10 8in    Sand dune  2x10   LSD 8 in 2x10 ea    Sand dune step up 2x10 ea    FSU 8in  2x10 ea  LSD 6in 2x10 ea LSD 6in 2x10 ea    ecc step down      6\" 2x10 L only    Leg ext    2x10 25lbs   2up 1 dwn 2x10 25lbs   2up 1 dwn 2x10 40# 2 up 1down   Leg press    3x10 100lb DL    2x10 80lb SL 3x10 100lb " DL    2x10 80lb SL 3x10 120lbs DL    3x10 80# SL   Single leg eccentric squat    2xF with airex on chair  2xF with airex on chair   Calf raises         HEP/education         Ther Activity                           Gait Training                           Modalities

## 2023-05-01 ENCOUNTER — APPOINTMENT (OUTPATIENT)
Dept: PHYSICAL THERAPY | Facility: REHABILITATION | Age: 53
End: 2023-05-01
Payer: MEDICARE

## 2023-05-01 NOTE — PROGRESS NOTES
"Daily Note     Today's date: 2023  Patient name: Lonie Meckel  : 1970  MRN: [id number]  Referring provider: Mario Montoya MD  Dx:   Encounter Diagnosis     ICD-10-CM    1  S/P total knee arthroplasty, left  Z96 652       2  Primary osteoarthritis of left knee  M17 12                      Subjective: ***      Objective: See treatment diary below      Assessment: Tolerated treatment {Tolerated treatment :7053154539}   Patient {assessment:}      Plan: {PLAN:[id number]}     Precautions: hx of TKA 2023, HTN    Manuals 4/24 4/27  4/10 4/13 4/20   STM R quad 10' 10' AFB                         Re-assessment QD 5'        Neuro Re-Ed 4/24 4/27  4/10 4/13 4/20   balance         Lat weight shift         bridges    3x10     clamshells         Sidestepping at bar         Suitcase carry                  Ther Ex 4/24 4/27  4/10 4/13 4/20   Bike 15' ROM 10' ROM  10' ROM 10' ROM 10' ROM   PROM         Seated flexion AAROM         Quad sets         SLR 5x5 ea 3lb 2x15 ea 3lb  5x5 ea 3lb 2x15 ea 3lb 2x15ea 3lb   STS         squats         VG     SL 30x ea L7 SL 30x ea L7    Step ups Lateral step down 2x10 8in    Sand dune  2x10   LSD 8 in 2x10 ea    Sand dune step up 2x10 ea    FSU 8in  2x10 ea  LSD 6in 2x10 ea LSD 6in 2x10 ea    ecc step down      6\" 2x10 L only    Leg ext    2x10 25lbs   2up 1 dwn 2x10 25lbs   2up 1 dwn 2x10 40# 2 up 1down   Leg press    3x10 100lb DL    2x10 80lb SL 3x10 100lb DL    2x10 80lb SL 3x10 120lbs DL    3x10 80# SL   Single leg eccentric squat    2xF with airex on chair  2xF with airex on chair   Calf raises         HEP/education         Ther Activity                           Gait Training                           Modalities                                "

## 2023-05-04 ENCOUNTER — OFFICE VISIT (OUTPATIENT)
Dept: PHYSICAL THERAPY | Facility: REHABILITATION | Age: 53
End: 2023-05-04

## 2023-05-04 DIAGNOSIS — M17.12 PRIMARY OSTEOARTHRITIS OF LEFT KNEE: ICD-10-CM

## 2023-05-04 DIAGNOSIS — Z96.652 S/P TOTAL KNEE ARTHROPLASTY, LEFT: Primary | ICD-10-CM

## 2023-05-04 NOTE — PROGRESS NOTES
"Daily Note     Today's date: 2023  Patient name: Donna Magana  : 1970  MRN: [id number]  Referring provider: Radha Peralta MD  Dx:   Encounter Diagnosis     ICD-10-CM    1  S/P total knee arthroplasty, left  Z96 652       2  Primary osteoarthritis of left knee  M17 12           Start Time: 1655  Stop Time: 1735  Total time in clinic (min): 40 minutes    Subjective: Reports her knee is doing well  More pain in her thigh and low back  Objective: See treatment diary below      Assessment: Tolerated treatment well  No pain or difficulty with exercises today  Overall, L knee is doing very well following her L TKA on 2023  Pt did report R knee crunching, clicking, and pain during some activities  Pt is ready for D/C at this time  Will D/C next visit  Patient exhibited good technique with therapeutic exercises and would benefit from continued PT      Plan: Continue per plan of care  D/C next visit        Precautions: hx of TKA 2023, HTN    Manuals 4/24 4/27 5/4 4/10 4/13 4/20   STM R quad 10' 10' AFB                         Re-assessment QD 5'        Neuro Re-Ed 4/24 4/27 5/4 4/10 4/13 4/20   balance   rockerboard 3' ea    SLS 3' tot      Lat weight shift         bridges    3x10     clamshells         Sidestepping at bar         Suitcase carry                  Ther Ex  5/4 4/10 4/13 4/20   Bike 15' ROM 10' ROM 12' ROM 10' ROM 10' ROM 10' ROM   PROM         Seated flexion AAROM         Quad sets         SLR 5x5 ea 3lb 2x15 ea 3lb  5x5 ea 3lb 2x15 ea 3lb 2x15ea 3lb   STS         squats         VG     SL 30x ea L7 SL 30x ea L7    Step ups Lateral step down 2x10 8in    Sand dune  2x10   LSD 8 in 2x10 ea    Sand dune step up 2x10 ea    FSU 8in  2x10 ea LSD 8in 2x10     Sand dune 2x10 ea step up    FSU 8in 2x10 LSD 6in 2x10 ea LSD 6in 2x10 ea    ecc step down      6\" 2x10 L only    Leg ext    2x10 25lbs   2up 1 dwn 2x10 25lbs   2up 1 dwn 2x10 40# 2 up 1down   Leg press   3x10 120lbs " single leg  3x10 100lb DL    2x10 80lb SL 3x10 100lb DL    2x10 80lb SL 3x10 120lbs DL    3x10 80# SL   Single leg eccentric squat    2xF with airex on chair  2xF with airex on chair   Calf raises         HEP/education         Ther Activity                           Gait Training                           Modalities

## 2023-05-05 ENCOUNTER — APPOINTMENT (OUTPATIENT)
Dept: RADIOLOGY | Facility: AMBULARY SURGERY CENTER | Age: 53
End: 2023-05-05
Attending: ORTHOPAEDIC SURGERY

## 2023-05-05 ENCOUNTER — OFFICE VISIT (OUTPATIENT)
Dept: OBGYN CLINIC | Facility: CLINIC | Age: 53
End: 2023-05-05

## 2023-05-05 VITALS
HEIGHT: 64 IN | HEART RATE: 69 BPM | WEIGHT: 214 LBS | BODY MASS INDEX: 36.54 KG/M2 | SYSTOLIC BLOOD PRESSURE: 132 MMHG | DIASTOLIC BLOOD PRESSURE: 78 MMHG

## 2023-05-05 DIAGNOSIS — M17.11 PRIMARY OSTEOARTHRITIS OF RIGHT KNEE: ICD-10-CM

## 2023-05-05 DIAGNOSIS — Z96.652 S/P TOTAL KNEE REPLACEMENT, LEFT: ICD-10-CM

## 2023-05-05 DIAGNOSIS — Z96.652 S/P TOTAL KNEE REPLACEMENT, LEFT: Primary | ICD-10-CM

## 2023-05-05 RX ORDER — KETOROLAC TROMETHAMINE 30 MG/ML
30 INJECTION, SOLUTION INTRAMUSCULAR; INTRAVENOUS
Status: COMPLETED | OUTPATIENT
Start: 2023-05-05 | End: 2023-05-05

## 2023-05-05 RX ORDER — BUPIVACAINE HYDROCHLORIDE 2.5 MG/ML
1 INJECTION, SOLUTION INFILTRATION; PERINEURAL
Status: COMPLETED | OUTPATIENT
Start: 2023-05-05 | End: 2023-05-05

## 2023-05-05 RX ORDER — METHYLPREDNISOLONE ACETATE 40 MG/ML
2 INJECTION, SUSPENSION INTRA-ARTICULAR; INTRALESIONAL; INTRAMUSCULAR; SOFT TISSUE
Status: COMPLETED | OUTPATIENT
Start: 2023-05-05 | End: 2023-05-05

## 2023-05-05 RX ADMIN — BUPIVACAINE HYDROCHLORIDE 1 ML: 2.5 INJECTION, SOLUTION INFILTRATION; PERINEURAL at 11:06

## 2023-05-05 RX ADMIN — KETOROLAC TROMETHAMINE 30 MG: 30 INJECTION, SOLUTION INTRAMUSCULAR; INTRAVENOUS at 11:06

## 2023-05-05 RX ADMIN — METHYLPREDNISOLONE ACETATE 2 ML: 40 INJECTION, SUSPENSION INTRA-ARTICULAR; INTRALESIONAL; INTRAMUSCULAR; SOFT TISSUE at 11:06

## 2023-05-05 NOTE — PROGRESS NOTES
Assessment:  1  S/P total knee replacement, left  XR knee 3 vw left non injury      2  Primary osteoarthritis of right knee            Plan:     Patient is 12 weeks status post left total knee arthroplasty performed on 2/9/2023 and right knee pain due to osteoarthritis    Weightbearing as tolerated   We will repeat x-rays of the left knee at the next office visit   Patient is to follow-up in 9 months        The above stated was discussed in layman's terms and the patient expressed understanding  All questions were answered to the patient's satisfaction  Subjective:   Bernardo Mcadams is a 46 y o  female who presents today 12 weeks status post left total knee arthroplasty performed on 2/9/2023  Patient states she is doing well  She states her left knee is doing well and has been going to physical therapy  She states she is having increased pain in the right knee in the posterior aspect with physical therapy  She has not been taking any medications for pain      Review of systems negative unless otherwise specified in HPI    Past Medical History:   Diagnosis Date    Anxiety     Asthma     Cancer ([facility] [address] )     endometrial    Chronic pain syndrome     CPAP (continuous positive airway pressure) dependence     Depression     Diabetes mellitus ([facility] [address] )     Pre diabetic    GERD (gastroesophageal reflux disease)     HTN (hypertension)     Hyperlipidemia     Hypertension     Lumbar radiculopathy     Prediabetes     Sleep apnea        Past Surgical History:   Procedure Laterality Date    GASTRECTOMY SLEEVE LAPAROSCOPIC      HYSTERECTOMY      KNEE ARTHROSCOPY Right     LUMBAR FUSION      SC ARTHRP KNE CONDYLE&PLATU MEDIAL&LAT COMPARTMENTS Left 2/9/2023    Procedure: ARTHROPLASTY KNEE TOTAL AND ALL ASSOCIATED PROCEDURES;  Surgeon: Adeline Gonzalez MD;  Location:  MAIN OR;  Service: Orthopedics    SC BREAST REDUCTION Bilateral 9/23/2022    Procedure: BILATERAL BREAST REDUCTION;  Surgeon: Jose Whipple Desirae Rowe MD;  Location: [facility];  Service: Plastics    MARY-EN-Y PROCEDURE  05/27/2021    Sleve    TOTAL HIP ARTHROPLASTY Bilateral        Family History   Problem Relation Age of Onset    Diabetes Mother     Stroke Mother     Diabetes Father     Cancer Father         thyroid    Diabetes Sister     Diabetes Brother     No Known Problems Maternal Grandmother     No Known Problems Maternal Grandfather     No Known Problems Paternal Grandmother     No Known Problems Paternal Grandfather     No Known Problems Sister        Social History     Occupational History    Not on file   Tobacco Use    Smoking status: Former    Smokeless tobacco: Never   Vaping Use    Vaping Use: Never used   Substance and Sexual Activity    Alcohol use: Not Currently    Drug use: Not Currently    Sexual activity: Not Currently         Current Outpatient Medications:     acetaminophen (TYLENOL) 325 mg tablet, Take 650 mg by mouth every 6 (six) hours as needed for mild pain (Patient not taking: Reported on 2/28/2023), Disp: , Rfl:     azelastine (OPTIVAR) 0 05 % ophthalmic solution, Administer 1 drop to the right eye 2 (two) times a day, Disp: , Rfl:     bepotastine besilate (BEPREVE) 1 5 % op soln, INSTILL 1 DROP INTO BOTH EYES TWICE A DAY, Disp: , Rfl:     buPROPion (WELLBUTRIN XL) 300 mg 24 hr tablet, Take 300 mg by mouth daily, Disp: , Rfl:     busPIRone (BUSPAR) 10 mg tablet, Take 10 mg by mouth in the morning Takes twice a day, Disp: , Rfl:     Calcium Carb-Cholecalciferol (OSCAL-D) 500 mg-200 units per tablet, Take 1 tablet by mouth 2 (two) times a day with meals, Disp: , Rfl:     cetirizine (ZyrTEC) 10 mg tablet, Take 10 mg by mouth daily Takes 1 tablet 2 times a day, Disp: , Rfl:     Cholecalciferol (VITAMIN D3) 2000 units capsule, TAKE 2 CAPSULES BY MOUTH DAILY INDICATIONS: VITAMIN D DEFICIENCY , Disp: , Rfl: 5    CVS Aspirin Low Dose 81 MG EC tablet, TAKE 1 TABLET BY MOUTH TWICE A DAY, Disp: 60 tablet, Rfl: 1    CVS Vitamin B-12 1000 MCG tablet, Take 1,000 mcg by mouth daily, Disp: , Rfl:     CVS Vitamin C 500 MG tablet, TAKE 1 TABLET (500 MG TOTAL) BY MOUTH DAILY  , Disp: 90 tablet, Rfl: 1    docusate sodium (Colace) 100 mg capsule, Take 1 capsule (100 mg total) by mouth daily as needed for constipation, Disp: 30 capsule, Rfl: 0    DULoxetine (CYMBALTA) 60 mg delayed release capsule, Take 60 mg by mouth 2 (two) times a day, Disp: , Rfl:     erythromycin (ILOTYCIN) ophthalmic ointment, APPLY (1CM) TO THE SURGICAL EYE AT BEDTIME, Disp: , Rfl:     ferrous sulfate 325 (65 Fe) mg tablet, Take by mouth daily with breakfast, Disp: , Rfl:     folic acid (FOLVITE) 1 mg tablet, TAKE 1 TABLET BY MOUTH EVERY DAY, Disp: 90 tablet, Rfl: 1    gabapentin (NEURONTIN) 300 mg capsule, Take 2 capsules (600 mg total) by mouth 3 (three) times a day, Disp: 180 capsule, Rfl: 2    HYDROcodone-acetaminophen (Norco) 5-325 mg per tablet, Take 1 tablet by mouth every 6 (six) hours as needed for pain Max Daily Amount: 4 tablets (Patient not taking: Reported on 3/29/2023), Disp: 20 tablet, Rfl: 0    hydrocortisone 1 % cream, Apply topically as needed Applies daily, Disp: , Rfl:     methocarbamol (ROBAXIN) 500 mg tablet, Take 1 tablet (500 mg total) by mouth every 8 (eight) hours as needed for muscle spasms, Disp: 90 tablet, Rfl: 2    Multiple Vitamins-Minerals (multivitamin with minerals) tablet, Take 1 tablet by mouth daily, Disp: 30 tablet, Rfl: 0    ondansetron (ZOFRAN) 4 mg tablet, Take 1 tablet (4 mg total) by mouth every 8 (eight) hours as needed for nausea or vomiting, Disp: 20 tablet, Rfl: 0    pantoprazole (PROTONIX) 40 mg tablet, Take 40 mg by mouth as needed, Disp: , Rfl:     prednisoLONE acetate (PRED FORTE) 1 % ophthalmic suspension, instill 1 drop 6 times daily to the surgical eye, Disp: , Rfl:     simvastatin (ZOCOR) 40 mg tablet, Take 40 mg by mouth daily at bedtime, Disp: , Rfl:     VENTOLIN  (90 Base) MCG/ACT inhaler, INHALE 2 PUFFS EVERY 4 (FOUR) HOURS AS NEEDED FOR WHEEZING OR SHORTNESS OF BREATH , Disp: , Rfl: 2    Allergies   Allergen Reactions    Bee Venom Swelling    Dust Mite Extract     Fish-Derived Products - Food Allergy Hives    Iodine - Food Allergy Hives    Lac Bovis Other (See Comments)     congestion    Molds & Smuts     Other Swelling    Pollen Extract Other (See Comments)     Runny nose, watery eyes (also has corneal swelling) and itching  Triggers asthma    Shrimp Flavor - Food Allergy Hives            Vitals:    05/05/23 1018   BP: 132/78   Pulse: 69       Objective:            Physical Exam  Physical Exam:      General Appearance:    Alert, cooperative, no distress, appears stated age   Head:    Normocephalic, without obvious abnormality, atraumatic   Eyes:    conjunctiva/corneas clear, both eyes         Nose:   Nares normal, septum midline, no drainage    Throat:   Lips normal; teeth and gums normal   Neck:    symmetrical, trachea midline, ;     thyroid:  no enlargement/   Back:     Symmetric, no curvature, ROM normal   Lungs:   No audible wheezing or labored breathing   Chest Wall:    No tenderness or deformity    Heart:    Regular rate and rhythm                         Pulses:   2+ and symmetric all extremities   Skin:   Skin color, texture, turgor normal, no rashes or lesions   Neurologic:   normal strength, sensation and reflexes     throughout                       Ortho Exam  Left knee  Anterior incision well-healed  No erythema  Full extension  Stable to varus and valgus stress   Neurovascularly Intact Distally      Right knee  Skin intact  No erythema   TTP posterolateral and medial joint line   Full extension   Stable to varus and valgus stress   NVID      Diagnostics, reviewed and taken today if performed as documented the attending physician has personally reviewed the pertinent films in PACS and interpretation is as follow x-ray left knee demonstrate status post TKA, "adequate alignment implant, no sign of loosening; patient does have a fracture line which is visible but well callused around the femoral shaft region      Procedures, if performed today:    Large joint arthrocentesis: R knee  Universal Protocol:  Consent: Verbal consent obtained  Risks and benefits: risks, benefits and alternatives were discussed  Consent given by: patient  Time out: Immediately prior to procedure a \"time out\" was called to verify the correct patient, procedure, equipment, support staff and site/side marked as required  Timeout called at: 5/5/2023 11:05 AM   Patient understanding: patient states understanding of the procedure being performed  Site marked: the operative site was marked  Supporting Documentation  Indications: pain   Procedure Details  Location: knee - R knee  Preparation: Patient was prepped and draped in the usual sterile fashion  Needle size: 22 G  Approach: anterolateral  Medications administered: 1 mL bupivacaine 0 25 %; 2 mL methylPREDNISolone acetate 40 mg/mL; 30 mg ketorolac 30 mg/mL    Patient tolerance: patient tolerated the procedure well with no immediate complications  Dressing:  Sterile dressing applied            Scribe Attestation    I,:  Skinny Pollock am acting as a scribe while in the presence of the attending physician :       I,:  Anjali Pedro MD personally performed the services described in this documentation    as scribed in my presence :             Portions of the record may have been created with voice recognition software  Occasional wrong word or \"sound a like\" substitutions may have occurred due to the inherent limitations of voice recognition software  Read the chart carefully and recognize, using context, where substitutions have occurred    "

## 2023-05-08 ENCOUNTER — EVALUATION (OUTPATIENT)
Dept: PHYSICAL THERAPY | Facility: REHABILITATION | Age: 53
End: 2023-05-08

## 2023-05-08 DIAGNOSIS — M17.12 PRIMARY OSTEOARTHRITIS OF LEFT KNEE: ICD-10-CM

## 2023-05-08 DIAGNOSIS — Z96.652 S/P TOTAL KNEE ARTHROPLASTY, LEFT: Primary | ICD-10-CM

## 2023-05-08 NOTE — PROGRESS NOTES
Daily Note     Today's date: 2023  Patient name: Estela Mendoza  : 1970  MRN: [id number]  Referring provider: Chance Romero MD  Dx:   Encounter Diagnosis     ICD-10-CM    1  S/P total knee arthroplasty, left  Z96 652       2  Primary osteoarthritis of left knee  M17 12           Start Time: 5579  Stop Time: 029  Total time in clinic (min): 43 minutes    Subjective: Reports she has a fx in her leg that was found on x-ray  Reports max pain level of 7-8/10  Had a lot of pain after work over the weekend  Objective: See treatment diary below    Knee ROM: 0-122 deg    Assessment: Tolerated treatment well  At pt's most recent follow up, she was found to have a L femoral shaft fx  Will monitor pt symptoms  Patient exhibited good technique with therapeutic exercises  D/C today due to improvement in overall functional status  Pt will continue exercises at home  Any pain returns or does not go away pt will reach out to office  Goals  Short Term Goals: to be achieved by 4 weeks  1) Patient to be independent with basic HEP  MET  2) Decrease pain by 5/10 at its worst  NOT MET  3) Increase knee flexion ROM to 90 deg  MET  4) Increase knee extension ROM by > 5 deg  MET  5) Increase LE strength by 1/2 MMT grade in all deficient planes  MET  6) Patient to negotiate steps with a step-to pattern with use of HR  MET  7) Patient to report decreased sleep interruption secondary to pain  MET  8) Increase ambulatory tolerance by 10 min with LRAD  MET    Long Term Goals: to be achieved by discharge  1) FOTO equal to or greater than goal  MET  2) Patient to be independent with comprehensive HEP  MET  3) Decrease pain to 3/10 or less for improved quality of life  MET  4) Increase LE strength to 5/5 MMT grade in all planes to improve a/iadls  MET  5) Achieve full knee extension ROM to improve a/iadls  MET  6) Increase knee flexion ROM to 120 degrees minimum to improve a/iadls   MET  7) Patient to negotiate steps with "a reciprocal pattern without use of Hr  MET  8) Increase ambulatory tolerance to 30 min to improve participation in social activities  MET  9) Patient to report no sleep interruption secondary to pain  MET    Plan: D/C today  Pt will follow up if needed        Precautions: hx of TKA 02/09/2023, HTN    Manuals 4/24 4/27 5/4 5/8 4/13 4/20   STM R quad 10' 10' AFB                         Re-assessment QD 5'        Neuro Re-Ed 4/24 4/27 5/4 5/8 4/13 4/20   balance   rockerboard 3' ea    SLS 3' tot rockerboard 3' ea    SLS 3' tot     Lat weight shift         bridges         clamshells         Sidestepping at bar         Suitcase carry                  Ther Ex 4/24 4/27 5/4 5/8 4/13 4/20   Bike 15' ROM 10' ROM 12' ROM 12' ROM 10' ROM 10' ROM   PROM         Seated flexion AAROM         Quad sets         SLR 5x5 ea 3lb 2x15 ea 3lb  30x ea 3lb AW 2x15 ea 3lb 2x15ea 3lb   STS         squats         VG     L6 3x10 ea SL 30x ea L7    Step ups Lateral step down 2x10 8in    Sand dune  2x10   LSD 8 in 2x10 ea    Sand dune step up 2x10 ea    FSU 8in  2x10 ea LSD 8in 2x10     Sand dune 2x10 ea step up    FSU 8in 2x10 LSD 8in 2x10    FSU 8in 2x10 LSD 6in 2x10 ea    ecc step down      6\" 2x10 L only    Leg ext     2x10 25lbs   2up 1 dwn 2x10 40# 2 up 1down   Leg press   3x10 120lbs single leg   3x10 100lb DL    2x10 80lb SL 3x10 120lbs DL    3x10 80# SL   Single leg eccentric squat      2xF with airex on chair   Calf raises         HEP/education         Ther Activity                           Gait Training                           Modalities                                "

## 2023-05-09 ENCOUNTER — TELEPHONE (OUTPATIENT)
Dept: PAIN MEDICINE | Facility: CLINIC | Age: 53
End: 2023-05-09

## 2023-05-09 ENCOUNTER — HOSPITAL ENCOUNTER (OUTPATIENT)
Dept: RADIOLOGY | Facility: CLINIC | Age: 53
Discharge: HOME | End: 2023-05-09

## 2023-05-09 VITALS
DIASTOLIC BLOOD PRESSURE: 94 MMHG | TEMPERATURE: 97.2 F | HEART RATE: 84 BPM | RESPIRATION RATE: 18 BRPM | OXYGEN SATURATION: 97 % | SYSTOLIC BLOOD PRESSURE: 138 MMHG

## 2023-05-09 DIAGNOSIS — M47.812 CERVICAL SPONDYLOSIS: ICD-10-CM

## 2023-05-09 RX ORDER — BUPIVACAINE HYDROCHLORIDE 5 MG/ML
3 INJECTION, SOLUTION EPIDURAL; INTRACAUDAL ONCE
Status: COMPLETED | OUTPATIENT
Start: 2023-05-09 | End: 2023-05-09

## 2023-05-09 RX ORDER — LIDOCAINE HYDROCHLORIDE 10 MG/ML
8 INJECTION, SOLUTION EPIDURAL; INFILTRATION; INTRACAUDAL; PERINEURAL ONCE
Status: COMPLETED | OUTPATIENT
Start: 2023-05-09 | End: 2023-05-09

## 2023-05-09 RX ADMIN — LIDOCAINE HYDROCHLORIDE 3 ML: 20 INJECTION, SOLUTION EPIDURAL; INFILTRATION; INTRACAUDAL; PERINEURAL at 11:12

## 2023-05-09 RX ADMIN — BUPIVACAINE HYDROCHLORIDE 3 ML: 5 INJECTION, SOLUTION EPIDURAL; INTRACAUDAL; PERINEURAL at 11:12

## 2023-05-09 RX ADMIN — LIDOCAINE HYDROCHLORIDE 8 ML: 10 INJECTION, SOLUTION EPIDURAL; INFILTRATION; INTRACAUDAL; PERINEURAL at 11:09

## 2023-05-09 NOTE — H&P
History of Present Illness: The patient is a 46 y o  female who presents with complaints of neck pain      Past Medical History:   Diagnosis Date   • Anxiety    • Asthma    • Cancer ([address] )     endometrial   • Chronic pain syndrome    • CPAP (continuous positive airway pressure) dependence    • Depression    • Diabetes mellitus (HCC)     Pre diabetic   • GERD (gastroesophageal reflux disease)    • HTN (hypertension)    • Hyperlipidemia    • Hypertension    • Lumbar radiculopathy    • Prediabetes    • Sleep apnea        Past Surgical History:   Procedure Laterality Date   • GASTRECTOMY SLEEVE LAPAROSCOPIC     • HYSTERECTOMY     • KNEE ARTHROSCOPY Right    • LUMBAR FUSION     • CA ARTHRP KNE CONDYLE&PLATU MEDIAL&LAT COMPARTMENTS Left 2/9/2023    Procedure: ARTHROPLASTY KNEE TOTAL AND ALL ASSOCIATED PROCEDURES;  Surgeon: Shahrzad Desouza MD;  Location:  MAIN OR;  Service: Orthopedics   • CA BREAST REDUCTION Bilateral 9/23/2022    Procedure: BILATERAL BREAST REDUCTION;  Surgeon: Corrine Bone MD;  Location:  MAIN OR;  Service: Plastics   • MARY-EN-Y PROCEDURE  05/27/2021    Sleve   • TOTAL HIP ARTHROPLASTY Bilateral          Current Outpatient Medications:   •  acetaminophen (TYLENOL) 325 mg tablet, Take 650 mg by mouth every 6 (six) hours as needed for mild pain (Patient not taking: Reported on 2/28/2023), Disp: , Rfl:   •  azelastine (OPTIVAR) 0 05 % ophthalmic solution, Administer 1 drop to the right eye 2 (two) times a day, Disp: , Rfl:   •  bepotastine besilate (BEPREVE) 1 5 % op soln, INSTILL 1 DROP INTO BOTH EYES TWICE A DAY, Disp: , Rfl:   •  buPROPion (WELLBUTRIN XL) 300 mg 24 hr tablet, Take 300 mg by mouth daily, Disp: , Rfl:   •  busPIRone (BUSPAR) 10 mg tablet, Take 10 mg by mouth in the morning Takes twice a day, Disp: , Rfl:   •  Calcium Carb-Cholecalciferol (OSCAL-D) 500 mg-200 units per tablet, Take 1 tablet by mouth 2 (two) times a day with meals, Disp: , Rfl:   •  cetirizine (ZyrTEC) 10 mg tablet, Take 10 mg by mouth daily Takes 1 tablet 2 times a day, Disp: , Rfl:   •  Cholecalciferol (VITAMIN D3) 2000 units capsule, TAKE 2 CAPSULES BY MOUTH DAILY INDICATIONS: VITAMIN D DEFICIENCY , Disp: , Rfl: 5  •  CVS Aspirin Low Dose 81 MG EC tablet, TAKE 1 TABLET BY MOUTH TWICE A DAY, Disp: 60 tablet, Rfl: 1  •  CVS Vitamin B-12 1000 MCG tablet, Take 1,000 mcg by mouth daily, Disp: , Rfl:   •  CVS Vitamin C 500 MG tablet, TAKE 1 TABLET (500 MG TOTAL) BY MOUTH DAILY  , Disp: 90 tablet, Rfl: 1  •  docusate sodium (Colace) 100 mg capsule, Take 1 capsule (100 mg total) by mouth daily as needed for constipation, Disp: 30 capsule, Rfl: 0  •  DULoxetine (CYMBALTA) 60 mg delayed release capsule, Take 60 mg by mouth 2 (two) times a day, Disp: , Rfl:   •  erythromycin (ILOTYCIN) ophthalmic ointment, APPLY (1CM) TO THE SURGICAL EYE AT BEDTIME, Disp: , Rfl:   •  ferrous sulfate 325 (65 Fe) mg tablet, Take by mouth daily with breakfast, Disp: , Rfl:   •  folic acid (FOLVITE) 1 mg tablet, TAKE 1 TABLET BY MOUTH EVERY DAY, Disp: 90 tablet, Rfl: 1  •  gabapentin (NEURONTIN) 300 mg capsule, Take 2 capsules (600 mg total) by mouth 3 (three) times a day, Disp: 180 capsule, Rfl: 2  •  HYDROcodone-acetaminophen (Norco) 5-325 mg per tablet, Take 1 tablet by mouth every 6 (six) hours as needed for pain Max Daily Amount: 4 tablets (Patient not taking: Reported on 3/29/2023), Disp: 20 tablet, Rfl: 0  •  hydrocortisone 1 % cream, Apply topically as needed Applies daily, Disp: , Rfl:   •  methocarbamol (ROBAXIN) 500 mg tablet, Take 1 tablet (500 mg total) by mouth every 8 (eight) hours as needed for muscle spasms, Disp: 90 tablet, Rfl: 2  •  Multiple Vitamins-Minerals (multivitamin with minerals) tablet, Take 1 tablet by mouth daily, Disp: 30 tablet, Rfl: 0  •  ondansetron (ZOFRAN) 4 mg tablet, Take 1 tablet (4 mg total) by mouth every 8 (eight) hours as needed for nausea or vomiting, Disp: 20 tablet, Rfl: 0  •  pantoprazole (PROTONIX) 40 mg tablet, Take 40 mg by mouth as needed, Disp: , Rfl:   •  prednisoLONE acetate (PRED FORTE) 1 % ophthalmic suspension, instill 1 drop 6 times daily to the surgical eye, Disp: , Rfl:   •  simvastatin (ZOCOR) 40 mg tablet, Take 40 mg by mouth daily at bedtime, Disp: , Rfl:   •  VENTOLIN  (90 Base) MCG/ACT inhaler, INHALE 2 PUFFS EVERY 4 (FOUR) HOURS AS NEEDED FOR WHEEZING OR SHORTNESS OF BREATH , Disp: , Rfl: 2    Allergies   Allergen Reactions   • Bee Venom Swelling   • Dust Mite Extract    • Fish-Derived Products - Food Allergy Hives   • Iodine - Food Allergy Hives   • Lac Bovis Other (See Comments)     congestion   • Molds & Smuts    • Other Swelling   • Pollen Extract Other (See Comments)     Runny nose, watery eyes (also has corneal swelling) and itching  Triggers asthma   • Shrimp Flavor - Food Allergy Hives       Physical Exam:   Vitals:    05/09/23 1045   BP: 119/89   Pulse: 80   Resp: 18   Temp: (!) 97 2 °F (36 2 °C)   SpO2: 96%     General: Awake, Alert, Oriented x 3, Mood and affect appropriate  Respiratory: Respirations even and unlabored  Cardiovascular: Peripheral pulses intact; no edema  Musculoskeletal Exam: Left cervical paraspinals tender to palpation    ASA Score: 3    Patient/Chart Verification  Patient ID Verified: Verbal  ID Band Applied: No  Consents Confirmed: Procedural, To be obtained in the Pre-Procedure area  Interval H&P(within 24 hr) Complete (required for Outpatients and Surgery Admit only): To be obtained in the Pre-Procedure area  Allergies Reviewed: Yes  Anticoag/NSAID held?: NA  Currently on antibiotics?: No  Pregnancy denied?: Yes    Assessment:   1   Cervical spondylosis        Plan: Repeat left C4-6 RFA

## 2023-05-09 NOTE — DISCHARGE INSTRUCTIONS
Medial Branch Radiofrequency Ablation     WHAT YOU NEED TO KNOW:   Medial branch radiofrequency ablation (RFA) is a procedure used to treat facet joint pain in your neck, mid back, or lower back  Facet joints are found at the back of each vertebra  A needle electrode is used to send electrical currents to the nerves in your facet joint  The electrical currents create heat that damages the nerve so it cannot send pain signals  ACTIVITY  Do not drive or operate machinery today  No strenuous activity today - bending, lifting, etc   You may shower today, but do not sit in a tub of water  Resume normal activities tomorrow as tolerated  CARE OF THE INJECTION SITE  If you have soreness or pain, apply ice to the area today (20 minutes on/20 minutes off)  Starting tomorrow, you may use warm, moist heat or ice if needed  Notify the [facility] if you have any of the following: redness, drainage, swelling, or fever above 100°F     SPECIAL INSTRUCTIONS  Our office will call you tomorrow for a progress report and make an appointment for a follow up visit in 4 weeks  If you feel a sunburn-like sensation in the area of your procedure, call our office  MEDICATIONS  Continue to take all routine medications  Our office may have instructed you to hold some medications  As no general anesthesia was used in today's procedure, you should not experience any side effects related to anesthesia  If you have a problem specifically related to your procedure, please call our office at [phone]  Problems not related to your procedure should be directed to your primary care physician

## 2023-05-10 ENCOUNTER — TELEPHONE (OUTPATIENT)
Dept: OBGYN CLINIC | Facility: HOSPITAL | Age: 53
End: 2023-05-10

## 2023-05-10 NOTE — TELEPHONE ENCOUNTER
Caller: MAGALY [facility]    Doctor: Salina Catalan    Reason for call: SIGNIFICANT FINDINGS    Status post total left knee arthroplasty with a distal left femur nondisplaced periprosthetic fracture

## 2023-05-11 ENCOUNTER — TELEPHONE (OUTPATIENT)
Dept: OBGYN CLINIC | Facility: HOSPITAL | Age: 53
End: 2023-05-11

## 2023-05-11 NOTE — TELEPHONE ENCOUNTER
Caller: patient    Doctor: Suly Petersen    Reason for call: pt is requesting a work note stating she was in the office on Friday 5/5 and that she is to use a stool while at work  Pt states that her leg still goes numb   Upload to My Chart adan    Call back#: [phone]

## 2023-05-12 NOTE — TELEPHONE ENCOUNTER
Sent pt MyChart msg asking how pt is doing and to pls contact the office for 4-6wk f/u appt  Office # provided for marcos

## 2023-05-19 ENCOUNTER — TELEPHONE (OUTPATIENT)
Dept: PAIN MEDICINE | Facility: CLINIC | Age: 53
End: 2023-05-19

## 2023-05-19 NOTE — TELEPHONE ENCOUNTER
S/w pt and advised that she has 2 available refills at the [facility] pharm on [address]  Pt verbalized understanding

## 2023-05-19 NOTE — TELEPHONE ENCOUNTER
Caller: Kaylee Wallace     Doctor: Be Frey     Reason for call: patient states this medication is not covered under her insurance anymore can you recommend something else?     Call back#: [phone]

## 2023-05-19 NOTE — TELEPHONE ENCOUNTER
Pt contacted Call Center requested refill of their medication  Medication Name: methocarbamol (ROBAXIN) 500 mg tablet        Frequency of Med: Take 1 tablet (500 mg total) by mouth every 8 (eight) hours as needed for muscle spasms      Remaining Medication: 0      Pharmacy and Location: [facility]/pharmacy #[id number]- [facility] WAGONER - [address] [facility] [address] [facility] [address]  [address]   Phone:  [phone]  Fax:  [phone]   CHAIM #:  [id number]        Pt  Preferred Callback Phone Number:   [phone]    Thank you

## 2023-06-19 NOTE — PROGRESS NOTES
Assessment:  1  Chronic pain syndrome    2  Lumbar radiculopathy    3  Degenerative disc disease, cervical    4  Lumbar spondylosis    5  Cervical spondylosis        Plan:  1  Patient will be scheduled for left L5 TFESI to address the inflammatory component of the patient's pain  Complete risks and benefits including bleeding, infection, tissue reaction, nerve injury and allergic reaction were discussed  The patient was agreeable and verbalized an understanding  2  We can repeat cervical RFA every year as needed  This procedure did help her neck pain but she is still having headaches throughout the week  I advised her to discuss headache management with her PCP who may also potentially recommend a neurology consultation  3  Patient may continue methocarbamol and gabapentin as prescribed  Does not require refills today  4  We will avoid NSAIDs secondary to history of bariatric surgery  5  Patient may continue Tylenol as needed and should not exceed more than 3000 mg in 24 hours  6  Follow-up after procedure or sooner if needed    History of Present Illness: The patient is a 46 y o  female with a history of left total knee arthroplasty in February 2023, bariatric surgery and fibromyalgia last seen on 3/29/2023 who presents for a follow up office visit in regards to chronic neck pain with associated headaches, and low back pain that radiates into the lateral aspect of the left lower extremity  Patient has completed bilateral C4-6 RFA with improvement of her neck pain, however she still experiences frequent headaches throughout the week  She has not yet had her epidural steroid injection for her low back and left lower extremity symptoms and would like to schedule that at this time  She continues on gabapentin 600 mg 3 times a day and methocarbamol 500 mg as needed with 40% improvement without side effects  She rates her pain an 8 out of 10 on the numeric pain rating scale    She constantly is pain in the morning and at night which is described as dull aching, pressure-like, shooting, numbness and pins-and-needles    I have personally reviewed and/or updated the patient's past medical history, past surgical history, family history, social history, current medications, allergies, and vital signs today  Review of Systems:    Review of Systems   Respiratory: Negative for shortness of breath  Cardiovascular: Negative for chest pain  Gastrointestinal: Negative for constipation, diarrhea, nausea and vomiting  Musculoskeletal: Positive for gait problem  Negative for arthralgias, joint swelling and myalgias  Skin: Negative for rash  Neurological: Negative for dizziness, seizures and weakness  All other systems reviewed and are negative          Past Medical History:   Diagnosis Date   • Anxiety    • Asthma    • Cancer ([facility] [address] )     endometrial   • Chronic pain syndrome    • CPAP (continuous positive airway pressure) dependence    • Depression    • Diabetes mellitus (HCC)     Pre diabetic   • GERD (gastroesophageal reflux disease)    • HTN (hypertension)    • Hyperlipidemia    • Hypertension    • Lumbar radiculopathy    • Prediabetes    • Sleep apnea        Past Surgical History:   Procedure Laterality Date   • GASTRECTOMY SLEEVE LAPAROSCOPIC     • HYSTERECTOMY     • KNEE ARTHROSCOPY Right    • LUMBAR FUSION     • [place] ARTHRP KNE CONDYLE&PLATU MEDIAL&LAT COMPARTMENTS Left 2/9/2023    Procedure: ARTHROPLASTY KNEE TOTAL AND ALL ASSOCIATED PROCEDURES;  Surgeon: Brock White MD;  Location:  MAIN OR;  Service: Orthopedics   • [place] BREAST REDUCTION Bilateral 9/23/2022    Procedure: BILATERAL BREAST REDUCTION;  Surgeon: Wilbert oPpe MD;  Location:  MAIN OR;  Service: Plastics   • MARY-EN-Y PROCEDURE  05/27/2021    Sleve   • TOTAL HIP ARTHROPLASTY Bilateral        Family History   Problem Relation Age of Onset   • Diabetes Mother    • Stroke Mother    • Diabetes Father    • Cancer Father         thyroid   • Diabetes Sister    • Diabetes Brother    • No Known Problems Maternal Grandmother    • No Known Problems Maternal Grandfather    • No Known Problems Paternal Grandmother    • No Known Problems Paternal Grandfather    • No Known Problems Sister        Social History     Occupational History   • Not on file   Tobacco Use   • Smoking status: Former   • Smokeless tobacco: Never   Vaping Use   • Vaping Use: Never used   Substance and Sexual Activity   • Alcohol use: Not Currently   • Drug use: Not Currently   • Sexual activity: Not Currently         Current Outpatient Medications:   •  azelastine (OPTIVAR) 0 05 % ophthalmic solution, Administer 1 drop to the right eye 2 (two) times a day, Disp: , Rfl:   •  buPROPion (WELLBUTRIN XL) 300 mg 24 hr tablet, Take 300 mg by mouth daily, Disp: , Rfl:   •  busPIRone (BUSPAR) 10 mg tablet, Take 10 mg by mouth in the morning Takes twice a day, Disp: , Rfl:   •  Calcium Carb-Cholecalciferol (OSCAL-D) 500 mg-200 units per tablet, Take 1 tablet by mouth 2 (two) times a day with meals, Disp: , Rfl:   •  cetirizine (ZyrTEC) 10 mg tablet, Take 10 mg by mouth daily Takes 1 tablet 2 times a day, Disp: , Rfl:   •  Cholecalciferol (VITAMIN D3) 2000 units capsule, TAKE 2 CAPSULES BY MOUTH DAILY INDICATIONS: VITAMIN D DEFICIENCY , Disp: , Rfl: 5  •  [employer] Aspirin Low Dose 81 MG EC tablet, TAKE 1 TABLET BY MOUTH TWICE A DAY, Disp: 60 tablet, Rfl: 1  •  CVS Vitamin B-12 1000 MCG tablet, Take 1,000 mcg by mouth daily, Disp: , Rfl:   •  CVS Vitamin C 500 MG tablet, TAKE 1 TABLET (500 MG TOTAL) BY MOUTH DAILY  , Disp: 90 tablet, Rfl: 1  •  DULoxetine (CYMBALTA) 60 mg delayed release capsule, Take 60 mg by mouth 2 (two) times a day, Disp: , Rfl:   •  erythromycin (ILOTYCIN) ophthalmic ointment, APPLY (1CM) TO THE SURGICAL EYE AT BEDTIME, Disp: , Rfl:   •  ferrous sulfate 325 (65 Fe) mg tablet, Take by mouth daily with breakfast, Disp: , Rfl:   •  folic acid (FOLVITE) 1 mg tablet, TAKE 1 TABLET BY MOUTH EVERY DAY, Disp: 90 tablet, Rfl: 1  •  gabapentin (NEURONTIN) 300 mg capsule, Take 2 capsules (600 mg total) by mouth 3 (three) times a day, Disp: 180 capsule, Rfl: 2  •  hydrocortisone 1 % cream, Apply topically as needed Applies daily, Disp: , Rfl:   •  methocarbamol (ROBAXIN) 500 mg tablet, Take 1 tablet (500 mg total) by mouth every 8 (eight) hours as needed for muscle spasms, Disp: 90 tablet, Rfl: 2  •  Multiple Vitamins-Minerals (multivitamin with minerals) tablet, Take 1 tablet by mouth daily, Disp: 30 tablet, Rfl: 0  •  ondansetron (ZOFRAN) 4 mg tablet, Take 1 tablet (4 mg total) by mouth every 8 (eight) hours as needed for nausea or vomiting, Disp: 20 tablet, Rfl: 0  •  simvastatin (ZOCOR) 40 mg tablet, Take 40 mg by mouth daily at bedtime, Disp: , Rfl:   •  VENTOLIN  (90 Base) MCG/ACT inhaler, INHALE 2 PUFFS EVERY 4 (FOUR) HOURS AS NEEDED FOR WHEEZING OR SHORTNESS OF BREATH , Disp: , Rfl: 2  •  acetaminophen (TYLENOL) 325 mg tablet, Take 650 mg by mouth every 6 (six) hours as needed for mild pain (Patient not taking: Reported on 2/28/2023), Disp: , Rfl:   •  bepotastine besilate (BEPREVE) 1 5 % op soln, INSTILL 1 DROP INTO BOTH EYES TWICE A DAY, Disp: , Rfl:   •  docusate sodium (Colace) 100 mg capsule, Take 1 capsule (100 mg total) by mouth daily as needed for constipation, Disp: 30 capsule, Rfl: 0  •  HYDROcodone-acetaminophen (Norco) 5-325 mg per tablet, Take 1 tablet by mouth every 6 (six) hours as needed for pain Max Daily Amount: 4 tablets (Patient not taking: Reported on 3/29/2023), Disp: 20 tablet, Rfl: 0  •  pantoprazole (PROTONIX) 40 mg tablet, Take 40 mg by mouth as needed, Disp: , Rfl:   •  prednisoLONE acetate (PRED FORTE) 1 % ophthalmic suspension, instill 1 drop 6 times daily to the surgical eye, Disp: , Rfl:     Allergies   Allergen Reactions   • Bee Venom Swelling   • Dust Mite Extract    • Fish-Derived Products - Food Allergy Hives   • Iodine - Food Allergy Hives   • Lac Bovis "Other (See Comments)     congestion   • Molds & Smuts    • Other Swelling   • Pollen Extract Other (See Comments)     Runny nose, watery eyes (also has corneal swelling) and itching  Triggers asthma   • Shrimp Flavor - Food Allergy Hives       Physical Exam:    /63   Pulse 67   Ht 5' 4\" (1 626 m)   Wt 97 1 kg (214 lb)   BMI 36 73 kg/m²     Constitutional:normal, well developed, well nourished, alert, in no distress and non-toxic and no overt pain behavior  Eyes:anicteric  HEENT:grossly intact  Neck:supple, symmetric, trachea midline and no masses   Pulmonary:even and unlabored  Cardiovascular:No edema or pitting edema present  Skin:Normal without rashes or lesions and well hydrated  Psychiatric:Mood and affect appropriate  Neurologic:Cranial Nerves II-XII grossly intact  Musculoskeletal:antalgic gait  Positive seated straight leg raise on the left      Imaging  No orders to display         No orders of the defined types were placed in this encounter      "

## 2023-06-20 ENCOUNTER — OFFICE VISIT (OUTPATIENT)
Dept: PAIN MEDICINE | Facility: CLINIC | Age: 53
End: 2023-06-20
Payer: MEDICARE

## 2023-06-20 VITALS
DIASTOLIC BLOOD PRESSURE: 63 MMHG | SYSTOLIC BLOOD PRESSURE: 105 MMHG | WEIGHT: 214 LBS | HEIGHT: 64 IN | HEART RATE: 67 BPM | BODY MASS INDEX: 36.54 KG/M2

## 2023-06-20 DIAGNOSIS — M54.16 LUMBAR RADICULOPATHY: ICD-10-CM

## 2023-06-20 DIAGNOSIS — G89.4 CHRONIC PAIN SYNDROME: Primary | ICD-10-CM

## 2023-06-20 DIAGNOSIS — M47.816 LUMBAR SPONDYLOSIS: ICD-10-CM

## 2023-06-20 DIAGNOSIS — M50.30 DEGENERATIVE DISC DISEASE, CERVICAL: ICD-10-CM

## 2023-06-20 DIAGNOSIS — M54.16 LUMBAR RADICULOPATHY: Primary | ICD-10-CM

## 2023-06-20 DIAGNOSIS — M47.812 CERVICAL SPONDYLOSIS: ICD-10-CM

## 2023-06-20 PROCEDURE — 99214 OFFICE O/P EST MOD 30 MIN: CPT | Performed by: NURSE PRACTITIONER

## 2023-06-24 DIAGNOSIS — M17.12 PRIMARY OSTEOARTHRITIS OF LEFT KNEE: ICD-10-CM

## 2023-06-27 RX ORDER — FOLIC ACID 1 MG/1
TABLET ORAL
Qty: 90 TABLET | Refills: 1 | Status: SHIPPED | OUTPATIENT
Start: 2023-06-27

## 2023-06-27 RX ORDER — ASCORBIC ACID 500 MG
TABLET ORAL
Qty: 90 TABLET | Refills: 1 | Status: SHIPPED | OUTPATIENT
Start: 2023-06-27

## 2023-07-05 ENCOUNTER — TELEPHONE (OUTPATIENT)
Dept: PAIN MEDICINE | Facility: CLINIC | Age: 53
End: 2023-07-05

## 2023-07-05 NOTE — TELEPHONE ENCOUNTER
Caller: patient    Doctor: Latasha Nance    Reason for call: schedule injection    Call back#: [phone]

## 2023-08-07 ENCOUNTER — HOSPITAL ENCOUNTER (OUTPATIENT)
Dept: RADIOLOGY | Facility: CLINIC | Age: 53
Discharge: HOME | End: 2023-08-07
Payer: MEDICARE

## 2023-08-07 VITALS
TEMPERATURE: 97.5 F | HEART RATE: 70 BPM | DIASTOLIC BLOOD PRESSURE: 87 MMHG | OXYGEN SATURATION: 96 % | RESPIRATION RATE: 18 BRPM | SYSTOLIC BLOOD PRESSURE: 129 MMHG

## 2023-08-07 DIAGNOSIS — M54.16 LUMBAR RADICULOPATHY: ICD-10-CM

## 2023-08-07 PROCEDURE — 64483 NJX AA&/STRD TFRM EPI L/S 1: CPT | Performed by: ANESTHESIOLOGY

## 2023-08-07 RX ORDER — PAPAVERINE HCL 150 MG
10 CAPSULE, EXTENDED RELEASE ORAL ONCE
Status: COMPLETED | OUTPATIENT
Start: 2023-08-07 | End: 2023-08-07

## 2023-08-07 RX ADMIN — IOHEXOL 1 ML: 300 INJECTION, SOLUTION INTRAVENOUS at 15:07

## 2023-08-07 RX ADMIN — DEXAMETHASONE SODIUM PHOSPHATE 10 MG: 10 INJECTION, SOLUTION INTRAMUSCULAR; INTRAVENOUS at 15:08

## 2023-08-07 RX ADMIN — LIDOCAINE HYDROCHLORIDE 1 ML: 20 INJECTION, SOLUTION EPIDURAL; INFILTRATION; INTRACAUDAL; PERINEURAL at 15:05

## 2023-08-07 NOTE — H&P
History of Present Illness: The patient is a 46 y.o. female who presents with complaints of low back and left leg pain. Past Medical History:   Diagnosis Date   • Anxiety    • Asthma    • Cancer ([address])     endometrial   • Chronic pain syndrome    • CPAP (continuous positive airway pressure) dependence    • Depression    • Diabetes mellitus (HCC)     Pre diabetic   • GERD (gastroesophageal reflux disease)    • HTN (hypertension)    • Hyperlipidemia    • Hypertension    • Lumbar radiculopathy    • Prediabetes    • Sleep apnea        Past Surgical History:   Procedure Laterality Date   • GASTRECTOMY SLEEVE LAPAROSCOPIC     • HYSTERECTOMY     • KNEE ARTHROSCOPY Right    • LUMBAR FUSION     • [place] ARTHRP KNE CONDYLE&PLATU MEDIAL&LAT COMPARTMENTS Left 2/9/2023    Procedure: ARTHROPLASTY KNEE TOTAL AND ALL ASSOCIATED PROCEDURES;  Surgeon: Phil Evans MD;  Location:  MAIN OR;  Service: Orthopedics   • [place] BREAST REDUCTION Bilateral 9/23/2022    Procedure: BILATERAL BREAST REDUCTION;  Surgeon: Chago Nolen MD;  Location:  MAIN OR;  Service: Plastics   • MARY-EN-Y PROCEDURE  05/27/2021    Sleve   • TOTAL HIP ARTHROPLASTY Bilateral          Current Outpatient Medications:   •  acetaminophen (TYLENOL) 325 mg tablet, Take 650 mg by mouth every 6 (six) hours as needed for mild pain (Patient not taking: Reported on 2/28/2023), Disp: , Rfl:   •  ascorbic acid (VITAMIN C) 500 mg tablet, TAKE 1 TABLET (500 MG TOTAL) BY MOUTH DAILY. , Disp: 90 tablet, Rfl: 1  •  azelastine (OPTIVAR) 0.05 % ophthalmic solution, Administer 1 drop to the right eye 2 (two) times a day, Disp: , Rfl:   •  bepotastine besilate (BEPREVE) 1.5 % op soln, INSTILL 1 DROP INTO BOTH EYES TWICE A DAY, Disp: , Rfl:   •  buPROPion (WELLBUTRIN XL) 300 mg 24 hr tablet, Take 300 mg by mouth daily, Disp: , Rfl:   •  busPIRone (BUSPAR) 10 mg tablet, Take 10 mg by mouth in the morning Takes twice a day, Disp: , Rfl:   •  Calcium Carb-Cholecalciferol (OSCAL-D) 500 mg-200 units per tablet, Take 1 tablet by mouth 2 (two) times a day with meals, Disp: , Rfl:   •  cetirizine (ZyrTEC) 10 mg tablet, Take 10 mg by mouth daily Takes 1 tablet 2 times a day, Disp: , Rfl:   •  Cholecalciferol (VITAMIN D3) 2000 units capsule, TAKE 2 CAPSULES BY MOUTH DAILY INDICATIONS: VITAMIN D DEFICIENCY., Disp: , Rfl: 5  •  CVS Aspirin Low Dose 81 MG EC tablet, TAKE 1 TABLET BY MOUTH TWICE A DAY, Disp: 60 tablet, Rfl: 1  •  CVS Vitamin B-12 1000 MCG tablet, Take 1,000 mcg by mouth daily, Disp: , Rfl:   •  docusate sodium (Colace) 100 mg capsule, Take 1 capsule (100 mg total) by mouth daily as needed for constipation, Disp: 30 capsule, Rfl: 0  •  DULoxetine (CYMBALTA) 60 mg delayed release capsule, Take 60 mg by mouth 2 (two) times a day, Disp: , Rfl:   •  erythromycin (ILOTYCIN) ophthalmic ointment, APPLY (1CM) TO THE SURGICAL EYE AT BEDTIME, Disp: , Rfl:   •  ferrous sulfate 325 (65 Fe) mg tablet, Take by mouth daily with breakfast, Disp: , Rfl:   •  folic acid (FOLVITE) 1 mg tablet, TAKE 1 TABLET BY MOUTH EVERY DAY, Disp: 90 tablet, Rfl: 1  •  gabapentin (NEURONTIN) 300 mg capsule, Take 2 capsules (600 mg total) by mouth 3 (three) times a day, Disp: 180 capsule, Rfl: 2  •  HYDROcodone-acetaminophen (Norco) 5-325 mg per tablet, Take 1 tablet by mouth every 6 (six) hours as needed for pain Max Daily Amount: 4 tablets (Patient not taking: Reported on 3/29/2023), Disp: 20 tablet, Rfl: 0  •  hydrocortisone 1 % cream, Apply topically as needed Applies daily, Disp: , Rfl:   •  methocarbamol (ROBAXIN) 500 mg tablet, Take 1 tablet (500 mg total) by mouth every 8 (eight) hours as needed for muscle spasms, Disp: 90 tablet, Rfl: 2  •  Multiple Vitamins-Minerals (multivitamin with minerals) tablet, Take 1 tablet by mouth daily, Disp: 30 tablet, Rfl: 0  •  ondansetron (ZOFRAN) 4 mg tablet, Take 1 tablet (4 mg total) by mouth every 8 (eight) hours as needed for nausea or vomiting, Disp: 20 tablet, Rfl: 0  • pantoprazole (PROTONIX) 40 mg tablet, Take 40 mg by mouth as needed, Disp: , Rfl:   •  prednisoLONE acetate (PRED FORTE) 1 % ophthalmic suspension, instill 1 drop 6 times daily to the surgical eye, Disp: , Rfl:   •  simvastatin (ZOCOR) 40 mg tablet, Take 40 mg by mouth daily at bedtime, Disp: , Rfl:   •  VENTOLIN  (90 Base) MCG/ACT inhaler, INHALE 2 PUFFS EVERY 4 (FOUR) HOURS AS NEEDED FOR WHEEZING OR SHORTNESS OF BREATH., Disp: , Rfl: 2    Allergies   Allergen Reactions   • Bee Venom Swelling   • Dust Mite Extract    • Fish-Derived Products - Food Allergy Hives   • Iodine - Food Allergy Hives   • Milk (Cow) Other (See Comments)     congestion   • Molds & Smuts    • Other Swelling   • Pollen Extract Other (See Comments)     Runny nose, watery eyes (also has corneal swelling) and itching  Triggers asthma   • Shrimp Flavor - Food Allergy Hives       Physical Exam:   Vitals:    08/07/23 1448   BP: 124/88   Pulse: 82   Resp: 18   Temp: 97.5 °F (36.4 °C)   SpO2: 98%     General: Awake, Alert, Oriented x 3, Mood and affect appropriate  Respiratory: Respirations even and unlabored  Cardiovascular: Peripheral pulses intact; no edema  Musculoskeletal Exam: Left lumbar paraspinals tender to palpation    ASA Score: 3    Patient/Chart Verification  Patient ID Verified: Verbal  ID Band Applied: No  Consents Confirmed: Procedural, To be obtained in the Pre-Procedure area  Interval H&P(within 24 hr) Complete (required for Outpatients and Surgery Admit only): To be obtained in the Pre-Procedure area  Allergies Reviewed: Yes  Anticoag/NSAID held?: NA  Currently on antibiotics?: No  Pregnancy denied?: Yes    Assessment:   1.  Lumbar radiculopathy        Plan: LEFT L5 TFESI

## 2023-08-07 NOTE — DISCHARGE INSTRUCTIONS
Epidural Steroid Injection   WHAT YOU NEED TO KNOW:   An epidural steroid injection (AMAIRANI) is a procedure to inject steroid medicine into the epidural space. The epidural space is between your spinal cord and vertebrae. Steroids reduce inflammation and fluid buildup in your spine that may be causing pain. You may be given pain medicine along with the steroids. ACTIVITY  Do not drive or operate machinery today. No strenuous activity today - bending, lifting, etc.  You may resume normal activites starting tomorrow - start slowly and as tolerated. You may shower today, but no tub baths or hot tubs. You may have numbness for several hours from the local anesthetic. Please use caution and common sense, especially with weight-bearing activities. CARE OF THE INJECTION SITE  If you have soreness or pain, apply ice to the area today (20 minutes on/20 minutes off). Starting tomorrow, you may use warm, moist heat or ice if needed. You may have an increase or change in your discomfort for 36-48 hours after your treatment. Apply ice and continue with any pain medication you have been prescribed. Notify [facility] if you have any of the following: redness, drainage, swelling, headache, stiff neck or fever above 100°F.    SPECIAL INSTRUCTIONS  Our office will contact you in approximately 7 days for a progress report. MEDICATIONS  Continue to take all routine medications. Our office may have instructed you to hold some medications. As no general anesthesia was used in today's procedure, you should not experience any side effects related to anesthesia. If you are diabetic, the steroids used in today's injection may temporarily increase your blood sugar levels after the first few days after your injection. Please keep a close eye on your sugars and alert the doctor who manages your diabetes if your sugars are significantly high from your baseline or you are symptomatic.      If you have a problem specifically related to your procedure, please call our office at [phone]. Problems not related to your procedure should be directed to your primary care physician.

## 2023-08-14 ENCOUNTER — TELEPHONE (OUTPATIENT)
Dept: PAIN MEDICINE | Facility: CLINIC | Age: 53
End: 2023-08-14

## 2023-08-14 ENCOUNTER — TELEPHONE (OUTPATIENT)
Age: 53
End: 2023-08-14

## 2023-08-14 NOTE — TELEPHONE ENCOUNTER
Caller: patient     Doctor: Gloria Osorio    Reason for call: requesting placard for handicap due to knee replacement    Call back#: [phone]

## 2023-08-14 NOTE — TELEPHONE ENCOUNTER
Caller: Shanae Meier  Doctor/office: Dr Adela Holbrook   #: [phone]    % of improvement: 40%  Pain Scale (1-10): 9/10 back

## 2023-08-18 ENCOUNTER — OFFICE VISIT (OUTPATIENT)
Dept: OBGYN CLINIC | Facility: CLINIC | Age: 53
End: 2023-08-18
Payer: MEDICARE

## 2023-08-18 ENCOUNTER — APPOINTMENT (OUTPATIENT)
Dept: RADIOLOGY | Facility: AMBULARY SURGERY CENTER | Age: 53
End: 2023-08-18
Attending: ORTHOPAEDIC SURGERY
Payer: MEDICARE

## 2023-08-18 VITALS
HEART RATE: 67 BPM | BODY MASS INDEX: 37.22 KG/M2 | SYSTOLIC BLOOD PRESSURE: 133 MMHG | WEIGHT: 218 LBS | HEIGHT: 64 IN | DIASTOLIC BLOOD PRESSURE: 85 MMHG

## 2023-08-18 DIAGNOSIS — M17.11 PRIMARY OSTEOARTHRITIS OF RIGHT KNEE: Primary | ICD-10-CM

## 2023-08-18 DIAGNOSIS — M17.11 PRIMARY OSTEOARTHRITIS OF RIGHT KNEE: ICD-10-CM

## 2023-08-18 DIAGNOSIS — Z01.818 PREOPERATIVE TESTING: ICD-10-CM

## 2023-08-18 PROCEDURE — 73562 X-RAY EXAM OF KNEE 3: CPT

## 2023-08-18 PROCEDURE — 99214 OFFICE O/P EST MOD 30 MIN: CPT | Performed by: ORTHOPAEDIC SURGERY

## 2023-08-18 RX ORDER — ACETAMINOPHEN 325 MG/1
975 TABLET ORAL ONCE
OUTPATIENT
Start: 2023-08-18 | End: 2023-08-18

## 2023-08-18 RX ORDER — MULTIVIT-MIN/IRON FUM/FOLIC AC 7.5 MG-4
1 TABLET ORAL DAILY
Qty: 30 TABLET | Refills: 0 | Status: SHIPPED | OUTPATIENT
Start: 2023-08-18

## 2023-08-18 RX ORDER — CHLORHEXIDINE GLUCONATE 4 G/100ML
SOLUTION TOPICAL DAILY PRN
OUTPATIENT
Start: 2023-08-18

## 2023-08-18 RX ORDER — CHLORHEXIDINE GLUCONATE 0.12 MG/ML
15 RINSE ORAL ONCE
OUTPATIENT
Start: 2023-08-18 | End: 2023-08-18

## 2023-08-18 RX ORDER — SODIUM CHLORIDE, SODIUM LACTATE, POTASSIUM CHLORIDE, CALCIUM CHLORIDE 600; 310; 30; 20 MG/100ML; MG/100ML; MG/100ML; MG/100ML
125 INJECTION, SOLUTION INTRAVENOUS CONTINUOUS
OUTPATIENT
Start: 2023-08-18

## 2023-08-18 RX ORDER — ASCORBIC ACID 500 MG
500 TABLET ORAL 2 TIMES DAILY
Qty: 60 TABLET | Refills: 1 | Status: SHIPPED | OUTPATIENT
Start: 2023-08-18

## 2023-08-18 RX ORDER — FOLIC ACID 1 MG/1
1 TABLET ORAL DAILY
Qty: 30 TABLET | Refills: 1 | Status: SHIPPED | OUTPATIENT
Start: 2023-08-18

## 2023-08-18 RX ORDER — FERROUS SULFATE TAB EC 324 MG (65 MG FE EQUIVALENT) 324 (65 FE) MG
324 TABLET DELAYED RESPONSE ORAL
Qty: 60 TABLET | Refills: 1 | Status: SHIPPED | OUTPATIENT
Start: 2023-08-18

## 2023-08-18 NOTE — PROGRESS NOTES
Assessment:  1. Primary osteoarthritis of right knee  XR knee 3 vw right non injury          Plan:    • Patient has chronic right knee pain due to osteoarthritis   • Weightbearing as tolerated   • Provided patient with paraspinal muscular stretching and strengthening exercises  • Discussed with patient surgery for right total knee arthroplasty and would like to proceed forward scheduling for surgery   • The benefits and purpose of the operations and/or procedure have been explained to me in language I understand by Dr. Yudelka Smith well as the risks and benefits, alternatives and complications pertaining to the above procedure/surgery which include but is not limited to: infections, deeps vein thrombosis, blood clots to the lungs, wound healing problems, complications from extensive blood loss, including shock, possible death, continued pain, fracture, vascular or nerve injury, potential need for further surgery/revision, persistent pain/stiffness/instability, failure of hardware,heart attack, stroke and not obtaining results patient used. • Will stay one night and be discharged the next day   • She will be on ASA 81 mg BID of DVT prophylaxis   • She will be on vitamins 3 days prior to surgery  • Will be removing staples and repeat x-rays of the right knee at the next office visit  • She is to follow-up in 2 weeks postop right TKA        The above stated was discussed in layman's terms and the patient expressed understanding. All questions were answered to the patient's satisfaction. Subjective:   Marissa Eagle is a 46 y.o. female who presents today follow-up for chronic right knee pain due to osteoarthritis. She had a steroid injection on 5/5/2023 with some relief. She states she is having pain in the lateral aspect of the right knee. She states her pain is worse with weightbearing, going up and down steps. She has been taking Tylenol for pain relief.       Review of systems negative unless otherwise specified in HPI    Past Medical History:   Diagnosis Date   • Anxiety    • Asthma    • Cancer ([address])     endometrial   • Chronic pain syndrome    • CPAP (continuous positive airway pressure) dependence    • Depression    • Diabetes mellitus (HCC)     Pre diabetic   • GERD (gastroesophageal reflux disease)    • HTN (hypertension)    • Hyperlipidemia    • Hypertension    • Lumbar radiculopathy    • Prediabetes    • Sleep apnea        Past Surgical History:   Procedure Laterality Date   • GASTRECTOMY SLEEVE LAPAROSCOPIC     • HYSTERECTOMY     • KNEE ARTHROSCOPY Right    • LUMBAR FUSION     • AK ARTHRP KNE CONDYLE&PLATU MEDIAL&LAT COMPARTMENTS Left 2/9/2023    Procedure: ARTHROPLASTY KNEE TOTAL AND ALL ASSOCIATED PROCEDURES;  Surgeon: Rd Wright MD;  Location:  MAIN OR;  Service: Orthopedics   • AK BREAST REDUCTION Bilateral 9/23/2022    Procedure: BILATERAL BREAST REDUCTION;  Surgeon: Divya Read MD;  Location:  MAIN OR;  Service: Plastics   • MARY-EN-Y PROCEDURE  05/27/2021    Sleve   • TOTAL HIP ARTHROPLASTY Bilateral        Family History   Problem Relation Age of Onset   • Diabetes Mother    • Stroke Mother    • Diabetes Father    • Cancer Father         thyroid   • Diabetes Sister    • Diabetes Brother    • No Known Problems Maternal Grandmother    • No Known Problems Maternal Grandfather    • No Known Problems Paternal Grandmother    • No Known Problems Paternal Grandfather    • No Known Problems Sister        Social History     Occupational History   • Not on file   Tobacco Use   • Smoking status: Former   • Smokeless tobacco: Never   Vaping Use   • Vaping Use: Never used   Substance and Sexual Activity   • Alcohol use: Not Currently   • Drug use: Not Currently   • Sexual activity: Not Currently         Current Outpatient Medications:   •  acetaminophen (TYLENOL) 325 mg tablet, Take 650 mg by mouth every 6 (six) hours as needed for mild pain (Patient not taking: Reported on 2/28/2023), Disp: , Rfl:   •  ascorbic acid (VITAMIN C) 500 mg tablet, TAKE 1 TABLET (500 MG TOTAL) BY MOUTH DAILY. , Disp: 90 tablet, Rfl: 1  •  azelastine (OPTIVAR) 0.05 % ophthalmic solution, Administer 1 drop to the right eye 2 (two) times a day, Disp: , Rfl:   •  bepotastine besilate (BEPREVE) 1.5 % op soln, INSTILL 1 DROP INTO BOTH EYES TWICE A DAY, Disp: , Rfl:   •  buPROPion (WELLBUTRIN XL) 300 mg 24 hr tablet, Take 300 mg by mouth daily, Disp: , Rfl:   •  busPIRone (BUSPAR) 10 mg tablet, Take 10 mg by mouth in the morning Takes twice a day, Disp: , Rfl:   •  Calcium Carb-Cholecalciferol (OSCAL-D) 500 mg-200 units per tablet, Take 1 tablet by mouth 2 (two) times a day with meals, Disp: , Rfl:   •  cetirizine (ZyrTEC) 10 mg tablet, Take 10 mg by mouth daily Takes 1 tablet 2 times a day, Disp: , Rfl:   •  Cholecalciferol (VITAMIN D3) 2000 units capsule, TAKE 2 CAPSULES BY MOUTH DAILY INDICATIONS: VITAMIN D DEFICIENCY., Disp: , Rfl: 5  •  CVS Aspirin Low Dose 81 MG EC tablet, TAKE 1 TABLET BY MOUTH TWICE A DAY, Disp: 60 tablet, Rfl: 1  •  CVS Vitamin B-12 1000 MCG tablet, Take 1,000 mcg by mouth daily, Disp: , Rfl:   •  docusate sodium (Colace) 100 mg capsule, Take 1 capsule (100 mg total) by mouth daily as needed for constipation, Disp: 30 capsule, Rfl: 0  •  DULoxetine (CYMBALTA) 60 mg delayed release capsule, Take 60 mg by mouth 2 (two) times a day, Disp: , Rfl:   •  erythromycin (ILOTYCIN) ophthalmic ointment, APPLY (1CM) TO THE SURGICAL EYE AT BEDTIME, Disp: , Rfl:   •  ferrous sulfate 325 (65 Fe) mg tablet, Take by mouth daily with breakfast, Disp: , Rfl:   •  folic acid (FOLVITE) 1 mg tablet, TAKE 1 TABLET BY MOUTH EVERY DAY, Disp: 90 tablet, Rfl: 1  •  gabapentin (NEURONTIN) 300 mg capsule, Take 2 capsules (600 mg total) by mouth 3 (three) times a day, Disp: 180 capsule, Rfl: 2  •  HYDROcodone-acetaminophen (Norco) 5-325 mg per tablet, Take 1 tablet by mouth every 6 (six) hours as needed for pain Max Daily Amount: 4 tablets (Patient not taking: Reported on 3/29/2023), Disp: 20 tablet, Rfl: 0  •  hydrocortisone 1 % cream, Apply topically as needed Applies daily, Disp: , Rfl:   •  methocarbamol (ROBAXIN) 500 mg tablet, Take 1 tablet (500 mg total) by mouth every 8 (eight) hours as needed for muscle spasms, Disp: 90 tablet, Rfl: 2  •  Multiple Vitamins-Minerals (multivitamin with minerals) tablet, Take 1 tablet by mouth daily, Disp: 30 tablet, Rfl: 0  •  ondansetron (ZOFRAN) 4 mg tablet, Take 1 tablet (4 mg total) by mouth every 8 (eight) hours as needed for nausea or vomiting, Disp: 20 tablet, Rfl: 0  •  pantoprazole (PROTONIX) 40 mg tablet, Take 40 mg by mouth as needed, Disp: , Rfl:   •  prednisoLONE acetate (PRED FORTE) 1 % ophthalmic suspension, instill 1 drop 6 times daily to the surgical eye, Disp: , Rfl:   •  simvastatin (ZOCOR) 40 mg tablet, Take 40 mg by mouth daily at bedtime, Disp: , Rfl:   •  VENTOLIN  (90 Base) MCG/ACT inhaler, INHALE 2 PUFFS EVERY 4 (FOUR) HOURS AS NEEDED FOR WHEEZING OR SHORTNESS OF BREATH., Disp: , Rfl: 2    Allergies   Allergen Reactions   • Bee Venom Swelling   • Dust Mite Extract    • Fish-Derived Products - Food Allergy Hives   • Iodine - Food Allergy Hives   • Milk (Cow) Other (See Comments)     congestion   • Molds & Smuts    • Other Swelling   • Pollen Extract Other (See Comments)     Runny nose, watery eyes (also has corneal swelling) and itching  Triggers asthma   • Shrimp Flavor - Food Allergy Hives            Vitals:    08/18/23 1144   BP: 133/85   Pulse: 67       Objective:            Physical Exam  Physical Exam:      General Appearance:    Alert, cooperative, no distress, appears stated age   Head:    Normocephalic, without obvious abnormality, atraumatic   Eyes:    conjunctiva/corneas clear, both eyes         Nose:   Nares normal, septum midline, no drainage    Throat:   Lips normal; teeth and gums normal   Neck:    symmetrical, trachea midline, ;     thyroid:  no enlargement/ Back:     Symmetric, no curvature, ROM normal   Lungs:   No audible wheezing or labored breathing   Chest Wall:    No tenderness or deformity    Heart:    Regular rate and rhythm                         Pulses:   2+ and symmetric all extremities   Skin:   Skin color, texture, turgor normal, no rashes or lesions   Neurologic:   normal strength, sensation and reflexes     throughout                       Ortho Exam  Right Knee  Skin intact  No erythema  Tenderness to palpation lateral joint line  Full extension  Pain with valgus stress on the lateral aspect of the knee  Stable with varus and valgus stress  Neurovascularly Intact Distally     Diagnostics, reviewed and taken today if performed as documented: The attending physician has personally reviewed the pertinent films in PACS and interpretation is as follows: X-ray right knee demonstrates tricompartmental space narrowing worse in the lateral compartment with osteophyte formation      Procedures, if performed today:    Procedures    None performed      Scribe Attestation    I,:  Enrico Huitron am acting as a scribe while in the presence of the attending physician.:       I,:  Jayde Garcia MD personally performed the services described in this documentation    as scribed in my presence.:             Portions of the record may have been created with voice recognition software. Occasional wrong word or "sound a like" substitutions may have occurred due to the inherent limitations of voice recognition software. Read the chart carefully and recognize, using context, where substitutions have occurred.

## 2023-08-21 NOTE — TELEPHONE ENCOUNTER
Patient reports 65-70% improvement 2 wk post inj   Pain level ? ?/10 - Unable to answer because she was laying in bed, but felt better throughout the week

## 2023-08-31 ENCOUNTER — TELEPHONE (OUTPATIENT)
Dept: OBGYN CLINIC | Facility: HOSPITAL | Age: 53
End: 2023-08-31

## 2023-08-31 NOTE — TELEPHONE ENCOUNTER
Preoperative Elective Admission Assessment- Spoke to patient     Medicare-  EA      Living Situation:  Pt is moving to her parents house, and will be residing there with her parents and son. She reports she is moving this weekend, plenty of time before surgery. She did state she was unsure of the exact number of steps. What kind of home: multi-level- half a double   How do they enter the home: Back  How many levels in home: 2  # of steps to enter home: 3 to enter   # of steps to second floor: 12-14 to 2nd floor   Are there handrails: Yes  Are there landings: No  Sleeping arrangement: first/entry floor  Where is Bathroom: First floor bathroom with walk in shower, with chair      First Floor Setup:   Is there a bathroom: Yes  Where would pt sleep: recliner     DME: cane     Patient's Current Level of Function: Ambulates with cane and ADLs: Independent     Post-op Caregiver: relative  Caregiver Name and phone number for Inpatient discharge needs: Father- [phone]  Currently receive any HHC/aides/community supports: No     Post-op Transport: relative  To/from hospital: relative  To/from PT 2-3x/week: relative  Uses community transport now: No     Outpatient Physical Therapy Site:  Site: [facility]   pre and post-op appts scheduled? Yes     Medication Management: self  Preferred Pharmacy for Post-op Medications: [facility]   Blood Management Vitamin Regimen: Taking Iron, made aware of the folic acid and Vitamin C- instructed to start ASAP. Post-op anticoagulant: to be determined by surgical team postoperatively- ASA per OV note     DC Plan: Pt plans to be discharged home  Pt educated that our goal, if at all possible, is to appropriately discharge patient based off their post-op function while striving to maintain maximal independence.  If possible, the goal is to discharge patient to home and for them to attend outpatient physical therapy.     Barriers to DC identified preoperatively: none identified     BMI: 35.70            Enrolled in [employer]     Patient Education:  Pt educated on post-op pain, early mobilization (POD0), Average inpt LOS, OP PT goal.  Patient educated that our goal is to appropriately discharge patient based off their post-op function while striving to maintain maximal independence.  The goal is to discharge patient to home and for them to attend outpatient physical therapy

## 2023-09-01 DIAGNOSIS — Z01.818 PREOPERATIVE TESTING: ICD-10-CM

## 2023-09-01 DIAGNOSIS — M17.11 PRIMARY OSTEOARTHRITIS OF RIGHT KNEE: ICD-10-CM

## 2023-09-01 RX ORDER — FERROUS SULFATE TAB EC 324 MG (65 MG FE EQUIVALENT) 324 (65 FE) MG
324 TABLET DELAYED RESPONSE ORAL
Qty: 180 TABLET | Refills: 1 | Status: SHIPPED | OUTPATIENT
Start: 2023-09-01

## 2023-09-01 RX ORDER — ELASTIC BANDAGE 4"
1 BANDAGE TOPICAL DAILY
Qty: 90 TABLET | Refills: 1 | Status: SHIPPED | OUTPATIENT
Start: 2023-09-01

## 2023-09-01 RX ORDER — LORATADINE 10 MG
500 TABLET ORAL 2 TIMES DAILY
Qty: 180 TABLET | Refills: 1 | Status: SHIPPED | OUTPATIENT
Start: 2023-09-01

## 2023-09-06 DIAGNOSIS — Z98.890 S/P BILATERAL BREAST REDUCTION: ICD-10-CM

## 2023-09-06 DIAGNOSIS — N62 MACROMASTIA: ICD-10-CM

## 2023-09-06 NOTE — TELEPHONE ENCOUNTER
Reason for call:   [x] Refill   [] Prior Auth  [] Other:     Office:   [] PCP/Provider -   [x] Speciality/Provider - spine and pain    Medication: Gabapentin    Patient hung up, states she had to go to work and did not give details regarding script other then she needed a refill.

## 2023-09-07 RX ORDER — GABAPENTIN 300 MG/1
600 CAPSULE ORAL 3 TIMES DAILY
Qty: 180 CAPSULE | Refills: 2 | Status: SHIPPED | OUTPATIENT
Start: 2023-09-07

## 2023-09-07 NOTE — TELEPHONE ENCOUNTER
Pt contacted Call Center requested refill of their medication. Medication Name: gabapentin      Dosage of Med: 300 mg      Frequency of Med: Take 2 capsules (600 mg total) by mouth 3 (three) times a day      Remaining Medication: 2      Pharmacy and Location: [facility]        Pt. Preferred Callback Phone Number: [phone]      Thank you.

## 2023-09-11 ENCOUNTER — OFFICE VISIT (OUTPATIENT)
Dept: LAB | Facility: CLINIC | Age: 53
End: 2023-09-11
Payer: MEDICARE

## 2023-09-11 ENCOUNTER — APPOINTMENT (OUTPATIENT)
Dept: LAB | Facility: CLINIC | Age: 53
End: 2023-09-11
Payer: MEDICARE

## 2023-09-11 DIAGNOSIS — M17.11 PRIMARY OSTEOARTHRITIS OF RIGHT KNEE: ICD-10-CM

## 2023-09-11 DIAGNOSIS — Z01.818 PREOPERATIVE TESTING: ICD-10-CM

## 2023-09-11 LAB
ALBUMIN SERPL BCP-MCNC: 4.2 G/DL (ref 3.5–5)
ALP SERPL-CCNC: 75 U/L (ref 34–104)
ALT SERPL W P-5'-P-CCNC: 30 U/L (ref 7–52)
ANION GAP SERPL CALCULATED.3IONS-SCNC: 6 MMOL/L
APTT PPP: 30 SECONDS (ref 23–37)
AST SERPL W P-5'-P-CCNC: 22 U/L (ref 13–39)
BASOPHILS # BLD AUTO: 0.05 THOUSANDS/ÂΜL (ref 0–0.1)
BASOPHILS NFR BLD AUTO: 1 % (ref 0–1)
BILIRUB SERPL-MCNC: 0.62 MG/DL (ref 0.2–1)
BUN SERPL-MCNC: 16 MG/DL (ref 5–25)
CALCIUM SERPL-MCNC: 9.3 MG/DL (ref 8.4–10.2)
CHLORIDE SERPL-SCNC: 105 MMOL/L (ref 96–108)
CO2 SERPL-SCNC: 30 MMOL/L (ref 21–32)
CREAT SERPL-MCNC: 0.77 MG/DL (ref 0.6–1.3)
EOSINOPHIL # BLD AUTO: 0.49 THOUSAND/ÂΜL (ref 0–0.61)
EOSINOPHIL NFR BLD AUTO: 8 % (ref 0–6)
ERYTHROCYTE [DISTWIDTH] IN BLOOD BY AUTOMATED COUNT: 13.2 % (ref 11.6–15.1)
EST. AVERAGE GLUCOSE BLD GHB EST-MCNC: 123 MG/DL
GFR SERPL CREATININE-BSD FRML MDRD: 87 ML/MIN/1.73SQ M
GLUCOSE SERPL-MCNC: 113 MG/DL (ref 65–140)
HBA1C MFR BLD: 5.9 %
HCT VFR BLD AUTO: 46 % (ref 34.8–46.1)
HGB BLD-MCNC: 14.8 G/DL (ref 11.5–15.4)
IMM GRANULOCYTES # BLD AUTO: 0.02 THOUSAND/UL (ref 0–0.2)
IMM GRANULOCYTES NFR BLD AUTO: 0 % (ref 0–2)
INR PPP: 0.94 (ref 0.84–1.19)
LYMPHOCYTES # BLD AUTO: 2.31 THOUSANDS/ÂΜL (ref 0.6–4.47)
LYMPHOCYTES NFR BLD AUTO: 39 % (ref 14–44)
MCH RBC QN AUTO: 29.7 PG (ref 26.8–34.3)
MCHC RBC AUTO-ENTMCNC: 32.2 G/DL (ref 31.4–37.4)
MCV RBC AUTO: 92 FL (ref 82–98)
MONOCYTES # BLD AUTO: 0.4 THOUSAND/ÂΜL (ref 0.17–1.22)
MONOCYTES NFR BLD AUTO: 7 % (ref 4–12)
NEUTROPHILS # BLD AUTO: 2.66 THOUSANDS/ÂΜL (ref 1.85–7.62)
NEUTS SEG NFR BLD AUTO: 45 % (ref 43–75)
NRBC BLD AUTO-RTO: 0 /100 WBCS
PLATELET # BLD AUTO: 229 THOUSANDS/UL (ref 149–390)
PMV BLD AUTO: 9.5 FL (ref 8.9–12.7)
POTASSIUM SERPL-SCNC: 4.3 MMOL/L (ref 3.5–5.3)
PROT SERPL-MCNC: 6.6 G/DL (ref 6.4–8.4)
PROTHROMBIN TIME: 13.2 SECONDS (ref 11.6–14.5)
RBC # BLD AUTO: 4.98 MILLION/UL (ref 3.81–5.12)
SODIUM SERPL-SCNC: 141 MMOL/L (ref 135–147)
WBC # BLD AUTO: 5.93 THOUSAND/UL (ref 4.31–10.16)

## 2023-09-11 PROCEDURE — 85025 COMPLETE CBC W/AUTO DIFF WBC: CPT

## 2023-09-11 PROCEDURE — 86850 RBC ANTIBODY SCREEN: CPT | Performed by: ORTHOPAEDIC SURGERY

## 2023-09-11 PROCEDURE — 86900 BLOOD TYPING SEROLOGIC ABO: CPT | Performed by: ORTHOPAEDIC SURGERY

## 2023-09-11 PROCEDURE — 36415 COLL VENOUS BLD VENIPUNCTURE: CPT

## 2023-09-11 PROCEDURE — 85730 THROMBOPLASTIN TIME PARTIAL: CPT

## 2023-09-11 PROCEDURE — 83036 HEMOGLOBIN GLYCOSYLATED A1C: CPT

## 2023-09-11 PROCEDURE — 86901 BLOOD TYPING SEROLOGIC RH(D): CPT | Performed by: ORTHOPAEDIC SURGERY

## 2023-09-11 PROCEDURE — 80053 COMPREHEN METABOLIC PANEL: CPT

## 2023-09-11 PROCEDURE — 93005 ELECTROCARDIOGRAM TRACING: CPT

## 2023-09-11 PROCEDURE — 85610 PROTHROMBIN TIME: CPT

## 2023-09-12 ENCOUNTER — APPOINTMENT (OUTPATIENT)
Dept: PREADMISSION TESTING | Facility: HOSPITAL | Age: 53
End: 2023-09-12
Payer: MEDICARE

## 2023-09-12 ENCOUNTER — LAB REQUISITION (OUTPATIENT)
Dept: LAB | Facility: HOSPITAL | Age: 53
End: 2023-09-12
Payer: MEDICARE

## 2023-09-12 DIAGNOSIS — M17.11 UNILATERAL PRIMARY OSTEOARTHRITIS, RIGHT KNEE: ICD-10-CM

## 2023-09-13 ENCOUNTER — ANESTHESIA EVENT (OUTPATIENT)
Dept: PERIOP | Facility: HOSPITAL | Age: 53
End: 2023-09-13
Payer: MEDICARE

## 2023-09-13 LAB
ATRIAL RATE: 71 BPM
P AXIS: 21 DEGREES
PR INTERVAL: 108 MS
QRS AXIS: 52 DEGREES
QRSD INTERVAL: 76 MS
QT INTERVAL: 344 MS
QTC INTERVAL: 373 MS
T WAVE AXIS: 57 DEGREES
VENTRICULAR RATE: 71 BPM

## 2023-09-13 PROCEDURE — 93010 ELECTROCARDIOGRAM REPORT: CPT | Performed by: INTERNAL MEDICINE

## 2023-09-13 RX ORDER — MONTELUKAST SODIUM 10 MG/1
TABLET ORAL
COMMUNITY
Start: 2023-08-10

## 2023-09-13 NOTE — PRE-PROCEDURE INSTRUCTIONS
Pre-Surgery Instructions:   Medication Instructions   • azelastine (OPTIVAR) 0.05 % ophthalmic solution Take day of surgery. • buPROPion (WELLBUTRIN XL) 300 mg 24 hr tablet Take day of surgery. • busPIRone (BUSPAR) 10 mg tablet Take day of surgery. • Calcium Carb-Cholecalciferol (OSCAL-D) 500 mg-200 units per tablet Hold day of surgery. • cetirizine (ZyrTEC) 10 mg tablet Hold day of surgery. • Cholecalciferol (VITAMIN D3) 2000 units capsule Hold day of surgery. • [facility]S Vitamin B-12 1000 MCG tablet Hold day of surgery. • [facility]S Vitamin C 500 MG tablet Hold day of surgery. • docusate sodium (Colace) 100 mg capsule Take day of surgery. • DULoxetine (CYMBALTA) 60 mg delayed release capsule Take day of surgery. • erythromycin (ILOTYCIN) ophthalmic ointment Take day of surgery. • ferrous sulfate 324 (65 Fe) mg Hold day of surgery. • folic acid (FOLVITE) 1 mg tablet Hold day of surgery. • hydrocortisone 1 % cream Hold day of surgery. • methocarbamol (ROBAXIN) 500 mg tablet Uses PRN- OK to take day of surgery   • montelukast (SINGULAIR) 10 mg tablet Take night before surgery   • Multiple Vitamins-Minerals ([facility] Spectravite Adults) TABS Hold day of surgery. • ondansetron (ZOFRAN) 4 mg tablet Uses PRN- OK to take day of surgery   • prednisoLONE acetate (PRED FORTE) 1 % ophthalmic suspension Take day of surgery. • simvastatin (ZOCOR) 40 mg tablet Take night before surgery   • VENTOLIN  (90 Base) MCG/ACT inhaler Uses PRN- OK to take day of surgery   Medication instructions for day surgery reviewed. Please use only a sip of water to take your instructed medications. Avoid all over the counter vitamins, supplements and NSAIDS for one week prior to surgery per anesthesia guidelines. Tylenol is ok to take as needed. Pt states she has not been taking folic acid, unaware that she was supposed to. Will call [facility] and .       You will receive a call one business day prior to surgery with an arrival time and hospital directions. If your surgery is scheduled on a Monday, the hospital will be calling you on the Friday prior to your surgery. If you have not heard from anyone by 8pm, please call the hospital supervisor through the hospital  at [phone]. Luz Allen [phone]). Do not eat or drink anything after midnight the night before your surgery, including candy, mints, lifesavers, or chewing gum. Do not drink alcohol 24hrs before your surgery. Try not to smoke at least 24hrs before your surgery. Follow the pre surgery showering instructions as listed in the [facility] Surgical Experience Booklet” or otherwise provided by your surgeon's office. Do not shave the surgical area 24 hours before surgery. Do not apply any lotions, creams, including makeup, cologne, deodorant, or perfumes after showering on the day of your surgery. No contact lenses, eye make-up, or artificial eyelashes. Remove nail polish, including gel polish, and any artificial, gel, or acrylic nails if possible. Remove all jewelry including rings and body piercing jewelry. Wear causal clothing that is easy to take on and off. Consider your type of surgery. Keep any valuables, jewelry, piercings at home. Please bring any specially ordered equipment (sling, braces) if indicated. Arrange for a responsible person to drive you to and from the hospital on the day of your surgery. Visitor Guidelines discussed. Call the surgeon's office with any new illnesses, exposures, or additional questions prior to surgery. Please reference your [facility] Surgical Experience Booklet” for additional information to prepare for your upcoming surgery.

## 2023-09-14 PROBLEM — E11.65 TYPE 2 DIABETES MELLITUS WITH HYPERGLYCEMIA, WITHOUT LONG-TERM CURRENT USE OF INSULIN (HCC): Status: ACTIVE | Noted: 2020-12-03

## 2023-09-14 PROBLEM — M17.11 PRIMARY OSTEOARTHRITIS OF RIGHT KNEE: Status: ACTIVE | Noted: 2023-09-14

## 2023-09-14 PROBLEM — H18.603 KERATOCONUS OF BOTH EYES: Status: ACTIVE | Noted: 2017-11-14

## 2023-09-14 PROBLEM — Z98.84 S/P LAPAROSCOPIC SLEEVE GASTRECTOMY: Status: ACTIVE | Noted: 2021-06-17

## 2023-09-14 PROBLEM — C54.1 ENDOMETRIAL ADENOCARCINOMA (HCC): Status: ACTIVE | Noted: 2017-07-18

## 2023-09-14 NOTE — H&P (VIEW-ONLY)
Internal Medicine Pre-Operative Evaluation:     Reason for Visit: Pre-operative Evaluation for Risk Stratification and Optimization    Patient ID: Michael Martínez is a 46 y.o. female. Surgery: Arthroplasty of right knee  Referring Provider: Dr Ivana Mccullough      Recommendations to Proceed withSurgery    Patient is considered to be Low risk for Medium risk procedure. After evaluation and discussion with patient with emphasis that all surgery has some degree of inherent risk it is determined this procedure is of acceptable risk  medically. Patient may proceed with planned procedure      Assessment      Primary osteoarthritis right knee  • Failed conservative measures  • Electing to undergo total joint arthroplasty    Pre-operative Medical Evaluation for planned surgery  • Patient meets preoperative quality goals as noted below  • Recommendations as listed in PLAN section below  • Contact surgical nurse  navigator with any questions regarding preoperative plan or schedule.     Impaired fasting glucose  • Hgb A1c 5.9  • Recommend following DM diet  • Monitor FBS    H/o L TKA, B/L EVA  · No post operative issues    Chronic back pain  · F/b pain mgmt    Obesity  • Recommend ongoing attempts at weight loss  • Current BMI 38  • H/o Gastric bypass              Patient Active Problem List   Diagnosis   • Bilateral carpal tunnel syndrome   • Degenerative disc disease, cervical   • Lumbar radiculopathy   • Sacroiliitis (HCC)   • Lumbar spondylosis   • Primary osteoarthritis of left knee   • Hypertension   • Hyperlipidemia   • Prediabetes   • Chronic pain of both knees   • Cervical spondylosis   • Neck pain   • Syncope and collapse   • Chronic pain syndrome   • Macromastia   • S/P bilateral breast reduction   • Skin lesion of breast   • Asthma   • GERD (gastroesophageal reflux disease)   • Sleep apnea   • Anxiety and depression   • Fall   • Ambulatory dysfunction   • Status post left knee replacement   • Abnormal Pap smear of vagina   • Allergic rhinitis   • Osteoarthrosis involving multiple sites   • Endometrial adenocarcinoma (HCC)   • Insomnia   • Keratoconus of both eyes   • Primary localized osteoarthritis of left hip   • S/P laparoscopic sleeve gastrectomy   • Vitamin D deficiency   • Primary osteoarthritis of right knee        Plan:     1. Further preoperative workup as follows:   - none no further testing required may proceed with surgery    2. Medication Management/Recommendations:   - continue all current medicines through morning of surgery with sip of water except the following:  - hold aspirin 7 days prior to surgery  - avoid use of NSAID such as motrin, advil, aleve for 7 days prior to surgery  - hold all OTC herbal or nutritional supplements 7 days before surgery    3. Patient requires further consultation with:   No Consults Required    4. Discharge Planning / Barriers to Discharge  none identified - patients has post discharge therapy plan in place, transportation arranged for discharge day, adequate family support at home to assist with discharge to home. Subjective:           History of Present Illness:     Shanna Wilkins is a 46 y.o. female who presents to the office today for a preoperative consultation at the request of surgeon. The patient understands this is an elective procedure and not emergent. They are electing to undergo planned procedure with an understanding that all surgery has inherent risk. They have worked with their surgeon and failed conservative treatment measures. Today they present for preoperative risk assessment and recommendations for optimization in preparation for surgery. Pt seen in surgical optimization center for upcoming proposed surgery. They have failed previous conservative measures and have elected surgical intervention. Pt meets presurgical lab and BMI optimization goals.     Upon interview questioning patient is able to perform greater than 4 METs workload in daily life without any reported cardio-pulmonary symptoms. ROS: No TIA's or unusual headaches, no dysphagia. No prolonged cough. No dyspnea or chest pain on exertion. No abdominal pain, change in bowel habits, black or bloody stools. No urinary tract or BPH symptoms. Positive reported pain in arthritic joint. Positive difficulty with gait. No skin rashes or issues. Objective: There were no vitals taken for this visit.       General Appearance: no distress, conversive  HEENT: PERRLA, conjuctiva normal; oropharynx clear; mucous membranes moist;   Neck:  Supple, no lymphadenopathy or thyromegaly  Lungs: breath sounds normal, normal respiratory effort, no retractions, expiratory effort normal  CV: normal heart sounds S1/S2, PMI normal   ABD: soft non tender, no masses , no hepatic or splenomegaly  EXT: DP pulses intact, no lymphadenopathy, no edema  Skin: normal turgor, normal texture, no rash  Psych: affect normal, mood normal  Neuro: AAOx3        The following portions of the patient's history were reviewed and updated as appropriate: allergies, current medications, past family history, past medical history, past social history, past surgical history and problem list.     Past History:       Past Medical History:   Diagnosis Date   • Anxiety    • Asthma    • Cancer ([address])     endometrial   • Chronic pain syndrome    • CPAP (continuous positive airway pressure) dependence    • Depression    • Diabetes mellitus ([address])     Pre diabetic   • GERD (gastroesophageal reflux disease)    • HTN (hypertension)    • Hyperlipidemia    • Hypertension    • Lumbar radiculopathy    • Prediabetes    • Sleep apnea     Past Surgical History:   Procedure Laterality Date   • EYE SURGERY  07/26/2022    cornea eye transplant   • GASTRECTOMY SLEEVE LAPAROSCOPIC     • HYSTERECTOMY     • KNEE ARTHROSCOPY Right    • LUMBAR FUSION     • AR ARTHRP KNE CONDYLE&PLATU MEDIAL&LAT COMPARTMENTS Left 02/09/2023    Procedure: ARTHROPLASTY KNEE TOTAL AND ALL ASSOCIATED PROCEDURES;  Surgeon: Patrizia Carlos MD;  Location:  MAIN OR;  Service: Orthopedics   • AZ BREAST REDUCTION Bilateral 09/23/2022    Procedure: BILATERAL BREAST REDUCTION;  Surgeon: Zakiya Astudillo MD;  Location:  MAIN OR;  Service: Plastics   • MARY-EN-Y PROCEDURE  05/27/2021    Sleve   • TOTAL HIP ARTHROPLASTY Bilateral           Social History     Tobacco Use   • Smoking status: Former   • Smokeless tobacco: Never   Vaping Use   • Vaping Use: Never used   Substance Use Topics   • Alcohol use: Not Currently   • Drug use: Not Currently     Family History   Problem Relation Age of Onset   • Diabetes Mother    • Stroke Mother    • Diabetes Father    • Cancer Father         thyroid   • Diabetes Sister    • Diabetes Brother    • No Known Problems Maternal Grandmother    • No Known Problems Maternal Grandfather    • No Known Problems Paternal Grandmother    • No Known Problems Paternal Grandfather    • No Known Problems Sister           Allergies: Allergies   Allergen Reactions   • Bee Venom Swelling   • Dust Mite Extract    • Fish-Derived Products - Food Allergy Hives   • Iodine - Food Allergy Hives   • Milk (Cow) Other (See Comments)     congestion   • Molds & Smuts    • Other Swelling   • Pollen Extract Other (See Comments)     Runny nose, watery eyes (also has corneal swelling) and itching  Triggers asthma   • Shrimp Flavor - Food Allergy Hives        Current Medications:     Current Outpatient Medications   Medication Instructions   • ascorbic acid (VITAMIN C) 500 mg tablet TAKE 1 TABLET (500 MG TOTAL) BY MOUTH DAILY.    • azelastine (OPTIVAR) 0.05 % ophthalmic solution 1 drop, Right Eye, 2 times daily   • bepotastine besilate (BEPREVE) 1.5 % op soln INSTILL 1 DROP INTO BOTH EYES TWICE A DAY   • buPROPion (WELLBUTRIN XL) 300 mg, Oral, Daily   • busPIRone (BUSPAR) 10 mg, Oral, Daily, Takes twice a day   • Calcium Carb-Cholecalciferol (OSCAL-D) 500 mg-200 units per tablet 1 tablet, Oral, 2 times daily with meals   • cetirizine (ZYRTEC) 10 mg, Oral, Daily, Takes 1 tablet 2 times a day   • Cholecalciferol (VITAMIN D3) 2000 units capsule TAKE 2 CAPSULES BY MOUTH DAILY INDICATIONS: VITAMIN D DEFICIENCY. • [facility] Aspirin Low Dose 81 MG EC tablet TAKE 1 TABLET BY MOUTH TWICE A DAY   • [facility] Vitamin B-12 1,000 mcg, Oral, Daily   • [facility] Vitamin C 500 mg, Oral, 2 times daily   • docusate sodium (COLACE) 100 mg, Oral, Daily PRN   • DULoxetine (CYMBALTA) 60 mg, Oral, 2 times daily   • ferrous sulfate 325 (65 Fe) mg tablet Oral, Daily with breakfast   • folic acid (FOLVITE) 1 mg, Oral, Daily   • gabapentin (NEURONTIN) 600 mg, Oral, 3 times daily   • hydrocortisone 1 % cream Topical, As needed, Applies daily   • methocarbamol (ROBAXIN) 500 mg, Oral, Every 8 hours PRN   • montelukast (SINGULAIR) 10 mg tablet TAKE 1 TABLET BY MOUTH EVERY DAY AT NIGHT   • Multiple Vitamins-Minerals (multivitamin with minerals) tablet 1 tablet, Oral, Daily   • ondansetron (ZOFRAN) 4 mg, Oral, Every 8 hours PRN   • simvastatin (ZOCOR) 40 mg, Oral, Daily at bedtime   • VENTOLIN  (90 Base) MCG/ACT inhaler INHALE 2 PUFFS EVERY 4 (FOUR) HOURS AS NEEDED FOR WHEEZING OR SHORTNESS OF BREATH. PRE-OP WORKSHEET DATA    Assessment of Pre-Operative Risks     MLJ Quality Hard Stops:  BMI (<40) : Estimated body mass index is 37.42 kg/m² as calculated from the following:    Height as of 8/18/23: 5' 4" (1.626 m). Weight as of 8/18/23: 98.9 kg (218 lb). Hgb ( >11): Lab Results   Component Value Date    HGB 14.8 09/11/2023     HbA1c (<7.0) :   Lab Results   Component Value Date    HGBA1C 5.9 (H) 09/11/2023     GFR (>60) (Less then 45 = Nephrology consult):  Estimated Creatinine Clearance: 95.5 mL/min (by C-G formula based on SCr of 0.77 mg/dL).       Active Decompensated Chronic Conditions which would delay surgery  No acutely decompensated medical issues such as recent CVA, MI, new onset arrhythmia, severe aortic stenosis, CHF, uncontrolled COPD       Exercise Capacity: (if less the 4 mets consider functional assessment via cardiac stress testing or consultation)    · Able to walk 2 blocks without symptoms?: Yes  · Able to walk 1 flights without symptoms?: Yes    Assessment of intra and post operative respiratory, hemodynamic and thrombotic risks     Prior Anesthesia Reactions: No     Personal history of venous thromboembolic disease? No    History of steroid use > 5 mg for >2 weeks within last year? No    Cardiac Risk Estimation: per the Revised Cardiac Risk Index (Circ. 100:1043, 1999),     The patient's risk factors for cardiac complications include :  none    Ismael Amezquita has a in hospital cardiac risk of RCI RISK CLASS I (0 risk factors, risk of major cardiac compl. appr. 0.5%) based on RCRI calculator          Pre-Op Data Reviewed:       Laboratory Results: I have personally reviewed the pertinent laboratory results/reports     EKG:I have personally reviewed pertinent reports. . I personally reviewed and interpreted available tracings in the electronic medical record    Sinus rhythm with short ME  Possible Septal infarct , age undetermined  When compared with ECG of 23-FEB-2023 20:39,  No significant change was found  Consider lead placement   Confirmed by Xiomy Liang ([id number]) on 9/13/2023     OLD RECORDS: reviewed old records in the chart review section if EHR on day of visit.     Previous cardiopulmonary studies within the past year:  · Echocardiogram: no  · Cardiac Catheterization: no  · Stress Test: no      Time of visit including pre-visit chart review, visit and post-visit coordination of plan and care , review of pre-surgical lab work, preparation and time spent documenting note in electronic medical record, time spent face-to-face in physical examination answering patient questions by care team 55 minutes             [address] [facility]

## 2023-09-14 NOTE — PATIENT INSTRUCTIONS
Contact surgical nurse  navigator with any questions regarding preoperative plan or schedule.   Stop all over the counter supplements, herbal, naturopathic  medications for 1 week prior to surgery UNLESS prescribed by your surgeon  Hold NSAIDS (i.e. advil, alleve, motrin, ibuprofen, celebrex) minimum 7 days prior to surgery  Hold Asprin minimum 7 days prior to surgery  Recommend using Tylenol ( acetaminophen ) 500mg every eight hours during the first week post discharge in conjunction with any additional pain medicine prescribed by your surgeon  Use bowel medicines prescribed by your surgeon ( colace) daily post op during the first 1-2 weeks to avoid post operative constipation issues  Call [phone] with any post discharge concerns or medical issues  The morning of surgery take only the following medication with small sip of water: none

## 2023-09-14 NOTE — PROGRESS NOTES
Internal Medicine Pre-Operative Evaluation:     Reason for Visit: Pre-operative Evaluation for Risk Stratification and Optimization    Patient ID: Jocelyn Jamil is a 46 y.o. female. Surgery: Arthroplasty of right knee  Referring Provider: Dr Krish Blanco      Recommendations to Proceed withSurgery    Patient is considered to be Low risk for Medium risk procedure. After evaluation and discussion with patient with emphasis that all surgery has some degree of inherent risk it is determined this procedure is of acceptable risk  medically. Patient may proceed with planned procedure      Assessment      Primary osteoarthritis right knee  • Failed conservative measures  • Electing to undergo total joint arthroplasty    Pre-operative Medical Evaluation for planned surgery  • Patient meets preoperative quality goals as noted below  • Recommendations as listed in PLAN section below  • Contact surgical nurse  navigator with any questions regarding preoperative plan or schedule.     Impaired fasting glucose  • Hgb A1c 5.9  • Recommend following DM diet  • Monitor FBS    H/o L TKA, B/L EVA  · No post operative issues    Chronic back pain  · F/b pain mgmt    Obesity  • Recommend ongoing attempts at weight loss  • Current BMI 38  • H/o Gastric bypass              Patient Active Problem List   Diagnosis   • Bilateral carpal tunnel syndrome   • Degenerative disc disease, cervical   • Lumbar radiculopathy   • Sacroiliitis (HCC)   • Lumbar spondylosis   • Primary osteoarthritis of left knee   • Hypertension   • Hyperlipidemia   • Prediabetes   • Chronic pain of both knees   • Cervical spondylosis   • Neck pain   • Syncope and collapse   • Chronic pain syndrome   • Macromastia   • S/P bilateral breast reduction   • Skin lesion of breast   • Asthma   • GERD (gastroesophageal reflux disease)   • Sleep apnea   • Anxiety and depression   • Fall   • Ambulatory dysfunction   • Status post left knee replacement   • Abnormal Pap smear of vagina   • Allergic rhinitis   • Osteoarthrosis involving multiple sites   • Endometrial adenocarcinoma (HCC)   • Insomnia   • Keratoconus of both eyes   • Primary localized osteoarthritis of left hip   • S/P laparoscopic sleeve gastrectomy   • Vitamin D deficiency   • Primary osteoarthritis of right knee        Plan:     1. Further preoperative workup as follows:   - none no further testing required may proceed with surgery    2. Medication Management/Recommendations:   - continue all current medicines through morning of surgery with sip of water except the following:  - hold aspirin 7 days prior to surgery  - avoid use of NSAID such as motrin, advil, aleve for 7 days prior to surgery  - hold all OTC herbal or nutritional supplements 7 days before surgery    3. Patient requires further consultation with:   No Consults Required    4. Discharge Planning / Barriers to Discharge  none identified - patients has post discharge therapy plan in place, transportation arranged for discharge day, adequate family support at home to assist with discharge to home. Subjective:           History of Present Illness:     Onofre Blanco is a 46 y.o. female who presents to the office today for a preoperative consultation at the request of surgeon. The patient understands this is an elective procedure and not emergent. They are electing to undergo planned procedure with an understanding that all surgery has inherent risk. They have worked with their surgeon and failed conservative treatment measures. Today they present for preoperative risk assessment and recommendations for optimization in preparation for surgery. Pt seen in surgical optimization center for upcoming proposed surgery. They have failed previous conservative measures and have elected surgical intervention. Pt meets presurgical lab and BMI optimization goals.     Upon interview questioning patient is able to perform greater than 4 METs workload in daily life without any reported cardio-pulmonary symptoms. ROS: No TIA's or unusual headaches, no dysphagia. No prolonged cough. No dyspnea or chest pain on exertion. No abdominal pain, change in bowel habits, black or bloody stools. No urinary tract or BPH symptoms. Positive reported pain in arthritic joint. Positive difficulty with gait. No skin rashes or issues. Objective: There were no vitals taken for this visit.       General Appearance: no distress, conversive  HEENT: PERRLA, conjuctiva normal; oropharynx clear; mucous membranes moist;   Neck:  Supple, no lymphadenopathy or thyromegaly  Lungs: breath sounds normal, normal respiratory effort, no retractions, expiratory effort normal  CV: normal heart sounds S1/S2, PMI normal   ABD: soft non tender, no masses , no hepatic or splenomegaly  EXT: DP pulses intact, no lymphadenopathy, no edema  Skin: normal turgor, normal texture, no rash  Psych: affect normal, mood normal  Neuro: AAOx3        The following portions of the patient's history were reviewed and updated as appropriate: allergies, current medications, past family history, past medical history, past social history, past surgical history and problem list.     Past History:       Past Medical History:   Diagnosis Date   • Anxiety    • Asthma    • Cancer ([address])     endometrial   • Chronic pain syndrome    • CPAP (continuous positive airway pressure) dependence    • Depression    • Diabetes mellitus ([address])     Pre diabetic   • GERD (gastroesophageal reflux disease)    • HTN (hypertension)    • Hyperlipidemia    • Hypertension    • Lumbar radiculopathy    • Prediabetes    • Sleep apnea     Past Surgical History:   Procedure Laterality Date   • EYE SURGERY  07/26/2022    cornea eye transplant   • GASTRECTOMY SLEEVE LAPAROSCOPIC     • HYSTERECTOMY     • KNEE ARTHROSCOPY Right    • LUMBAR FUSION     • AZ ARTHRP KNE CONDYLE&PLATU MEDIAL&LAT COMPARTMENTS Left 02/09/2023    Procedure: ARTHROPLASTY KNEE TOTAL AND ALL ASSOCIATED PROCEDURES;  Surgeon: Tania Harris MD;  Location:  MAIN OR;  Service: Orthopedics   • MI BREAST REDUCTION Bilateral 09/23/2022    Procedure: BILATERAL BREAST REDUCTION;  Surgeon: Harish Peña MD;  Location:  MAIN OR;  Service: Plastics   • MARY-EN-Y PROCEDURE  05/27/2021    Sleve   • TOTAL HIP ARTHROPLASTY Bilateral           Social History     Tobacco Use   • Smoking status: Former   • Smokeless tobacco: Never   Vaping Use   • Vaping Use: Never used   Substance Use Topics   • Alcohol use: Not Currently   • Drug use: Not Currently     Family History   Problem Relation Age of Onset   • Diabetes Mother    • Stroke Mother    • Diabetes Father    • Cancer Father         thyroid   • Diabetes Sister    • Diabetes Brother    • No Known Problems Maternal Grandmother    • No Known Problems Maternal Grandfather    • No Known Problems Paternal Grandmother    • No Known Problems Paternal Grandfather    • No Known Problems Sister           Allergies: Allergies   Allergen Reactions   • Bee Venom Swelling   • Dust Mite Extract    • Fish-Derived Products - Food Allergy Hives   • Iodine - Food Allergy Hives   • Milk (Cow) Other (See Comments)     congestion   • Molds & Smuts    • Other Swelling   • Pollen Extract Other (See Comments)     Runny nose, watery eyes (also has corneal swelling) and itching  Triggers asthma   • Shrimp Flavor - Food Allergy Hives        Current Medications:     Current Outpatient Medications   Medication Instructions   • ascorbic acid (VITAMIN C) 500 mg tablet TAKE 1 TABLET (500 MG TOTAL) BY MOUTH DAILY.    • azelastine (OPTIVAR) 0.05 % ophthalmic solution 1 drop, Right Eye, 2 times daily   • bepotastine besilate (BEPREVE) 1.5 % op soln INSTILL 1 DROP INTO BOTH EYES TWICE A DAY   • buPROPion (WELLBUTRIN XL) 300 mg, Oral, Daily   • busPIRone (BUSPAR) 10 mg, Oral, Daily, Takes twice a day   • Calcium Carb-Cholecalciferol (OSCAL-D) 500 mg-200 units per tablet 1 tablet, Oral, 2 times daily with meals   • cetirizine (ZYRTEC) 10 mg, Oral, Daily, Takes 1 tablet 2 times a day   • Cholecalciferol (VITAMIN D3) 2000 units capsule TAKE 2 CAPSULES BY MOUTH DAILY INDICATIONS: VITAMIN D DEFICIENCY. • [facility] Aspirin Low Dose 81 MG EC tablet TAKE 1 TABLET BY MOUTH TWICE A DAY   • [facility] Vitamin B-12 1,000 mcg, Oral, Daily   • [facility] Vitamin C 500 mg, Oral, 2 times daily   • docusate sodium (COLACE) 100 mg, Oral, Daily PRN   • DULoxetine (CYMBALTA) 60 mg, Oral, 2 times daily   • ferrous sulfate 325 (65 Fe) mg tablet Oral, Daily with breakfast   • folic acid (FOLVITE) 1 mg, Oral, Daily   • gabapentin (NEURONTIN) 600 mg, Oral, 3 times daily   • hydrocortisone 1 % cream Topical, As needed, Applies daily   • methocarbamol (ROBAXIN) 500 mg, Oral, Every 8 hours PRN   • montelukast (SINGULAIR) 10 mg tablet TAKE 1 TABLET BY MOUTH EVERY DAY AT NIGHT   • Multiple Vitamins-Minerals (multivitamin with minerals) tablet 1 tablet, Oral, Daily   • ondansetron (ZOFRAN) 4 mg, Oral, Every 8 hours PRN   • simvastatin (ZOCOR) 40 mg, Oral, Daily at bedtime   • VENTOLIN  (90 Base) MCG/ACT inhaler INHALE 2 PUFFS EVERY 4 (FOUR) HOURS AS NEEDED FOR WHEEZING OR SHORTNESS OF BREATH. PRE-OP WORKSHEET DATA    Assessment of Pre-Operative Risks     MLJ Quality Hard Stops:  BMI (<40) : Estimated body mass index is 37.42 kg/m² as calculated from the following:    Height as of 8/18/23: 5' 4" (1.626 m). Weight as of 8/18/23: 98.9 kg (218 lb). Hgb ( >11): Lab Results   Component Value Date    HGB 14.8 09/11/2023     HbA1c (<7.0) :   Lab Results   Component Value Date    HGBA1C 5.9 (H) 09/11/2023     GFR (>60) (Less then 45 = Nephrology consult):  Estimated Creatinine Clearance: 95.5 mL/min (by C-G formula based on SCr of 0.77 mg/dL).       Active Decompensated Chronic Conditions which would delay surgery  No acutely decompensated medical issues such as recent CVA, MI, new onset arrhythmia, severe aortic stenosis, CHF, uncontrolled COPD       Exercise Capacity: (if less the 4 mets consider functional assessment via cardiac stress testing or consultation)    · Able to walk 2 blocks without symptoms?: Yes  · Able to walk 1 flights without symptoms?: Yes    Assessment of intra and post operative respiratory, hemodynamic and thrombotic risks     Prior Anesthesia Reactions: No     Personal history of venous thromboembolic disease? No    History of steroid use > 5 mg for >2 weeks within last year? No    Cardiac Risk Estimation: per the Revised Cardiac Risk Index (Circ. 100:1043, 1999),     The patient's risk factors for cardiac complications include :  none    Alva Espinoza has a in hospital cardiac risk of RCI RISK CLASS I (0 risk factors, risk of major cardiac compl. appr. 0.5%) based on RCRI calculator          Pre-Op Data Reviewed:       Laboratory Results: I have personally reviewed the pertinent laboratory results/reports     EKG:I have personally reviewed pertinent reports. . I personally reviewed and interpreted available tracings in the electronic medical record    Sinus rhythm with short AK  Possible Septal infarct , age undetermined  When compared with ECG of 23-FEB-2023 20:39,  No significant change was found  Consider lead placement   Confirmed by Veena Lentz ([id number]) on 9/13/2023     OLD RECORDS: reviewed old records in the chart review section if EHR on day of visit.     Previous cardiopulmonary studies within the past year:  · Echocardiogram: no  · Cardiac Catheterization: no  · Stress Test: no      Time of visit including pre-visit chart review, visit and post-visit coordination of plan and care , review of pre-surgical lab work, preparation and time spent documenting note in electronic medical record, time spent face-to-face in physical examination answering patient questions by care team 55 minutes             [address] [facility]

## 2023-09-18 DIAGNOSIS — Z98.890 S/P BILATERAL BREAST REDUCTION: ICD-10-CM

## 2023-09-18 DIAGNOSIS — N62 MACROMASTIA: ICD-10-CM

## 2023-09-18 PROBLEM — Z01.818 PRE-OPERATIVE CLEARANCE: Status: ACTIVE | Noted: 2023-09-18

## 2023-09-18 RX ORDER — DOCUSATE SODIUM 100 MG/1
CAPSULE, LIQUID FILLED ORAL
Qty: 30 CAPSULE | Refills: 0 | Status: SHIPPED | OUTPATIENT
Start: 2023-09-18

## 2023-09-19 ENCOUNTER — TELEPHONE (OUTPATIENT)
Dept: PLASTIC SURGERY | Facility: CLINIC | Age: 53
End: 2023-09-19

## 2023-09-19 ENCOUNTER — OFFICE VISIT (OUTPATIENT)
Age: 53
End: 2023-09-19
Payer: MEDICARE

## 2023-09-19 ENCOUNTER — PREP FOR PROCEDURE (OUTPATIENT)
Dept: PLASTIC SURGERY | Facility: CLINIC | Age: 53
End: 2023-09-19

## 2023-09-19 ENCOUNTER — OFFICE VISIT (OUTPATIENT)
Dept: PLASTIC SURGERY | Facility: CLINIC | Age: 53
End: 2023-09-19

## 2023-09-19 VITALS
WEIGHT: 222.6 LBS | BODY MASS INDEX: 38 KG/M2 | TEMPERATURE: 97.9 F | SYSTOLIC BLOOD PRESSURE: 126 MMHG | HEART RATE: 86 BPM | DIASTOLIC BLOOD PRESSURE: 100 MMHG | HEIGHT: 64 IN

## 2023-09-19 VITALS
DIASTOLIC BLOOD PRESSURE: 64 MMHG | WEIGHT: 222.9 LBS | BODY MASS INDEX: 38.06 KG/M2 | HEIGHT: 64 IN | SYSTOLIC BLOOD PRESSURE: 110 MMHG

## 2023-09-19 DIAGNOSIS — L98.9 SKIN LESION OF FACE: Primary | ICD-10-CM

## 2023-09-19 DIAGNOSIS — I15.9 SECONDARY HYPERTENSION: ICD-10-CM

## 2023-09-19 DIAGNOSIS — F41.9 ANXIETY AND DEPRESSION: ICD-10-CM

## 2023-09-19 DIAGNOSIS — K21.9 GASTROESOPHAGEAL REFLUX DISEASE, UNSPECIFIED WHETHER ESOPHAGITIS PRESENT: ICD-10-CM

## 2023-09-19 DIAGNOSIS — Z01.818 PRE-OPERATIVE CLEARANCE: ICD-10-CM

## 2023-09-19 DIAGNOSIS — F32.A ANXIETY AND DEPRESSION: ICD-10-CM

## 2023-09-19 DIAGNOSIS — R73.03 PREDIABETES: ICD-10-CM

## 2023-09-19 DIAGNOSIS — Z98.84 S/P LAPAROSCOPIC SLEEVE GASTRECTOMY: ICD-10-CM

## 2023-09-19 DIAGNOSIS — G47.30 SLEEP APNEA, UNSPECIFIED TYPE: ICD-10-CM

## 2023-09-19 DIAGNOSIS — M17.11 PRIMARY OSTEOARTHRITIS OF RIGHT KNEE: ICD-10-CM

## 2023-09-19 PROCEDURE — 99214 OFFICE O/P EST MOD 30 MIN: CPT | Performed by: INTERNAL MEDICINE

## 2023-09-19 RX ORDER — BUPROPION HYDROCHLORIDE 150 MG/1
TABLET ORAL
COMMUNITY
Start: 2023-09-19

## 2023-09-19 NOTE — PROGRESS NOTES
Assessment/Plan:    Patient is a 54-year-old female, known to our practice, who presents for evaluation of a skin lesion of the right medial eyebrow. Please see HPI. I discussed surgical excision with complex closure. Patient understood and agreed. This will be done under local anesthesia. Discussed options, including forgoing surgery, as well as benefits and risks of surgery including but not limited to anesthesia, bleeding, infection, scarring and potential need for additional procedures. Consent was obtained and all questions answered to their satisfaction. We will plan for surgery at their earliest convenience. No problem-specific Assessment & Plan notes found for this encounter. Diagnoses and all orders for this visit:    Skin lesion of face          Subjective:      Patient ID: Anderson Harvey is a 46 y.o. female. HPI     Patient reports that she continues to be bothered by a skin lesion on her medial eyebrow. She understands that this will be done in the operating room. Upon evaluation, the patient has an approximately 0.5 x 0.5 cm raised, flesh-colored lesion of her medial eyebrow. Please see photo in media. The following portions of the patient's history were reviewed and updated as appropriate:   She  has a past medical history of Anxiety, Asthma, Cancer ([address]), Chronic pain syndrome, CPAP (continuous positive airway pressure) dependence, Depression, Diabetes mellitus ([address]), GERD (gastroesophageal reflux disease), HTN (hypertension), Hyperlipidemia, Hypertension, Lumbar radiculopathy, Prediabetes, and Sleep apnea.   She   Patient Active Problem List    Diagnosis Date Noted   • Skin lesion of face 09/19/2023   • Pre-operative clearance 09/18/2023   • Primary osteoarthritis of right knee 09/14/2023   • Fall 02/23/2023   • Ambulatory dysfunction 02/23/2023   • Status post left knee replacement 02/23/2023   • Asthma    • GERD (gastroesophageal reflux disease)    • Sleep apnea    • Skin lesion of breast 11/21/2022   • S/P bilateral breast reduction 10/03/2022   • Macromastia 09/23/2022   • Syncope and collapse 02/10/2022   • Chronic pain syndrome 02/10/2022   • Neck pain 01/10/2022   • S/P laparoscopic sleeve gastrectomy 06/17/2021   • Cervical spondylosis    • Chronic pain of both knees 12/10/2019   • Hypertension 11/29/2019   • Hyperlipidemia 11/29/2019   • Prediabetes 11/29/2019   • Primary osteoarthritis of left knee 01/31/2019   • Lumbar spondylosis 04/12/2018   • Degenerative disc disease, cervical 12/29/2017   • Lumbar radiculopathy 12/29/2017   • Sacroiliitis ([address]) 12/29/2017   • Bilateral carpal tunnel syndrome 12/15/2017   • Keratoconus of both eyes 11/14/2017   • Endometrial adenocarcinoma ([address]) 07/18/2017   • Abnormal Pap smear of vagina 09/08/2016   • Allergic rhinitis 03/30/2016   • Primary localized osteoarthritis of left hip 07/23/2015   • Anxiety and depression 01/27/2014   • Insomnia 11/25/2013   • Vitamin D deficiency 08/19/2013   • Osteoarthrosis involving multiple sites 06/28/2013     She  has a past surgical history that includes Knee arthroscopy (Right); Total hip arthroplasty (Bilateral); Lumbar fusion; Woody-en-y procedure (05/27/2021); Hysterectomy; GASTRECTOMY SLEEVE LAPAROSCOPIC; pr breast reduction (Bilateral, 09/23/2022); pr arthrp kne condyle&platu medial&lat compartments (Left, 02/09/2023); and Eye surgery (07/26/2022). Her family history includes Cancer in her father; Diabetes in her brother, father, mother, and sister; No Known Problems in her maternal grandfather, maternal grandmother, paternal grandfather, paternal grandmother, and sister; Stroke in her mother. She  reports that she has quit smoking. She has never used smokeless tobacco. She reports that she does not currently use alcohol. She reports that she does not currently use drugs.   Current Outpatient Medications   Medication Sig Dispense Refill   • ascorbic acid (VITAMIN C) 500 mg tablet TAKE 1 TABLET (500 MG TOTAL) BY MOUTH DAILY. 90 tablet 1   • azelastine (OPTIVAR) 0.05 % ophthalmic solution Administer 1 drop to the right eye 2 (two) times a day     • bepotastine besilate (BEPREVE) 1.5 % op soln INSTILL 1 DROP INTO BOTH EYES TWICE A DAY     • buPROPion (WELLBUTRIN XL) 300 mg 24 hr tablet Take 300 mg by mouth daily     • busPIRone (BUSPAR) 10 mg tablet Take 10 mg by mouth in the morning Takes twice a day     • Calcium Carb-Cholecalciferol (OSCAL-D) 500 mg-200 units per tablet Take 1 tablet by mouth 2 (two) times a day with meals     • cetirizine (ZyrTEC) 10 mg tablet Take 10 mg by mouth daily Takes 1 tablet 2 times a day     • Cholecalciferol (VITAMIN D3) 2000 units capsule TAKE 2 CAPSULES BY MOUTH DAILY INDICATIONS: VITAMIN D DEFICIENCY. 5   • [facility] Aspirin Low Dose 81 MG EC tablet TAKE 1 TABLET BY MOUTH TWICE A DAY (Patient taking differently: For POST OP) 60 tablet 1   • [facility] Vitamin B-12 1000 MCG tablet Take 1,000 mcg by mouth daily     • [facility] Vitamin C 500 MG tablet TAKE 1 TABLET BY MOUTH TWICE A  tablet 1   • docusate sodium (COLACE) 100 mg capsule TAKE 1 CAPSULE BY MOUTH DAILY AS NEEDED FOR CONSTIPATION.  30 capsule 0   • DULoxetine (CYMBALTA) 60 mg delayed release capsule Take 60 mg by mouth 2 (two) times a day     • ferrous sulfate 325 (65 Fe) mg tablet Take by mouth daily with breakfast     • folic acid (FOLVITE) 1 mg tablet Take 1 tablet (1 mg total) by mouth daily 30 tablet 1   • gabapentin (NEURONTIN) 300 mg capsule Take 2 capsules (600 mg total) by mouth 3 (three) times a day 180 capsule 2   • hydrocortisone 1 % cream Apply topically as needed Applies daily     • methocarbamol (ROBAXIN) 500 mg tablet Take 1 tablet (500 mg total) by mouth every 8 (eight) hours as needed for muscle spasms 90 tablet 2   • montelukast (SINGULAIR) 10 mg tablet TAKE 1 TABLET BY MOUTH EVERY DAY AT NIGHT     • Multiple Vitamins-Minerals (multivitamin with minerals) tablet Take 1 tablet by mouth daily 30 tablet 0 • ondansetron (ZOFRAN) 4 mg tablet Take 1 tablet (4 mg total) by mouth every 8 (eight) hours as needed for nausea or vomiting 20 tablet 0   • simvastatin (ZOCOR) 40 mg tablet Take 40 mg by mouth daily at bedtime     • VENTOLIN  (90 Base) MCG/ACT inhaler INHALE 2 PUFFS EVERY 4 (FOUR) HOURS AS NEEDED FOR WHEEZING OR SHORTNESS OF BREATH.  2     No current facility-administered medications for this visit. Current Outpatient Medications on File Prior to Visit   Medication Sig   • ascorbic acid (VITAMIN C) 500 mg tablet TAKE 1 TABLET (500 MG TOTAL) BY MOUTH DAILY. • azelastine (OPTIVAR) 0.05 % ophthalmic solution Administer 1 drop to the right eye 2 (two) times a day   • bepotastine besilate (BEPREVE) 1.5 % op soln INSTILL 1 DROP INTO BOTH EYES TWICE A DAY   • buPROPion (WELLBUTRIN XL) 300 mg 24 hr tablet Take 300 mg by mouth daily   • busPIRone (BUSPAR) 10 mg tablet Take 10 mg by mouth in the morning Takes twice a day   • Calcium Carb-Cholecalciferol (OSCAL-D) 500 mg-200 units per tablet Take 1 tablet by mouth 2 (two) times a day with meals   • cetirizine (ZyrTEC) 10 mg tablet Take 10 mg by mouth daily Takes 1 tablet 2 times a day   • Cholecalciferol (VITAMIN D3) 2000 units capsule TAKE 2 CAPSULES BY MOUTH DAILY INDICATIONS: VITAMIN D DEFICIENCY. • [facility] Aspirin Low Dose 81 MG EC tablet TAKE 1 TABLET BY MOUTH TWICE A DAY (Patient taking differently: For POST OP)   • [facility] Vitamin B-12 1000 MCG tablet Take 1,000 mcg by mouth daily   • [facility] Vitamin C 500 MG tablet TAKE 1 TABLET BY MOUTH TWICE A DAY   • docusate sodium (COLACE) 100 mg capsule TAKE 1 CAPSULE BY MOUTH DAILY AS NEEDED FOR CONSTIPATION.    • DULoxetine (CYMBALTA) 60 mg delayed release capsule Take 60 mg by mouth 2 (two) times a day   • ferrous sulfate 325 (65 Fe) mg tablet Take by mouth daily with breakfast   • folic acid (FOLVITE) 1 mg tablet Take 1 tablet (1 mg total) by mouth daily   • gabapentin (NEURONTIN) 300 mg capsule Take 2 capsules (600 mg total) by mouth 3 (three) times a day   • hydrocortisone 1 % cream Apply topically as needed Applies daily   • methocarbamol (ROBAXIN) 500 mg tablet Take 1 tablet (500 mg total) by mouth every 8 (eight) hours as needed for muscle spasms   • montelukast (SINGULAIR) 10 mg tablet TAKE 1 TABLET BY MOUTH EVERY DAY AT NIGHT   • Multiple Vitamins-Minerals (multivitamin with minerals) tablet Take 1 tablet by mouth daily   • ondansetron (ZOFRAN) 4 mg tablet Take 1 tablet (4 mg total) by mouth every 8 (eight) hours as needed for nausea or vomiting   • simvastatin (ZOCOR) 40 mg tablet Take 40 mg by mouth daily at bedtime   • VENTOLIN  (90 Base) MCG/ACT inhaler INHALE 2 PUFFS EVERY 4 (FOUR) HOURS AS NEEDED FOR WHEEZING OR SHORTNESS OF BREATH. No current facility-administered medications on file prior to visit. She is allergic to bee venom, dust mite extract, fish-derived products - food allergy, iodine - food allergy, milk (cow), molds & smuts, other, pollen extract, and shrimp flavor - food allergy. .    Review of Systems    A 12 point ROS was completed and is negative except as per HPI     Objective:      /100   Pulse 86   Temp 97.9 °F (36.6 °C)   Ht 5' 4" (1.626 m)   Wt 101 kg (222 lb 9.6 oz)   BMI 38.21 kg/m²          Physical Exam  Vitals and nursing note reviewed. Constitutional:       General: She is not in acute distress. Appearance: Normal appearance. She is obese. She is not ill-appearing. HENT:      Head: Normocephalic and atraumatic. Nose: Nose normal.      Mouth/Throat:      Mouth: Mucous membranes are moist.   Eyes:      Extraocular Movements: Extraocular movements intact. Conjunctiva/sclera: Conjunctivae normal.      Pupils: Pupils are equal, round, and reactive to light. Neck:      Vascular: No carotid bruit. Cardiovascular:      Rate and Rhythm: Normal rate and regular rhythm. Pulses: Normal pulses. Heart sounds: Normal heart sounds.    Pulmonary: Effort: Pulmonary effort is normal. No respiratory distress. Breath sounds: Normal breath sounds. Abdominal:      General: Abdomen is flat. Palpations: Abdomen is soft. Musculoskeletal:         General: No swelling, tenderness, deformity or signs of injury. Normal range of motion. Cervical back: Normal range of motion and neck supple. No rigidity or tenderness. Right lower leg: No edema. Left lower leg: No edema. Lymphadenopathy:      Cervical: No cervical adenopathy. Skin:     General: Skin is warm and dry. Comments: See HPI    Neurological:      General: No focal deficit present. Mental Status: She is alert and oriented to person, place, and time. Cranial Nerves: No cranial nerve deficit. Sensory: No sensory deficit. Motor: No weakness.    Psychiatric:         Mood and Affect: Mood normal.         Behavior: Behavior normal.

## 2023-09-28 ENCOUNTER — EVALUATION (OUTPATIENT)
Dept: PHYSICAL THERAPY | Facility: REHABILITATION | Age: 53
End: 2023-09-28
Payer: MEDICARE

## 2023-09-28 DIAGNOSIS — Z01.818 PREOPERATIVE TESTING: ICD-10-CM

## 2023-09-28 DIAGNOSIS — M17.11 PRIMARY OSTEOARTHRITIS OF RIGHT KNEE: ICD-10-CM

## 2023-09-28 PROCEDURE — 97161 PT EVAL LOW COMPLEX 20 MIN: CPT | Performed by: PHYSICAL THERAPIST

## 2023-10-05 ENCOUNTER — HOSPITAL ENCOUNTER (OUTPATIENT)
Facility: HOSPITAL | Age: 53
Setting detail: OUTPATIENT SURGERY
Discharge: HOME | End: 2023-10-06
Attending: ORTHOPAEDIC SURGERY | Admitting: ORTHOPAEDIC SURGERY
Payer: MEDICARE

## 2023-10-05 ENCOUNTER — ANESTHESIA (OUTPATIENT)
Dept: PERIOP | Facility: HOSPITAL | Age: 53
End: 2023-10-05
Payer: MEDICARE

## 2023-10-05 DIAGNOSIS — Z01.818 PREOPERATIVE TESTING: ICD-10-CM

## 2023-10-05 DIAGNOSIS — M17.11 PRIMARY OSTEOARTHRITIS OF RIGHT KNEE: ICD-10-CM

## 2023-10-05 DIAGNOSIS — Z96.652 STATUS POST LEFT KNEE REPLACEMENT: ICD-10-CM

## 2023-10-05 DIAGNOSIS — Z96.651 STATUS POST TOTAL KNEE REPLACEMENT USING CEMENT, RIGHT: Primary | ICD-10-CM

## 2023-10-05 LAB — GLUCOSE SERPL-MCNC: 148 MG/DL (ref 65–140)

## 2023-10-05 PROCEDURE — C9290 INJ, BUPIVACAINE LIPOSOME: HCPCS | Performed by: ANESTHESIOLOGY

## 2023-10-05 PROCEDURE — 82948 REAGENT STRIP/BLOOD GLUCOSE: CPT

## 2023-10-05 PROCEDURE — 97167 OT EVAL HIGH COMPLEX 60 MIN: CPT

## 2023-10-05 PROCEDURE — C1776 JOINT DEVICE (IMPLANTABLE): HCPCS | Performed by: ORTHOPAEDIC SURGERY

## 2023-10-05 PROCEDURE — 97535 SELF CARE MNGMENT TRAINING: CPT

## 2023-10-05 PROCEDURE — C1713 ANCHOR/SCREW BN/BN,TIS/BN: HCPCS | Performed by: ORTHOPAEDIC SURGERY

## 2023-10-05 PROCEDURE — 27447 TOTAL KNEE ARTHROPLASTY: CPT | Performed by: PHYSICIAN ASSISTANT

## 2023-10-05 PROCEDURE — 97163 PT EVAL HIGH COMPLEX 45 MIN: CPT

## 2023-10-05 PROCEDURE — 97116 GAIT TRAINING THERAPY: CPT

## 2023-10-05 PROCEDURE — 27447 TOTAL KNEE ARTHROPLASTY: CPT | Performed by: ORTHOPAEDIC SURGERY

## 2023-10-05 DEVICE — ATTUNE KNEE SYSTEM TIBIAL INSERT ROTATING PLATFORM POSTERIOR STABILIZED 4 7MM AOX
Type: IMPLANTABLE DEVICE | Site: KNEE | Status: FUNCTIONAL
Brand: ATTUNE

## 2023-10-05 DEVICE — ATTUNE KNEE SYSTEM FEMORAL POSTERIOR STABILIZED SIZE 4 RIGHT CEMENTED
Type: IMPLANTABLE DEVICE | Site: KNEE | Status: FUNCTIONAL
Brand: ATTUNE

## 2023-10-05 DEVICE — ATTUNE PATELLA MEDIALIZED DOME 35MM CEMENTED AOX
Type: IMPLANTABLE DEVICE | Site: KNEE | Status: FUNCTIONAL
Brand: ATTUNE

## 2023-10-05 DEVICE — SMARTSET HV HIGH VISCOSITY BONE CEMENT 40G
Type: IMPLANTABLE DEVICE | Site: KNEE | Status: FUNCTIONAL
Brand: SMARTSET

## 2023-10-05 DEVICE — ATTUNE KNEE SYSTEM TIBIAL BASE ROTATING PLATFORM SIZE 4 CEMENTED
Type: IMPLANTABLE DEVICE | Site: KNEE | Status: FUNCTIONAL
Brand: ATTUNE

## 2023-10-05 RX ORDER — HYDROMORPHONE HCL IN WATER/PF 6 MG/30 ML
0.2 PATIENT CONTROLLED ANALGESIA SYRINGE INTRAVENOUS
Status: DISCONTINUED | OUTPATIENT
Start: 2023-10-05 | End: 2023-10-05 | Stop reason: HOSPADM

## 2023-10-05 RX ORDER — ALBUMIN, HUMAN INJ 5% 5 %
25 SOLUTION INTRAVENOUS ONCE
Status: COMPLETED | OUTPATIENT
Start: 2023-10-05 | End: 2023-10-05

## 2023-10-05 RX ORDER — MIDAZOLAM HYDROCHLORIDE 2 MG/2ML
INJECTION, SOLUTION INTRAMUSCULAR; INTRAVENOUS AS NEEDED
Status: DISCONTINUED | OUTPATIENT
Start: 2023-10-05 | End: 2023-10-05

## 2023-10-05 RX ORDER — DIPHENHYDRAMINE HYDROCHLORIDE 50 MG/ML
12.5 INJECTION INTRAMUSCULAR; INTRAVENOUS ONCE AS NEEDED
Status: DISCONTINUED | OUTPATIENT
Start: 2023-10-05 | End: 2023-10-05 | Stop reason: HOSPADM

## 2023-10-05 RX ORDER — METHOCARBAMOL 750 MG/1
750 TABLET, FILM COATED ORAL EVERY 6 HOURS SCHEDULED
Status: DISCONTINUED | OUTPATIENT
Start: 2023-10-05 | End: 2023-10-06 | Stop reason: HOSPADM

## 2023-10-05 RX ORDER — BUPIVACAINE HYDROCHLORIDE 5 MG/ML
INJECTION, SOLUTION EPIDURAL; INTRACAUDAL
Status: COMPLETED | OUTPATIENT
Start: 2023-10-05 | End: 2023-10-05

## 2023-10-05 RX ORDER — SODIUM CHLORIDE, SODIUM LACTATE, POTASSIUM CHLORIDE, CALCIUM CHLORIDE 600; 310; 30; 20 MG/100ML; MG/100ML; MG/100ML; MG/100ML
75 INJECTION, SOLUTION INTRAVENOUS CONTINUOUS
Status: DISCONTINUED | OUTPATIENT
Start: 2023-10-05 | End: 2023-10-06 | Stop reason: HOSPADM

## 2023-10-05 RX ORDER — VENLAFAXINE HYDROCHLORIDE 37.5 MG/1
37.5 CAPSULE, EXTENDED RELEASE ORAL DAILY
COMMUNITY
Start: 2023-09-29 | End: 2024-09-28

## 2023-10-05 RX ORDER — PROPOFOL 10 MG/ML
INJECTION, EMULSION INTRAVENOUS AS NEEDED
Status: DISCONTINUED | OUTPATIENT
Start: 2023-10-05 | End: 2023-10-05

## 2023-10-05 RX ORDER — DOCUSATE SODIUM 100 MG/1
100 CAPSULE, LIQUID FILLED ORAL DAILY
Status: DISCONTINUED | OUTPATIENT
Start: 2023-10-05 | End: 2023-10-06 | Stop reason: HOSPADM

## 2023-10-05 RX ORDER — ACETAMINOPHEN 325 MG/1
650 TABLET ORAL EVERY 6 HOURS PRN
Status: DISCONTINUED | OUTPATIENT
Start: 2023-10-05 | End: 2023-10-06 | Stop reason: HOSPADM

## 2023-10-05 RX ORDER — TOPIRAMATE 25 MG/1
25 TABLET ORAL 2 TIMES DAILY
Status: DISCONTINUED | OUTPATIENT
Start: 2023-10-05 | End: 2023-10-06 | Stop reason: HOSPADM

## 2023-10-05 RX ORDER — ACETAMINOPHEN 325 MG/1
975 TABLET ORAL ONCE
Status: COMPLETED | OUTPATIENT
Start: 2023-10-05 | End: 2023-10-05

## 2023-10-05 RX ORDER — FENTANYL CITRATE/PF 50 MCG/ML
50 SYRINGE (ML) INJECTION
Status: DISCONTINUED | OUTPATIENT
Start: 2023-10-05 | End: 2023-10-05 | Stop reason: HOSPADM

## 2023-10-05 RX ORDER — ONDANSETRON 2 MG/ML
4 INJECTION INTRAMUSCULAR; INTRAVENOUS ONCE AS NEEDED
Status: DISCONTINUED | OUTPATIENT
Start: 2023-10-05 | End: 2023-10-05 | Stop reason: HOSPADM

## 2023-10-05 RX ORDER — FERROUS SULFATE 325(65) MG
325 TABLET ORAL
Status: DISCONTINUED | OUTPATIENT
Start: 2023-10-06 | End: 2023-10-06 | Stop reason: HOSPADM

## 2023-10-05 RX ORDER — TOPIRAMATE 25 MG/1
25 TABLET ORAL 2 TIMES DAILY
COMMUNITY
Start: 2023-09-29 | End: 2024-09-28

## 2023-10-05 RX ORDER — KETOROLAC TROMETHAMINE 30 MG/ML
INJECTION, SOLUTION INTRAMUSCULAR; INTRAVENOUS AS NEEDED
Status: DISCONTINUED | OUTPATIENT
Start: 2023-10-05 | End: 2023-10-05 | Stop reason: HOSPADM

## 2023-10-05 RX ORDER — ENOXAPARIN SODIUM 100 MG/ML
40 INJECTION SUBCUTANEOUS EVERY 24 HOURS
Status: DISCONTINUED | OUTPATIENT
Start: 2023-10-05 | End: 2023-10-06 | Stop reason: HOSPADM

## 2023-10-05 RX ORDER — BUPIVACAINE HYDROCHLORIDE AND EPINEPHRINE 5; 5 MG/ML; UG/ML
INJECTION, SOLUTION EPIDURAL; INTRACAUDAL; PERINEURAL AS NEEDED
Status: DISCONTINUED | OUTPATIENT
Start: 2023-10-05 | End: 2023-10-05 | Stop reason: HOSPADM

## 2023-10-05 RX ORDER — METOCLOPRAMIDE HYDROCHLORIDE 5 MG/ML
10 INJECTION INTRAMUSCULAR; INTRAVENOUS ONCE AS NEEDED
Status: DISCONTINUED | OUTPATIENT
Start: 2023-10-05 | End: 2023-10-05 | Stop reason: HOSPADM

## 2023-10-05 RX ORDER — SODIUM CHLORIDE, SODIUM LACTATE, POTASSIUM CHLORIDE, CALCIUM CHLORIDE 600; 310; 30; 20 MG/100ML; MG/100ML; MG/100ML; MG/100ML
125 INJECTION, SOLUTION INTRAVENOUS CONTINUOUS
Status: DISCONTINUED | OUTPATIENT
Start: 2023-10-05 | End: 2023-10-05 | Stop reason: SDUPTHER

## 2023-10-05 RX ORDER — PROPOFOL 10 MG/ML
INJECTION, EMULSION INTRAVENOUS CONTINUOUS PRN
Status: DISCONTINUED | OUTPATIENT
Start: 2023-10-05 | End: 2023-10-05

## 2023-10-05 RX ORDER — HYDROMORPHONE HCL/PF 1 MG/ML
0.5 SYRINGE (ML) INJECTION EVERY 2 HOUR PRN
Status: DISCONTINUED | OUTPATIENT
Start: 2023-10-05 | End: 2023-10-06 | Stop reason: HOSPADM

## 2023-10-05 RX ORDER — CALCIUM CARBONATE 500 MG/1
1000 TABLET, CHEWABLE ORAL DAILY PRN
Status: DISCONTINUED | OUTPATIENT
Start: 2023-10-05 | End: 2023-10-06 | Stop reason: HOSPADM

## 2023-10-05 RX ORDER — SODIUM CHLORIDE, SODIUM LACTATE, POTASSIUM CHLORIDE, CALCIUM CHLORIDE 600; 310; 30; 20 MG/100ML; MG/100ML; MG/100ML; MG/100ML
INJECTION, SOLUTION INTRAVENOUS CONTINUOUS PRN
Status: DISCONTINUED | OUTPATIENT
Start: 2023-10-05 | End: 2023-10-05

## 2023-10-05 RX ORDER — ASCORBIC ACID 500 MG
500 TABLET ORAL DAILY
Status: DISCONTINUED | OUTPATIENT
Start: 2023-10-05 | End: 2023-10-05 | Stop reason: SDUPTHER

## 2023-10-05 RX ORDER — VENLAFAXINE HYDROCHLORIDE 37.5 MG/1
37.5 CAPSULE, EXTENDED RELEASE ORAL DAILY
Status: DISCONTINUED | OUTPATIENT
Start: 2023-10-05 | End: 2023-10-06 | Stop reason: HOSPADM

## 2023-10-05 RX ORDER — FENTANYL CITRATE 50 UG/ML
INJECTION, SOLUTION INTRAMUSCULAR; INTRAVENOUS AS NEEDED
Status: DISCONTINUED | OUTPATIENT
Start: 2023-10-05 | End: 2023-10-05

## 2023-10-05 RX ORDER — TRANEXAMIC ACID 100 MG/ML
INJECTION, SOLUTION INTRAVENOUS AS NEEDED
Status: DISCONTINUED | OUTPATIENT
Start: 2023-10-05 | End: 2023-10-05 | Stop reason: HOSPADM

## 2023-10-05 RX ORDER — HYDROMORPHONE HCL/PF 1 MG/ML
0.5 SYRINGE (ML) INJECTION
Status: DISCONTINUED | OUTPATIENT
Start: 2023-10-05 | End: 2023-10-05 | Stop reason: HOSPADM

## 2023-10-05 RX ORDER — BUSPIRONE HYDROCHLORIDE 10 MG/1
10 TABLET ORAL DAILY
Status: DISCONTINUED | OUTPATIENT
Start: 2023-10-05 | End: 2023-10-06 | Stop reason: HOSPADM

## 2023-10-05 RX ORDER — OXYCODONE HYDROCHLORIDE 10 MG/1
10 TABLET ORAL EVERY 4 HOURS PRN
Status: DISCONTINUED | OUTPATIENT
Start: 2023-10-05 | End: 2023-10-06 | Stop reason: HOSPADM

## 2023-10-05 RX ORDER — AZELASTINE HYDROCHLORIDE 0.5 MG/ML
1 SOLUTION/ DROPS OPHTHALMIC 2 TIMES DAILY
Status: DISCONTINUED | OUTPATIENT
Start: 2023-10-05 | End: 2023-10-06 | Stop reason: HOSPADM

## 2023-10-05 RX ORDER — CHLORHEXIDINE GLUCONATE ORAL RINSE 1.2 MG/ML
15 SOLUTION DENTAL ONCE
Status: COMPLETED | OUTPATIENT
Start: 2023-10-05 | End: 2023-10-05

## 2023-10-05 RX ORDER — OXYCODONE HYDROCHLORIDE 5 MG/1
5 TABLET ORAL EVERY 4 HOURS PRN
Status: DISCONTINUED | OUTPATIENT
Start: 2023-10-05 | End: 2023-10-06 | Stop reason: HOSPADM

## 2023-10-05 RX ORDER — CEFAZOLIN SODIUM 1 G/3ML
INJECTION, POWDER, FOR SOLUTION INTRAMUSCULAR; INTRAVENOUS AS NEEDED
Status: DISCONTINUED | OUTPATIENT
Start: 2023-10-05 | End: 2023-10-05

## 2023-10-05 RX ORDER — BEPOTASTINE BESILATE 15 MG/ML
1 SOLUTION/ DROPS OPHTHALMIC DAILY
Status: DISCONTINUED | OUTPATIENT
Start: 2023-10-05 | End: 2023-10-06 | Stop reason: HOSPADM

## 2023-10-05 RX ORDER — CEFAZOLIN SODIUM 1 G/50ML
1000 SOLUTION INTRAVENOUS EVERY 8 HOURS
Status: COMPLETED | OUTPATIENT
Start: 2023-10-05 | End: 2023-10-06

## 2023-10-05 RX ORDER — SENNOSIDES 8.6 MG
1 TABLET ORAL DAILY
Status: DISCONTINUED | OUTPATIENT
Start: 2023-10-05 | End: 2023-10-06 | Stop reason: HOSPADM

## 2023-10-05 RX ORDER — GLYCOPYRROLATE 0.2 MG/ML
INJECTION INTRAMUSCULAR; INTRAVENOUS AS NEEDED
Status: DISCONTINUED | OUTPATIENT
Start: 2023-10-05 | End: 2023-10-05

## 2023-10-05 RX ORDER — METHOCARBAMOL 500 MG/1
500 TABLET, FILM COATED ORAL EVERY 8 HOURS PRN
Status: DISCONTINUED | OUTPATIENT
Start: 2023-10-05 | End: 2023-10-06 | Stop reason: HOSPADM

## 2023-10-05 RX ORDER — MORPHINE SULFATE 10 MG/ML
INJECTION, SOLUTION INTRAMUSCULAR; INTRAVENOUS AS NEEDED
Status: DISCONTINUED | OUTPATIENT
Start: 2023-10-05 | End: 2023-10-05 | Stop reason: HOSPADM

## 2023-10-05 RX ORDER — CLONIDINE 100 UG/ML
INJECTION, SOLUTION EPIDURAL AS NEEDED
Status: DISCONTINUED | OUTPATIENT
Start: 2023-10-05 | End: 2023-10-05

## 2023-10-05 RX ORDER — BUPROPION HYDROCHLORIDE 150 MG/1
300 TABLET ORAL DAILY
Status: DISCONTINUED | OUTPATIENT
Start: 2023-10-05 | End: 2023-10-06 | Stop reason: HOSPADM

## 2023-10-05 RX ORDER — SODIUM CHLORIDE, SODIUM LACTATE, POTASSIUM CHLORIDE, CALCIUM CHLORIDE 600; 310; 30; 20 MG/100ML; MG/100ML; MG/100ML; MG/100ML
125 INJECTION, SOLUTION INTRAVENOUS CONTINUOUS
Status: DISCONTINUED | OUTPATIENT
Start: 2023-10-05 | End: 2023-10-05 | Stop reason: ALTCHOICE

## 2023-10-05 RX ORDER — LIDOCAINE HYDROCHLORIDE 10 MG/ML
0.5 INJECTION, SOLUTION EPIDURAL; INFILTRATION; INTRACAUDAL; PERINEURAL ONCE AS NEEDED
Status: DISCONTINUED | OUTPATIENT
Start: 2023-10-05 | End: 2023-10-06 | Stop reason: HOSPADM

## 2023-10-05 RX ORDER — LIDOCAINE HYDROCHLORIDE 10 MG/ML
INJECTION, SOLUTION EPIDURAL; INFILTRATION; INTRACAUDAL; PERINEURAL AS NEEDED
Status: DISCONTINUED | OUTPATIENT
Start: 2023-10-05 | End: 2023-10-05

## 2023-10-05 RX ORDER — ASCORBIC ACID 500 MG
500 TABLET ORAL 2 TIMES DAILY
Status: DISCONTINUED | OUTPATIENT
Start: 2023-10-05 | End: 2023-10-06 | Stop reason: HOSPADM

## 2023-10-05 RX ORDER — ONDANSETRON 2 MG/ML
4 INJECTION INTRAMUSCULAR; INTRAVENOUS EVERY 6 HOURS PRN
Status: DISCONTINUED | OUTPATIENT
Start: 2023-10-05 | End: 2023-10-06 | Stop reason: HOSPADM

## 2023-10-05 RX ORDER — FOLIC ACID 1 MG/1
1 TABLET ORAL DAILY
Status: DISCONTINUED | OUTPATIENT
Start: 2023-10-05 | End: 2023-10-06 | Stop reason: HOSPADM

## 2023-10-05 RX ORDER — BUPIVACAINE HYDROCHLORIDE 7.5 MG/ML
INJECTION, SOLUTION INTRASPINAL AS NEEDED
Status: DISCONTINUED | OUTPATIENT
Start: 2023-10-05 | End: 2023-10-05

## 2023-10-05 RX ORDER — GABAPENTIN 300 MG/1
600 CAPSULE ORAL 3 TIMES DAILY
Status: DISCONTINUED | OUTPATIENT
Start: 2023-10-05 | End: 2023-10-06 | Stop reason: HOSPADM

## 2023-10-05 RX ORDER — TRANEXAMIC ACID 100 MG/ML
INJECTION, SOLUTION INTRAVENOUS AS NEEDED
Status: DISCONTINUED | OUTPATIENT
Start: 2023-10-05 | End: 2023-10-05

## 2023-10-05 RX ORDER — CHLORHEXIDINE GLUCONATE 4 G/100ML
SOLUTION TOPICAL DAILY PRN
Status: DISCONTINUED | OUTPATIENT
Start: 2023-10-05 | End: 2023-10-06 | Stop reason: HOSPADM

## 2023-10-05 RX ADMIN — PROPOFOL 40 MG: 10 INJECTION, EMULSION INTRAVENOUS at 09:46

## 2023-10-05 RX ADMIN — BUSPIRONE HYDROCHLORIDE 10 MG: 10 TABLET ORAL at 14:04

## 2023-10-05 RX ADMIN — TRANEXAMIC ACID 1 G: 1 INJECTION, SOLUTION INTRAVENOUS at 09:49

## 2023-10-05 RX ADMIN — BUPIVACAINE HYDROCHLORIDE 10 ML: 5 INJECTION, SOLUTION EPIDURAL; INTRACAUDAL at 09:07

## 2023-10-05 RX ADMIN — FENTANYL CITRATE 50 MCG: 50 INJECTION, SOLUTION INTRAMUSCULAR; INTRAVENOUS at 10:49

## 2023-10-05 RX ADMIN — BUPIVACAINE HYDROCHLORIDE IN DEXTROSE 1.4 ML: 7.5 INJECTION, SOLUTION SUBARACHNOID at 09:44

## 2023-10-05 RX ADMIN — ENOXAPARIN SODIUM 40 MG: 40 INJECTION SUBCUTANEOUS at 22:58

## 2023-10-05 RX ADMIN — CHLORHEXIDINE GLUCONATE 0.12% ORAL RINSE 15 ML: 1.2 LIQUID ORAL at 08:11

## 2023-10-05 RX ADMIN — METHOCARBAMOL TABLETS 750 MG: 750 TABLET, COATED ORAL at 20:59

## 2023-10-05 RX ADMIN — BUPIVACAINE HYDROCHLORIDE 20 ML: 5 INJECTION, SOLUTION EPIDURAL; INTRACAUDAL at 09:09

## 2023-10-05 RX ADMIN — ONDANSETRON 4 MG: 2 INJECTION INTRAMUSCULAR; INTRAVENOUS at 22:58

## 2023-10-05 RX ADMIN — PHENYLEPHRINE HYDROCHLORIDE 20 MCG/MIN: 10 INJECTION INTRAVENOUS at 09:48

## 2023-10-05 RX ADMIN — FOLIC ACID 1 MG: 1 TABLET ORAL at 14:04

## 2023-10-05 RX ADMIN — SODIUM CHLORIDE, SODIUM LACTATE, POTASSIUM CHLORIDE, AND CALCIUM CHLORIDE 125 ML/HR: .6; .31; .03; .02 INJECTION, SOLUTION INTRAVENOUS at 08:11

## 2023-10-05 RX ADMIN — SODIUM CHLORIDE, SODIUM LACTATE, POTASSIUM CHLORIDE, AND CALCIUM CHLORIDE 75 ML/HR: .6; .31; .03; .02 INJECTION, SOLUTION INTRAVENOUS at 13:53

## 2023-10-05 RX ADMIN — ALBUMIN (HUMAN) 25 G: 12.5 INJECTION, SOLUTION INTRAVENOUS at 12:30

## 2023-10-05 RX ADMIN — CEFAZOLIN SODIUM 1000 MG: 1 SOLUTION INTRAVENOUS at 17:36

## 2023-10-05 RX ADMIN — FENTANYL CITRATE 50 MCG: 50 INJECTION, SOLUTION INTRAMUSCULAR; INTRAVENOUS at 09:41

## 2023-10-05 RX ADMIN — ANTACID TABLETS 1000 MG: 500 TABLET, CHEWABLE ORAL at 20:28

## 2023-10-05 RX ADMIN — LIDOCAINE HYDROCHLORIDE 5 ML: 10 INJECTION, SOLUTION EPIDURAL; INFILTRATION; INTRACAUDAL at 09:46

## 2023-10-05 RX ADMIN — VENLAFAXINE HYDROCHLORIDE 37.5 MG: 37.5 CAPSULE, EXTENDED RELEASE ORAL at 14:04

## 2023-10-05 RX ADMIN — DOCUSATE SODIUM 100 MG: 100 CAPSULE, LIQUID FILLED ORAL at 14:04

## 2023-10-05 RX ADMIN — TOPIRAMATE 25 MG: 25 TABLET, FILM COATED ORAL at 17:36

## 2023-10-05 RX ADMIN — CLONIDINE HYDROCHLORIDE 30 MCG: 100 INJECTION, SOLUTION INTRAVENOUS at 09:44

## 2023-10-05 RX ADMIN — BUPROPION HYDROCHLORIDE 300 MG: 150 TABLET, EXTENDED RELEASE ORAL at 14:04

## 2023-10-05 RX ADMIN — SODIUM CHLORIDE, SODIUM LACTATE, POTASSIUM CHLORIDE, AND CALCIUM CHLORIDE: .6; .31; .03; .02 INJECTION, SOLUTION INTRAVENOUS at 09:36

## 2023-10-05 RX ADMIN — GLYCOPYRROLATE 0.2 MG: 0.2 INJECTION, SOLUTION INTRAMUSCULAR; INTRAVENOUS at 10:37

## 2023-10-05 RX ADMIN — SENNOSIDES 8.6 MG: 8.6 TABLET, FILM COATED ORAL at 14:04

## 2023-10-05 RX ADMIN — CEFAZOLIN 2000 MG: 1 INJECTION, POWDER, FOR SOLUTION INTRAMUSCULAR; INTRAVENOUS at 09:46

## 2023-10-05 RX ADMIN — ACETAMINOPHEN 975 MG: 325 TABLET, FILM COATED ORAL at 08:11

## 2023-10-05 RX ADMIN — CYANOCOBALAMIN TAB 500 MCG 1000 MCG: 500 TAB at 14:04

## 2023-10-05 RX ADMIN — GABAPENTIN 600 MG: 300 CAPSULE ORAL at 21:01

## 2023-10-05 RX ADMIN — OXYCODONE HYDROCHLORIDE 10 MG: 10 TABLET ORAL at 14:04

## 2023-10-05 RX ADMIN — PROPOFOL 100 MCG/KG/MIN: 10 INJECTION, EMULSION INTRAVENOUS at 09:48

## 2023-10-05 RX ADMIN — METHOCARBAMOL TABLETS 750 MG: 750 TABLET, COATED ORAL at 14:04

## 2023-10-05 RX ADMIN — GABAPENTIN 600 MG: 300 CAPSULE ORAL at 16:17

## 2023-10-05 RX ADMIN — MIDAZOLAM HYDROCHLORIDE 2 MG: 1 INJECTION, SOLUTION INTRAMUSCULAR; INTRAVENOUS at 09:37

## 2023-10-05 RX ADMIN — BUPIVACAINE 15 ML: 13.3 INJECTION, SUSPENSION, LIPOSOMAL INFILTRATION at 09:07

## 2023-10-05 NOTE — DISCHARGE INSTR - AVS FIRST PAGE
Discharge Instructions - Krysta Ayala 46 y.o. female MRN: [id number]  Unit/Bed#: [facility] MAIN    Weight Bearing Status:                                           Weight Bearing as tolerated to the right lower extremity. DVT prophylaxis:  Complete course of Aspirin 81 mg twice daily x 35 days from date of surgery as directed    Pain:  Start oxycodone 5 mg every 6 hrs after last dose taken after surgery for 1 day  Transition to oxycodone 5 mg every 6 hours as needed    Showering Instructions:   Do not shower until 5 days from date of surgery  Do not soak your incision(Tubs, Jacuzzi's, pools, ext)    Dressing Instructions: May remove dressing 5 days from date of surgery or may leave dressing in place until follow up office visit 2 weeks from surgery date  Keep dressing/incision clean, dry and intact until follow up appointment. Do not apply any creams or ointments/lotions to your incisions including antibiotic ointment, peroxide    Driving Instructions:  No driving until cleared by Orthopaedic Surgery. PT/OT:  Continue PT/OT on outpatient basis as directed  Continue motion and strengthening exercises while at home    Appt Instructions: If you do not have your appointment, please call the clinic at [phone]  Otherwise followup as scheduled    Contact the office sooner if you experience any increased numbness/tingling in the extremities.

## 2023-10-05 NOTE — OP NOTE
Op Note  Harpal Robledo  MRN: [id number]      Unit/Bed#:  OR MAIN    PreOp Dx: right knee DJD      Postop Dx: right knee DJD      Procedure: right total knee arthroplasty      Surgeon: MD Chiki Hancock PA-C      No Qualified Resident Available for this Case. The physician assistant was needed for all portions of this case including prepping and draping the patient as well as prepping and final implantation of the femoral, tibial, and patellar components. Fluids:       EBL:       Drains: none      Specimens: No       Complications: No       Condition: stable transferred to postanesthesia care unit      Implants: Depuy Attune RP  Femoral, size: 4  Tibial tray: 4  Polyethylene: 7  Patellar button: 28      46 y. o.female, presents at this time, secondary to treatment of right knee DJD with valgus deformity which has failed conservative treatment. The patient was told of all the pros and cons of operative and nonoperative intervention. Some of the complications of operative intervention include infection, bleeding, scarring, nerve injury, vascular injury, fracture, continued pain, decreased range of motion, DVT, PE death, loss of limb, need for further surgery, not obtain an results. The patient wished. Patient did consent for operative intervention for this pathology. Patient was brought to the operating room. Patient was anesthetized as anesthesia team. Patient was prepped and draped in normal sterile fashion. After this was done, we did conduct a time out to make sure correct. Patient was in the room, prepped marked and draped. Implants were in the room, Rep. For the implants were present, DVT prophylaxis and antibiotics were addressed. Midline incision was made over the anterior knee after going through skin, fatty tissue, fascia was identified. Flaps were created both medially and laterally. Medial parapatellar incision was made.  Excision of Hoffa fat pad was conducted. At this time because this was a a valgus knee, we made exposure to see the medial and lateral plateaus but no ligamentous releases were done at this time. We're able to excise the fatty tissue from the anterior aspect of the distal femur. We were able to at this time, flex the knee with the patella everted. Intramedullary reamer was used. Intramedullary guide for the femur was then placed to determine how much of the distal femur to cut and also, valgus cut angle. Pins were then placed. Intramedullary guide was removed. Distal femoral cut was made. Attention was now to the proximal tibia. Extramedullary guide was used. After making sure that we took the appropriate amount from the medial and lateral side with the aid of a measuring device, the jig was held in place with pins. Protection of the medial and lateral ligaments, as well as the popliteal region, was done. Proximal tibial cut was made. Extension gaps were evaluated. Size of the femur was then determined with the aid of a guide. After this was done, we placed the appropriate sized block. These were held down with pins. Anterior and posterior cuts were made. Anterior, posterior, chamfer cuts were made. We did check our flexion gap and was noted to be appropriate. This was a PCL sacrificing device being used Therefore a box cut was made. Tibial tray size was determined. This was held down with 2 small screws. The reamer and the punch was then used with the appropriate guide to make sure that these were all done, the appropriate manner. Guide was removed. Trial femoral component was placed. Trial tibial polyethylene was placed. Patient was noted to be stable. On coronal and sagittal planes. Patellar button size was determined after the articular surface of the patella was excised. The patella button was placed on the medial aspect of the patella. Holes were drilled for our true patella button to be cemented on.  We tracked the knee, and we noted that the patella was tracking appropriately over the trochlear of the trial implants. After this was done we did drill holes in our trial femoral component. We then removed. All trial components. Copious irrigation was used at the operative site. We did place some bone within the intramedullary canal of the femur. High viscocity cement was used and all components were placed, including the femur, tibia, and patella button. A trial tibial Polyethylene was placed. After cement had dried, removed the trial polyethylene and removed any excess cement that was in the popliteal region. Copious irrigation was used. The tibial polyethylene was then placed. Patient was placed in the appropriate ranges of motion and patient's Knee was noted to be stable. Tourniquet was discontinued. Hemostasis was obtained. Hemovac was placed, exiting the lateral distal thigh region. #1 Vicryl sutures were used to reapproximate the parapatellar incision. 2-0 Vicryl sutures for the subcutaneous closure. This was reinforced with staples. Wounds were cleaned and dried. Hemovac was placed to suction. Acticoat, 4 x 4, ABDs and web roll was placed prior to Ace bandage be placed from the foot. All the way to the mid thigh region.   Patient was awakened as anesthesia team noted to be a stable condition and transferred to postanesthesia care unit

## 2023-10-05 NOTE — ANESTHESIA POSTPROCEDURE EVALUATION
Post-Op Assessment Note    CV Status:  Stable  Pain Score: 0    Pain management: adequate     Mental Status:  Alert and awake   Hydration Status:  Euvolemic   PONV Controlled:  Controlled   Airway Patency:  Patent      Post Op Vitals Reviewed: Yes      Staff: CRNA         No notable events documented.     BP   101/64   Temp 98   Pulse 62   Resp 14   SpO2 98

## 2023-10-05 NOTE — ANESTHESIA PROCEDURE NOTES
Peripheral Block    Patient location during procedure: holding area  Start time: 10/5/2023 9:09 AM  Reason for block: at surgeon's request and post-op pain management  Staffing  Performed by: Mayra Canavan, MD  Authorized by: Mayra Canavan, MD    Preanesthetic Checklist  Completed: patient identified, IV checked, site marked, risks and benefits discussed, surgical consent, monitors and equipment checked, pre-op evaluation and timeout performed  Peripheral Block  Patient position: left lateral  Prep: ChloraPrep  Patient monitoring: frequent blood pressure checks, continuous pulse oximetry and heart rate  Block type: IPACK  Laterality: right  Injection technique: single-shot  Procedures: ultrasound guided, Ultrasound guidance required for the procedure to increase accuracy and safety of medication placement and decrease risk of complications.   Ultrasound permanent image savedbupivacaine (PF) (MARCAINE) 0.5 % injection 20 mL - Perineural   20 mL - 10/5/2023 9:09:00 AM  bupivacaine liposomal (EXPAREL) 1.3 % injection 20 mL - Perineural   5 mL - 10/5/2023 9:09:00 AM  Needle  Needle type: Stimuplex   Needle localization: anatomical landmarks and ultrasound guidance  Assessment  Injection assessment: incremental injection, frequent aspiration, injected with ease, negative aspiration, negative for heart rate change, no paresthesia on injection, no symptoms of intraneural/intravenous injection and needle tip visualized at all times  Paresthesia pain: none  Post-procedure:  site cleaned  patient tolerated the procedure well with no immediate complications

## 2023-10-05 NOTE — INTERVAL H&P NOTE
H&P reviewed. After examining the patient I find no changes in the patients condition since the H&P had been written. There were no vitals filed for this visit.     Patient seen and examined  Gen: no apparent distress  CV: S1S2  Chest: no audible wheezing  Abdomen: soft  Right LE: no open wounds or abrasions; minimal effusion; distally neurovascularly intact  To the OR for right TKA  Pros and cons of op and non-op intervention discussed with patient  We will follow up postoperatively

## 2023-10-05 NOTE — ANESTHESIA PREPROCEDURE EVALUATION
Procedure:  ARTHROPLASTY KNEE TOTAL (Right: Knee)    Relevant Problems   ANESTHESIA (within normal limits)      CARDIO   (+) Hyperlipidemia   (+) Hypertension      ENDO (within normal limits)      GI/HEPATIC   (+) GERD (gastroesophageal reflux disease)      /RENAL (within normal limits)      GYN   (+) Endometrial adenocarcinoma (HCC)      HEMATOLOGY (within normal limits)      MUSCULOSKELETAL   (+) Cervical spondylosis   (+) Degenerative disc disease, cervical   (+) Lumbar spondylosis   (+) Osteoarthrosis involving multiple sites   (+) Primary localized osteoarthritis of left hip   (+) Primary osteoarthritis of left knee   (+) Primary osteoarthritis of right knee   (+) Sacroiliitis (HCC)      NEURO/PSYCH   (+) Anxiety and depression   (+) Chronic pain syndrome      PULMONARY   (+) Asthma   (+) Sleep apnea      Nervous and Auditory   (+) Bilateral carpal tunnel syndrome   (+) Lumbar radiculopathy      Other   (+) S/P bilateral breast reduction        Physical Exam    Airway    Mallampati score: II  TM Distance: >3 FB  Neck ROM: full     Dental   No notable dental hx     Cardiovascular  Rhythm: regular, Rate: normal, Cardiovascular exam normal    Pulmonary  Pulmonary exam normal Breath sounds clear to auscultation,     Other Findings        Anesthesia Plan  ASA Score- 2     Anesthesia Type- spinal with ASA Monitors. Additional Monitors:   Airway Plan:     Comment: Right adductor canal and IPACK blocks for postoperative pain control. Plan Factors-Exercise tolerance (METS): >4 METS. Chart reviewed. EKG reviewed. Imaging results reviewed. Existing labs reviewed. Patient summary reviewed. Induction- intravenous. Postoperative Plan- Plan for postoperative opioid use. Informed Consent- Anesthetic plan and risks discussed with patient. I personally reviewed this patient with the CRNA. Discussed and agreed on the Anesthesia Plan with the CRNA. Concepcion Gary labs personally reviewed:  Lab Results   Component Value Date    WBC 5.93 09/11/2023    HGB 14.8 09/11/2023     09/11/2023     Lab Results   Component Value Date     09/07/2015    K 4.3 09/11/2023    BUN 16 09/11/2023    CREATININE 0.77 09/11/2023    GLUCOSE 146 (H) 02/10/2022     Lab Results   Component Value Date    PTT 30 09/11/2023      Lab Results   Component Value Date    INR 0.94 09/11/2023       Blood type A    Lab Results   Component Value Date    HGBA1C 5.9 (H) 09/11/2023       IZacarias MD, have personally seen and evaluated the patient prior to anesthetic care. I have reviewed the pre-anesthetic record, and other medical records if appropriate to the anesthetic care. If a CRNA is involved in the case, I have reviewed the CRNA assessment, if present, and agree. Risks/benefits and alternatives discussed with patient including possible PONV, sore throat, and possibility of rare anesthetic and surgical emergencies.

## 2023-10-05 NOTE — OCCUPATIONAL THERAPY NOTE
Occupational Therapy Evaluation/Treatment     Patient Name: Magi Armenta  HKIYN'P Date: 10/5/2023  Problem List  Principal Problem:    Status post total knee replacement using cement, right  Active Problems:    Lumbar radiculopathy    Hypertension    Hyperlipidemia    Chronic pain of both knees    Asthma    GERD (gastroesophageal reflux disease)    Sleep apnea    Anxiety and depression    Primary osteoarthritis of right knee    Past Medical History  Past Medical History:   Diagnosis Date    Anxiety     Asthma     Cancer ([address])     endometrial    Chronic pain syndrome     CPAP (continuous positive airway pressure) dependence     Depression     Diabetes mellitus (HCC)     Pre diabetic    GERD (gastroesophageal reflux disease)     HTN (hypertension)     Hyperlipidemia     Hypertension     Lumbar radiculopathy     Prediabetes     Sleep apnea      Past Surgical History  Past Surgical History:   Procedure Laterality Date    EYE SURGERY  07/26/2022    cornea eye transplant    GASTRECTOMY SLEEVE LAPAROSCOPIC      HYSTERECTOMY      KNEE ARTHROSCOPY Right     LUMBAR FUSION      LA ARTHRP KNE CONDYLE&PLATU MEDIAL&LAT COMPARTMENTS Left 02/09/2023    Procedure: ARTHROPLASTY KNEE TOTAL AND ALL ASSOCIATED PROCEDURES;  Surgeon: Apryl Campuzano MD;  Location: [facility];  Service: Orthopedics    LA BREAST REDUCTION Bilateral 09/23/2022    Procedure: BILATERAL BREAST REDUCTION;  Surgeon: London Mane MD;  Location:  MAIN OR;  Service: Plastics    MARY-EN-Y PROCEDURE  05/27/2021    [place]    TOTAL HIP ARTHROPLASTY Bilateral          10/05/23 1422   OT Last Visit   OT Visit Date 10/05/23   Note Type   Note type Evaluation  (+treatment [id number])   Pain Assessment   Pain Assessment Tool 0-10   Pain Score 7   Pain Location/Orientation Orientation: Right;Location: Knee; Location: Incision   Pain Radiating Towards n/a   Pain Onset/Description Onset: Ongoing;Frequency: Constant/Continuous; Descriptor: Discomfort   Effect of Pain on Daily Activities limits comfort, fnxl mobility and activity tolerance   Patient's Stated Pain Goal No pain   Hospital Pain Intervention(s) Repositioned; Ambulation/increased activity; Emotional support   Multiple Pain Sites No   Restrictions/Precautions   Weight Bearing Precautions Per Order Yes   RLE Weight Bearing Per Order WBAT  (POD#0 R TKA)   Other Precautions Chair Alarm; Bed Alarm;Multiple lines; Fall Risk;Pain; Impulsive   Home Living   Type of [address] One level;Performs ADLs on one level; Able to live on main level with bedroom/bathroom;Stairs to enter with rails  (7 steps, landing + 7 steps to enter c HR.)   Bathroom Shower/Tub Tub/shower unit   Bathroom Toilet Raised   Bathroom Equipment Shower chair;Grab bars around toilet   [address] aid;Reacher;Long-handled shoehorn;Grab bars  (RW; SPC)   Prior Function   Level of [place] Independent with ADLs; Independent with functional mobility; Independent with IADLS   Lives With Son  (who will be off of work the next 2 days to assist with needs post op)   Receives Help From Family;Friend(s); Outpatient therapy  (OPPT post op)   IADLs Independent with driving; Independent with meal prep; Independent with medication management   Falls in the last 6 months 1 to 4  (Pt reports 2 falls)   Vocational Unemployed   Comments Prior to surgery, pt reports being (I) c all self-care tasks and mobility with use of SPC prn. Lifestyle   Autonomy At baseline pt lives at home with her son in a 2nd floor apartment with + KRISTOPHER. I w/ ADLS, IADLs and functional mobility. (+) driving and falls.    Reciprocal Relationships Supportive family   Service to Others Unemployed   Intrinsic Gratification Sewing   General   Additional Pertinent History Pt is POD#0 R TKA; WBAT   Family/Caregiver Present No   Additional General Comments PMHx: L TKA (2/9/2023), anxiety/ depression, endometrial ca hx/ tx, lumbar radiculopathy, HTN, B EVA, B breast reduction (2022), gastric bypass ([facility] 2021). Subjective   Subjective "I am stubborn"   ADL   Eating Assistance 6  Modified independent   Eating Deficit Setup; Beverage management   Grooming Assistance 6  Modified Independent   UB Bathing Assistance 5  Supervision/Setup   LB Bathing Assistance 5  Supervision/Setup   UB Dressing Assistance 5  Supervision/Setup   LB Dressing Assistance 5  Supervision/Setup   Toileting Assistance  5  Supervision/Setup   Additional Comments Pt is limited by pain and decreased knee ROM. Bed Mobility   Supine to Sit 5  Supervision   Additional items Assist x 1;HOB elevated; Bedrails; Increased time required;Verbal cues   Additional Comments Pt denies dizziness/lightheadedness with postural changes. Sit to supine not assess, pt turned over to PT for additonal evaluation. Transfers   Sit to Stand 5  Supervision   Additional items Assist x 1; Increased time required; Impulsive;Verbal cues   Stand to Sit 5  Supervision   Additional items Assist x 1; Increased time required;Verbal cues   Stand pivot 5  Supervision   Additional items Assist x 1; Increased time required;Verbal cues  (RW)   Additional Comments Pt impuslively stood from EOB w/o RW or assist. Pt educated on safety awareness and risk of knee buckling post op. Pt verbalized understanding. Additional STS completed from EOB at S level.    Functional Mobility   Functional Mobility 5  Supervision   Additional Comments for functional household distance with directional changes   Additional items Rolling walker   Balance   Static Sitting Normal   Dynamic Sitting Good   Static Standing Fair +   Dynamic Standing Fair   Activity Tolerance   Activity Tolerance Patient limited by pain   Medical Staff Made Aware Spoke with care coordination, SLIM, PT   Nurse Made Aware Spoke with RN Evelyn DORSEY Assessment   RUE Assessment WFL   LUE Assessment   LUE Assessment WFL   Hand Function   Gross Motor Coordination Functional Fine Motor Coordination Functional   Sensation   Light Touch   (Pt reports mild numbness/tingling in R LE post op)   Vision-Basic Assessment   Current Vision Does not wear glasses   Cognition   Overall Cognitive Status WFL   Arousal/Participation Alert; Responsive; Cooperative   Attention Within functional limits   Orientation Level Oriented X4   Memory Decreased recall of precautions   Following Commands Follows multistep commands with increased time or repetition   Comments Pt is midly impsulive. Verbal cues for improved safety awareness. Assessment   Limitation Decreased ADL status; Decreased endurance;Decreased Safe judgement during ADL;Decreased self-care trans;Decreased high-level ADLs   Prognosis Good   Assessment Patient is a 46 y.o. female seen for OT evaluation and treatment  at [facility] following admission on 10/5/2023  s/p Status post total knee replacement using cement, right. Please see above for comprehensive list of comorbidities and significant PMHx impacting functional performance. At baseline, pt is (I) c ADLs/IADLs. ROSANNE for functional mobility with use of SPC prn. Upon initial evaluation, pt appears to be performing below baseline functional status. Pt requires ROSANNE for UB ADLs, S for LB ADLs, S for bed mobility, S for transfers and  S for functional mobility household distance with RW. The AM-PAC & Barthel Index outcome tools were used to assist in determining pt safety w/self care /mobility and appropriate d/c recommendations, see above for score. Occupational performance is affected by the following deficits:  decreased muscular strength , decreased activity tolerance , impaired safety awareness  and (+) pain . Personal/Environmental factors impacting D/C include: (+) Hx of falls , steps to enter/navigate the home, decreased social/family support within the home, Assistance needed for ADLs and functional mobility, High fall risk  and decreased recall of precautions . Supporting factors include: support system available and attitude towards recovery Patient would benefit from OT services within the acute care setting to maximize level of functional independence in the following areas fall prevention , self-care transfers, bed mobility , functional mobility and ADLs. From OT standpoint, recommendation at time of D/C would be return to previous environment with no rehabilitation needs. Goals   Patient Goals "believe it not to run"   LTG Time Frame 10-14   Long Term Goal See below   Plan   Treatment Interventions ADL retraining;Functional transfer training; Endurance training;Patient/family training;Equipment evaluation/education; Compensatory technique education; Energy conservation; Activityengagement   Goal Expiration Date 10/15/23   OT Treatment Day 1   OT Frequency 3-5x/wk   Recommendation   OT Discharge Recommendation No rehabilitation needs  (OPPT post op)   Additional Comments  The patient's raw score on the AM-PAC Daily Activity Inpatient Short Form is 21. A raw score of greater than or equal to 19 suggests the patient may benefit from discharge to home. Please refer to the recommendation of the Occupational Therapist for safe discharge planning.    AM-PAC Daily Activity Inpatient   Lower Body Dressing 3   Bathing 3   Toileting 3   Upper Body Dressing 4   Grooming 4   Eating 4   Daily Activity Raw Score 21   Daily Activity Standardized Score (Calc for Raw Score >=11) 44.27   AM-PAC Applied Cognition Inpatient   Following a Speech/Presentation 4   Understanding Ordinary Conversation 4   Taking Medications 4   Remembering Where Things Are Placed or Put Away 4   Remembering List of 4-5 Errands 4   Taking Care of Complicated Tasks 4   Applied Cognition Raw Score 24   Applied Cognition Standardized Score 62.21   Barthel Index   Feeding 10   Bathing 0   Grooming Score 5   Dressing Score 5   Bladder Score 5   Bowels Score 10   Toilet Use Score 5   Transfers (Bed/Chair) Score 10 Mobility (Level Surface) Score 0   Stairs Score 0   Barthel Index Score 50   Additional Treatment Session   Start Time 1410   End Time 1422   Treatment Assessment Pt participated in tx session following IE for ADL training and to further challenge activity tolerance. Pt completed ambulating transfer to/from standard toilet with use of grab bar at a supervision level. Supervision/setup for LB sponge bathing and pericare while sitting on the toilet. Supervision with increased time to thread B LEs into underwear with cueing for compensatory techniques d/t pain and knee ROM limitations. Pt ambulated short distance with S and use of RW. Tolerated standing at the sink for ~2 mins to complete hand washing task w/o support or steadying. At the end of the session care was turned over to the PT. At this time, pt will benefit from skilled OT services to address functional performance deficits and maximize independence in self-care/mobility tasks. At time of d/c, recommending to return to previous environment with no rehabilitation needs. End of Consult   Education Provided Yes   Nurse Communication Nurse aware of consult       Goals established on initial evaluation in order to achieve pt's goal of running. Pt will complete LB ADLs Independent   for increased ADL independence within 10 days. Pt will complete toileting Independent   with use of DME for increased ADL independence within 10 days. Pt will demonstrate proper body mechanics to complete self-care transfers and functional mobility with Mod independent  and use of LRAD for increased safety and functional independence within 10 days. Pt will demonstrate standing tolerance of 8 min with Mod independent  and use of LRAD for increased activity tolerance during ADL/IADL tasks within 10 days. Pt will complete bed mobility Independent  for increased independence in repositioning, pressure offloading, and managing comfort.      Pt will demonstrate proper body mechanics and fall prevention strategies during 100% of tx sessions for increased safety awareness during ADL/IADLs    Pt benefited from a partial co-session of skilled OT and PT therapists in order to most appropriately address functional deficits d/t regression from functioning level prior to admission  and decreased activity tolerance. OT/PT objectives were addressed separately; please see PT note for specific goal areas targeted.     Marion Holter, [id number] [facility] OTR/L   [employer] Licensure# [id number]

## 2023-10-05 NOTE — PLAN OF CARE
Problem: PHYSICAL THERAPY ADULT  Goal: Performs mobility at highest level of function for planned discharge setting. See evaluation for individualized goals. Description: Treatment/Interventions: Functional transfer training, LE strengthening/ROM, Elevations, Therapeutic exercise, Endurance training, Patient/family training, Equipment eval/education, Gait training          See flowsheet documentation for full assessment, interventions and recommendations. Note: Prognosis: Excellent  Problem List: Decreased range of motion, Impaired balance, Decreased mobility, Decreased coordination, Decreased safety awareness, Pain, Orthopedic restrictions  Assessment: Orders for PT eval and treat received. Pt's PMHx: lumbar radiculopathy, asthma, anxeity/depression, left TKA, jessica carpal tunnel, cervical ddd, sacrolitiis. Pt exhibits physical deficits noted in problem list above. Deficits listed contribute to functional limitations that are significant from the patient PLOF and include: tolerance for OOB functional activities, unsafe/inefficient ambulation and fall risk    Additionally, patient is limited by restrictions/precautions/DME: right LE WBAT    During today's session, Educated and instructed pt on walker management and gait corrections. Educated and instructed patient on stair training strategies for home setup. The AM-PAC & Barthel Index outcome tools were used to assist in determining pt safety w/ mobility/self care & appropriate d/c recommendations, see above for scores. Patient's clinical presentation is stable  following recent surgery/procedure. Considering the patient's PLOF, co-morbidities, acute functional limitations, functional outcome measures, and/or goal to progress functional independence; this patient would benefit from skilled Physical Therapy intervention in the acute care setting. Pt should have no difficulty returning home once medically appropriate.      Considerations for next session: initiate HEP. Progress gait/stair training. Barriers to Discharge: None     PT Discharge Recommendation: Home with outpatient rehabilitation    See flowsheet documentation for full assessment.

## 2023-10-05 NOTE — PHYSICAL THERAPY NOTE
PHYSICAL THERAPY Evaluation and Treatment  DATE: 10/05/23  TIME: 7757-9866    NAME:  Anderson Harvey  AGE:   46 y.o. Mrn:   [id number]  Length Of Stay: 0    ADMIT DX:  Primary osteoarthritis of right knee [M17.11]  Preoperative testing [Z01.818]    Past Medical History:   Diagnosis Date    Anxiety     Asthma     Cancer ([address])     endometrial    Chronic pain syndrome     CPAP (continuous positive airway pressure) dependence     Depression     Diabetes mellitus ([address])     Pre diabetic    GERD (gastroesophageal reflux disease)     HTN (hypertension)     Hyperlipidemia     Hypertension     Lumbar radiculopathy     Prediabetes     Sleep apnea      Past Surgical History:   Procedure Laterality Date    EYE SURGERY  07/26/2022    cornea eye transplant    GASTRECTOMY SLEEVE LAPAROSCOPIC      HYSTERECTOMY      KNEE ARTHROSCOPY Right     LUMBAR FUSION      WY ARTHRP KNE CONDYLE&PLATU MEDIAL&LAT COMPARTMENTS Left 02/09/2023    Procedure: ARTHROPLASTY KNEE TOTAL AND ALL ASSOCIATED PROCEDURES;  Surgeon: Xiomara Sun MD;  Location:  MAIN OR;  Service: Orthopedics    WY BREAST REDUCTION Bilateral 09/23/2022    Procedure: BILATERAL BREAST REDUCTION;  Surgeon: Era Conklin MD;  Location:  MAIN OR;  Service: Plastics    MARY-EN-Y PROCEDURE  05/27/2021    Sleve    TOTAL HIP ARTHROPLASTY Bilateral         10/05/23 1405   PT Last Visit   PT Visit Date 10/05/23   Note Type   Note type Evaluation   Pain Assessment   Pain Assessment Tool 0-10   Pain Score 6   Pain Location/Orientation Orientation: Right;Location: Knee   [facility] Pain Intervention(s) Ambulation/increased activity;Repositioned   Restrictions/Precautions   Weight Bearing Precautions Per Order Yes   RLE Weight Bearing Per Order WBAT   Other Precautions Fall Risk;Pain;Bed Alarm;Cognitive; Chair Alarm;WBS   Home Living   Type of [address] One level  (7+7 steps to enter with handrail left side)   Bathroom Shower/Tub Tub/shower unit   [employer] Raised   Bathroom Equipment Shower chair;Grab bars around toilet   Home Equipment Walker;Cane   Prior Function   Level of [place] Independent with ADLs; Independent with functional mobility; Independent with IADLS   Lives With Jo Dears Help From Family;Friend(s)   IADLs Independent with driving; Independent with meal prep; Independent with medication management   Falls in the last 6 months 1 to 4   Vocational Unemployed   Comments Prior to surgery, pt reports being (I) c all self-care tasks and mobility with use of SPC prn. General   Additional Pertinent History 47 y/o presents for elective right TKA. Family/Caregiver Present No   Cognition   Overall Cognitive Status WFL   Attention Difficulty dividing attention   Orientation Level Oriented X4   Memory Decreased recall of precautions   Following Commands Follows one step commands inconsistently   Subjective   Subjective Pt states, "I'm evil". Willing to participate in mobility   RUE Assessment   RUE Assessment WFL   LUE Assessment   LUE Assessment WFL   RLE Assessment   RLE Assessment WFL   LLE Assessment   LLE Assessment WFL   Coordination   Movements are Fluid and Coordinated 1   Sensation   (right LE diminished sensation)   Bed Mobility   Supine to Sit 5  Supervision   Additional items Impulsive;Verbal cues   Transfers   Sit to Stand 5  Supervision   Additional items Impulsive;Verbal cues  (close supervision due to impulsivity)   Stand to Sit 4  Minimal assistance   Additional items Armrests; Impulsive;Verbal cues  (cues and assist to ensure technique and safe transition)   Ambulation/Elevation   Gait Assistance 5  Supervision   Additional items Verbal cues; Tactile cues   Assistive Device Rolling walker   Distance 150'   Stair Management Assistance 4  Minimal assist   Additional items Verbal cues; Tactile cues   Stair Management Technique One rail L;Step to pattern; With walker   Number of Stairs 16   Ambulation/Elevation Additional Comments reciprocal gait, initially with poor heel off or knee flexion. Step advancement and foot clearance improved with instruction and practice. Instructed patient on multiple strategies to perform stairs with RW.  assist for safety and to manage walker at times. Balance   Static Sitting Normal   Dynamic Sitting Good   Static Standing Fair +   Dynamic Standing Fair   Ambulatory Fair   Endurance Deficit   Endurance Deficit No   Activity Tolerance   Activity Tolerance Patient tolerated treatment well   Medical Staff Made Aware spoke with OT   Nurse Made Aware spoke with nursing   Assessment   Prognosis Excellent   Problem List Decreased range of motion; Impaired balance;Decreased mobility; Decreased coordination;Decreased safety awareness;Pain;Orthopedic restrictions   Assessment Orders for PT eval and treat received. Pt's PMHx: lumbar radiculopathy, asthma, anxeity/depression, left TKA, jessica carpal tunnel, cervical ddd, sacrolitiis. Pt exhibits physical deficits noted in problem list above. Deficits listed contribute to functional limitations that are significant from the patient PLOF and include: tolerance for OOB functional activities, unsafe/inefficient ambulation and fall risk    Additionally, patient is limited by restrictions/precautions/DME: right LE WBAT    During today's session, Educated and instructed pt on walker management and gait corrections. Educated and instructed patient on stair training strategies for home setup. The AM-PAC & Barthel Index outcome tools were used to assist in determining pt safety w/ mobility/self care & appropriate d/c recommendations, see above for scores. Patient's clinical presentation is stable  following recent surgery/procedure. Considering the patient's PLOF, co-morbidities, acute functional limitations, functional outcome measures, and/or goal to progress functional independence; this patient would benefit from skilled Physical Therapy intervention in the acute care setting. Pt should have no difficulty returning home once medically appropriate. Considerations for next session: initiate HEP. Progress gait/stair training. Barriers to Discharge None   Goals   Patient Goals "be able to run"   [facility] Expiration Date 10/15/23   Short Term Goal #1 1: Pt will perform supine<>stand, independently from flat bed.  2: Pt will ambualte 300' with reciprocal gait and appropriate step length/advancement with RW, mod I. 3: Pt will ascend/descend 1 flight of stairs with rail/AD, mod I. 4: Pt will perform writtent HEP, mod I.   Plan   Treatment/Interventions Functional transfer training;LE strengthening/ROM; Elevations; Therapeutic exercise; Endurance training;Patient/family training;Equipment eval/education;Gait training   PT Frequency Twice a day   Recommendation   PT Discharge Recommendation Home with outpatient rehabilitation   AM-PAC Basic Mobility Inpatient   Turning in Flat Bed Without Bedrails 4   Lying on Back to Sitting on Edge of Flat Bed Without Bedrails 4   Moving Bed to Chair 3   Standing Up From Chair Using Arms 4   Walk in Room 3   Climb 3-5 Stairs With Railing 3   Basic Mobility Inpatient Raw Score 21   Basic Mobility Standardized Score 45.55   Highest Level Of Mobility   JH-HLM Goal 6: Walk 10 steps or more   JH-HLM Achieved 7: Walk 25 feet or more   Barthel Index   Feeding 10   Bathing 0   Grooming Score 5   Dressing Score 5   Bladder Score 10   Bowels Score 10   Toilet Use Score 5   Transfers (Bed/Chair) Score 10   Mobility (Level Surface) Score 10   Stairs Score 5   Barthel Index Score 70   Additional Treatment Session   Start Time 1430   End Time 1445   Treatment Assessment Educated and instructed patient on gait and stair training. Carry over minimal due to self limited interest in education. Pt denies wanting to intiate or carry out HEP at this time. Equipment Use RW   End of Consult   Patient Position at End of Consult Bedside chair;Bed/Chair alarm activated; All needs within reach   The patient's AM-PAC Basic Mobility Inpatient Short Form Raw Score is 21. A Raw score of greater than or equal to 16 suggests the patient may benefit from discharge to home. Please also refer to the recommendation of the Physical Therapist for safe discharge planning.     Rao Sanders, PT, DPT  [place] Licensure #[id number]

## 2023-10-05 NOTE — ANESTHESIA PROCEDURE NOTES
Peripheral Block    Patient location during procedure: holding area  Start time: 10/5/2023 9:07 AM  Reason for block: at surgeon's request and post-op pain management  Staffing  Performed by: Kimberley Matson MD  Authorized by: Kimberley Matson MD    Preanesthetic Checklist  Completed: patient identified, IV checked, site marked, risks and benefits discussed, surgical consent, monitors and equipment checked, pre-op evaluation and timeout performed  Peripheral Block  Patient position: supine  Prep: ChloraPrep  Patient monitoring: frequent blood pressure checks, continuous pulse oximetry and heart rate  Block type: Adductor Canal  Laterality: right  Injection technique: single-shot  Procedures: ultrasound guided, Ultrasound guidance required for the procedure to increase accuracy and safety of medication placement and decrease risk of complications.   Ultrasound permanent image savedbupivacaine (PF) (MARCAINE) 0.5 % injection 20 mL - Perineural   10 mL - 10/5/2023 9:07:00 AM  bupivacaine liposomal (EXPAREL) 1.3 % injection 20 mL - Perineural   15 mL - 10/5/2023 9:07:00 AM  Needle  Needle type: Stimuplex   Needle localization: anatomical landmarks and ultrasound guidance  Assessment  Injection assessment: incremental injection, frequent aspiration, injected with ease, negative aspiration, negative for heart rate change, no paresthesia on injection, no symptoms of intraneural/intravenous injection and needle tip visualized at all times  Paresthesia pain: none  Post-procedure:  site cleaned  patient tolerated the procedure well with no immediate complications

## 2023-10-05 NOTE — PLAN OF CARE
Problem: OCCUPATIONAL THERAPY ADULT  Goal: Performs self-care activities at highest level of function for planned discharge setting. See evaluation for individualized goals. Description: Treatment Interventions: ADL retraining, Functional transfer training, Endurance training, Patient/family training, Equipment evaluation/education, Compensatory technique education, Energy conservation, Activityengagement          See flowsheet documentation for full assessment, interventions and recommendations. Outcome: Progressing  Note: Limitation: Decreased ADL status, Decreased endurance, Decreased Safe judgement during ADL, Decreased self-care trans, Decreased high-level ADLs  Prognosis: Good  Assessment: Patient is a 46 y.o. female seen for OT evaluation and treatment  at [address] [place] [address] following admission on 10/5/2023  s/p Status post total knee replacement using cement, right. Please see above for comprehensive list of comorbidities and significant PMHx impacting functional performance. At baseline, pt is (I) c ADLs/IADLs. ROSANNE for functional mobility with use of SPC prn. Upon initial evaluation, pt appears to be performing below baseline functional status. Pt requires ROSANNE for UB ADLs, S for LB ADLs, S for bed mobility, S for transfers and  S for functional mobility household distance with RW. The AM-PAC & Barthel Index outcome tools were used to assist in determining pt safety w/self care /mobility and appropriate d/c recommendations, see above for score. Occupational performance is affected by the following deficits:  decreased muscular strength , decreased activity tolerance , impaired safety awareness  and (+) pain . Personal/Environmental factors impacting D/C include: (+) Hx of falls , steps to enter/navigate the home, decreased social/family support within the home, Assistance needed for ADLs and functional mobility, High fall risk  and decreased recall of precautions .  Supporting factors include: support system available and attitude towards recovery Patient would benefit from OT services within the acute care setting to maximize level of functional independence in the following areas fall prevention , self-care transfers, bed mobility , functional mobility and ADLs. From OT standpoint, recommendation at time of D/C would be return to previous environment with no rehabilitation needs.      OT Discharge Recommendation: No rehabilitation needs (OPPT post op)     Kodi Bernal, [id number] [facility] OTR/L   [place] Licensure# [id number]

## 2023-10-05 NOTE — PLAN OF CARE
Problem: MOBILITY - ADULT  Goal: Maintain or return to baseline ADL function  Description: INTERVENTIONS:  -  Assess patient's ability to carry out ADLs; assess patient's baseline for ADL function and identify physical deficits which impact ability to perform ADLs (bathing, care of mouth/teeth, toileting, grooming, dressing, etc.)  - Assess/evaluate cause of self-care deficits   - Assess range of motion  - Assess patient's mobility; develop plan if impaired  - Assess patient's need for assistive devices and provide as appropriate  - Encourage maximum independence but intervene and supervise when necessary  - Involve family in performance of ADLs  - Assess for home care needs following discharge   - Consider OT consult to assist with ADL evaluation and planning for discharge  - Provide patient education as appropriate  Outcome: Progressing  Goal: Maintains/Returns to pre admission functional level  Description: INTERVENTIONS:  - Perform BMAT or MOVE assessment daily.   - Set and communicate daily mobility goal to care team and patient/family/caregiver.    - Collaborate with rehabilitation services on mobility goals if consulted  - Out of bed for toileting  - Record patient progress and toleration of activity level   Outcome: Progressing     Problem: PAIN - ADULT  Goal: Verbalizes/displays adequate comfort level or baseline comfort level  Description: Interventions:  - Encourage patient to monitor pain and request assistance  - Assess pain using appropriate pain scale  - Administer analgesics based on type and severity of pain and evaluate response  - Implement non-pharmacological measures as appropriate and evaluate response  - Consider cultural and social influences on pain and pain management  - Notify physician/advanced practitioner if interventions unsuccessful or patient reports new pain  Outcome: Progressing     Problem: INFECTION - ADULT  Goal: Absence or prevention of progression during hospitalization  Description: INTERVENTIONS:  - Assess and monitor for signs and symptoms of infection  - Monitor lab/diagnostic results  - Monitor all insertion sites, i.e. indwelling lines, tubes, and drains  - Monitor endotracheal if appropriate and nasal secretions for changes in amount and color  - [place] appropriate cooling/warming therapies per order  - Administer medications as ordered  - Instruct and encourage patient and family to use good hand hygiene technique  - Identify and instruct in appropriate isolation precautions for identified infection/condition  Outcome: Progressing  Goal: Absence of fever/infection during neutropenic period  Description: INTERVENTIONS:  - Monitor WBC    Outcome: Progressing     Problem: SAFETY ADULT  Goal: Maintain or return to baseline ADL function  Description: INTERVENTIONS:  -  Assess patient's ability to carry out ADLs; assess patient's baseline for ADL function and identify physical deficits which impact ability to perform ADLs (bathing, care of mouth/teeth, toileting, grooming, dressing, etc.)  - Assess/evaluate cause of self-care deficits   - Assess range of motion  - Assess patient's mobility; develop plan if impaired  - Assess patient's need for assistive devices and provide as appropriate  - Encourage maximum independence but intervene and supervise when necessary  - Involve family in performance of ADLs  - Assess for home care needs following discharge   - Consider OT consult to assist with ADL evaluation and planning for discharge  - Provide patient education as appropriate  Outcome: Progressing  Goal: Maintains/Returns to pre admission functional level  Description: INTERVENTIONS:  - Perform BMAT or MOVE assessment daily.   - Set and communicate daily mobility goal to care team and patient/family/caregiver.    - Collaborate with rehabilitation services on mobility goals if consulted  - Out of bed for toileting  - Record patient progress and toleration of activity level   Outcome: Progressing  Goal: Patient will remain free of falls  Description: INTERVENTIONS:  - Educate patient/family on patient safety including physical limitations  - Instruct patient to call for assistance with activity   - Consult OT/PT to assist with strengthening/mobility   - Keep Call bell within reach  - Keep bed low and locked with side rails adjusted as appropriate  - Keep care items and personal belongings within reach  - Initiate and maintain comfort rounds  - Make Fall Risk Sign visible to staff  - Apply yellow socks and bracelet for high fall risk patients  - Consider moving patient to room near nurses station  Outcome: Progressing     Problem: DISCHARGE PLANNING  Goal: Discharge to home or other facility with appropriate resources  Description: INTERVENTIONS:  - Identify barriers to discharge w/patient and caregiver  - Arrange for needed discharge resources and transportation as appropriate  - Identify discharge learning needs (meds, wound care, etc.)  - Arrange for interpretive services to assist at discharge as needed  - Refer to Case Management Department for coordinating discharge planning if the patient needs post-hospital services based on physician/advanced practitioner order or complex needs related to functional status, cognitive ability, or social support system  Outcome: Progressing     Problem: Knowledge Deficit  Goal: Patient/family/caregiver demonstrates understanding of disease process, treatment plan, medications, and discharge instructions  Description: Complete learning assessment and assess knowledge base.   Interventions:  - Provide teaching at level of understanding  - Provide teaching via preferred learning methods  Outcome: Progressing

## 2023-10-05 NOTE — NURSING NOTE
Patient agitated by dinner tray, yelling at staff after offering seasoning or other options such as calling dietary for different choice section. Dietary contacted and made aware that patient unhappy and replaced meal tray.

## 2023-10-06 VITALS
OXYGEN SATURATION: 94 % | SYSTOLIC BLOOD PRESSURE: 126 MMHG | WEIGHT: 224 LBS | HEART RATE: 66 BPM | DIASTOLIC BLOOD PRESSURE: 75 MMHG | HEIGHT: 64 IN | BODY MASS INDEX: 38.24 KG/M2 | TEMPERATURE: 97.8 F | RESPIRATION RATE: 18 BRPM

## 2023-10-06 LAB
ANION GAP SERPL CALCULATED.3IONS-SCNC: 9 MMOL/L
BUN SERPL-MCNC: 15 MG/DL (ref 5–25)
CALCIUM SERPL-MCNC: 9.3 MG/DL (ref 8.4–10.2)
CHLORIDE SERPL-SCNC: 105 MMOL/L (ref 96–108)
CO2 SERPL-SCNC: 25 MMOL/L (ref 21–32)
CREAT SERPL-MCNC: 0.84 MG/DL (ref 0.6–1.3)
ERYTHROCYTE [DISTWIDTH] IN BLOOD BY AUTOMATED COUNT: 12.6 % (ref 11.6–15.1)
GFR SERPL CREATININE-BSD FRML MDRD: 79 ML/MIN/1.73SQ M
GLUCOSE SERPL-MCNC: 128 MG/DL (ref 65–140)
HCT VFR BLD AUTO: 38.3 % (ref 34.8–46.1)
HGB BLD-MCNC: 12.6 G/DL (ref 11.5–15.4)
MCH RBC QN AUTO: 30.1 PG (ref 26.8–34.3)
MCHC RBC AUTO-ENTMCNC: 32.9 G/DL (ref 31.4–37.4)
MCV RBC AUTO: 91 FL (ref 82–98)
PLATELET # BLD AUTO: 241 THOUSANDS/UL (ref 149–390)
PMV BLD AUTO: 9.4 FL (ref 8.9–12.7)
POTASSIUM SERPL-SCNC: 4.3 MMOL/L (ref 3.5–5.3)
RBC # BLD AUTO: 4.19 MILLION/UL (ref 3.81–5.12)
SODIUM SERPL-SCNC: 139 MMOL/L (ref 135–147)
WBC # BLD AUTO: 15.17 THOUSAND/UL (ref 4.31–10.16)

## 2023-10-06 PROCEDURE — 97530 THERAPEUTIC ACTIVITIES: CPT

## 2023-10-06 PROCEDURE — 97535 SELF CARE MNGMENT TRAINING: CPT

## 2023-10-06 PROCEDURE — 80048 BASIC METABOLIC PNL TOTAL CA: CPT | Performed by: PHYSICIAN ASSISTANT

## 2023-10-06 PROCEDURE — 85027 COMPLETE CBC AUTOMATED: CPT | Performed by: PHYSICIAN ASSISTANT

## 2023-10-06 PROCEDURE — 97110 THERAPEUTIC EXERCISES: CPT

## 2023-10-06 PROCEDURE — 99024 POSTOP FOLLOW-UP VISIT: CPT | Performed by: PHYSICIAN ASSISTANT

## 2023-10-06 PROCEDURE — 97116 GAIT TRAINING THERAPY: CPT

## 2023-10-06 RX ORDER — OXYCODONE HYDROCHLORIDE 5 MG/1
TABLET ORAL
Qty: 30 TABLET | Refills: 0 | Status: SHIPPED | OUTPATIENT
Start: 2023-10-06

## 2023-10-06 RX ORDER — SENNOSIDES 8.6 MG
650 CAPSULE ORAL EVERY 8 HOURS PRN
Qty: 30 TABLET | Refills: 0 | Status: SHIPPED | OUTPATIENT
Start: 2023-10-06 | End: 2023-11-05

## 2023-10-06 RX ADMIN — SENNOSIDES 8.6 MG: 8.6 TABLET, FILM COATED ORAL at 08:56

## 2023-10-06 RX ADMIN — CYANOCOBALAMIN TAB 500 MCG 1000 MCG: 500 TAB at 08:56

## 2023-10-06 RX ADMIN — FOLIC ACID 1 MG: 1 TABLET ORAL at 08:56

## 2023-10-06 RX ADMIN — OXYCODONE HYDROCHLORIDE AND ACETAMINOPHEN 500 MG: 500 TABLET ORAL at 08:56

## 2023-10-06 RX ADMIN — FERROUS SULFATE TAB 325 MG (65 MG ELEMENTAL FE) 325 MG: 325 (65 FE) TAB at 08:56

## 2023-10-06 RX ADMIN — OXYCODONE HYDROCHLORIDE 10 MG: 10 TABLET ORAL at 07:15

## 2023-10-06 RX ADMIN — BUPROPION HYDROCHLORIDE 300 MG: 150 TABLET, EXTENDED RELEASE ORAL at 08:56

## 2023-10-06 RX ADMIN — DOCUSATE SODIUM 100 MG: 100 CAPSULE, LIQUID FILLED ORAL at 08:56

## 2023-10-06 RX ADMIN — OXYCODONE HYDROCHLORIDE 5 MG: 5 TABLET ORAL at 00:07

## 2023-10-06 RX ADMIN — VENLAFAXINE HYDROCHLORIDE 37.5 MG: 37.5 CAPSULE, EXTENDED RELEASE ORAL at 08:56

## 2023-10-06 RX ADMIN — BUSPIRONE HYDROCHLORIDE 10 MG: 10 TABLET ORAL at 08:56

## 2023-10-06 RX ADMIN — METHOCARBAMOL TABLETS 750 MG: 750 TABLET, COATED ORAL at 08:56

## 2023-10-06 RX ADMIN — SODIUM CHLORIDE, SODIUM LACTATE, POTASSIUM CHLORIDE, AND CALCIUM CHLORIDE 75 ML/HR: .6; .31; .03; .02 INJECTION, SOLUTION INTRAVENOUS at 02:15

## 2023-10-06 RX ADMIN — GABAPENTIN 600 MG: 300 CAPSULE ORAL at 08:56

## 2023-10-06 RX ADMIN — CEFAZOLIN SODIUM 1000 MG: 1 SOLUTION INTRAVENOUS at 02:11

## 2023-10-06 RX ADMIN — TOPIRAMATE 25 MG: 25 TABLET, FILM COATED ORAL at 08:56

## 2023-10-06 RX ADMIN — METHOCARBAMOL TABLETS 750 MG: 750 TABLET, COATED ORAL at 02:11

## 2023-10-06 NOTE — PROGRESS NOTES
Progress Note - Orthopedics   Jacqueline Rojas 46 y.o. female MRN: [id number]  Unit/Bed#: -01      Subjective:    46 y. o.female POD #1 right TKA. Pt states she had some vomiting last night, but is feeling much better today and was able to get food down this morning. Pt doing well. Denies other consitutional complaints.     Labs:  0   Lab Value Date/Time    HCT 38.3 10/06/2023 0540    HCT 46.0 09/11/2023 1128    HCT 38.4 02/23/2023 1953    HCT 35.7 09/07/2015 0600    HCT 34.6 (L) 09/06/2015 0556    HCT 39.4 09/05/2015 0607    HGB 12.6 10/06/2023 0540    HGB 14.8 09/11/2023 1128    HGB 12.3 02/23/2023 1953    HGB 11.1 (L) 09/07/2015 0600    HGB 10.8 (L) 09/06/2015 0556    HGB 12.8 09/05/2015 0607    INR 0.94 09/11/2023 1128    INR 1.02 08/20/2015 1311    WBC 15.17 (H) 10/06/2023 0540    WBC 5.93 09/11/2023 1128    WBC 10.05 02/23/2023 1953    WBC 11.47 (H) 09/07/2015 0600    WBC 11.74 (H) 09/06/2015 0556    WBC 18.25 (H) 09/05/2015 0607    CRP <1.0 01/03/2023 1244       Meds:    Current Facility-Administered Medications:   •  acetaminophen (TYLENOL) tablet 650 mg, 650 mg, Oral, Q6H PRN, Brent Rodriguez PA-C  •  ascorbic acid (VITAMIN C) tablet 500 mg, 500 mg, Oral, BID, Brent Rodriguez PA-C, 500 mg at 10/06/23 6406  •  azelastine (OPTIVAR) 0.05 % ophthalmic solution 1 drop, 1 drop, Right Eye, BID, Laqueradha Rodriguez PA-C  •  bepotastine besilate (BEPREVE) 1.5 % op soln 1 drop, 1 drop, Right Eye, Daily, Laqueta Leak, PA-C  •  bupivacaine liposomal (EXPAREL) 1.3 % injection 20 mL, 20 mL, Infiltration, Once, Madelene Severs, MD  •  buPROPion (WELLBUTRIN XL) 24 hr tablet 300 mg, 300 mg, Oral, Daily, Mary Schwartz PA-C, 300 mg at 10/06/23 8  •  busPIRone (BUSPAR) tablet 10 mg, 10 mg, Oral, Daily, Mary Schwartz PA-C, 10 mg at 10/06/23 2974  •  calcium carbonate (TUMS) chewable tablet 1,000 mg, 1,000 mg, Oral, Daily PRN, Mary Schwartz PA-C, 1,000 mg at 10/05/23 2028  •  chlorhexidine (HIBICLENS) 4 % topical liquid, , Topical, Daily PRN, Sarah Ceja MD  •  cyanocobalamin (VITAMIN B-12) tablet 1,000 mcg, 1,000 mcg, Oral, Daily, Blayne Shelby PA-C, 1,000 mcg at 10/06/23 4265  •  docusate sodium (COLACE) capsule 100 mg, 100 mg, Oral, Daily, Olivia Schwartz PA-C, 100 mg at 10/06/23 5063  •  enoxaparin (LOVENOX) subcutaneous injection 40 mg, 40 mg, Subcutaneous, Q24H, Blayne Shelby PA-C, 40 mg at 10/05/23 2258  •  ferrous sulfate tablet 325 mg, 325 mg, Oral, Daily With Breakfast, Blayne Shelby PA-C, 325 mg at 30/86/02 3529  •  folic acid (FOLVITE) tablet 1 mg, 1 mg, Oral, Daily, Blayne Shelby PA-C, 1 mg at 10/06/23 4851  •  gabapentin (NEURONTIN) capsule 600 mg, 600 mg, Oral, TID, Blayne Shelby PA-C, 600 mg at 10/06/23 8460  •  HYDROmorphone (DILAUDID) injection 0.5 mg, 0.5 mg, Intravenous, Q2H PRN, Blayne Shelby PA-C  •  lactated ringers bolus 1,000 mL, 1,000 mL, Intravenous, Once PRN **AND** lactated ringers bolus 1,000 mL, 1,000 mL, Intravenous, Once PRN, Blayne Shelby PA-C  •  lactated ringers infusion, 75 mL/hr, Intravenous, Continuous, Blayne Shelby PA-C, Last Rate: 75 mL/hr at 10/06/23 0215, 75 mL/hr at 10/06/23 0215  •  lidocaine (PF) (XYLOCAINE-MPF) 1 % injection 0.5 mL, 0.5 mL, Infiltration, Once PRN, Nicholas Anne MD  •  methocarbamol (ROBAXIN) tablet 500 mg, 500 mg, Oral, Q8H PRN, Blayne Shelby PA-C  •  methocarbamol (ROBAXIN) tablet 750 mg, 750 mg, Oral, Q6H [address], Blayne Shelby PA-C, 750 mg at 10/06/23 0856  •  ondansetron [facility]) injection 4 mg, 4 mg, Intravenous, Q6H PRN, Blayne Shelby PA-C, 4 mg at 10/05/23 2258  •  oxyCODONE (ROXICODONE) immediate release tablet 10 mg, 10 mg, Oral, Q4H PRN, Blayne Shelby PA-C, 10 mg at 10/06/23 0715  •  oxyCODONE (ROXICODONE) IR tablet 5 mg, 5 mg, Oral, Q4H PRN, Olivia Cam JOSETTE Schwartz, 5 mg at 10/06/23 0007  •  senna (SENOKOT) tablet 8.6 mg, 1 tablet, Oral, Daily, Torri Schwartz PA-C, 8.6 mg at 10/06/23 0856  •  sodium chloride 0.9 % bolus 1,000 mL, 1,000 mL, Intravenous, Once PRN **AND** sodium chloride 0.9 % bolus 1,000 mL, 1,000 mL, Intravenous, Once PRN, Jacqueline Vyas PA-C  •  topiramate (TOPAMAX) tablet 25 mg, 25 mg, Oral, BID, Torri Schwartz PA-C, 25 mg at 10/06/23 1608  •  tranexamic Acid 1,000 mg, bupivacaine-epinephrine (PF) (MARCAINE/EPINEPHRINE PF) 0.25 %-1:441325 10 mL, ketorolac (TORADOL) 30 mg, morphine 10 mg in empty bag 1 each OR irrigation, , Irrigation, Once, Jacqueline Vyas PA-C  •  venlafaxine [facility]) 24 hr capsule 37.5 mg, 37.5 mg, Oral, Daily, Torri Schwartz PA-C, 37.5 mg at 10/06/23 0856    Blood Culture:   No results found for: "BLOODCX"    Wound Culture:   No results found for: "WOUNDCULT"    Ins and Outs:  I/O last 24 hours: In: 2200.5 [P.O.:473; I.V.:1727.5]  Out: 100 [Urine:80; Blood:20]          Physical:  Vitals:    10/06/23 0740   BP: 126/75   Pulse: 66   Resp: 18   Temp: 97.8 °F (36.6 °C)   SpO2: 94%     Musculoskeletal: right Lower Extremity  · Dressing c/d/i  · Calf compressible, nontender  · SILT s/sp/dp/t  · +FHL/EHL, +ankle dorsi/plantar flexion. · +PT pulse    _*_*_*_*_*_*_*_*_*_*_*_*_*_*_*_*_*_*_*_*_*_*_*_*_*_*_*_*_*_*_*_*_*_*_*_*_*_*_*_*_*    Assessment:    46 y. o.female POD #1 right TKA    Plan:  · WBAT RLE  · Up with assistance  · PT/OT  · Pain control  · DVT ppx  · Will continue to assess for acute blood loss anemia  · Dispo: Ortho will follow    Yamile Amezcua PA-C

## 2023-10-06 NOTE — PLAN OF CARE
Problem: PHYSICAL THERAPY ADULT  Goal: Performs mobility at highest level of function for planned discharge setting. See evaluation for individualized goals. Description: Treatment/Interventions: Functional transfer training, LE strengthening/ROM, Elevations, Therapeutic exercise, Endurance training, Patient/family training, Equipment eval/education, Gait training          See flowsheet documentation for full assessment, interventions and recommendations. Outcome: Progressing  Note: Prognosis: Excellent  Problem List: Decreased strength, Decreased range of motion, Decreased mobility, Pain, Orthopedic restrictions  Assessment: Pt exhibits physical deficits noted in problem list above. Deficits listed contribute to functional limitations that are significant from the patient PLOF and include: unsafe/inefficient ambulation, fall risk and difficulty to safely manage stairs/steps/ramp    Additionally, patient is limited by restrictions/precautions/DME: RLE WBAT    During today's session, Reviewed and progressed HEP. Educated and instructed patient on safe sit<>stand transfer, limited carry over due to pt self limiting. Progressed and instructed on gait corrections. The AM-PAC & Barthel Index outcome tools were used to assist in determining pt safety w/ mobility/self care & appropriate d/c recommendations, see above for scores. Patient's clinical presentation is stable  following recent surgery/procedure. Pt should have no difficulty returning home at time of discharge with OP PT. Considerations for next session: Review HEP. Progress gait training/duration and review stair training. Barriers to Discharge: None     PT Discharge Recommendation: Home with outpatient rehabilitation    See flowsheet documentation for full assessment.

## 2023-10-06 NOTE — PLAN OF CARE
Problem: INFECTION - ADULT  Goal: Absence or prevention of progression during hospitalization  Description: INTERVENTIONS:  - Assess and monitor for signs and symptoms of infection  - Monitor lab/diagnostic results  - Monitor all insertion sites, i.e. indwelling lines, tubes, and drains  - Monitor endotracheal if appropriate and nasal secretions for changes in amount and color  - [place] appropriate cooling/warming therapies per order  - Administer medications as ordered  - Instruct and encourage patient and family to use good hand hygiene technique  - Identify and instruct in appropriate isolation precautions for identified infection/condition  Outcome: Progressing  Goal: Absence of fever/infection during neutropenic period  Description: INTERVENTIONS:  - Monitor WBC    Outcome: Progressing         Problem: PAIN - ADULT  Goal: Verbalizes/displays adequate comfort level or baseline comfort level  Description: Interventions:  - Encourage patient to monitor pain and request assistance  - Assess pain using appropriate pain scale  - Administer analgesics based on type and severity of pain and evaluate response  - Implement non-pharmacological measures as appropriate and evaluate response  - Consider cultural and social influences on pain and pain management  - Notify physician/advanced practitioner if interventions unsuccessful or patient reports new pain  Outcome: Progressing     Problem: PAIN - ADULT  Goal: Verbalizes/displays adequate comfort level or baseline comfort level  Description: Interventions:  - Encourage patient to monitor pain and request assistance  - Assess pain using appropriate pain scale  - Administer analgesics based on type and severity of pain and evaluate response  - Implement non-pharmacological measures as appropriate and evaluate response  - Consider cultural and social influences on pain and pain management  - Notify physician/advanced practitioner if interventions unsuccessful or patient reports new pain  Outcome: Progressing         Problem: SAFETY ADULT  Goal: Maintain or return to baseline ADL function  Description: INTERVENTIONS:  -  Assess patient's ability to carry out ADLs; assess patient's baseline for ADL function and identify physical deficits which impact ability to perform ADLs (bathing, care of mouth/teeth, toileting, grooming, dressing, etc.)  - Assess/evaluate cause of self-care deficits   - Assess range of motion  - Assess patient's mobility; develop plan if impaired  - Assess patient's need for assistive devices and provide as appropriate  - Encourage maximum independence but intervene and supervise when necessary  - Involve family in performance of ADLs  - Assess for home care needs following discharge   - Consider OT consult to assist with ADL evaluation and planning for discharge  - Provide patient education as appropriate  Outcome: Progressing        Problem: SAFETY ADULT  Goal: Patient will remain free of falls  Description: INTERVENTIONS:  - Educate patient/family on patient safety including physical limitations  - Instruct patient to call for assistance with activity   - Consult OT/PT to assist with strengthening/mobility   - Keep Call bell within reach  - Keep bed low and locked with side rails adjusted as appropriate  - Keep care items and personal belongings within reach  - Initiate and maintain comfort rounds  - Make Fall Risk Sign visible to staff  - Initiate/Maintain bed alarm  - Obtain necessary fall risk management equipment:   - Apply yellow socks and bracelet for high fall risk patients  - Consider moving patient to room near nurses station  Outcome: Progressing

## 2023-10-06 NOTE — DISCHARGE SUMMARY
ORTHOPEDICS DISCHARGE SUMMARY  Tuan Carrasco 46 y.o. female MRN: [id number]  Unit/Bed#: -01    Attending Physician: America Funez    Admitting diagnosis: Primary osteoarthritis of right knee [M17.11]  Preoperative testing [Z01.818]    Discharge diagnosis: Primary osteoarthritis of right knee [M17.11]  Preoperative testing [Z01.818]    Date of admission: 10/5/2023    Date of discharge: 10/06/23         Procedure: Right TKA    HPI:  This is a 46y.o. year old female that presented to the office with signs and symptoms of right knee osteoarthritis. They tried and failed conservative treatment measures and wished to proceed with surgical intervention. The risks, benefits, and complications of the procedure were discussed with the patient and informed consent was obtained. Hospital Course: The patient was admitted to the hospital on 10/5/2023 and underwent an uncomplicated right total knee arthroplasty. They were transferred to the floor after a brief stay in the post-anesthesia care unit. Their pain was well managed with IV and oral pain medications. They began therapy on post operative day #1. Lovenox was also started for DVT prophylaxis 12 hours post operatively. On discharge date pt was cleared by PT and the medicine team and determined to be safe for discharge. Daily discussion was had with the patient, nursing staff, orthopaedic team, and family members if present. All questions were answered to the patients satisifaction. 0   Lab Value Date/Time    HGB 12.6 10/06/2023 0540    HGB 14.8 09/11/2023 1128    HGB 12.3 02/23/2023 1953    HGB 13.4 02/10/2023 0545    HGB 15.3 01/03/2023 1244    HGB 14.7 09/09/2022 1222    HGB 12.6 02/11/2022 0825    HGB 12.6 02/10/2022 0500    HGB 13.0 02/10/2022 0459    HGB 13.8 01/27/2022 1105    HGB 11.1 (L) 09/07/2015 0600    HGB 10.8 (L) 09/06/2015 0556    HGB 12.8 09/05/2015 0607    HGB 14.5 08/20/2015 1311         Discharge Instructions:   The patient was discharged weight bearing as tolerated to the right lower extremity. Aspirin will be continued for 28 days. Continue PT/OT. Take pain medications as instructed. Discharge Medications: For the complete list of discharge medications, please refer to the patient's medication reconciliation.

## 2023-10-06 NOTE — PLAN OF CARE
Problem: OCCUPATIONAL THERAPY ADULT  Goal: Performs self-care activities at highest level of function for planned discharge setting. See evaluation for individualized goals. Description: Treatment Interventions: ADL retraining, Functional transfer training, Endurance training, Patient/family training, Equipment evaluation/education, Compensatory technique education, Energy conservation, Activityengagement          See flowsheet documentation for full assessment, interventions and recommendations. Outcome: Adequate for Discharge  Note: Limitation: Decreased ADL status, Decreased endurance, Decreased Safe judgement during ADL, Decreased self-care trans, Decreased high-level ADLs  Prognosis: Good  Assessment: Patient seen for OT treatment on 10/6/2023 s/p admission for Status post total knee replacement using cement, right Patient agreeable to OT session. Patient participated in fall prevention , self-care transfers, functional mobility and ADLs with intervention focus on optimizing independence in functional mobility, self-care transfers and ADL tasks with use of compensatory techniques to minimize pain and decreased knee ROM. Xoimy Esteves is showing improvements in functional mobility, activity tolerance, transfers, balance and ADLS. From an OT standpoint,  pt demonstrates adequate functional independence and safety for POD#0 D/C.  At this time, recommending D/C to: return to previous environment with no rehabilitation needs     OT Discharge Recommendation: No rehabilitation needs (OPPT post op)     Harpal Davalos, [id number] [facility] OTR/L   [place] Licensure# [id number]

## 2023-10-06 NOTE — PHYSICAL THERAPY NOTE
PHYSICAL THERAPY Treatment  DATE: 10/06/23  TIME: 8387-2421    NAME:  Carrington Martel  AGE:   46 y.o. Mrn:   [id number]  Length Of Stay: 0    ADMIT DX:  Primary osteoarthritis of right knee [M17.11]  Preoperative testing [Z01.818]    Past Medical History:   Diagnosis Date    Anxiety     Asthma     Cancer ([address])     endometrial    Chronic pain syndrome     CPAP (continuous positive airway pressure) dependence     Depression     Diabetes mellitus (HCC)     Pre diabetic    GERD (gastroesophageal reflux disease)     HTN (hypertension)     Hyperlipidemia     Hypertension     Lumbar radiculopathy     Prediabetes     Sleep apnea      Past Surgical History:   Procedure Laterality Date    EYE SURGERY  07/26/2022    cornea eye transplant    GASTRECTOMY SLEEVE LAPAROSCOPIC      HYSTERECTOMY      KNEE ARTHROSCOPY Right     LUMBAR FUSION      NH ARTHROPLASTY FEM CONDYLES/TIBIAL PLATEAU KNEE Right 16/2/0848    Procedure: ARTHROPLASTY KNEE TOTAL;  Surgeon: Mey Olson MD;  Location: [facility];  Service: Orthopedics    NH ARTHRP KNE CONDYLE&PLATU MEDIAL&LAT COMPARTMENTS Left 02/09/2023    Procedure: ARTHROPLASTY KNEE TOTAL AND ALL ASSOCIATED PROCEDURES;  Surgeon: Mey Olson MD;  Location: [facility];  Service: Orthopedics    NH BREAST REDUCTION Bilateral 09/23/2022    Procedure: BILATERAL BREAST REDUCTION;  Surgeon: Ileana Thompson MD;  Location: [facility];  Service: Plastics    MARY-EN-Y PROCEDURE  05/27/2021    Sleve    TOTAL HIP ARTHROPLASTY Bilateral         10/06/23 0801   PT Last Visit   PT Visit Date 10/06/23   Note Type   Note Type Treatment   Pain Assessment   Pain Assessment Tool 0-10   Pain Score 4   Pain Location/Orientation Orientation: Right;Location: Knee   Effect of Pain on Daily Activities limits comfort but able to continue through activity   Hospital Pain Intervention(s) Ambulation/increased activity; Rest   Restrictions/Precautions   Weight Bearing Precautions Per Order Yes   RLE Weight Bearing Per Order WBAT   Other Precautions Pain; Fall Risk   General   Chart Reviewed Yes   Additional Pertinent History 47 y/o presents for elective right TKA. Family/Caregiver Present No   Cognition   Overall Cognitive Status WFL   Arousal/Participation Alert   Orientation Level Oriented X4   Subjective   Subjective Pt pleasant and agreeable. Expressing anxiousness to discharge home   Bed Mobility   Rolling R 6  Modified independent   Rolling L 6  Modified independent   Supine to Sit 6  Modified independent   Additional items   (flat bed)   Sit to Supine 5  Supervision   Additional items LE management;Verbal cues  (flat bed)   Transfers   Sit to Stand 5  Supervision   Additional items Verbal cues; Increased time required   Stand to Sit 5  Supervision   Additional items Verbal cues   Ambulation/Elevation   Gait Assistance 5  Supervision   Additional items Verbal cues; Tactile cues   Assistive Device Rolling walker   Distance 150'   Ambulation/Elevation Additional Comments Pt progressed to reciprocal gait with good step length, heel off, heel strike, and pace. min cues for postural and step corrections. Pt exhibited good carry over with instructions   Balance   Static Sitting Normal   Dynamic Sitting Normal   Static Standing Fair +   Dynamic Standing Fair +   Ambulatory Fair   Endurance Deficit   Endurance Deficit No   Activity Tolerance   Activity Tolerance Patient tolerated treatment well   Medical Staff Made Aware spoke with OT   Nurse Made Aware spoke with nursing   Exercises   Quad Sets Supine;10 reps;Bilateral;AROM  (10 sec hold)   Heelslides Supine;15 reps;AROM; Right  (use of sheet for flexion over pressure and to manual resist knee extension leg pres)   Hip Abduction 10 reps;Right;AROM  (left side lying, right LE clam shell)   Ankle Pumps Supine;15 reps;Bilateral   Squat Standing;10 reps;AROM; Bilateral  (with RW mini squat)   Assessment   Prognosis Excellent   Problem List Decreased strength;Decreased range of motion;Decreased mobility;Pain;Orthopedic restrictions   Assessment Pt exhibits physical deficits noted in problem list above. Deficits listed contribute to functional limitations that are significant from the patient PLOF and include: unsafe/inefficient ambulation, fall risk and difficulty to safely manage stairs/steps/ramp    Additionally, patient is limited by restrictions/precautions/DME: RLE WBAT    During today's session, Reviewed and progressed HEP. Educated and instructed patient on safe sit<>stand transfer, limited carry over due to pt self limiting. Progressed and instructed on gait corrections. The AM-PAC & Barthel Index outcome tools were used to assist in determining pt safety w/ mobility/self care & appropriate d/c recommendations, see above for scores. Patient's clinical presentation is stable  following recent surgery/procedure. Pt should have no difficulty returning home at time of discharge with OP PT. Considerations for next session: Review HEP. Progress gait training/duration and review stair training. Barriers to Discharge None   Goals   Patient Goals return to running   STG Expiration Date 10/15/23   Short Term Goal #1 Pt exhibits physical deficits noted in problem list above. Deficits listed contribute to functional limitations that are significant from the patient PLOF and include: unsafe/inefficient ambulation, fall risk and difficulty to safely manage stairs/steps/ramp    Additionally, patient is limited by restrictions/precautions/DME: RLE WBAT    During today's session, Reviewed and progressed HEP. Educated and instructed patient on safe sit<>stand transfer, limited carry over due to pt self limiting. Progressed and instructed on gait corrections. The AM-PAC & Barthel Index outcome tools were used to assist in determining pt safety w/ mobility/self care & appropriate d/c recommendations, see above for scores.  Patient's clinical presentation is stable  following recent surgery/procedure. Pt should have no difficulty returning home at time of discharge with OP PT. Considerations for next session: Review HEP. Progress gait training/duration and review stair training. Plan   Treatment/Interventions Functional transfer training;LE strengthening/ROM; Elevations; Therapeutic exercise; Endurance training;Patient/family training;Equipment eval/education;Gait training   PT Frequency Twice a day   Recommendation   PT Discharge Recommendation Home with outpatient rehabilitation   AM-PAC Basic Mobility Inpatient   Turning in Flat Bed Without Bedrails 4   Lying on Back to Sitting on Edge of Flat Bed Without Bedrails 4   Moving Bed to Chair 4   Standing Up From Chair Using Arms 4   Walk in Room 4   Climb 3-5 Stairs With Railing 3   Basic Mobility Inpatient Raw Score 23   Basic Mobility Standardized Score 50.88   Highest Level Of Mobility   JH-HLM Goal 7: Walk 25 feet or more   JH-HLM Achieved 8: Walk 250 feet ot more   Education   Education Provided Home exercise program;Mobility training;Assistive device   End of Consult   Patient Position at End of Consult Seated edge of bed; All needs within reach  (Nursing present to take over patient care)   The patient's AM-PAC Basic Mobility Inpatient Short Form Raw Score is 23. A Raw score of greater than or equal to 16 suggests the patient may benefit from discharge to home. Please also refer to the recommendation of the Physical Therapist for safe discharge planning.     Luc Sinclair, PT, DPT  [place] Licensure #[id number]

## 2023-10-06 NOTE — OCCUPATIONAL THERAPY NOTE
Occupational Therapy Treatment Note     Patient Name: Carmen Ramirez  GUJPD'L Date: 10/6/2023  Problem List  Principal Problem:    Status post total knee replacement using cement, right  Active Problems:    Lumbar radiculopathy    Hypertension    Hyperlipidemia    Chronic pain of both knees    Asthma    GERD (gastroesophageal reflux disease)    Sleep apnea    Anxiety and depression    Primary osteoarthritis of right knee          10/06/23 0940   OT Last Visit   OT Visit Date 10/06/23   Note Type   Note Type Treatment   Pain Assessment   Pain Assessment Tool 0-10   Pain Score 8   Pain Location/Orientation Orientation: Right;Location: Knee; Location: Incision   Pain Radiating Towards n/a   Pain Onset/Description Onset: Ongoing;Frequency: Constant/Continuous; Descriptor: Discomfort   Effect of Pain on Daily Activities limits comfort and fnxl mobility   Patient's Stated Pain Goal No pain   Hospital Pain Intervention(s) Repositioned; Ambulation/increased activity; Emotional support   Multiple Pain Sites No   Restrictions/Precautions   Weight Bearing Precautions Per Order Yes   RLE Weight Bearing Per Order WBAT  (POD#1 R TKA)   Other Precautions WBS; Fall Risk;Pain   Lifestyle   Autonomy At baseline pt lives at home with her son in a 2nd floor apartment with + KRISTOPHER. I w/ ADLS, IADLs and functional mobility. (+) driving and falls. Reciprocal Relationships Supportive family   Service to Others Unemployed   Intrinsic Gratification Sewing   ADL   Where Assessed Edge of bed  (vs sitting on the toilet)   Eating Assistance 6  Modified independent   Eating Deficit Setup; Beverage management   UB Bathing Assistance 6  Modified Independent   UB Bathing Deficit Setup; Chest;Right arm;Left arm; Abdomen   UB Bathing Comments Sponge bathe with wipes while seated on the toilet   LB Bathing Assistance 4  Minimal Assistance   LB Bathing Deficit Setup;Perineal area; Buttocks;Right upper leg;Left upper leg;Right lower leg including foot; Left lower leg including foot   LB Bathing Comments Sponge bathe with wipes sitting on the toilet; increased assistance to reach R foot d/t limited knee ROM   UB Dressing Assistance 6  Modified independent   UB Dressing Deficit Setup; Thread RUE; Thread LUE;Pull over head;Pull around back;Pull down in back   UB Dressing Comments doff gown, chitra shirt/bra   LB Dressing Assistance 4  Minimal Assistance   LB Dressing Deficit Setup; Requires assistive device for steadying;Verbal cueing;Supervision/safety; Increased time to complete; Don/doff R sock; Don/doff L sock; Thread RLE into pants; Thread LLE into pants; Thread RLE into underwear; Thread LLE into underwear;Pull up over hips;Don/doff R shoe;Don/doff L shoe   LB Dressing Comments Completed seated EOB/standing. Increased time to thread B LEs into pants and underwear. (A) to chitra R sock and shoe; S to tie shoes with increased time   Toileting Assistance  5  Supervision/Setup   Toileting Deficit Setup;Grab bar use;Clothing management up;Clothing management down;Perineal hygiene;Supervison/safety   Toileting Comments Use of R grab bar   Functional Standing Tolerance   Time ~2 mins   Activity functional transfer training and participation in ADL tasks   Comments Pt with overall fair + static standing balance with no c/o or LOBs.    Bed Mobility   Additional Comments DNT bed mobility; pt recevied seated on the EOB and remained there at the end of the session with all needs in reach   Transfers   Sit to Stand 5  Supervision   Additional items Increased time required;Verbal cues   Stand to Sit 5  Supervision   Additional items Increased time required;Verbal cues   Stand pivot 5  Supervision   Additional items Increased time required;Verbal cues  (RW)   Toilet transfer 5  Supervision   Additional items Increased time required;Verbal cues;Standard toilet  (use of grab bar)   Additional Comments Pt with good application and recall of optimal hand placement and body mechanics for safe transfers from previous session. Multiple STS completed t/o the session, x 3 at supervision level from various self-care surfaces. Functional Mobility   Functional Mobility 5  Supervision   Additional Comments for functional household distance with directional changes   Additional items Rolling walker   Toilet Transfers   Toilet Transfer From Rolling walker   Toilet Transfer Type To and from   Toilet Transfer to Standard toilet   Toilet Transfer Technique Ambulating   Toilet Transfers Supervision   Toilet Transfers Comments Use of R grab bar   Car Transfers   Car Transfers Comments Pt educated on safe mechanics for car transfers, pt verbalized understanding. Subjective   Subjective "I ready to get out of here"   Cognition   Overall Cognitive Status [facility]   Arousal/Participation Alert; Responsive; Cooperative   Attention Within functional limits   Orientation Level Oriented X4   Memory Within functional limits   Following Commands Follows all commands and directions without difficulty   Comments Pt is highly motivated to return to PLOF with increased independence   Activity Tolerance   Activity Tolerance Patient tolerated treatment well   Medical Staff Made Aware Spoke with care coordination, PT, MICHAEL Gordillo   Assessment   Assessment Patient seen for OT treatment on 10/6/2023 s/p admission for Status post total knee replacement using cement, right Patient agreeable to OT session. Patient participated in fall prevention , self-care transfers, functional mobility and ADLs with intervention focus on optimizing independence in functional mobility, self-care transfers and ADL tasks with use of compensatory techniques to minimize pain and decreased knee ROM. Carrington Delonte is showing improvements in functional mobility, activity tolerance, transfers, balance and ADLS. From an OT standpoint,  pt demonstrates adequate functional independence and safety for POD#0 D/C.  At this time, recommending D/C to: return to previous environment with no rehabilitation needs   Plan   Treatment Interventions ADL retraining;Functional transfer training; Endurance training;Patient/family training;Equipment evaluation/education; Compensatory technique education; Energy conservation; Activityengagement   Goal Expiration Date 10/15/23   OT Treatment Day 2   OT Frequency 3-5x/wk   Recommendation   OT Discharge Recommendation No rehabilitation needs  (OPPT post op)   Additional Comments  The patient's raw score on the AM-PAC Daily Activity Inpatient Short Form is 22. A raw score of greater than or equal to 19 suggests the patient may benefit from discharge to home. Please refer to the recommendation of the Occupational Therapist for safe discharge planning. AM-PAC Daily Activity Inpatient   Lower Body Dressing 3   Bathing 3   Toileting 4   Upper Body Dressing 4   Grooming 4   Eating 4   Daily Activity Raw Score 22   Daily Activity Standardized Score (Calc for Raw Score >=11) 47. 1   AM-[facility] Applied Cognition Inpatient   Following a Speech/Presentation 4   Understanding Ordinary Conversation 4   Taking Medications 4   Remembering Where Things Are Placed or Put Away 4   Remembering List of 4-5 Errands 4   Taking Care of Complicated Tasks 4   Applied Cognition Raw Score 24   Applied Cognition Standardized Score 62.21   End of Consult   Education Provided Yes   Patient Position at End of Consult Seated edge of bed; All needs within reach   Nurse Communication Nurse aware of consult       Rebeca Prather, [address] [facility] OTR/L   [place] Licensure# [id number]

## 2023-10-09 ENCOUNTER — OFFICE VISIT (OUTPATIENT)
Dept: PHYSICAL THERAPY | Facility: REHABILITATION | Age: 53
End: 2023-10-09
Payer: MEDICARE

## 2023-10-09 ENCOUNTER — TELEPHONE (OUTPATIENT)
Dept: OBGYN CLINIC | Facility: HOSPITAL | Age: 53
End: 2023-10-09

## 2023-10-09 DIAGNOSIS — Z96.651 S/P TOTAL KNEE ARTHROPLASTY, RIGHT: Primary | ICD-10-CM

## 2023-10-09 PROCEDURE — 97110 THERAPEUTIC EXERCISES: CPT | Performed by: PHYSICAL THERAPIST

## 2023-10-09 PROCEDURE — 97161 PT EVAL LOW COMPLEX 20 MIN: CPT | Performed by: PHYSICAL THERAPIST

## 2023-10-09 NOTE — PROGRESS NOTES
PT Evaluation     Today's date: 10/9/2023  Patient name: Venita Pinon  : 1970  MRN: [id number]  Referring provider: Floresita Flower MD  Dx:   Encounter Diagnosis     ICD-10-CM    1. S/P total knee arthroplasty, right  Z96.651           Start Time: 1100  Stop Time: 1140  Total time in clinic (min): 40 minutes    Assessment  Assessment details: Problem List:  1) R knee hypomobility  2) Decreased functional strength and ambulatory tolerance    Venita Pinon is a pleasant 46 y.o. female who presents following R TKA on 10/5/2023. she has R knee hypomobility, decreased R quad strength, and decreased ambulatory tolerance resulting in the fear of her not being able to stay active. No further referral appears necessary at this time based upon examination results. I expect she will improve in 10-12 weeks. No signs and symptoms of DVT or infection at this time. Ayden Graham would benefit from skilled physical therapy to address her limitations and allow her to return to her PLOF. Discussed and educated on signs and symptoms of infection and DVT. Also went over HEP to ensure pt is moving while at home. Impairments: abnormal or restricted ROM, activity intolerance, impaired physical strength, lacks appropriate home exercise program, pain with function, weight-bearing intolerance and poor posture     Symptom irritability: moderateUnderstanding of Dx/Px/POC: good   Prognosis: good    Goals  Short Term Goals: to be achieved by 4 weeks  1) Patient to be independent with basic HEP. 2) Decrease pain by 5/10 at its worst.  3) Increase knee flexion ROM to 90 deg. 4) Increase knee extension ROM by > 5 deg. 5) Increase LE strength by 1/2 MMT grade in all deficient planes. 6) Patient to negotiate steps with a step-to pattern with use of HR. 7) Patient to report decreased sleep interruption secondary to pain. 8) Increase ambulatory tolerance by 10 min with LRAD.     Long Term Goals: to be achieved by discharge  1) FOTO equal to or greater than . 2) Patient to be independent with comprehensive HEP. 3) Decrease pain to 3/10 or less for improved quality of life. 4) Increase LE strength to 5/5 MMT grade in all planes to improve a/iadls. 5) Achieve full knee extension ROM to improve a/iadls. 6) Increase knee flexion ROM to 120 degrees minimum to improve a/iadls. 7) Patient to negotiate steps with a reciprocal pattern without use of Hr. 8) Increase ambulatory tolerance to 30 min to improve participation in social activities. 9) Patient to report no sleep interruption secondary to pain. Plan  Patient would benefit from: skilled physical therapy  Planned modality interventions: cryotherapy, thermotherapy: hydrocollator packs and unattended electrical stimulation  Planned therapy interventions: IADL retraining, joint mobilization, manual therapy, massage, ADL training, activity modification, abdominal trunk stabilization, ADL retraining, balance, balance/weight bearing training, neuromuscular re-education, body mechanics training, behavior modification, strengthening, stretching, therapeutic activities, therapeutic exercise, therapeutic training, transfer training, graded exercise, graded motor, home exercise program, graded activity, gait training, functional ROM exercises, patient education, postural training, IASTM, kinesiology taping and flexibility  Frequency: 2x week  Duration in visits: 16  Duration in weeks: 8  Treatment plan discussed with: patient        Subjective Evaluation    History of Present Illness  Date of surgery: 10/5/2023  Mechanism of injury: surgery  Mechanism of injury: Kuldeep Wan is a 46 y.o. female presenting to physical therapy on 10/09/23 with referral from MD for R TKA on 10/5/2023. She has been trying to move around although she is in a lot of pain. She is sore at all times. Reports sleep has not been too bad.          Quality of life: good    Patient Goals  Patient goals for therapy: increased strength, independence with ADLs/IADLs, return to sport/leisure activities, decreased pain, increased motion and return to work    Pain  Current pain ratin  At best pain ratin  At worst pain ratin  Quality: sharp and dull ache  Relieving factors: relaxation and rest  Aggravating factors: standing and walking    Treatments  Previous treatment: physical therapy        Objective  Myotomes  All intact  Dermatome: all intact          GAIT: with RW        MMT         AROM          PROM    Hip       L       R        L           R      L     R   Flex. Extn. Abd. Add. IR.         ER.         G. Max         G. Med         Iliop.          .         Knee         Extension         Flexion     0  89            Ankle         Dorsi Flexion         Plantar Flexion                  Comments: able to perform 4 SLRs without assistance       Precautions: R TKA 10/5/2023    Manuals 10/9                                                                Neuro Re-Ed 10/9            Quad sets             SLRs             LAQs                                                                 Ther Ex 10/9            Bike             VG             PROM             Walking around clinic             Standing knee flexion             Heel slides                                       Ther Activity                                       Gait Training                                       Modalities

## 2023-10-10 NOTE — TELEPHONE ENCOUNTER
Pt contacted to completed a postoperative follow up call assessment. She reports being "fine" and current pain of an 8/10, "very irritable" and "tired today." She is ambulating with the RW. She reports her dressing is dry with no drainage, and swelling "has come down." She is not icing, doesn't "have ice cubes."   She had outpatient PT yesterday. She states "I can't go over the med list" and declined to review it medication by medication. She is taking miralax at bedtime, denies BM but is passing gas. She denies chest pain, SOB, dizziness, fever or calf pain. She denies questions or concerns and states 'I'm good."     pt encouraged to call me with questions, concerns or issues.

## 2023-10-12 ENCOUNTER — APPOINTMENT (OUTPATIENT)
Dept: PHYSICAL THERAPY | Facility: REHABILITATION | Age: 53
End: 2023-10-12
Payer: MEDICARE

## 2023-10-16 ENCOUNTER — OFFICE VISIT (OUTPATIENT)
Dept: PHYSICAL THERAPY | Facility: REHABILITATION | Age: 53
End: 2023-10-16
Payer: MEDICARE

## 2023-10-16 DIAGNOSIS — M79.18 MYOFASCIAL PAIN SYNDROME: ICD-10-CM

## 2023-10-16 DIAGNOSIS — M17.11 PRIMARY OSTEOARTHRITIS OF RIGHT KNEE: ICD-10-CM

## 2023-10-16 DIAGNOSIS — Z96.651 S/P TOTAL KNEE ARTHROPLASTY, RIGHT: Primary | ICD-10-CM

## 2023-10-16 PROCEDURE — 97112 NEUROMUSCULAR REEDUCATION: CPT

## 2023-10-16 PROCEDURE — 97110 THERAPEUTIC EXERCISES: CPT

## 2023-10-16 RX ORDER — METHOCARBAMOL 500 MG/1
500 TABLET, FILM COATED ORAL EVERY 8 HOURS PRN
Qty: 90 TABLET | Refills: 0 | Status: SHIPPED | OUTPATIENT
Start: 2023-10-16

## 2023-10-16 NOTE — TELEPHONE ENCOUNTER
Reason for call:   [x] Refill   [] Prior Auth  [x] Other: PATIENT CALLED MEDICATION REFILL LINE, FOR A REFILL ON A MEDICATION SHE USED FOR MUSCLE SPASMS THREE TIMES A DAY. HE DOES NOT KNOW THE NAME OF THE MEDICATION OR WHEN SHE LAST GOT IT, BUT STATED SHE HAS BEEN OUT FOR ABOUT TWO WEEKS AND NEEDS THE MEDICATION BECAUSE HER MUSCLES ARE IN PAIN. SHE STATED SHE HAS BAD PTSD AND CAN NOT REMEMBER THE NAME BUT HER KNOWS Nenita Membreno WROTE THE SCRIPT. AFTER LOOKING IN HER CHART I BELIEVE IT MIGHT BE METHOCARBAMOL, I TRIED TO CONFIRM THE NAME WITH THE PATIENT BUT SHE SAID I WAS NOT UNDERSTANDING AND SHE DID NOT KNOW THE NAME. PLEASE REVIEW AND ADVISE, PATIENT WOULD LIKE A CALL WHEN THIS IS TAKEN CARE OF SO SHE KNOWS WHAT IS GOING ON. Office:   [] PCP/Provider -   [x] Speciality/Provider - SPA AND PAIN    Medication: METHOCARBAMOL    Dose/Frequency: 500mg    Quantity: #90    Pharmacy: [facility] [place]    Does the patient have enough for 3 days?    [] Yes   [x] No - Send as HP to POD

## 2023-10-16 NOTE — PROGRESS NOTES
Daily Note     Today's date: 10/16/2023  Patient name: Herlinda Duff  : 1970  MRN: [id number]  Referring provider: Yoav Lo MD  Dx:   Encounter Diagnosis     ICD-10-CM    1. S/P total knee arthroplasty, right  Z96.651       2. Primary osteoarthritis of right knee  M17.11           Start Time: 1107  Stop Time: 1147  Total time in clinic (min): 40 minutes    Subjective: Pt reports pain at her R knee really started to bother her last Wednesday. Has had increased pain levels since. Feels like the staples are the biggest cause for her symptoms and limited mobility. Received ambulating with SPC today. Objective: See treatment diary below      Assessment: Tolerated treatment fair. Initiated POC as outlined below, focusing on mobility and ROM today. Most limited with and most discomfort present during PROM R knee ext. Very challenged with quad set and experienced cramping in R hip flexor/prox quad; resolved with rest.  Was able to complete full revolution on bike after about 3 min of rec bike. Patient would benefit from continued PT to further improve ROM, increase strength, and maximize overall function. Plan: Continue per plan of care.       Precautions: R TKA 10/5/2023    Manuals 10/9 10/16                                                               Neuro Re-Ed 10/9 10/16           Quad sets  5"x10           SLRs             LAQs                                                                 Ther Ex 10/9 10/16           Bike  Rocking/full  6 min           VG             PROM  AFB           Walking around clinic  75' x3  SPC           Standing knee flexion             Heel slides  10"x10                                     Ther Activity                                       Gait Training                                       Modalities

## 2023-10-19 ENCOUNTER — OFFICE VISIT (OUTPATIENT)
Dept: PHYSICAL THERAPY | Facility: REHABILITATION | Age: 53
End: 2023-10-19
Payer: MEDICARE

## 2023-10-19 DIAGNOSIS — Z96.651 S/P TOTAL KNEE ARTHROPLASTY, RIGHT: Primary | ICD-10-CM

## 2023-10-19 DIAGNOSIS — M17.11 PRIMARY OSTEOARTHRITIS OF RIGHT KNEE: ICD-10-CM

## 2023-10-19 PROCEDURE — 97110 THERAPEUTIC EXERCISES: CPT

## 2023-10-19 NOTE — PROGRESS NOTES
Daily Note     Today's date: 10/19/2023  Patient name: Hallie Funez  : 1970  MRN: [id number]  Referring provider: Griselda Pinks, MD  Dx:   Encounter Diagnosis     ICD-10-CM    1. S/P total knee arthroplasty, right  Z96.651       2. Primary osteoarthritis of right knee  M17.11                      Subjective: pt reports increased pain from going down the steps leading with LLE. Objective: See treatment diary below      Assessment: Tolerated treatment well. Good sequencing and ambulating ramiro with SPC, but with antalgic gait. Limited ROM with RSB, toggle only for today. Good quad activation with sets and LAQ. She is making good gain in knee ROM at this stage of recovery. Continued PT would be beneficial to improve function. Plan: Continue per plan of care.        Precautions: R TKA 10/5/2023    Manuals 10/9 10/16 10/19                                                              Neuro Re-Ed 10/9 10/16 10/19          Quad sets  5"x10 5"x10          SLRs             LAQs   20x5"                                                              Ther Ex 10/9 10/16 10/19          Bike  Rocking/full  6 min Toggle 6'          VG             PROM  AFB MB          Walking around clinic  75' x3  SPC 75' x3  SPC          Standing knee flexion             Heel slides  10"x10 10x10"                                    Ther Activity                                       Gait Training                                       Modalities

## 2023-10-20 ENCOUNTER — OFFICE VISIT (OUTPATIENT)
Dept: OBGYN CLINIC | Facility: CLINIC | Age: 53
End: 2023-10-20

## 2023-10-20 ENCOUNTER — APPOINTMENT (OUTPATIENT)
Dept: RADIOLOGY | Facility: AMBULARY SURGERY CENTER | Age: 53
End: 2023-10-20
Payer: MEDICARE

## 2023-10-20 VITALS — WEIGHT: 224 LBS | BODY MASS INDEX: 38.24 KG/M2 | HEIGHT: 64 IN

## 2023-10-20 DIAGNOSIS — Z96.651 S/P TOTAL KNEE REPLACEMENT USING CEMENT, RIGHT: Primary | ICD-10-CM

## 2023-10-20 DIAGNOSIS — Z96.651 S/P TOTAL KNEE REPLACEMENT USING CEMENT, RIGHT: ICD-10-CM

## 2023-10-20 PROCEDURE — 73562 X-RAY EXAM OF KNEE 3: CPT

## 2023-10-20 NOTE — PATIENT INSTRUCTIONS
Merle Armstrong                                                                                                            ANTIBIOTIC USE FOR JOINT REPLACEMENT PATIENTS  You have had surgery to replace one of your joints with a metal prosthesis. Going forward, you should take oral antibiotics before any dental work, including routine cleanings. These procedures are a potential source of injection. If you develop an infection, it could spread to your operative joint and cause complications. Please show the following guidelines to your medical doctor and dentist, so he/she can prescribe the appropriate medications. The following information is recommended by the American Heart, Dental, and Orthopedic Associations:  STANDARD GENERAL PROPHYLAXIS  antibiotic prophylaxis recommended lifetime  Recommend refraining from dental procedures until 3 months post operatively  Amoxicillin for Adults:    500mg - Take 4 capsules (2 grams) orally one hour before the procedure  If allergic to Penicillin  Clindamycin for Adults:   300mg - Take 2 capsules (600 mg) orally one hour before the procedure  Azithromycin for Adults:  250mg - Take 2 capsules (500 mg) orally one hour before the procedure  Cephalexin for Adults:     500mg - Take 4 capsules (2 grams) orally one hour before the procedure  Note: Cephalosporins should not be used if you have ever developed an immediate hypersensitivity reaction (i.e., hives, swelling, severe itching, difficulty breathing) to Penicillin  Please feel free to contact our office at [phone] with questions or concerns.

## 2023-10-20 NOTE — PROGRESS NOTES
46 y.o.female presents for 2 weeks postoperative visit status post right TKA. Patient states she is doing well she is ambulate with assistance of a cane. She is taking now aspirin DVT prophylaxis  . She is doing well in physical therapy. Pain is well under control. she denies any numbness tingling calf pain chest pain shortness of breath.     Review of Systems  Review of systems negative unless otherwise specified in HPI    Past Medical History  Past Medical History:   Diagnosis Date    Anxiety     Asthma     Cancer ([address])     endometrial    Chronic pain syndrome     CPAP (continuous positive airway pressure) dependence     Depression     Diabetes mellitus ([address])     Pre diabetic    GERD (gastroesophageal reflux disease)     HTN (hypertension)     Hyperlipidemia     Hypertension     Lumbar radiculopathy     Prediabetes     Sleep apnea        Past Surgical History  Past Surgical History:   Procedure Laterality Date    EYE SURGERY  07/26/2022    cornea eye transplant    GASTRECTOMY SLEEVE LAPAROSCOPIC      HYSTERECTOMY      KNEE ARTHROSCOPY Right     LUMBAR FUSION      NM ARTHROPLASTY FEM CONDYLES/TIBIAL PLATEAU KNEE Right 28/0/6593    Procedure: ARTHROPLASTY KNEE TOTAL;  Surgeon: Cas Ahn MD;  Location: [facility];  Service: Orthopedics    NM ARTHRP KNE CONDYLE&PLATU MEDIAL&LAT COMPARTMENTS Left 02/09/2023    Procedure: ARTHROPLASTY KNEE TOTAL AND ALL ASSOCIATED PROCEDURES;  Surgeon: Cas Ahn MD;  Location: [facility];  Service: Orthopedics    NM BREAST REDUCTION Bilateral 09/23/2022    Procedure: BILATERAL BREAST REDUCTION;  Surgeon: Carlotta Cavazos MD;  Location:  MAIN OR;  Service: Plastics    MARY-EN-Y PROCEDURE  05/27/2021    Sleve    TOTAL HIP ARTHROPLASTY Bilateral        Current Medications  Current Outpatient Medications on File Prior to Visit   Medication Sig Dispense Refill    acetaminophen (TYLENOL) 650 mg CR tablet Take 1 tablet (650 mg total) by mouth every 8 (eight) hours as needed for mild pain 30 tablet 0    aspirin (ECOTRIN LOW STRENGTH) 81 mg EC tablet Take 1 tablet (81 mg total) by mouth 2 (two) times a day Take 1(one) 81 mg tablet twice daily x 35 days after total joint arthroplasty 70 tablet 0    azelastine (OPTIVAR) 0.05 % ophthalmic solution Administer 1 drop to the right eye 2 (two) times a day      bepotastine besilate (BEPREVE) 1.5 % op soln INSTILL 1 DROP INTO BOTH EYES TWICE A DAY      buPROPion (WELLBUTRIN XL) 300 mg 24 hr tablet Take 300 mg by mouth daily      busPIRone (BUSPAR) 10 mg tablet Take 10 mg by mouth in the morning Takes twice a day      Calcium Carb-Cholecalciferol (OSCAL-D) 500 mg-200 units per tablet Take 1 tablet by mouth 2 (two) times a day with meals      cetirizine (ZyrTEC) 10 mg tablet Take 10 mg by mouth daily Takes 1 tablet 2 times a day      Cholecalciferol (VITAMIN D3) 2000 units capsule TAKE 2 CAPSULES BY MOUTH DAILY INDICATIONS: VITAMIN D DEFICIENCY.  5    [facility] Vitamin B-12 1000 MCG tablet Take 1,000 mcg by mouth daily      [facility] Vitamin C 500 MG tablet TAKE 1 TABLET BY MOUTH TWICE A  tablet 1    ferrous sulfate 325 (65 Fe) mg tablet Take by mouth daily with breakfast      folic acid (FOLVITE) 1 mg tablet Take 1 tablet (1 mg total) by mouth daily 30 tablet 1    gabapentin (NEURONTIN) 300 mg capsule Take 2 capsules (600 mg total) by mouth 3 (three) times a day 180 capsule 2    hydrocortisone 1 % cream Apply topically as needed Applies daily      methocarbamol (ROBAXIN) 500 mg tablet Take 1 tablet (500 mg total) by mouth every 8 (eight) hours as needed for muscle spasms 90 tablet 0    montelukast (SINGULAIR) 10 mg tablet TAKE 1 TABLET BY MOUTH EVERY DAY AT NIGHT      Multiple Vitamins-Minerals (multivitamin with minerals) tablet Take 1 tablet by mouth daily 30 tablet 0    ondansetron (ZOFRAN) 4 mg tablet Take 1 tablet (4 mg total) by mouth every 8 (eight) hours as needed for nausea or vomiting 20 tablet 0    simvastatin (ZOCOR) 40 mg tablet Take 40 mg by mouth daily at bedtime      topiramate (TOPAMAX) 25 mg tablet Take 25 mg by mouth 2 (two) times a day      venlafaxine (EFFEXOR-XR) 37.5 mg 24 hr capsule Take 37.5 mg by mouth daily      VENTOLIN  (90 Base) MCG/ACT inhaler INHALE 2 PUFFS EVERY 4 (FOUR) HOURS AS NEEDED FOR WHEEZING OR SHORTNESS OF BREATH.  2    ascorbic acid (VITAMIN C) 500 mg tablet TAKE 1 TABLET (500 MG TOTAL) BY MOUTH DAILY. (Patient not taking: Reported on 10/20/2023) 90 tablet 1    docusate sodium (COLACE) 100 mg capsule TAKE 1 CAPSULE BY MOUTH DAILY AS NEEDED FOR CONSTIPATION. (Patient not taking: Reported on 10/20/2023) 30 capsule 0    oxyCODONE (ROXICODONE) 5 immediate release tablet Take 1 tablets every 6 hrs as needed for pain control (Patient not taking: Reported on 10/20/2023) 30 tablet 0     No current facility-administered medications on file prior to visit. Recent Labs [facility])  0   Lab Value Date/Time    HCT 38.3 10/06/2023 0540    HCT 35.7 09/07/2015 0600    HGB 12.6 10/06/2023 0540    HGB 11.1 (L) 09/07/2015 0600    WBC 15.17 (H) 10/06/2023 0540    WBC 11.47 (H) 09/07/2015 0600    INR 0.94 09/11/2023 1128    INR 1.02 08/20/2015 1311    CRP <1.0 01/03/2023 1244    GLUCOSE 146 (H) 02/10/2022 0500    GLUCOSE 116 09/07/2015 0600    HGBA1C 5.9 (H) 09/11/2023 1131    HGBA1C 5.9 (H) 05/27/2021 1846         Physical exam  General: Awake, Alert, Oriented  Eyes: Pupils equal, round and reactive to light  Heart: regular rate and rhythm  Lungs: No audible wheezing    right Knee exam  Patient's right knee well approximated anterior incision 2 to 3 degrees all full extension flexion to 95 degrees calf nontender palpation active ankle dorsiflexion without pain.  numbness noted around the incision otherwise distal pulses present sensation intact    Imaging  X-rays x-rays reveal actually aligned right total knee arthroplasty without evidence of loosening or osseous abnormality.     Assessment:  Status post 2 weeks right total knee arthroplasty doing well  Plan:  Weightbearing as tolerated right lower extremity   continue aspirin for DVT prophylaxis  Physical therapy  Lifetime dental antibiotic prophylaxis recommended  Follow-up in 2 months time repeat x-rays right knee

## 2023-10-23 ENCOUNTER — TELEPHONE (OUTPATIENT)
Age: 53
End: 2023-10-23

## 2023-10-23 ENCOUNTER — OFFICE VISIT (OUTPATIENT)
Dept: PHYSICAL THERAPY | Facility: REHABILITATION | Age: 53
End: 2023-10-23
Payer: MEDICARE

## 2023-10-23 DIAGNOSIS — M17.11 PRIMARY OSTEOARTHRITIS OF RIGHT KNEE: ICD-10-CM

## 2023-10-23 DIAGNOSIS — Z96.651 S/P TOTAL KNEE ARTHROPLASTY, RIGHT: Primary | ICD-10-CM

## 2023-10-23 PROCEDURE — 97110 THERAPEUTIC EXERCISES: CPT

## 2023-10-23 PROCEDURE — 97112 NEUROMUSCULAR REEDUCATION: CPT

## 2023-10-23 NOTE — PROGRESS NOTES
Daily Note     Today's date: 10/23/2023  Patient name: Alva Espinoza  : 1970  MRN: [id number]  Referring provider: Cas Ahn MD  Dx:   Encounter Diagnosis     ICD-10-CM    1. S/P total knee arthroplasty, right  Z96.651       2. Primary osteoarthritis of right knee  M17.11           Start Time: 1107  Stop Time: 1155  Total time in clinic (min): 48 minutes    Subjective: Pt reports she frequently utilizes OTC pain medication to help manage pain at R knee. Notes her R hip has also been bothering her and has received a script to add R hip pain to her treatments. Also states she has been dealing with a rash that has developed along posterior R knee, and has been present since last Wednesday/Thursday. Objective: See treatment diary below. Slight redness along posterior R knee. Advised patient to continue to monitor rash to this area and to consider contacting her PCP, or going to [facility], should these symptoms remain over the next 1-2 days. Assessment: Tolerated treatment fair. Continued with program as outlined below. Presents with greatest limitation into R knee ext;  Limited tolerance to OP into R knee ext PROM; LLLD in sitting utilized to further address these limitations in ROM. Progressed with addition of sand dune march to further improve stability of R hip/knee and improve WB'ing through RLE. Limited knee flexion during ambulation requiring cues for proper form/gait. Patient would benefit from continued PT. Plan: Continue per plan of care.       Precautions: R TKA 10/5/2023    Manuals 10/9 10/16 10/19 10/23                                                             Neuro Re-Ed 10/9 10/16 10/19 10/23         Quad sets  5"x10 5"x10 5"x15         SLRs             LAQs   20x5" 20x5"         Sand dune March    1' x2                                                Ther Ex 10/9 10/16 10/19 10/23         Bike  Rocking/full  6 min Toggle 6' Toggle/full 6'         VG PROM  AFB MB AFB         Walking around clinic  75' x3  SPC 75' x3  SPC 75' x1   SPC         Standing knee flexion             Heel slides  10"x10 10x10"          LLLD    Sitting  3 min                      Ther Activity                                       Gait Training                                       Modalities

## 2023-10-23 NOTE — TELEPHONE ENCOUNTER
Caller: Patient    Doctor: Dr. Darren Smyth    Reason for call: Patient calling stating that she saw PA for rash and nothing seems to be helping alleviate the rash. Patient is wondering if an antibiotic can be prescribed or is something else can be prescribed. Also she is unable to find a new tip for her cane. She is wondering if a prescription can be placed for a new cane.   Patient asking to speak to clinical team.     Call back#: [phone]

## 2023-10-23 NOTE — TELEPHONE ENCOUNTER
Spoke to patient. We discussed ordering her a new cane (completed in [employer] and email sent to them). We also discussed her rash, which she reports putting "everything on" including diaper rash cream    We discussed no creams/ointments/lotions on incisional area for 12 weeks. We discussed no creams/ointments on rash (posterior leg) to cause worsening irritation. Advised to take non drowsy OTC Benadryl. pt encouraged to call me with questions, concerns or issues.

## 2023-10-24 LAB
DME PARACHUTE DELIVERY DATE REQUESTED: NORMAL
DME PARACHUTE ITEM DESCRIPTION: NORMAL
DME PARACHUTE ORDER STATUS: NORMAL
DME PARACHUTE SUPPLIER NAME: NORMAL
DME PARACHUTE SUPPLIER PHONE: NORMAL

## 2023-10-26 ENCOUNTER — OFFICE VISIT (OUTPATIENT)
Dept: PHYSICAL THERAPY | Facility: REHABILITATION | Age: 53
End: 2023-10-26
Payer: MEDICARE

## 2023-10-26 DIAGNOSIS — M17.11 PRIMARY OSTEOARTHRITIS OF RIGHT KNEE: ICD-10-CM

## 2023-10-26 DIAGNOSIS — Z96.651 S/P TOTAL KNEE ARTHROPLASTY, RIGHT: Primary | ICD-10-CM

## 2023-10-26 PROCEDURE — 97110 THERAPEUTIC EXERCISES: CPT

## 2023-10-26 PROCEDURE — 97140 MANUAL THERAPY 1/> REGIONS: CPT

## 2023-10-26 NOTE — PROGRESS NOTES
Daily Note     Today's date: 10/26/2023  Patient name: Onofre Blanco  : 1970  MRN: [id number]  Referring provider: Jimmy House MD  Dx:   Encounter Diagnosis     ICD-10-CM    1. S/P total knee arthroplasty, right  Z96.651       2. Primary osteoarthritis of right knee  M17.11           Start Time: 1105  Stop Time: 1145  Total time in clinic (min): 40 minutes    Subjective: Pt reports continued pain at R knee, especially when keeping R knee straight. States she spoke to her doctor about the rash on her R post knee who advised to use Benadryl to help manage symptoms. Notes the past couple of days she also had some yellow substance around open sections of her incision and her son put Neosporin on these areas, as well as covered with large band-aids. Objective: See treatment diary below. Some yellowness around open parts at superior and inferior areas of incision, but appeared to be more so moist scabbing. Band-aids were saturated in clear substance, likely the Neosporin used by her son last night. All these areas recovered with clean gauze pads. Educated patient about signs and symptoms of possible infection to be aware of and on the look out for. Should redness get worse or continue to spread, pt advised to contact her doctor. Assessment: Tolerated treatment fair. Continues to be most limited with available PROM R knee ext; PROM R knee flex is making good progress toward ROM goals. Seemed to respond better to heel slide/QS combo, compared to static holds with knee extended. Patient would benefit from continued PT to further improve strength, increase ROM, and decrease pain. Plan: Continue per plan of care. Continue to monitor incision for infection.        Precautions: R TKA 10/5/2023    Manuals 10/9 10/16 10/19 10/23 10/26                                  Incision assessment     AFB                     Neuro Re-Ed 10/9 10/16 10/19 10/23 10/26        Quad sets  5"x10 5"x10 5"x15         SLRs             LAQs   20x5" 20x5" 5"x20        Sand dune March 1' x2                                                Ther Ex 10/9 10/16 10/19 10/23 10/26        Bike  Rocking/full  6 min Toggle 6' Toggle/full 6' Toggle 6'        VG             PROM  AFB MB AFB AFB        Walking around clinic  75' x3  SPC 75' x3  SPC 75' x1   SPC         Standing knee flexion             Heel slides  10"x10 10x10"  Heel slide/QS combo  5"/5" x4 min        LLLD    Sitting  3 min                      Ther Activity                                       Gait Training                                       Modalities

## 2023-10-30 ENCOUNTER — OFFICE VISIT (OUTPATIENT)
Dept: PHYSICAL THERAPY | Facility: REHABILITATION | Age: 53
End: 2023-10-30
Payer: MEDICARE

## 2023-10-30 DIAGNOSIS — Z96.651 S/P TOTAL KNEE ARTHROPLASTY, RIGHT: Primary | ICD-10-CM

## 2023-10-30 PROCEDURE — 97140 MANUAL THERAPY 1/> REGIONS: CPT | Performed by: PHYSICAL THERAPIST

## 2023-10-30 PROCEDURE — 97110 THERAPEUTIC EXERCISES: CPT | Performed by: PHYSICAL THERAPIST

## 2023-10-30 NOTE — PROGRESS NOTES
Daily Note     Today's date: 10/30/2023  Patient name: Venita Pinon  : 1970  MRN: [id number]  Referring provider: Floresita Flower MD  Dx:   Encounter Diagnosis     ICD-10-CM    1. S/P total knee arthroplasty, right  Z96.651           Start Time: 1100  Stop Time: 1140  Total time in clinic (min): 40 minutes    Subjective: Pt reports max pain level of 9/10. States it hurts at night, but is getting around better. Objective: See treatment diary below. Redness noted around distal wound, but reports improved since last visit. ROM 5-119. Assessment: Tolerated treatment fair. Range of motion improved since IE and in good standing. Good tolerance to all activities today. Will continue to monitor incision. Patient would benefit from continued PT to further improve strength, increase ROM, and decrease pain. Plan: Continue per plan of care. Continue to monitor incision for infection.        Precautions: R TKA 10/5/2023    Manuals 10/9 10/16 10/19 10/23 10/26 10/30                                 Incision assessment     AFB QD       Assessment      QD 10'       Neuro Re-Ed 10/9 10/16 10/19 10/23 10/26 10/30       Quad sets  5"x10 5"x10 5"x15         SLRs             LAQs   20x5" 20x5" 5"x20        Sand dune ' x2                                                Ther Ex 10/9 10/16 10/19 10/23 10/26 10/30       Bike  Rocking/full  6 min Toggle 6' Toggle/full 6' Toggle 6' Rocking 8'       VG      3x10 DL L5    Single leg R 10x       PROM  AFB MB [employer] QD       Walking around clinic  75' x3  SPC 75' x3  SPC 75' x1   SPC         Standing knee flexion             Heel slides  10"x10 10x10"  Heel slide/QS combo  5"/5" x4 min        LLLD    Sitting  3 min         Leg ext      2x10 35lbs DL       Ther Activity                                       Gait Training                                       Modalities

## 2023-11-02 ENCOUNTER — OFFICE VISIT (OUTPATIENT)
Dept: PHYSICAL THERAPY | Facility: REHABILITATION | Age: 53
End: 2023-11-02
Payer: MEDICARE

## 2023-11-02 DIAGNOSIS — M17.11 PRIMARY OSTEOARTHRITIS OF RIGHT KNEE: ICD-10-CM

## 2023-11-02 DIAGNOSIS — Z96.651 S/P TOTAL KNEE ARTHROPLASTY, RIGHT: Primary | ICD-10-CM

## 2023-11-02 PROCEDURE — 97140 MANUAL THERAPY 1/> REGIONS: CPT

## 2023-11-02 PROCEDURE — 97110 THERAPEUTIC EXERCISES: CPT

## 2023-11-02 NOTE — PROGRESS NOTES
Daily Note     Today's date: 2023  Patient name: Monica Anderson  : 1970  MRN: [id number]  Referring provider: Cecelia Fall MD  Dx:   Encounter Diagnosis     ICD-10-CM    1. S/P total knee arthroplasty, right  Z96.651       2. Primary osteoarthritis of right knee  M17.11                      Subjective: José Lu drove to [place] yesterday, joint stiffness from the car ride. Objective: See treatment diary below    ~2 cm open portion on the proximal incision, clean and moist, covered with band aide. It is heeling with secondary intention. EDU on the sings and symptoms of infection. Assessment: Tolerated treatment well. Pt continues to make good gains with ROM at this stage of recovery. Good tolerance to all activities today. Will continue to monitor incision. Patient would benefit from continued PT to further improve strength, increase ROM, and decrease pain. Plan: Continue per plan of care. Continue to monitor incision for infection.          Precautions: R TKA 10/5/2023    Manuals 10/9 10/16 10/19 10/23 10/26 10/30 11/2                                Incision assessment     AFB QD MB      Assessment      QD 10'       Neuro Re-Ed 10/9 10/16 10/19 10/23 10/26 10/30 11/2      Quad sets  5"x10 5"x10 5"x15         SLRs       10x      LAQs   20x5" 20x5" 5"x20        Sand dune March    1' x2                                                Ther Ex 10/9 10/16 10/19 10/23 10/26 10/30 11/2      Bike  Rocking/full  6 min Toggle 6' Toggle/full 6' Toggle 6' Rocking 8' Toggle to full 10'      VG      3x10 DL L5    Single leg R 10x 3x10 DL L5    Single leg R 10x      PROM  AFB MB [employer] AFB QD       Walking around clinic  75' x3  SPC 75' x3  SPC 75' x1   SPC         Standing knee flexion             Heel slides  10"x10 10x10"  Heel slide/QS combo  5"/5" x4 min        LLLD    Sitting  3 min   HEP      Leg ext      2x10 35lbs DL 2x10 35lbs DL      Ther Activity Gait Training                                       Modalities

## 2023-11-06 ENCOUNTER — OFFICE VISIT (OUTPATIENT)
Dept: PHYSICAL THERAPY | Facility: REHABILITATION | Age: 53
End: 2023-11-06
Payer: MEDICARE

## 2023-11-06 DIAGNOSIS — Z96.651 S/P TOTAL KNEE ARTHROPLASTY, RIGHT: Primary | ICD-10-CM

## 2023-11-06 PROCEDURE — 97112 NEUROMUSCULAR REEDUCATION: CPT | Performed by: PHYSICAL THERAPIST

## 2023-11-06 PROCEDURE — 97110 THERAPEUTIC EXERCISES: CPT | Performed by: PHYSICAL THERAPIST

## 2023-11-06 NOTE — PROGRESS NOTES
Daily Note     Today's date: 2023  Patient name: Venita Pinon  : 1970  MRN: [id number]  Referring provider: Floresita Flower MD  Dx:   Encounter Diagnosis     ICD-10-CM    1. S/P total knee arthroplasty, right  Z96.651                      Subjective: Chapo Wren reports she is doing pretty well. Denies any body aches, fever, chills. Did not take her medications yesterday because she is feeling better. Objective: See treatment diary below    Unable to view incision due to pt not wearing attire to visualize the incision    Assessment: Tolerated treatment well. Functionally, doing very well. Patient would benefit from continued PT to further improve strength, increase ROM, and decrease pain. Plan: Continue per plan of care. Continue to monitor incision for infection.          Precautions: R TKA 10/5/2023    Manuals 11/6 10/16 10/19 10/23 10/26 10/30 11/2                       Incision assessment     AFB QD MB   Assessment      QD 10'    Neuro Re-Ed 11/6 10/16 10/19 10/23 10/26 10/30 11   Quad sets  5"x10 5"x10 5"x15      SLRs       10x   LAQs   20x5" 20x5" 5"x20     Sand dune March 2x10    2x10 step ups   1' x2      Bertrand resisted ambulation 5' backwards                             Ther Ex 11/6 10/16 10/19 10/23 10/26 10/30 11   Bike Bike 10' Rocking/full  6 min Toggle 6' Toggle/full 6' Toggle 6' Rocking 8' Toggle to full 10'   VG 4' DL L7    2x10 single leg L7     3x10 DL L5    Single leg R 10x 3x10 DL L5    Single leg R 10x   PROM  AFB MB [employer] AFB QD    Walking around clinic  75' x3  SPC 75' x3  SPC 75' x1   SPC      Standing knee flexion          Heel slides  10"x10 10x10"  Heel slide/QS combo  5"/5" x4 min     LLLD    Sitting  3 min   HEP   Leg ext 3x10 35lbs DL     2x10 35lbs DL 2x10 35lbs DL   squats 2x10         Ther Activity                              Gait Training                              Modalities

## 2023-11-09 ENCOUNTER — APPOINTMENT (OUTPATIENT)
Dept: PHYSICAL THERAPY | Facility: REHABILITATION | Age: 53
End: 2023-11-09
Payer: MEDICARE

## 2023-11-13 ENCOUNTER — OFFICE VISIT (OUTPATIENT)
Dept: PHYSICAL THERAPY | Facility: REHABILITATION | Age: 53
End: 2023-11-13
Payer: MEDICARE

## 2023-11-13 DIAGNOSIS — Z96.651 S/P TOTAL KNEE ARTHROPLASTY, RIGHT: Primary | ICD-10-CM

## 2023-11-13 PROCEDURE — 97110 THERAPEUTIC EXERCISES: CPT | Performed by: PHYSICAL THERAPIST

## 2023-11-13 PROCEDURE — 97112 NEUROMUSCULAR REEDUCATION: CPT | Performed by: PHYSICAL THERAPIST

## 2023-11-13 NOTE — PROGRESS NOTES
Daily Note     Today's date: 2023  Patient name: Charley Cutler  : 1970  MRN: [id number]  Referring provider: Patrizia Carlos MD  Dx:   Encounter Diagnosis     ICD-10-CM    1. S/P total knee arthroplasty, right  Z96.651           Start Time: 1100  Stop Time: 1145  Total time in clinic (min): 45 minutes    Subjective: Iram Cornell reports she has not been sleeping well due to pain. Objective: See treatment diary below    Assessment: Tolerated treatment well. Most challenged by lateral step downs and backwards walking on claudia. Doing well post-operatively, but still having pain that is affecting her sleep. Patient would benefit from continued PT to further improve strength, increase ROM, and decrease pain. Plan: Continue per plan of care.         Precautions: R TKA 10/5/2023    Manuals 11/6 11/13 10/19 10/23 10/26 10/30 11/2                       Incision assessment     AFB QD MB   Assessment      QD 10'    Neuro Re-Ed 11/6 11/13 10/19 10/23 10/26 10/30 11/2   Quad sets   5"x10 5"x15      SLRs       10x   LAQs   20x5" 20x5" 5"x20     Sand dune March 2x10    2x10 step ups 25x step ups  1' x2      Claudia resisted ambulation 5' backwards 5'  backwards                            Ther Ex 11/6 11/13 10/19 10/23 10/26 10/30 11/2   Bike Bike 10' Bike 10' L1 ROM Toggle 6' Toggle/full 6' Toggle 6' Rocking 8' Toggle to full 10'   VG 4' DL L7    2x10 single leg L7 7' DL    25x L5 3x10    3x10 DL L5    Single leg R 10x 3x10 DL L5    Single leg R 10x   PROM   MB AFB AFB QD    Walking around clinic   75' x3  SPC 75' x1   SPC      Standing knee flexion          Heel slides   10x10"  Heel slide/QS combo  5"/5" x4 min     LLLD    Sitting  3 min   HEP   Leg ext 3x10 35lbs DL 3x10 35lbs    2x10 35lbs DL 2x10 35lbs DL   Lateral step down  6" 4x4        squats 2x10         Ther Activity                              Gait Training                              Modalities

## 2023-11-16 ENCOUNTER — OFFICE VISIT (OUTPATIENT)
Dept: PHYSICAL THERAPY | Facility: REHABILITATION | Age: 53
End: 2023-11-16
Payer: MEDICARE

## 2023-11-16 DIAGNOSIS — Z96.651 S/P TOTAL KNEE ARTHROPLASTY, RIGHT: Primary | ICD-10-CM

## 2023-11-16 PROCEDURE — 97110 THERAPEUTIC EXERCISES: CPT | Performed by: PHYSICAL THERAPIST

## 2023-11-16 PROCEDURE — 97112 NEUROMUSCULAR REEDUCATION: CPT | Performed by: PHYSICAL THERAPIST

## 2023-11-16 NOTE — PROGRESS NOTES
Daily Note     Today's date: 2023  Patient name: William Parham  : 1970  MRN: [id number]  Referring provider: Mikala Diaz MD  Dx:   Encounter Diagnosis     ICD-10-CM    1. S/P total knee arthroplasty, right  Z96.651           Start Time: 1050  Stop Time: 1140  Total time in clinic (min): 50 minutes    Subjective: Guadalupe Tobar reports she slept better last night, but she had to take medication. Objective: See treatment diary below    Assessment: Tolerated treatment well. Patient would benefit from continued PT to further improve strength, increase ROM, and decrease pain. Plan: Continue per plan of care.         Precautions: R TKA 10/5/2023    Manuals 11/6 11/13 11/16 10/23 10/26 10/30 11/2                       Incision assessment     AFB QD MB   Assessment      QD 10'    Neuro Re-Ed 11/6 11/13 11/16 10/23 10/26 10/30 11/2   Quad sets    5"x15      SLRs       10x   LAQs    20x5" 5"x20     Sand dune March 2x10    2x10 step ups 25x step ups 25x step ups 1' x2      [place] resisted ambulation 5' backwards 5'  backwards 8' backwards       Ron board   3' ea                  Ther Ex 11/6 11/13 11/16 10/23 10/26 10/30 11/2   Bike Bike 10' Bike 10' L1 ROM Bike 10' L1 ROM Toggle/full 6' Toggle 6' Rocking 8' Toggle to full 10'   VG 4' DL L7    2x10 single leg L7 7' DL    25x L5 3x10 7' DL L6    20x single leg L6    3x10 DL L5    Single leg R 10x 3x10 DL L5    Single leg R 10x   PROM    AFB AFB QD    Walking around clinic    75' x1   SPC      Standing knee flexion          Heel slides     Heel slide/QS combo  5"/5" x4 min     LLLD    Sitting  3 min   HEP   Leg ext 3x10 35lbs DL 3x10 35lbs 3x10 35lbs   2x10 35lbs DL 2x10 35lbs DL   Lateral step down  6" 4x4        squats 2x10         Ther Activity                              Gait Training                              Modalities

## 2023-11-20 ENCOUNTER — OFFICE VISIT (OUTPATIENT)
Dept: PHYSICAL THERAPY | Facility: REHABILITATION | Age: 53
End: 2023-11-20
Payer: MEDICARE

## 2023-11-20 DIAGNOSIS — Z96.651 S/P TOTAL KNEE ARTHROPLASTY, RIGHT: Primary | ICD-10-CM

## 2023-11-20 DIAGNOSIS — M79.18 MYOFASCIAL PAIN SYNDROME: ICD-10-CM

## 2023-11-20 PROCEDURE — 97110 THERAPEUTIC EXERCISES: CPT | Performed by: PHYSICAL THERAPIST

## 2023-11-20 PROCEDURE — 97112 NEUROMUSCULAR REEDUCATION: CPT | Performed by: PHYSICAL THERAPIST

## 2023-11-20 RX ORDER — METHOCARBAMOL 500 MG/1
500 TABLET, FILM COATED ORAL EVERY 8 HOURS PRN
Qty: 90 TABLET | Refills: 0 | Status: SHIPPED | OUTPATIENT
Start: 2023-11-20

## 2023-11-20 NOTE — TELEPHONE ENCOUNTER
Reason for call:   [x] Refill   [] Prior Auth  [] Other:     Office:   [] PCP/Provider -   [x] Specialty/Provider - S & P    Medication: Methocarbamol 500 mg    Quantity: 90    Pharmacy: [id number]    Does the patient have enough for 3 days?    [x] Yes   [] No - Send as HP to POD

## 2023-11-20 NOTE — PROGRESS NOTES
Daily Note     Today's date: 2023  Patient name: Alva Espinoza  : 1970  MRN: [id number]  Referring provider: Cas Ahn MD  Dx:   Encounter Diagnosis     ICD-10-CM    1. S/P total knee arthroplasty, right  Z96.651           Start Time: 1100  Stop Time: 1146  Total time in clinic (min): 46 minutes    Subjective: Chantel Bush reports she is having some right hip pain. Right knee is doing alright. Objective: See treatment diary below    Assessment: Tolerated treatment well. Progressing well with strengthening and function. Patient would benefit from continued PT to further improve strength, increase ROM, and decrease pain. Plan: Continue per plan of care.         Precautions: R TKA 10/5/2023    Manuals 11/6 11/13 11/16 11/20 10/26 10/30 11/2                       Incision assessment     AFB QD MB   Assessment      QD 10'    Neuro Re-Ed 11/6 11/13 11/16 11/20 10/26 10/30 11/2   Quad sets          SLRs       10x   LAQs     5"x20     Sand dune March 2x10    2x10 step ups 25x step ups 25x step ups       Claudia resisted ambulation 5' backwards 5'  backwards 8' backwards 8' backwards      Ron board   3' ea                  Ther Ex 11/6 11/13 11/16 11/20 10/26 10/30 11/2   Bike Bike 10' Bike 10' L1 ROM Bike 10' L1 ROM Bike 11' ROM L1 Toggle 6' Rocking 8' Toggle to full 10'   VG 4' DL L7    2x10 single leg L7 7' DL    25x L5 3x10 7' DL L6    20x single leg L6  5' L5 DL    20x single leg L5  3x10 DL L5    Single leg R 10x 3x10 DL L5    Single leg R 10x   PROM     AFB QD    Walking around clinic          Standing knee flexion          Heel slides     Heel slide/QS combo  5"/5" x4 min     LLLD       HEP   Leg ext 3x10 35lbs DL 3x10 35lbs 3x10 35lbs 3x10 35lbs  2x10 35lbs DL 2x10 35lbs DL   Lateral step down  6" 4x4  6" 20x ea      squats 2x10         Ther Activity                              Gait Training                              Modalities

## 2023-11-20 NOTE — TELEPHONE ENCOUNTER
S/W pt, pt is taking robaxin 3 x a day, works very well, denies SE, pt requesting refill. Pt asked when she should be reevaluated in office, nurse advised pt to call office after PT appointment to schedule, as her last visit was in June. Pt verbalized understanding and appreciative of call. Pt does not require CB once script is filled, her pharmacy will notify.

## 2023-11-24 ENCOUNTER — OFFICE VISIT (OUTPATIENT)
Dept: PHYSICAL THERAPY | Facility: REHABILITATION | Age: 53
End: 2023-11-24
Payer: MEDICARE

## 2023-11-24 DIAGNOSIS — Z96.651 S/P TOTAL KNEE ARTHROPLASTY, RIGHT: Primary | ICD-10-CM

## 2023-11-24 PROCEDURE — 97110 THERAPEUTIC EXERCISES: CPT | Performed by: PHYSICAL THERAPIST

## 2023-11-24 PROCEDURE — 97112 NEUROMUSCULAR REEDUCATION: CPT | Performed by: PHYSICAL THERAPIST

## 2023-11-24 NOTE — PROGRESS NOTES
Daily Note     Today's date: 2023  Patient name: Jenna Darby  : 1970  MRN: [id number]  Referring provider: Dianna Kaur MD  Dx:   Encounter Diagnosis     ICD-10-CM    1. S/P total knee arthroplasty, right  Z96.651           Start Time: 9612  Stop Time: 1128  Total time in clinic (min): 44 minutes    Subjective: Darinel Goodman reports she is doing pretty well today. Objective: See treatment diary below    Assessment: Patient tolerated treatment well. Displayed good technique with therapeutic exercises and did not report any pain or discomfort during exercise protocol. Pt would benefit from continued skilled physical therapy services to improve overall function while decreasing discomfort to return them to their prior level of function. Plan: Continue per plan of care.         Precautions: R TKA 10/5/2023    Manuals 11/6 11/13 11/16 11/20 11/24 10/30 11/2                       Incision assessment      QD MB   Assessment      QD 10'    Neuro Re-Ed 11/6 11/13 11/16 11/20 11/24 10/30 11/2   Quad sets          SLRs       10x   LAQs          Sand dune March 2x10    2x10 step ups 25x step ups 25x step ups       Claudia resisted ambulation 5' backwards 5'  backwards 8' backwards 8' backwards 8' reverse     Ron board   3' ea        SLS     10xF     Ther Ex 11/6 11/13 11/16 11/20 11/24 10/30 11/2   Bike Bike 10' Bike 10' L1 ROM Bike 10' L1 ROM Bike 11' ROM L1 Bike 12' ROM L1 Rocking 8' Toggle to full 10'   VG 4' DL L7    2x10 single leg L7 7' DL    25x L5 3x10 7' DL L6    20x single leg L6  5' L5 DL    20x single leg L5  3x10 DL L5    Single leg R 10x 3x10 DL L5    Single leg R 10x   PROM      QD    Walking around clinic          Standing knee flexion          Heel slides          LLLD       HEP   Leg ext 3x10 35lbs DL 3x10 35lbs 3x10 35lbs 3x10 35lbs 3x10 38lbs 2x10 35lbs DL 2x10 35lbs DL   Lateral step down  6" 4x4  6" 20x ea 8" step up and over 30x    LSD 2x10     squats 2x10         Ther Activity Gait Training                              Modalities

## 2023-11-27 ENCOUNTER — APPOINTMENT (OUTPATIENT)
Dept: PHYSICAL THERAPY | Facility: REHABILITATION | Age: 53
End: 2023-11-27
Payer: MEDICARE

## 2023-11-28 PROCEDURE — NC001 PR NO CHARGE: Performed by: PHYSICIAN ASSISTANT

## 2023-11-28 NOTE — H&P
H&P - Plastic Surgery   Magi Armenta 46 y.o. female MRN: [id number]  Unit/Bed#:  Encounter: [id number]           Procedure: EXCISION OF SKIN LESION OF THE RIGHT LATERAL EYEBROW WITH COMPLEX CLOSURE. (Right: Face)   Anesthesia type: Local   Diagnosis: Skin lesion of face            HPI:   Magi Armenta is a 46y.o. year old female who  reports that she continues to be bothered by a skin lesion on her medial eyebrow. She understands that this will be done in the operating room. Upon evaluation, the patient has an approximately 0.5 x 0.5 cm raised, flesh-colored lesion of her medial eyebrow. Please see photo in media. REVIEW OF SYSTEMS    GENERAL/CONSTITUTIONAL: The patient denies fever, fatigue, weakness, weight gain or weight loss. HEAD, EYES, EARS, NOSE AND THROAT: Eyes - The patient denies pain, redness, loss of vision, double or blurred vision and denies wearing glasses. The patient denies ringing in the ears, nosebleeds sinusitis, post nasal drip. Also denies frequent sore throats, hoarseness, painful swallowing. CARDIOVASCULAR: The patient denies chest pain, irregular heartbeats, palpitations, shortness of breath, heart murmurs, high blood pressure, cramps in his legs with walking, pain in his feet or toes at night or varicose veins. RESPIRATORY: The patient denies chronic cough, wheezing or night sweats. GASTROINTESTINAL: The patient denies decreased appetite, nausea, vomiting, diarrhea, constipation, blood in the stools. GENITOURINARY: The patient denies difficult urination, pain or burning with urination, blood in the urine. MUSCULOSKELETAL: neck, knee pain The patient denies arm, thigh or calf cramps. No joint or muscle pain. No muscle weakness or tenderness. No joint swelling, neck pain, back pain or major orthopedic injuries. SKIN AND BREASTS: see hpi The patient denies easy bruising, skin redness, skin rash, hives.   NEUROLOGIC: The patient denies headache, dizziness, fainting, memory loss.  PSYCHIATRIC: The patient denies depression. +anxiety. ENDOCRINE: The patient denies intolerance to hot or cold temperature, flushing, fingernail changes, increased thirst, increased salt intake or decreased sexual desire. HEMATOLOGIC/LYMPHATIC: The patient denies anemia, bleeding tendency or clotting tendency. ALLERGIC/IMMUNOLOGIC: The patient denies rhinitis, asthma, skin sensitivity, latex allergies or sensitivity.       Historical Information   Past Medical History:   Diagnosis Date    Anxiety     Asthma     Cancer ([address])     endometrial    Chronic pain syndrome     CPAP (continuous positive airway pressure) dependence     Depression     Diabetes mellitus ([address])     Pre diabetic    GERD (gastroesophageal reflux disease)     HTN (hypertension)     Hyperlipidemia     Hypertension     Lumbar radiculopathy     Prediabetes     Sleep apnea      Past Surgical History:   Procedure Laterality Date    EYE SURGERY  07/26/2022    cornea eye transplant    GASTRECTOMY SLEEVE LAPAROSCOPIC      HYSTERECTOMY      KNEE ARTHROSCOPY Right     LUMBAR FUSION      AR ARTHROPLASTY FEM CONDYLES/TIBIAL PLATEAU KNEE Right 74/9/9593    Procedure: ARTHROPLASTY KNEE TOTAL;  Surgeon: Joyce Christian MD;  Location: [facility];  Service: Orthopedics    AR ARTHRP KNE CONDYLE&PLATU MEDIAL&LAT COMPARTMENTS Left 02/09/2023    Procedure: ARTHROPLASTY KNEE TOTAL AND ALL ASSOCIATED PROCEDURES;  Surgeon: Joyce Christian MD;  Location: [facility];  Service: Orthopedics    AR BREAST REDUCTION Bilateral 09/23/2022    Procedure: BILATERAL BREAST REDUCTION;  Surgeon: Elvira Lou MD;  Location:  MAIN OR;  Service: Plastics    MARY-EN-Y PROCEDURE  05/27/2021    Sleve    TOTAL HIP ARTHROPLASTY Bilateral      Social History   Social History     Substance and Sexual Activity   Alcohol Use Not Currently     Social History     Substance and Sexual Activity   Drug Use Not Currently     Social History     Tobacco Use   Smoking Status Former Smokeless Tobacco Never     Family History:   Family History   Problem Relation Age of Onset    Diabetes Mother     Stroke Mother     Diabetes Father     Cancer Father         thyroid    Diabetes Sister     Diabetes Brother     No Known Problems Maternal Grandmother     No Known Problems Maternal Grandfather     No Known Problems Paternal Grandmother     No Known Problems Paternal Grandfather     No Known Problems Sister        Meds/Allergies   No current facility-administered medications for this encounter. Current Outpatient Medications:     ascorbic acid (VITAMIN C) 500 mg tablet, TAKE 1 TABLET (500 MG TOTAL) BY MOUTH DAILY.  (Patient not taking: Reported on 10/20/2023), Disp: 90 tablet, Rfl: 1    aspirin (ECOTRIN LOW STRENGTH) 81 mg EC tablet, Take 1 tablet (81 mg total) by mouth 2 (two) times a day Take 1(one) 81 mg tablet twice daily x 35 days after total joint arthroplasty, Disp: 70 tablet, Rfl: 0    azelastine (OPTIVAR) 0.05 % ophthalmic solution, Administer 1 drop to the right eye 2 (two) times a day, Disp: , Rfl:     bepotastine besilate (BEPREVE) 1.5 % op soln, INSTILL 1 DROP INTO BOTH EYES TWICE A DAY, Disp: , Rfl:     buPROPion (WELLBUTRIN XL) 300 mg 24 hr tablet, Take 300 mg by mouth daily, Disp: , Rfl:     busPIRone (BUSPAR) 10 mg tablet, Take 10 mg by mouth in the morning Takes twice a day, Disp: , Rfl:     Calcium Carb-Cholecalciferol (OSCAL-D) 500 mg-200 units per tablet, Take 1 tablet by mouth 2 (two) times a day with meals, Disp: , Rfl:     cetirizine (ZyrTEC) 10 mg tablet, Take 10 mg by mouth daily Takes 1 tablet 2 times a day, Disp: , Rfl:     Cholecalciferol (VITAMIN D3) 2000 units capsule, TAKE 2 CAPSULES BY MOUTH DAILY INDICATIONS: VITAMIN D DEFICIENCY., Disp: , Rfl: 5    [employer] Vitamin B-12 1000 MCG tablet, Take 1,000 mcg by mouth daily, Disp: , Rfl:     [employer] Vitamin C 500 MG tablet, TAKE 1 TABLET BY MOUTH TWICE A DAY, Disp: 180 tablet, Rfl: 1    docusate sodium (COLACE) 100 mg capsule, TAKE 1 CAPSULE BY MOUTH DAILY AS NEEDED FOR CONSTIPATION. (Patient not taking: Reported on 10/20/2023), Disp: 30 capsule, Rfl: 0    ferrous sulfate 325 (65 Fe) mg tablet, Take by mouth daily with breakfast, Disp: , Rfl:     folic acid (FOLVITE) 1 mg tablet, Take 1 tablet (1 mg total) by mouth daily, Disp: 30 tablet, Rfl: 1    gabapentin (NEURONTIN) 300 mg capsule, Take 2 capsules (600 mg total) by mouth 3 (three) times a day, Disp: 180 capsule, Rfl: 2    hydrocortisone 1 % cream, Apply topically as needed Applies daily, Disp: , Rfl:     methocarbamol (ROBAXIN) 500 mg tablet, Take 1 tablet (500 mg total) by mouth every 8 (eight) hours as needed for muscle spasms, Disp: 90 tablet, Rfl: 0    montelukast (SINGULAIR) 10 mg tablet, TAKE 1 TABLET BY MOUTH EVERY DAY AT NIGHT, Disp: , Rfl:     Multiple Vitamins-Minerals (multivitamin with minerals) tablet, Take 1 tablet by mouth daily, Disp: 30 tablet, Rfl: 0    ondansetron (ZOFRAN) 4 mg tablet, Take 1 tablet (4 mg total) by mouth every 8 (eight) hours as needed for nausea or vomiting, Disp: 20 tablet, Rfl: 0    oxyCODONE (ROXICODONE) 5 immediate release tablet, Take 1 tablets every 6 hrs as needed for pain control (Patient not taking: Reported on 10/20/2023), Disp: 30 tablet, Rfl: 0    simvastatin (ZOCOR) 40 mg tablet, Take 40 mg by mouth daily at bedtime, Disp: , Rfl:     topiramate (TOPAMAX) 25 mg tablet, Take 25 mg by mouth 2 (two) times a day, Disp: , Rfl:     venlafaxine (EFFEXOR-XR) 37.5 mg 24 hr capsule, Take 37.5 mg by mouth daily, Disp: , Rfl:     VENTOLIN  (90 Base) MCG/ACT inhaler, INHALE 2 PUFFS EVERY 4 (FOUR) HOURS AS NEEDED FOR WHEEZING OR SHORTNESS OF BREATH., Disp: , Rfl: 2       Objective     /100   Pulse 86   Temp 97.9 °F (36.6 °C)   Ht 5' 4" (1.626 m)   Wt 101 kg (222 lb 9.6 oz)   BMI 38.21 kg/m²             Physical Exam  Vitals and nursing note reviewed. Constitutional:       General: She is not in acute distress.      Appearance: Normal appearance. She is obese. She is not ill-appearing. HENT:      Head: Normocephalic and atraumatic. Nose: Nose normal.      Mouth/Throat:      Mouth: Mucous membranes are moist.   Eyes:      Extraocular Movements: Extraocular movements intact. Conjunctiva/sclera: Conjunctivae normal.      Pupils: Pupils are equal, round, and reactive to light. Neck:      Vascular: No carotid bruit. Cardiovascular:      Rate and Rhythm: Normal rate and regular rhythm. Pulses: Normal pulses. Heart sounds: Normal heart sounds. Pulmonary:      Effort: Pulmonary effort is normal. No respiratory distress. Breath sounds: Normal breath sounds. Abdominal:      General: Abdomen is flat. Palpations: Abdomen is soft. Musculoskeletal:         General: No swelling, tenderness, deformity or signs of injury. Normal range of motion. Cervical back: Normal range of motion and neck supple. No rigidity or tenderness. Right lower leg: No edema. Left lower leg: No edema. Lymphadenopathy:      Cervical: No cervical adenopathy. Skin:     General: Skin is warm and dry. Comments: See HPI    Neurological:      General: No focal deficit present. Mental Status: She is alert and oriented to person, place, and time. Cranial Nerves: No cranial nerve deficit. Sensory: No sensory deficit. Motor: No weakness. Psychiatric:         Mood and Affect: Mood normal.         Behavior: Behavior normal.     Lab Results:   Lab Results   Component Value Date    WBC 15.17 (H) 10/06/2023    HGB 12.6 10/06/2023    HCT 38.3 10/06/2023    MCV 91 10/06/2023     10/06/2023          No results found for: "TISSUECULT", "WOUNDCULT"      Imaging Studies:   No results found.     EKG, Pathology, and Other Studies:   Lab Results   Component Value Date/Time    [facility]  11/21/2022 03:41 PM     A. Skin lesion, Right breast lesion, punch biopsy:  - Recurrent/traumatized dermal nevus associated with scar, present at peripheral borders of biopsy. -- Confirmed by positive SOX10, Melan-A, Melan-A/Ki-67 (low proliferative index),         HMB-45 (diminished towards base); negative Pankeratin (CKAE1/3) immunostains.            No intake or output data in the 24 hours ending 11/28/23 1649    Invasive Devices       None                   VTE Prophylaxis: Sequential compression device (Venodyne)

## 2023-11-29 ENCOUNTER — HOSPITAL ENCOUNTER (OUTPATIENT)
Facility: AMBULARY SURGERY CENTER | Age: 53
Setting detail: OUTPATIENT SURGERY
Discharge: HOME | End: 2023-11-29
Attending: STUDENT IN AN ORGANIZED HEALTH CARE EDUCATION/TRAINING PROGRAM | Admitting: STUDENT IN AN ORGANIZED HEALTH CARE EDUCATION/TRAINING PROGRAM
Payer: MEDICARE

## 2023-11-29 VITALS
OXYGEN SATURATION: 100 % | RESPIRATION RATE: 20 BRPM | SYSTOLIC BLOOD PRESSURE: 107 MMHG | WEIGHT: 219 LBS | HEART RATE: 70 BPM | HEIGHT: 64 IN | BODY MASS INDEX: 37.39 KG/M2 | DIASTOLIC BLOOD PRESSURE: 74 MMHG | TEMPERATURE: 97.8 F

## 2023-11-29 DIAGNOSIS — L98.9 SKIN LESION OF FACE: ICD-10-CM

## 2023-11-29 PROCEDURE — 13131 CMPLX RPR F/C/C/M/N/AX/G/H/F: CPT | Performed by: STUDENT IN AN ORGANIZED HEALTH CARE EDUCATION/TRAINING PROGRAM

## 2023-11-29 PROCEDURE — 88305 TISSUE EXAM BY PATHOLOGIST: CPT | Performed by: PATHOLOGY

## 2023-11-29 PROCEDURE — 11442 EXC FACE-MM B9+MARG 1.1-2 CM: CPT | Performed by: STUDENT IN AN ORGANIZED HEALTH CARE EDUCATION/TRAINING PROGRAM

## 2023-11-29 RX ORDER — LIDOCAINE HYDROCHLORIDE AND EPINEPHRINE 10; 10 MG/ML; UG/ML
INJECTION, SOLUTION INFILTRATION; PERINEURAL AS NEEDED
Status: DISCONTINUED | OUTPATIENT
Start: 2023-11-29 | End: 2023-11-29 | Stop reason: HOSPADM

## 2023-11-29 RX ORDER — MAGNESIUM HYDROXIDE 1200 MG/15ML
LIQUID ORAL AS NEEDED
Status: DISCONTINUED | OUTPATIENT
Start: 2023-11-29 | End: 2023-11-29 | Stop reason: HOSPADM

## 2023-11-29 RX ORDER — ACETAMINOPHEN 325 MG/1
975 TABLET ORAL ONCE
Status: COMPLETED | OUTPATIENT
Start: 2023-11-29 | End: 2023-11-29

## 2023-11-29 RX ORDER — GABAPENTIN 300 MG/1
300 CAPSULE ORAL ONCE
Status: COMPLETED | OUTPATIENT
Start: 2023-11-29 | End: 2023-11-29

## 2023-11-29 RX ORDER — GINSENG 100 MG
CAPSULE ORAL AS NEEDED
Status: DISCONTINUED | OUTPATIENT
Start: 2023-11-29 | End: 2023-11-29 | Stop reason: HOSPADM

## 2023-11-29 RX ORDER — TRAMADOL HYDROCHLORIDE 50 MG/1
50 TABLET ORAL EVERY 8 HOURS PRN
Qty: 15 TABLET | Refills: 0 | Status: SHIPPED | OUTPATIENT
Start: 2023-11-29 | End: 2023-11-29 | Stop reason: CLARIF

## 2023-11-29 RX ADMIN — ACETAMINOPHEN 975 MG: 325 TABLET, FILM COATED ORAL at 06:56

## 2023-11-29 RX ADMIN — GABAPENTIN 300 MG: 300 CAPSULE ORAL at 06:56

## 2023-11-29 NOTE — DISCHARGE INSTR - AVS FIRST PAGE
Surgery Date: 11/29/2023                Patient: Xiomy Esteves  Surgeon: Dr. Mark Harrison     Postoperative Instructions for Outpatient Surgery  Excision of Lesion/Mass     Dressings:  [] Skin glue was applied to your incision over absorbable sutures. You may feel small pieces of suture at the ends of your incision. [x] Incision is closed with nonabsorbable sutures. [] Remove dressing the first morning following your surgery and bathe as directed. [] No dressings are required but you may cover the incision with band-aid or gauze for comfort. [x] Apply bacitracin or other antibiotic ointment to incision and cover with band-aid or gauze. [] Leave dressing in place until your follow up appointment. [] Other instructions:      Bathing:  [x] Shower 48 hours after surgery. Allow soap and water to gently wash over the incision. No scrubbing. Gently pat dry and apply dressing as needed/instructed above.  [] Keep incision/dressing dry until your follow up appointment. [x] No submerging incision in bathtub, pool, hot tub and/or lake. Activity:  [x] No heavy lifting (> 10lbs). [x] No strenuous exercise.  [] Walking is permitted and encouraged. [] Strict sun avoidance/protection of incision site. [] Other instructions:      Medication:  [x] Resume preoperative medications. [x] Ok to use Tylenol for pain control. You may also use ibuprofen 48 hours after surgery. [] Finish all antibiotics as prescribed.  [] You may not drive until off your pain medications. [x] Apply ice to area as needed for pain. Do not place ice directly on skin. [] Other instructions: It is expected to have some bruising, swelling and mild oozing at the incision site and the surrounding area. If there is more than you expect or you suspect an infection, please call the office. Some patients may experience a low-grade fever after surgery. If it is above 100.4, please call the office.      If you do not have a postoperative office appointment scheduled, please call the office today and let the staff know Dr. Danica MEADE needs to see you in 7  days. Please call [phone] with any questions, concerns or changes.

## 2023-11-29 NOTE — OP NOTE
OPERATIVE REPORT  PATIENT NAME: Michael Martínez    :  1970  MRN: [id number]  Pt Location: [facility] OR [facility]OM 01    SURGERY DATE: 2023    Surgeon(s) and Role:     * Darling Brand MD - Primary    Preop Diagnosis:  Skin lesion of face [L98.9]    Post-Op Diagnosis Codes:     * Skin lesion of face [L98.9]    Procedure(s):  Excision of right eyebrow dermatofibroma (1.3x0.6 cm) with complex closure (total length closure 1.6 cm)    Specimen(s):  ID Type Source Tests Collected by Time Destination   1 : Right eyebrow fibroma Tissue Soft Tissue, Other TISSUE EXAM Darling Brand MD 2023  7:47 AM        Estimated Blood Loss:   Minimal    Drains:  * No LDAs found *    Anesthesia Type:   Local    Operative Indications:  Skin lesion of face [L98.9]    Operative Findings:  Right eyebrow dermatofibroma 1.3x0.6 cm  Complex closure length 1.6 cm    Complications:   None    Procedure and Technique:  Patient was brought to the operating room, transferred to the operating table in supine fashion. A timeout was performed at which point all patient identifiers were deemed to be correct. The face was prepped and draped in the normal sterile fashion. I first proceeded with local infiltration with 2 cc of 1% lidocaine with epi. After allowing for the epi to take effect, I proceeded with excision down to healthy subcutaneous tissue and it was sent for routine path. I then proceeded with complex closure to allow for tension free closure of the wound. The surrounding skin flaps were raised at least 0.6 cm in all directions to allow for tensions free closure of the wound. The dermis was reapproximated with 4-0 vicryl and skin with 4-0 prolene sutures. Bacitracin was applied to the incision. This concluded the procedure. Patient tolerated the procedure well without complications. At the end of the case, all sponge, needle, and instrument counts were correct.  Patient was awakened from anesthesia and taken to the PACU in stable condition.     I was present for the entire procedure, A qualified resident physician was not available and A physician assistant was required during the procedure for retraction, tissue handling, dissection and suturing        Patient Disposition:  PACU     This procedure was not performed to treat primary cutaneous melanoma through wide local excision      SIGNATURE: Zakiya Astudillo MD  DATE: November 29, 2023  TIME: 7:58 AM

## 2023-12-04 ENCOUNTER — APPOINTMENT (OUTPATIENT)
Dept: PHYSICAL THERAPY | Facility: REHABILITATION | Age: 53
End: 2023-12-04
Payer: MEDICARE

## 2023-12-05 ENCOUNTER — OFFICE VISIT (OUTPATIENT)
Dept: PLASTIC SURGERY | Facility: CLINIC | Age: 53
End: 2023-12-05

## 2023-12-05 DIAGNOSIS — L98.9 SKIN LESION OF FACE: Primary | ICD-10-CM

## 2023-12-05 PROCEDURE — 88305 TISSUE EXAM BY PATHOLOGIST: CPT | Performed by: PATHOLOGY

## 2023-12-05 NOTE — PROGRESS NOTES
Assessment/Plan:     Patient is a 39-year-old female who is status post excision of the right eyebrow dermatofibroma with complex closure by Dr. Paula Suazo on 11/29/2023. Please see HPI. Patient returns to the office today for first postoperative visit and suture removal.     The patient will return to our office in approximately 4-6 weeks for a scar check. The patient will apply Aquaphor to the incision site for the next 4-5 days and then may begin silicone scar treatment. She was advised to avoid sun exposure and to wear an SPF of at least 30 at all times. Diagnoses and all orders for this visit:    Skin lesion of face          Subjective:     Patient ID: Jayden Ke is a 46 y.o. female. HPI    Patient reports no issues or concerns. She has been doing well postoperatively. Review of Systems    See HPI    Objective:     Physical Exam      Incision and sutures are clean, dry and intact. Skin is healing appropriately. Please see photo in media.

## 2023-12-06 ENCOUNTER — OFFICE VISIT (OUTPATIENT)
Dept: PHYSICAL THERAPY | Facility: REHABILITATION | Age: 53
End: 2023-12-06
Payer: MEDICARE

## 2023-12-06 DIAGNOSIS — Z96.651 S/P TOTAL KNEE ARTHROPLASTY, RIGHT: Primary | ICD-10-CM

## 2023-12-06 PROCEDURE — 97110 THERAPEUTIC EXERCISES: CPT | Performed by: PHYSICAL THERAPIST

## 2023-12-06 PROCEDURE — 97112 NEUROMUSCULAR REEDUCATION: CPT | Performed by: PHYSICAL THERAPIST

## 2023-12-06 NOTE — PROGRESS NOTES
Daily Note     Today's date: 2023  Patient name: Hallie Funez  : 1970  MRN: [id number]  Referring provider: Griselda Pinks, MD  Dx:   Encounter Diagnosis     ICD-10-CM    1. S/P total knee arthroplasty, right  Z96.651           Start Time: 1100  Stop Time: 1155  Total time in clinic (min): 55 minutes    Subjective: Shiva Dixon reports she is doing alright. Objective: See treatment diary below    Assessment: Patient tolerated treatment well. Pt would benefit from continued skilled physical therapy services to improve overall function while decreasing discomfort to return them to their prior level of function. Plan: Continue per plan of care.         Precautions: R TKA 10/5/2023    Manuals                        Incision assessment       MB   Assessment          Neuro Re-Ed    Quad sets          SLRs       10x   LAQs          Sand dune March 2x10    2x10 step ups 25x step ups 25x step ups       Claudia resisted ambulation 5' backwards 5'  backwards 8' backwards 8' backwards 8' reverse 8' reverese    Ron board   3' ea        SLS     10xF 15xF    Ther Ex    Bike Bike 10' Bike 10' L1 ROM Bike 10' L1 ROM Bike 11' ROM L1 Bike 12' ROM L1 Bike 10' L1 ROM Toggle to full 10'   VG 4' DL L7    2x10 single leg L7 7' DL    25x L5 3x10 7' DL L6    20x single leg L6  5' L5 DL    20x single leg L5   3x10 DL L5    Single leg R 10x   PROM          Walking around clinic          Standing knee flexion          Heel slides          LLLD       HEP   Leg ext 3x10 35lbs DL 3x10 35lbs 3x10 35lbs 3x10 35lbs 3x10 38lbs 3x10 35lbs 2x10 35lbs DL   Lateral step down  6" 4x4  6" 20x ea 8" step up and over 30x    LSD 2x10 8" step up and over 20x    LSD 20x ea 8in    Leg press      20x ea DL 100lb    Single leg 60lb 15x                        squats 2x10         Ther Activity                              Gait Training Modalities

## 2023-12-11 ENCOUNTER — OFFICE VISIT (OUTPATIENT)
Dept: PHYSICAL THERAPY | Facility: REHABILITATION | Age: 53
End: 2023-12-11
Payer: MEDICARE

## 2023-12-11 DIAGNOSIS — Z96.651 S/P TOTAL KNEE ARTHROPLASTY, RIGHT: Primary | ICD-10-CM

## 2023-12-11 PROCEDURE — 97110 THERAPEUTIC EXERCISES: CPT | Performed by: PHYSICAL THERAPIST

## 2023-12-11 PROCEDURE — 97112 NEUROMUSCULAR REEDUCATION: CPT | Performed by: PHYSICAL THERAPIST

## 2023-12-11 NOTE — PROGRESS NOTES
Daily Note     Today's date: 2023  Patient name: Charley Cutler  : 1970  MRN: [id number]  Referring provider: Patrizia Carlos MD  Dx:   Encounter Diagnosis     ICD-10-CM    1. S/P total knee arthroplasty, right  Z96.651           Start Time: 1050  Stop Time: 1135  Total time in clinic (min): 45 minutes    Subjective: Iram Cornell reports she is doing pretty well. Knee is sore. Objective: See treatment diary below    Assessment: Patient tolerated treatment well. Displayed good technique with therapeutic exercises and did not report any pain or discomfort during exercise protocol. Pt would benefit from continued skilled physical therapy services to improve overall function while decreasing discomfort to return them to their prior level of function. Plan: Continue per plan of care.         Precautions: R TKA 10/5/2023    Manuals                         Incision assessment          Assessment          Neuro Re-Ed    Quad sets          SLRs          LAQs          Sand dune March 2x10    2x10 step ups 25x step ups 25x step ups    25x step ups   Claudia resisted ambulation 5' backwards 5'  backwards 8' backwards 8' backwards 8' reverse 8' reverese    Ron board   3' ea     3' ea way   SLS     10xF 15xF    Ther Ex    Bike Bike 10' Bike 10' L1 ROM Bike 10' L1 ROM Bike 11' ROM L1 Bike 12' ROM L1 Bike 10' L1 ROM Bike 11' ROM   VG 4' DL L7    2x10 single leg L7 7' DL    25x L5 3x10 7' DL L6    20x single leg L6  5' L5 DL    20x single leg L5   6' L5 DL    2x10 single leg ea L5   PROM          Walking around clinic          Standing knee flexion          Heel slides          LLLD          Leg ext 3x10 35lbs DL 3x10 35lbs 3x10 35lbs 3x10 35lbs 3x10 38lbs 3x10 35lbs 3x10 35lbs DL   Lateral step down  6" 4x4  6" 20x ea 8" step up and over 30x    LSD 2x10 8" step up and over 20x    LSD 20x ea 8in    Leg press      20x ea DL 100lb    Single leg 60lb 15x                        squats 2x10         Ther Activity                              Gait Training                              Modalities

## 2023-12-12 ENCOUNTER — OFFICE VISIT (OUTPATIENT)
Dept: OBGYN CLINIC | Facility: CLINIC | Age: 53
End: 2023-12-12

## 2023-12-12 ENCOUNTER — HOSPITAL ENCOUNTER (OUTPATIENT)
Dept: RADIOLOGY | Facility: HOSPITAL | Age: 53
Discharge: HOME | End: 2023-12-12
Payer: MEDICARE

## 2023-12-12 VITALS
DIASTOLIC BLOOD PRESSURE: 84 MMHG | HEIGHT: 64 IN | SYSTOLIC BLOOD PRESSURE: 126 MMHG | WEIGHT: 219 LBS | BODY MASS INDEX: 37.39 KG/M2 | HEART RATE: 70 BPM

## 2023-12-12 DIAGNOSIS — Z96.651 S/P TOTAL KNEE REPLACEMENT USING CEMENT, RIGHT: ICD-10-CM

## 2023-12-12 DIAGNOSIS — Z96.651 S/P TOTAL KNEE REPLACEMENT USING CEMENT, RIGHT: Primary | ICD-10-CM

## 2023-12-12 DIAGNOSIS — M25.551 PAIN IN RIGHT HIP: ICD-10-CM

## 2023-12-12 DIAGNOSIS — M25.562 LEFT KNEE PAIN, UNSPECIFIED CHRONICITY: ICD-10-CM

## 2023-12-12 PROCEDURE — 73562 X-RAY EXAM OF KNEE 3: CPT

## 2023-12-12 PROCEDURE — 99024 POSTOP FOLLOW-UP VISIT: CPT | Performed by: PHYSICIAN ASSISTANT

## 2023-12-12 NOTE — PROGRESS NOTES
46 y.o.female presents for  10 weeks  postoperative visit status post right TKA. Patient states she is doing well physical therapy states her pain is well-controlled denies any pain numbness or tingling in her right lower extremity.     Review of Systems  Review of systems negative unless otherwise specified in HPI    Past Medical History  Past Medical History:   Diagnosis Date    Anxiety     Asthma     Cancer ([address])     endometrial    Chronic pain syndrome     CPAP (continuous positive airway pressure) dependence     Depression     Diabetes mellitus ([address])     Pre diabetic    GERD (gastroesophageal reflux disease)     HTN (hypertension)     Hyperlipidemia     Hypertension     Lumbar radiculopathy     Prediabetes     Sleep apnea        Past Surgical History  Past Surgical History:   Procedure Laterality Date    EYE SURGERY  07/26/2022    cornea eye transplant    GASTRECTOMY SLEEVE LAPAROSCOPIC      HYSTERECTOMY      KNEE ARTHROSCOPY Right     LUMBAR FUSION      AR ARTHROPLASTY FEM CONDYLES/TIBIAL PLATEAU KNEE Right 71/0/0628    Procedure: ARTHROPLASTY KNEE TOTAL;  Surgeon: Torri Coelho MD;  Location:  MAIN OR;  Service: Orthopedics    AR ARTHRP KNE CONDYLE&PLATU MEDIAL&LAT COMPARTMENTS Left 02/09/2023    Procedure: ARTHROPLASTY KNEE TOTAL AND ALL ASSOCIATED PROCEDURES;  Surgeon: Torri Coelho MD;  Location:  MAIN OR;  Service: Orthopedics    AR BREAST REDUCTION Bilateral 09/23/2022    Procedure: BILATERAL BREAST REDUCTION;  Surgeon: Jennifer Karimi MD;  Location:  MAIN OR;  Service: Plastics    MARY-EN-Y PROCEDURE  05/27/2021    [place]    SKIN LESION EXCISION Right 11/29/2023    Procedure: EXCISION OF SKIN LESION OF THE RIGHT LATERAL EYEBROW WITH COMPLEX CLOSURE.;  Surgeon: Jennifer Karimi MD;  Location: [facility] MA[facility] OR;  Service: Plastics    TOTAL HIP ARTHROPLASTY Bilateral        Current Medications  Current Outpatient Medications on File Prior to Visit   Medication Sig Dispense Refill    ascorbic acid (VITAMIN C) 500 mg tablet TAKE 1 TABLET (500 MG TOTAL) BY MOUTH DAILY. (Patient not taking: Reported on 10/20/2023) 90 tablet 1    aspirin (ECOTRIN LOW STRENGTH) 81 mg EC tablet Take 1 tablet (81 mg total) by mouth 2 (two) times a day Take 1(one) 81 mg tablet twice daily x 35 days after total joint arthroplasty 70 tablet 0    azelastine (OPTIVAR) 0.05 % ophthalmic solution Administer 1 drop to the right eye 2 (two) times a day      bepotastine besilate (BEPREVE) 1.5 % op soln INSTILL 1 DROP INTO BOTH EYES TWICE A DAY      buPROPion (WELLBUTRIN XL) 300 mg 24 hr tablet Take 300 mg by mouth daily      busPIRone (BUSPAR) 10 mg tablet Take 10 mg by mouth in the morning Takes twice a day      Calcium Carb-Cholecalciferol (OSCAL-D) 500 mg-200 units per tablet Take 1 tablet by mouth 2 (two) times a day with meals      cetirizine (ZyrTEC) 10 mg tablet Take 10 mg by mouth daily Takes 1 tablet 2 times a day      Cholecalciferol (VITAMIN D3) 2000 units capsule TAKE 2 CAPSULES BY MOUTH DAILY INDICATIONS: VITAMIN D DEFICIENCY.  5    [facility] Vitamin B-12 1000 MCG tablet Take 1,000 mcg by mouth daily      [facility] Vitamin C 500 MG tablet TAKE 1 TABLET BY MOUTH TWICE A  tablet 1    docusate sodium (COLACE) 100 mg capsule TAKE 1 CAPSULE BY MOUTH DAILY AS NEEDED FOR CONSTIPATION.  (Patient not taking: Reported on 10/20/2023) 30 capsule 0    ferrous sulfate 325 (65 Fe) mg tablet Take by mouth daily with breakfast      folic acid (FOLVITE) 1 mg tablet Take 1 tablet (1 mg total) by mouth daily 30 tablet 1    gabapentin (NEURONTIN) 300 mg capsule Take 2 capsules (600 mg total) by mouth 3 (three) times a day 180 capsule 2    hydrocortisone 1 % cream Apply topically as needed Applies daily      methocarbamol (ROBAXIN) 500 mg tablet Take 1 tablet (500 mg total) by mouth every 8 (eight) hours as needed for muscle spasms 90 tablet 0    montelukast (SINGULAIR) 10 mg tablet TAKE 1 TABLET BY MOUTH EVERY DAY AT NIGHT      Multiple Vitamins-Minerals (multivitamin with minerals) tablet Take 1 tablet by mouth daily 30 tablet 0    ondansetron (ZOFRAN) 4 mg tablet Take 1 tablet (4 mg total) by mouth every 8 (eight) hours as needed for nausea or vomiting 20 tablet 0    simvastatin (ZOCOR) 40 mg tablet Take 40 mg by mouth daily at bedtime      topiramate (TOPAMAX) 25 mg tablet Take 25 mg by mouth 2 (two) times a day      venlafaxine (EFFEXOR-XR) 37.5 mg 24 hr capsule Take 37.5 mg by mouth daily      VENTOLIN  (90 Base) MCG/ACT inhaler INHALE 2 PUFFS EVERY 4 (FOUR) HOURS AS NEEDED FOR WHEEZING OR SHORTNESS OF BREATH.  2     No current facility-administered medications on file prior to visit.        Recent Labs [facility])  0   Lab Value Date/Time    HCT 38.3 10/06/2023 0540    HCT 35.7 09/07/2015 0600    HGB 12.6 10/06/2023 0540    HGB 11.1 (L) 09/07/2015 0600    WBC 15.17 (H) 10/06/2023 0540    WBC 11.47 (H) 09/07/2015 0600    INR 0.94 09/11/2023 1128    INR 1.02 08/20/2015 1311    CRP <1.0 01/03/2023 1244    GLUCOSE 146 (H) 02/10/2022 0500    GLUCOSE 116 09/07/2015 0600    HGBA1C 5.6 11/16/2023 0945         Physical exam  General: Awake, Alert, Oriented  Eyes: Pupils equal, round and reactive to light  Heart: regular rate and rhythm  Lungs: No audible wheezing    right Knee exam  Examination of the patient's right lower extremity incisions well-approximated well-healed full active extension flexion to greater than 110 degrees stable to varus and valgus construct hamstring strength 5 out of 5 sensation intact as well as present    Imaging  X-rays of the right knee reviewed x-rays reveal excellent aligned right total knee arthroplasty without evidence of loosening or osseous abnormality    Assessment:  Status post 10 weeks right total knee arthroplasty    Plan:  Weightbearing as tolerated lower extremity  Continue physical therapy range of motion stretching strengthening  Lifetime antibiotic prophylaxis recommended  Follow-up in 9 months time or sooner if needed repeat x-rays right knee

## 2023-12-14 ENCOUNTER — OFFICE VISIT (OUTPATIENT)
Dept: PHYSICAL THERAPY | Facility: REHABILITATION | Age: 53
End: 2023-12-14
Payer: MEDICARE

## 2023-12-14 DIAGNOSIS — Z96.651 S/P TOTAL KNEE ARTHROPLASTY, RIGHT: Primary | ICD-10-CM

## 2023-12-14 PROCEDURE — 97110 THERAPEUTIC EXERCISES: CPT

## 2023-12-14 PROCEDURE — 97112 NEUROMUSCULAR REEDUCATION: CPT

## 2023-12-14 NOTE — PROGRESS NOTES
Daily Note     Today's date: 2023  Patient name: Nydia Vaughan  : 1970  MRN: [id number]  Referring provider: Frieda Armstrong MD  Dx:   Encounter Diagnosis     ICD-10-CM    1. S/P total knee arthroplasty, right  Z96.651           Start Time: 1053  Stop Time: 1140  Total time in clinic (min): 47 minutes    Subjective: Pt reports her R knee has been sore. Notes she is planning to move and has been packing so her entire body is sore. Objective: See treatment diary below      Assessment: Tolerated treatment well. Continued with program as outlined below. Was able to increase reps with knee ext machine today, demonstrating improved strength. No other progressions made d/t increased soreness reported at start of treatment. Patient would benefit from continued PT. Plan: Continue per plan of care.       Precautions: R TKA 10/5/2023    Manuals                         Incision assessment          Assessment          Neuro Re-Ed    Quad sets          SLRs          LAQs          [place] 25x step ups  25x step ups    25x step ups   [place] resisted ambulation   8' backwards 8' backwards 8' reverse 8' reverese    Ron board 3' ea way  3' ea     3' ea way   SLS     10xF 15xF    Ther Ex    Bike Bike 11' L1 ROM  Bike 10' L1 ROM Bike 11' ROM L1 Bike 12' ROM L1 Bike 10' L1 ROM Bike 11' ROM   VG 5' L5 DL    2x15 single leg ea L5  7' DL L6    20x single leg L6  5' L5 DL    20x single leg L5   6' L5 DL    2x10 single leg ea L5   PROM          Walking around clinic          Standing knee flexion          Heel slides          LLLD          Leg ext 4x10 35lbs DL  3x10 35lbs 3x10 35lbs 3x10 38lbs 3x10 35lbs 3x10 35lbs DL   Lateral step down    6" 20x ea 8" step up and over 30x    LSD 2x10 8" step up and over 20x    LSD 20x ea 8in    Leg press      20x ea DL 100lb    Single leg 60lb 15x squats          Ther Activity                              Gait Training                              Modalities

## 2023-12-18 ENCOUNTER — OFFICE VISIT (OUTPATIENT)
Dept: PHYSICAL THERAPY | Facility: REHABILITATION | Age: 53
End: 2023-12-18
Payer: MEDICARE

## 2023-12-18 DIAGNOSIS — Z96.651 S/P TOTAL KNEE ARTHROPLASTY, RIGHT: Primary | ICD-10-CM

## 2023-12-18 PROCEDURE — 97112 NEUROMUSCULAR REEDUCATION: CPT

## 2023-12-18 PROCEDURE — 97110 THERAPEUTIC EXERCISES: CPT

## 2023-12-18 NOTE — PROGRESS NOTES
"Daily Note     Today's date: 2023  Patient name: Gem Guillen  : 1970  MRN: [id number]  Referring provider: Margi Mcpherson MD  Dx:   Encounter Diagnosis     ICD-10-CM    1. S/P total knee arthroplasty, right  Z96.651                      Subjective: Pt reports she is generally exhausted and sore from packing to move out of her apartment.  States her R hip pain keeps getting worse over time.        Objective: See treatment diary below      Assessment: Tolerated treatment fair. Program modified slightly today d/t generally soreness and fatigue reported at start of treatment.  Able to complete all exercise with no aggravation of symptoms reported to therapist.  Patient would benefit from continued PT to further improve strength, decrease pain, and maximize overall function.  Would benefit from resuming previous programming as able NV.        Plan: Continue per plan of care.      Precautions: R TKA 10/5/2023    Manuals                         Incision assessment          Assessment          Neuro Re-Ed    Quad sets          SLRs          LAQs          [place] 25x step ups 25x step ups     25x step ups   [place] resisted ambulation     8' reverse 8' reverese    Ron board 3' ea way 3' ea way     3' ea way   SLS     10xF 15xF    Ther Ex    Bike Bike 11' L1 ROM Bike 10' L1 ROM   Bike 12' ROM L1 Bike 10' L1 ROM Bike 11' ROM   VG 5' L5 DL    2x15 single leg ea L5 5' L5 DL    2x15 single leg ea L5     6' L5 DL    2x10 single leg ea L5   PROM          Walking around clinic          Standing knee flexion          Heel slides          LLLD          Leg ext 4x10 35lbs DL nv   3x10 38lbs 3x10 35lbs 3x10 35lbs DL   Lateral step down    6\"  Lat step up and over   8\" step up and over 30x    LSD 2x10 8\" step up and over 20x    LSD 20x ea 8in    Leg press      20x ea DL 100lb    Single leg 60lb 15x                      "   squats          Ther Activity                              Gait Training                              Modalities

## 2023-12-21 ENCOUNTER — OFFICE VISIT (OUTPATIENT)
Dept: PHYSICAL THERAPY | Facility: REHABILITATION | Age: 53
End: 2023-12-21
Payer: MEDICARE

## 2023-12-21 DIAGNOSIS — Z96.651 S/P TOTAL KNEE ARTHROPLASTY, RIGHT: Primary | ICD-10-CM

## 2023-12-21 PROCEDURE — 97110 THERAPEUTIC EXERCISES: CPT

## 2023-12-21 PROCEDURE — 97112 NEUROMUSCULAR REEDUCATION: CPT

## 2023-12-21 NOTE — PROGRESS NOTES
"Daily Note     Today's date: 2023  Patient name: Gem Guillen  : 1970  MRN: [id number]  Referring provider: Margi Mcpherson MD  Dx:   Encounter Diagnosis     ICD-10-CM    1. S/P total knee arthroplasty, right  Z96.651           Start Time: 1137  Stop Time: 1225  Total time in clinic (min): 48 minutes    Subjective: Pt reports she is still tired from her packing and moving out of her apartment.  Overall is feeling a little better today compared to LV.        Objective: See treatment diary below      Assessment: Tolerated treatment well. Continued with program as outlined below.  Is making good strides with overall strength.  Challenged with retro walking at [place] but able to complete without any complaints of increased pain.  Patient would benefit from continued PT.      Plan: Continue per plan of care.      Precautions: R TKA 10/5/2023    Manuals                         Incision assessment          Assessment          Neuro Re-Ed    Quad sets          SLRs          LAQs          [place] 25x step ups 25x step ups 30x step ups    25x step ups   [place] resisted ambulation   15lb  1x10  8' reverse 8' reverese    Ron board 3' ea way 3' ea way 3' ea way    3' ea way   SLS     10xF 15xF    Ther Ex    Bike Bike 11' L1 ROM Bike 10' L1 ROM Bike 10' L1 ROM  Bike 12' ROM L1 Bike 10' L1 ROM Bike 11' ROM   VG 5' L5 DL    2x15 single leg ea L5 5' L5 DL    2x15 single leg ea L5     6' L5 DL    2x10 single leg ea L5   PROM          Walking around clinic          Standing knee flexion          Heel slides          LLLD          Leg ext 4x10 35lbs DL nv 3x10 35lbs DL  3x10 38lbs 3x10 35lbs 3x10 35lbs DL   Lateral step down    6\"  Lat step up and over   8\" step up and over 30x    LSD 2x10 8\" step up and over 20x    LSD 20x ea 8in    Leg press      20x ea DL 100lb    Single leg 60lb 15x                      "   squats          Ther Activity                              Gait Training                              Modalities

## 2023-12-28 ENCOUNTER — OFFICE VISIT (OUTPATIENT)
Dept: PHYSICAL THERAPY | Facility: REHABILITATION | Age: 53
End: 2023-12-28
Payer: MEDICARE

## 2023-12-28 DIAGNOSIS — Z96.651 S/P TOTAL KNEE ARTHROPLASTY, RIGHT: Primary | ICD-10-CM

## 2023-12-28 PROCEDURE — 97112 NEUROMUSCULAR REEDUCATION: CPT

## 2023-12-28 PROCEDURE — 97110 THERAPEUTIC EXERCISES: CPT

## 2023-12-28 NOTE — PROGRESS NOTES
"Daily Note     Today's date: 2023  Patient name: Gem Guillen  : 1970  MRN: [id number]  Referring provider: Margi Mcpherson MD  Dx:   Encounter Diagnosis     ICD-10-CM    1. S/P total knee arthroplasty, right  Z96.651           Start Time: 1055  Stop Time: 1145  Total time in clinic (min): 50 minutes    Subjective: Pt reports R knee and hip are sore today.  Has been busy with packing to move out of her apartment.  Going up and down stairs frequently carrying all of her belongings.       Objective: See treatment diary below      Assessment: Tolerated treatment well. Continued with program as outlined below.  Noticeable antalgic gait on RLE noted while walking around clinic.  No progressions made today d/t soreness reported at start of treatment.  Patient would benefit from continued PT to further improve strength, decrease pain/soreness, and maximize overall function.        Plan: Continue per plan of care.      Precautions: R TKA 10/5/2023    Manuals                         Incision assessment          Assessment          Neuro Re-Ed    Quad sets          SLRs          LAQs          [place] 25x step ups 25x step ups 30x step ups 30x step ups   25x step ups   [place] resisted ambulation   15lb  1x10   8' reverese    Ron board 3' ea way 3' ea way 3' ea way 3' ea way   3' ea way   SLS      15xF    Ther Ex    Bike Bike 11' L1 ROM Bike 10' L1 ROM Bike 10' L1 ROM Bike 10' L1 ROM  Bike 10' L1 ROM Bike 11' ROM   VG 5' L5 DL    2x15 single leg ea L5 5' L5 DL    2x15 single leg ea L5  5' L5 DL    2x15 single leg ea L5   6' L5 DL    2x10 single leg ea L5   PROM          Walking around clinic          Standing knee flexion          Heel slides          LLLD          Leg ext 4x10 35lbs DL nv 3x10 35lbs DL 3x10 35lbs DL  3x10 35lbs 3x10 35lbs DL   Lateral step down    6\"  Lat step up and over    8\" step " up and over 20x    LSD 20x ea 8in    Leg press      20x ea DL 100lb    Single leg 60lb 15x                        squats          Ther Activity                              Gait Training                              Modalities

## 2024-01-04 ENCOUNTER — OFFICE VISIT (OUTPATIENT)
Dept: PHYSICAL THERAPY | Facility: REHABILITATION | Age: 54
End: 2024-01-04
Payer: MEDICARE

## 2024-01-04 DIAGNOSIS — N62 MACROMASTIA: ICD-10-CM

## 2024-01-04 DIAGNOSIS — Z98.890 S/P BILATERAL BREAST REDUCTION: ICD-10-CM

## 2024-01-04 DIAGNOSIS — M79.18 MYOFASCIAL PAIN SYNDROME: ICD-10-CM

## 2024-01-04 DIAGNOSIS — Z96.651 S/P TOTAL KNEE ARTHROPLASTY, RIGHT: Primary | ICD-10-CM

## 2024-01-04 PROCEDURE — 97112 NEUROMUSCULAR REEDUCATION: CPT | Performed by: PHYSICAL THERAPIST

## 2024-01-04 PROCEDURE — 97110 THERAPEUTIC EXERCISES: CPT | Performed by: PHYSICAL THERAPIST

## 2024-01-04 NOTE — PROGRESS NOTES
Daily Note     Today's date: 2024  Patient name: Gem Guillen  : 1970  MRN: [id number]  Referring provider: Margi Mcpherson MD  Dx:   Encounter Diagnosis     ICD-10-CM    1. S/P total knee arthroplasty, right  Z96.651           Start Time: 1430  Stop Time: 1510  Total time in clinic (min): 40 minutes    Subjective: Pt reports R knee and hip are sore today.  Has been busy with packing to move out of her apartment.  Going up and down stairs frequently carrying all of her belongings.       Objective: See treatment diary below      Assessment: Tolerated treatment well. Continued with program as outlined below.  Noticeable antalgic gait on RLE noted while walking around clinic.  No progressions made today d/t soreness reported at start of treatment.  Patient would benefit from continued PT to further improve strength, decrease pain/soreness, and maximize overall function.        Plan: Continue per plan of care.      Precautions: R TKA 10/5/2023    Manuals                         Incision assessment          Assessment          Neuro Re-Ed    Quad sets          SLRs          LAQs          Sand dune March 25x step ups 25x step ups 30x step ups 30x step ups 30x  25x step ups   [place] resisted ambulation   15lb  1x10   8' reverese    Ron board 3' ea way 3' ea way 3' ea way 3' ea way   3' ea way   SLS      15xF    Bridge with abd     BHB 2x10     Squats with band     2x10 BHB     Hip abd iso     10x L  15x R                         Ther Ex    Bike Bike 11' L1 ROM Bike 10' L1 ROM Bike 10' L1 ROM Bike 10' L1 ROM Bike 12' ROM Bike 10' L1 ROM Bike 11' ROM   VG 5' L5 DL    2x15 single leg ea L5 5' L5 DL    2x15 single leg ea L5  5' L5 DL    2x15 single leg ea L5   6' L5 DL    2x10 single leg ea L5   PROM          Walking around clinic          Standing knee flexion          Heel slides          LLLD         "  Leg ext 4x10 35lbs DL nv 3x10 35lbs DL 3x10 35lbs DL X10 35lbs DL 3x10 35lbs 3x10 35lbs DL   Lateral step down    6\"  Lat step up and over    8\" step up and over 20x    LSD 20x ea 8in    Leg press      20x ea DL 100lb    Single leg 60lb 15x                        squats          Ther Activity                              Gait Training                              Modalities                                                   "

## 2024-01-04 NOTE — TELEPHONE ENCOUNTER
Reason for call:   [x] Refill   [] Prior Auth  [] Other:     Office:   [] PCP/Provider -   [x] Specialty/Provider - Spine & Pain    Medication: Gabapentin    Dose/Frequency: 300 mg     Quantity: #180    [facility]    Does the patient have enough for 3 days?   [] Yes   [x] No - Send as HP to POD                    Reason for call:   [x] Refill   [] Prior Auth  [] Other:     Office:   [] PCP/Provider -   [x] Specialty/Provider - Spine & Pain    Medication: Robaxin    Dose/Frequency: 500 mg     Quantity: #90    Pharm[facility]    Does the patient have enough for 3 days?   [] Yes   [x] No - Send as HP to POD

## 2024-01-08 ENCOUNTER — OFFICE VISIT (OUTPATIENT)
Dept: PHYSICAL THERAPY | Facility: REHABILITATION | Age: 54
End: 2024-01-08
Payer: MEDICARE

## 2024-01-08 DIAGNOSIS — Z96.651 S/P TOTAL KNEE ARTHROPLASTY, RIGHT: Primary | ICD-10-CM

## 2024-01-08 PROCEDURE — 97110 THERAPEUTIC EXERCISES: CPT | Performed by: PHYSICAL THERAPIST

## 2024-01-08 PROCEDURE — 97112 NEUROMUSCULAR REEDUCATION: CPT | Performed by: PHYSICAL THERAPIST

## 2024-01-08 RX ORDER — METHOCARBAMOL 500 MG/1
500 TABLET, FILM COATED ORAL EVERY 8 HOURS PRN
Qty: 90 TABLET | Refills: 0 | Status: SHIPPED | OUTPATIENT
Start: 2024-01-08

## 2024-01-08 RX ORDER — GABAPENTIN 300 MG/1
600 CAPSULE ORAL 3 TIMES DAILY
Qty: 180 CAPSULE | Refills: 2 | Status: SHIPPED | OUTPATIENT
Start: 2024-01-08

## 2024-01-08 NOTE — PROGRESS NOTES
"Daily Note     Today's date: 2024  Patient name: Gem Guillen  : 1970  MRN: [id number]  Referring provider: Margi Mcpherson MD  Dx:   Encounter Diagnosis     ICD-10-CM    1. S/P total knee arthroplasty, right  Z96.651           Start Time: 1100  Stop Time: 1200  Total time in clinic (min): 60 minutes    Subjective: Pt reports she is doing alright today. Denies soreness from last visit.     Objective: See treatment diary below    1:1 with Amanuel Fernández PT   1:1 with Amanda Arias PT [id number]  1:1 with Percy Matson PT [phone]    Assessment: Tolerated treatment well. Patient would benefit from continued PT to further improve strength, decrease pain/soreness, and maximize overall function.      Plan: Continue per plan of care.      Precautions: R TKA 10/5/2023    Manuals                         Incision assessment          Assessment          Neuro Re-Ed    Quad sets          SLRs          LAQs          Sand dune March 25x step ups 25x step ups 30x step ups 30x step ups 30x 3x10 25x step ups   [place] resisted ambulation   15lb  1x10       Ron board 3' ea way 3' ea way 3' ea way 3' ea way  3' ea way 3' ea way   SLS          Bridge with abd     BHB 2x10 RHB 2x10    Squats with band     2x10 BHB RHB 2x10x3\"    Hip abd iso     10x L  15x R 10x5\" ea    clamshells      2x10 RHB ea              Ther Ex    Bike Bike 11' L1 ROM Bike 10' L1 ROM Bike 10' L1 ROM Bike 10' L1 ROM Bike 12' ROM 15' bike ROM Bike 11' ROM   VG 5' L5 DL    2x15 single leg ea L5 5' L5 DL    2x15 single leg ea L5  5' L5 DL    2x15 single leg ea L5   6' L5 DL    2x10 single leg ea L5   PROM          Walking around clinic          Standing knee flexion          Heel slides          LLLD          Leg ext 4x10 35lbs DL nv 3x10 35lbs DL 3x10 35lbs DL X10 35lbs DL 3x10 35lbs DL 3x10 35lbs DL   Lateral step down    6\"  Lat step up and " over        Leg press                              squats          Ther Activity                              Gait Training                              Modalities

## 2024-01-08 NOTE — TELEPHONE ENCOUNTER
Patient called to check on the status of refills for the methocarbamol and the gabapentin, states she is doing well on this meds, states she will call back right now and schedule an appt for f/u. States she is out of medication.

## 2024-01-11 ENCOUNTER — OFFICE VISIT (OUTPATIENT)
Dept: PHYSICAL THERAPY | Facility: REHABILITATION | Age: 54
End: 2024-01-11
Payer: MEDICARE

## 2024-01-11 DIAGNOSIS — Z96.651 S/P TOTAL KNEE ARTHROPLASTY, RIGHT: Primary | ICD-10-CM

## 2024-01-11 PROCEDURE — 97110 THERAPEUTIC EXERCISES: CPT

## 2024-01-11 PROCEDURE — 97112 NEUROMUSCULAR REEDUCATION: CPT

## 2024-01-11 NOTE — PROGRESS NOTES
"Daily Note     Today's date: 2024  Patient name: Gem Guillen  : 1970  MRN: [id number]  Referring provider: Margi Mcpherson MD  Dx:   Encounter Diagnosis     ICD-10-CM    1. S/P total knee arthroplasty, right  Z96.651           Start Time: 1157  Stop Time: 1230  Total time in clinic (min): 33 minutes  Pt arrived 13 min late for appointment and was accommodated.      Subjective: Pt reports she had a tough time sleeping last night.        Objective: See treatment diary below      Assessment: Tolerated treatment well. Continued with program as outlined below.  Program modified slightly d/t time constraints.  Is making decent progress with overall strength.  Slight discomfort of R hip in R sidelying during clamshells.  Able to complete all other assigned exercise without any complaints of increased pain.  Patient would benefit from continued PT.      Plan: Continue per plan of care.      Precautions: R TKA 10/5/2023    Manuals                        Incision assessment          Assessment          Neuro Re-Ed    Quad sets          SLRs          LAQs          Sand dune March 25x step ups 25x step ups 30x step ups 30x step ups 30x 3x10 2x10  step ups   [place] resisted ambulation   15lb  1x10       Ron board 3' ea way 3' ea way 3' ea way 3' ea way  3' ea way nv   SLS          Bridge with abd     BHB 2x10 RHB 2x10 RHB 2x10   Squats with band     2x10 BHB RHB 2x10x3\" nv   Hip abd iso     10x L  15x R 10x5\" ea nv   clamshells      2x10 RHB ea 2x10 RHB ea             Ther Ex    Bike Bike 11' L1 ROM Bike 10' L1 ROM Bike 10' L1 ROM Bike 10' L1 ROM Bike 12' ROM 15' bike ROM 6' bike ROM   VG 5' L5 DL    2x15 single leg ea L5 5' L5 DL    2x15 single leg ea L5  5' L5 DL    2x15 single leg ea L5   L5  Single leg  1x15 ea   PROM          Walking around clinic          Standing knee flexion          Heel slides  " "        LLLD          Leg ext 4x10 35lbs DL nv 3x10 35lbs DL 3x10 35lbs DL X10 35lbs DL 3x10 35lbs DL 3x10 35lbs DL   Lateral step down    6\"  Lat step up and over        Leg press                              squats          Ther Activity                              Gait Training                              Modalities                                                     "

## 2024-01-18 ENCOUNTER — APPOINTMENT (OUTPATIENT)
Dept: PHYSICAL THERAPY | Facility: REHABILITATION | Age: 54
End: 2024-01-18
Payer: MEDICARE

## 2024-01-22 ENCOUNTER — OFFICE VISIT (OUTPATIENT)
Dept: PHYSICAL THERAPY | Facility: REHABILITATION | Age: 54
End: 2024-01-22
Payer: MEDICARE

## 2024-01-22 DIAGNOSIS — Z96.651 S/P TOTAL KNEE ARTHROPLASTY, RIGHT: Primary | ICD-10-CM

## 2024-01-22 PROCEDURE — 97112 NEUROMUSCULAR REEDUCATION: CPT | Performed by: PHYSICAL THERAPIST

## 2024-01-22 PROCEDURE — 97110 THERAPEUTIC EXERCISES: CPT | Performed by: PHYSICAL THERAPIST

## 2024-01-22 NOTE — PROGRESS NOTES
"Daily Note     Today's date: 2024  Patient name: Gem Guillen  : 1970  MRN: [id number]  Referring provider: Margi Mcpherson MD  Dx:   Encounter Diagnosis     ICD-10-CM    1. S/P total knee arthroplasty, right  Z96.651           Start Time: 1215  Stop Time: 1259  Total time in clinic (min): 44 minutes    Subjective: Pt reports she was sick and is now feeling a little better.         Objective: See treatment diary below      Assessment: Tolerated treatment well. Tolerated activities well. Did report some fatigue towards end of treatment. Patient would benefit from continued PT.      Plan: Continue per plan of care.      Precautions: R TKA 10/5/2023    Manuals                        Incision assessment          Assessment          Neuro Re-Ed    Quad sets          SLRs          LAQs          Sand dune March 3x10 step ups 25x step ups 30x step ups 30x step ups 30x 3x10 2x10  step ups   [place] resisted ambulation   15lb  1x10       Ron board  3' ea way 3' ea way 3' ea way  3' ea way nv   SLS          Bridge with abd BHB 3x10    BHB 2x10 RHB 2x10 RHB 2x10   Squats with band 3x10 BHB    2x10 BHB RHB 2x10x3\" nv   Hip abd iso     10x L  15x R 10x5\" ea nv   clamshells 2x10 BHB     2x10 RHB ea 2x10 RHB ea             Ther Ex    Bike 10' L1 ROM  Bike 10' L1 ROM Bike 10' L1 ROM Bike 10' L1 ROM Bike 12' ROM 15' bike ROM 6' bike ROM   VG  5' L5 DL    2x15 single leg ea L5  5' L5 DL    2x15 single leg ea L5   L5  Single leg  1x15 ea   PROM          Walking around clinic          Standing knee flexion          Heel slides          LLLD          Leg ext 4x10 DL 35lbs nv 3x10 35lbs DL 3x10 35lbs DL X10 35lbs DL 3x10 35lbs DL 3x10 35lbs DL   Lateral step down  6\"  Lat step up and over        Leg press 4x10 100lbs DL                             squats          Ther Activity                              Gait Training "                              Modalities

## 2024-01-25 ENCOUNTER — OFFICE VISIT (OUTPATIENT)
Dept: PHYSICAL THERAPY | Facility: REHABILITATION | Age: 54
End: 2024-01-25
Payer: MEDICARE

## 2024-01-25 DIAGNOSIS — Z96.651 S/P TOTAL KNEE ARTHROPLASTY, RIGHT: Primary | ICD-10-CM

## 2024-01-25 PROCEDURE — 97112 NEUROMUSCULAR REEDUCATION: CPT

## 2024-01-25 PROCEDURE — 97110 THERAPEUTIC EXERCISES: CPT

## 2024-01-25 NOTE — PROGRESS NOTES
"Daily Note     Today's date: 2024  Patient name: Gem Guillen  : 1970  MRN: [id number]  Referring provider: Margi Mcpherson MD  Dx:   Encounter Diagnosis     ICD-10-CM    1. S/P total knee arthroplasty, right  Z96.651           Start Time: 1140  Stop Time: 1233  Total time in clinic (min): 53 minutes    Subjective: Pt reports she is generally sore.  Notes she just finished moving out of her apartment.        Objective: See treatment diary below      Assessment: Tolerated treatment well. Continued with program as outlined below.  Progressed with increased resistance on DL leg press and addition of SL leg press today, demonstrating improvements in strength.  Patient would benefit from continued PT to further improve strength, decrease pain/soreness, and maximize overall function.        Plan: Continue per plan of care.      Precautions: R TKA 10/5/2023    Manuals                        Incision assessment          Assessment          Neuro Re-Ed    Quad sets          SLRs          LAQs          [place] 3x10 step ups 2x10  step ups   30x 3x10 2x10  step ups   [place] resisted ambulation          Ron board      3' ea way nv   SLS          Bridge with abd BHB 3x10 BHB 3x10   BHB 2x10 RHB 2x10 RHB 2x10   Squats with band 3x10 BHB 3x10 BHB   2x10 BHB RHB 2x10x3\" nv   Hip abd iso     10x L  15x R 10x5\" ea nv   clamshells 2x10 BHB 2x10 BHB ea    2x10 RHB ea 2x10 RHB ea             Ther Ex    Bike 10' L1 ROM  10' L1 ROM    Bike 12' ROM 15' bike ROM 6' bike ROM   VG       L5  Single leg  1x15 ea   PROM          Walking around clinic          Standing knee flexion          Heel slides          LLLD          Leg ext 4x10 DL 35lbs 3x10 DL 35lbs   X10 35lbs DL 3x10 35lbs DL 3x10 35lbs DL   Lateral step down          Leg press 4x10 100lbs DL 3x10 120lbs DL    1x10  60lbs  SL                            squats          Ther Activity       "                        Gait Training                              Modalities

## 2024-01-29 ENCOUNTER — APPOINTMENT (OUTPATIENT)
Dept: PHYSICAL THERAPY | Facility: REHABILITATION | Age: 54
End: 2024-01-29
Payer: MEDICARE

## 2024-02-02 ENCOUNTER — TELEPHONE (OUTPATIENT)
Dept: PAIN MEDICINE | Facility: CLINIC | Age: 54
End: 2024-02-02

## 2024-02-02 NOTE — TELEPHONE ENCOUNTER
S/W pt and advised of DC from practice due to no shows, nurse advised pt a f/u letter will be sent to pt along with a list of area pain providers. Pt was unhappy with decision and disconnected call.    Please have discharge letter signed and mailed to pt along with list of area pain providers, TY.

## 2024-02-02 NOTE — TELEPHONE ENCOUNTER
Pt called in wanting to schedule with another Dr from [facility] I did inform her that she has been discharged pt was upset and did state that she suffers from Anxiety and depression and that's what caused her to miss her appts.

## 2024-02-02 NOTE — TELEPHONE ENCOUNTER
Caller: patient    Doctor/Office: BRIAN    Call regarding :  calling back, transfer to RN     Call was transferred to:

## 2024-02-02 NOTE — TELEPHONE ENCOUNTER
Discharge letter has seen signed and mailed along with list of other pain specialist. Also has been scanned into pt's chart.

## 2024-02-09 ENCOUNTER — APPOINTMENT (OUTPATIENT)
Dept: RADIOLOGY | Facility: AMBULARY SURGERY CENTER | Age: 54
End: 2024-02-09
Attending: ORTHOPAEDIC SURGERY
Payer: MEDICARE

## 2024-02-09 ENCOUNTER — OFFICE VISIT (OUTPATIENT)
Dept: OBGYN CLINIC | Facility: CLINIC | Age: 54
End: 2024-02-09
Payer: MEDICARE

## 2024-02-09 VITALS — HEIGHT: 64 IN | BODY MASS INDEX: 37.56 KG/M2 | WEIGHT: 220 LBS

## 2024-02-09 DIAGNOSIS — Z96.651 S/P TOTAL KNEE REPLACEMENT USING CEMENT, RIGHT: ICD-10-CM

## 2024-02-09 DIAGNOSIS — S83.512A SPRAIN OF ANTERIOR CRUCIATE LIGAMENT OF LEFT KNEE, INITIAL ENCOUNTER: ICD-10-CM

## 2024-02-09 DIAGNOSIS — S83.512A SPRAIN OF ANTERIOR CRUCIATE LIGAMENT OF LEFT KNEE, INITIAL ENCOUNTER: Primary | ICD-10-CM

## 2024-02-09 PROCEDURE — 73562 X-RAY EXAM OF KNEE 3: CPT

## 2024-02-09 PROCEDURE — 99213 OFFICE O/P EST LOW 20 MIN: CPT | Performed by: ORTHOPAEDIC SURGERY

## 2024-02-09 NOTE — PATIENT INSTRUCTIONS
[facility]                                                                                               Dr. Margi Mcpherson                                                                                                            ANTIBIOTIC USE FOR JOINT REPLACEMENT PATIENTS  You have had surgery to replace one of your joints with a metal prosthesis. Going forward, you should take oral antibiotics before any dental work, including routine cleanings. These procedures are a potential source of injection. If you develop an infection, it could spread to your operative joint and cause complications.  Please show the following guidelines to your medical doctor and dentist, so he/she can prescribe the appropriate medications.  The following information is recommended by the American Heart, Dental, and Orthopedic Associations:  STANDARD GENERAL PROPHYLAXIS  antibiotic prophylaxis recommended lifetime  Recommend refraining from dental procedures until 3 months post operatively  Amoxicillin for Adults:    500mg - Take 4 capsules (2 grams) orally one hour before the procedure  If allergic to Penicillin  Clindamycin for Adults:   300mg - Take 2 capsules (600 mg) orally one hour before the procedure  Azithromycin for Adults:  250mg - Take 2 capsules (500 mg) orally one hour before the procedure  Cephalexin for Adults:     500mg - Take 4 capsules (2 grams) orally one hour before the procedure  Note: Cephalosporins should not be used if you have ever developed an immediate hypersensitivity reaction (i.e., hives, swelling, severe itching, difficulty breathing) to Penicillin  Please feel free to contact our office at [phone] with questions or concerns.

## 2024-02-09 NOTE — PROGRESS NOTES
Orthopedics          Gem Guillen 54 y.o. female MRN: [id number]      Chief Complaint:   Status post 1 year left total knee arthroplasty    HPI:   54 y.o.female status post 1 year left knee arthroplasty.  Patient states she is doing well regarding her left knee she has had no issues or problems regarding her left knee she occasionally notices pain with weather changes otherwise she is back to most all activities.  Patient did well following her right total knee replacement she continues home therapy exercises at this time.                Review Of Systems:   Skin: Normal  Neuro: See HPI  Musculoskeletal: See HPI  All other systems reviewed and are negative    Past Medical History:   Past Medical History:   Diagnosis Date    Anxiety     Asthma     Cancer (HCC)     endometrial    Chronic pain syndrome     CPAP (continuous positive airway pressure) dependence     Depression     Diabetes mellitus (HCC)     Pre diabetic    GERD (gastroesophageal reflux disease)     HTN (hypertension)     Hyperlipidemia     Hypertension     Lumbar radiculopathy     Prediabetes     Sleep apnea        Past Surgical History:   Past Surgical History:   Procedure Laterality Date    EYE SURGERY  07/26/2022    cornea eye transplant    GASTRECTOMY SLEEVE LAPAROSCOPIC      HYSTERECTOMY      KNEE ARTHROSCOPY Right     LUMBAR FUSION      TN ARTHROPLASTY FEM CONDYLES/TIBIAL PLATEAU KNEE Right 10/5/2023    Procedure: ARTHROPLASTY KNEE TOTAL;  Surgeon: Margi Mcpherson MD;  Location: [facility];  Service: Orthopedics    TN ARTHRP KNE CONDYLE&PLATU MEDIAL&LAT COMPARTMENTS Left 02/09/2023    Procedure: ARTHROPLASTY KNEE TOTAL AND ALL ASSOCIATED PROCEDURES;  Surgeon: Margi Mcpherson MD;  Location: [facility];  Service: Orthopedics    TN BREAST REDUCTION Bilateral 09/23/2022    Procedure: BILATERAL BREAST REDUCTION;  Surgeon: Edinson Winston MD;  Location:  MAIN OR;  Service: Plastics    MARY-EN-Y PROCEDURE  05/27/2021    Sleve    SKIN LESION  EXCISION Right 11/29/2023    Procedure: EXCISION OF SKIN LESION OF THE RIGHT LATERAL EYEBROW WITH COMPLEX CLOSURE.;  Surgeon: Edinson iWnston MD;  Location[facility];  Service: Plastics    TOTAL HIP ARTHROPLASTY Bilateral        Family History:  Family history reviewed and non-contributory  Family History   Problem Relation Age of Onset    Diabetes Mother     Stroke Mother     Diabetes Father     Cancer Father         thyroid    Diabetes Sister     Diabetes Brother     No Known Problems Maternal Grandmother     No Known Problems Maternal Grandfather     No Known Problems Paternal Grandmother     No Known Problems Paternal Grandfather     No Known Problems Sister          Social History:  Social History     Socioeconomic History    Marital status: Single     Spouse name: None    Number of children: None    Years of education: None    Highest education level: None   Occupational History    None   Tobacco Use    Smoking status: Former    Smokeless tobacco: Never   Vaping Use    Vaping status: Never Used   Substance and Sexual Activity    Alcohol use: Not Currently    Drug use: Not Currently    Sexual activity: Not Currently   Other Topics Concern    None   Social History Narrative    ** Merged History Encounter **          Social Determinants of Health     Financial Resource Strain: Not on file   Food Insecurity: No Food Insecurity (2/24/2023)    Hunger Vital Sign     Worried About Running Out of Food in the Last Year: Never true     Ran Out of Food in the Last Year: Never true   Transportation Needs: No Transportation Needs (2/24/2023)    PRAPARE - Transportation     Lack of Transportation (Medical): No     Lack of Transportation (Non-Medical): No   Physical Activity: Not on file   Stress: Not on file   Social Connections: Not on file   Intimate Partner Violence: Not on file   Housing Stability: Unknown (2/24/2023)    Housing Stability Vital Sign     Unable to Pay for Housing in the Last Year: No     Number of  Places Lived in the Last Year: Not on file     Unstable Housing in the Last Year: No       Allergies:   Allergies   Allergen Reactions    Bee Venom Swelling    Dust Mite Extract     Fish-Derived Products - Food Allergy Hives    Iodine - Food Allergy Hives    Milk (Cow) Other (See Comments)     congestion    Molds & Smuts     Other Swelling    Pollen Extract Other (See Comments)     Runny nose, watery eyes (also has corneal swelling) and itching  Triggers asthma    Shrimp Flavor - Food Allergy Hives       Labs:  0   Lab Value Date/Time    HCT 38.3 10/06/2023 0540    HCT 46.0 09/11/2023 1128    HCT 38.4 02/23/2023 1953    HCT 35.7 09/07/2015 0600    HCT 34.6 (L) 09/06/2015 0556    HCT 39.4 09/05/2015 0607    HGB 12.6 10/06/2023 0540    HGB 14.8 09/11/2023 1128    HGB 12.3 02/23/2023 1953    HGB 11.1 (L) 09/07/2015 0600    HGB 10.8 (L) 09/06/2015 0556    HGB 12.8 09/05/2015 0607    INR 0.94 09/11/2023 1128    INR 1.02 08/20/2015 1311    WBC 15.17 (H) 10/06/2023 0540    WBC 5.93 09/11/2023 1128    WBC 10.05 02/23/2023 1953    WBC 11.47 (H) 09/07/2015 0600    WBC 11.74 (H) 09/06/2015 0556    WBC 18.25 (H) 09/05/2015 0607    CRP <1.0 01/03/2023 1244       Meds:    Current Outpatient Medications:     aspirin (ECOTRIN LOW STRENGTH) 81 mg EC tablet, Take 1 tablet (81 mg total) by mouth 2 (two) times a day Take 1(one) 81 mg tablet twice daily x 35 days after total joint arthroplasty, Disp: 70 tablet, Rfl: 0    azelastine (OPTIVAR) 0.05 % ophthalmic solution, Administer 1 drop to the right eye 2 (two) times a day, Disp: , Rfl:     buPROPion (WELLBUTRIN XL) 300 mg 24 hr tablet, Take 300 mg by mouth daily, Disp: , Rfl:     busPIRone (BUSPAR) 10 mg tablet, Take 10 mg by mouth in the morning Takes twice a day, Disp: , Rfl:     Calcium Carb-Cholecalciferol (OSCAL-D) 500 mg-200 units per tablet, Take 1 tablet by mouth 2 (two) times a day with meals, Disp: , Rfl:     cetirizine (ZyrTEC) 10 mg tablet, Take 10 mg by mouth daily Takes 1  tablet 2 times a day, Disp: , Rfl:     Cholecalciferol (VITAMIN D3) 2000 units capsule, TAKE 2 CAPSULES BY MOUTH DAILY INDICATIONS: VITAMIN D DEFICIENCY., Disp: , Rfl: 5    [employer] Vitamin B-12 1000 MCG tablet, Take 1,000 mcg by mouth daily, Disp: , Rfl:     [employer] Vitamin C 500 MG tablet, TAKE 1 TABLET BY MOUTH TWICE A DAY, Disp: 180 tablet, Rfl: 1    ferrous sulfate 325 (65 Fe) mg tablet, Take by mouth daily with breakfast, Disp: , Rfl:     folic acid (FOLVITE) 1 mg tablet, Take 1 tablet (1 mg total) by mouth daily, Disp: 30 tablet, Rfl: 1    gabapentin (NEURONTIN) 300 mg capsule, Take 2 capsules (600 mg total) by mouth 3 (three) times a day, Disp: 180 capsule, Rfl: 2    hydrocortisone 1 % cream, Apply topically as needed Applies daily, Disp: , Rfl:     montelukast (SINGULAIR) 10 mg tablet, TAKE 1 TABLET BY MOUTH EVERY DAY AT NIGHT, Disp: , Rfl:     Multiple Vitamins-Minerals (multivitamin with minerals) tablet, Take 1 tablet by mouth daily, Disp: 30 tablet, Rfl: 0    ondansetron (ZOFRAN) 4 mg tablet, Take 1 tablet (4 mg total) by mouth every 8 (eight) hours as needed for nausea or vomiting, Disp: 20 tablet, Rfl: 0    simvastatin (ZOCOR) 40 mg tablet, Take 40 mg by mouth daily at bedtime, Disp: , Rfl:     topiramate (TOPAMAX) 25 mg tablet, Take 25 mg by mouth 2 (two) times a day, Disp: , Rfl:     venlafaxine (EFFEXOR-XR) 37.5 mg 24 hr capsule, Take 37.5 mg by mouth daily, Disp: , Rfl:     VENTOLIN  (90 Base) MCG/ACT inhaler, INHALE 2 PUFFS EVERY 4 (FOUR) HOURS AS NEEDED FOR WHEEZING OR SHORTNESS OF BREATH., Disp: , Rfl: 2    ascorbic acid (VITAMIN C) 500 mg tablet, TAKE 1 TABLET (500 MG TOTAL) BY MOUTH DAILY. (Patient not taking: Reported on 10/20/2023), Disp: 90 tablet, Rfl: 1    bepotastine besilate (BEPREVE) 1.5 % op soln, INSTILL 1 DROP INTO BOTH EYES TWICE A DAY (Patient not taking: Reported on 2/9/2024), Disp: , Rfl:     docusate sodium (COLACE) 100 mg capsule, TAKE 1 CAPSULE BY MOUTH DAILY AS NEEDED FOR  CONSTIPATION. (Patient not taking: Reported on 2/9/2024), Disp: 30 capsule, Rfl: 0    methocarbamol (ROBAXIN) 500 mg tablet, Take 1 tablet (500 mg total) by mouth every 8 (eight) hours as needed for muscle spasms, Disp: 90 tablet, Rfl: 0    Body mass index is 37.76 kg/m².  Wt Readings from Last 3 Encounters:   02/09/24 99.8 kg (220 lb)   12/12/23 99.3 kg (219 lb)   11/29/23 99.3 kg (219 lb)     Physical Exam:   There were no vitals filed for this visit.    General Appearance:    Alert, cooperative, no distress, appears stated age   Head:    Normocephalic, without obvious abnormality, atraumatic   Eyes:    conjunctiva/corneas clear, both eyes        Nose:   Nares normal, septum midline, no drainage    Throat:   Lips normal; teeth and gums normal   Neck:    symmetrical, trachea midline, ;     thyroid:  no enlargement/   Back:     Symmetric, no curvature, ROM normal   Lungs:   No audible wheezing or labored breathing   Chest Wall:    No tenderness or deformity    Heart:    Regular rate and rhythm               Pulses:   2+ and symmetric all extremities   Skin:   Skin color, texture, turgor normal, no rashes or lesions   Neurologic:   normal strength, sensation and reflexes     throughout       Musculoskeletal: left lower extremity  Examination patient's left knee well-healed anterior incision flexion extension flexion greater than 120 degrees stable to varus valgus stress full extension and flexion calf nontender palpation quadrant intact out of 5 no pain palpation pes anserine bursa region quadricep patella tendon    Radiology:   I personally reviewed the films.  X-rays left knee shows excellent aligned left knee arthroplasty without evidence of loosening or osseous abnormality    _*_*_*_*_*_*_*_*_*_*_*_*_*_*_*_*_*_*_*_*_*_*_*_*_*_*_*_*_*_*_*_*_*_*_*_*_*_*_*_*_*    Assessment:  54 y.o.female status post 1 year left total knee arthroplasty doing well for    Plan:   Weight bearing as tolerated left lower extremity lower  extremity  Lifetime antibiotic prophylaxis recommended  Continue home range of motion stretching strength and exercise  Follow up as previously scheduled for right knee follow-up      Paul Schwartz PA-C                    .

## 2024-06-03 ENCOUNTER — EVALUATION (OUTPATIENT)
Dept: PHYSICAL THERAPY | Facility: REHABILITATION | Age: 54
End: 2024-06-03
Payer: MEDICARE

## 2024-06-03 DIAGNOSIS — M54.2 NECK PAIN ON LEFT SIDE: Primary | ICD-10-CM

## 2024-06-03 PROCEDURE — 97161 PT EVAL LOW COMPLEX 20 MIN: CPT | Performed by: PHYSICAL THERAPIST

## 2024-06-03 PROCEDURE — 97110 THERAPEUTIC EXERCISES: CPT | Performed by: PHYSICAL THERAPIST

## 2024-06-03 NOTE — LETTER
Maria Luisa 3, 2024    Otto Franklin MD  [address]  [address]  [facility] [address]    Patient: Gem Guillen   YOB: 1970   Date of Visit: 6/3/2024     Encounter Diagnosis     ICD-10-CM    1. Neck pain on left side  M54.2           Dear Dr. Franklin:    Thank you for your recent referral of Gem Guillen. Please review the attached evaluation summary from Gem's recent visit.     Please verify that you agree with the plan of care by signing the attached order.     If you have any questions or concerns, please do not hesitate to call.     I sincerely appreciate the opportunity to share in the care of one of your patients and hope to have another opportunity to work with you in the near future.       Sincerely,    Amanuel Fernández      Referring Provider:      I certify that I have read the below Plan of Care and certify the need for these services furnished under this plan of treatment while under my care.                    Otto Franklin MD  [address]  [address]  [facility] [address]  Via Fax: [phone]          PT Evaluation     Today's date: 6/3/2024  Patient name: Gem Guillen  : 1970  MRN: [id number]  Referring provider: Otto Franklin MD  Dx:   Encounter Diagnosis     ICD-10-CM    1. Neck pain on left side  M54.2           Start Time: 1600  Stop Time: 1630  Total time in clinic (min): 30 minutes    Assessment  Impairments: abnormal or restricted ROM, activity intolerance, impaired physical strength, lacks appropriate home exercise program, pain with function, weight-bearing intolerance and poor posture     Assessment details: Problem List:  1) C1-2 hypomobility  2) upper thoracic/CTJ hypomobility  3) L UT tautness/weakness    Gem Guillen is a pleasant 54 y.o. female who presents with L sided neck pain that began over a year ago.  she has decreased L upper cervical and upper thoracic mobility coupled with tautness/weakness of her L upper trapezius resulting in  the pain she is experiencing and fear not it not going away.  No further referral appears necessary at this time based upon examination results.  I expect she will improve in 8-12 weeks with consistent HEP performance. Nubia would benefit from skilled physical therapy to address her limitations and allow her to move with less pain/discomfort.   Understanding of Dx/Px/POC: good     Prognosis: good    Goals  ST-4 weeks  Patient will be independent with home exercise program.   Patient will be able to manage symptoms independently.  Patient will decrease pain by 25-50%    LTG: by discharge  Patient will improve FOTO to goal  Patient will report minimal (1-2/10) pain with aggravating activities to display improvements in overall functional status  Patient will have full and pain free ROM  Patient will have full pain free shoulder range of motion      Plan  Patient would benefit from: skilled physical therapy  Planned modality interventions: cryotherapy, thermotherapy: hydrocollator packs and unattended electrical stimulation    Planned therapy interventions: IADL retraining, joint mobilization, manual therapy, massage, ADL training, activity modification, abdominal trunk stabilization, ADL retraining, balance, balance/weight bearing training, neuromuscular re-education, body mechanics training, behavior modification, strengthening, stretching, therapeutic activities, therapeutic exercise, therapeutic training, transfer training, graded exercise, graded motor, home exercise program, graded activity, gait training, functional ROM exercises, patient education, postural training, IASTM, kinesiology taping and flexibility    Frequency: 2x week  Duration in weeks: 8  Plan of Care beginning date: 6/3/2024  Plan of Care expiration date: 2024  Treatment plan discussed with: patient        Subjective Evaluation    History of Present Illness  Mechanism of injury: Gem is a 54 y.o. female presenting to physical therapy  on 24 with referral from MD for left sided neck pain that began over a year ago. Pain is on the left side. Reports some headaches and migraines at times. States she gets headaches 4x/wk. A lot of trouble sleeping.           Quality of life: good    Pain  Current pain ratin  At best pain ratin  At worst pain ratin  Location: left sided neck pain  Quality: sharp  Relieving factors: change in position, relaxation and rest  Aggravating factors: overhead activity (turning head to left)          Objective    Myotomes all intact b/l  Dermatome: all intact b/l                   Scapular position:  Abduction Syndrome: (+)    Palpation: TTP over L UT, L C6-7 TPs     Cervical  % of normal   Flex. 100% P   Extn. 50% P   SB Left 50%   SB Right 50%   ROT Left 50% P   ROT Right 100%         MMT         AROM          PROM    Shoulder       L       R        L           R      L     R   Flex.   WFL WFL     Abd.   WFL WFL     IR.         ER.                  Upper Trap 3        Mid Trap         Low Trap         Serratus              Thoracic Spine:          Segmental mobility:   AAJ= stiff with left rotation    Mid CS= hypomobility on L        Precautions: standard    Manuals 6/3            C1-2 MET             T/S mobs                          Assessment             Neuro Re-Ed 6/3            XS                                                                                           Ther Ex 6/3            UBE             CTJ Butterfly HEP            SNAG L HEP            Thoracic mobility             Seated thoracic mobility                                       HEP/education 8'            Ther Activity                                       Gait Training                                       Modalities

## 2024-06-03 NOTE — PROGRESS NOTES
PT Evaluation     Today's date: 6/3/2024  Patient name: Gem Guillen  : 1970  MRN: [id number]  Referring provider: Otto Franklin MD  Dx:   Encounter Diagnosis     ICD-10-CM    1. Neck pain on left side  M54.2           Start Time: 1600  Stop Time: 1630  Total time in clinic (min): 30 minutes    Assessment  Impairments: abnormal or restricted ROM, activity intolerance, impaired physical strength, lacks appropriate home exercise program, pain with function, weight-bearing intolerance and poor posture     Assessment details: Problem List:  1) C1-2 hypomobility  2) upper thoracic/CTJ hypomobility  3) L UT tautness/weakness    Gem Guillen is a pleasant 54 y.o. female who presents with L sided neck pain that began over a year ago.  she has decreased L upper cervical and upper thoracic mobility coupled with tautness/weakness of her L upper trapezius resulting in the pain she is experiencing and fear not it not going away.  No further referral appears necessary at this time based upon examination results.  I expect she will improve in 8-12 weeks with consistent HEP performance. Nubia would benefit from skilled physical therapy to address her limitations and allow her to move with less pain/discomfort.   Understanding of Dx/Px/POC: good     Prognosis: good    Goals  ST-4 weeks  Patient will be independent with home exercise program.   Patient will be able to manage symptoms independently.  Patient will decrease pain by 25-50%    LTG: by discharge  Patient will improve FOTO to goal  Patient will report minimal (1-2/10) pain with aggravating activities to display improvements in overall functional status  Patient will have full and pain free ROM  Patient will have full pain free shoulder range of motion      Plan  Patient would benefit from: skilled physical therapy  Planned modality interventions: cryotherapy, thermotherapy: hydrocollator packs and unattended electrical stimulation    Planned therapy  interventions: IADL retraining, joint mobilization, manual therapy, massage, ADL training, activity modification, abdominal trunk stabilization, ADL retraining, balance, balance/weight bearing training, neuromuscular re-education, body mechanics training, behavior modification, strengthening, stretching, therapeutic activities, therapeutic exercise, therapeutic training, transfer training, graded exercise, graded motor, home exercise program, graded activity, gait training, functional ROM exercises, patient education, postural training, IASTM, kinesiology taping and flexibility    Frequency: 2x week  Duration in weeks: 8  Plan of Care beginning date: 6/3/2024  Plan of Care expiration date: 2024  Treatment plan discussed with: patient        Subjective Evaluation    History of Present Illness  Mechanism of injury: Gem is a 54 y.o. female presenting to physical therapy on 24 with referral from MD for left sided neck pain that began over a year ago. Pain is on the left side. Reports some headaches and migraines at times. States she gets headaches 4x/wk. A lot of trouble sleeping.           Quality of life: good    Pain  Current pain ratin  At best pain ratin  At worst pain ratin  Location: left sided neck pain  Quality: sharp  Relieving factors: change in position, relaxation and rest  Aggravating factors: overhead activity (turning head to left)          Objective    Myotomes all intact b/l  Dermatome: all intact b/l                   Scapular position:  Abduction Syndrome: (+)    Palpation: TTP over L UT, L C6-7 TPs     Cervical  % of normal   Flex. 100% P   Extn. 50% P   SB Left 50%   SB Right 50%   ROT Left 50% P   ROT Right 100%         MMT         AROM          PROM    Shoulder       L       R        L           R      L     R   Flex.   WFL WFL     Abd.   WFL WFL     IR.         ER.                  Upper Trap 3        Mid Trap         Low Trap         Serratus              Thoracic  Spine:          Segmental mobility:   AAJ= stiff with left rotation    Mid CS= hypomobility on L        Precautions: standard    Manuals 6/3            C1-2 MET             T/S mobs                          Assessment             Neuro Re-Ed 6/3            XS                                                                                           Ther Ex 6/3            UBE             CTJ Butterfly HEP            SNAG L HEP            Thoracic mobility             Seated thoracic mobility                                       HEP/education 8'            Ther Activity                                       Gait Training                                       Modalities

## 2024-06-04 ENCOUNTER — OFFICE VISIT (OUTPATIENT)
Dept: PHYSICAL THERAPY | Facility: REHABILITATION | Age: 54
End: 2024-06-04
Payer: MEDICARE

## 2024-06-04 DIAGNOSIS — M54.2 NECK PAIN ON LEFT SIDE: Primary | ICD-10-CM

## 2024-06-04 PROCEDURE — 97112 NEUROMUSCULAR REEDUCATION: CPT | Performed by: PHYSICAL THERAPIST

## 2024-06-04 PROCEDURE — 97140 MANUAL THERAPY 1/> REGIONS: CPT | Performed by: PHYSICAL THERAPIST

## 2024-06-04 PROCEDURE — 97110 THERAPEUTIC EXERCISES: CPT | Performed by: PHYSICAL THERAPIST

## 2024-06-04 NOTE — PROGRESS NOTES
"Daily Note     Today's date: 2024  Patient name: Gem Guillen  : 1970  MRN: [id number]  Referring provider: Otto Franklin MD  Dx:   Encounter Diagnosis     ICD-10-CM    1. Neck pain on left side  M54.2           Start Time: 0900  Stop Time: 0945  Total time in clinic (min): 45 minutes    Subjective: Reports her neck is doing okay. Still gets some pain.      Objective: See treatment diary below      Assessment: Tolerated treatment well. Patient exhibited good technique with therapeutic exercises and would benefit from continued PT      Plan: Continue per plan of care.      Precautions: standard    Manuals 6/3 6/4           C1-2 MET  QD 2 bouts           T/S mobs                          Assessment  QD           Neuro Re-Ed 6/3 6/4           XS  3x8x3\" purp           Suitcase carry  15lb  4 laps            shrug  YJB 20x3\"                                                               Ther Ex 6/3 6/4           UBE  5'           CTJ Butterfly HEP            SNAG L HEP            Thoracic mobility  3x15 with dowel           Seated thoracic mobility             Wall slide  2x10                        HEP/education 8'            Ther Activity                                       Gait Training                                       Modalities                                            "

## 2024-06-10 ENCOUNTER — OFFICE VISIT (OUTPATIENT)
Dept: PHYSICAL THERAPY | Facility: REHABILITATION | Age: 54
End: 2024-06-10
Payer: MEDICARE

## 2024-06-10 DIAGNOSIS — M54.2 NECK PAIN ON LEFT SIDE: Primary | ICD-10-CM

## 2024-06-10 PROCEDURE — 97110 THERAPEUTIC EXERCISES: CPT | Performed by: PHYSICAL THERAPIST

## 2024-06-10 PROCEDURE — 97112 NEUROMUSCULAR REEDUCATION: CPT | Performed by: PHYSICAL THERAPIST

## 2024-06-10 NOTE — PROGRESS NOTES
"Daily Note     Today's date: 6/10/2024  Patient name: Gem Guillen  : 1970  MRN: [id number]  Referring provider: Otto Franklin MD  Dx:   Encounter Diagnosis     ICD-10-CM    1. Neck pain on left side  M54.2           Start Time: 930  Stop Time: 1015  Total time in clinic (min): 45 minutes    Subjective: Reports her neck is doing alright. Did have a headache last night and did not sleep well.       Objective: See treatment diary below      Assessment: Tolerated treatment well. Decreased stiffness noted upon assessment. Patient exhibited good technique with therapeutic exercises and would benefit from continued PT      Plan: Continue per plan of care.      Precautions: standard    Manuals 6/3 6/4 6/10          C1-2 MET  QD 2 bouts QD          T/S mobs                          Assessment  QD QD          Neuro Re-Ed 6/3 6/4 6/10          XS  3x8x3\" purp 3x10x3\" purp          Suitcase carry  15lb  4 laps  3 laps 15lb KB          shrug  YJB 20x3\" YJB 20x3\"          DNF   20x          DNF with rot   20x3\"                                    Ther Ex 6/3 6/4 6/10          UBE  5' 5'          CTJ Butterfly HEP            SNAG L HEP            Thoracic mobility  3x15 with dowel 3x15          Seated thoracic mobility             Wall slide  2x10                        HEP/education 8'            Ther Activity                                       Gait Training                                       Modalities                                            "

## 2024-06-13 ENCOUNTER — OFFICE VISIT (OUTPATIENT)
Dept: PHYSICAL THERAPY | Facility: REHABILITATION | Age: 54
End: 2024-06-13
Payer: MEDICARE

## 2024-06-13 DIAGNOSIS — M54.2 NECK PAIN ON LEFT SIDE: Primary | ICD-10-CM

## 2024-06-13 PROCEDURE — 97112 NEUROMUSCULAR REEDUCATION: CPT | Performed by: PHYSICAL THERAPIST

## 2024-06-13 PROCEDURE — 97110 THERAPEUTIC EXERCISES: CPT | Performed by: PHYSICAL THERAPIST

## 2024-06-13 NOTE — PROGRESS NOTES
"Daily Note     Today's date: 2024  Patient name: Gem Guillen  : 1970  MRN: [id number]  Referring provider: Otto Franklin MD  Dx:   Encounter Diagnosis     ICD-10-CM    1. Neck pain on left side  M54.2           Start Time: 925  Stop Time: 1010  Total time in clinic (min): 45 minutes    Subjective: Reports her neck is doing a little better. Has trouble looking to the left.       Objective: See treatment diary below      Assessment: Tolerated treatment well. Improvement in L rotation following mobilizations. Patient exhibited good technique with therapeutic exercises and would benefit from continued PT      Plan: Continue per plan of care.      Precautions: standard    Manuals 6/3 6/4 6/10 6/13         C1-2 MET  QD 2 bouts QD QD  3 bouts L rot         T/S mobs             CTJ mob    Seated with L rot MWM 3x8         Assessment  QD QD QD         Neuro Re-Ed 6/3 6/4 6/10 6/13         XS  3x8x3\" purp 3x10x3\" purp 3x10 purp         Suitcase carry  15lb  4 laps  3 laps 15lb KB          shrug  YJB 20x3\" YJB 20x3\"          DNF   20x 20x         DNF with rot   20x3\" 20x         Rows    Minier 25x rows    Stepback 25x                      Ther Ex 6/3 6/4 6/10 6/13         UBE  5' 5' 3'/3'         CTJ Butterfly HEP            SNAG L HEP            Thoracic mobility  3x15 with dowel 3x15          Seated thoracic mobility             Wall slide  2x10  2x10                      HEP/education 8'            Ther Activity                                       Gait Training                                       Modalities                                            "

## 2024-06-20 ENCOUNTER — OFFICE VISIT (OUTPATIENT)
Dept: PHYSICAL THERAPY | Facility: REHABILITATION | Age: 54
End: 2024-06-20
Payer: MEDICARE

## 2024-06-20 DIAGNOSIS — M54.2 NECK PAIN ON LEFT SIDE: Primary | ICD-10-CM

## 2024-06-20 PROCEDURE — 97110 THERAPEUTIC EXERCISES: CPT | Performed by: PHYSICAL THERAPIST

## 2024-06-20 PROCEDURE — 97112 NEUROMUSCULAR REEDUCATION: CPT | Performed by: PHYSICAL THERAPIST

## 2024-06-20 NOTE — PROGRESS NOTES
"Daily Note     Today's date: 2024  Patient name: Gem Guillen  : 1970  MRN: [id number]  Referring provider: Otto Franklin MD  Dx:   Encounter Diagnosis     ICD-10-CM    1. Neck pain on left side  M54.2           Start Time: 1020  Stop Time: 1115  Total time in clinic (min): 55 minutes    Subjective: Reports her neck is doing pretty well. Only having headaches at times.       Objective: See treatment diary below      Assessment: Tolerated treatment well. No pain reported during treatment. Patient exhibited good technique with therapeutic exercises and would benefit from continued PT      Plan: Continue per plan of care.      Precautions: standard    Manuals 6/3 6/4 6/10 6/13 6/20        C1-2 MET  QD 2 bouts QD QD  3 bouts L rot QD        T/S mobs             CTJ mob    Seated with L rot MWM 3x8         Assessment  QD QD QD QD        Neuro Re-Ed 6/3 6/4 6/10 6/13 6/20        XS  3x8x3\" purp 3x10x3\" purp 3x10 purp 3x10 purp I's        Suitcase carry  15lb  4 laps  3 laps 15lb KB          shrug  YJB 20x3\" YJB 20x3\"          DNF   20x 20x 20x        DNF with rot   20x3\" 20x 20x        Rows    Claudia 25x rows    Stepback 25x Claudia 3x10 rows 20lb    Stepback 20x 20lb                     Ther Ex 6/3 6/4 6/10 6/13 6/20        UBE  5' 5' 3'/3' 3'/3'        CTJ Butterfly HEP            SNAG L HEP            Thoracic mobility  3x15 with dowel 3x15  3x15 with dowel and 1/2 foam        Seated thoracic mobility             Wall slide  2x10  2x10 2x10                     HEP/education 8'            Ther Activity                                       Gait Training                                       Modalities                                            "

## 2024-06-25 ENCOUNTER — OFFICE VISIT (OUTPATIENT)
Dept: PHYSICAL THERAPY | Facility: REHABILITATION | Age: 54
End: 2024-06-25
Payer: MEDICARE

## 2024-06-25 DIAGNOSIS — M54.2 NECK PAIN ON LEFT SIDE: Primary | ICD-10-CM

## 2024-06-25 PROCEDURE — 97140 MANUAL THERAPY 1/> REGIONS: CPT | Performed by: PHYSICAL THERAPIST

## 2024-06-25 PROCEDURE — 97112 NEUROMUSCULAR REEDUCATION: CPT | Performed by: PHYSICAL THERAPIST

## 2024-06-25 NOTE — PROGRESS NOTES
"Daily Note     Today's date: 2024  Patient name: Gem Guillen  : 1970  MRN: [id number]  Referring provider: Otto Franklin MD  Dx:   Encounter Diagnosis     ICD-10-CM    1. Neck pain on left side  M54.2           Start Time: 0850  Stop Time: 0945  Total time in clinic (min): 55 minutes    Subjective: Reports her neck is better than when she started she just can't lift her L arm up as high.     Objective: See treatment diary below    Assessment: Tolerated treatment well. Doing well overall and tolerates exercises well. Had some discomfort with chin tucks, but did not last and no reproduction of headache. Patient exhibited good technique with therapeutic exercises and would benefit from continued PT    Plan: Continue per plan of care.      Precautions: standard    Manuals 6/3 6/4 6/10 6/13 6/20 6/25   C1-2 MET  QD 2 bouts QD QD  3 bouts L rot QD    T/S mobs         CTJ mob    Seated with L rot MWM 3x8     STM      UT 5'   Rib mobs      First rib 2x10 with shld elevation   Assessment  QD QD QD QD QD 4'   Neuro Re-Ed 6/3 6/4 6/10 6/13 6/20 6/25   XS  3x8x3\" purp 3x10x3\" purp 3x10 purp 3x10 purp I's 2x10 Grn Y's    2x10 I's   Suitcase carry  15lb  4 laps  3 laps 15lb KB      shrug  YJB 20x3\" YJB 20x3\"      DNF   20x 20x 20x 20x   DNF with rot   20x3\" 20x 20x 20x   Rows    Claudia 25x rows    Stepback 25x Converse 3x10 rows 20lb    Stepback 20x 20lb Converse 3x10 rows 20lb    Stepback 20x 20lb            Ther Ex 6/3 6/4 6/10 6/13 6/20 6/25   UBE  5' 5' 3'/3' 3'/3' 3'/3'   CTJ Butterfly HEP        SNAG L HEP        Thoracic mobility  3x15 with dowel 3x15  3x15 with dowel and 1/2 foam 3x15 with dowel and 1/2 foam   Seated thoracic mobility         Wall slide  2x10  2x10 2x10             HEP/education 8'        Ther Activity                           Gait Training                           Modalities                                "

## 2024-06-28 ENCOUNTER — OFFICE VISIT (OUTPATIENT)
Dept: PHYSICAL THERAPY | Facility: REHABILITATION | Age: 54
End: 2024-06-28
Payer: MEDICARE

## 2024-06-28 DIAGNOSIS — M54.2 NECK PAIN ON LEFT SIDE: Primary | ICD-10-CM

## 2024-06-28 PROCEDURE — 97140 MANUAL THERAPY 1/> REGIONS: CPT | Performed by: PHYSICAL THERAPIST

## 2024-06-28 PROCEDURE — 97110 THERAPEUTIC EXERCISES: CPT | Performed by: PHYSICAL THERAPIST

## 2024-06-28 PROCEDURE — 97112 NEUROMUSCULAR REEDUCATION: CPT | Performed by: PHYSICAL THERAPIST

## 2024-06-28 NOTE — PROGRESS NOTES
"Daily Note     Today's date: 2024  Patient name: Gem Guillen  : 1970  MRN: [id number]  Referring provider: Otto Franklin MD  Dx:   Encounter Diagnosis     ICD-10-CM    1. Neck pain on left side  M54.2                      Subjective: Reports her neck is doing pretty good. Just woke up so she is tired.     Objective: See treatment diary below    Assessment: Tolerated treatment well. Patient exhibited good technique with therapeutic exercises and would benefit from continued PT    Plan: Continue per plan of care.      Precautions: standard    Manuals 6/28  6/10 6/13 6/20 6/25   C1-2 MET   QD QD  3 bouts L rot QD    T/S mobs         CTJ mob    Seated with L rot MWM 3x8     STM UT 5'     UT 5'   Rib mobs First rib 2x10 with shld elevation     First rib 2x10 with shld elevation   Assessment QD  QD QD QD QD 4'   Neuro Re-Ed 6/28  6/10 6/13 6/20 6/25   XS I's MTB 3x10    Y's PTB 2x10  3x10x3\" purp 3x10 purp 3x10 purp I's 2x10 Grn Y's    2x10 I's   Suitcase carry   3 laps 15lb KB      shrug   YJB 20x3\"      DNF 20x  20x 20x 20x 20x   DNF with rot 20x  20x3\" 20x 20x 20x   Rows Silver band 20x5\"   Claudia 25x rows    Stepback 25x Jachin 3x10 rows 20lb    Stepback 20x 20lb Jachin 3x10 rows 20lb    Stepback 20x 20lb            Ther Ex 6/28  6/10 6/13 6/20 6/25   UBE 3'/3'  5' 3'/3' 3'/3' 3'/3'   CTJ Butterfly         SNAG         Thoracic mobility 3x15 with dowel and 1/2 foam  3x15  3x15 with dowel and 1/2 foam 3x15 with dowel and 1/2 foam   Seated thoracic mobility         Wall slide    2x10 2x10             HEP/education         Ther Activity                           Gait Training                           Modalities                                "

## 2024-09-08 ENCOUNTER — HOSPITAL ENCOUNTER (EMERGENCY)
Facility: HOSPITAL | Age: 54
Discharge: HOME | End: 2024-09-09
Attending: EMERGENCY MEDICINE
Payer: MEDICARE

## 2024-09-08 VITALS
OXYGEN SATURATION: 98 % | HEART RATE: 104 BPM | RESPIRATION RATE: 19 BRPM | TEMPERATURE: 97.4 F | DIASTOLIC BLOOD PRESSURE: 130 MMHG | SYSTOLIC BLOOD PRESSURE: 144 MMHG

## 2024-09-08 DIAGNOSIS — F14.90 COCAINE USE: Primary | ICD-10-CM

## 2024-09-08 DIAGNOSIS — F22 EKBOM'S DELUSIONAL PARASITOSIS (HCC): ICD-10-CM

## 2024-09-08 PROCEDURE — 99284 EMERGENCY DEPT VISIT MOD MDM: CPT

## 2024-09-08 PROCEDURE — 99285 EMERGENCY DEPT VISIT HI MDM: CPT | Performed by: EMERGENCY MEDICINE

## 2024-09-08 PROCEDURE — 82075 ASSAY OF BREATH ETHANOL: CPT

## 2024-09-08 RX ORDER — OLANZAPINE 10 MG/1
10 TABLET, ORALLY DISINTEGRATING ORAL ONCE
Status: COMPLETED | OUTPATIENT
Start: 2024-09-08 | End: 2024-09-08

## 2024-09-08 RX ORDER — LORAZEPAM 1 MG/1
1 TABLET ORAL ONCE
Status: COMPLETED | OUTPATIENT
Start: 2024-09-08 | End: 2024-09-08

## 2024-09-08 RX ADMIN — OLANZAPINE 10 MG: 10 TABLET, ORALLY DISINTEGRATING ORAL at 23:24

## 2024-09-08 RX ADMIN — LORAZEPAM 1 MG: 1 TABLET ORAL at 23:24

## 2024-09-09 LAB
ALBUMIN SERPL BCG-MCNC: 4.6 G/DL (ref 3.5–5)
ALP SERPL-CCNC: 63 U/L (ref 34–104)
ALT SERPL W P-5'-P-CCNC: 18 U/L (ref 7–52)
ANION GAP SERPL CALCULATED.3IONS-SCNC: 14 MMOL/L (ref 4–13)
AST SERPL W P-5'-P-CCNC: 21 U/L (ref 13–39)
ATRIAL RATE: 111 BPM
BASOPHILS # BLD AUTO: 0.08 THOUSANDS/ΜL (ref 0–0.1)
BASOPHILS NFR BLD AUTO: 1 % (ref 0–1)
BILIRUB SERPL-MCNC: 0.76 MG/DL (ref 0.2–1)
BUN SERPL-MCNC: 16 MG/DL (ref 5–25)
CALCIUM SERPL-MCNC: 10 MG/DL (ref 8.4–10.2)
CARDIAC TROPONIN I PNL SERPL HS: 6 NG/L
CHLORIDE SERPL-SCNC: 102 MMOL/L (ref 96–108)
CO2 SERPL-SCNC: 23 MMOL/L (ref 21–32)
CREAT SERPL-MCNC: 1.1 MG/DL (ref 0.6–1.3)
EOSINOPHIL # BLD AUTO: 0.3 THOUSAND/ΜL (ref 0–0.61)
EOSINOPHIL NFR BLD AUTO: 3 % (ref 0–6)
ERYTHROCYTE [DISTWIDTH] IN BLOOD BY AUTOMATED COUNT: 12.9 % (ref 11.6–15.1)
ETHANOL EXG-MCNC: 0 MG/DL
GFR SERPL CREATININE-BSD FRML MDRD: 56 ML/MIN/1.73SQ M
GLUCOSE SERPL-MCNC: 105 MG/DL (ref 65–140)
HCT VFR BLD AUTO: 47.2 % (ref 34.8–46.1)
HGB BLD-MCNC: 15.7 G/DL (ref 11.5–15.4)
IMM GRANULOCYTES # BLD AUTO: 0.03 THOUSAND/UL (ref 0–0.2)
IMM GRANULOCYTES NFR BLD AUTO: 0 % (ref 0–2)
LYMPHOCYTES # BLD AUTO: 2.38 THOUSANDS/ΜL (ref 0.6–4.47)
LYMPHOCYTES NFR BLD AUTO: 25 % (ref 14–44)
MCH RBC QN AUTO: 29 PG (ref 26.8–34.3)
MCHC RBC AUTO-ENTMCNC: 33.3 G/DL (ref 31.4–37.4)
MCV RBC AUTO: 87 FL (ref 82–98)
MONOCYTES # BLD AUTO: 1.05 THOUSAND/ΜL (ref 0.17–1.22)
MONOCYTES NFR BLD AUTO: 11 % (ref 4–12)
NEUTROPHILS # BLD AUTO: 5.69 THOUSANDS/ΜL (ref 1.85–7.62)
NEUTS SEG NFR BLD AUTO: 60 % (ref 43–75)
NRBC BLD AUTO-RTO: 0 /100 WBCS
P AXIS: 127 DEGREES
PLATELET # BLD AUTO: 356 THOUSANDS/UL (ref 149–390)
PMV BLD AUTO: 10.5 FL (ref 8.9–12.7)
POTASSIUM SERPL-SCNC: 4.1 MMOL/L (ref 3.5–5.3)
PR INTERVAL: 124 MS
PROT SERPL-MCNC: 7.4 G/DL (ref 6.4–8.4)
QRS AXIS: 55 DEGREES
QRSD INTERVAL: 80 MS
QT INTERVAL: 298 MS
QTC INTERVAL: 405 MS
RBC # BLD AUTO: 5.41 MILLION/UL (ref 3.81–5.12)
SODIUM SERPL-SCNC: 139 MMOL/L (ref 135–147)
T WAVE AXIS: 216 DEGREES
TSH SERPL DL<=0.05 MIU/L-ACNC: 2.15 UIU/ML (ref 0.45–4.5)
VENTRICULAR RATE: 111 BPM
WBC # BLD AUTO: 9.53 THOUSAND/UL (ref 4.31–10.16)

## 2024-09-09 PROCEDURE — 84443 ASSAY THYROID STIM HORMONE: CPT

## 2024-09-09 PROCEDURE — 93005 ELECTROCARDIOGRAM TRACING: CPT

## 2024-09-09 PROCEDURE — 85025 COMPLETE CBC W/AUTO DIFF WBC: CPT

## 2024-09-09 PROCEDURE — 80053 COMPREHEN METABOLIC PANEL: CPT

## 2024-09-09 PROCEDURE — 36415 COLL VENOUS BLD VENIPUNCTURE: CPT

## 2024-09-09 PROCEDURE — 84484 ASSAY OF TROPONIN QUANT: CPT

## 2024-09-09 PROCEDURE — 93010 ELECTROCARDIOGRAM REPORT: CPT | Performed by: INTERNAL MEDICINE

## 2024-09-09 RX ORDER — LORAZEPAM 1 MG/1
1 TABLET ORAL ONCE
Status: COMPLETED | OUTPATIENT
Start: 2024-09-09 | End: 2024-09-09

## 2024-09-09 RX ADMIN — LORAZEPAM 1 MG: 1 TABLET ORAL at 00:49

## 2024-09-09 NOTE — ED NOTES
Initiated request for [facility] virtual sitter.     Gladys Thompson RN  09/09/24 0011

## 2024-09-09 NOTE — ED NOTES
Patient admitted to cocaine use with last use today when son was present. She also stated that she has been like this in the past when she did a lot of cocaine. She then went back to screaming bugs were coming out of her skin. Plan is to let her sober up and offer warm handoff. Drug and alcohol resources provided to patient for outpatient treatment.

## 2024-09-09 NOTE — ED ATTENDING ATTESTATION
9/8/2024  I, Blayne Evangelista MD, saw and evaluated the patient. I have discussed the patient with the resident/non-physician practitioner and agree with the resident's/non-physician practitioner's findings, Plan of Care, and MDM as documented in the resident's/non-physician practitioner's note, except where noted. All available labs and Radiology studies were reviewed.  I was present for key portions of any procedure(s) performed by the resident/non-physician practitioner and I was immediately available to provide assistance.       At this point I agree with the current assessment done in the Emergency Department.  I have conducted an independent evaluation of this patient a history and physical is as follows:    ED Course         Critical Care Time  Procedures    53 yo female states she feels bugs are coming out of her eyes and feels pus is coming out. Pt with no si, no hi, but appears paranoid and delusional.  Pt with no cp, no sob, pt feels anxious. No abdominal pain.  No headache.  Vss, afebrile, lungs cta, rrr, abodmen soft nontender, bilateral eyes with no erythema, no drainage.  Zyprexa, ativan, merced psych workup, crisis.

## 2024-09-09 NOTE — ED NOTES
Pt visibly restless in melgoza bed rocking back and forth and mumbling incoherently but In no acute distress at this time     Gladys Thompson, NICKY  09/08/24 2098

## 2024-09-15 NOTE — ED PROVIDER NOTES
1. Cocaine use    2. Ekbom's delusional parasitosis (HCC)      ED Disposition       ED Disposition   Discharge    Condition   Stable    Date/Time   Mon Sep 9, 2024  6:44 AM    Comment   Gem Guillen discharge to home/self care.                   Assessment & Plan       Medical Decision Making  Gem Guillen is a 54 y.o. who presents with paranoia    Vital signs are tachycardic, mildly hypertensive. Afebrile      Ddx: concern for psychosis 2/2 drug use vs. Metabolic disturbance vs. Psychiatric disorder    Plan: geriatric psych work up as below- patient medically cleared for crisis evaluation   See separate note for crisis evaluation  Symptoms treated with zyprexa and ativan   Patient care signed off to night team     Disposition: Per chart review, patient was discharged with drug and alcohol resources from crisis and return precautions with recommended follow up with PCP        Amount and/or Complexity of Data Reviewed  Labs: ordered.    Risk  Prescription drug management.                       Medications   OLANZapine (ZyPREXA ZYDIS) dispersible tablet 10 mg (10 mg Oral Given 9/8/24 2324)   LORazepam (ATIVAN) tablet 1 mg (1 mg Oral Given 9/8/24 2324)   LORazepam (ATIVAN) tablet 1 mg (1 mg Oral Given 9/9/24 0049)       History of Present Illness       Patient is a 55 yo female with PMH of anxiety and depression who presents for evaluation of acute agitation. Patient is a poor historian 2/2 mental status. She is complaining of bugs stuck under her skin and pus coming out of her eyes. She denies and SI or HI. Patient endorses cocaine use, and states she last used cocaine 2 days ago. However, patient's son later present at bedside, stating that his mother used cocaine earlier today.               Review of Systems   All other systems reviewed and are negative.          Objective     ED Triage Vitals [09/08/24 2252]   Temperature Pulse Blood Pressure Respirations SpO2 Patient Position - Orthostatic VS   (!) 97.4 °F  (36.3 °C) 104 (!) 144/130 19 98 % --      Temp src Heart Rate Source BP Location FiO2 (%) Pain Score    -- -- -- -- 10 - Worst Possible Pain        Physical Exam  Constitutional:       General: She is in acute distress.      Appearance: She is not ill-appearing, toxic-appearing or diaphoretic.   HENT:      Head: Normocephalic and atraumatic.      Nose: Nose normal.      Mouth/Throat:      Mouth: Mucous membranes are moist.   Eyes:      Extraocular Movements: Extraocular movements intact.      Conjunctiva/sclera: Conjunctivae normal.   Cardiovascular:      Rate and Rhythm: Tachycardia present.   Pulmonary:      Effort: Pulmonary effort is normal.   Musculoskeletal:         General: Normal range of motion.   Skin:     General: Skin is warm and dry.   Psychiatric:         Attention and Perception: She is inattentive. She perceives visual hallucinations. She does not perceive auditory hallucinations.         Mood and Affect: Mood is anxious.         Speech: Speech is rapid and pressured.         Behavior: Behavior is not aggressive or hyperactive.         Thought Content: Thought content is paranoid. Thought content does not include homicidal or suicidal ideation. Thought content does not include homicidal or suicidal plan.         Cognition and Memory: She exhibits impaired recent memory.         Labs Reviewed   CBC AND DIFFERENTIAL - Abnormal       Result Value    WBC 9.53      RBC 5.41 (*)     Hemoglobin 15.7 (*)     Hematocrit 47.2 (*)     MCV 87      MCH 29.0      MCHC 33.3      RDW 12.9      MPV 10.5      Platelets 356      nRBC 0      Segmented % 60      Immature Grans % 0      Lymphocytes % 25      Monocytes % 11      Eosinophils Relative 3      Basophils Relative 1      Absolute Neutrophils 5.69      Absolute Immature Grans 0.03      Absolute Lymphocytes 2.38      Absolute Monocytes 1.05      Eosinophils Absolute 0.30      Basophils Absolute 0.08     COMPREHENSIVE METABOLIC PANEL - Abnormal    Sodium 139       Potassium 4.1      Chloride 102      CO2 23      ANION GAP 14 (*)     BUN 16      Creatinine 1.10      Glucose 105      Calcium 10.0      AST 21      ALT 18      Alkaline Phosphatase 63      Total Protein 7.4      Albumin 4.6      Total Bilirubin 0.76      eGFR 56      Narrative:     National Kid[employer] guidelines for Chronic Kidney Disease (CKD):     Stage 1 with normal or high GFR (GFR > 90 mL/min/1.73 square meters)    Stage 2 Mild CKD (GFR = 60-89 mL/min/1.73 square meters)    Stage 3A Moderate CKD (GFR = 45-59 mL/min/1.73 square meters)    Stage 3B Moderate CKD (GFR = 30-44 mL/min/1.73 square meters)    Stage 4 Severe CKD (GFR = 15-29 mL/min/1.73 square meters)    Stage 5 End Stage CKD (GFR <15 mL/min/1.73 square meters)  Note: GFR calculation is accurate only with a steady state creatinine   TSH, 3RD GENERATION - Normal    TSH 3RD GENERATON 2.149     HS TROPONIN I 0HR - Normal    hs TnI 0hr 6     POCT ALCOHOL BREATH TEST - Normal    EXTBreath Alcohol 0.000       No orders to display       ECG 12 Lead Documentation Only    Date/Time: 9/14/2024 9:12 PM    Performed by: Cassandra Mueller MD  Authorized by: Cassandra Mueller MD    Indications / Diagnosis:  Geriatric psych workup  ECG reviewed by me, the ED Provider: yes    Patient location:  ED  Interpretation:     Interpretation: abnormal    Rate:     ECG rate:  111    ECG rate assessment: tachycardic    Rhythm:     Rhythm: multifocal atrial tachycardia    Ectopy:     Ectopy: none    QRS:     QRS axis:  Normal    QRS intervals:  Normal  Conduction:     Conduction: normal    ST segments:     ST segments:  Abnormal  Comments:        Lateral infarct , age undetermined  ST & T wave abnormality, consider anterior ischemia             Cassandra Mueller MD  09/14/24 0030

## 2024-10-07 ENCOUNTER — HOSPITAL ENCOUNTER (EMERGENCY)
Facility: HOSPITAL | Age: 54
Discharge: HOME | End: 2024-10-07
Payer: MEDICARE

## 2024-10-07 VITALS
OXYGEN SATURATION: 98 % | HEART RATE: 90 BPM | SYSTOLIC BLOOD PRESSURE: 182 MMHG | DIASTOLIC BLOOD PRESSURE: 86 MMHG | RESPIRATION RATE: 18 BRPM | TEMPERATURE: 98 F

## 2024-10-07 DIAGNOSIS — F29 PSYCHOSIS (HCC): Primary | ICD-10-CM

## 2024-10-07 DIAGNOSIS — F14.10 COCAINE ABUSE (HCC): ICD-10-CM

## 2024-10-07 PROBLEM — F19.959 SUBSTANCE-INDUCED PSYCHOTIC DISORDER (HCC): Status: ACTIVE | Noted: 2024-10-07

## 2024-10-07 PROBLEM — F15.90 STIMULANT USE DISORDER: Status: ACTIVE | Noted: 2024-10-07

## 2024-10-07 LAB
ALBUMIN SERPL BCG-MCNC: 4.5 G/DL (ref 3.5–5)
ALP SERPL-CCNC: 69 U/L (ref 34–104)
ALT SERPL W P-5'-P-CCNC: 19 U/L (ref 7–52)
ANION GAP SERPL CALCULATED.3IONS-SCNC: 11 MMOL/L (ref 4–13)
AST SERPL W P-5'-P-CCNC: 19 U/L (ref 13–39)
BASOPHILS # BLD AUTO: 0.05 THOUSANDS/ΜL (ref 0–0.1)
BASOPHILS NFR BLD AUTO: 1 % (ref 0–1)
BILIRUB SERPL-MCNC: 0.61 MG/DL (ref 0.2–1)
BUN SERPL-MCNC: 17 MG/DL (ref 5–25)
CALCIUM SERPL-MCNC: 9.8 MG/DL (ref 8.4–10.2)
CHLORIDE SERPL-SCNC: 105 MMOL/L (ref 96–108)
CO2 SERPL-SCNC: 28 MMOL/L (ref 21–32)
CREAT SERPL-MCNC: 0.84 MG/DL (ref 0.6–1.3)
EOSINOPHIL # BLD AUTO: 0.44 THOUSAND/ΜL (ref 0–0.61)
EOSINOPHIL NFR BLD AUTO: 6 % (ref 0–6)
ERYTHROCYTE [DISTWIDTH] IN BLOOD BY AUTOMATED COUNT: 13.4 % (ref 11.6–15.1)
ETHANOL EXG-MCNC: 0 MG/DL
ETHANOL SERPL-MCNC: <10 MG/DL
GFR SERPL CREATININE-BSD FRML MDRD: 78 ML/MIN/1.73SQ M
GLUCOSE SERPL-MCNC: 118 MG/DL (ref 65–140)
HCT VFR BLD AUTO: 44.9 % (ref 34.8–46.1)
HGB BLD-MCNC: 14.5 G/DL (ref 11.5–15.4)
IMM GRANULOCYTES # BLD AUTO: 0.04 THOUSAND/UL (ref 0–0.2)
IMM GRANULOCYTES NFR BLD AUTO: 1 % (ref 0–2)
LYMPHOCYTES # BLD AUTO: 2.23 THOUSANDS/ΜL (ref 0.6–4.47)
LYMPHOCYTES NFR BLD AUTO: 29 % (ref 14–44)
MCH RBC QN AUTO: 29.1 PG (ref 26.8–34.3)
MCHC RBC AUTO-ENTMCNC: 32.3 G/DL (ref 31.4–37.4)
MCV RBC AUTO: 90 FL (ref 82–98)
MONOCYTES # BLD AUTO: 0.54 THOUSAND/ΜL (ref 0.17–1.22)
MONOCYTES NFR BLD AUTO: 7 % (ref 4–12)
NEUTROPHILS # BLD AUTO: 4.34 THOUSANDS/ΜL (ref 1.85–7.62)
NEUTS SEG NFR BLD AUTO: 56 % (ref 43–75)
NRBC BLD AUTO-RTO: 0 /100 WBCS
PLATELET # BLD AUTO: 261 THOUSANDS/UL (ref 149–390)
PMV BLD AUTO: 9.9 FL (ref 8.9–12.7)
POTASSIUM SERPL-SCNC: 3.5 MMOL/L (ref 3.5–5.3)
PROT SERPL-MCNC: 7.3 G/DL (ref 6.4–8.4)
RBC # BLD AUTO: 4.99 MILLION/UL (ref 3.81–5.12)
SODIUM SERPL-SCNC: 144 MMOL/L (ref 135–147)
TSH SERPL DL<=0.05 MIU/L-ACNC: 1.62 UIU/ML (ref 0.45–4.5)
WBC # BLD AUTO: 7.64 THOUSAND/UL (ref 4.31–10.16)

## 2024-10-07 PROCEDURE — 36415 COLL VENOUS BLD VENIPUNCTURE: CPT

## 2024-10-07 PROCEDURE — 82075 ASSAY OF BREATH ETHANOL: CPT

## 2024-10-07 PROCEDURE — 99215 OFFICE O/P EST HI 40 MIN: CPT | Performed by: PSYCHIATRY & NEUROLOGY

## 2024-10-07 PROCEDURE — 80053 COMPREHEN METABOLIC PANEL: CPT

## 2024-10-07 PROCEDURE — 84443 ASSAY THYROID STIM HORMONE: CPT

## 2024-10-07 PROCEDURE — 85025 COMPLETE CBC W/AUTO DIFF WBC: CPT

## 2024-10-07 PROCEDURE — 99285 EMERGENCY DEPT VISIT HI MDM: CPT

## 2024-10-07 PROCEDURE — 99284 EMERGENCY DEPT VISIT MOD MDM: CPT

## 2024-10-07 PROCEDURE — 82077 ASSAY SPEC XCP UR&BREATH IA: CPT

## 2024-10-07 RX ORDER — ACETAMINOPHEN 325 MG/1
650 TABLET ORAL ONCE
Status: COMPLETED | OUTPATIENT
Start: 2024-10-07 | End: 2024-10-07

## 2024-10-07 RX ORDER — DROPERIDOL 2.5 MG/ML
1 INJECTION, SOLUTION INTRAMUSCULAR; INTRAVENOUS ONCE
Status: COMPLETED | OUTPATIENT
Start: 2024-10-07 | End: 2024-10-07

## 2024-10-07 RX ORDER — LORAZEPAM 1 MG/1
2 TABLET ORAL ONCE
Status: COMPLETED | OUTPATIENT
Start: 2024-10-07 | End: 2024-10-07

## 2024-10-07 RX ADMIN — ACETAMINOPHEN 650 MG: 325 TABLET ORAL at 06:22

## 2024-10-07 RX ADMIN — LORAZEPAM 2 MG: 1 TABLET ORAL at 08:45

## 2024-10-07 NOTE — ED NOTES
This worker contacted [facility] @ [phone]. Spoke to answering service, information provided. To call back to this worker.

## 2024-10-07 NOTE — ASSESSMENT & PLAN NOTE
-Cont meds below as an outpatient, per external dispense info pt has 90 day supply of both written last month  Wellbutrin  QD  Buspar 10 mg TID  -would hold these meds while here for observation  -outpatient behavioral health referral

## 2024-10-07 NOTE — ED NOTES
Pt. Refusing female nurses to talk to her or touch her at this time.      Nahomy Dos Santos RN  10/07/24 0514

## 2024-10-07 NOTE — ED CARE HANDOFF
Emergency Department Sign Out Note        Sign out and transfer of care from Dr. Harvey. See Separate Emergency Department note.     The patient, Gem Guillen, was evaluated by the previous provider for acute psychosis.    Workup Completed:  Patient is denying blood work and UA.    ED Course / Workup Pending (followup):  Pending psych eval.     Psych saw the patient and stated that the symptoms are likely secondary to cocaine use and that her psychotic symptoms seem to be resolving.     Gianni was discharged home.                                   ED Course as of 10/07/24 1633   Mon Oct 07, 2024   0711 SO:  Acutely psychotic barricaded at home.  Started 1 month ago after cocaine.  Hallucinations of bugs crawling.   Son wants to 302. Questionable since cocaine induced.  Excoriations on skin and hair.    Give her time to recover.  Psych eval put in.   0838 Reached out to [facility] to see her.   1023 Labs obtained on patient.    1023 Pending crisis eval.   1122 Son's phone number: [phone]  Tried to reach son, but it went to voicemail   1311 Patient still sleepy; arousable and answering questions, but drowsy.     Procedures  Medical Decision Making  Amount and/or Complexity of Data Reviewed  Labs: ordered.    Risk  OTC drugs.  Prescription drug management.            Disposition  Final diagnoses:   Psychosis (HCC)   Cocaine abuse (HCC)     Time reflects when diagnosis was documented in both MDM as applicable and the Disposition within this note       Time User Action Codes Description Comment    10/7/2024  6:27 AM Kanchan Harvey Add [F29] Psychosis (HCC)     10/7/2024  7:06 AM Kanchan Harvey Add [F14.10] Cocaine abuse (HCC)           ED Disposition       ED Disposition   Discharge    Condition   Stable    Date/Time   Mon Oct 7, 2024  3:18 PM    Comment   Gem Guillen discharge to home/self care.                   Follow-up Information    None       Discharge Medication List as of 10/7/2024  3:21 PM         CONTINUE these medications which have NOT CHANGED    Details   !! ascorbic acid (VITAMIN C) 500 mg tablet TAKE 1 TABLET (500 MG TOTAL) BY MOUTH DAILY., Normal      aspirin (ECOTRIN LOW STRENGTH) 81 mg EC tablet Take 1 tablet (81 mg total) by mouth 2 (two) times a day Take 1(one) 81 mg tablet twice daily x 35 days after total joint arthroplasty, Starting Fri 10/6/2023, Normal      azelastine (OPTIVAR) 0.05 % ophthalmic solution Administer 1 drop to the right eye 2 (two) times a day, Starting Mon 5/22/2017, Historical Med      bepotastine besilate (BEPREVE) 1.5 % op soln INSTILL 1 DROP INTO BOTH EYES TWICE A DAY, Historical Med      buPROPion (WELLBUTRIN XL) 300 mg 24 hr tablet Take 300 mg by mouth daily, Historical Med      busPIRone (BUSPAR) 10 mg tablet Take 10 mg by mouth in the morning Takes twice a day, Historical Med      Calcium Carb-Cholecalciferol (OSCAL-D) 500 mg-200 units per tablet Take 1 tablet by mouth 2 (two) times a day with meals, Historical Med      cetirizine (ZyrTEC) 10 mg tablet Take 10 mg by mouth daily Takes 1 tablet 2 times a day, Historical Med      Cholecalciferol (VITAMIN D3) 2000 units capsule TAKE 2 CAPSULES BY MOUTH DAILY INDICATIONS: VITAMIN D DEFICIENCY., Historical Med      [facility] Vitamin B-12 1000 MCG tablet Take 1,000 mcg by mouth daily, Starting u 3/4/2021, Historical Med      !! [facility] Vitamin C 500 MG tablet TAKE 1 TABLET BY MOUTH TWICE A DAY, Starting Fri 9/1/2023, Normal      docusate sodium (COLACE) 100 mg capsule TAKE 1 CAPSULE BY MOUTH DAILY AS NEEDED FOR CONSTIPATION., Normal      ferrous sulfate 325 (65 Fe) mg tablet Take by mouth daily with breakfast, Historical Med      folic acid (FOLVITE) 1 mg tablet Take 1 tablet (1 mg total) by mouth daily, Starting Fri 8/18/2023, Normal      gabapentin (NEURONTIN) 300 mg capsule Take 2 capsules (600 mg total) by mouth 3 (three) times a day, Starting Mon 1/8/2024, Normal      hydrocortisone 1 % cream Apply topically as needed Applies  daily, Starting Tue 8/29/2017, Historical Med      methocarbamol (ROBAXIN) 500 mg tablet Take 1 tablet (500 mg total) by mouth every 8 (eight) hours as needed for muscle spasms, Starting Mon 1/8/2024, Normal      montelukast (SINGULAIR) 10 mg tablet TAKE 1 TABLET BY MOUTH EVERY DAY AT NIGHT, Historical Med      Multiple Vitamins-Minerals (multivitamin with minerals) tablet Take 1 tablet by mouth daily, Starting Tue 11/29/2022, Normal      ondansetron (ZOFRAN) 4 mg tablet Take 1 tablet (4 mg total) by mouth every 8 (eight) hours as needed for nausea or vomiting, Starting Fri 9/23/2022, Normal      simvastatin (ZOCOR) 40 mg tablet Take 40 mg by mouth daily at bedtime, Historical Med      topiramate (TOPAMAX) 25 mg tablet Take 25 mg by mouth 2 (two) times a day, Starting Fri 9/29/2023, Until Sat 9/28/2024, Historical Med      venlafaxine (EFFEXOR-XR) 37.5 mg 24 hr capsule Take 37.5 mg by mouth daily, Starting Fri 9/29/2023, Until Sat 9/28/2024, Historical Med      VENTOLIN  (90 Base) MCG/ACT inhaler INHALE 2 PUFFS EVERY 4 (FOUR) HOURS AS NEEDED FOR WHEEZING OR SHORTNESS OF BREATH., Historical Med       !! - Potential duplicate medications found. Please discuss with provider.        No discharge procedures on file.       ED Provider  Electronically Signed by     Ishan Doherty MD  10/07/24 9423

## 2024-10-07 NOTE — ED NOTES
"Pt refusing blood work and to change into paper scrubs at this time. Stating that \"she will not change because she is going home\" and had blood work done at [facility] on 9/24 stating \"if you want blood get it from a vampire\".      Nahomy Dos Santos RN  10/07/24 0554       Nahomy Dos Santos RN  10/07/24 0558    "

## 2024-10-07 NOTE — ED ATTENDING ATTESTATION
I, Ritchie Pool DO, saw and evaluated the patient. All available labs and X-rays were reviewed. I discussed the patient with the resident / non-physician and agree with the resident's / non-physician practitioner's findings and plan as documented in the resident's / non-physician practicitioner's note, except where noted. At this point, I agree with the current assessment done in the ED.     NAME: Gem Guillen  AGE: 54 y.o. SEX: female  : 1970   MRN: [id number]  ENCOUNTER: [id number]    Disposition   Active Problems:  There are no active Hospital Problems.      ED Disposition       None             Assessment/Plan   Medical Decision Making  Patient with history as below presented for psychiatric evaluation. History obtained from patient.    Differential diagnosis includes: Drug-induced psychosis, psychiatric illness    Plan: Psychiatric consult    Suspect presentation secondary to drug-induced psychosis given her recent cocaine use.  Patient reportedly completing his ADLs as per son.  His main concern seems to be increased arguments with family at home as well as frequently scratching herself.  Patient has had some passive suicidal ideations that she endorses that the patient but nothing that she has acted upon.  At present patient does not seem to be meeting 302 criteria, and suspect that her presentation is secondary to drug-induced psychosis as the son clearly relates this to her recent cocaine use.  Will have psychiatry evaluate the patient for recommendations.    Amount and/or Complexity of Data Reviewed  Labs: ordered.    Risk  OTC drugs.  Prescription drug management.                        History of Present Illness     Patient is a 54 y.o. female with a significant past medical history of diabetes, hypertension, hyperlipidemia, anxiety, depression, presenting for psychiatric evaluation.  Patient reportedly was barricaded in her room and son called police with concerns that she has not been  caring for herself recently.  Patient has concerns that there are bugs that are eating her from the inside out.  Son reports that she has been frequently scratching herself, however he does indicate that she continues to complete her ADLs.  She made some passive suicidal statements but is not had any attempt to act on these.  She has not had any homicidal ideations.  She did recently start some cocaine use although is unclear how much she has been using this.  Patient unwilling to provide further history.    Past Medical History     Past Medical History:   Diagnosis Date    Anxiety     Asthma     Cancer (HCC)     endometrial    Chronic pain syndrome     CPAP (continuous positive airway pressure) dependence     Depression     Diabetes mellitus (HCC)     Pre diabetic    GERD (gastroesophageal reflux disease)     HTN (hypertension)     Hyperlipidemia     Hypertension     Lumbar radiculopathy     Prediabetes     Sleep apnea        Past Surgical History     Past Surgical History:   Procedure Laterality Date    EYE SURGERY  07/26/2022    cornea eye transplant    GASTRECTOMY SLEEVE LAPAROSCOPIC      HYSTERECTOMY      KNEE ARTHROSCOPY Right     LUMBAR FUSION      MS ARTHROPLASTY FEM CONDYLES/TIBIAL PLATEAU KNEE Right 10/5/2023    Procedure: ARTHROPLASTY KNEE TOTAL;  Surgeon: Margi Mcpherson MD;  Location:  MAIN OR;  Service: Orthopedics    MS ARTHRP KNE CONDYLE&PLATU MEDIAL&LAT COMPARTMENTS Left 02/09/2023    Procedure: ARTHROPLASTY KNEE TOTAL AND ALL ASSOCIATED PROCEDURES;  Surgeon: Margi Mcpherson MD;  Location:  MAIN OR;  Service: Orthopedics    MS BREAST REDUCTION Bilateral 09/23/2022    Procedure: BILATERAL BREAST REDUCTION;  Surgeon: Edinson Winston MD;  Location:  MAIN OR;  Service: Plastics    MARY-EN-Y PROCEDURE  05/27/2021    Sleve    SKIN LESION EXCISION Right 11/29/2023    Procedure: EXCISION OF SKIN LESION OF THE RIGHT LATERAL EYEBROW WITH COMPLEX CLOSURE.;  Surgeon: Edinson Winston MD;  Location:  [facility];  Service: Plastics    TOTAL HIP ARTHROPLASTY Bilateral        Social History     Social History     Substance and Sexual Activity   Alcohol Use Not Currently     Social History     Substance and Sexual Activity   Drug Use Not Currently     Social History     Tobacco Use   Smoking Status Former   Smokeless Tobacco Never       Family History     Family History   Problem Relation Age of Onset    Diabetes Mother     Stroke Mother     Diabetes Father     Cancer Father         thyroid    Diabetes Sister     Diabetes Brother     No Known Problems Maternal Grandmother     No Known Problems Maternal Grandfather     No Known Problems Paternal Grandmother     No Known Problems Paternal Grandfather     No Known Problems Sister        Medications Prior to Admission     Prior to Admission medications    Medication Sig Start Date End Date Taking? Authorizing Provider   ascorbic acid (VITAMIN C) 500 mg tablet TAKE 1 TABLET (500 MG TOTAL) BY MOUTH DAILY.  Patient not taking: Reported on 10/20/2023 6/27/23   Paul Schwartz PA-C   aspirin (ECOTRIN LOW STRENGTH) 81 mg EC tablet Take 1 tablet (81 mg total) by mouth 2 (two) times a day Take 1(one) 81 mg tablet twice daily x 35 days after total joint arthroplasty 10/6/23   Roselyn Vazquez PA-C   azelastine (OPTIVAR) 0.05 % ophthalmic solution Administer 1 drop to the right eye 2 (two) times a day 5/22/17   Historical Provider, MD   bepotastine besilate (BEPREVE) 1.5 % op soln INSTILL 1 DROP INTO BOTH EYES TWICE A DAY  Patient not taking: Reported on 2/9/2024 7/15/22   Historical Provider, MD   buPROPion (WELLBUTRIN XL) 300 mg 24 hr tablet Take 300 mg by mouth daily    Historical Provider, MD   busPIRone (BUSPAR) 10 mg tablet Take 10 mg by mouth in the morning Takes twice a day    Historical Provider, MD   Calcium Carb-Cholecalciferol (OSCAL-D) 500 mg-200 units per tablet Take 1 tablet by mouth 2 (two) times a day with meals    Historical Provider, MD   cetirizine  (ZyrTEC) 10 mg tablet Take 10 mg by mouth daily Takes 1 tablet 2 times a day    Historical Provider, MD   Cholecalciferol (VITAMIN D3) 2000 units capsule TAKE 2 CAPSULES BY MOUTH DAILY INDICATIONS: VITAMIN D DEFICIENCY. 5/12/18   Historical Provider, MD   [facility] Vitamin B-12 1000 MCG tablet Take 1,000 mcg by mouth daily 3/4/21   Historical Provider, MD   [facility] Vitamin C 500 MG tablet TAKE 1 TABLET BY MOUTH TWICE A DAY 9/1/23   Margi Mcpherson MD   docusate sodium (COLACE) 100 mg capsule TAKE 1 CAPSULE BY MOUTH DAILY AS NEEDED FOR CONSTIPATION.  Patient not taking: Reported on 2/9/2024 9/18/23   Pratik Hardy PA-C   ferrous sulfate 325 (65 Fe) mg tablet Take by mouth daily with breakfast    Historical Provider, MD   folic acid (FOLVITE) 1 mg tablet Take 1 tablet (1 mg total) by mouth daily 8/18/23   Margi Mcpherson MD   gabapentin (NEURONTIN) 300 mg capsule Take 2 capsules (600 mg total) by mouth 3 (three) times a day 1/8/24   DB Lucio   hydrocortisone 1 % cream Apply topically as needed Applies daily 8/29/17   Historical Provider, MD   methocarbamol (ROBAXIN) 500 mg tablet Take 1 tablet (500 mg total) by mouth every 8 (eight) hours as needed for muscle spasms 1/8/24   DB Lucio   montelukast (SINGULAIR) 10 mg tablet TAKE 1 TABLET BY MOUTH EVERY DAY AT NIGHT 8/10/23   Historical Provider, MD   Multiple Vitamins-Minerals (multivitamin with minerals) tablet Take 1 tablet by mouth daily 11/29/22   Margi Mcpherson MD   ondansetron (ZOFRAN) 4 mg tablet Take 1 tablet (4 mg total) by mouth every 8 (eight) hours as needed for nausea or vomiting 9/23/22   Pratik Hardy PA-C   simvastatin (ZOCOR) 40 mg tablet Take 40 mg by mouth daily at bedtime    Historical Provider, MD   topiramate (TOPAMAX) 25 mg tablet Take 25 mg by mouth 2 (two) times a day 9/29/23 9/28/24  Historical Provider, MD   venlafaxine (EFFEXOR-XR) 37.5 mg 24 hr capsule Take 37.5 mg by mouth daily 9/29/23 9/28/24  Historical  Provider, MD STEVEN  (90 Base) MCG/ACT inhaler INHALE 2 PUFFS EVERY 4 (FOUR) HOURS AS NEEDED FOR WHEEZING OR SHORTNESS OF BREATH. 1/9/18   Historical Provider, MD       Allergies     Allergies   Allergen Reactions    Bee Venom Swelling    Dust Mite Extract     Fish-Derived Products - Food Allergy Hives    Iodine - Food Allergy Hives    Milk (Cow) Other (See Comments)     congestion    Molds & Smuts     Other Swelling    Pollen Extract Other (See Comments)     Runny nose, watery eyes (also has corneal swelling) and itching  Triggers asthma    Shrimp Flavor - Food Allergy Hives       Objective     Vitals:    10/07/24 0509   BP: (!) 182/86   BP Location: Right arm   Pulse: 90   Resp: 18   Temp: 98 °F (36.7 °C)   TempSrc: Oral   SpO2: 98%     There is no height or weight on file to calculate BMI.  No intake or output data in the 24 hours ending 10/07/24 0716  Invasive Devices       None                   Review of Systems   Unable to perform ROS: Psychiatric disorder        Physical Exam  Vitals and nursing note reviewed.   Constitutional:       General: She is not in acute distress.     Appearance: Normal appearance. She is not ill-appearing or toxic-appearing.      Comments: Disheveled appearing.   HENT:      Head: Normocephalic and atraumatic.      Right Ear: External ear normal.      Left Ear: External ear normal.      Nose: Nose normal.   Eyes:      General: No scleral icterus.        Right eye: No discharge.         Left eye: No discharge.      Extraocular Movements: Extraocular movements intact.      Conjunctiva/sclera: Conjunctivae normal.   Cardiovascular:      Rate and Rhythm: Normal rate.      Heart sounds: Normal heart sounds. No murmur heard.     No friction rub. No gallop.   Pulmonary:      Effort: Pulmonary effort is normal. No respiratory distress.      Breath sounds: Normal breath sounds.   Abdominal:      General: Abdomen is flat. There is no distension.      Palpations: Abdomen is soft.  There is no mass.      Tenderness: There is no abdominal tenderness.   Genitourinary:     Comments: Deferred  Skin:     General: Skin is warm and dry.      Comments: Patient appears as if she has been picking at several areas of her skin and has some scattered excoriations.   Neurological:      General: No focal deficit present.      Mental Status: She is alert.   Psychiatric:         Speech: Speech is tangential.         Behavior: Behavior is agitated.          Medications   droperidol (FOR EMS ONLY) (INAPSINE) 2.5 mg/mL injection 2.5 mg (0 mg Does not apply Given to EMS 10/7/24 0515)   acetaminophen (TYLENOL) tablet 650 mg (650 mg Oral Given 10/7/24 0622)        Results Reviewed       Procedure Component Value Units Date/Time    POCT alcohol breath test [[id number]]  (Normal) Resulted: 10/07/24 0547    Lab Status: Final result Updated: 10/07/24 0547     EXTBreath Alcohol 0.000    Rapid drug screen, urine [[id number]]     Lab Status: No result Specimen: Urine     CBC and differential [[id number]]     Lab Status: No result Specimen: Blood     Comprehensive metabolic panel [[id number]]     Lab Status: No result Specimen: Blood     TSH [[id number]]     Lab Status: No result Specimen: Blood     Ethanol [[id number]]     Lab Status: No result Specimen: Blood              No orders to display        ED Course         Procedures   Procedures

## 2024-10-07 NOTE — ED NOTES
Pt is experiencing tactile hallucinations and open wounds on head and arm. Pt makes complaints of bugs and paranoia of such. Pt is not taking care of self. Pt has a HX of meth usage in the past. Pt has a HX of similar symptomology. Pt told nursing that she took Benadryl to sleep and not for her itching. Son, Allen, [phone], to petition.

## 2024-10-07 NOTE — ED PROVIDER NOTES
"Final diagnoses:   Psychosis (HCC)   Cocaine abuse (HCC)     ED Disposition       None          Assessment & Plan       Medical Decision Making  Patient is a 54yoF presenting to the emergency department for acute psychosis that has been ongoing for the past month but worse over the past few days. Onset one month ago after using cocaine which she used again yesterday. Differential includes but is not limited to substance induced psychosis, schizophrenia, doubt medical condition due to normal labs one month ago. Patient appears acutely psychotic, disheveled, does not appear capable of caring for herself currently. Son adds patient made loose suicidal remarks during episodes of psychosis for which she is currently denying.  Patient's son is going to speak with crisis and planning to fill out 302.  Questionable 302 criteria given that symptoms are potentially substance induced. Patient to be evaluated by psychiatry.     Amount and/or Complexity of Data Reviewed  Labs: ordered.    Risk  OTC drugs.  Prescription drug management.             Medications   droperidol (FOR EMS ONLY) (INAPSINE) 2.5 mg/mL injection 2.5 mg (0 mg Does not apply Given to EMS 10/7/24 0515)   acetaminophen (TYLENOL) tablet 650 mg (650 mg Oral Given 10/7/24 0622)       ED Risk Strat Scores                                               History of Present Illness       Chief Complaint   Patient presents with    Psychiatric Evaluation     Pt's son called police to 302 his mom because she was hallucinating. Pt stating \"theres bugs on me\". Reports taking a handful of benedryl tonight because \"she has bugs on her and wanted to stop the itching\".        Past Medical History:   Diagnosis Date    Anxiety     Asthma     Cancer (HCC)     endometrial    Chronic pain syndrome     CPAP (continuous positive airway pressure) dependence     Depression     Diabetes mellitus (HCC)     Pre diabetic    GERD (gastroesophageal reflux disease)     HTN (hypertension)     " Hyperlipidemia     Hypertension     Lumbar radiculopathy     Prediabetes     Sleep apnea       Past Surgical History:   Procedure Laterality Date    EYE SURGERY  07/26/2022    cornea eye transplant    GASTRECTOMY SLEEVE LAPAROSCOPIC      HYSTERECTOMY      KNEE ARTHROSCOPY Right     LUMBAR FUSION      NY ARTHROPLASTY FEM CONDYLES/TIBIAL PLATEAU KNEE Right 10/5/2023    Procedure: ARTHROPLASTY KNEE TOTAL;  Surgeon: Margi Mcpherson MD;  Location:  MAIN OR;  Service: Orthopedics    NY ARTHRP KNE CONDYLE&PLATU MEDIAL&LAT COMPARTMENTS Left 02/09/2023    Procedure: ARTHROPLASTY KNEE TOTAL AND ALL ASSOCIATED PROCEDURES;  Surgeon: Margi Mcpherson MD;  Location:  MAIN OR;  Service: Orthopedics    NY BREAST REDUCTION Bilateral 09/23/2022    Procedure: BILATERAL BREAST REDUCTION;  Surgeon: Edinson Winston MD;  Location:  MAIN OR;  Service: Plastics    MARY-EN-Y PROCEDURE  05/27/2021    [place]    SKIN LESION EXCISION Right 11/29/2023    Procedure: EXCISION OF SKIN LESION OF THE RIGHT LATERAL EYEBROW WITH COMPLEX CLOSURE.;  Surgeon: Edinson Winston MD;  Location: [facility] MAIN OR;  Service: Plastics    TOTAL HIP ARTHROPLASTY Bilateral       Family History   Problem Relation Age of Onset    Diabetes Mother     Stroke Mother     Diabetes Father     Cancer Father         thyroid    Diabetes Sister     Diabetes Brother     No Known Problems Maternal Grandmother     No Known Problems Maternal Grandfather     No Known Problems Paternal Grandmother     No Known Problems Paternal Grandfather     No Known Problems Sister       Social History     Tobacco Use    Smoking status: Former    Smokeless tobacco: Never   Vaping Use    Vaping status: Never Used   Substance Use Topics    Alcohol use: Not Currently    Drug use: Not Currently      E-Cigarette/Vaping    E-Cigarette Use Never User       E-Cigarette/Vaping Substances    Nicotine No     THC No     CBD No     Flavoring No     Other No     Unknown No       I have reviewed and agree  "with the history as documented.     Patient is a 54yoF PMH of depression, anxiety, HTN, HLD, DM, GERD presenting for evaluation of hallucinations.  Patient was brought in by police and EMS after 911 was called tonight by her son because the patient had herself barricaded in her room.  Over the past month, she has been frequently having tactile and visual hallucinations of bugs in her home.  Patient reports that they are biting her and eating her hair. She states that she is \"able to make them come out of [her] head if [she] tries hard enough.\" Patient has several wounds on her that appear to be self-induced from scratching.  She additionally has areas of hair loss on her head which appear self-induced as well.  Son reports that patient did cocaine approximately a month ago and had a similar episode.  He states that they will sober up in the emergency department before discharging the patient home.  He said that since she has been home, symptoms have recurred and been persistent getting more frequent and worse over the past few days.  Patient reports that she last used cocaine yesterday. Son reports that patient is not caring for herself due to these intense hallucinations and has intermittently made suicidal statements during the episodes of hallucinations.          Review of Systems   Constitutional:  Negative for chills, fatigue and fever.   HENT:  Negative for congestion.    Respiratory:  Negative for cough, chest tightness and shortness of breath.    Cardiovascular:  Negative for chest pain.   Gastrointestinal:  Negative for abdominal pain, diarrhea, nausea and vomiting.   Genitourinary:  Negative for difficulty urinating.   Skin:  Negative for color change.   Neurological:  Negative for dizziness, syncope, weakness, numbness and headaches.   Psychiatric/Behavioral:  Positive for behavioral problems, hallucinations and self-injury.            Objective       ED Triage Vitals   Temperature Pulse Blood Pressure " Respirations SpO2 Patient Position - Orthostatic VS   10/07/24 0509 10/07/24 0509 10/07/24 0509 10/07/24 0509 10/07/24 0509 10/07/24 0509   98 °F (36.7 °C) 90 (!) 182/86 18 98 % Lying      Temp Source Heart Rate Source BP Location FiO2 (%) Pain Score    10/07/24 0509 10/07/24 0509 10/07/24 0509 -- 10/07/24 0622    Oral Monitor Right arm  5      Vitals      Date and Time Temp Pulse SpO2 Resp BP Pain Score FACES Pain Rating User   10/07/24 0622 -- -- -- -- -- 5 -- BK   10/07/24 0509 98 °F (36.7 °C) 90 98 % 18 182/86 -- -- BK            Physical Exam  Vitals and nursing note reviewed.   Constitutional:       General: She is not in acute distress.     Appearance: Normal appearance. She is not ill-appearing or toxic-appearing.   HENT:      Head: Normocephalic and atraumatic.   Eyes:      General: No scleral icterus.     Extraocular Movements: Extraocular movements intact.   Cardiovascular:      Rate and Rhythm: Normal rate and regular rhythm.      Pulses: Normal pulses.      Heart sounds: Normal heart sounds. No murmur heard.  Pulmonary:      Effort: Pulmonary effort is normal. No respiratory distress.      Breath sounds: Normal breath sounds. No wheezing or rhonchi.   Abdominal:      General: Abdomen is flat. There is no distension.      Palpations: Abdomen is soft.      Tenderness: There is no abdominal tenderness.   Musculoskeletal:         General: Normal range of motion.      Cervical back: Normal range of motion.   Skin:     Capillary Refill: Capillary refill takes less than 2 seconds.      Findings: Lesion (sparce excoriations throughout patient's body involving her extremities, gluteal area, and scalp. Patchy areas of hair loss on patient's scalp.) present.   Neurological:      General: No focal deficit present.      Mental Status: She is alert.      Cranial Nerves: No cranial nerve deficit.      Sensory: No sensory deficit.      Motor: No weakness.      Gait: Gait normal.   Psychiatric:         Attention and  Perception: Attention normal.         Mood and Affect: Mood normal. Affect is angry.         Speech: Speech is tangential.         Behavior: Behavior is uncooperative and agitated. Behavior is not aggressive or combative.         Thought Content: Thought content is paranoid and delusional.      Comments: Appears unkempt, disheveled, half clothed.          Results Reviewed       Procedure Component Value Units Date/Time    POCT alcohol breath test [[id number]]  (Normal) Resulted: 10/07/24 0547    Lab Status: Final result Updated: 10/07/24 0547     EXTBreath Alcohol 0.000    Rapid drug screen, urine [[id number]]     Lab Status: No result Specimen: Urine     CBC and differential [[id number]]     Lab Status: No result Specimen: Blood     Comprehensive metabolic panel [[id number]]     Lab Status: No result Specimen: Blood     TSH [[id number]]     Lab Status: No result Specimen: Blood     Ethanol [[id number]]     Lab Status: No result Specimen: Blood             No orders to display       Procedures    ED Medication and Procedure Management   Prior to Admission Medications   Prescriptions Last Dose Informant Patient Reported? Taking?   [facility] Vitamin B-12 1000 MCG tablet  Self Yes No   Sig: Take 1,000 mcg by mouth daily   [facility] Vitamin C 500 MG tablet   No No   Sig: TAKE 1 TABLET BY MOUTH TWICE A DAY   Calcium Carb-Cholecalciferol (OSCAL-D) 500 mg-200 units per tablet  Self Yes No   Sig: Take 1 tablet by mouth 2 (two) times a day with meals   Cholecalciferol (VITAMIN D3) 2000 units capsule  Self Yes No   Sig: TAKE 2 CAPSULES BY MOUTH DAILY INDICATIONS: VITAMIN D DEFICIENCY.   Multiple Vitamins-Minerals (multivitamin with minerals) tablet   No No   Sig: Take 1 tablet by mouth daily   VENTOLIN  (90 Base) MCG/ACT inhaler  Self Yes No   Sig: INHALE 2 PUFFS EVERY 4 (FOUR) HOURS AS NEEDED FOR WHEEZING OR SHORTNESS OF BREATH.   ascorbic acid (VITAMIN C) 500 mg tablet   No No   Sig: TAKE 1 TABLET (500 MG TOTAL) BY MOUTH DAILY.    Patient not taking: Reported on 10/20/2023   aspirin (ECOTRIN LOW STRENGTH) 81 mg EC tablet   No No   Sig: Take 1 tablet (81 mg total) by mouth 2 (two) times a day Take 1(one) 81 mg tablet twice daily x 35 days after total joint arthroplasty   azelastine (OPTIVAR) 0.05 % ophthalmic solution  Self Yes No   Sig: Administer 1 drop to the right eye 2 (two) times a day   bepotastine besilate (BEPREVE) 1.5 % op soln  Self Yes No   Sig: INSTILL 1 DROP INTO BOTH EYES TWICE A DAY   Patient not taking: Reported on 2/9/2024   buPROPion (WELLBUTRIN XL) 300 mg 24 hr tablet  Self Yes No   Sig: Take 300 mg by mouth daily   busPIRone (BUSPAR) 10 mg tablet  Self Yes No   Sig: Take 10 mg by mouth in the morning Takes twice a day   cetirizine (ZyrTEC) 10 mg tablet  Self Yes No   Sig: Take 10 mg by mouth daily Takes 1 tablet 2 times a day   docusate sodium (COLACE) 100 mg capsule   No No   Sig: TAKE 1 CAPSULE BY MOUTH DAILY AS NEEDED FOR CONSTIPATION.   Patient not taking: Reported on 2/9/2024   ferrous sulfate 325 (65 Fe) mg tablet  Self Yes No   Sig: Take by mouth daily with breakfast   folic acid (FOLVITE) 1 mg tablet   No No   Sig: Take 1 tablet (1 mg total) by mouth daily   gabapentin (NEURONTIN) 300 mg capsule   No No   Sig: Take 2 capsules (600 mg total) by mouth 3 (three) times a day   hydrocortisone 1 % cream  Self Yes No   Sig: Apply topically as needed Applies daily   methocarbamol (ROBAXIN) 500 mg tablet   No No   Sig: Take 1 tablet (500 mg total) by mouth every 8 (eight) hours as needed for muscle spasms   montelukast (SINGULAIR) 10 mg tablet   Yes No   Sig: TAKE 1 TABLET BY MOUTH EVERY DAY AT NIGHT   ondansetron (ZOFRAN) 4 mg tablet  Self No No   Sig: Take 1 tablet (4 mg total) by mouth every 8 (eight) hours as needed for nausea or vomiting   simvastatin (ZOCOR) 40 mg tablet  Self Yes No   Sig: Take 40 mg by mouth daily at bedtime   topiramate (TOPAMAX) 25 mg tablet   Yes No   Sig: Take 25 mg by mouth 2 (two) times a  day   venlafaxine (EFFEXOR-XR) 37.5 mg 24 hr capsule   Yes No   Sig: Take 37.5 mg by mouth daily      Facility-Administered Medications: None     Patient's Medications   Discharge Prescriptions    No medications on file     No discharge procedures on file.  ED SEPSIS DOCUMENTATION   Time reflects when diagnosis was documented in both MDM as applicable and the Disposition within this note       Time User Action Codes Description Comment    10/7/2024  6:27 AM Kanchan Harvey [F29] Psychosis ([facility])     10/7/2024  7:06 AM Kanchan Harvey [F14.10] Cocaine abuse ([facility])                  Kanchan Harvey MD  10/07/24 0704       Kanchan Harvey MD  10/07/24 0706

## 2024-10-07 NOTE — QUICK NOTE
Attempted to see pt for interview. However, pt is every somnolent and has difficulty maintaining awake state for assessment and I was unable to perform full assessment. Sates she is very tired. Briefly she was able to state that she no longer feels like there are bugs on her and denies SI/HI/AVH. Per collateral from son, pt was actively using cocaine prior to admission and has hx of formication w/ cocaine use in the past. Likely crashing from cocaine currently. Will attempt to see pt again at later point when she is able to maintain a more awake state.

## 2024-10-07 NOTE — ED NOTES
Blood work held and paper scrubs held at this time per Dr. Pool.      Nahomy Dos Santos, RN  10/07/24 0614

## 2024-10-07 NOTE — ED NOTES
Met with pt earlier who denied suicidal or homicidal ideations. She denied hallucinations however feels she has bugs on her. Pt admitted to using cocaine yesterday and has similar hx of symptoms due to cocaine use. Pt lives with her parents and son. Pt claims to be med complaint at home. Will discuss with the ED doctor and psychiatry. Pt stated she wanted to go home and feels safe for d/c.

## 2024-10-07 NOTE — ED NOTES
This writer discussed the patients current presentation and recommended discharge plan with Dr. Griffin. They agree with the patient being discharged at this time with referrals and/or information about outpatient psych resources.     The patient was Instructed to follow up with their PCP.   The patient was provided with referral information for:   Outpatient psych resources.     This writer and the patient completed a safety plan.  The patient was provided with a copy of their safety plan with encouragement to utilize the plan following discharge.     In addition, the patient was instructed to call local [facility], other crisis services, [facility] or to go to the nearest ER immediately if their situation changes at any time.     This writer discussed discharge plans with the patient and family, who agrees with and understands the discharge plans.        SAFETY PLAN  Warning Signs (thoughts, images, mood, behavior, situations) of a potential crisis: thoughts of self harm/suicide      Coping Skills (what can I do to take my mind off the problem, or to keep myself safe): take a walk, read a book, journal, exercise       Outside Support (who can I reach out to for support and help): call [facility], make therapy/psychiatry appointment        National Suicide Prevention Hotline:  988      [facility] [phone] - Crisis   [facility] [phone] - LVF Crisis/Mobile Crisis   [phone] - SLPF Crisis   [facility]: [phone]  [facility]: [phone]   [facility] [phone] - Crisis   [facility] [phone] - Crisis     [facility] [phone] - Crisis   [facility] [phone] - Crisis   [phone] - Peer Support Talk Line (1-9pm daily)  [phone] - Teen Support Talk Line (1-9pm daily)  [phone] - VDDS   [facility] [phone]- Crisis    [facility] [phone] - Crisis   [facility] [phone] - Crisis    [facility]) [phone] - Family Guidance  Center Crisis

## 2024-10-07 NOTE — CONSULTS
Consultation - Behavioral Health   Gem Guillen 54 y.o. female MRN: [id number]  Unit/Bed#: SH 02 Encounter: [id number]    Assessment & Plan     Assessment:    Gem Guillen is a 54 y.o. female with past medical history of HLD, HTN, sleep apnea, GERD, diabetes and past endometrial cancer and past psychiatric history of anxiety and depression as well as stimulant use disorder.  Psychiatry is asked to consult secondary to concerns for psychosis.    At this time patient's acute symptoms are likely secondary to cocaine use.  Per chart review and per patient she has been using cocaine at least since last month and had an episode where it caused her to have psychotic symptoms.  Psychotic symptoms seem to be resolving as cocaine is leaving her system and is now quite tired as one would expect.  It appears that patient's SI statements are made when she was acutely intoxicated and she is currently denying any such symptoms now that she is more sober and although I believe she may benefit from a higher level of care for substance and mood concerns, she currently would prefer outpatient treatment.  Assessment & Plan  Substance-induced psychotic disorder (HCC)  -seems to be resolving, no longer having formication or hallucinations, no SI  -pt's feeling of fatigue is to be expected coming down from cocaine   -hold for 24 hrs and observe, can d/c if no acute safety concerns afterwards  -please reach out to psych for any new concerns  -see below recs for acute agitation    #) Medications for Agitation  - Would recommend verbal de-escalation first for agitation. Please offer voluntary medications orally if able.  - Would recommend the following medications if necessary for pt's safety or safety of others (please hold for oversedation):         Haldol 5mg PO/IV/IM           Ativan 2mg PO/IV/IM         Cogentin 1mg PO/IV/IM   - Please consider EKG as appropriate to monitor for QTc prolongation (>450) specifically if medications are  "used more than one time or patient has received >1 antipsychotic medication.  - Monitor electrolyte imbalances w/ goal K >4.0, Mg >2.0    Depression  -Cont meds below as an outpatient, per external dispense info pt has 90 day supply of both written last month  Wellbutrin  QD  Buspar 10 mg TID  -would hold these meds while here for observation  -outpatient behavioral health referral     Stimulant use disorder  -obtain UDS  -outpatient substance use treatment referral      Diagnoses, available treatment options, alternatives to treatment, and risks vs. benefits of current psychiatric treatment plan were discussed with the patient.  Prior records were reviewed in [employer].  The case was discussed with the primary team.    Scheduled medications:       PRN:      Chief Complaint: \"I'm tired\"    History of Present Illness   Physician Requesting Consult: Neto Griffin MD  Reason for Consult / Principal Problem: psychosis    Gem Guillen is a 54 y.o. female with past medical history of HLD, HTN, sleep apnea, GERD, diabetes and past endometrial cancer and past psychiatric history of anxiety and depression as well as stimulant use disorder.  Psychiatry is asked to consult secondary to concerns for psychosis.    Patient was brought in the hospital secondary to concern from patient's son. Patient's son petitioned a 302 secondary to patient's behaviors at home. He noted that patient had been using cocaine in the days prior to admission and subsequently patient started to have psychotic experiences noting that there were bugs on her and she was feeling very itchy as well as becoming more paranoid and disorganized in her behaviors. Also was reportedly making statements of SI. Chart was reviewed and patient had a similar experience about a month ago in which use of cocaine led to formication and hallucinations.    I attempted to see the patient earlier in the day however she was still quite somnolent.  I was then able to see " her later in the afternoon when she was more awake.  Patient was able to engage though in a somewhat limited manner secondary to fatigue.  She states that she was using at least 1 g of cocaine in the day prior to admission.  She notes at that time she was having feelings of bugs crawling on her skin and was feeling quite itchy.  She denies these feelings right now and denies any paranoia or auditory or visual hallucinations.  She also denies any thoughts of SI.  Her main complaint at this time is feeling profoundly tired.    She does endorse a history of mood concerns.  She states that she has depression and anxiety and receives medication from her primary care doctor for.  Patient states that she takes Wellbutrin and BuSpar.  External med rec does show that prescription for Wellbutrin for what looks like 450 mg of the extended release was prescribed in September with a 90-day supply as well as 10 mg 2 times daily of BuSpar with a similar 90-day supply.  Regarding her depressive symptoms, she states that she has decreased appetite some low mood as well as decreased energy and concentration.  She notes her sleep generally does pretty well.     I discussed treatment options with her.  Patient at this time does not wish to receive inpatient help for her mood disorder or substance use.  She would prefer to have these taken care of in the outpatient setting.  When I asked patient about the reason she uses the cocaine, she indicates that it is because it helps elevate her mood when she is feeling down.    I was able to speak with the son at number listed on 302.  The son confirms that patient was using cocaine recently as well as a month ago when she came to the ED with similar hallucinations.  Was able to provide updates on his mother's current condition.  Given improvement in symptoms, he is amenable to his mother returning home after a period of observation in the ED to ensure the effects of the cocaine have worn off.  He does not have any additional safety concerns at this time.     Psychiatric Review Of Systems:  Medication side effects: none  Sleep: reports good  Appetite: decreased  Hygiene: able to tend to instrumental and basic ADLs  Anxiety Symptoms: denies  Psychotic Symptoms: denies  Depression Symptoms: low mood  Manic Symptoms: denies  PTSD Symptoms: denies  Suicidal Thoughts: denies  Homicidal Thoughts: denies      Historical Information   Past Psychiatric History  Diagnoses: anxiety, depression  Medication History: Wellbutrin 450 mg QD, Buspar 10 mg TID, Effexor 37.5 mg   Inpatient: denies  Outpatient: denies  Suicide Attempts: denies    Substance Abuse History:  Pt endorses using 1 g of cocaine/day when using. Denies use of alcohol or other substances.    Social History     Substance and Sexual Activity   Alcohol Use Not Currently     Social History     Substance and Sexual Activity   Drug Use Not Currently       I discussed substance abuse with the patient and, if pertinent, discussed risks vs benefits of decreasing frequency of use.    Family Psychiatric History:   Sister w/ unspecified psych hx    Social History  Currently living: Son and son's grandparents  Relationships: family support  Children: son  Gun Ownership: denies     Rest of social history as per below:  Social History     Socioeconomic History    Marital status: Single     Spouse name: Not on file    Number of children: Not on file    Years of education: Not on file    Highest education level: Not on file   Occupational History    Not on file   Tobacco Use    Smoking status: Former    Smokeless tobacco: Never   Vaping Use    Vaping status: Never Used   Substance and Sexual Activity    Alcohol use: Not Currently    Drug use: Not Currently    Sexual activity: Not Currently   Other Topics Concern    Not on file   Social History Narrative    ** Merged History Encounter **          Social Determinants of Health     Financial Resource Strain: Not on file    Food Insecurity: No Food Insecurity (2/24/2023)    Hunger Vital Sign     Worried About Running Out of Food in the Last Year: Never true     Ran Out of Food in the Last Year: Never true   Transportation Needs: No Transportation Needs (2/24/2023)    PRAPARE - Transportation     Lack of Transportation (Medical): No     Lack of Transportation (Non-Medical): No   Physical Activity: Not on file   Stress: Not on file   Social Connections: Not on file   Intimate Partner Violence: Not on file   Housing Stability: Unknown (2/24/2023)    Housing Stability Vital Sign     Unable to Pay for Housing in the Last Year: No     Number of Places Lived in the Last Year: Not on file     Unstable Housing in the Last Year: No         Traumatic History:   Deferred due to pt's cooperation level     Medical Review Of Systems:  Review of Systems - general feeling of fatigue  All other systems were reviewed and are negative    Relevant PMH/PSH:   Past Medical History:   Diagnosis Date    Anxiety     Asthma     Cancer (HCC)     endometrial    Chronic pain syndrome     CPAP (continuous positive airway pressure) dependence     Depression     Diabetes mellitus (HCC)     Pre diabetic    GERD (gastroesophageal reflux disease)     HTN (hypertension)     Hyperlipidemia     Hypertension     Lumbar radiculopathy     Prediabetes     Sleep apnea      Past Surgical History:   Procedure Laterality Date    EYE SURGERY  07/26/2022    cornea eye transplant    GASTRECTOMY SLEEVE LAPAROSCOPIC      HYSTERECTOMY      KNEE ARTHROSCOPY Right     LUMBAR FUSION      AZ ARTHROPLASTY FEM CONDYLES/TIBIAL PLATEAU KNEE Right 10/5/2023    Procedure: ARTHROPLASTY KNEE TOTAL;  Surgeon: Margi Mcpherson MD;  Location:  MAIN OR;  Service: Orthopedics    AZ ARTHRP KNE CONDYLE&PLATU MEDIAL&LAT COMPARTMENTS Left 02/09/2023    Procedure: ARTHROPLASTY KNEE TOTAL AND ALL ASSOCIATED PROCEDURES;  Surgeon: Margi Mcpherson MD;  Location:  MAIN OR;  Service: Orthopedics    AZ  "BREAST REDUCTION Bilateral 09/23/2022    Procedure: BILATERAL BREAST REDUCTION;  Surgeon: Edinson Winston MD;  Location: [facility];  Service: Plastics    MARY-EN-Y PROCEDURE  05/27/2021    [facility]    SKIN LESION EXCISION Right 11/29/2023    Procedure: EXCISION OF SKIN LESION OF THE RIGHT LATERAL EYEBROW WITH COMPLEX CLOSURE.;  Surgeon: Edinson Winston MD;  [facility]: [facility];  Service: Plastics    TOTAL HIP ARTHROPLASTY Bilateral          Meds/Allergies   current meds: No current facility-administered medications for this encounter.  Allergies   Allergen Reactions    Bee Venom Swelling    Dust Mite Extract     Fish-Derived Products - Food Allergy Hives    Iodine - Food Allergy Hives    Milk (Cow) Other (See Comments)     congestion    Molds & Smuts     Other Swelling    Pollen Extract Other (See Comments)     Runny nose, watery eyes (also has corneal swelling) and itching  Triggers asthma    Shrimp Flavor - Food Allergy Hives       Objective   Vital signs in last 24 hours:  Temp:  [98 °F (36.7 °C)] 98 °F (36.7 °C)  HR:  [90] 90  BP: (182)/(86) 182/86  Resp:  [18] 18  SpO2:  [98 %] 98 %  O2 Device: None (Room air)    No intake or output data in the 24 hours ending 10/07/24 1427    Mental Status Evaluation:  Appearance: casually dressed, older than stated age  Motor: no psychomotor retardation, no gait abnormalities  Behavior: limited cooperation, answers questions appropriately  Speech: soft, normal rhythm  Mood: \"I'm tired\"  Affect: constricted,   Thought Process: linear and goal-oriented  Thought Content: denies delusions  Risk Potential: denies suicidal ideation, plan, or intent. Denies homicidal ideation  Perceptions: denies auditory hallucinations, denies visual hallucinations,   Sensorium: Oriented to person, place, time, and situation  Cognition: GARY backwards w/o error or difficulty, 7 Quarters in $1.75  Consciousness: alert and awake  Attention: limited 2nd to fatigue but able to participate " "meaningfully in interview    Insight: limited  Judgement: limited        Lab Results:    I have personally reviewed all pertinent laboratory/tests results.    CBC  Lab Results   Component Value Date    WBC 7.64 10/07/2024    RBC 4.99 10/07/2024    HGB 14.5 10/07/2024    HCT 44.9 10/07/2024    MCH 29.1 10/07/2024    MCV 90 10/07/2024     10/07/2024    RDW 13.4 10/07/2024       BMP  Lab Results   Component Value Date     09/07/2015    K 3.5 10/07/2024     10/07/2024    CO2 28 10/07/2024    BUN 17 10/07/2024       LFTs  Lab Results   Component Value Date    ALB 4.5 10/07/2024    TP 7.3 10/07/2024    TBILI 0.61 10/07/2024       TSH  Lab Results   Component Value Date    TSH 1.28 11/16/2023       COVID-19  No results found for: \"SARSCOV2\"    UDS  No results found for: \"AMPMETHUR\", \"BARBTUR\", \"BDZUR\", \"THCUR\", \"COCAINEUR\", \"METHADONEUR\", \"OPIATEUR\", \"PCPUR\", \"ECSTASYUR\"    Medical Alcohol Level  Lab Results   Component Value Date    ETOH <10 10/07/2024       EKG    Encounter Date: 09/08/24   ECG 12 lead   Result Value    Ventricular Rate 111    Atrial Rate 111    NY Interval 124    QRSD Interval 80    QT Interval 298    QTC Interval 405    P Axis 127    QRS Axis 55    T Wave Axis 216    Narrative    Age and gender specific ECG analysis   Unusual P axis, possible ectopic atrial tachycardia  Lateral infarct , age undetermined  ST & T wave abnormality, consider anterior ischemia  Abnormal ECG    Confirmed by Jose Aguilera ([id number]) on 9/9/2024 1:07:14 AM       Code Status: Prior    Counseling / Coordination of Care  I have spent a total time of 45 minutes on 10/07/24 in caring for this patient including reviewing the chart, interviewing the patient, documenting in the medical record and discussing with the primary team.    Angelo Arreola MD    "

## 2024-10-07 NOTE — DISCHARGE INSTRUCTIONS
Dear Gem,    You were seen in the [facility] emergency department for acute psychosis after using cocaine.  While you were here, we did collected labs and found you to have no acute problems.    If you have any new concerning symptoms such as seeing things then that may not be there, palpitations, or any other emergent symptoms, please come back to the ED.    Thank you for trusting us with your care.

## 2024-10-07 NOTE — ASSESSMENT & PLAN NOTE
-seems to be resolving, no longer having formication or hallucinations, no SI  -pt's feeling of fatigue is to be expected coming down from cocaine   -hold for 24 hrs and observe, can d/c if no acute safety concerns afterwards  -please reach out to psych for any new concerns  -see below recs for acute agitation    #) Medications for Agitation  - Would recommend verbal de-escalation first for agitation. Please offer voluntary medications orally if able.  - Would recommend the following medications if necessary for pt's safety or safety of others (please hold for oversedation):         Haldol 5mg PO/IV/IM           Ativan 2mg PO/IV/IM         Cogentin 1mg PO/IV/IM   - Please consider EKG as appropriate to monitor for QTc prolongation (>450) specifically if medications are used more than one time or patient has received >1 antipsychotic medication.  - Monitor electrolyte imbalances w/ goal K >4.0, Mg >2.0

## 2024-10-07 NOTE — ED NOTES
"/73 (BP Location: Left arm)   Pulse 105   Temp 99  F (37.2  C) (Oral)   Resp 16   Ht 1.665 m (5' 5.55\")   Wt 54.4 kg (119 lb 14.4 oz)   SpO2 98%   BMI 19.62 kg/m      Tmax 99.0, -110s. Other VSS. Denies pain/nausea. Up independently. Pt walked halls with daughter today. Voiding adequately. X1 BM reported and imodium given x1. Pt requested to have imodium patel BID and is ordered to start tonight. Pt aware. Fair appetite. Iván counts to start at MN. L CVC HL. KCl replaced and recheck WNL. Will continue with POC.     " JAQUELINE from [facility]. Gave Francesca general information as well as petitioner's phone number. Francesca to contact [facility] for petition.

## 2024-12-10 ENCOUNTER — OFFICE VISIT (OUTPATIENT)
Dept: OBGYN CLINIC | Facility: CLINIC | Age: 54
End: 2024-12-10
Payer: MEDICARE

## 2024-12-10 ENCOUNTER — HOSPITAL ENCOUNTER (OUTPATIENT)
Dept: RADIOLOGY | Facility: HOSPITAL | Age: 54
Discharge: HOME | End: 2024-12-10
Attending: ORTHOPAEDIC SURGERY
Payer: MEDICARE

## 2024-12-10 VITALS
SYSTOLIC BLOOD PRESSURE: 156 MMHG | BODY MASS INDEX: 31 KG/M2 | WEIGHT: 181.6 LBS | DIASTOLIC BLOOD PRESSURE: 76 MMHG | HEIGHT: 64 IN | HEART RATE: 89 BPM

## 2024-12-10 DIAGNOSIS — M25.562 LEFT KNEE PAIN, UNSPECIFIED CHRONICITY: ICD-10-CM

## 2024-12-10 DIAGNOSIS — Z96.652 S/P TOTAL KNEE REPLACEMENT, LEFT: ICD-10-CM

## 2024-12-10 DIAGNOSIS — M25.562 LEFT KNEE PAIN, UNSPECIFIED CHRONICITY: Primary | ICD-10-CM

## 2024-12-10 DIAGNOSIS — Z96.651 S/P TOTAL KNEE REPLACEMENT USING CEMENT, RIGHT: ICD-10-CM

## 2024-12-10 PROCEDURE — 99213 OFFICE O/P EST LOW 20 MIN: CPT | Performed by: ORTHOPAEDIC SURGERY

## 2024-12-10 PROCEDURE — 73562 X-RAY EXAM OF KNEE 3: CPT

## 2024-12-10 NOTE — PROGRESS NOTES
Assessment:  1. Left knee pain, unspecified chronicity  Ambulatory referral to Physical Therapy    CANCELED: XR knee 1 or 2 vw left      2. S/P total knee replacement, left  Ambulatory referral to Physical Therapy      3. S/P total knee replacement using cement, right  Ambulatory referral to Physical Therapy          Plan:    Patient is 22 months s/p left total knee arthroplasty, DOS 2/9/24   Weightbearing as tolerated   XR left knee was reviewed in the office today which showed stable alignment of implant with no sign of loosening   Therapy order was placed for quadriceps,hamstrings and glut strengthening exercises   Follow up in 3 month for reevaluation         The above stated was discussed in layman's terms and the patient expressed understanding.  All questions were answered to the patient's satisfaction.         Subjective:   Gem Guillen is a 54 y.o. female who presents today 22 months s/p left total knee arthroplasty, DOS 2/9/23. She is doing well. She is happy with her knee. She states recently she has lost of weight and feels weakness in her legs. She is currently not taking any pain medications.       Review of systems negative unless otherwise specified in HPI    Past Medical History:   Diagnosis Date    Anxiety     Asthma     Cancer (HCC)     endometrial    Chronic pain syndrome     CPAP (continuous positive airway pressure) dependence     Depression     Diabetes mellitus (HCC)     Pre diabetic    GERD (gastroesophageal reflux disease)     HTN (hypertension)     Hyperlipidemia     Hypertension     Lumbar radiculopathy     Prediabetes     Sleep apnea        Past Surgical History:   Procedure Laterality Date    EYE SURGERY  07/26/2022    cornea eye transplant    GASTRECTOMY SLEEVE LAPAROSCOPIC      HYSTERECTOMY      KNEE ARTHROSCOPY Right     LUMBAR FUSION      TN ARTHROPLASTY FEM CONDYLES/TIBIAL PLATEAU KNEE Right 10/5/2023    Procedure: ARTHROPLASTY KNEE TOTAL;  Surgeon: Margi Mcpherson MD;   Location: [facility];  Service: Orthopedics    OR ARTHRP KNE CONDYLE&PLATU MEDIAL&LAT COMPARTMENTS Left 02/09/2023    Procedure: ARTHROPLASTY KNEE TOTAL AND ALL ASSOCIATED PROCEDURES;  Surgeon: Margi Mcpherson MD;  Location: [facility];  Service: Orthopedics    OR BREAST REDUCTION Bilateral 09/23/2022    Procedure: BILATERAL BREAST REDUCTION;  Surgeon: Edinson Winston MD;  Location: [facility];  Service: Plastics    MARY-EN-Y PROCEDURE  05/27/2021    [facility]    SKIN LESION EXCISION Right 11/29/2023    Procedure: EXCISION OF SKIN LESION OF THE RIGHT LATERAL EYEBROW WITH COMPLEX CLOSURE.;  Surgeon: Edinson Winston MD;  Location: [facility];  Service: Plastics    TOTAL HIP ARTHROPLASTY Bilateral        Family History   Problem Relation Age of Onset    Diabetes Mother     Stroke Mother     Diabetes Father     Cancer Father         thyroid    Diabetes Sister     Diabetes Brother     No Known Problems Maternal Grandmother     No Known Problems Maternal Grandfather     No Known Problems Paternal Grandmother     No Known Problems Paternal Grandfather     No Known Problems Sister        Social History     Occupational History    Not on file   Tobacco Use    Smoking status: Former    Smokeless tobacco: Never   Vaping Use    Vaping status: Never Used   Substance and Sexual Activity    Alcohol use: Not Currently    Drug use: Not Currently    Sexual activity: Not Currently         Current Outpatient Medications:     aspirin (ECOTRIN LOW STRENGTH) 81 mg EC tablet, Take 1 tablet (81 mg total) by mouth 2 (two) times a day Take 1(one) 81 mg tablet twice daily x 35 days after total joint arthroplasty, Disp: 70 tablet, Rfl: 0    azelastine (OPTIVAR) 0.05 % ophthalmic solution, Administer 1 drop to the right eye 2 (two) times a day, Disp: , Rfl:     buPROPion (WELLBUTRIN XL) 300 mg 24 hr tablet, Take 300 mg by mouth daily, Disp: , Rfl:     busPIRone (BUSPAR) 10 mg tablet, Take 10 mg by mouth in the morning Takes twice a day, Disp:  , Rfl:     Calcium Carb-Cholecalciferol (OSCAL-D) 500 mg-200 units per tablet, Take 1 tablet by mouth 2 (two) times a day with meals, Disp: , Rfl:     cetirizine (ZyrTEC) 10 mg tablet, Take 10 mg by mouth daily Takes 1 tablet 2 times a day, Disp: , Rfl:     Cholecalciferol (VITAMIN D3) 2000 units capsule, TAKE 2 CAPSULES BY MOUTH DAILY INDICATIONS: VITAMIN D DEFICIENCY., Disp: , Rfl: 5    [employer] Vitamin B-12 1000 MCG tablet, Take 1,000 mcg by mouth daily, Disp: , Rfl:     [employer] Vitamin C 500 MG tablet, TAKE 1 TABLET BY MOUTH TWICE A DAY, Disp: 180 tablet, Rfl: 1    ferrous sulfate 325 (65 Fe) mg tablet, Take by mouth daily with breakfast, Disp: , Rfl:     folic acid (FOLVITE) 1 mg tablet, Take 1 tablet (1 mg total) by mouth daily, Disp: 30 tablet, Rfl: 1    gabapentin (NEURONTIN) 300 mg capsule, Take 2 capsules (600 mg total) by mouth 3 (three) times a day, Disp: 180 capsule, Rfl: 2    hydrocortisone 1 % cream, Apply topically as needed Applies daily, Disp: , Rfl:     methocarbamol (ROBAXIN) 500 mg tablet, Take 1 tablet (500 mg total) by mouth every 8 (eight) hours as needed for muscle spasms, Disp: 90 tablet, Rfl: 0    montelukast (SINGULAIR) 10 mg tablet, TAKE 1 TABLET BY MOUTH EVERY DAY AT NIGHT, Disp: , Rfl:     Multiple Vitamins-Minerals (multivitamin with minerals) tablet, Take 1 tablet by mouth daily, Disp: 30 tablet, Rfl: 0    ondansetron (ZOFRAN) 4 mg tablet, Take 1 tablet (4 mg total) by mouth every 8 (eight) hours as needed for nausea or vomiting, Disp: 20 tablet, Rfl: 0    simvastatin (ZOCOR) 40 mg tablet, Take 40 mg by mouth daily at bedtime, Disp: , Rfl:     VENTOLIN  (90 Base) MCG/ACT inhaler, INHALE 2 PUFFS EVERY 4 (FOUR) HOURS AS NEEDED FOR WHEEZING OR SHORTNESS OF BREATH., Disp: , Rfl: 2    ascorbic acid (VITAMIN C) 500 mg tablet, TAKE 1 TABLET (500 MG TOTAL) BY MOUTH DAILY. (Patient not taking: Reported on 12/10/2024), Disp: 90 tablet, Rfl: 1    bepotastine besilate (BEPREVE) 1.5 % op soln,  INSTILL 1 DROP INTO BOTH EYES TWICE A DAY (Patient not taking: Reported on 2/9/2024), Disp: , Rfl:     docusate sodium (COLACE) 100 mg capsule, TAKE 1 CAPSULE BY MOUTH DAILY AS NEEDED FOR CONSTIPATION. (Patient not taking: Reported on 12/10/2024), Disp: 30 capsule, Rfl: 0    topiramate (TOPAMAX) 25 mg tablet, Take 25 mg by mouth 2 (two) times a day, Disp: , Rfl:     venlafaxine (EFFEXOR-XR) 37.5 mg 24 hr capsule, Take 37.5 mg by mouth daily, Disp: , Rfl:     Allergies   Allergen Reactions    Bee Venom Swelling    Dust Mite Extract     Fish-Derived Products - Food Allergy Hives    Iodine - Food Allergy Hives    Milk (Cow) Other (See Comments)     congestion    Molds & Smuts     Other Swelling    Pollen Extract Other (See Comments)     Runny nose, watery eyes (also has corneal swelling) and itching  Triggers asthma    Shrimp Flavor - Food Allergy Hives            Vitals:    12/10/24 1401   BP: 156/76   Pulse: 89     Body mass index is 31.17 kg/m².  Wt Readings from Last 3 Encounters:   12/10/24 82.4 kg (181 lb 9.6 oz)   02/09/24 99.8 kg (220 lb)   12/12/23 99.3 kg (219 lb)       Objective:            Physical Exam  Physical Exam:      General Appearance:    Alert, cooperative, no distress, appears stated age   Head:    Normocephalic, without obvious abnormality, atraumatic   Eyes:    conjunctiva/corneas clear, both eyes         Nose:   Nares normal, septum midline, no drainage    Throat:   Lips normal; teeth and gums normal   Neck:    symmetrical, trachea midline, ;     thyroid:  no enlargement/   Back:     Symmetric, no curvature, ROM normal   Lungs:   No audible wheezing or labored breathing   Chest Wall:    No tenderness or deformity    Heart:    Regular rate and rhythm                         Pulses:   2+ and symmetric all extremities   Skin:   Skin color, texture, turgor normal, no rashes or lesions   Neurologic:   normal strength, sensation and reflexes     throughout                       Ortho Exam  Left  "Knee  Surgical incision well healed   No erythema or open wounds  No effusion  No Tenderness palpation of medial joint line  No lateral joint line tenderness  Near full extension  Full flexion  Patellar grind test -  Stable to varus and valgus stress  Negative posterior drawer  Negative Lachman test  Neurovascular intact distally       Diagnostics, reviewed and taken today if performed as documented:    The attending physician has personally reviewed the pertinent films in PACS and interpretation is as follows: XR left knee demonstrates s/p TKA adequate alignment of implant with no sign of loosening       Procedures, if performed today:    Procedures    None performed      Scribe Attestation      I,:  Dickson Pollock MA am acting as a scribe while in the presence of the attending physician.:       I,:  Margi Mcpherson MD personally performed the services described in this documentation    as scribed in my presence.:               Portions of the record may have been created with voice recognition software.  Occasional wrong word or \"sound a like\" substitutions may have occurred due to the inherent limitations of voice recognition software.  Read the chart carefully and recognize, using context, where substitutions have occurred.  "

## 2025-01-30 ENCOUNTER — EVALUATION (OUTPATIENT)
Dept: PHYSICAL THERAPY | Facility: REHABILITATION | Age: 55
End: 2025-01-30
Payer: MEDICARE

## 2025-01-30 DIAGNOSIS — M25.562 LEFT KNEE PAIN, UNSPECIFIED CHRONICITY: ICD-10-CM

## 2025-01-30 DIAGNOSIS — Z96.652 S/P TOTAL KNEE REPLACEMENT, LEFT: ICD-10-CM

## 2025-01-30 DIAGNOSIS — Z96.651 S/P TOTAL KNEE REPLACEMENT USING CEMENT, RIGHT: ICD-10-CM

## 2025-01-30 PROCEDURE — 97110 THERAPEUTIC EXERCISES: CPT | Performed by: PHYSICAL THERAPIST

## 2025-01-30 PROCEDURE — 97161 PT EVAL LOW COMPLEX 20 MIN: CPT | Performed by: PHYSICAL THERAPIST

## 2025-01-30 NOTE — PROGRESS NOTES
PT Evaluation     Today's date: 2025  Patient name: Gem Guillen  : 1970  MRN: [id number]  Referring provider: Margi Mcpherson MD  Dx:   Encounter Diagnosis     ICD-10-CM    1. Left knee pain, unspecified chronicity  M25.562 Ambulatory referral to Physical Therapy      2. S/P total knee replacement, left  Z96.652 Ambulatory referral to Physical Therapy      3. S/P total knee replacement using cement, right  Z96.651 Ambulatory referral to Physical Therapy          Start Time: 945  Stop Time:   Total time in clinic (min): 35 minutes    Assessment  Impairments: abnormal or restricted ROM, activity intolerance, impaired physical strength, lacks appropriate home exercise program, pain with function, weight-bearing intolerance and poor posture     Assessment details: Problem List:  1) Bilateral leg weakness    Gem Guillen is a pleasant 55 y.o. female who presents with leg weakness that has been bothering her for about 2-3 months.  she has bilateral quadricep and glute max weakness resulting in difficulty with transitional movements.  No further referral appears necessary at this time based upon examination results. Patient will improve in 8 weeks with consistent HEP performance and session adherence. Patient would benefit from skilled physical therapy to decrease pain, improve function and help them achieve their goals.       Understanding of Dx/Px/POC: good     Prognosis: good    Goals  ST-4 weeks  Patient will be independent with home exercise program.   Patient will be able to manage symptoms independently.  Patient will decrease pain by 25-50%    LTG: by discharge  Patient will improve FOTO to goal  Patient will report minimal (1-2/10) pain with aggravating activities to display improvements in overall functional status  Patient will perform 6MWT without break indicating improved cardiovascular endurance      Plan  Patient would benefit from: skilled physical therapy  Planned modality  interventions: cryotherapy, thermotherapy: hydrocollator packs and unattended electrical stimulation    Planned therapy interventions: IADL retraining, joint mobilization, manual therapy, massage, ADL training, activity modification, abdominal trunk stabilization, ADL retraining, balance, balance/weight bearing training, neuromuscular re-education, body mechanics training, behavior modification, strengthening, stretching, therapeutic activities, therapeutic exercise, therapeutic training, transfer training, graded exercise, graded motor, home exercise program, graded activity, gait training, functional ROM exercises, patient education, postural training, IASTM, kinesiology taping and flexibility    Frequency: 2x week  Duration in weeks: 8  Treatment plan discussed with: patient        Subjective Evaluation    History of Present Illness  Mechanism of injury: Gem is a 55 y.o. female presenting to physical therapy on 25 with referral from MD for leg weakness that has been bothering her for a few months. Reports she lost a bunch of weight recently. Has some low back pain. Fell twice recently getting off the couch.           Quality of life: good    Patient Goals  Patient goals for therapy: increased strength, independence with ADLs/IADLs, return to sport/leisure activities, decreased pain and increased motion  Patient goal: make it easier getting in and out of chair  Pain  Current pain ratin  At best pain ratin  At worst pain ratin  Quality: sharp and dull ache          Objective    Myotomes  all intact b/l  Dermatome: all intact b/l    GAIT  5xSTS: 18.55 seconds, used hands on legs to stand  2MWT: 362ft, no AD          MMT         AROM          PROM    Hip       L       R        L           R      L     R   Flex.         Extn.         Abd.         Add.         IR.         ER.         G. Max         G. Med         Iliop.         .         Knee         Extension 4 4   0 0   Flexion     125 120             Ankle         Dorsi Flexion         Plantar Flexion              Precautions: anxiety, depression    Manuals 1/30                                                                Neuro Re-Ed 1/30                                                                                                       Ther Ex 1/30            Bike 8' ROM            VG             Leg press             Leg extension                                       HEP/education 10'            Ther Activity             Step ups             STS             Gait Training                                       Modalities

## 2025-01-31 ENCOUNTER — OFFICE VISIT (OUTPATIENT)
Dept: PHYSICAL THERAPY | Facility: REHABILITATION | Age: 55
End: 2025-01-31
Payer: MEDICARE

## 2025-01-31 DIAGNOSIS — M25.562 LEFT KNEE PAIN, UNSPECIFIED CHRONICITY: Primary | ICD-10-CM

## 2025-01-31 DIAGNOSIS — Z96.652 S/P TOTAL KNEE REPLACEMENT, LEFT: ICD-10-CM

## 2025-01-31 DIAGNOSIS — Z96.651 S/P TOTAL KNEE REPLACEMENT USING CEMENT, RIGHT: ICD-10-CM

## 2025-01-31 PROCEDURE — 97530 THERAPEUTIC ACTIVITIES: CPT | Performed by: PHYSICAL THERAPIST

## 2025-01-31 PROCEDURE — 97110 THERAPEUTIC EXERCISES: CPT | Performed by: PHYSICAL THERAPIST

## 2025-01-31 NOTE — PROGRESS NOTES
Daily Note     Today's date: 2025  Patient name: Gem Guillen  : 1970  MRN: [id number]  Referring provider: Margi Mcpherson MD  Dx:   Encounter Diagnosis     ICD-10-CM    1. Left knee pain, unspecified chronicity  M25.562       2. S/P total knee replacement, left  Z96.652       3. S/P total knee replacement using cement, right  Z96.651           Start Time: 0800  Stop Time: 0840  Total time in clinic (min): 40 minutes    Subjective: Reports she is doing alright.       Objective: See treatment diary below      Assessment: Tolerated treatment well. Challenged with step ups especially on L side. Patient exhibited good technique with therapeutic exercises and would benefit from continued PT      Plan: Continue per plan of care.      Precautions: anxiety, depression    Manuals                                                                 Neuro Re-Ed                                                                                                        Ther Ex            Bike 8' ROM 8' ROM           VG  5' ROM post           Leg press  2x15 100lb DL           Leg extension  2x15 35lb DL                                     HEP/education 10'            Ther Activity             Step ups  25x ea leg 8in           STS  25x no hands           Gait Training                                       Modalities

## 2025-02-03 ENCOUNTER — OFFICE VISIT (OUTPATIENT)
Dept: PHYSICAL THERAPY | Facility: REHABILITATION | Age: 55
End: 2025-02-03
Payer: MEDICARE

## 2025-02-03 DIAGNOSIS — Z96.652 S/P TOTAL KNEE REPLACEMENT, LEFT: ICD-10-CM

## 2025-02-03 DIAGNOSIS — Z96.651 S/P TOTAL KNEE REPLACEMENT USING CEMENT, RIGHT: ICD-10-CM

## 2025-02-03 DIAGNOSIS — M25.562 LEFT KNEE PAIN, UNSPECIFIED CHRONICITY: Primary | ICD-10-CM

## 2025-02-03 PROCEDURE — 97110 THERAPEUTIC EXERCISES: CPT | Performed by: PHYSICAL THERAPIST

## 2025-02-03 PROCEDURE — 97530 THERAPEUTIC ACTIVITIES: CPT | Performed by: PHYSICAL THERAPIST

## 2025-02-03 NOTE — PROGRESS NOTES
Daily Note     Today's date: 2/3/2025  Patient name: Gem Guillen  : 1970  MRN: [id number]  Referring provider: Margi Mcpherson MD  Dx:   Encounter Diagnosis     ICD-10-CM    1. Left knee pain, unspecified chronicity  M25.562       2. S/P total knee replacement, left  Z96.652       3. S/P total knee replacement using cement, right  Z96.651           Start Time: 09  Stop Time: 1015  Total time in clinic (min): 40 minutes    Subjective: Reports she is doing pretty well. Had some soreness following last treatment.       Objective: See treatment diary below      Assessment: Tolerated treatment well. Patient exhibited good technique with therapeutic exercises and would benefit from continued PT      Plan: Continue per plan of care.      Precautions: anxiety, depression    Manuals                                                                 Neuro Re-Ed                                                                                                        Ther Ex  2/3          Bike 8' ROM 8' ROM 8' ROM          VG  5' ROM post 5' ROM post          Leg press  2x15 100lb DL 2x15 100lb DB          Leg extension  2x15 35lb DL 2x15 35lb DL                                    HEP/education 10'            Ther Activity  1/31 1/3          Step ups  25x ea leg 8in 25x ea leg 8in          STS  25x no hands 25x no hands          Gait Training                                       Modalities

## 2025-02-06 ENCOUNTER — OFFICE VISIT (OUTPATIENT)
Dept: PHYSICAL THERAPY | Facility: REHABILITATION | Age: 55
End: 2025-02-06
Payer: MEDICARE

## 2025-02-06 DIAGNOSIS — M25.562 LEFT KNEE PAIN, UNSPECIFIED CHRONICITY: Primary | ICD-10-CM

## 2025-02-06 DIAGNOSIS — Z96.652 S/P TOTAL KNEE REPLACEMENT, LEFT: ICD-10-CM

## 2025-02-06 DIAGNOSIS — Z96.651 S/P TOTAL KNEE REPLACEMENT USING CEMENT, RIGHT: ICD-10-CM

## 2025-02-06 PROCEDURE — 97110 THERAPEUTIC EXERCISES: CPT | Performed by: PHYSICAL THERAPIST

## 2025-02-06 PROCEDURE — 97530 THERAPEUTIC ACTIVITIES: CPT | Performed by: PHYSICAL THERAPIST

## 2025-02-06 NOTE — PROGRESS NOTES
Daily Note     Today's date: 2025  Patient name: Gem Guillen  : 1970  MRN: [id number]  Referring provider: Margi Mcpherson MD  Dx:   Encounter Diagnosis     ICD-10-CM    1. Left knee pain, unspecified chronicity  M25.562       2. S/P total knee replacement, left  Z96.652       3. S/P total knee replacement using cement, right  Z96.651           Start Time: 1135  Stop Time: 1230  Total time in clinic (min): 55 minutes    Subjective: Reports she is doing okay. Still getting some L knee pain.       Objective: See treatment diary below      Assessment: Tolerated treatment well. Added calf raises to program and progressed step ups which were all tolerated well. Patient exhibited good technique with therapeutic exercises and would benefit from continued PT      Plan: Continue per plan of care.      Precautions: anxiety, depression    Manuals                                                                 Neuro Re-Ed                                                                                                        Ther Ex 1/30 1/31 2/3 2/3         Bike 8' ROM 8' ROM 8' ROM 10' ROM         VG  5' ROM post 5' ROM post nv         Leg press  2x15 100lb DL 2x15 100lb DB 4x8 100lb DL         Leg extension  2x15 35lb DL 2x15 35lb DL 4x8 35lb DL         Bicep curl    2x10 10lb DB         Calf raises     On leg press 3x10 80lbs DL         HEP/education 10'            Ther Activity  1/31 2/3 2/6         Step ups  25x ea leg 8in 25x ea leg 8in 25x 2lb AW 10in box         STS  25x no hands 25x no hands 25x 5lb KB in hands         Gait Training                                       Modalities

## 2025-02-10 ENCOUNTER — OFFICE VISIT (OUTPATIENT)
Dept: PHYSICAL THERAPY | Facility: REHABILITATION | Age: 55
End: 2025-02-10
Payer: MEDICARE

## 2025-02-10 DIAGNOSIS — M25.562 LEFT KNEE PAIN, UNSPECIFIED CHRONICITY: Primary | ICD-10-CM

## 2025-02-10 DIAGNOSIS — Z96.652 S/P TOTAL KNEE REPLACEMENT, LEFT: ICD-10-CM

## 2025-02-10 DIAGNOSIS — Z96.651 S/P TOTAL KNEE REPLACEMENT USING CEMENT, RIGHT: ICD-10-CM

## 2025-02-10 PROCEDURE — 97110 THERAPEUTIC EXERCISES: CPT | Performed by: PHYSICAL THERAPIST

## 2025-02-10 NOTE — PROGRESS NOTES
Daily Note     Today's date: 2/10/2025  Patient name: Gem Guillen  : 1970  MRN: [id number]  Referring provider: Margi Mcpherson MD  Dx:   Encounter Diagnosis     ICD-10-CM    1. Left knee pain, unspecified chronicity  M25.562       2. S/P total knee replacement, left  Z96.652       3. S/P total knee replacement using cement, right  Z96.651             Start Time: 0800  Stop Time: 0845  Total time in clinic (min): 45 minutes    Subjective: Reports she is doing pretty well.       Objective: See treatment diary below      Assessment: Tolerated treatment well. Patient exhibited good technique with therapeutic exercises and would benefit from continued PT      Plan: Continue per plan of care.      Precautions: anxiety, depression    Manuals                                                                 Neuro Re-Ed                                                                                                        Ther Ex 1/30 1/31 2/3 2/6 2/10        Bike 8' ROM 8' ROM 8' ROM 10' ROM 10' ROM        VG  5' ROM post 5' ROM post nv         Leg press  2x15 100lb DL 2x15 100lb DB 4x8 100lb DL 4x8 100lb DL        Leg extension  2x15 35lb DL 2x15 35lb DL 4x8 35lb DL 4x8 35lb DL        Bicep curl    2x10 10lb DB 2x10 10lb DB        Calf raises     On leg press 3x10 80lbs DL On leg press  3x10 80lbs DL        HEP/education 10'            Ther Activity  1/31 2/3 2/6 2/10        Step ups  25x ea leg 8in 25x ea leg 8in 25x 2lb AW 10in box 25x 2lb AW 10in box        STS  25x no hands 25x no hands 25x 5lb KB in hands 25x 8lb KB in hands        Gait Training                                       Modalities

## 2025-02-13 ENCOUNTER — APPOINTMENT (OUTPATIENT)
Dept: PHYSICAL THERAPY | Facility: REHABILITATION | Age: 55
End: 2025-02-13
Payer: MEDICARE

## 2025-02-24 ENCOUNTER — APPOINTMENT (OUTPATIENT)
Dept: PHYSICAL THERAPY | Facility: REHABILITATION | Age: 55
End: 2025-02-24
Payer: MEDICARE

## 2025-02-27 ENCOUNTER — APPOINTMENT (OUTPATIENT)
Dept: PHYSICAL THERAPY | Facility: REHABILITATION | Age: 55
End: 2025-02-27
Payer: MEDICARE

## 2025-03-03 ENCOUNTER — APPOINTMENT (OUTPATIENT)
Dept: PHYSICAL THERAPY | Facility: REHABILITATION | Age: 55
End: 2025-03-03
Payer: MEDICARE

## 2025-03-06 ENCOUNTER — APPOINTMENT (OUTPATIENT)
Dept: PHYSICAL THERAPY | Facility: REHABILITATION | Age: 55
End: 2025-03-06
Payer: MEDICARE

## 2025-03-10 ENCOUNTER — APPOINTMENT (OUTPATIENT)
Dept: PHYSICAL THERAPY | Facility: REHABILITATION | Age: 55
End: 2025-03-10
Payer: MEDICARE

## 2025-03-13 ENCOUNTER — APPOINTMENT (OUTPATIENT)
Dept: PHYSICAL THERAPY | Facility: REHABILITATION | Age: 55
End: 2025-03-13
Payer: MEDICARE

## 2025-03-19 ENCOUNTER — OFFICE VISIT (OUTPATIENT)
Dept: PHYSICAL THERAPY | Facility: REHABILITATION | Age: 55
End: 2025-03-19
Payer: MEDICARE

## 2025-03-19 DIAGNOSIS — M25.561 CHRONIC PAIN OF BOTH KNEES: Primary | ICD-10-CM

## 2025-03-19 DIAGNOSIS — G89.29 CHRONIC PAIN OF BOTH KNEES: Primary | ICD-10-CM

## 2025-03-19 DIAGNOSIS — M25.562 CHRONIC PAIN OF BOTH KNEES: Primary | ICD-10-CM

## 2025-03-19 PROCEDURE — 97530 THERAPEUTIC ACTIVITIES: CPT | Performed by: PHYSICAL THERAPIST

## 2025-03-19 PROCEDURE — 97110 THERAPEUTIC EXERCISES: CPT | Performed by: PHYSICAL THERAPIST

## 2025-03-19 NOTE — PROGRESS NOTES
Daily Note     Today's date: 3/19/2025  Patient name: Gem Guillen  : 1970  MRN: [id number]  Referring provider: Margi Mcpherson MD  Dx:   Encounter Diagnosis     ICD-10-CM    1. Chronic pain of both knees  M25.561     M25.562     G89.29               Start Time: 1125  Stop Time: 1218  Total time in clinic (min): 53 minutes    Subjective: Reports she is feeling better. Legs have been good, but left knee still hurts a lot. Can't lay on her left side. Max pain level of 7/10.      Objective: See treatment diary below      Assessment: Tolerated treatment well. Patient exhibited good technique with therapeutic exercises and would benefit from continued PT    Goals  ST-4 weeks  Patient will be independent with home exercise program.   Patient will be able to manage symptoms independently.  Patient will decrease pain by 25-50%    LTG: by discharge  Patient will improve FOTO to goal  Patient will report minimal (1-2/10) pain with aggravating activities to display improvements in overall functional status  Patient will perform 6MWT without break indicating improved cardiovascular endurance    Plan: Continue per plan of care.      Precautions: anxiety, depression    Manuals                                             Neuro Re-Ed                                                                        Ther Ex 1/30 1/31 2/3 2/6 2/10 3/19   Bike 8' ROM 8' ROM 8' ROM 10' ROM 10' ROM 10' ROM   VG  5' ROM post 5' ROM post nv  5' L5 ROM   Leg press  2x15 100lb DL 2x15 100lb DB 4x8 100lb DL 4x8 100lb DL 3x8 100lb DL   Leg extension  2x15 35lb DL 2x15 35lb DL 4x8 35lb DL 4x8 35lb DL 3x8 50lb DL   Bicep curl    2x10 10lb DB 2x10 10lb DB 2x10 10lb DB   Calf raises     On leg press 3x10 80lbs DL On leg press  3x10 80lbs DL On leg press 3x10 80lbs DL   HEP/education 10'        Ther Activity  1/31 2/3 2/6 2/10 3/19   Step ups  25x ea leg 8in 25x ea leg 8in 25x 2lb AW 10in box 25x 2lb AW 10in box 25x 8in box   STS   25x no hands 25x no hands 25x 5lb KB in hands 25x 8lb KB in hands 8lb KB 25x   Gait Training                           Modalities

## 2025-03-26 ENCOUNTER — OFFICE VISIT (OUTPATIENT)
Dept: PHYSICAL THERAPY | Facility: REHABILITATION | Age: 55
End: 2025-03-26
Payer: MEDICARE

## 2025-03-26 DIAGNOSIS — G89.29 CHRONIC PAIN OF BOTH KNEES: Primary | ICD-10-CM

## 2025-03-26 DIAGNOSIS — M25.561 CHRONIC PAIN OF BOTH KNEES: Primary | ICD-10-CM

## 2025-03-26 DIAGNOSIS — M25.562 CHRONIC PAIN OF BOTH KNEES: Primary | ICD-10-CM

## 2025-03-26 PROCEDURE — 97530 THERAPEUTIC ACTIVITIES: CPT | Performed by: PHYSICAL THERAPIST

## 2025-03-26 PROCEDURE — 97110 THERAPEUTIC EXERCISES: CPT | Performed by: PHYSICAL THERAPIST

## 2025-03-26 NOTE — PROGRESS NOTES
Daily Note     Today's date: 3/26/2025  Patient name: Gem Guillen  : 1970  MRN: [id number]  Referring provider: Margi Mcpherson MD  Dx:   Encounter Diagnosis     ICD-10-CM    1. Chronic pain of both knees  M25.561     M25.562     G89.29               Start Time: 1045  Stop Time: 1130  Total time in clinic (min): 45 minutes    Subjective: She is tired today.     Objective: See treatment diary below    Assessment: Tolerated treatment well. Patient exhibited good technique with therapeutic exercises and would benefit from continued PT    Plan: Continue per plan of care.      Precautions: anxiety, depression    Manuals                                             Neuro Re-Ed                                                                        Ther Ex 3/26 1/31 2/3 2/6 2/10 3/19   Bike 8' ROM 8' ROM 8' ROM 10' ROM 10' ROM 10' ROM   VG L5 5' ROM 5' ROM post 5' ROM post nv  5' L5 ROM   Leg press 120lb 3x8 DL 2x15 100lb DL 2x15 100lb DB 4x8 100lb DL 4x8 100lb DL 3x8 100lb DL   Leg extension 3x8 35lb DL 2x15 35lb DL 2x15 35lb DL 4x8 35lb DL 4x8 35lb DL 3x8 50lb DL   Bicep curl 2x10 10lb DB   2x10 10lb DB 2x10 10lb DB 2x10 10lb DB   Calf raises  On leg press 3x10 80lbs DL   On leg press 3x10 80lbs DL On leg press  3x10 80lbs DL On leg press 3x10 80lbs DL   HEP/education 10'        Ther Activity 3/26 1/31 2/3 2/6 2/10 3/19   Step ups 25x 8in box 25x ea leg 8in 25x ea leg 8in 25x 2lb AW 10in box 25x 2lb AW 10in box 25x 8in box   STS 8lb 25x 25x no hands 25x no hands 25x 5lb KB in hands 25x 8lb KB in hands 8lb KB 25x   Gait Training                           Modalities

## 2025-03-28 ENCOUNTER — OFFICE VISIT (OUTPATIENT)
Dept: PHYSICAL THERAPY | Facility: REHABILITATION | Age: 55
End: 2025-03-28
Payer: MEDICARE

## 2025-03-28 DIAGNOSIS — M25.561 CHRONIC PAIN OF BOTH KNEES: Primary | ICD-10-CM

## 2025-03-28 DIAGNOSIS — M25.562 CHRONIC PAIN OF BOTH KNEES: Primary | ICD-10-CM

## 2025-03-28 DIAGNOSIS — G89.29 CHRONIC PAIN OF BOTH KNEES: Primary | ICD-10-CM

## 2025-03-28 PROCEDURE — 97110 THERAPEUTIC EXERCISES: CPT | Performed by: PHYSICAL THERAPIST

## 2025-03-28 PROCEDURE — 97530 THERAPEUTIC ACTIVITIES: CPT | Performed by: PHYSICAL THERAPIST

## 2025-03-28 NOTE — PROGRESS NOTES
Daily Note     Today's date: 3/28/2025  Patient name: Gem Guillen  : 1970  MRN: [id number]  Referring provider: Margi Mcpherson MD  Dx:   Encounter Diagnosis     ICD-10-CM    1. Chronic pain of both knees  M25.561     M25.562     G89.29               Start Time: 1030  Stop Time: 1111  Total time in clinic (min): 41 minutes    Subjective: She is feeling tired today.     Objective: See treatment diary below    Assessment: Tolerated treatment well. Patient exhibited good technique with therapeutic exercises and would benefit from continued PT    Plan: Continue per plan of care.      Precautions: anxiety, depression    Manuals                                             Neuro Re-Ed                                                                        Ther Ex 3/26 3/28  2/6 2/10 3/19   Bike 8' ROM 8' ROM  10' ROM 10' ROM 10' ROM   UBE  5' ROM       VG L5 5' ROM L5 5' ROM  nv  5' L5 ROM   Leg press 120lb 3x8 DL 140lb 4x10 DL  4x8 100lb DL 4x8 100lb DL 3x8 100lb DL   Leg extension 3x8 35lb DL 4x10 50lb DL  4x8 35lb DL 4x8 35lb DL 3x8 50lb DL   Bicep curl 2x10 10lb DB 2x10 10lb DB  2x10 10lb DB 2x10 10lb DB 2x10 10lb DB   Calf raises  On leg press 3x10 80lbs DL On leg press 3x10 100lbs DL  On leg press 3x10 80lbs DL On leg press  3x10 80lbs DL On leg press 3x10 80lbs DL   HEP/education 10'        Ther Activity 3/26 3/28  2/6 2/10 3/19   Step ups 25x 8in box 25x ea 10in box  25x 2lb AW 10in box 25x 2lb AW 10in box 25x 8in box   STS 8lb 25x 25x 10lb KB  25x 5lb KB in hands 25x 8lb KB in hands 8lb KB 25x   Gait Training                           Modalities

## 2025-03-31 ENCOUNTER — OFFICE VISIT (OUTPATIENT)
Dept: PHYSICAL THERAPY | Facility: REHABILITATION | Age: 55
End: 2025-03-31
Payer: MEDICARE

## 2025-03-31 DIAGNOSIS — G89.29 CHRONIC PAIN OF BOTH KNEES: Primary | ICD-10-CM

## 2025-03-31 DIAGNOSIS — M25.561 CHRONIC PAIN OF BOTH KNEES: Primary | ICD-10-CM

## 2025-03-31 DIAGNOSIS — M25.562 CHRONIC PAIN OF BOTH KNEES: Primary | ICD-10-CM

## 2025-03-31 PROCEDURE — 97110 THERAPEUTIC EXERCISES: CPT | Performed by: PHYSICAL THERAPIST

## 2025-03-31 NOTE — PROGRESS NOTES
Daily Note     Today's date: 3/31/2025  Patient name: Gem Guillen  : 1970  MRN: [id number]  Referring provider: Margi Mcpherson MD  Dx:   Encounter Diagnosis     ICD-10-CM    1. Chronic pain of both knees  M25.561     M25.562     G89.29           Start Time: 1355  Stop Time: 1420  Total time in clinic (min): 25 minutes    Subjective: Reported no symptoms.       Objective: See treatment diary below      Assessment: Pt tolerated treatment well and reported no pain with interventions. Pt demonstrated improved LE strength and progressed step ups height. Pt would benefit from continuing PT to address limitations in mobility, stability, strength to promote pain-free functional independence.       Plan: Continue per plan of care.      Precautions: anxiety, depression    Manuals                                             Neuro Re-Ed                                                                        Ther Ex 3/26 3/28 3/31 2/6 2/10 3/19   Bike 8' ROM 8' ROM 10' ROM 10' ROM 10' ROM 10' ROM   UBE  5' ROM       VG L5 5' ROM L5 5' ROM  nv  5' L5 ROM   Leg press 120lb 3x8 DL 140lb 4x10 DL  4x8 100lb DL 4x8 100lb DL 3x8 100lb DL   Leg extension 3x8 35lb DL 4x10 50lb DL  4x8 35lb DL 4x8 35lb DL 3x8 50lb DL   Bicep curl 2x10 10lb DB 2x10 10lb DB 2x10 10 lb DB 2x10 10lb DB 2x10 10lb DB 2x10 10lb DB   Shoulder press    2x10 10 DB      Rows   2x10 Keisser 19#      Cross shoulder ext   2x10 YXB      Calf raises  On leg press 3x10 80lbs DL On leg press 3x10 100lbs DL  On leg press 3x10 80lbs DL On leg press  3x10 80lbs DL On leg press 3x10 80lbs DL   HEP/education 10'        Ther Activity 3/26 3/28 3/31 2/6 2/10 3/19   Step ups 25x 8in box 25x ea 10in box 20x ea 12 in box 25x 2lb AW 10in box 25x 2lb AW 10in box 25x 8in box   STS 8lb 25x 25x 10lb KB 20x8 lb KB 25x 5lb KB in hands 25x 8lb KB in hands 8lb KB 25x   Gait Training                           Modalities

## 2025-04-04 ENCOUNTER — OFFICE VISIT (OUTPATIENT)
Dept: PHYSICAL THERAPY | Facility: REHABILITATION | Age: 55
End: 2025-04-04
Payer: MEDICARE

## 2025-04-04 DIAGNOSIS — G89.29 CHRONIC PAIN OF BOTH KNEES: Primary | ICD-10-CM

## 2025-04-04 DIAGNOSIS — M25.562 CHRONIC PAIN OF BOTH KNEES: Primary | ICD-10-CM

## 2025-04-04 DIAGNOSIS — M25.561 CHRONIC PAIN OF BOTH KNEES: Primary | ICD-10-CM

## 2025-04-04 PROCEDURE — 97530 THERAPEUTIC ACTIVITIES: CPT | Performed by: PHYSICAL THERAPIST

## 2025-04-04 PROCEDURE — 97110 THERAPEUTIC EXERCISES: CPT | Performed by: PHYSICAL THERAPIST

## 2025-04-04 NOTE — PROGRESS NOTES
Daily Note     Today's date: 2025  Patient name: Gem Guillen  : 1970  MRN: [id number]  Referring provider: Margi Mcpherson MD  Dx:   Encounter Diagnosis     ICD-10-CM    1. Chronic pain of both knees  M25.561     M25.562     G89.29           Start Time: 1000  Stop Time: 1045  Total time in clinic (min): 45 minutes    Subjective: Reports she is tired today.     Objective: See treatment diary below    Assessment: Pt tolerated treatment well and reported no pain with interventions. Pt would benefit from continuing PT to address limitations in mobility, stability, strength to promote pain-free functional independence.     Plan: Continue per plan of care.      Precautions: anxiety, depression    Manuals                                             Neuro Re-Ed                                                                        Ther Ex 3/26 3/28 3/31 4/4  3/19   Bike 8' ROM 8' ROM 10' ROM 10' ROM  10' ROM   UBE  5' ROM       VG L5 5' ROM L5 5' ROM    5' L5 ROM   Leg press 120lb 3x8 DL 140lb 4x10 DL  140lb 4x10 DL  3x8 100lb DL   Leg extension 3x8 35lb DL 4x10 50lb DL  2x10 65lb  3x8 50lb DL   Bicep curl 2x10 10lb DB 2x10 10lb DB 2x10 10 lb DB   2x10 10lb DB   Shoulder press    2x10 10 DB      Rows   2x10 Keisser 19# 3x10 agueda 20lb     Cross shoulder ext   2x10 YXB 2x10 XS yllw     Calf raises  On leg press 3x10 80lbs DL On leg press 3x10 100lbs DL    On leg press 3x10 80lbs DL   HEP/education 10'        Ther Activity 3/26 3/28 3/31 4/4  3/19   Step ups 25x 8in box 25x ea 10in box 20x ea 12 in box 20x ea 12in box  25x 8in box   STS 8lb 25x 25x 10lb KB 20x8 lb KB 20x 10lb KB  8lb KB 25x   Gait Training                           Modalities

## 2025-04-08 ENCOUNTER — OFFICE VISIT (OUTPATIENT)
Dept: PHYSICAL THERAPY | Facility: REHABILITATION | Age: 55
End: 2025-04-08
Payer: MEDICARE

## 2025-04-08 DIAGNOSIS — G89.29 CHRONIC PAIN OF BOTH KNEES: Primary | ICD-10-CM

## 2025-04-08 DIAGNOSIS — M25.561 CHRONIC PAIN OF BOTH KNEES: Primary | ICD-10-CM

## 2025-04-08 DIAGNOSIS — M25.562 CHRONIC PAIN OF BOTH KNEES: Primary | ICD-10-CM

## 2025-04-08 PROCEDURE — 97530 THERAPEUTIC ACTIVITIES: CPT | Performed by: PHYSICAL THERAPIST

## 2025-04-08 PROCEDURE — 97110 THERAPEUTIC EXERCISES: CPT | Performed by: PHYSICAL THERAPIST

## 2025-04-08 NOTE — PROGRESS NOTES
Daily Note     Today's date: 2025  Patient name: Gem Guillen  : 1970  MRN: [id number]  Referring provider: Margi Mcpherson MD  Dx:   Encounter Diagnosis     ICD-10-CM    1. Chronic pain of both knees  M25.561     M25.562     G89.29           Start Time: 1035  Stop Time: 1115  Total time in clinic (min): 40 minutes    Subjective: Reports she is tired today.     Objective: See treatment diary below    Assessment: Patient tolerated treatment well. Displayed good technique with therapeutic exercises and did not report any pain or discomfort during exercise protocol. Pt would benefit from continued skilled physical therapy services to improve overall function while decreasing discomfort to return them to their prior level of function.    Plan: Continue per plan of care.      Precautions: anxiety, depression    Manuals                                             Neuro Re-Ed                                                                        Ther Ex 3/26 3/28 3/31 4/4 4/8 3/19   Bike 8' ROM 8' ROM 10' ROM 10' ROM 10' ROM 10' ROM   UBE  5' ROM       VG L5 5' ROM L5 5' ROM    5' L5 ROM   Leg press 120lb 3x8 DL 140lb 4x10 DL  140lb 4x10 DL 140lb 4x10 DL 3x8 100lb DL   Leg extension 3x8 35lb DL 4x10 50lb DL  2x10 65lb 3x8 50lb 3x8 50lb DL   Bicep curl 2x10 10lb DB 2x10 10lb DB 2x10 10 lb DB  2x10 10lb DB 2x10 10lb DB   Shoulder press    2x10 10 DB      Rows   2x10 Keisser 19# 3x10 agueda 20lb 3x10 20lb agueda    Cross shoulder ext   2x10 YXB 2x10 XS yllw 2x10 yllw XS    Calf raises  On leg press 3x10 80lbs DL On leg press 3x10 100lbs DL    On leg press 3x10 80lbs DL   HEP/education 10'        Ther Activity 3/26 3/28 3/31 4/4 4/8 3/19   Step ups 25x 8in box 25x ea 10in box 20x ea 12 in box 20x ea 12in box 20x ea 12in box 25x 8in box   STS 8lb 25x 25x 10lb KB 20x8 lb KB 20x 10lb KB 20x 15lb KB 8lb KB 25x   Gait Training                           Modalities

## 2025-04-10 ENCOUNTER — OFFICE VISIT (OUTPATIENT)
Dept: PHYSICAL THERAPY | Facility: REHABILITATION | Age: 55
End: 2025-04-10
Payer: MEDICARE

## 2025-04-10 DIAGNOSIS — M25.561 CHRONIC PAIN OF BOTH KNEES: Primary | ICD-10-CM

## 2025-04-10 DIAGNOSIS — G89.29 CHRONIC PAIN OF BOTH KNEES: Primary | ICD-10-CM

## 2025-04-10 DIAGNOSIS — M25.562 CHRONIC PAIN OF BOTH KNEES: Primary | ICD-10-CM

## 2025-04-10 PROCEDURE — 97110 THERAPEUTIC EXERCISES: CPT | Performed by: PHYSICAL THERAPIST

## 2025-04-10 PROCEDURE — 97530 THERAPEUTIC ACTIVITIES: CPT | Performed by: PHYSICAL THERAPIST

## 2025-04-10 NOTE — PROGRESS NOTES
Daily Note     Today's date: 4/10/2025  Patient name: Gem Guillen  : 1970  MRN: [id number]  Referring provider: Margi Mcpherson MD  Dx:   Encounter Diagnosis     ICD-10-CM    1. Chronic pain of both knees  M25.561     M25.562     G89.29           Start Time: 1030  Stop Time: 1115  Total time in clinic (min): 45 minutes    Subjective: Reports she is doing pretty good today.     Objective: See treatment diary below    Assessment: Patient tolerated treatment well. Pt would benefit from continued skilled physical therapy services to improve overall function while decreasing discomfort to return them to their prior level of function.    Plan: Continue per plan of care.      Precautions: anxiety, depression    Manuals                                             Neuro Re-Ed                                                                        Ther Ex 3/26 3/28 3/31 4/4 4/8 4/10   Bike 8' ROM 8' ROM 10' ROM 10' ROM 10' ROM 10' ROM   UBE  5' ROM       VG L5 5' ROM L5 5' ROM       Leg press 120lb 3x8 DL 140lb 4x10 DL  140lb 4x10 DL 140lb 4x10 DL 140lb 4x10 DL   Leg extension 3x8 35lb DL 4x10 50lb DL  2x10 65lb 3x8 50lb 3x8 35lb   Bicep curl 2x10 10lb DB 2x10 10lb DB 2x10 10 lb DB  2x10 10lb DB 2x10 10lb DB   Shoulder press    2x10 10 DB      Rows   2x10 Keisser 19# 3x10 agueda 20lb 3x10 20lb agueda 3x10 20lb agueda   Cross shoulder ext   2x10 YXB 2x10 XS yllw 2x10 yllw XS 2x10 yllw XS   Calf raises  On leg press 3x10 80lbs DL On leg press 3x10 100lbs DL    On leg press 3x10 100lbs DL   HEP/education 10'        Ther Activity 3/26 3/28 3/31 4/4 4/8 4/10   Step ups 25x 8in box 25x ea 10in box 20x ea 12 in box 20x ea 12in box 20x ea 12in box 20x ea 12 in box   STS 8lb 25x 25x 10lb KB 20x8 lb KB 20x 10lb KB 20x 15lb KB 20x 15lb KB   Gait Training                           Modalities

## 2025-04-16 NOTE — PRE-PROCEDURE INSTRUCTIONS
For headache and migraine prevention I would suggest a combination vitamin supplement which may include 1 or more of the following ingredients: Magnesium citrate 300 mg twice a day, Riboflavin (vitamin B2) 400 - 600 mg,  Butterbur 150 mg (PA free). Other ingredients that may be helpful are feverfew, coenzyme Q10 100 mg three times a day,  melatonin and twan.  Some example brands that combine some of these ingredients are Migravent or Dolovent.     Nortriptyline at night  F/u 1 mo         Pre-Surgery Instructions:   Medication Instructions   • acetaminophen (TYLENOL) 325 mg tablet Uses PRN- OK to take day of surgery   • bepotastine besilate (BEPREVE) 1 5 % op soln Take day of surgery  • buPROPion (WELLBUTRIN XL) 300 mg 24 hr tablet Hold day of surgery  • busPIRone (BUSPAR) 10 mg tablet Hold day of surgery  • cetirizine (ZyrTEC) 10 mg tablet Uses PRN- OK to take day of surgery   • Cholecalciferol (VITAMIN D3) 2000 units capsule Stop taking 7 days prior to surgery  • [employer] Vitamin C 500 MG tablet Hold day of surgery  • DULoxetine (CYMBALTA) 60 mg delayed release capsule Hold day of surgery  • erythromycin (ILOTYCIN) ophthalmic ointment Take day of surgery  • ferrous sulfate 325 (65 Fe) mg tablet Hold day of surgery  • fluticasone (FLONASE) 50 mcg/act nasal spray Uses PRN- OK to take day of surgery   • folic acid (FOLVITE) 1 mg tablet Hold day of surgery  • gabapentin (NEURONTIN) 300 mg capsule Uses PRN- OK to take day of surgery   • hydrocortisone 1 % cream Hold day of surgery  • methocarbamol (ROBAXIN) 500 mg tablet Uses PRN- OK to take day of surgery   • Multiple Vitamins-Minerals (multivitamin with minerals) tablet Hold day of surgery  • ofloxacin (OCUFLOX) 0 3 % ophthalmic solution Take day of surgery  • ondansetron (ZOFRAN) 4 mg tablet Uses PRN- OK to take day of surgery   • pantoprazole (PROTONIX) 40 mg tablet Uses PRN- OK to take day of surgery   • Polyethyl Glycol-Propyl Glycol (SYSTANE OP) Hold day of surgery  • prednisoLONE acetate (PRED FORTE) 1 % ophthalmic suspension Hold day of surgery  • simvastatin (ZOCOR) 40 mg tablet Hold day of surgery  • VENTOLIN  (90 Base) MCG/ACT inhaler Uses PRN- OK to take day of surgery    Spoke with patient medication list reviewed  Npo after midnight  Stop all vitamins and nsaids not prescribed by surgeon 1 week prior to surgery  Patient states she doesn't want to take any pills DOS   Patient has surgical soap and paul clothes shower instructions given and understood  No shaving 24hrs prior to surgery  Bring photo id and insurance card  no jewelry or contacts  Patient has been vaccinated and denies covid symptoms 1 vaccinated adult allowed with pt  Patient must have ride home after discharge  Physical therapy has been set up  Incentive spirometer reviewed  Patient advised to bring own cpap, she said she doesn't want to  She states having help issues for when she is discharged encouraged her to call nurse navigator she said she will be calling now   [facility] will call 2/8 with arrival time and directions  Reviewed with patient, in detail, instructions from "My Surgical Experience"  Verified with patient that he received T[facility] patient education from surgeon's office  Advised to call ortho office/surgery coordinator with any questions and/or concerns  Confirmed that patient has hibiclens soap and CHG wipes  Incentive spirometer received  Patient verbalized understanding of current visitor restrictions due to Covid and will clarify with nurse DOS  Instructed to avoid all ASA and OTC Vit/Supp 1 week prior to surgery and to avoid NSAIDs 7 days prior to surgery per anesthesia guidelines  Tylenol ok to take prn  No alcohol 24 hours prior to surgery  Patient aware Lovenox or other Blood Thinner prescribed is for POST OP ONLY  Instructed to call surgeon's office in meantime with any new illness  Patient verbalized an understanding of all instructions reviewed and offers no concerns at this time

## (undated) DEVICE — CHLORAPREP HI-LITE 26ML ORANGE

## (undated) DEVICE — DISPOSABLE OR TOWEL: Brand: CARDINAL HEALTH

## (undated) DEVICE — SUT PLAIN 5-0 PC-1 18 IN 1915G

## (undated) DEVICE — NEEDLE 18 G X 1 1/2 SAFETY

## (undated) DEVICE — TOWEL SET X-RAY

## (undated) DEVICE — PAD CAST 6 IN COTTON NON STERILE

## (undated) DEVICE — TRAY FOLEY 16FR URIMETER SURESTEP

## (undated) DEVICE — U-DRAPE: Brand: CONVERTORS

## (undated) DEVICE — SKIN MARKER DUAL TIP WITH RULER CAP, FLEXIBLE RULER AND LABELS: Brand: DEVON

## (undated) DEVICE — BULB SYRINGE,IRRIGATION WITH PROTECTIVE CAP: Brand: DOVER

## (undated) DEVICE — DRAPE SURGIKIT SADDLE BAG

## (undated) DEVICE — SPONGE LAP 18 X 18 IN

## (undated) DEVICE — GAUZE SPONGES,16 PLY: Brand: CURITY

## (undated) DEVICE — THE SIMPULSE SOLO SYSTEM WITH ULTREX RETRACTABLE SPLASH SHIELD TIP: Brand: SIMPULSE SOLO

## (undated) DEVICE — ANTIBACTERIAL UNDYED BRAIDED (POLYGLACTIN 910), SYNTHETIC ABSORBABLE SUTURE: Brand: COATED VICRYL

## (undated) DEVICE — HOOD: Brand: FLYTE, SURGICOOL

## (undated) DEVICE — SILVER-COATED ANTIMICROBIAL BARRIER DRESSING: Brand: ACTICOAT   4" X 8"

## (undated) DEVICE — ELECTRODE EZ CLEAN BLADE -0012

## (undated) DEVICE — ACE WRAP 6 IN UNSTERILE

## (undated) DEVICE — NEPTUNE E-SEP SMOKE EVACUATION PENCIL, COATED, 70MM BLADE, PUSH BUTTON SWITCH: Brand: NEPTUNE E-SEP

## (undated) DEVICE — PROXIMATE SKIN STAPLERS (35 WIDE) CONTAINS 35 STAINLESS STEEL STAPLES (FIXED HEAD): Brand: PROXIMATE

## (undated) DEVICE — ADHESIVE SKIN CLOSR DERMABOND PRINEO

## (undated) DEVICE — ABDOMINAL PAD: Brand: DERMACEA

## (undated) DEVICE — SUT MONOCRYL 3-0 PS-2 18 IN Y497G

## (undated) DEVICE — IMPERVIOUS STOCKINETTE: Brand: DEROYAL

## (undated) DEVICE — SUT VICRYL 2-0 CT-1 27 IN J259H

## (undated) DEVICE — PADDING CAST 4 IN  COTTON STRL

## (undated) DEVICE — SUT MONOCRYL 4-0 PS-2 18 IN Y496G

## (undated) DEVICE — RECIPROCATING BLADE, DOUBLE SIDED, OFFSET  (70.0 X 0.8 X 12.5MM)

## (undated) DEVICE — GLOVE INDICATOR PI UNDERGLOVE SZ 8.5 BLUE

## (undated) DEVICE — INTENDED FOR TISSUE SEPARATION, AND OTHER PROCEDURES THAT REQUIRE A SHARP SURGICAL BLADE TO PUNCTURE OR CUT.: Brand: BARD-PARKER ® CARBON RIB-BACK BLADES

## (undated) DEVICE — ADHESIVE SKIN HIGH VISCOSITY EXOFIN 1ML

## (undated) DEVICE — GLOVE SRG BIOGEL 6.5

## (undated) DEVICE — BETHLEHEM UNIV TOTAL KNEE, KIT: Brand: CARDINAL HEALTH

## (undated) DEVICE — SPONGE LAP 18 X 18 IN STRL RFD

## (undated) DEVICE — COBAN 4 IN STERILE

## (undated) DEVICE — SUT STRATAFIX SPIRAL MONOCRYL PLUS 3-0 PS-2 45CM SXMP1B107

## (undated) DEVICE — SPONGE PVP SCRUB WING STERILE

## (undated) DEVICE — GLOVE SRG BIOGEL 8.5

## (undated) DEVICE — ELECTRODE BLADE MOD  E-Z CLEAN 6.5IN -0014M

## (undated) DEVICE — CUFF TOURNIQUET 34 X 4 IN QUICK CONNECT DISP 1BLA

## (undated) DEVICE — SUT VICRYL 3-0 SH 27 IN J416H

## (undated) DEVICE — SPINNING CEMENT MIXING BOWL

## (undated) DEVICE — 3M™ IOBAN™ 2 ANTIMICROBIAL INCISE DRAPE 6650EZ: Brand: IOBAN™ 2

## (undated) DEVICE — SUT VICRYL 1 CT-1 27 IN J261H

## (undated) DEVICE — LIGHT HANDLE COVER SLEEVE DISP BLUE STELLAR

## (undated) DEVICE — BETHLEHEM UNIVERSAL BREAST PK: Brand: CARDINAL HEALTH

## (undated) DEVICE — PLUMEPEN PRO 10FT

## (undated) DEVICE — HANDPIECE SET WITH RETRACTABLE COAXIAL FAN SPRAY TIP AND SUCTION TUBE: Brand: INTERPULSE

## (undated) DEVICE — PENCIL ELECTROSURG E-Z CLEAN -0035H

## (undated) DEVICE — COOL TEMP PAD

## (undated) DEVICE — GLOVE SRG BIOGEL 8

## (undated) DEVICE — BASIC DOUBLE BASIN 2-LF: Brand: MEDLINE INDUSTRIES, INC.

## (undated) DEVICE — SPONGE SCRUB 4 PCT CHLORHEXIDINE

## (undated) DEVICE — 3 BONE CEMENT MIXER: Brand: MIXEVAC

## (undated) DEVICE — ELECTRODE BLADE MOD E-Z CLEAN  2.75IN 7CM -0012AM

## (undated) DEVICE — SUT VICRYL PLUS 1 CTB-1 36 IN VCPB947H

## (undated) DEVICE — INTENDED FOR TISSUE SEPARATION, AND OTHER PROCEDURES THAT REQUIRE A SHARP SURGICAL BLADE TO PUNCTURE OR CUT.: Brand: BARD-PARKER SAFETY BLADES SIZE 10, STERILE

## (undated) DEVICE — DRAPE SHEET THREE QUARTER

## (undated) DEVICE — TIBURON SPLIT SHEET: Brand: CONVERTORS

## (undated) DEVICE — INTENDED FOR TISSUE SEPARATION, AND OTHER PROCEDURES THAT REQUIRE A SHARP SURGICAL BLADE TO PUNCTURE OR CUT.: Brand: BARD-PARKER SAFETY BLADES SIZE 15, STERILE

## (undated) DEVICE — STIRRUP STRAP ADULT DISP

## (undated) DEVICE — CAPIT KNEE ATTUNE RP W/DOM

## (undated) DEVICE — NEEDLE 25G X 1 1/2

## (undated) DEVICE — BETHLEHEM UNIVERSAL OUTPATIENT: Brand: CARDINAL HEALTH

## (undated) DEVICE — CAPIT KNEE ATTUNE RP CEMENT - DEPUY

## (undated) DEVICE — DUAL CUT SAGITTAL BLADE

## (undated) DEVICE — GLOVE SRG LF STRL BGL SKNSNS 6.5 PF

## (undated) DEVICE — GOWN,SLEEVE,STERILE,W/CSR WRAP,1/P: Brand: MEDLINE

## (undated) DEVICE — PACK UNIVERSAL DRAPES SUB-Q ICD

## (undated) DEVICE — HEAVY DUTY TABLE COVER: Brand: CONVERTORS

## (undated) DEVICE — DRESSING XEROFORM 5 X 9

## (undated) DEVICE — ELECTRODE NEEDLE MEGAFINE 2IN E-Z CLEAN MEGADYNE -0118

## (undated) DEVICE — MAYO STAND COVER: Brand: CONVERTORS